# Patient Record
Sex: MALE | Race: WHITE | NOT HISPANIC OR LATINO | Employment: OTHER | URBAN - METROPOLITAN AREA
[De-identification: names, ages, dates, MRNs, and addresses within clinical notes are randomized per-mention and may not be internally consistent; named-entity substitution may affect disease eponyms.]

---

## 2018-01-04 ENCOUNTER — APPOINTMENT (OUTPATIENT)
Dept: GENERAL RADIOLOGY | Facility: HOSPITAL | Age: 68
End: 2018-01-04

## 2018-01-04 ENCOUNTER — HOSPITAL ENCOUNTER (OUTPATIENT)
Facility: HOSPITAL | Age: 68
Setting detail: OBSERVATION
LOS: 1 days | Discharge: HOME | End: 2018-01-05
Attending: EMERGENCY MEDICINE | Admitting: INTERNAL MEDICINE

## 2018-01-04 DIAGNOSIS — I48.91 ATRIAL FIBRILLATION WITH RVR (HCC): Primary | ICD-10-CM

## 2018-01-04 LAB
ALBUMIN SERPL-MCNC: 4 G/DL (ref 3.5–5.2)
ALBUMIN/GLOB SERPL: 1.3 G/DL
ALP SERPL-CCNC: 82 U/L (ref 39–117)
ALT SERPL W P-5'-P-CCNC: 6 U/L (ref 1–41)
ANION GAP SERPL CALCULATED.3IONS-SCNC: 16 MMOL/L
AST SERPL-CCNC: 10 U/L (ref 1–40)
BASOPHILS # BLD AUTO: 0.02 10*3/MM3 (ref 0–0.2)
BASOPHILS NFR BLD AUTO: 0.2 % (ref 0–1.5)
BILIRUB SERPL-MCNC: 1 MG/DL (ref 0.1–1.2)
BUN BLD-MCNC: 21 MG/DL (ref 8–23)
BUN/CREAT SERPL: 19.3 (ref 7–25)
CALCIUM SPEC-SCNC: 8.6 MG/DL (ref 8.6–10.5)
CHLORIDE SERPL-SCNC: 99 MMOL/L (ref 98–107)
CO2 SERPL-SCNC: 21 MMOL/L (ref 22–29)
CREAT BLD-MCNC: 1.09 MG/DL (ref 0.76–1.27)
DEPRECATED RDW RBC AUTO: 43.6 FL (ref 37–54)
EOSINOPHIL # BLD AUTO: 0.04 10*3/MM3 (ref 0–0.7)
EOSINOPHIL NFR BLD AUTO: 0.5 % (ref 0.3–6.2)
ERYTHROCYTE [DISTWIDTH] IN BLOOD BY AUTOMATED COUNT: 14.2 % (ref 11.5–14.5)
GFR SERPL CREATININE-BSD FRML MDRD: 67 ML/MIN/1.73
GLOBULIN UR ELPH-MCNC: 3 GM/DL
GLUCOSE BLD-MCNC: 244 MG/DL (ref 65–99)
GLUCOSE BLDC GLUCOMTR-MCNC: 214 MG/DL (ref 70–130)
HCT VFR BLD AUTO: 42.7 % (ref 40.4–52.2)
HGB BLD-MCNC: 14.4 G/DL (ref 13.7–17.6)
HOLD SPECIMEN: NORMAL
HOLD SPECIMEN: NORMAL
IMM GRANULOCYTES # BLD: 0 10*3/MM3 (ref 0–0.03)
IMM GRANULOCYTES NFR BLD: 0 % (ref 0–0.5)
LYMPHOCYTES # BLD AUTO: 0.55 10*3/MM3 (ref 0.9–4.8)
LYMPHOCYTES NFR BLD AUTO: 6.6 % (ref 19.6–45.3)
MCH RBC QN AUTO: 28.4 PG (ref 27–32.7)
MCHC RBC AUTO-ENTMCNC: 33.7 G/DL (ref 32.6–36.4)
MCV RBC AUTO: 84.2 FL (ref 79.8–96.2)
MONOCYTES # BLD AUTO: 0.27 10*3/MM3 (ref 0.2–1.2)
MONOCYTES NFR BLD AUTO: 3.2 % (ref 5–12)
NEUTROPHILS # BLD AUTO: 7.51 10*3/MM3 (ref 1.9–8.1)
NEUTROPHILS NFR BLD AUTO: 89.5 % (ref 42.7–76)
PLATELET # BLD AUTO: 211 10*3/MM3 (ref 140–500)
PMV BLD AUTO: 10.9 FL (ref 6–12)
POTASSIUM BLD-SCNC: 4 MMOL/L (ref 3.5–5.2)
PROT SERPL-MCNC: 7 G/DL (ref 6–8.5)
RBC # BLD AUTO: 5.07 10*6/MM3 (ref 4.6–6)
SODIUM BLD-SCNC: 136 MMOL/L (ref 136–145)
TROPONIN T SERPL-MCNC: <0.01 NG/ML (ref 0–0.03)
TROPONIN T SERPL-MCNC: <0.01 NG/ML (ref 0–0.03)
WBC NRBC COR # BLD: 8.39 10*3/MM3 (ref 4.5–10.7)
WHOLE BLOOD HOLD SPECIMEN: NORMAL
WHOLE BLOOD HOLD SPECIMEN: NORMAL

## 2018-01-04 PROCEDURE — 93005 ELECTROCARDIOGRAM TRACING: CPT

## 2018-01-04 PROCEDURE — 99284 EMERGENCY DEPT VISIT MOD MDM: CPT

## 2018-01-04 PROCEDURE — 25010000002 ENOXAPARIN PER 10 MG: Performed by: INTERNAL MEDICINE

## 2018-01-04 PROCEDURE — 85025 COMPLETE CBC W/AUTO DIFF WBC: CPT | Performed by: EMERGENCY MEDICINE

## 2018-01-04 PROCEDURE — 84484 ASSAY OF TROPONIN QUANT: CPT | Performed by: NURSE PRACTITIONER

## 2018-01-04 PROCEDURE — 80053 COMPREHEN METABOLIC PANEL: CPT | Performed by: EMERGENCY MEDICINE

## 2018-01-04 PROCEDURE — 96372 THER/PROPH/DIAG INJ SC/IM: CPT

## 2018-01-04 PROCEDURE — 99223 1ST HOSP IP/OBS HIGH 75: CPT | Performed by: INTERNAL MEDICINE

## 2018-01-04 PROCEDURE — 93005 ELECTROCARDIOGRAM TRACING: CPT | Performed by: NURSE PRACTITIONER

## 2018-01-04 PROCEDURE — 63710000001 INSULIN ASPART PER 5 UNITS: Performed by: INTERNAL MEDICINE

## 2018-01-04 PROCEDURE — 84484 ASSAY OF TROPONIN QUANT: CPT | Performed by: EMERGENCY MEDICINE

## 2018-01-04 PROCEDURE — 71046 X-RAY EXAM CHEST 2 VIEWS: CPT

## 2018-01-04 PROCEDURE — 82962 GLUCOSE BLOOD TEST: CPT

## 2018-01-04 PROCEDURE — 93010 ELECTROCARDIOGRAM REPORT: CPT | Performed by: INTERNAL MEDICINE

## 2018-01-04 PROCEDURE — 36415 COLL VENOUS BLD VENIPUNCTURE: CPT | Performed by: EMERGENCY MEDICINE

## 2018-01-04 RX ORDER — CITALOPRAM 10 MG/1
10 TABLET ORAL DAILY
Status: DISCONTINUED | OUTPATIENT
Start: 2018-01-05 | End: 2018-01-05 | Stop reason: HOSPADM

## 2018-01-04 RX ORDER — CITALOPRAM 20 MG/1
20 TABLET ORAL DAILY
COMMUNITY

## 2018-01-04 RX ORDER — LUBIPROSTONE 24 UG/1
24 CAPSULE ORAL
COMMUNITY
End: 2019-08-16 | Stop reason: ALTCHOICE

## 2018-01-04 RX ORDER — MECLIZINE HYDROCHLORIDE 25 MG/1
25 TABLET ORAL 3 TIMES DAILY PRN
Status: DISCONTINUED | OUTPATIENT
Start: 2018-01-04 | End: 2018-01-05 | Stop reason: HOSPADM

## 2018-01-04 RX ORDER — ACETAMINOPHEN 325 MG/1
650 TABLET ORAL EVERY 6 HOURS PRN
Status: DISCONTINUED | OUTPATIENT
Start: 2018-01-04 | End: 2018-01-05 | Stop reason: HOSPADM

## 2018-01-04 RX ORDER — DEXTROSE MONOHYDRATE 25 G/50ML
25 INJECTION, SOLUTION INTRAVENOUS
Status: DISCONTINUED | OUTPATIENT
Start: 2018-01-04 | End: 2018-01-05 | Stop reason: HOSPADM

## 2018-01-04 RX ORDER — ALPRAZOLAM 0.25 MG/1
0.25 TABLET ORAL 4 TIMES DAILY PRN
Status: DISCONTINUED | OUTPATIENT
Start: 2018-01-04 | End: 2018-01-05 | Stop reason: HOSPADM

## 2018-01-04 RX ORDER — GABAPENTIN 300 MG/1
300 CAPSULE ORAL 3 TIMES DAILY
Status: DISCONTINUED | OUTPATIENT
Start: 2018-01-04 | End: 2018-01-05 | Stop reason: HOSPADM

## 2018-01-04 RX ORDER — NICOTINE POLACRILEX 4 MG
15 LOZENGE BUCCAL
Status: DISCONTINUED | OUTPATIENT
Start: 2018-01-04 | End: 2018-01-05 | Stop reason: HOSPADM

## 2018-01-04 RX ORDER — GLIPIZIDE 10 MG/1
5 TABLET, FILM COATED, EXTENDED RELEASE ORAL DAILY
Status: ON HOLD | COMMUNITY
Start: 2012-10-22 | End: 2022-03-23

## 2018-01-04 RX ORDER — LISINOPRIL 20 MG/1
20 TABLET ORAL DAILY
Status: DISCONTINUED | OUTPATIENT
Start: 2018-01-05 | End: 2018-01-05 | Stop reason: HOSPADM

## 2018-01-04 RX ORDER — OXYBUTYNIN CHLORIDE 10 MG/1
10 TABLET, EXTENDED RELEASE ORAL DAILY
Status: DISCONTINUED | OUTPATIENT
Start: 2018-01-05 | End: 2018-01-05 | Stop reason: HOSPADM

## 2018-01-04 RX ORDER — LISINOPRIL 20 MG/1
20 TABLET ORAL DAILY
COMMUNITY
End: 2018-02-02 | Stop reason: ALTCHOICE

## 2018-01-04 RX ORDER — ASPIRIN 325 MG
325 TABLET ORAL ONCE
Status: DISCONTINUED | OUTPATIENT
Start: 2018-01-04 | End: 2018-01-04

## 2018-01-04 RX ORDER — ALLOPURINOL 100 MG/1
100 TABLET ORAL DAILY
COMMUNITY

## 2018-01-04 RX ORDER — TRAZODONE HYDROCHLORIDE 50 MG/1
25 TABLET ORAL NIGHTLY
COMMUNITY

## 2018-01-04 RX ORDER — GLIPIZIDE 10 MG/1
10 TABLET, FILM COATED, EXTENDED RELEASE ORAL
Status: DISCONTINUED | OUTPATIENT
Start: 2018-01-05 | End: 2018-01-05 | Stop reason: HOSPADM

## 2018-01-04 RX ORDER — ALLOPURINOL 100 MG/1
100 TABLET ORAL DAILY
Status: DISCONTINUED | OUTPATIENT
Start: 2018-01-05 | End: 2018-01-05 | Stop reason: HOSPADM

## 2018-01-04 RX ORDER — TRAZODONE HYDROCHLORIDE 50 MG/1
50 TABLET ORAL NIGHTLY
Status: DISCONTINUED | OUTPATIENT
Start: 2018-01-04 | End: 2018-01-05 | Stop reason: HOSPADM

## 2018-01-04 RX ORDER — SODIUM CHLORIDE 0.9 % (FLUSH) 0.9 %
10 SYRINGE (ML) INJECTION AS NEEDED
Status: DISCONTINUED | OUTPATIENT
Start: 2018-01-04 | End: 2018-01-05 | Stop reason: HOSPADM

## 2018-01-04 RX ORDER — LUBIPROSTONE 24 UG/1
24 CAPSULE ORAL 2 TIMES DAILY WITH MEALS
Status: DISCONTINUED | OUTPATIENT
Start: 2018-01-04 | End: 2018-01-05 | Stop reason: HOSPADM

## 2018-01-04 RX ORDER — SODIUM CHLORIDE 0.9 % (FLUSH) 0.9 %
1-10 SYRINGE (ML) INJECTION AS NEEDED
Status: DISCONTINUED | OUTPATIENT
Start: 2018-01-04 | End: 2018-01-05 | Stop reason: HOSPADM

## 2018-01-04 RX ORDER — ONDANSETRON 2 MG/ML
4 INJECTION INTRAMUSCULAR; INTRAVENOUS EVERY 6 HOURS PRN
Status: DISCONTINUED | OUTPATIENT
Start: 2018-01-04 | End: 2018-01-05 | Stop reason: HOSPADM

## 2018-01-04 RX ORDER — MECLIZINE HYDROCHLORIDE 25 MG/1
25 TABLET ORAL 3 TIMES DAILY PRN
COMMUNITY
End: 2018-12-14 | Stop reason: HOSPADM

## 2018-01-04 RX ORDER — OXYBUTYNIN CHLORIDE 10 MG/1
10 TABLET, EXTENDED RELEASE ORAL DAILY
COMMUNITY
End: 2020-09-11

## 2018-01-04 RX ORDER — GABAPENTIN 300 MG/1
300 CAPSULE ORAL DAILY
COMMUNITY
End: 2018-08-10 | Stop reason: ALTCHOICE

## 2018-01-04 RX ORDER — ATORVASTATIN CALCIUM 10 MG/1
10 TABLET, FILM COATED ORAL DAILY
Status: DISCONTINUED | OUTPATIENT
Start: 2018-01-05 | End: 2018-01-05 | Stop reason: HOSPADM

## 2018-01-04 RX ORDER — CARVEDILOL 3.12 MG/1
3.12 TABLET ORAL EVERY 12 HOURS SCHEDULED
Status: DISCONTINUED | OUTPATIENT
Start: 2018-01-04 | End: 2018-01-05 | Stop reason: HOSPADM

## 2018-01-04 RX ADMIN — GABAPENTIN 300 MG: 300 CAPSULE ORAL at 23:07

## 2018-01-04 RX ADMIN — INSULIN ASPART 4 UNITS: 100 INJECTION, SOLUTION INTRAVENOUS; SUBCUTANEOUS at 21:25

## 2018-01-04 RX ADMIN — TRAZODONE HYDROCHLORIDE 50 MG: 50 TABLET, FILM COATED ORAL at 23:08

## 2018-01-04 RX ADMIN — LUBIPROSTONE 24 MCG: 24 CAPSULE, GELATIN COATED ORAL at 23:08

## 2018-01-04 RX ADMIN — ENOXAPARIN SODIUM 100 MG: 100 INJECTION SUBCUTANEOUS at 23:12

## 2018-01-04 RX ADMIN — DILTIAZEM HYDROCHLORIDE 5 MG/HR: 100 INJECTION, POWDER, LYOPHILIZED, FOR SOLUTION INTRAVENOUS at 16:15

## 2018-01-04 RX ADMIN — ENOXAPARIN SODIUM 100 MG: 100 INJECTION SUBCUTANEOUS at 18:16

## 2018-01-04 RX ADMIN — CARVEDILOL 3.12 MG: 3.12 TABLET, FILM COATED ORAL at 21:25

## 2018-01-04 NOTE — ED PROVIDER NOTES
Pt is a 67 y.o. Male reporting to the ED with palpitations, onset a few hours ago today. Pt states that he was seen by his PCP for evaluation for disability. At this time the pt had an EKG performed which showed an abnormal, rapid heart rate. Pt also c/o SOA for 1.5 weeks. Pt denies CP or cough. The pt reports he is not currently on any blood thinners. On exam, lungs CTAB with faint crackles in bilateral bases, heart has irregularly irregular rhythm with HR in the 90s-120s on Cardizem, R chest wall there is a brain stimulator secondary to Parkinson's, abd is soft, non-tender, no BLE edema, and intact distal pulses. He is stable and no distress.     Pt had afib on the monitor and on my initial examination.     1725 - Consulted with Dr. Velasquez ([facility]) who agrees to admit the pt.     1735 - Rechecked pt. Pt is resting comfortably, and appears to have converted back into NSR.  Informed pt of the plan for admission for his new onset afib. He agrees with the plan for admission. All questions answered. He states he has no contraindications to anticoagulation.     EKG           EKG time: 1531  Rhythm/Rate: afib, 123  P waves and WV: afib  QRS, axis: narrow complex, normal axis   ST and T waves: nonspecific changes     Interpreted Contemporaneously by me, independently viewed  No prior records for comparison      I supervised care provided by the midlevel provider.    We have discussed this patient's history, physical exam, and treatment plan.   I have reviewed the note and personally saw and examined the patient and agree with the plan of care.    Documentation assistance provided by bob Lugo for Dr. Walsh.  Information recorded by the bob was done at my direction and has been verified and validated by me.         Ramo Lugo  01/04/18 1824       Tony Walsh MD  01/04/18 1910

## 2018-01-04 NOTE — ED PROVIDER NOTES
"  EMERGENCY DEPARTMENT ENCOUNTER    CHIEF COMPLAINT  Chief Complaint: rapid heart rate  History given by: patient, family  History limited by: N/A  Room Number: 41/41  PMD: Harvey Larson MD      HPI:  Pt is a 67 y.o. male who presents with rapid heart rate. Per family, while pt was at routine visit at PMD's office earlier today at the [facility], he underwent EKG that showed \"irregular rhythm and elevated HR\". Pt states that consequently, PMD referred him to ED for further evaluation. He reports that today, he has had intermittent, waxing and waning rapid palpitations, dyspnea, and heaviness to left chest. He also notes that he had nausea and 2 episodes of vomiting yesterday, both of which have resolved. He denies weakness, dizziness, sweating, fevers, chills, diarrhea, abd pain, cough, hx of atrial fibrillation, and hx of heart disease. He took 81mg ASA earlier today. Past Medical History of Parkinson's disease (has brain stimulator in place), diabetes, and HTN.     Duration: noticed today  Timing: unknown  Location: cardiovascular  Radiation: none  Quality: \"irregular rhythm and elevated HR\"  Intensity/Severity: moderate  Progression: unknown  Associated Symptoms: rapid palpitations, dyspnea, heaviness to left chest, nausea (occurred yesterday, resolved), vomiting x2 (occurred yesterday, resolved)  Aggravating Factors: none   Alleviating Factors: none  Previous Episodes: none  Treatment before arrival: none    PAST MEDICAL HISTORY  Active Ambulatory Problems     Diagnosis Date Noted   • ASCVD (arteriosclerotic cardiovascular disease) 01/27/2016   • Chronic atrial fibrillation 01/27/2016   • Anticoagulated on warfarin 01/27/2016   • Diabetic retinopathy associated with type 2 diabetes mellitus 01/27/2016   • Essential hypertension 01/27/2016   • HLD (hyperlipidemia) 01/27/2016   • Hypothyroidism 01/27/2016   • Peripheral neuropathy 01/27/2016   • Type 2 diabetes mellitus 01/27/2016   • Renal insufficiency " 01/27/2016     Resolved Ambulatory Problems     Diagnosis Date Noted   • No Resolved Ambulatory Problems     Past Medical History:   Diagnosis Date   • Hypertension    • Parkinson's disease        PAST SURGICAL HISTORY  Past Surgical History:   Procedure Laterality Date   • BRAIN STIMULATOR     • JOINT REPLACEMENT      knee, shoulder       FAMILY HISTORY  History reviewed. No pertinent family history.    SOCIAL HISTORY  Social History     Social History   • Marital status:      Spouse name: N/A   • Number of children: N/A   • Years of education: N/A     Occupational History   • Not on file.     Social History Main Topics   • Smoking status: Never Smoker   • Smokeless tobacco: Not on file   • Alcohol use No   • Drug use: No   • Sexual activity: Defer     Other Topics Concern   • Not on file     Social History Narrative   • No narrative on file         ALLERGIES  Bacitracin; Fish-derived products; Neosporin [neomycin-bacitracin zn-polymyx]; and Shrimp (diagnostic)    REVIEW OF SYSTEMS  Review of Systems   Constitutional: Negative for chills and fever.   HENT: Negative for sore throat.    Respiratory: Positive for shortness of breath. Negative for cough.    Cardiovascular: Positive for chest pain (heaviness to left chest) and palpitations (rapid palpitations).   Gastrointestinal: Positive for nausea (occurred yesterday, resolved) and vomiting (x2, occurred yesterday, resolved). Negative for abdominal pain and diarrhea.   Genitourinary: Negative for difficulty urinating and dysuria.   Musculoskeletal: Negative for back pain.   Skin: Negative for rash.   Neurological: Negative for dizziness and weakness.   Psychiatric/Behavioral: The patient is not nervous/anxious.        PHYSICAL EXAM  ED Triage Vitals   Temp Heart Rate Resp BP SpO2   01/04/18 1216 01/04/18 1138 01/04/18 1138 01/04/18 1138 01/04/18 1138   97.7 °F (36.5 °C) 77 20 111/77 95 % WNL       Physical Exam   Constitutional: He is oriented to person,  place, and time and well-developed, well-nourished, and in no distress. No distress.   HENT:   Head: Normocephalic and atraumatic.   Mouth/Throat: Mucous membranes are normal.   Eyes: EOM are normal. No scleral icterus.   Neck: Normal range of motion.   Cardiovascular: Normal heart sounds.  An irregularly irregular rhythm present. Tachycardia present.    HR is in 160s   Pulmonary/Chest: Effort normal and breath sounds normal. No respiratory distress.   Abdominal: Soft. There is no tenderness. There is no rebound and no guarding.   Musculoskeletal: Normal range of motion. He exhibits no edema (no pedal edema).   Neurological: He is alert and oriented to person, place, and time.   Skin: Skin is warm and dry.   Psychiatric: Mood and affect normal.   Nursing note and vitals reviewed.      LAB RESULTS  Recent Results (from the past 24 hour(s))   Comprehensive Metabolic Panel    Collection Time: 01/04/18 12:44 PM   Result Value Ref Range    Glucose 244 (H) 65 - 99 mg/dL    BUN 21 8 - 23 mg/dL    Creatinine 1.09 0.76 - 1.27 mg/dL    Sodium 136 136 - 145 mmol/L    Potassium 4.0 3.5 - 5.2 mmol/L    Chloride 99 98 - 107 mmol/L    CO2 21.0 (L) 22.0 - 29.0 mmol/L    Calcium 8.6 8.6 - 10.5 mg/dL    Total Protein 7.0 6.0 - 8.5 g/dL    Albumin 4.00 3.50 - 5.20 g/dL    ALT (SGPT) 6 1 - 41 U/L    AST (SGOT) 10 1 - 40 U/L    Alkaline Phosphatase 82 39 - 117 U/L    Total Bilirubin 1.0 0.1 - 1.2 mg/dL    eGFR Non African Amer 67 >60 mL/min/1.73    Globulin 3.0 gm/dL    A/G Ratio 1.3 g/dL    BUN/Creatinine Ratio 19.3 7.0 - 25.0    Anion Gap 16.0 mmol/L   Troponin    Collection Time: 01/04/18 12:44 PM   Result Value Ref Range    Troponin T <0.010 0.000 - 0.030 ng/mL   Light Blue Top    Collection Time: 01/04/18 12:44 PM   Result Value Ref Range    Extra Tube hold for add-on    Green Top (Gel)    Collection Time: 01/04/18 12:44 PM   Result Value Ref Range    Extra Tube Hold for add-ons.    Lavender Top    Collection Time: 01/04/18 12:44  PM   Result Value Ref Range    Extra Tube hold for add-on    Gold Top - SST    Collection Time: 01/04/18 12:44 PM   Result Value Ref Range    Extra Tube Hold for add-ons.    CBC Auto Differential    Collection Time: 01/04/18 12:44 PM   Result Value Ref Range    WBC 8.39 4.50 - 10.70 10*3/mm3    RBC 5.07 4.60 - 6.00 10*6/mm3    Hemoglobin 14.4 13.7 - 17.6 g/dL    Hematocrit 42.7 40.4 - 52.2 %    MCV 84.2 79.8 - 96.2 fL    MCH 28.4 27.0 - 32.7 pg    MCHC 33.7 32.6 - 36.4 g/dL    RDW 14.2 11.5 - 14.5 %    RDW-SD 43.6 37.0 - 54.0 fl    MPV 10.9 6.0 - 12.0 fL    Platelets 211 140 - 500 10*3/mm3    Neutrophil % 89.5 (H) 42.7 - 76.0 %    Lymphocyte % 6.6 (L) 19.6 - 45.3 %    Monocyte % 3.2 (L) 5.0 - 12.0 %    Eosinophil % 0.5 0.3 - 6.2 %    Basophil % 0.2 0.0 - 1.5 %    Immature Grans % 0.0 0.0 - 0.5 %    Neutrophils, Absolute 7.51 1.90 - 8.10 10*3/mm3    Lymphocytes, Absolute 0.55 (L) 0.90 - 4.80 10*3/mm3    Monocytes, Absolute 0.27 0.20 - 1.20 10*3/mm3    Eosinophils, Absolute 0.04 0.00 - 0.70 10*3/mm3    Basophils, Absolute 0.02 0.00 - 0.20 10*3/mm3    Immature Grans, Absolute 0.00 0.00 - 0.03 10*3/mm3   Troponin    Collection Time: 01/04/18  4:00 PM   Result Value Ref Range    Troponin T <0.010 0.000 - 0.030 ng/mL       I ordered the above labs and reviewed the results      RADIOLOGY            XR Chest 2 View (Final result) Result time: 01/04/18 13:17:23     Final result by Myron Rutledge MD (01/04/18 13:17:23)     Impression:     No focal pulmonary consolidation. Follow-up as clinical  indications persist.     This report was finalized on 1/4/2018 1:17 PM by Dr. Myron Rutledge MD.        Narrative:     XR CHEST 2 VW-     HISTORY: Male who is 67 years-old,  chest pain     TECHNIQUE: Frontal and lateral views of the chest     COMPARISON: 11/16/2015     FINDINGS: Heart size is normal. Pulmonary vasculature is unremarkable.  Aorta is tortuous. Right-sided generator device is seen with leads  extending to the  right aspect of the neck, both the image. No focal  pulmonary consolidation, pleural effusion, or pneumothorax. No acute  osseous process.                   I ordered the above noted radiological studies and reviewed the images on the PACS system.        EKG    EKG was interpreted by Dr. Walsh. See Dr Walsh's note for EKG interpretation.       PROGRESS AND CONSULTS  3:42 PM- Reviewed pt's history and workup with Dr. Walsh.  At bedside evaluation, they agree with the plan of care.  3:47 PM- EKG shows atrial fibrillation with RVR. Ordered cardizem drip and bolus for rate control.   3:50 PM- Informed pt and family that pt's EKG shows new onset atrial fibrillation with RVR for which pt will be administered medication for rate control. They verbalize understanding and agreement with plan.  5:07 PM- Rechecked pt. He is resting comfortably and is in no acute distress. HR is variable between 100s to 120s, atrial fibrillation.   5:09 PM- Sent call out to [facility] for admission.   5:33 PM- Discussed case with Dr Louis (cardiologist)  Reviewed history, exam, results and treatments.  Discussed concerns and plan of care. Dr Louis accepts pt to be admitted to telemetry.  5:35 PM- Rechecked pt. He continues to rest comfortably and remains in no acute distress. HR is in 70s. Rhythm has now converted to sinus rhythm on the heart monitor. BP is 113/63. O2 sat is 95% on room air. Discussed with pt and family about all pertinent results including CXR findings (no acute process), normal renal function, normal WBC count, and negative troponin. Informed pt and family that per heart monitor, pt has now converted to sinus rhythm. Discussed pt admission for further care, cardiology evaluation, and observation. Pt and family verbalize understanding and agreement with plan.   6:08 PM- Pt and family state that pt has no contraindication for anticoagulation. Ordered lovenox for anticoagulation.  6:48 PM- Pt's  "cardizem has been discontinued.       ADMISSION    Discussed treatment plan and reason for admission with pt/family and admitting physician.  Pt/family voiced understanding of the plan for admission for further testing/treatment as needed.      DIAGNOSIS  Final diagnoses:   New onset atrial fibrillation with RVR           COURSE & MEDICAL DECISION MAKING  Pertinent Labs and Imaging studies that were ordered and reviewed are noted above.  Results were reviewed/discussed with the patient and family and they were also made aware of online assess.   Pt and family also made aware that some labs, such as cultures, will not be resulted during ER visit and follow up with PMD is necessary.     MEDICATIONS GIVEN IN ER  Medications   sodium chloride 0.9 % flush 10 mL (not administered)   aspirin tablet 325 mg (325 mg Oral Not Given 1/4/18 1523)   diltiaZEM (CARDIZEM) IVPB 100 mg/100 mL (1 mg/mL) NS (add-vantage) (5 mg/hr Intravenous New Bag 1/4/18 1615)   diltiazem (CARDIZEM) bolus from bag 1 mg/mL 5 mg (5 mg Intravenous Bolus from Bag 1/4/18 1615)   enoxaparin (LOVENOX) syringe 100 mg (100 mg Subcutaneous Given 1/4/18 1816)       /76  Pulse 89  Temp 97.7 °F (36.5 °C) (Tympanic)   Resp 20  Ht 185.4 cm (73\")  Wt 103 kg (227 lb)  SpO2 94%  BMI 29.95 kg/m2      I personally reviewed the past medical history, past surgical history, social history, family history, current medications and allergies as they appear in this chart.  The scribe's note accurately reflects the work and decisions made by me.     Documentation assistance provided by bob Sparks for ELVIS Ashraf on 1/4/2018 at 6:28 PM. Information recorded by the bob was done at my direction and has been verified and validated by me.     Yaron Sparks  01/04/18 6538       ESME Plascencia  01/04/18 1856    "

## 2018-01-05 ENCOUNTER — APPOINTMENT (OUTPATIENT)
Dept: CARDIOLOGY | Facility: HOSPITAL | Age: 68
End: 2018-01-05
Attending: INTERNAL MEDICINE

## 2018-01-05 VITALS
HEART RATE: 56 BPM | RESPIRATION RATE: 18 BRPM | TEMPERATURE: 97.6 F | BODY MASS INDEX: 29.95 KG/M2 | HEIGHT: 73 IN | OXYGEN SATURATION: 95 % | WEIGHT: 226 LBS | DIASTOLIC BLOOD PRESSURE: 82 MMHG | SYSTOLIC BLOOD PRESSURE: 107 MMHG

## 2018-01-05 LAB
ALBUMIN SERPL-MCNC: 3.5 G/DL (ref 3.5–5.2)
ALBUMIN/GLOB SERPL: 1.3 G/DL
ALP SERPL-CCNC: 63 U/L (ref 39–117)
ALT SERPL W P-5'-P-CCNC: <5 U/L (ref 1–41)
ANION GAP SERPL CALCULATED.3IONS-SCNC: 14.1 MMOL/L
ANION GAP SERPL CALCULATED.3IONS-SCNC: 14.1 MMOL/L
AST SERPL-CCNC: 8 U/L (ref 1–40)
BH CV ECHO MEAS - ACS: 2.3 CM
BH CV ECHO MEAS - AO MEAN PG (FULL): 0 MMHG
BH CV ECHO MEAS - AO MEAN PG: 3 MMHG
BH CV ECHO MEAS - AO ROOT AREA (BSA CORRECTED): 1.7
BH CV ECHO MEAS - AO ROOT AREA: 9.6 CM^2
BH CV ECHO MEAS - AO ROOT DIAM: 3.5 CM
BH CV ECHO MEAS - AO V2 MEAN: 81.4 CM/SEC
BH CV ECHO MEAS - AO V2 VTI: 24.6 CM
BH CV ECHO MEAS - AVA(I,A): 4 CM^2
BH CV ECHO MEAS - AVA(I,D): 4 CM^2
BH CV ECHO MEAS - BSA(HAYCOCK): 2.2 M^2
BH CV ECHO MEAS - BSA: 2.1 M^2
BH CV ECHO MEAS - BZI_BMI: 37.6 KILOGRAMS/M^2
BH CV ECHO MEAS - BZI_METRIC_HEIGHT: 165.1 CM
BH CV ECHO MEAS - BZI_METRIC_WEIGHT: 102.5 KG
BH CV ECHO MEAS - CONTRAST EF (2CH): 63.3 ML/M^2
BH CV ECHO MEAS - CONTRAST EF 4CH: 66.4 ML/M^2
BH CV ECHO MEAS - EDV(CUBED): 85.2 ML
BH CV ECHO MEAS - EDV(MOD-SP2): 79 ML
BH CV ECHO MEAS - EDV(MOD-SP4): 128 ML
BH CV ECHO MEAS - EDV(TEICH): 87.7 ML
BH CV ECHO MEAS - EF(CUBED): 74.2 %
BH CV ECHO MEAS - EF(MOD-SP2): 63.3 %
BH CV ECHO MEAS - EF(MOD-SP4): 66.4 %
BH CV ECHO MEAS - EF(TEICH): 66.3 %
BH CV ECHO MEAS - ESV(CUBED): 22 ML
BH CV ECHO MEAS - ESV(MOD-SP2): 29 ML
BH CV ECHO MEAS - ESV(MOD-SP4): 43 ML
BH CV ECHO MEAS - ESV(TEICH): 29.6 ML
BH CV ECHO MEAS - FS: 36.4 %
BH CV ECHO MEAS - IVS/LVPW: 1
BH CV ECHO MEAS - IVSD: 1.2 CM
BH CV ECHO MEAS - LAT PEAK E' VEL: 8 CM/SEC
BH CV ECHO MEAS - LV DIASTOLIC VOL/BSA (35-75): 61.4 ML/M^2
BH CV ECHO MEAS - LV MASS(C)D: 191.3 GRAMS
BH CV ECHO MEAS - LV MASS(C)DI: 91.8 GRAMS/M^2
BH CV ECHO MEAS - LV MEAN PG: 3 MMHG
BH CV ECHO MEAS - LV SYSTOLIC VOL/BSA (12-30): 20.6 ML/M^2
BH CV ECHO MEAS - LV V1 MEAN: 80 CM/SEC
BH CV ECHO MEAS - LV V1 VTI: 26.1 CM
BH CV ECHO MEAS - LVIDD: 4.4 CM
BH CV ECHO MEAS - LVIDS: 2.8 CM
BH CV ECHO MEAS - LVLD AP2: 8.8 CM
BH CV ECHO MEAS - LVLD AP4: 9 CM
BH CV ECHO MEAS - LVLS AP2: 7.4 CM
BH CV ECHO MEAS - LVLS AP4: 7.6 CM
BH CV ECHO MEAS - LVOT AREA (M): 3.8 CM^2
BH CV ECHO MEAS - LVOT AREA: 3.8 CM^2
BH CV ECHO MEAS - LVOT DIAM: 2.2 CM
BH CV ECHO MEAS - LVPWD: 1.2 CM
BH CV ECHO MEAS - MED PEAK E' VEL: 5.9 CM/SEC
BH CV ECHO MEAS - MV A DUR: 0.23 SEC
BH CV ECHO MEAS - MV A MAX VEL: 44.9 CM/SEC
BH CV ECHO MEAS - MV DEC SLOPE: 275 CM/SEC^2
BH CV ECHO MEAS - MV DEC TIME: 0.23 SEC
BH CV ECHO MEAS - MV E MAX VEL: 83.4 CM/SEC
BH CV ECHO MEAS - MV E/A: 1.9
BH CV ECHO MEAS - MV MEAN PG: 1 MMHG
BH CV ECHO MEAS - MV P1/2T MAX VEL: 91.5 CM/SEC
BH CV ECHO MEAS - MV P1/2T: 97.5 MSEC
BH CV ECHO MEAS - MV V2 MEAN: 53.7 CM/SEC
BH CV ECHO MEAS - MV V2 VTI: 27.7 CM
BH CV ECHO MEAS - MVA P1/2T LCG: 2.4 CM^2
BH CV ECHO MEAS - MVA(P1/2T): 2.3 CM^2
BH CV ECHO MEAS - MVA(VTI): 3.6 CM^2
BH CV ECHO MEAS - PA ACC SLOPE: 692 CM/SEC^2
BH CV ECHO MEAS - PA ACC TIME: 0.12 SEC
BH CV ECHO MEAS - PA MAX PG: 2.8 MMHG
BH CV ECHO MEAS - PA PR(ACCEL): 25 MMHG
BH CV ECHO MEAS - PA V2 MAX: 84.2 CM/SEC
BH CV ECHO MEAS - PULM A REVS DUR: 0.15 SEC
BH CV ECHO MEAS - PULM A REVS VEL: 22.4 CM/SEC
BH CV ECHO MEAS - PULM DIAS VEL: 71 CM/SEC
BH CV ECHO MEAS - PULM S/D: 0.68
BH CV ECHO MEAS - PULM SYS VEL: 48.2 CM/SEC
BH CV ECHO MEAS - QP/QS: 0.59
BH CV ECHO MEAS - RAP SYSTOLE: 8 MMHG
BH CV ECHO MEAS - RV MEAN PG: 1 MMHG
BH CV ECHO MEAS - RV V1 MEAN: 44.3 CM/SEC
BH CV ECHO MEAS - RV V1 VTI: 15.3 CM
BH CV ECHO MEAS - RVOT AREA: 3.8 CM^2
BH CV ECHO MEAS - RVOT DIAM: 2.2 CM
BH CV ECHO MEAS - RVSP: 30 MMHG
BH CV ECHO MEAS - SI(AO): 113.6 ML/M^2
BH CV ECHO MEAS - SI(CUBED): 30.3 ML/M^2
BH CV ECHO MEAS - SI(LVOT): 47.6 ML/M^2
BH CV ECHO MEAS - SI(MOD-SP2): 24 ML/M^2
BH CV ECHO MEAS - SI(MOD-SP4): 40.8 ML/M^2
BH CV ECHO MEAS - SI(TEICH): 27.9 ML/M^2
BH CV ECHO MEAS - SV(AO): 236.7 ML
BH CV ECHO MEAS - SV(CUBED): 63.2 ML
BH CV ECHO MEAS - SV(LVOT): 99.2 ML
BH CV ECHO MEAS - SV(MOD-SP2): 50 ML
BH CV ECHO MEAS - SV(MOD-SP4): 85 ML
BH CV ECHO MEAS - SV(RVOT): 58.2 ML
BH CV ECHO MEAS - SV(TEICH): 58.1 ML
BH CV ECHO MEAS - TAPSE (>1.6): 2.3 CM2
BH CV ECHO MEAS - TR MAX VEL: 235 CM/SEC
BH CV XLRA - RV BASE: 3.8 CM
BH CV XLRA - RV LENGTH: 7.1 CM
BH CV XLRA - RV MID: 2.7 CM
BH CV XLRA - TDI S': 11.2 CM/SEC
BILIRUB SERPL-MCNC: 0.7 MG/DL (ref 0.1–1.2)
BUN BLD-MCNC: 21 MG/DL (ref 8–23)
BUN BLD-MCNC: 21 MG/DL (ref 8–23)
BUN/CREAT SERPL: 19.4 (ref 7–25)
BUN/CREAT SERPL: 20.8 (ref 7–25)
CALCIUM SPEC-SCNC: 8 MG/DL (ref 8.6–10.5)
CALCIUM SPEC-SCNC: 8 MG/DL (ref 8.6–10.5)
CHLORIDE SERPL-SCNC: 101 MMOL/L (ref 98–107)
CHLORIDE SERPL-SCNC: 102 MMOL/L (ref 98–107)
CO2 SERPL-SCNC: 22.9 MMOL/L (ref 22–29)
CO2 SERPL-SCNC: 22.9 MMOL/L (ref 22–29)
CREAT BLD-MCNC: 1.01 MG/DL (ref 0.76–1.27)
CREAT BLD-MCNC: 1.08 MG/DL (ref 0.76–1.27)
DEPRECATED RDW RBC AUTO: 45.1 FL (ref 37–54)
DEPRECATED RDW RBC AUTO: 45.4 FL (ref 37–54)
E/E' RATIO: 12.3
ERYTHROCYTE [DISTWIDTH] IN BLOOD BY AUTOMATED COUNT: 14.3 % (ref 11.5–14.5)
ERYTHROCYTE [DISTWIDTH] IN BLOOD BY AUTOMATED COUNT: 14.5 % (ref 11.5–14.5)
GFR SERPL CREATININE-BSD FRML MDRD: 68 ML/MIN/1.73
GFR SERPL CREATININE-BSD FRML MDRD: 73 ML/MIN/1.73
GLOBULIN UR ELPH-MCNC: 2.6 GM/DL
GLUCOSE BLD-MCNC: 139 MG/DL (ref 65–99)
GLUCOSE BLD-MCNC: 146 MG/DL (ref 65–99)
GLUCOSE BLDC GLUCOMTR-MCNC: 148 MG/DL (ref 70–130)
GLUCOSE BLDC GLUCOMTR-MCNC: 188 MG/DL (ref 70–130)
HCT VFR BLD AUTO: 36.6 % (ref 40.4–52.2)
HCT VFR BLD AUTO: 37.5 % (ref 40.4–52.2)
HGB BLD-MCNC: 12 G/DL (ref 13.7–17.6)
HGB BLD-MCNC: 12.1 G/DL (ref 13.7–17.6)
LEFT ATRIUM VOLUME INDEX: 48 ML/M2
MAGNESIUM SERPL-MCNC: 1.4 MG/DL (ref 1.6–2.4)
MAGNESIUM SERPL-MCNC: 1.4 MG/DL (ref 1.6–2.4)
MCH RBC QN AUTO: 27.9 PG (ref 27–32.7)
MCH RBC QN AUTO: 28 PG (ref 27–32.7)
MCHC RBC AUTO-ENTMCNC: 32.3 G/DL (ref 32.6–36.4)
MCHC RBC AUTO-ENTMCNC: 32.8 G/DL (ref 32.6–36.4)
MCV RBC AUTO: 85.5 FL (ref 79.8–96.2)
MCV RBC AUTO: 86.4 FL (ref 79.8–96.2)
PLATELET # BLD AUTO: 156 10*3/MM3 (ref 140–500)
PLATELET # BLD AUTO: 170 10*3/MM3 (ref 140–500)
PMV BLD AUTO: 10.4 FL (ref 6–12)
PMV BLD AUTO: 11 FL (ref 6–12)
POTASSIUM BLD-SCNC: 3.4 MMOL/L (ref 3.5–5.2)
POTASSIUM BLD-SCNC: 3.5 MMOL/L (ref 3.5–5.2)
PROT SERPL-MCNC: 6.1 G/DL (ref 6–8.5)
RBC # BLD AUTO: 4.28 10*6/MM3 (ref 4.6–6)
RBC # BLD AUTO: 4.34 10*6/MM3 (ref 4.6–6)
SODIUM BLD-SCNC: 138 MMOL/L (ref 136–145)
SODIUM BLD-SCNC: 139 MMOL/L (ref 136–145)
T4 FREE SERPL-MCNC: 1.09 NG/DL (ref 0.93–1.7)
TSH SERPL DL<=0.05 MIU/L-ACNC: 1.06 MIU/ML (ref 0.27–4.2)
WBC NRBC COR # BLD: 4.64 10*3/MM3 (ref 4.5–10.7)
WBC NRBC COR # BLD: 4.98 10*3/MM3 (ref 4.5–10.7)

## 2018-01-05 PROCEDURE — G0378 HOSPITAL OBSERVATION PER HR: HCPCS

## 2018-01-05 PROCEDURE — 93306 TTE W/DOPPLER COMPLETE: CPT

## 2018-01-05 PROCEDURE — 84443 ASSAY THYROID STIM HORMONE: CPT | Performed by: INTERNAL MEDICINE

## 2018-01-05 PROCEDURE — 25010000002 ENOXAPARIN PER 10 MG: Performed by: INTERNAL MEDICINE

## 2018-01-05 PROCEDURE — 93005 ELECTROCARDIOGRAM TRACING: CPT | Performed by: INTERNAL MEDICINE

## 2018-01-05 PROCEDURE — 80053 COMPREHEN METABOLIC PANEL: CPT | Performed by: INTERNAL MEDICINE

## 2018-01-05 PROCEDURE — 93306 TTE W/DOPPLER COMPLETE: CPT | Performed by: INTERNAL MEDICINE

## 2018-01-05 PROCEDURE — 63710000001 INSULIN ASPART PER 5 UNITS: Performed by: INTERNAL MEDICINE

## 2018-01-05 PROCEDURE — 85027 COMPLETE CBC AUTOMATED: CPT | Performed by: INTERNAL MEDICINE

## 2018-01-05 PROCEDURE — 83735 ASSAY OF MAGNESIUM: CPT | Performed by: INTERNAL MEDICINE

## 2018-01-05 PROCEDURE — 84439 ASSAY OF FREE THYROXINE: CPT | Performed by: INTERNAL MEDICINE

## 2018-01-05 PROCEDURE — 99232 SBSQ HOSP IP/OBS MODERATE 35: CPT | Performed by: INTERNAL MEDICINE

## 2018-01-05 PROCEDURE — 93010 ELECTROCARDIOGRAM REPORT: CPT | Performed by: INTERNAL MEDICINE

## 2018-01-05 PROCEDURE — 99217 PR OBSERVATION CARE DISCHARGE MANAGEMENT: CPT | Performed by: INTERNAL MEDICINE

## 2018-01-05 PROCEDURE — 96372 THER/PROPH/DIAG INJ SC/IM: CPT

## 2018-01-05 PROCEDURE — 82962 GLUCOSE BLOOD TEST: CPT

## 2018-01-05 RX ORDER — WARFARIN SODIUM 3 MG/1
3 TABLET ORAL
Status: DISCONTINUED | OUTPATIENT
Start: 2018-01-05 | End: 2018-01-05 | Stop reason: HOSPADM

## 2018-01-05 RX ORDER — CARVEDILOL 3.12 MG/1
3.12 TABLET ORAL EVERY 12 HOURS SCHEDULED
Qty: 30 TABLET | Refills: 11 | Status: SHIPPED | OUTPATIENT
Start: 2018-01-05 | End: 2018-01-22 | Stop reason: SDUPTHER

## 2018-01-05 RX ORDER — WARFARIN SODIUM 3 MG/1
3 TABLET ORAL
Qty: 30 TABLET | Refills: 6 | Status: SHIPPED | OUTPATIENT
Start: 2018-01-05 | End: 2018-01-22 | Stop reason: SDUPTHER

## 2018-01-05 RX ADMIN — CARVEDILOL 3.12 MG: 3.12 TABLET, FILM COATED ORAL at 09:01

## 2018-01-05 RX ADMIN — LUBIPROSTONE 24 MCG: 24 CAPSULE, GELATIN COATED ORAL at 09:01

## 2018-01-05 RX ADMIN — ENOXAPARIN SODIUM 100 MG: 100 INJECTION SUBCUTANEOUS at 12:32

## 2018-01-05 RX ADMIN — INSULIN ASPART 2 UNITS: 100 INJECTION, SOLUTION INTRAVENOUS; SUBCUTANEOUS at 12:33

## 2018-01-05 RX ADMIN — CITALOPRAM 10 MG: 10 TABLET, FILM COATED ORAL at 09:01

## 2018-01-05 RX ADMIN — ALLOPURINOL 100 MG: 100 TABLET ORAL at 09:01

## 2018-01-05 RX ADMIN — LISINOPRIL 20 MG: 20 TABLET ORAL at 09:01

## 2018-01-05 RX ADMIN — GLIPIZIDE 10 MG: 10 TABLET, FILM COATED, EXTENDED RELEASE ORAL at 09:01

## 2018-01-05 RX ADMIN — OXYBUTYNIN CHLORIDE 10 MG: 10 TABLET, FILM COATED, EXTENDED RELEASE ORAL at 09:01

## 2018-01-05 RX ADMIN — GABAPENTIN 300 MG: 300 CAPSULE ORAL at 09:00

## 2018-01-05 NOTE — PLAN OF CARE
Problem: Activity Intolerance (Adult)  Goal: Identify Related Risk Factors and Signs and Symptoms  Outcome: Ongoing (interventions implemented as appropriate)    Goal: Activity Tolerance  Outcome: Ongoing (interventions implemented as appropriate)    Goal: Effective Energy Conservation Techniques  Outcome: Ongoing (interventions implemented as appropriate)

## 2018-01-05 NOTE — PROGRESS NOTES
Hospital Follow Up    LOS:  LOS: 1 day   Patient Name: Edilberto Reeder  Age/Sex: 67 y.o. male  : 1950  MRN: [id number]    Day of Service: 18   Length of Stay: 1  Encounter Provider: Jimbo Fugn MD  Place of Service: [facility] CARDIOLOGY  Patient Care Team:  Harvey Larson MD as PCP - General  Harvey Larson MD as PCP - Family Medicine    Subjective:     Chief Complaint: Paroxysmal atrial fibrillation.    Interval History: Patient clinically is doing significantly better today.  He has remained in normal sinus rhythm overnight.    Objective:     Objective:  Temp:  [97.3 °F (36.3 °C)-97.7 °F (36.5 °C)] 97.7 °F (36.5 °C)  Heart Rate:  [] 55  Resp:  [18-20] 18  BP: (104-140)/(60-86) 104/75   No intake or output data in the 24 hours ending 18 0948  Body mass index is 29.82 kg/(m^2).  Last 3 weights    18  1216 18  1902 18  0932   Weight: 103 kg (227 lb) 103 kg (226 lb 4 oz) 103 kg (226 lb)     Weight change:       Physical Exam:   General : Alert, cooperative, in no acute distress.  Neuro: alert,cooperative and oriented  Lungs: CTAB. Normal respiratory effort and rate.  CV:: Regular rate and rhythm, normal S1 and S2, no murmurs, gallops or rubs.  ABD: Soft, nontender, non-distended. positive bowel sounds  Extr: No edema or cyanosis, moves all extremities    Lab Review:     Results from last 7 days  Lab Units 18  0626 18  0445 18  1244   SODIUM mmol/L 139 138 136   POTASSIUM mmol/L 3.4* 3.5 4.0   CHLORIDE mmol/L 102 101 99   CO2 mmol/L 22.9 22.9 21.0*   BUN mg/dL 21 21 21   CREATININE mg/dL 1.01 1.08 1.09   GLUCOSE mg/dL 146* 139* 244*   CALCIUM mg/dL 8.0* 8.0* 8.6   AST (SGOT) U/L 8  --  10   ALT (SGPT) U/L <5  --  6       Results from last 7 days  Lab Units 18  1600 18  1244   TROPONIN T ng/mL <0.010 <0.010       Results from last 7 days  Lab Units 18  0626 18  0445   WBC 10*3/mm3 4.64  4.98   HEMOGLOBIN g/dL 12.0* 12.1*   HEMATOCRIT % 36.6* 37.5*   PLATELETS 10*3/mm3 156 170           Results from last 7 days  Lab Units 01/05/18  0626 01/05/18  0445   MAGNESIUM mg/dL 1.4* 1.4*               Results from last 7 days  Lab Units 01/05/18  0445   TSH mIU/mL 1.060     I reviewed the patient's new clinical results.  I personally viewed and interpreted the patient's EKG  Current Medications:   Scheduled Meds:  allopurinol 100 mg Oral Daily   atorvastatin 10 mg Oral Daily   Carbidopa-Levodopa ER 36.25 mg Oral 4x Daily   carvedilol 3.125 mg Oral Q12H   citalopram 10 mg Oral Daily   enoxaparin 1 mg/kg Subcutaneous Q12H   gabapentin 300 mg Oral TID   glipiZIDE 10 mg Oral Daily With Breakfast   insulin aspart 0-9 Units Subcutaneous 4x Daily With Meals & Nightly   lisinopril 20 mg Oral Daily   lubiprostone 24 mcg Oral BID With Meals   oxybutynin XL 10 mg Oral Daily   traZODone 50 mg Oral Nightly     Continuous Infusions:  diltiaZEM 5 mg/hr Last Rate: 5 mg/hr (01/04/18 1615)       Allergies:  Allergies   Allergen Reactions   • Bacitracin Anaphylaxis   • Fish-Derived Products    • Neosporin [Neomycin-Bacitracin Zn-Polymyx]    • Shellfish Allergy    • Shrimp (Diagnostic)        Assessment:     Active Problems:    Atrial fibrillation with RVR        Plan:   1.  Paroxysmal atrial fibrillation.  He has stress test back in 2015.  Echocardiogram is currently pending.  I agree with Coreg.  If his ejection fraction is normal then I would discharge patient home on anticoagulation if these continue to [facility] I believe they cover Pradaxa the best although it is the least tolerated among the newer agents.  Possible discharge today.        Jimbo Fung MD  01/05/18  9:48 AM

## 2018-01-05 NOTE — DISCHARGE SUMMARY
Edilberto Reeder  [id number]    Date of Admit: 1/4/2018  Date of Discharge:  1/5/2018    Discharge Diagnosis:  Active Hospital Problems (** Indicates Principal Problem)    Diagnosis Date Noted   • Atrial fibrillation with RVR [I48.91] 01/04/2018      Resolved Hospital Problems    Diagnosis Date Noted Date Resolved   No resolved problems to display.           Hospital Course:   This is a 67 year old male with hx of Parkinsons, DM, & HTN. [facility] while [facility]. EKG found him to be in atrial flutter, and he converted to sinus after being started on a cardizem gtt. 3.125 mg of carvedilol was added to his regimen. He is now stable for discharge to home on coumadin and bridged with lovenox. He is to follow up with me in 4 weeks.     Procedures Performed    Echocardiogram on 1/5/18-  Interpretation Summary      · Left ventricular systolic function is normal. Calculated EF = 66.4%. Estimated EF was in agreement with the calculated EF. Normal left ventricular cavity size and wall thickness noted. All left ventricular wall segments contract normally.  · Left ventricular diastolic function is normal.  · No valvular stenosis or insufficiency noted.              Consults     Date and Time Order Name Status Description    1/4/2018 2618 Cardiology (on-call MD unless specified) Completed           Discharge Medications     Your medication list      START taking these medications       Instructions Last Dose Given Next Dose Due    carvedilol 3.125 MG tablet   Commonly known as:  COREG        Take 1 tablet by mouth Every 12 (Twelve) Hours.         enoxaparin 100 MG/ML solution syringe   Commonly known as:  LOVENOX        Inject 1 mL under the skin Every 12 (Twelve) Hours for 10 doses.         warfarin 3 MG tablet   Commonly known as:  COUMADIN        Take 1 tablet by mouth Daily. Please get INR checked on Monday 1/8/18.           CONTINUE taking these  medications       Instructions Last Dose Given Next Dose Due    allopurinol 100 MG tablet   Commonly known as:  ZYLOPRIM              CARBIDOPA-LEVODOPA EN              citalopram 10 MG tablet   Commonly known as:  CeleXA              gabapentin 300 MG capsule   Commonly known as:  NEURONTIN              glipiZIDE 10 MG 24 hr tablet   Commonly known as:  GLUCOTROL XL              lisinopril 20 MG tablet   Commonly known as:  PRINIVIL,ZESTRIL              lubiprostone 24 MCG capsule   Commonly known as:  AMITIZA              meclizine 25 MG tablet   Commonly known as:  ANTIVERT              metFORMIN 500 MG tablet   Commonly known as:  GLUCOPHAGE              oxybutynin XL 10 MG 24 hr tablet   Commonly known as:  DITROPAN-XL              SIMVASTATIN PO              traZODone 50 MG tablet   Commonly known as:  DESYREL                   Where to Get Your Medications      These medications were sent to [facility] - [address] [facility] - [phone]  - [phone] [address] [address]     Phone:  [phone]    • carvedilol 3.125 MG tablet   • enoxaparin 100 MG/ML solution syringe   • warfarin 3 MG tablet             Discharge Diet: healthy heart    Activity at Discharge: as tolerated    Discharge disposition: home    Condition on Discharge: stable    Follow-up Appointments  No future appointments.  Additional Instructions for the Follow-ups that You Need to Schedule     Discharge Follow-up with Specialty: Follow up with Dr. stover in 4 weeks. Get your INR checked this Monday on 1/8/18.    As directed    Specialty:  Follow up with Dr. stover in 4 weeks. Get your INR checked this Monday on 1/8/18.                     Test Results Pending at Discharge  INR Monday in my office       Jimbo Stover MD  01/05/18  12:22 PM

## 2018-01-05 NOTE — H&P
Date of Admission: 18    Encounter Provider: Evelio Louis MD    Group of Service: [facility]     Patient Name: Edilberto Reeder    :1950    Chief complaint:  Shortness of breath, rapid heart rate    History of Present Illness:       This is a very pleasant 67 year-old white male who is been followed by Dr. Fung in our group, last on 1/29/15.  He has a history of advanced Parkinson's disease, including placement of a brain stimulator.  He sees Dr. Augustin at the [facility] for his Parkinson's disease.  He also has hypertension and diabetes.  He has seen Dr. Fung in the past for preoperative clearance.  He evidently had minimal coronary artery disease on a cardiac catheterization around the year .  He has not had any progression of this disease that he is aware of.    The patient presented for a disability assessment at the [facility] today, and was noted to have a rapid irregular pulse.  He was sent to the emergency department here, and was noted to have intermittent paroxysmal atrial fibrillation, along with typical atrial flutter.  His EKG appears to show atrial flutter.  He was placed on a diltiazem drip, and converted to sinus rhythm after several hours.  He also was given Lovenox.  He was unaware that he was in atrial fibrillation, although he does state that he has been more short of breath with exertion in the last several days.  He has not had any chest discomfort.      Past Medical History:   Diagnosis Date   • Diabetes    • Hypertension    • Parkinson's disease     Advanced          Past Surgical History:   Procedure Laterality Date   • BRAIN STIMULATOR     • JOINT REPLACEMENT      Bilateral shoulder and knee replacements         Allergies   Allergen Reactions   • Bacitracin Anaphylaxis   • Fish-Derived Products    • Neosporin [Neomycin-Bacitracin Zn-Polymyx]    • Shellfish Allergy    • Shrimp (Diagnostic)          No current  "facility-administered medications on file prior to encounter.      No current outpatient prescriptions on file prior to encounter.         Social History     Social History   • Marital status:      Spouse name: N/A   • Number of children: N/A   • Years of education: N/A     Occupational History   • Not on file.     Social History Main Topics   • Smoking status: Never Smoker   • Smokeless tobacco: Never Used   • Alcohol use No   • Drug use: No   • Sexual activity: Defer     Other Topics Concern   • Not on file     Social History Narrative   • No narrative on file         History reviewed. No pertinent family history.      REVIEW OF SYSTEMS:   Pertinent positives were noted in the history of present illness above.  Otherwise, all other systems were reviewed, and were negative.    Objective:     Vitals:    01/04/18 1627 01/04/18 1632 01/04/18 1705 01/04/18 1902   BP:  126/76 116/76 140/73   BP Location:    Right arm   Patient Position:    Lying   Pulse: 81 89 89 71   Resp:    18   Temp:       TempSrc:       SpO2: 95% 94% 94% 95%   Weight:    103 kg (226 lb 4 oz)   Height:         Body mass index is 29.85 kg/(m^2).  Flowsheet Rows         First Filed Value    Admission Height  185.4 cm (73\") Documented at 01/04/2018 1216    Admission Weight  103 kg (227 lb) Documented at 01/04/2018 1216           General:    No acute distress, alert and oriented x4, pleasant                   Head:    Normocephalic, atraumatic.   Eyes:          Conjunctivae and sclerae normal, no icterus, PERRLA   Throat:   No oral lesions, no thrush, oral mucosa moist.    Neck:   Supple, trachea midline.   Lungs:     Clear to auscultation bilaterally     Heart:    Regular rhythm and normal rate.  No murmurs, gallops, or rubs noted.   Abdomen:     Soft, non-tender, non-distended, positive bowel sounds   Extremities:   No clubbing, cyanosis, or edema.     Pulses:   Pulses palpable and equal bilaterally.    Skin:   No bleeding or rash.   Neuro:   " Tremor noted    Psychiatric:   Normal mood and affect.     Lab Review:                  Results from last 7 days  Lab Units 01/04/18  1244   SODIUM mmol/L 136   POTASSIUM mmol/L 4.0   CHLORIDE mmol/L 99   CO2 mmol/L 21.0*   BUN mg/dL 21   CREATININE mg/dL 1.09   GLUCOSE mg/dL 244*   CALCIUM mg/dL 8.6       Results from last 7 days  Lab Units 01/04/18  1600 01/04/18  1244   TROPONIN T ng/mL <0.010 <0.010       Results from last 7 days  Lab Units 01/04/18  1244   WBC 10*3/mm3 8.39   HEMOGLOBIN g/dL 14.4   HEMATOCRIT % 42.7   PLATELETS 10*3/mm3 211                       EKG (reviewed by me personally):  Rapid atrial flutter      Assessment:   1.  Paroxysmal atrial fibrillation and typical atrial flutter  2.  Dyspnea on exertion  3.  Advanced Parkinson's disease, status post brain stimulator placement  4.  Hypertension  5.  Diabetes  6.  Minimal coronary artery disease by cath in 2000     Plan:       Again, the patient has converted to sinus rhythm at this point.  I am going to keep him on 5 mg of IV diltiazem overnight.  I will add carvedilol 3.125 mg twice a day to his regimen.  He is being anticoagulated with Lovenox currently.  I had a long discussion with the patient and his family regarding anticoagulation options.  He does occasionally fall because of his Parkinson's disease, although he has not suffered serious injury recently.  They are considering warfarin given its ease of reversibility.  However, I feel that he could try either Eliquis or Xarelto as an initial trial.  I will check thyroid function tests in the morning.  I will also check an echocardiogram tomorrow as well given that he has had more shortness of breath.  I suspect that this is from the rapid atrial fibrillation and atrial flutter in general.    Sharath Louis M.D.

## 2018-01-08 ENCOUNTER — TELEPHONE (OUTPATIENT)
Dept: CARDIOLOGY | Facility: HOSPITAL | Age: 68
End: 2018-01-08

## 2018-01-08 ENCOUNTER — TELEPHONE (OUTPATIENT)
Dept: CARDIOLOGY | Facility: CLINIC | Age: 68
End: 2018-01-08

## 2018-01-08 ENCOUNTER — HOSPITAL ENCOUNTER (OUTPATIENT)
Dept: CARDIOLOGY | Facility: HOSPITAL | Age: 68
Setting detail: RECURRING SERIES
Discharge: HOME | End: 2018-01-08

## 2018-01-08 PROCEDURE — 36416 COLLJ CAPILLARY BLOOD SPEC: CPT

## 2018-01-08 PROCEDURE — 85610 PROTHROMBIN TIME: CPT

## 2018-01-08 NOTE — TELEPHONE ENCOUNTER
Safia- Will you please send in the additional 5 Lovenox injections, same directions, to his pharmacy? (I cannot send via EPIC nor enter in to his chart.) Would you please also add a refill in case he is still subtherapeutic on Friday? So that he can pick those up without a new script. I will call the pt and let him know. Thank you, Beatriz

## 2018-01-08 NOTE — TELEPHONE ENCOUNTER
Patient called from [phone] and left a message asking if he should check his INR here or PCP's office.  I called number back but no answer.  Lili

## 2018-01-12 ENCOUNTER — HOSPITAL ENCOUNTER (OUTPATIENT)
Dept: CARDIOLOGY | Facility: HOSPITAL | Age: 68
Setting detail: RECURRING SERIES
Discharge: HOME | End: 2018-01-12

## 2018-01-12 PROCEDURE — 36416 COLLJ CAPILLARY BLOOD SPEC: CPT

## 2018-01-12 PROCEDURE — 85610 PROTHROMBIN TIME: CPT

## 2018-01-19 ENCOUNTER — HOSPITAL ENCOUNTER (OUTPATIENT)
Dept: CARDIOLOGY | Facility: HOSPITAL | Age: 68
Setting detail: RECURRING SERIES
Discharge: HOME | End: 2018-01-19

## 2018-01-19 PROCEDURE — 36416 COLLJ CAPILLARY BLOOD SPEC: CPT

## 2018-01-19 PROCEDURE — 85610 PROTHROMBIN TIME: CPT

## 2018-01-22 ENCOUNTER — HOSPITAL ENCOUNTER (OUTPATIENT)
Dept: CARDIOLOGY | Facility: HOSPITAL | Age: 68
Setting detail: RECURRING SERIES
Discharge: HOME | End: 2018-01-22

## 2018-01-22 PROCEDURE — 85610 PROTHROMBIN TIME: CPT

## 2018-01-22 PROCEDURE — 36416 COLLJ CAPILLARY BLOOD SPEC: CPT

## 2018-01-22 RX ORDER — CARVEDILOL 3.12 MG/1
3.12 TABLET ORAL EVERY 12 HOURS SCHEDULED
Qty: 180 TABLET | Refills: 0 | Status: SHIPPED | OUTPATIENT
Start: 2018-01-22 | End: 2019-08-16 | Stop reason: ALTCHOICE

## 2018-01-22 RX ORDER — WARFARIN SODIUM 3 MG/1
TABLET ORAL
Qty: 90 TABLET | Refills: 0 | Status: SHIPPED | OUTPATIENT
Start: 2018-01-22 | End: 2018-03-28 | Stop reason: SDUPTHER

## 2018-01-29 ENCOUNTER — HOSPITAL ENCOUNTER (OUTPATIENT)
Dept: CARDIOLOGY | Facility: HOSPITAL | Age: 68
Setting detail: RECURRING SERIES
Discharge: HOME | End: 2018-01-29

## 2018-01-29 ENCOUNTER — TELEPHONE (OUTPATIENT)
Dept: CARDIOLOGY | Facility: HOSPITAL | Age: 68
End: 2018-01-29

## 2018-01-29 PROCEDURE — 36416 COLLJ CAPILLARY BLOOD SPEC: CPT

## 2018-01-29 PROCEDURE — 85610 PROTHROMBIN TIME: CPT

## 2018-01-29 NOTE — TELEPHONE ENCOUNTER
I would use up lovenox  Ck in 3 days as you planned.  If inr not increased in 3 days then refill lovenox and resume taking

## 2018-02-01 ENCOUNTER — HOSPITAL ENCOUNTER (OUTPATIENT)
Dept: CARDIOLOGY | Facility: HOSPITAL | Age: 68
Setting detail: RECURRING SERIES
Discharge: HOME | End: 2018-02-01

## 2018-02-01 PROCEDURE — 36416 COLLJ CAPILLARY BLOOD SPEC: CPT

## 2018-02-01 PROCEDURE — 85610 PROTHROMBIN TIME: CPT

## 2018-02-02 ENCOUNTER — OFFICE VISIT (OUTPATIENT)
Dept: CARDIOLOGY | Facility: CLINIC | Age: 68
End: 2018-02-02

## 2018-02-02 VITALS
SYSTOLIC BLOOD PRESSURE: 115 MMHG | HEIGHT: 73 IN | DIASTOLIC BLOOD PRESSURE: 74 MMHG | HEART RATE: 55 BPM | BODY MASS INDEX: 29.69 KG/M2 | WEIGHT: 224 LBS | OXYGEN SATURATION: 98 %

## 2018-02-02 DIAGNOSIS — I48.0 PAROXYSMAL ATRIAL FIBRILLATION (HCC): Primary | ICD-10-CM

## 2018-02-02 DIAGNOSIS — I10 ESSENTIAL HYPERTENSION: ICD-10-CM

## 2018-02-02 DIAGNOSIS — I25.10 ASCVD (ARTERIOSCLEROTIC CARDIOVASCULAR DISEASE): ICD-10-CM

## 2018-02-02 PROCEDURE — 99214 OFFICE O/P EST MOD 30 MIN: CPT | Performed by: INTERNAL MEDICINE

## 2018-02-02 RX ORDER — SIMVASTATIN 40 MG
40 TABLET ORAL NIGHTLY
COMMUNITY
End: 2018-12-14 | Stop reason: HOSPADM

## 2018-02-02 NOTE — PROGRESS NOTES
Subjective:     Encounter Date:02/02/2018      Patient ID: Edilberto Reeder is a 67 y.o. male.    Chief Complaint:  Atrial Fibrillation   Presents for follow-up visit. Symptoms include dizziness, hypertension and shortness of breath. Symptoms are negative for chest pain and palpitations. The symptoms have been stable. Past medical history includes atrial fibrillation and CAD.   Hypertension   This is a chronic problem. The problem is controlled. Associated symptoms include malaise/fatigue and shortness of breath. Pertinent negatives include no anxiety, chest pain, palpitations or peripheral edema.   Coronary Artery Disease   Presents for follow-up visit. Symptoms include dizziness and shortness of breath. Pertinent negatives include no chest pain or palpitations. Risk factors include hypertension. The symptoms have been stable.     67-year-old gentleman who presents today for reevaluation after recent hospitalization.  Patient was found to have atrial fibrillation with a rapid rate.  He was rate controlled discharged on Lovenox and warfarin.  Warfarin was chosen due to the fact he is extremely high fall risk due to Parkinson's disease.  His Parkinson's is significant enough that he has a electronic implant to help control it.    Review of Systems   Constitution: Positive for malaise/fatigue.   Cardiovascular: Negative for chest pain and palpitations.   Respiratory: Positive for cough and shortness of breath.    Neurological: Positive for dizziness.   All other systems reviewed and are negative.      Procedures       Objective:     Physical Exam   Constitutional: He is oriented to person, place, and time. He appears well-developed.   HENT:   Head: Normocephalic.   Eyes: Conjunctivae are normal.   Neck: Normal range of motion.   Cardiovascular: Normal rate, regular rhythm and normal heart sounds.    Pulmonary/Chest: Breath sounds normal.   Abdominal: Soft. Bowel sounds are normal.   Musculoskeletal: Normal range  of motion. He exhibits no edema.   Neurological: He is alert and oriented to person, place, and time.   Skin: Skin is warm and dry.   Psychiatric: He has a normal mood and affect. His behavior is normal.   Vitals reviewed.      Lab Review:       Assessment:          Diagnosis Plan   1. Paroxysmal atrial fibrillation     2. ASCVD (arteriosclerotic cardiovascular disease)     3. Essential hypertension            Plan:       1.  Proximal atrial fibrillation.  Clinically patient is doing well.  Heart rate was 55 today blood pressure was very good.  This correlates to members and patient provided when he checked at home.  He remains on warfarin.  He did ask about continuing his Coreg and warfarin in the future.  I reviewed the risks and benefits of the current approach.  Although he has Parkinson's disease and is a high risk for fall he is also a significant risk to have an embolic event.  Unfortunately patient came to his clinic appointment today without his walker.  I stressed the importance to minimizing the risk of falls and clearly a walker would help that.  His son was present during the entire visit and I reviewed the risks and benefits of anticoagulation.  2.  Hypertension blood pressures good  3.  Coronary artery disease stable  4.  Follow-up 6 months    Atrial Fibrillation and Atrial Flutter  Assessment  • The patient has paroxysmal atrial fibrillation  • This is non-valvular in etiology  • The patient's CHADS2-VASc score is 3  • A [id number] score of 2 or more is considered a high risk for a thromboembolic event  • Warfarin prescribed    Plan  • Attempt to maintain sinus rhythm  • Continue warfarin for antithrombotic therapy, bleeding issues discussed  • Continue beta blocker for rhythm control      Coronary Artery Disease  Assessment  • The patient has no angina

## 2018-02-05 ENCOUNTER — HOSPITAL ENCOUNTER (OUTPATIENT)
Dept: CARDIOLOGY | Facility: HOSPITAL | Age: 68
Setting detail: RECURRING SERIES
Discharge: HOME | End: 2018-02-05

## 2018-02-05 PROCEDURE — 36416 COLLJ CAPILLARY BLOOD SPEC: CPT

## 2018-02-05 PROCEDURE — 85610 PROTHROMBIN TIME: CPT

## 2018-02-12 ENCOUNTER — HOSPITAL ENCOUNTER (OUTPATIENT)
Dept: CARDIOLOGY | Facility: HOSPITAL | Age: 68
Setting detail: RECURRING SERIES
Discharge: HOME | End: 2018-02-12

## 2018-02-12 PROCEDURE — 85610 PROTHROMBIN TIME: CPT

## 2018-02-12 PROCEDURE — 36416 COLLJ CAPILLARY BLOOD SPEC: CPT

## 2018-02-19 ENCOUNTER — HOSPITAL ENCOUNTER (OUTPATIENT)
Dept: CARDIOLOGY | Facility: HOSPITAL | Age: 68
Setting detail: RECURRING SERIES
Discharge: HOME | End: 2018-02-19

## 2018-02-19 PROCEDURE — 85610 PROTHROMBIN TIME: CPT

## 2018-02-19 PROCEDURE — 36416 COLLJ CAPILLARY BLOOD SPEC: CPT

## 2018-02-26 ENCOUNTER — HOSPITAL ENCOUNTER (OUTPATIENT)
Dept: CARDIOLOGY | Facility: HOSPITAL | Age: 68
Setting detail: RECURRING SERIES
Discharge: HOME | End: 2018-02-26

## 2018-02-26 PROCEDURE — 36416 COLLJ CAPILLARY BLOOD SPEC: CPT

## 2018-02-26 PROCEDURE — 85610 PROTHROMBIN TIME: CPT

## 2018-03-20 ENCOUNTER — HOSPITAL ENCOUNTER (OUTPATIENT)
Dept: CARDIOLOGY | Facility: HOSPITAL | Age: 68
Setting detail: RECURRING SERIES
Discharge: HOME | End: 2018-03-20

## 2018-03-20 PROCEDURE — 85610 PROTHROMBIN TIME: CPT

## 2018-03-20 PROCEDURE — 36416 COLLJ CAPILLARY BLOOD SPEC: CPT

## 2018-03-28 RX ORDER — WARFARIN SODIUM 3 MG/1
TABLET ORAL
Qty: 44 TABLET | Refills: 0 | Status: SHIPPED | OUTPATIENT
Start: 2018-03-28 | End: 2018-04-26 | Stop reason: SDUPTHER

## 2018-04-03 ENCOUNTER — HOSPITAL ENCOUNTER (OUTPATIENT)
Dept: CARDIOLOGY | Facility: HOSPITAL | Age: 68
Setting detail: RECURRING SERIES
Discharge: HOME | End: 2018-04-03

## 2018-04-03 PROCEDURE — 36416 COLLJ CAPILLARY BLOOD SPEC: CPT

## 2018-04-03 PROCEDURE — 85610 PROTHROMBIN TIME: CPT

## 2018-04-10 ENCOUNTER — HOSPITAL ENCOUNTER (OUTPATIENT)
Dept: CARDIOLOGY | Facility: HOSPITAL | Age: 68
Setting detail: RECURRING SERIES
Discharge: HOME | End: 2018-04-10

## 2018-04-10 PROCEDURE — 36416 COLLJ CAPILLARY BLOOD SPEC: CPT

## 2018-04-10 PROCEDURE — 85610 PROTHROMBIN TIME: CPT

## 2018-04-19 ENCOUNTER — OFFICE VISIT (OUTPATIENT)
Dept: CARDIOLOGY | Facility: CLINIC | Age: 68
End: 2018-04-19

## 2018-04-19 ENCOUNTER — HOSPITAL ENCOUNTER (OUTPATIENT)
Dept: CARDIOLOGY | Facility: HOSPITAL | Age: 68
Discharge: HOME | End: 2018-04-19
Admitting: NURSE PRACTITIONER

## 2018-04-19 ENCOUNTER — HOSPITAL ENCOUNTER (OUTPATIENT)
Dept: CARDIOLOGY | Facility: HOSPITAL | Age: 68
Setting detail: RECURRING SERIES
Discharge: HOME | End: 2018-04-19

## 2018-04-19 VITALS
HEART RATE: 78 BPM | HEIGHT: 72 IN | SYSTOLIC BLOOD PRESSURE: 120 MMHG | BODY MASS INDEX: 31.56 KG/M2 | WEIGHT: 233 LBS | DIASTOLIC BLOOD PRESSURE: 88 MMHG

## 2018-04-19 DIAGNOSIS — I48.0 PAROXYSMAL ATRIAL FIBRILLATION (HCC): Primary | ICD-10-CM

## 2018-04-19 DIAGNOSIS — R06.09 DYSPNEA ON EXERTION: ICD-10-CM

## 2018-04-19 DIAGNOSIS — I25.10 ASCVD (ARTERIOSCLEROTIC CARDIOVASCULAR DISEASE): ICD-10-CM

## 2018-04-19 DIAGNOSIS — R06.09 DYSPNEA ON EXERTION: Primary | ICD-10-CM

## 2018-04-19 PROBLEM — I48.91 ATRIAL FIBRILLATION WITH RVR (HCC): Status: RESOLVED | Noted: 2018-01-04 | Resolved: 2018-04-19

## 2018-04-19 LAB
ANION GAP SERPL CALCULATED.3IONS-SCNC: 13.3 MMOL/L
BASOPHILS # BLD AUTO: 0.05 10*3/MM3 (ref 0–0.2)
BASOPHILS NFR BLD AUTO: 0.7 % (ref 0–1.5)
BUN BLD-MCNC: 12 MG/DL (ref 8–23)
BUN/CREAT SERPL: 10.3 (ref 7–25)
CALCIUM SPEC-SCNC: 9 MG/DL (ref 8.6–10.5)
CHLORIDE SERPL-SCNC: 101 MMOL/L (ref 98–107)
CO2 SERPL-SCNC: 25.7 MMOL/L (ref 22–29)
CREAT BLD-MCNC: 1.16 MG/DL (ref 0.76–1.27)
DEPRECATED RDW RBC AUTO: 45.2 FL (ref 37–54)
EOSINOPHIL # BLD AUTO: 0.21 10*3/MM3 (ref 0–0.7)
EOSINOPHIL NFR BLD AUTO: 2.8 % (ref 0.3–6.2)
ERYTHROCYTE [DISTWIDTH] IN BLOOD BY AUTOMATED COUNT: 14.6 % (ref 11.5–14.5)
GFR SERPL CREATININE-BSD FRML MDRD: 62 ML/MIN/1.73
GLUCOSE BLD-MCNC: 117 MG/DL (ref 65–99)
HCT VFR BLD AUTO: 40.4 % (ref 40.4–52.2)
HGB BLD-MCNC: 12.9 G/DL (ref 13.7–17.6)
IMM GRANULOCYTES # BLD: 0 10*3/MM3 (ref 0–0.03)
IMM GRANULOCYTES NFR BLD: 0 % (ref 0–0.5)
LYMPHOCYTES # BLD AUTO: 1.22 10*3/MM3 (ref 0.9–4.8)
LYMPHOCYTES NFR BLD AUTO: 16.2 % (ref 19.6–45.3)
MCH RBC QN AUTO: 26.9 PG (ref 27–32.7)
MCHC RBC AUTO-ENTMCNC: 31.9 G/DL (ref 32.6–36.4)
MCV RBC AUTO: 84.2 FL (ref 79.8–96.2)
MONOCYTES # BLD AUTO: 0.55 10*3/MM3 (ref 0.2–1.2)
MONOCYTES NFR BLD AUTO: 7.3 % (ref 5–12)
NEUTROPHILS # BLD AUTO: 5.52 10*3/MM3 (ref 1.9–8.1)
NEUTROPHILS NFR BLD AUTO: 73 % (ref 42.7–76)
NT-PROBNP SERPL-MCNC: 337.7 PG/ML (ref 0–900)
PLATELET # BLD AUTO: 229 10*3/MM3 (ref 140–500)
PMV BLD AUTO: 10 FL (ref 6–12)
POTASSIUM BLD-SCNC: 4.3 MMOL/L (ref 3.5–5.2)
RBC # BLD AUTO: 4.8 10*6/MM3 (ref 4.6–6)
SODIUM BLD-SCNC: 140 MMOL/L (ref 136–145)
WBC NRBC COR # BLD: 7.55 10*3/MM3 (ref 4.5–10.7)

## 2018-04-19 PROCEDURE — 83880 ASSAY OF NATRIURETIC PEPTIDE: CPT | Performed by: NURSE PRACTITIONER

## 2018-04-19 PROCEDURE — 36415 COLL VENOUS BLD VENIPUNCTURE: CPT

## 2018-04-19 PROCEDURE — 93000 ELECTROCARDIOGRAM COMPLETE: CPT | Performed by: NURSE PRACTITIONER

## 2018-04-19 PROCEDURE — 85610 PROTHROMBIN TIME: CPT

## 2018-04-19 PROCEDURE — 36416 COLLJ CAPILLARY BLOOD SPEC: CPT

## 2018-04-19 PROCEDURE — 80048 BASIC METABOLIC PNL TOTAL CA: CPT | Performed by: NURSE PRACTITIONER

## 2018-04-19 PROCEDURE — 99214 OFFICE O/P EST MOD 30 MIN: CPT | Performed by: NURSE PRACTITIONER

## 2018-04-19 PROCEDURE — 85025 COMPLETE CBC W/AUTO DIFF WBC: CPT | Performed by: NURSE PRACTITIONER

## 2018-04-19 RX ORDER — LEVODOPA AND CARBIDOPA 145; 36.25 MG/1; MG/1
3 CAPSULE, EXTENDED RELEASE ORAL 4 TIMES DAILY
COMMUNITY
Start: 2018-03-28 | End: 2019-08-16 | Stop reason: ALTCHOICE

## 2018-04-19 RX ORDER — POTASSIUM CHLORIDE 750 MG/1
10 TABLET, FILM COATED, EXTENDED RELEASE ORAL DAILY
Qty: 90 TABLET | Refills: 3 | Status: SHIPPED | OUTPATIENT
Start: 2018-04-19 | End: 2020-09-11 | Stop reason: ALTCHOICE

## 2018-04-19 RX ORDER — FUROSEMIDE 20 MG/1
20 TABLET ORAL DAILY
Qty: 30 TABLET | Refills: 1 | Status: SHIPPED | OUTPATIENT
Start: 2018-04-19 | End: 2018-06-10 | Stop reason: SDUPTHER

## 2018-04-19 RX ORDER — MIRABEGRON 50 MG/1
1 TABLET, FILM COATED, EXTENDED RELEASE ORAL DAILY
COMMUNITY
Start: 2018-03-28 | End: 2019-08-16 | Stop reason: ALTCHOICE

## 2018-04-19 RX ORDER — TAMSULOSIN HYDROCHLORIDE 0.4 MG/1
1 CAPSULE ORAL DAILY
COMMUNITY
Start: 2018-03-28 | End: 2020-09-11 | Stop reason: ALTCHOICE

## 2018-04-19 NOTE — PROGRESS NOTES
Date of Office Visit: 2018  Encounter Provider: ESME Dozier  Place of Service: [facility]  Patient Name: Edilberto Reeder  :1950    Chief Complaint   Patient presents with   • Shortness of Breath   :     HPI: Edilberto Reeder is a 67 y.o. male comes in today for  complaints of shortness of breath.  He is a patient Dr. Fung.  I'm seeing him for the first time today and have reviewed his records.    He has a history of atrial fibrillation, coronary disease and hypertension.    He was in the hospital in 2018.  [facility].  He was found to be in atrial flutter.  Converted to sinus rhythm after being on a Cardizem drip.  He was started on carvedilol and discharged on warfarin.  Echo showed a CRT 66% with no significant valvular disease.    Patient's primary care as for the patient to be seen her to his next visit due to shortness of breath at rest.    He reports that since the diagnosis of atrial fibrillation, he had shortness of breath while bending over.  He is also noted some shortness of breath on exertion.  The only chest pain he describes as tightness with sneezing or coughing.  He was able to go on a long trip last week with his wife.  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  The shortness of breath he was having started before this trip.  He does complain of a cough.  He does have some palpitations but not as severe as when he came to the hospital.  He has Parkinson's.  He does report that he had a MI in the past but does not have stents.  He was told he did not have any significant blockages.    Past Medical History:   Diagnosis Date   • Atrial fibrillation with RVR    • Atrial flutter    • Diabetes    • HLD (hyperlipidemia) 2016   • Hypertension    • Parkinson's disease     Advanced        Past Surgical History:   Procedure Laterality Date   • BRAIN  "STIMULATOR     • JOINT REPLACEMENT      Bilateral shoulder and knee replacements           Review of Systems   Constitution: Positive for malaise/fatigue.   Cardiovascular: Negative for chest pain and palpitations.   Respiratory: Positive for cough and shortness of breath.    Neurological: Positive for dizziness.   All other systems reviewed and are negative.    All other systems reviewed and are negative    Allergies   Allergen Reactions   • Bacitracin Anaphylaxis   • Fish-Derived Products    • Neosporin [Neomycin-Bacitracin Zn-Polymyx]    • Shellfish Allergy    • Shrimp (Diagnostic)        All aspects of family and social history reviewed.           Objective:     Vitals:    04/19/18 0924   BP: 120/88   BP Location: Left arm   Pulse: 78   Weight: 106 kg (233 lb)   Height: 182.9 cm (72\")     Body mass index is 31.6 kg/m².    PHYSICAL EXAM:  Physical Exam   Constitutional: He is oriented to person, place, and time. He appears well-developed.   HENT:   Head: Normocephalic.   Eyes: Conjunctivae are normal.   Neck: Normal range of motion.   Cardiovascular: Normal rate, regular rhythm and normal heart sounds.    Pulmonary/Chest: He has rales (bilaterally).   Abdominal: Soft. Bowel sounds are normal.   Musculoskeletal: Normal range of motion. He exhibits no edema.   Neurological: He is alert and oriented to person, place, and time.   Skin: Skin is warm and dry.   Psychiatric: He has a normal mood and affect. His behavior is normal.   Vitals reviewed.        ECG 12 Lead  Date/Time: 4/19/2018 10:10 AM  Performed by: SANTY CANNON  Authorized by: SANTY CANNON   Comparison: compared with previous ECG from 1/5/2018  Similar to previous ECG  Rhythm: sinus rhythm  Rate: normal  BPM: 60  Conduction: conduction normal  ST Segments: ST segments normal  T Waves: T waves normal  QRS axis: normal  Other: no other findings  Clinical impression: normal ECG  Comments: parkinsons  Indication: afib                Assessment:    "    Diagnosis Plan   1. Paroxysmal atrial fibrillation  Holter Monitor - 72 Hour Up To 21 Days   2. ASCVD (arteriosclerotic cardiovascular disease)  Holter Monitor - 72 Hour Up To 21 Days   3. Dyspnea on exertion  Holter Monitor - 72 Hour Up To 21 Days        Orders Placed This Encounter   Procedures   • Holter Monitor - 72 Hour Up To 21 Days     14 day zio     Standing Status:   Future     Standing Expiration Date:   4/19/2019     Order Specific Question:   Reason for exam?     Answer:   AFib     Order Specific Question:   AFib specification?     Answer:   Paroxysmal       Current Outpatient Prescriptions   Medication Sig Dispense Refill   • allopurinol (ZYLOPRIM) 100 MG tablet Take 100 mg by mouth Daily.     • carvedilol (COREG) 3.125 MG tablet Take 1 tablet by mouth Every 12 (Twelve) Hours. 180 tablet 0   • citalopram (CeleXA) 10 MG tablet Take 10 mg by mouth Daily.     • gabapentin (NEURONTIN) 300 MG capsule Take 300 mg by mouth Daily.     • glipiZIDE (GLUCOTROL XL) 10 MG 24 hr tablet Take 10 mg by mouth.     • lubiprostone (AMITIZA) 24 MCG capsule Take 24 mcg by mouth Daily With Breakfast.     • meclizine (ANTIVERT) 25 MG tablet Take 25 mg by mouth 3 (Three) Times a Day As Needed for dizziness.     • metFORMIN (GLUCOPHAGE) 500 MG tablet Take 1,000 mg by mouth 2 (Two) Times a Day With Meals.     • oxybutynin XL (DITROPAN-XL) 10 MG 24 hr tablet Take 10 mg by mouth Daily.     • simvastatin (ZOCOR) 40 MG tablet Take 40 mg by mouth Every Night.     • traZODone (DESYREL) 50 MG tablet Take 50 mg by mouth Every Night.     • warfarin (COUMADIN) 3 MG tablet TAKE TWO TABLETS BY MOUTH DAILY ON SUNDAY AND SATURDAY AND TAKE THREE TABLETS BY MOUTH DAILY ON FRIDAY --- 1 TABLET ON ALL OTHER DAYS 44 tablet 0   • furosemide (LASIX) 20 MG tablet Take 1 tablet by mouth Daily. 30 tablet 1   • MYRBETRIQ 50 MG tablet sustained-release 24 hour 24 hr tablet Take 1 tablet by mouth Daily.     • potassium chloride (K-DUR) 10 MEQ CR tablet  Take 1 tablet by mouth Daily. 90 tablet 3   • RYTARY 36. MG capsule controlled-release Take 3 capsules by mouth 4 (Four) Times a Day.     • tamsulosin (FLOMAX) 0.4 MG capsule 24 hr capsule Take 1 capsule by mouth Daily.       No current facility-administered medications for this visit.             Plan:       1. Atrial Fibrillation and Atrial Flutter  Assessment  • The patient has paroxysmal atrial fibrillation  • This is non-valvular in etiology  • The patient's CHADS2-VASc score is 3  • A [id number] score of 2 or more is considered a high risk for a thromboembolic event  • Warfarin prescribed    Plan  • Attempt to maintain sinus rhythm  • Continue warfarin for antithrombotic therapy, bleeding issues discussed  • Continue beta blocker for rhythm control   Palpitations at times.  Looks to have slight diastolic heart failure.  I would be concerned that he be having more atrial fibrillation than we know about.  We'll place a Zio patch for monitoring.    2. Dyspnea-progressive shortness of breath since diagnosis of atrial fibrillation. Worse when bending over. Not as prominent with exertion.   We'll check some blood work today to make sure he is not anemic since starting warfarin.  Start low-dose furosemide 20 mg daily and 10 mEq potassium as the patient does have Rales in his lungs.  We'll check a proBNP and a BMP as well.     3.  Hypertension-stable    3.  Hyperlipidemia-follows with PCP    4.  Parkinson's    5.  Diabetes    6.Neuropathy-limiting factor to ambulation    Follow up in office after testing completed if abnormal    As always, it has been a pleasure to participate in this patient's care.      Sincerely,      ESME Dozier

## 2018-04-26 RX ORDER — WARFARIN SODIUM 3 MG/1
TABLET ORAL
Qty: 60 TABLET | Refills: 2 | Status: SHIPPED | OUTPATIENT
Start: 2018-04-26 | End: 2018-04-26 | Stop reason: SDUPTHER

## 2018-04-26 RX ORDER — WARFARIN SODIUM 3 MG/1
TABLET ORAL
Qty: 60 TABLET | Refills: 2 | Status: ON HOLD | OUTPATIENT
Start: 2018-04-26 | End: 2018-12-14 | Stop reason: SDUPTHER

## 2018-04-30 ENCOUNTER — HOSPITAL ENCOUNTER (OUTPATIENT)
Dept: CARDIOLOGY | Facility: HOSPITAL | Age: 68
Setting detail: RECURRING SERIES
Discharge: HOME | End: 2018-04-30

## 2018-04-30 PROCEDURE — 36416 COLLJ CAPILLARY BLOOD SPEC: CPT

## 2018-04-30 PROCEDURE — 85610 PROTHROMBIN TIME: CPT

## 2018-05-17 ENCOUNTER — TELEPHONE (OUTPATIENT)
Dept: CARDIOLOGY | Facility: CLINIC | Age: 68
End: 2018-05-17

## 2018-05-17 NOTE — TELEPHONE ENCOUNTER
Zio Patch reviewed. No specific atrial arrythmias. No changes to be made. Not likely consistent with his dizziness he describes. Attempted to call pt with results. No answer on home phone

## 2018-05-29 ENCOUNTER — HOSPITAL ENCOUNTER (OUTPATIENT)
Dept: CARDIOLOGY | Facility: HOSPITAL | Age: 68
Setting detail: RECURRING SERIES
Discharge: HOME | End: 2018-05-29

## 2018-05-29 PROCEDURE — 85610 PROTHROMBIN TIME: CPT

## 2018-05-29 PROCEDURE — 36416 COLLJ CAPILLARY BLOOD SPEC: CPT

## 2018-06-04 ENCOUNTER — HOSPITAL ENCOUNTER (OUTPATIENT)
Dept: CARDIOLOGY | Facility: HOSPITAL | Age: 68
Setting detail: RECURRING SERIES
Discharge: HOME | End: 2018-06-04

## 2018-06-04 PROCEDURE — 36416 COLLJ CAPILLARY BLOOD SPEC: CPT

## 2018-06-04 PROCEDURE — 85610 PROTHROMBIN TIME: CPT

## 2018-06-11 RX ORDER — FUROSEMIDE 20 MG/1
TABLET ORAL
Qty: 30 TABLET | Refills: 0 | Status: SHIPPED | OUTPATIENT
Start: 2018-06-11 | End: 2018-07-16 | Stop reason: SDUPTHER

## 2018-07-02 ENCOUNTER — HOSPITAL ENCOUNTER (OUTPATIENT)
Dept: CARDIOLOGY | Facility: HOSPITAL | Age: 68
Setting detail: RECURRING SERIES
Discharge: HOME | End: 2018-07-02

## 2018-07-02 PROCEDURE — 36416 COLLJ CAPILLARY BLOOD SPEC: CPT

## 2018-07-02 PROCEDURE — 85610 PROTHROMBIN TIME: CPT

## 2018-07-17 RX ORDER — FUROSEMIDE 20 MG/1
TABLET ORAL
Qty: 30 TABLET | Refills: 3 | Status: SHIPPED | OUTPATIENT
Start: 2018-07-17 | End: 2018-08-10 | Stop reason: ALTCHOICE

## 2018-08-03 ENCOUNTER — HOSPITAL ENCOUNTER (OUTPATIENT)
Dept: CARDIOLOGY | Facility: HOSPITAL | Age: 68
Setting detail: RECURRING SERIES
Discharge: HOME | End: 2018-08-03

## 2018-08-03 PROCEDURE — 85610 PROTHROMBIN TIME: CPT

## 2018-08-03 PROCEDURE — 36416 COLLJ CAPILLARY BLOOD SPEC: CPT

## 2018-08-10 ENCOUNTER — OFFICE VISIT (OUTPATIENT)
Dept: CARDIOLOGY | Facility: CLINIC | Age: 68
End: 2018-08-10

## 2018-08-10 VITALS
BODY MASS INDEX: 30.61 KG/M2 | HEART RATE: 57 BPM | WEIGHT: 226 LBS | DIASTOLIC BLOOD PRESSURE: 78 MMHG | SYSTOLIC BLOOD PRESSURE: 130 MMHG | HEIGHT: 72 IN

## 2018-08-10 DIAGNOSIS — I10 ESSENTIAL HYPERTENSION: Primary | Chronic | ICD-10-CM

## 2018-08-10 DIAGNOSIS — I48.0 PAROXYSMAL ATRIAL FIBRILLATION (HCC): Chronic | ICD-10-CM

## 2018-08-10 PROCEDURE — 93000 ELECTROCARDIOGRAM COMPLETE: CPT | Performed by: INTERNAL MEDICINE

## 2018-08-10 PROCEDURE — 99214 OFFICE O/P EST MOD 30 MIN: CPT | Performed by: INTERNAL MEDICINE

## 2018-08-10 NOTE — PROGRESS NOTES
Subjective:     Encounter Date:02/02/2018      Patient ID: Edilberto Reeder is a 68 y.o. male.    Chief Complaint:  Atrial Fibrillation   Presents for follow-up visit. Symptoms include dizziness, hypertension and shortness of breath. Symptoms are negative for chest pain and palpitations. The symptoms have been stable. Past medical history includes atrial fibrillation and CAD.   Hypertension   This is a chronic problem. The problem is controlled. Associated symptoms include malaise/fatigue and shortness of breath. Pertinent negatives include no anxiety, chest pain, palpitations or peripheral edema.   Coronary Artery Disease   Presents for follow-up visit. Symptoms include dizziness and shortness of breath. Pertinent negatives include no chest pain or palpitations. Risk factors include hypertension. The symptoms have been stable.     67-year-old gentleman who presents today for reevaluation after recent hospitalization.  Patient presents today doing better.  He does have a stimulator in for his Parkinson's without it he shakes extensively.  He does ambulate with a cane he has no limitations from a heart standpoint at the current time.    Review of Systems   Constitution: Positive for malaise/fatigue.   Cardiovascular: Negative for chest pain and palpitations.   Respiratory: Positive for cough and shortness of breath.    Neurological: Positive for dizziness.   All other systems reviewed and are negative.        ECG 12 Lead  Date/Time: 8/10/2018 3:39 PM  Performed by: STEVE KYLE  Authorized by: STEVE KYLE   Comparison: compared with previous ECG from 8/10/2018  Similar to previous ECG  Rhythm: sinus rhythm  Clinical impression: normal ECG               Objective:     Physical Exam   Constitutional: He is oriented to person, place, and time. He appears well-developed.   HENT:   Head: Normocephalic.   Eyes: Conjunctivae are normal.   Neck: Normal range of motion.   Cardiovascular: Normal rate, regular  rhythm and normal heart sounds.    Pulmonary/Chest: Breath sounds normal.   Abdominal: Soft. Bowel sounds are normal.   Musculoskeletal: Normal range of motion. He exhibits no edema.   Neurological: He is alert and oriented to person, place, and time.   Skin: Skin is warm and dry.   Psychiatric: He has a normal mood and affect. His behavior is normal.   Vitals reviewed.      Lab Review:       Assessment:          Diagnosis Plan   1. Essential hypertension     2. Paroxysmal atrial fibrillation (CMS/[facility])            Plan:       1.  Proximal atrial fibrillation.  Patient clinically is doing relatively well.  Some shortness of breath some edema some lightheadedness.  2.  Hypertension blood pressures good  3.  Coronary artery disease stable  4.  Follow-up 6 months    Atrial Fibrillation and Atrial Flutter  Assessment  • The patient has paroxysmal atrial fibrillation  • This is non-valvular in etiology  • The patient's CHADS2-VASc score is 3  • A [id number] score of 2 or more is considered a high risk for a thromboembolic event  • Warfarin prescribed    Plan  • Attempt to maintain sinus rhythm  • Continue warfarin for antithrombotic therapy, bleeding issues discussed  • Continue beta blocker for rhythm control      Coronary Artery Disease  Assessment  • The patient has no angina

## 2018-08-17 ENCOUNTER — ANTICOAGULATION VISIT (OUTPATIENT)
Dept: PHARMACY | Facility: HOSPITAL | Age: 68
End: 2018-08-17

## 2018-08-17 DIAGNOSIS — I48.0 PAROXYSMAL ATRIAL FIBRILLATION (HCC): ICD-10-CM

## 2018-08-17 PROBLEM — I48.91 ATRIAL FIBRILLATION: Status: ACTIVE | Noted: 2018-08-17

## 2018-08-17 LAB
INR PPP: 1.9 (ref 0.91–1.09)
PROTHROMBIN TIME: 23.3 SECONDS (ref 10–13.8)

## 2018-08-17 PROCEDURE — 36416 COLLJ CAPILLARY BLOOD SPEC: CPT

## 2018-08-17 PROCEDURE — 85610 PROTHROMBIN TIME: CPT

## 2018-08-17 PROCEDURE — G0463 HOSPITAL OUTPT CLINIC VISIT: HCPCS

## 2018-08-17 NOTE — PROGRESS NOTES
Anticoagulation Clinic Progress Note  Anticoagulation Summary  As of 2018    INR goal:   2.0-3.0   TTR:   --   Today's INR:   1.9!   Warfarin maintenance plan:   6 mg on Sun, Thu, Sat; 3 mg all other days   Weekly warfarin total:   30 mg   Plan last modified:   Tosin Rodríguez [facility] (2018)   Next INR check:   2018   Target end date:   Indefinite    Indications    Atrial fibrillation (CMS/HCC) [I48.91] [I48.91]             Anticoagulation Episode Summary     INR check location:       Preferred lab:       [facility] [place]    Comments:         Anticoagulation Care Providers     Provider Role Specialty Phone number    Jimbo Fung MD Referring Cardiology [phone]    Tosin Rodríguez [facility] Responsible Pharmacy             Drug interactions: None  Diet: Basically consistent but eating extra veggies from garden    Clinic Interview:  Patient Findings     Negatives:   Signs/symptoms of thrombosis, Signs/symptoms of bleeding,   Laboratory test error suspected, Change in health, Change in alcohol use,   Change in activity, Upcoming invasive procedure, Emergency department   visit, Upcoming dental procedure, Missed doses, Extra doses, Change in   medications, Change in diet/appetite, Hospital admission, Bruising, Other   complaints    Comments:   Eating some garden veggies, cucumbers, pickling cucumbers      Clinical Outcomes     Negatives:   Major bleeding event, Thromboembolic event,   Anticoagulation-related hospital admission, Anticoagulation-related ED   visit, Anticoagulation-related fatality    Comments:   Eating some garden veggies, cucumbers, pickling cucumbers        Education:  Edilberto Reeder is a new start in the [facility]. We discussed the followin) Warfarin's indication, mechanism, and dosing  2) Enforced the importance of taking warfarin as instructed and at the same time every day, preferably in the evening so that we can  make dose adjustments more easily following subsequent clinic visits  3) What he should do about a missed dose; pts can take missed doses within about 12 hours of their usual scheduled dose, but he was instructed on the importance of not doubling up on doses unless told to do so by the [facility]  4) Explained possible side effects of warfarin therapy, including increased risk of bleeding, s/sx of bleeding and s/sx of any additional clots/PE/CVA.   5) Discussed monitoring of warfarin, the INR, goal INR range, and the frequency of monitoring  6) Reviewed drug/food/tobacco/EtOH interactions and provided written information covering these topics in more detail, explaining that green, leafy vegetables interact most heavily with warfarin  7) Instructed the pt not to take or discontinue any medications without informing his physician/pharmacist and reminded him to inform us of any dietary changes, as well  8) Explained that he would be coming into the clinic more frequently in these first few weeks of therapy as we try to adjust his dose and achieve a therapeutic INR x 2 consecutive readings. Once that is achieved, patient will follow up in clinic every 4 weeks, on average.    He stated no problems with transportation or scheduling clinic appts in this manner. he expressed understanding of the information provided and has no additional questions at this time.    Edilberto Reeder was presented with a copy of the Patients Rights and Responsibilities. he expressed verbal consent and agreement to receive care in the [facility] Clinic under the current collaborative care agreement with [facility].       INR History:  Previous INR data from [facility] is recorded in [facility] and scanned into the patient's chart in [facility]. These results have been analyzed and reviewed.      Plan:  1. INR is Subtherapeutic today- see above in Anticoagulation Summary.   Will instruct Edilberto CAST  Varinder to Continue their warfarin regimen- see above in Anticoagulation Summary.  2. Follow up in 2 weeks  3. Patient declines warfarin refills.  4. Verbal and written information provided. Patient expresses understanding and has no further questions at this time.    Tosin Rodríguez, PharmD, BCACP

## 2018-08-24 ENCOUNTER — TELEPHONE (OUTPATIENT)
Dept: CARDIOLOGY | Facility: CLINIC | Age: 68
End: 2018-08-24

## 2018-08-27 PROBLEM — I48.91 ATRIAL FIBRILLATION (HCC): Status: RESOLVED | Noted: 2018-08-17 | Resolved: 2018-08-27

## 2018-08-27 NOTE — PROGRESS NOTES
Patient transferring to [facility] for [facility]. Resolved his current anticoagulation episode with us. Thank you.

## 2018-08-31 ENCOUNTER — ANTICOAGULATION VISIT (OUTPATIENT)
Dept: PHARMACY | Facility: HOSPITAL | Age: 68
End: 2018-08-31

## 2018-08-31 DIAGNOSIS — I48.0 PAROXYSMAL ATRIAL FIBRILLATION (HCC): ICD-10-CM

## 2018-12-08 ENCOUNTER — APPOINTMENT (OUTPATIENT)
Dept: GENERAL RADIOLOGY | Facility: HOSPITAL | Age: 68
End: 2018-12-08

## 2018-12-08 ENCOUNTER — HOSPITAL ENCOUNTER (INPATIENT)
Facility: HOSPITAL | Age: 68
LOS: 3 days | Discharge: NURSING, REHAB OR LONG-TERM CARE | End: 2018-12-14
Attending: EMERGENCY MEDICINE | Admitting: HOSPITALIST

## 2018-12-08 ENCOUNTER — APPOINTMENT (OUTPATIENT)
Dept: CT IMAGING | Facility: HOSPITAL | Age: 68
End: 2018-12-08

## 2018-12-08 DIAGNOSIS — I48.0 PAROXYSMAL ATRIAL FIBRILLATION (HCC): Chronic | ICD-10-CM

## 2018-12-08 DIAGNOSIS — G20 PARKINSON DISEASE (HCC): ICD-10-CM

## 2018-12-08 DIAGNOSIS — Z74.09 IMPAIRED FUNCTIONAL MOBILITY AND ACTIVITY TOLERANCE: ICD-10-CM

## 2018-12-08 DIAGNOSIS — R29.90 STROKE-LIKE SYMPTOMS: Primary | ICD-10-CM

## 2018-12-08 PROBLEM — E87.6 HYPOPOTASSEMIA: Status: ACTIVE | Noted: 2018-12-08

## 2018-12-08 LAB
ALBUMIN SERPL-MCNC: 3.5 G/DL (ref 3.5–5.2)
ALBUMIN/GLOB SERPL: 1.1 G/DL
ALP SERPL-CCNC: 65 U/L (ref 39–117)
ALT SERPL W P-5'-P-CCNC: <5 U/L (ref 1–41)
ANION GAP SERPL CALCULATED.3IONS-SCNC: 12.3 MMOL/L
APTT PPP: 37 SECONDS (ref 22.7–35.4)
AST SERPL-CCNC: 7 U/L (ref 1–40)
BASOPHILS # BLD AUTO: 0.02 10*3/MM3 (ref 0–0.2)
BASOPHILS NFR BLD AUTO: 0.3 % (ref 0–1.5)
BILIRUB SERPL-MCNC: 1.1 MG/DL (ref 0.1–1.2)
BUN BLD-MCNC: 9 MG/DL (ref 8–23)
BUN/CREAT SERPL: 8.4 (ref 7–25)
CALCIUM SPEC-SCNC: 7.8 MG/DL (ref 8.6–10.5)
CHLORIDE SERPL-SCNC: 93 MMOL/L (ref 98–107)
CO2 SERPL-SCNC: 33.7 MMOL/L (ref 22–29)
CREAT BLD-MCNC: 1.07 MG/DL (ref 0.76–1.27)
DEPRECATED RDW RBC AUTO: 46.5 FL (ref 37–54)
EOSINOPHIL # BLD AUTO: 0.14 10*3/MM3 (ref 0–0.7)
EOSINOPHIL NFR BLD AUTO: 1.8 % (ref 0.3–6.2)
ERYTHROCYTE [DISTWIDTH] IN BLOOD BY AUTOMATED COUNT: 14.9 % (ref 11.5–14.5)
GFR SERPL CREATININE-BSD FRML MDRD: 68 ML/MIN/1.73
GLOBULIN UR ELPH-MCNC: 3.2 GM/DL
GLUCOSE BLD-MCNC: 125 MG/DL (ref 65–99)
GLUCOSE BLDC GLUCOMTR-MCNC: 139 MG/DL (ref 70–130)
HBA1C MFR BLD: 7.34 % (ref 4.8–5.6)
HCT VFR BLD AUTO: 37.2 % (ref 40.4–52.2)
HGB BLD-MCNC: 11.8 G/DL (ref 13.7–17.6)
HOLD SPECIMEN: NORMAL
HOLD SPECIMEN: NORMAL
IMM GRANULOCYTES # BLD: 0.02 10*3/MM3 (ref 0–0.03)
IMM GRANULOCYTES NFR BLD: 0.3 % (ref 0–0.5)
INR PPP: 1.66 (ref 0.9–1.1)
LYMPHOCYTES # BLD AUTO: 0.93 10*3/MM3 (ref 0.9–4.8)
LYMPHOCYTES NFR BLD AUTO: 12 % (ref 19.6–45.3)
MCH RBC QN AUTO: 27 PG (ref 27–32.7)
MCHC RBC AUTO-ENTMCNC: 31.7 G/DL (ref 32.6–36.4)
MCV RBC AUTO: 85.1 FL (ref 79.8–96.2)
MONOCYTES # BLD AUTO: 0.78 10*3/MM3 (ref 0.2–1.2)
MONOCYTES NFR BLD AUTO: 10.1 % (ref 5–12)
NEUTROPHILS # BLD AUTO: 5.84 10*3/MM3 (ref 1.9–8.1)
NEUTROPHILS NFR BLD AUTO: 75.5 % (ref 42.7–76)
NT-PROBNP SERPL-MCNC: 731.2 PG/ML (ref 0–900)
PLATELET # BLD AUTO: 214 10*3/MM3 (ref 140–500)
PMV BLD AUTO: 10.4 FL (ref 6–12)
POTASSIUM BLD-SCNC: 3 MMOL/L (ref 3.5–5.2)
PROT SERPL-MCNC: 6.7 G/DL (ref 6–8.5)
PROTHROMBIN TIME: 19.4 SECONDS (ref 11.7–14.2)
RBC # BLD AUTO: 4.37 10*6/MM3 (ref 4.6–6)
SODIUM BLD-SCNC: 139 MMOL/L (ref 136–145)
TROPONIN T SERPL-MCNC: <0.01 NG/ML (ref 0–0.03)
WBC NRBC COR # BLD: 7.73 10*3/MM3 (ref 4.5–10.7)
WHOLE BLOOD HOLD SPECIMEN: NORMAL
WHOLE BLOOD HOLD SPECIMEN: NORMAL

## 2018-12-08 PROCEDURE — 84484 ASSAY OF TROPONIN QUANT: CPT | Performed by: EMERGENCY MEDICINE

## 2018-12-08 PROCEDURE — 83880 ASSAY OF NATRIURETIC PEPTIDE: CPT | Performed by: EMERGENCY MEDICINE

## 2018-12-08 PROCEDURE — 70450 CT HEAD/BRAIN W/O DYE: CPT

## 2018-12-08 PROCEDURE — 83036 HEMOGLOBIN GLYCOSYLATED A1C: CPT | Performed by: HOSPITALIST

## 2018-12-08 PROCEDURE — 99285 EMERGENCY DEPT VISIT HI MDM: CPT

## 2018-12-08 PROCEDURE — 85610 PROTHROMBIN TIME: CPT | Performed by: EMERGENCY MEDICINE

## 2018-12-08 PROCEDURE — 82962 GLUCOSE BLOOD TEST: CPT

## 2018-12-08 PROCEDURE — 85025 COMPLETE CBC W/AUTO DIFF WBC: CPT | Performed by: EMERGENCY MEDICINE

## 2018-12-08 PROCEDURE — 85730 THROMBOPLASTIN TIME PARTIAL: CPT | Performed by: EMERGENCY MEDICINE

## 2018-12-08 PROCEDURE — G0378 HOSPITAL OBSERVATION PER HR: HCPCS

## 2018-12-08 PROCEDURE — 93005 ELECTROCARDIOGRAM TRACING: CPT | Performed by: EMERGENCY MEDICINE

## 2018-12-08 PROCEDURE — 93010 ELECTROCARDIOGRAM REPORT: CPT | Performed by: INTERNAL MEDICINE

## 2018-12-08 PROCEDURE — 71045 X-RAY EXAM CHEST 1 VIEW: CPT

## 2018-12-08 PROCEDURE — 80053 COMPREHEN METABOLIC PANEL: CPT | Performed by: EMERGENCY MEDICINE

## 2018-12-08 RX ORDER — SODIUM CHLORIDE 0.9 % (FLUSH) 0.9 %
3-10 SYRINGE (ML) INJECTION AS NEEDED
Status: DISCONTINUED | OUTPATIENT
Start: 2018-12-08 | End: 2018-12-14 | Stop reason: HOSPADM

## 2018-12-08 RX ORDER — NICOTINE POLACRILEX 4 MG
15 LOZENGE BUCCAL
Status: DISCONTINUED | OUTPATIENT
Start: 2018-12-08 | End: 2018-12-14 | Stop reason: HOSPADM

## 2018-12-08 RX ORDER — FUROSEMIDE 20 MG/1
20 TABLET ORAL DAILY
COMMUNITY
End: 2019-08-16 | Stop reason: ALTCHOICE

## 2018-12-08 RX ORDER — POTASSIUM CHLORIDE 1.5 G/1.77G
40 POWDER, FOR SOLUTION ORAL AS NEEDED
Status: DISCONTINUED | OUTPATIENT
Start: 2018-12-08 | End: 2018-12-14 | Stop reason: HOSPADM

## 2018-12-08 RX ORDER — ASPIRIN 325 MG
325 TABLET ORAL DAILY
Status: DISCONTINUED | OUTPATIENT
Start: 2018-12-08 | End: 2018-12-11

## 2018-12-08 RX ORDER — ASPIRIN 300 MG/1
300 SUPPOSITORY RECTAL DAILY
Status: DISCONTINUED | OUTPATIENT
Start: 2018-12-08 | End: 2018-12-11

## 2018-12-08 RX ORDER — DEXTROSE MONOHYDRATE 25 G/50ML
25 INJECTION, SOLUTION INTRAVENOUS
Status: DISCONTINUED | OUTPATIENT
Start: 2018-12-08 | End: 2018-12-14 | Stop reason: HOSPADM

## 2018-12-08 RX ORDER — SODIUM CHLORIDE 0.9 % (FLUSH) 0.9 %
10 SYRINGE (ML) INJECTION AS NEEDED
Status: DISCONTINUED | OUTPATIENT
Start: 2018-12-08 | End: 2018-12-14 | Stop reason: HOSPADM

## 2018-12-08 RX ORDER — ACETAMINOPHEN 325 MG/1
650 TABLET ORAL EVERY 4 HOURS PRN
Status: DISCONTINUED | OUTPATIENT
Start: 2018-12-08 | End: 2018-12-14 | Stop reason: HOSPADM

## 2018-12-08 RX ORDER — SODIUM CHLORIDE 0.9 % (FLUSH) 0.9 %
3 SYRINGE (ML) INJECTION EVERY 12 HOURS SCHEDULED
Status: DISCONTINUED | OUTPATIENT
Start: 2018-12-08 | End: 2018-12-14 | Stop reason: HOSPADM

## 2018-12-08 RX ORDER — POTASSIUM CHLORIDE 7.45 MG/ML
10 INJECTION INTRAVENOUS
Status: DISCONTINUED | OUTPATIENT
Start: 2018-12-08 | End: 2018-12-14 | Stop reason: HOSPADM

## 2018-12-08 RX ORDER — POTASSIUM CHLORIDE 750 MG/1
40 CAPSULE, EXTENDED RELEASE ORAL AS NEEDED
Status: DISCONTINUED | OUTPATIENT
Start: 2018-12-08 | End: 2018-12-14 | Stop reason: HOSPADM

## 2018-12-08 RX ORDER — HYDROCODONE BITARTRATE AND ACETAMINOPHEN 7.5; 325 MG/1; MG/1
1 TABLET ORAL ONCE
Status: COMPLETED | OUTPATIENT
Start: 2018-12-08 | End: 2018-12-08

## 2018-12-08 RX ORDER — FLUDROCORTISONE ACETATE 0.1 MG/1
0.1 TABLET ORAL 2 TIMES DAILY
COMMUNITY
End: 2019-08-16 | Stop reason: ALTCHOICE

## 2018-12-08 RX ORDER — ATORVASTATIN CALCIUM 80 MG/1
80 TABLET, FILM COATED ORAL NIGHTLY
Status: DISCONTINUED | OUTPATIENT
Start: 2018-12-08 | End: 2018-12-14 | Stop reason: HOSPADM

## 2018-12-08 RX ORDER — PREGABALIN 100 MG/1
150 CAPSULE ORAL 2 TIMES DAILY
COMMUNITY
End: 2019-08-16 | Stop reason: ALTCHOICE

## 2018-12-08 RX ORDER — SODIUM CHLORIDE 9 MG/ML
75 INJECTION, SOLUTION INTRAVENOUS CONTINUOUS
Status: DISCONTINUED | OUTPATIENT
Start: 2018-12-08 | End: 2018-12-10

## 2018-12-08 RX ADMIN — ATORVASTATIN CALCIUM 80 MG: 80 TABLET, FILM COATED ORAL at 21:25

## 2018-12-08 RX ADMIN — SODIUM CHLORIDE, PRESERVATIVE FREE 3 ML: 5 INJECTION INTRAVENOUS at 21:25

## 2018-12-08 RX ADMIN — POTASSIUM CHLORIDE 40 MEQ: 750 CAPSULE, EXTENDED RELEASE ORAL at 17:57

## 2018-12-08 RX ADMIN — SODIUM CHLORIDE 75 ML/HR: 9 INJECTION, SOLUTION INTRAVENOUS at 17:58

## 2018-12-08 RX ADMIN — ACETAMINOPHEN 650 MG: 325 TABLET, FILM COATED ORAL at 21:25

## 2018-12-08 RX ADMIN — HYDROCODONE BITARTRATE AND ACETAMINOPHEN 1 TABLET: 7.5; 325 TABLET ORAL at 13:50

## 2018-12-08 RX ADMIN — ASPIRIN 325 MG: 325 TABLET ORAL at 21:25

## 2018-12-08 NOTE — ED TRIAGE NOTES
Pt reports 2 falls in the last week. Pt did not hit head. Pt on coumadin.    Pt has hx of parkinsons with bouts of weakness.     Today, pt c/o right sided tingling that started 2 nights ago. Symptoms progressively worse.     Pt also c/o left neck and head pain.

## 2018-12-08 NOTE — H&P
[facility]    Patient Identification:  Name: Edilberto Reeder  Age: 68 y.o.  Sex: male  :  1950  MRN: [id number]         Primary Care Physician: Harvey Larson MD    Chief Complaint   Patient presents with   • Tingling     Subjective   Patient is a 68 y.o. male with a history of Parkinson's came to the hospital because of right-sided weakness and numbness that started this morning. He also reports left-sided neck pain. He states he has a tingling feeling on his right side feeling like it is going to sleep. He ambulates with a walker/cane and wife states he has a shuffling walk and was not able walk today. He has a history of atrial fibrillation on warfarin. He reports slurred speech today. He has incontinence due to his medications. He has a stimulator for Parkinson's    Past Medical History:   Diagnosis Date   • Atrial fibrillation with RVR (CMS/HCC)    • Atrial flutter (CMS/HCC)    • Diabetes (CMS/HCC)    • HLD (hyperlipidemia) 2016   • Hypertension    • Parkinson's disease (CMS/HCC)     Advanced      Past Surgical History:   Procedure Laterality Date   • BRAIN STIMULATOR     • JOINT REPLACEMENT      Bilateral shoulder and knee replacements     Current medications reviewed.    History reviewed. No pertinent family history.  Social History     Tobacco Use   • Smoking status: Never Smoker   • Smokeless tobacco: Never Used   • Tobacco comment: caffeine use   Substance Use Topics   • Alcohol use: Yes   • Drug use: No     Review of Systems   Constitutional: Negative for chills and fever.   Eyes: Negative.    Respiratory: Negative.  Negative for chest tightness and shortness of breath.    Cardiovascular: Negative.  Negative for chest pain.   Gastrointestinal: Negative.  Negative for abdominal pain, diarrhea, nausea and vomiting.   Endocrine: Negative.    Genitourinary: Negative.    Musculoskeletal: Negative.    Skin: Negative.     Allergic/Immunologic: Negative.    Neurological: Positive for speech difficulty, weakness, numbness and headaches.   Hematological: Negative.    Psychiatric/Behavioral: Negative.       Objective      Vital Signs  Temp:  [99.2 °F (37.3 °C)] 99.2 °F (37.3 °C)  Heart Rate:  [57-67] 60  Resp:  [14-18] 14  BP: ()/(65-75) 121/67  Body mass index is 31.94 kg/m².    Physical Exam   Constitutional: He is oriented to person, place, and time. He appears well-developed and well-nourished.  Non-toxic appearance. He does not have a sickly appearance. He does not appear ill. No distress.   HENT:   Head: Normocephalic and atraumatic.   Eyes: Conjunctivae and EOM are normal.   Neck: Neck supple. No tracheal deviation present.   Cardiovascular: Normal rate, regular rhythm and intact distal pulses.   Pulses:       Radial pulses are 2+ on the right side, and 2+ on the left side.        Dorsalis pedis pulses are 2+ on the right side, and 2+ on the left side.   implanted right chest device   Pulmonary/Chest: Effort normal and breath sounds normal. No respiratory distress. He has no wheezes. He has no rales.   Abdominal: Soft. He exhibits no distension. There is no tenderness. There is no rebound and no guarding.   Musculoskeletal: He exhibits no edema.   Lymphadenopathy:     He has no cervical adenopathy.   Neurological: He is alert and oriented to person, place, and time. A sensory deficit (right) is present.   RUE 3-4/5  RLE 2-3/5  speech slightly slurred  brudzinski negative  left kernig negative  right difficult due to chronic knee pain   Skin: Skin is warm and dry.   Psychiatric: He has a normal mood and affect. His behavior is normal.     Results Review:  I reviewed the patient's new clinical results.  I reviewed the patient's new imaging results and agree with the interpretation.  I personally viewed and interpreted the patient's EKG/Telemetry data.    Lab Results   Component Value Date    ANIONGAP 12.3 12/08/2018      Estimated Creatinine Clearance: 81.2 mL/min (by C-G formula based on SCr of 1.07 mg/dL).     Results from last 7 days   Lab Units  12/08/18   1215   INR   1.66*     Assessment/Plan   Active Hospital Problems    Diagnosis Date Noted   • Stroke-like symptoms [R29.90] 12/08/2018   • Diabetes (CMS/HCC) [E11.9]    • Parkinson's disease (CMS/[facility]) [G20]    • Anticoagulated on warfarin [Z79.01] 01/27/2016   • Essential hypertension [I10] 01/27/2016   • HLD (hyperlipidemia) [E78.5] 01/27/2016   • Hypothyroidism [E03.9] 01/27/2016   • Paroxysmal atrial fibrillation (CMS/[facility]) [I48.0] 01/27/2016      Resolved Hospital Problems   No resolved problems to display.     68 y.o. male   · right sided weakness, speech difficulty  · CT negative. no MRI due to brain stimulator.  · ASA, statin  · echo, lipids  · neuro checks  · ask neurology to see  · PT/OT/SLP  · atrial fibrillation on anticoagulation, INR low  · PD: continue meds. has a stimulator  · DM2  · check a1c  · correctional insulin  · hypokalemia: replace  · wife is health care surrogate  · full code  · I discussed the patient's findings and my recommendations with patient and family and Dr. Bustillo.    Alexis Amador MD  12/08/18  3:32 PM

## 2018-12-08 NOTE — PLAN OF CARE
Problem: Fall Risk (Adult)  Goal: Identify Related Risk Factors and Signs and Symptoms  Outcome: Outcome(s) achieved Date Met: 12/08/18    Goal: Absence of Fall  Outcome: Ongoing (interventions implemented as appropriate)      Problem: Patient Care Overview  Goal: Plan of Care Review  Outcome: Ongoing (interventions implemented as appropriate)   12/08/18 1102   Coping/Psychosocial   Plan of Care Reviewed With patient   Plan of Care Review   Progress improving   OTHER   Outcome Summary Pt NIH 3, RLE/RUE has lessened sensation, Pt A&Ox4, potassium being replaced, echo ordered, son at , will monitor.        Problem: Stroke (Ischemic) (Adult)  Goal: Signs and Symptoms of Listed Potential Problems Will be Absent, Minimized or Managed (Stroke)  Outcome: Ongoing (interventions implemented as appropriate)

## 2018-12-08 NOTE — ED NOTES
Nursing report ED to floor  Edilberto A Varinder  68 y.o.  male    HPI (triage note):   Chief Complaint   Patient presents with   • Tingling       Admitting doctor:   Alexis Amador MD    Admitting diagnosis:   The primary encounter diagnosis was Stroke-like symptoms. A diagnosis of Parkinson disease (CMS/HCC) was also pertinent to this visit.    Code status:   Current Code Status     Date Active Code Status Order ID Comments User Context       Prior          Allergies:   Bacitracin; Fish-derived products; Neosporin [neomycin-bacitracin zn-polymyx]; Shellfish allergy; and Shrimp (diagnostic)    Weight:       12/08/18  1202   Weight: 107 kg (235 lb 8 oz)       Most recent vitals:   Vitals:    12/08/18 1417 12/08/18 1426 12/08/18 1427 12/08/18 1433   BP:    121/67   Pulse: 61 60  57   Resp:   14    Temp:       TempSrc:       SpO2: 96% 97%  95%   Weight:       Height:           Active LDAs/IV Access:   Lines, Drains & Airways    Active LDAs     Name:   Placement date:   Placement time:   Site:   Days:    Peripheral IV 12/08/18 1215 Left Antecubital   12/08/18    1215    Antecubital   less than 1                Labs (abnormal labs have a star):   Labs Reviewed   PROTIME-INR - Abnormal; Notable for the following components:       Result Value    Protime 19.4 (*)     INR 1.66 (*)     All other components within normal limits   COMPREHENSIVE METABOLIC PANEL - Abnormal; Notable for the following components:    Glucose 125 (*)     Potassium 3.0 (*)     Chloride 93 (*)     CO2 33.7 (*)     Calcium 7.8 (*)     All other components within normal limits   APTT - Abnormal; Notable for the following components:    PTT 37.0 (*)     All other components within normal limits   CBC WITH AUTO DIFFERENTIAL - Abnormal; Notable for the following components:    RBC 4.37 (*)     Hemoglobin 11.8 (*)     Hematocrit 37.2 (*)     MCHC 31.7 (*)     RDW 14.9 (*)     Lymphocyte % 12.0 (*)     All other components within normal limits   TROPONIN  (IN-HOUSE) - Normal    Narrative:     Troponin T Reference Ranges:  Less than 0.03 ng/mL:    Negative for AMI  0.03 to 0.09 ng/mL:      Indeterminant for AMI  Greater than 0.09 ng/mL: Positive for AMI   BNP (IN-HOUSE) - Normal    Narrative:     Among patients with dyspnea, NT-proBNP is highly sensitive for the detection of acute congestive heart failure. In addition NT-proBNP of <300 pg/ml effectively rules out acute congestive heart failure with 99% negative predictive value.   RAINBOW DRAW    Narrative:     The following orders were created for panel order [place] Draw.  Procedure                               Abnormality         Status                     ---------                               -----------         ------                     Light Blue Top[[id number]]                                   Final result               Green Top (Gel)[[id number]]                                  Final result               Lavender Top[[id number]]                                     Final result               Gold Top - SST[[id number]]                                   Final result                 Please view results for these tests on the individual orders.   LIGHT BLUE TOP   GREEN TOP   LAVENDER TOP   GOLD TOP - SST   CBC AND DIFFERENTIAL    Narrative:     The following orders were created for panel order CBC & Differential.  Procedure                               Abnormality         Status                     ---------                               -----------         ------                     CBC Auto Differential[[id number]]        Abnormal            Final result                 Please view results for these tests on the individual orders.       EKG:   ECG 12 Lead             Meds given in ED:   Medications   sodium chloride 0.9 % flush 10 mL (not administered)   HYDROcodone-acetaminophen (NORCO) 7.5-325 MG per tablet 1 tablet (1 tablet Oral Given 12/8/18 1350)       Imaging results:  Ct Head Without Contrast    Result  Date: 12/8/2018       No acute intracranial hemorrhage or hydrocephalus. Chronic changes. Follow-up/further evaluation can be obtained as indicated.  This report was finalized on 12/8/2018 1:03 PM by Dr. Myron Rutledge M.D.      Xr Chest 1 View    Result Date: 12/8/2018  No evidence for acute pulmonary process. Follow-up as clinical indications persist.  This report was finalized on 12/8/2018 12:51 PM by Dr. Myron Rutledge M.D.        Ambulatory status:   - up with assist x 2 only to bedside commode    Social issues:   Social History     Socioeconomic History   • Marital status:      Spouse name: Not on file   • Number of children: Not on file   • Years of education: Not on file   • Highest education level: Not on file   Social Needs   • Financial resource strain: Not on file   • Food insecurity - worry: Not on file   • Food insecurity - inability: Not on file   • Transportation needs - medical: Not on file   • Transportation needs - non-medical: Not on file   Occupational History   • Not on file   Tobacco Use   • Smoking status: Never Smoker   • Smokeless tobacco: Never Used   • Tobacco comment: caffeine use   Substance and Sexual Activity   • Alcohol use: Yes   • Drug use: No   • Sexual activity: Defer   Other Topics Concern   • Not on file   Social History Narrative   • Not on file     .     Digna Aparicio RN  12/08/18 4529

## 2018-12-08 NOTE — ED NOTES
Pt. reports pain in the back of his neck is about a 2/10 and his right knee is about a 5-6/10. Pt. is in NAD and A/Ox4.     Enrique Burger, RN  12/08/18 2861

## 2018-12-08 NOTE — ED PROVIDER NOTES
" EMERGENCY DEPARTMENT ENCOUNTER    CHIEF COMPLAINT  Chief Complaint: tingling  History given by: pt  History limited by: none  Room Number: 23/23  PMD: Harvey Larson MD      HPI:  Pt is a 68 y.o. male who presents complaining of right sided body tingling. Pt states that the calf and right arm has a \"tingling\" sensation. Pt states that it feels like it is going to sleep. Pt states that he has right foot pain as well. Pt states that palpating the areas and placing pressure on the right leg makes the pain worse. Pt states that he has had trouble walking. Pt has hx of Parkinson's. Pt has had several falls recently, but denies LOC or head trauma. Pt is on Coumadin for A-fib.    Duration:  Since this morning  Onset: gradual  Timing: constant  Location: right arm and right leg  Radiation: n/a  Quality: n/a  Intensity/Severity: moderate  Progression: worsened  Associated Symptoms: right arm numbness, right leg numbness, and right foot pain.  Aggravating Factors: palpating and standing on his leg.  Alleviating Factors: n/a  Previous Episodes: Pt has hx of Parkinson's.   Treatment before arrival: n/a    PAST MEDICAL HISTORY  Active Ambulatory Problems     Diagnosis Date Noted   • ASCVD (arteriosclerotic cardiovascular disease) 01/27/2016   • Paroxysmal atrial fibrillation (CMS/HCC) 01/27/2016   • Anticoagulated on warfarin 01/27/2016   • Diabetic retinopathy associated with type 2 diabetes mellitus (CMS/[facility]) 01/27/2016   • Essential hypertension 01/27/2016   • HLD (hyperlipidemia) 01/27/2016   • Hypothyroidism 01/27/2016   • Peripheral neuropathy 01/27/2016   • Renal insufficiency 01/27/2016   • Diabetes (CMS/[facility])    • Parkinson's disease (CMS/[facility])    • Dyspnea on exertion 04/19/2018     Resolved Ambulatory Problems     Diagnosis Date Noted   • Type 2 diabetes mellitus (CMS/HCC) 01/27/2016   • Atrial fibrillation with RVR (CMS/[facility]) 01/04/2018   • Atrial fibrillation (CMS/[facility]) [I48.91] 08/17/2018     Past Medical History: "   Diagnosis Date   • Atrial fibrillation with RVR (CMS/HCC)    • Atrial flutter (CMS/HCC)    • Diabetes (CMS/[facility])    • HLD (hyperlipidemia) 1/27/2016   • Hypertension    • Parkinson's disease (CMS/[facility])        PAST SURGICAL HISTORY  Past Surgical History:   Procedure Laterality Date   • BRAIN STIMULATOR     • JOINT REPLACEMENT      Bilateral shoulder and knee replacements       FAMILY HISTORY  History reviewed. No pertinent family history.    SOCIAL HISTORY  Social History     Socioeconomic History   • Marital status:      Spouse name: Not on file   • Number of children: Not on file   • Years of education: Not on file   • Highest education level: Not on file   Social Needs   • Financial resource strain: Not on file   • Food insecurity - worry: Not on file   • Food insecurity - inability: Not on file   • Transportation needs - medical: Not on file   • Transportation needs - non-medical: Not on file   Occupational History   • Not on file   Tobacco Use   • Smoking status: Never Smoker   • Smokeless tobacco: Never Used   • Tobacco comment: caffeine use   Substance and Sexual Activity   • Alcohol use: Yes   • Drug use: No   • Sexual activity: Defer   Other Topics Concern   • Not on file   Social History Narrative   • Not on file       ALLERGIES  Bacitracin; Fish-derived products; Neosporin [neomycin-bacitracin zn-polymyx]; Shellfish allergy; and Shrimp (diagnostic)    REVIEW OF SYSTEMS  Review of Systems   Constitutional: Negative for activity change, appetite change and fever.   HENT: Negative for congestion and sore throat.    Eyes: Negative.    Respiratory: Negative for cough and shortness of breath.    Cardiovascular: Negative for chest pain and leg swelling.   Gastrointestinal: Negative for abdominal pain, diarrhea and vomiting.   Endocrine: Negative.    Genitourinary: Negative for decreased urine volume and dysuria.   Musculoskeletal: Negative for neck pain.   Skin: Negative for rash and wound.    Allergic/Immunologic: Negative.    Neurological: Positive for numbness (right leg, right arm, and right foot). Negative for weakness and headaches.   Hematological: Negative.    Psychiatric/Behavioral: Negative.    All other systems reviewed and are negative.      PHYSICAL EXAM  ED Triage Vitals   Temp Heart Rate Resp BP SpO2   12/08/18 1202 12/08/18 1202 12/08/18 1202 12/08/18 1216 12/08/18 1202   99.2 °F (37.3 °C) 67 15 125/70 91 %      Temp src Heart Rate Source Patient Position BP Location FiO2 (%)   12/08/18 1202 12/08/18 1202 -- -- --   Tympanic Monitor          Physical Exam   Constitutional: He is oriented to person, place, and time. No distress.   HENT:   Head: Normocephalic and atraumatic.   Eyes: EOM are normal. Pupils are equal, round, and reactive to light.   Neck: Normal range of motion. Neck supple.   Cardiovascular: Normal rate, regular rhythm and normal heart sounds.   Pulmonary/Chest: Effort normal and breath sounds normal. No respiratory distress.   Deep breath stimulator in the right chest   Abdominal: Soft. There is no tenderness. There is no rebound and no guarding.   Musculoskeletal: Normal range of motion. He exhibits edema (mild).        Cervical back: He exhibits tenderness.        Right forearm: He exhibits tenderness.        Right lower leg: He exhibits tenderness.   No signs of a DVT. Left neck tenderness at the musculature.   Neurological: He is alert and oriented to person, place, and time. He has an abnormal Straight Leg Raise Test (right) and an abnormal Finger-Nose-Finger Test (right). GCS score is 15.   Speech is slow bu clear. Difficult with right arm rise difficulty with FTN, week leg raise and dorsiflexion  on the right. Diminished sensation to the right forearm and lower leg.   Skin: Skin is warm and dry.   Psychiatric: Mood and affect normal.   Nursing note and vitals reviewed.      LAB RESULTS  Lab Results (last 24 hours)     Procedure Component Value Units Date/Time     Protime-INR [[id number]]  (Abnormal) Collected:  12/08/18 1215    Specimen:  Blood Updated:  12/08/18 1245     Protime 19.4 Seconds      INR 1.66    CBC & Differential [[id number]] Collected:  12/08/18 1215    Specimen:  Blood Updated:  12/08/18 1242    Narrative:       The following orders were created for panel order CBC & Differential.  Procedure                               Abnormality         Status                     ---------                               -----------         ------                     CBC Auto Differential[[id number]]        Abnormal            Final result                 Please view results for these tests on the individual orders.    Comprehensive Metabolic Panel [[id number]]  (Abnormal) Collected:  12/08/18 1215    Specimen:  Blood Updated:  12/08/18 1306     Glucose 125 mg/dL      BUN 9 mg/dL      Creatinine 1.07 mg/dL      Sodium 139 mmol/L      Potassium 3.0 mmol/L      Chloride 93 mmol/L      CO2 33.7 mmol/L      Calcium 7.8 mg/dL      Total Protein 6.7 g/dL      Albumin 3.50 g/dL      ALT (SGPT) <5 U/L      AST (SGOT) 7 U/L      Alkaline Phosphatase 65 U/L      Total Bilirubin 1.1 mg/dL      eGFR Non African Amer 68 mL/min/1.73      Globulin 3.2 gm/dL      A/G Ratio 1.1 g/dL      BUN/Creatinine Ratio 8.4     Anion Gap 12.3 mmol/L     aPTT [[id number]]  (Abnormal) Collected:  12/08/18 1215    Specimen:  Blood Updated:  12/08/18 1245     PTT 37.0 seconds     Troponin [[id number]]  (Normal) Collected:  12/08/18 1215    Specimen:  Blood Updated:  12/08/18 1255     Troponin T <0.010 ng/mL     Narrative:       Troponin T Reference Ranges:  Less than 0.03 ng/mL:    Negative for AMI  0.03 to 0.09 ng/mL:      Indeterminant for AMI  Greater than 0.09 ng/mL: Positive for AMI    BNP [[id number]]  (Normal) Collected:  12/08/18 1215    Specimen:  Blood Updated:  12/08/18 1252     proBNP 731.2 pg/mL     Narrative:       Among patients with dyspnea, NT-proBNP is highly sensitive for the detection of  acute congestive heart failure. In addition NT-proBNP of <300 pg/ml effectively rules out acute congestive heart failure with 99% negative predictive value.    CBC Auto Differential [[id number]]  (Abnormal) Collected:  12/08/18 1215    Specimen:  Blood Updated:  12/08/18 1242     WBC 7.73 10*3/mm3      RBC 4.37 10*6/mm3      Hemoglobin 11.8 g/dL      Hematocrit 37.2 %      MCV 85.1 fL      MCH 27.0 pg      MCHC 31.7 g/dL      RDW 14.9 %      RDW-SD 46.5 fl      MPV 10.4 fL      Platelets 214 10*3/mm3      Neutrophil % 75.5 %      Lymphocyte % 12.0 %      Monocyte % 10.1 %      Eosinophil % 1.8 %      Basophil % 0.3 %      Immature Grans % 0.3 %      Neutrophils, Absolute 5.84 10*3/mm3      Lymphocytes, Absolute 0.93 10*3/mm3      Monocytes, Absolute 0.78 10*3/mm3      Eosinophils, Absolute 0.14 10*3/mm3      Basophils, Absolute 0.02 10*3/mm3      Immature Grans, Absolute 0.02 10*3/mm3           I ordered the above labs and reviewed the results    RADIOLOGY  CT Head Without Contrast   Final Result           No acute intracranial hemorrhage or hydrocephalus. Chronic changes.   Follow-up/further evaluation can be obtained as indicated.       This report was finalized on 12/8/2018 1:03 PM by Dr. Myron Rutledge M.D.          XR Chest 1 View   Final Result   No evidence for acute pulmonary process. Follow-up as   clinical indications persist.       This report was finalized on 12/8/2018 12:51 PM by Dr. Myron Rutledge M.D.               I ordered the above noted radiological studies. Interpreted by radiologist. Reviewed by me in PACS.       PROCEDURES  Procedures  EKG          EKG time: 1312  Rhythm/Rate: baseline artifact to to stimular  P waves and IA: First degrees av block  QRS, axis: nml  ST and T waves: Non-specific ST changes       Interpreted Contemporaneously by me, independently viewed  unchanged compared to prior 1/5/18           PROGRESS AND CONSULTS    not a TPA due to the sx occurring an 12 hours  ago.    1236- Ordered CMP, aPITT, Troponin, BNP, INR, and  EKG.     1240- CBC.    1348- Placed a call to [facility]. Ordered [place] for pain    1409- Discussed pt case with Dr. Amador ([facility]) who will admit the pt to a tele bed.      MEDICAL DECISION MAKING  Results were reviewed/discussed with the patient and they were also made aware of online access. Pt also made aware that some labs, such as cultures, will not be resulted during ER visit and follow up with PMD is necessary.     MDM  Number of Diagnoses or Management Options  Parkinson disease (CMS/HCC):   Stroke-like symptoms:      Amount and/or Complexity of Data Reviewed  Clinical lab tests: reviewed and ordered (Troponin- negative  INR- elevated  BNP- negative)  Tests in the radiology section of CPT®: reviewed and ordered (CT head-     No acute intracranial hemorrhage or hydrocephalus. Chronic changes.  Follow-up/further evaluation can be obtained as indicated.  CXR- No evidence for acute pulmonary process. Follow-up as  clinical indications persist.  )  Tests in the medicine section of CPT®: reviewed and ordered (See procedure note for EKG  )  Decide to obtain previous medical records or to obtain history from someone other than the patient: yes  Discuss the patient with other providers: yes (Dr. Amador ([facility]))  Independent visualization of images, tracings, or specimens: yes           DIAGNOSIS  Final diagnoses:   Stroke-like symptoms   Parkinson disease (CMS/HCC)       DISPOSITION  ADMISSION    Discussed treatment plan and reason for admission with pt/family and admitting physician.  Pt/family voiced understanding of the plan for admission for further testing/treatment as needed.         Latest Documented Vital Signs:  As of 2:21 PM  BP- 90/75 HR- 62 Temp- 99.2 °F (37.3 °C) (Tympanic) O2 sat- 98%    --  Documentation assistance provided by bob Munoz for Dr. Bustillo.  Information recorded by the bob was done at my direction and has been verified and  validated by me.        Adria Munoz  12/08/18 1428       Alejandro Bustillo MD  12/08/18 0089

## 2018-12-09 ENCOUNTER — APPOINTMENT (OUTPATIENT)
Dept: CT IMAGING | Facility: HOSPITAL | Age: 68
End: 2018-12-09

## 2018-12-09 ENCOUNTER — APPOINTMENT (OUTPATIENT)
Dept: CARDIOLOGY | Facility: HOSPITAL | Age: 68
End: 2018-12-09
Attending: HOSPITALIST

## 2018-12-09 LAB
AORTIC DIMENSIONLESS INDEX: 1 (DI)
BH CV ECHO MEAS - ACS: 1.9 CM
BH CV ECHO MEAS - AO MAX PG: 4.8 MMHG
BH CV ECHO MEAS - AO MEAN PG (FULL): 0 MMHG
BH CV ECHO MEAS - AO MEAN PG: 2 MMHG
BH CV ECHO MEAS - AO ROOT AREA (BSA CORRECTED): 1.4
BH CV ECHO MEAS - AO ROOT AREA: 8.6 CM^2
BH CV ECHO MEAS - AO ROOT DIAM: 3.3 CM
BH CV ECHO MEAS - AO V2 MAX: 102 CM/SEC
BH CV ECHO MEAS - AO V2 MEAN: 71.9 CM/SEC
BH CV ECHO MEAS - AO V2 VTI: 22 CM
BH CV ECHO MEAS - AVA(I,A): 3 CM^2
BH CV ECHO MEAS - AVA(I,D): 3 CM^2
BH CV ECHO MEAS - BSA(HAYCOCK): 2.4 M^2
BH CV ECHO MEAS - BSA: 2.3 M^2
BH CV ECHO MEAS - BZI_BMI: 30.7 KILOGRAMS/M^2
BH CV ECHO MEAS - BZI_METRIC_HEIGHT: 185.4 CM
BH CV ECHO MEAS - BZI_METRIC_WEIGHT: 105.7 KG
BH CV ECHO MEAS - EDV(CUBED): 148.9 ML
BH CV ECHO MEAS - EDV(MOD-SP2): 101 ML
BH CV ECHO MEAS - EDV(MOD-SP4): 112 ML
BH CV ECHO MEAS - EDV(TEICH): 135.3 ML
BH CV ECHO MEAS - EF(CUBED): 85.3 %
BH CV ECHO MEAS - EF(MOD-BP): 73 %
BH CV ECHO MEAS - EF(MOD-SP2): 70.3 %
BH CV ECHO MEAS - EF(MOD-SP4): 75 %
BH CV ECHO MEAS - EF(TEICH): 78.2 %
BH CV ECHO MEAS - ESV(CUBED): 22 ML
BH CV ECHO MEAS - ESV(MOD-SP2): 30 ML
BH CV ECHO MEAS - ESV(MOD-SP4): 28 ML
BH CV ECHO MEAS - ESV(TEICH): 29.6 ML
BH CV ECHO MEAS - FS: 47.2 %
BH CV ECHO MEAS - IVS/LVPW: 1
BH CV ECHO MEAS - IVSD: 1.1 CM
BH CV ECHO MEAS - LAT PEAK E' VEL: 8 CM/SEC
BH CV ECHO MEAS - LV DIASTOLIC VOL/BSA (35-75): 48.8 ML/M^2
BH CV ECHO MEAS - LV MASS(C)D: 227.7 GRAMS
BH CV ECHO MEAS - LV MASS(C)DI: 99.2 GRAMS/M^2
BH CV ECHO MEAS - LV MEAN PG: 2 MMHG
BH CV ECHO MEAS - LV SYSTOLIC VOL/BSA (12-30): 12.2 ML/M^2
BH CV ECHO MEAS - LV V1 MAX: 98 CM/SEC
BH CV ECHO MEAS - LV V1 MEAN: 65.2 CM/SEC
BH CV ECHO MEAS - LV V1 VTI: 20.9 CM
BH CV ECHO MEAS - LVIDD: 5.3 CM
BH CV ECHO MEAS - LVIDS: 2.8 CM
BH CV ECHO MEAS - LVLD AP2: 9.1 CM
BH CV ECHO MEAS - LVLD AP4: 8.9 CM
BH CV ECHO MEAS - LVLS AP2: 7.6 CM
BH CV ECHO MEAS - LVLS AP4: 7.2 CM
BH CV ECHO MEAS - LVOT AREA (M): 3.1 CM^2
BH CV ECHO MEAS - LVOT AREA: 3.1 CM^2
BH CV ECHO MEAS - LVOT DIAM: 2 CM
BH CV ECHO MEAS - LVPWD: 1.1 CM
BH CV ECHO MEAS - MED PEAK E' VEL: 5 CM/SEC
BH CV ECHO MEAS - MV A DUR: 0.15 SEC
BH CV ECHO MEAS - MV A MAX VEL: 56.8 CM/SEC
BH CV ECHO MEAS - MV DEC SLOPE: 511 CM/SEC^2
BH CV ECHO MEAS - MV DEC TIME: 0.18 SEC
BH CV ECHO MEAS - MV E MAX VEL: 104 CM/SEC
BH CV ECHO MEAS - MV E/A: 1.8
BH CV ECHO MEAS - MV MEAN PG: 2 MMHG
BH CV ECHO MEAS - MV P1/2T MAX VEL: 118 CM/SEC
BH CV ECHO MEAS - MV P1/2T: 67.6 MSEC
BH CV ECHO MEAS - MV V2 MEAN: 57.3 CM/SEC
BH CV ECHO MEAS - MV V2 VTI: 29.4 CM
BH CV ECHO MEAS - MVA P1/2T LCG: 1.9 CM^2
BH CV ECHO MEAS - MVA(P1/2T): 3.3 CM^2
BH CV ECHO MEAS - MVA(VTI): 2.2 CM^2
BH CV ECHO MEAS - PA ACC SLOPE: 19.7 CM/SEC^2
BH CV ECHO MEAS - PA ACC TIME: 0.1 SEC
BH CV ECHO MEAS - PA MAX PG (FULL): 1.2 MMHG
BH CV ECHO MEAS - PA MAX PG: 2.9 MMHG
BH CV ECHO MEAS - PA PR(ACCEL): 34.5 MMHG
BH CV ECHO MEAS - PA V2 MAX: 85.3 CM/SEC
BH CV ECHO MEAS - PULM A REVS DUR: 0.13 SEC
BH CV ECHO MEAS - PULM A REVS VEL: 23.9 CM/SEC
BH CV ECHO MEAS - PULM DIAS VEL: 79.8 CM/SEC
BH CV ECHO MEAS - PULM S/D: 0.67
BH CV ECHO MEAS - PULM SYS VEL: 53.2 CM/SEC
BH CV ECHO MEAS - PVA(V,A): 2.4 CM^2
BH CV ECHO MEAS - PVA(V,D): 2.4 CM^2
BH CV ECHO MEAS - QP/QS: 0.64
BH CV ECHO MEAS - RAP SYSTOLE: 6 MMHG
BH CV ECHO MEAS - RV MAX PG: 1.7 MMHG
BH CV ECHO MEAS - RV MEAN PG: 1 MMHG
BH CV ECHO MEAS - RV V1 MAX: 65.4 CM/SEC
BH CV ECHO MEAS - RV V1 MEAN: 43.7 CM/SEC
BH CV ECHO MEAS - RV V1 VTI: 13.4 CM
BH CV ECHO MEAS - RVOT AREA: 3.1 CM^2
BH CV ECHO MEAS - RVOT DIAM: 2 CM
BH CV ECHO MEAS - RVSP: 30 MMHG
BH CV ECHO MEAS - SI(AO): 81.9 ML/M^2
BH CV ECHO MEAS - SI(CUBED): 55.3 ML/M^2
BH CV ECHO MEAS - SI(LVOT): 28.6 ML/M^2
BH CV ECHO MEAS - SI(MOD-SP2): 30.9 ML/M^2
BH CV ECHO MEAS - SI(MOD-SP4): 36.6 ML/M^2
BH CV ECHO MEAS - SI(TEICH): 46.1 ML/M^2
BH CV ECHO MEAS - SV(AO): 188.2 ML
BH CV ECHO MEAS - SV(CUBED): 126.9 ML
BH CV ECHO MEAS - SV(LVOT): 65.7 ML
BH CV ECHO MEAS - SV(MOD-SP2): 71 ML
BH CV ECHO MEAS - SV(MOD-SP4): 84 ML
BH CV ECHO MEAS - SV(RVOT): 42.1 ML
BH CV ECHO MEAS - SV(TEICH): 105.8 ML
BH CV ECHO MEAS - TAPSE (>1.6): 2.1 CM2
BH CV ECHO MEAS - TR MAX PG: 24
BH CV ECHO MEAS - TR MAX VEL: 242 CM/SEC
BH CV ECHO MEASUREMENTS AVERAGE E/E' RATIO: 16
BH CV VAS BP RIGHT ARM: NORMAL MMHG
BH CV XLRA - RV BASE: 3.7 CM
BH CV XLRA - TDI S': 12 CM/SEC
CHOLEST SERPL-MCNC: 126 MG/DL (ref 0–200)
FOLATE SERPL-MCNC: 7.26 NG/ML (ref 4.78–24.2)
GLUCOSE BLDC GLUCOMTR-MCNC: 165 MG/DL (ref 70–130)
GLUCOSE BLDC GLUCOMTR-MCNC: 167 MG/DL (ref 70–130)
GLUCOSE BLDC GLUCOMTR-MCNC: 303 MG/DL (ref 70–130)
HDLC SERPL-MCNC: 30 MG/DL (ref 40–60)
LDLC SERPL CALC-MCNC: 78 MG/DL (ref 0–100)
LDLC/HDLC SERPL: 2.59 {RATIO}
LEFT ATRIUM VOLUME INDEX: 19 ML/M2
MAXIMAL PREDICTED HEART RATE: 150 BPM
POTASSIUM BLD-SCNC: 2.8 MMOL/L (ref 3.5–5.2)
STRESS TARGET HR: 128 BPM
TRIGL SERPL-MCNC: 92 MG/DL (ref 0–150)
TSH SERPL DL<=0.05 MIU/L-ACNC: 4.14 MIU/ML (ref 0.27–4.2)
URATE SERPL-MCNC: 6.1 MG/DL (ref 3.4–7)
VIT B12 BLD-MCNC: 327 PG/ML (ref 211–946)
VLDLC SERPL-MCNC: 18.4 MG/DL (ref 5–40)

## 2018-12-09 PROCEDURE — 93306 TTE W/DOPPLER COMPLETE: CPT

## 2018-12-09 PROCEDURE — 63710000001 INSULIN LISPRO (HUMAN) PER 5 UNITS: Performed by: HOSPITALIST

## 2018-12-09 PROCEDURE — 82746 ASSAY OF FOLIC ACID SERUM: CPT | Performed by: PSYCHIATRY & NEUROLOGY

## 2018-12-09 PROCEDURE — 80061 LIPID PANEL: CPT | Performed by: HOSPITALIST

## 2018-12-09 PROCEDURE — 70496 CT ANGIOGRAPHY HEAD: CPT

## 2018-12-09 PROCEDURE — G0378 HOSPITAL OBSERVATION PER HR: HCPCS

## 2018-12-09 PROCEDURE — 25010000002 IOPAMIDOL 61 % SOLUTION: Performed by: HOSPITALIST

## 2018-12-09 PROCEDURE — 72125 CT NECK SPINE W/O DYE: CPT

## 2018-12-09 PROCEDURE — 97110 THERAPEUTIC EXERCISES: CPT

## 2018-12-09 PROCEDURE — 82607 VITAMIN B-12: CPT | Performed by: PSYCHIATRY & NEUROLOGY

## 2018-12-09 PROCEDURE — 84443 ASSAY THYROID STIM HORMONE: CPT | Performed by: PSYCHIATRY & NEUROLOGY

## 2018-12-09 PROCEDURE — 84132 ASSAY OF SERUM POTASSIUM: CPT | Performed by: HOSPITALIST

## 2018-12-09 PROCEDURE — 99203 OFFICE O/P NEW LOW 30 MIN: CPT | Performed by: PSYCHIATRY & NEUROLOGY

## 2018-12-09 PROCEDURE — 70498 CT ANGIOGRAPHY NECK: CPT

## 2018-12-09 PROCEDURE — 93306 TTE W/DOPPLER COMPLETE: CPT | Performed by: INTERNAL MEDICINE

## 2018-12-09 PROCEDURE — G8978 MOBILITY CURRENT STATUS: HCPCS

## 2018-12-09 PROCEDURE — 86592 SYPHILIS TEST NON-TREP QUAL: CPT | Performed by: PSYCHIATRY & NEUROLOGY

## 2018-12-09 PROCEDURE — 82962 GLUCOSE BLOOD TEST: CPT

## 2018-12-09 PROCEDURE — G8979 MOBILITY GOAL STATUS: HCPCS

## 2018-12-09 PROCEDURE — 97163 PT EVAL HIGH COMPLEX 45 MIN: CPT

## 2018-12-09 PROCEDURE — 84550 ASSAY OF BLOOD/URIC ACID: CPT | Performed by: HOSPITALIST

## 2018-12-09 RX ORDER — PREGABALIN 75 MG/1
150 CAPSULE ORAL EVERY 12 HOURS SCHEDULED
Status: DISCONTINUED | OUTPATIENT
Start: 2018-12-09 | End: 2018-12-14 | Stop reason: HOSPADM

## 2018-12-09 RX ORDER — HYDROCODONE BITARTRATE AND ACETAMINOPHEN 7.5; 325 MG/1; MG/1
1 TABLET ORAL EVERY 6 HOURS PRN
Status: DISCONTINUED | OUTPATIENT
Start: 2018-12-09 | End: 2018-12-11

## 2018-12-09 RX ORDER — OXYBUTYNIN CHLORIDE 10 MG/1
10 TABLET, EXTENDED RELEASE ORAL DAILY
Status: DISCONTINUED | OUTPATIENT
Start: 2018-12-09 | End: 2018-12-14 | Stop reason: HOSPADM

## 2018-12-09 RX ORDER — CITALOPRAM 20 MG/1
20 TABLET ORAL DAILY
Status: DISCONTINUED | OUTPATIENT
Start: 2018-12-09 | End: 2018-12-14 | Stop reason: HOSPADM

## 2018-12-09 RX ORDER — CARVEDILOL 3.12 MG/1
3.12 TABLET ORAL EVERY 12 HOURS SCHEDULED
Status: DISCONTINUED | OUTPATIENT
Start: 2018-12-09 | End: 2018-12-14 | Stop reason: HOSPADM

## 2018-12-09 RX ORDER — FLUDROCORTISONE ACETATE 0.1 MG/1
100 TABLET ORAL EVERY 12 HOURS SCHEDULED
Status: DISCONTINUED | OUTPATIENT
Start: 2018-12-09 | End: 2018-12-14 | Stop reason: HOSPADM

## 2018-12-09 RX ORDER — FUROSEMIDE 20 MG/1
20 TABLET ORAL DAILY
Status: DISCONTINUED | OUTPATIENT
Start: 2018-12-09 | End: 2018-12-14 | Stop reason: HOSPADM

## 2018-12-09 RX ORDER — ALLOPURINOL 100 MG/1
100 TABLET ORAL DAILY
Status: DISCONTINUED | OUTPATIENT
Start: 2018-12-09 | End: 2018-12-14 | Stop reason: HOSPADM

## 2018-12-09 RX ORDER — TAMSULOSIN HYDROCHLORIDE 0.4 MG/1
0.4 CAPSULE ORAL DAILY
Status: DISCONTINUED | OUTPATIENT
Start: 2018-12-09 | End: 2018-12-14 | Stop reason: HOSPADM

## 2018-12-09 RX ORDER — WARFARIN SODIUM 6 MG/1
6 TABLET ORAL
Status: DISCONTINUED | OUTPATIENT
Start: 2018-12-09 | End: 2018-12-14 | Stop reason: HOSPADM

## 2018-12-09 RX ORDER — TRAZODONE HYDROCHLORIDE 50 MG/1
50 TABLET ORAL NIGHTLY
Status: DISCONTINUED | OUTPATIENT
Start: 2018-12-09 | End: 2018-12-14 | Stop reason: HOSPADM

## 2018-12-09 RX ADMIN — HYDROCODONE BITARTRATE AND ACETAMINOPHEN 1 TABLET: 7.5; 325 TABLET ORAL at 14:00

## 2018-12-09 RX ADMIN — PREGABALIN 150 MG: 75 CAPSULE ORAL at 20:47

## 2018-12-09 RX ADMIN — HYDROCODONE BITARTRATE AND ACETAMINOPHEN 1 TABLET: 7.5; 325 TABLET ORAL at 00:55

## 2018-12-09 RX ADMIN — SODIUM CHLORIDE 75 ML/HR: 9 INJECTION, SOLUTION INTRAVENOUS at 20:47

## 2018-12-09 RX ADMIN — HYDROCODONE BITARTRATE AND ACETAMINOPHEN 1 TABLET: 7.5; 325 TABLET ORAL at 20:55

## 2018-12-09 RX ADMIN — TRAZODONE HYDROCHLORIDE 50 MG: 50 TABLET ORAL at 20:48

## 2018-12-09 RX ADMIN — OXYBUTYNIN CHLORIDE 10 MG: 10 TABLET, FILM COATED, EXTENDED RELEASE ORAL at 18:25

## 2018-12-09 RX ADMIN — INSULIN LISPRO 5 UNITS: 100 INJECTION, SOLUTION INTRAVENOUS; SUBCUTANEOUS at 18:26

## 2018-12-09 RX ADMIN — POTASSIUM CHLORIDE 40 MEQ: 750 CAPSULE, EXTENDED RELEASE ORAL at 14:00

## 2018-12-09 RX ADMIN — HYDROCODONE BITARTRATE AND ACETAMINOPHEN 1 TABLET: 7.5; 325 TABLET ORAL at 06:56

## 2018-12-09 RX ADMIN — POTASSIUM CHLORIDE 40 MEQ: 750 CAPSULE, EXTENDED RELEASE ORAL at 18:26

## 2018-12-09 RX ADMIN — ACETAMINOPHEN 650 MG: 325 TABLET, FILM COATED ORAL at 08:45

## 2018-12-09 RX ADMIN — INSULIN LISPRO 2 UNITS: 100 INJECTION, SOLUTION INTRAVENOUS; SUBCUTANEOUS at 08:42

## 2018-12-09 RX ADMIN — FUROSEMIDE 20 MG: 20 TABLET ORAL at 18:25

## 2018-12-09 RX ADMIN — CITALOPRAM 20 MG: 20 TABLET, FILM COATED ORAL at 18:33

## 2018-12-09 RX ADMIN — ALLOPURINOL 100 MG: 100 TABLET ORAL at 18:25

## 2018-12-09 RX ADMIN — FLUDROCORTISONE ACETATE 100 MCG: 0.1 TABLET ORAL at 20:48

## 2018-12-09 RX ADMIN — WARFARIN SODIUM 6 MG: 6 TABLET ORAL at 18:25

## 2018-12-09 RX ADMIN — POTASSIUM CHLORIDE 40 MEQ: 750 CAPSULE, EXTENDED RELEASE ORAL at 03:04

## 2018-12-09 RX ADMIN — POTASSIUM CHLORIDE 40 MEQ: 750 CAPSULE, EXTENDED RELEASE ORAL at 06:56

## 2018-12-09 RX ADMIN — INSULIN LISPRO 2 UNITS: 100 INJECTION, SOLUTION INTRAVENOUS; SUBCUTANEOUS at 22:02

## 2018-12-09 RX ADMIN — TAMSULOSIN HYDROCHLORIDE 0.4 MG: 0.4 CAPSULE ORAL at 18:25

## 2018-12-09 RX ADMIN — CARVEDILOL 3.12 MG: 3.12 TABLET, FILM COATED ORAL at 20:48

## 2018-12-09 RX ADMIN — SODIUM CHLORIDE, PRESERVATIVE FREE 3 ML: 5 INJECTION INTRAVENOUS at 20:48

## 2018-12-09 RX ADMIN — ATORVASTATIN CALCIUM 80 MG: 80 TABLET, FILM COATED ORAL at 20:48

## 2018-12-09 RX ADMIN — IOPAMIDOL 95 ML: 612 INJECTION, SOLUTION INTRAVENOUS at 19:41

## 2018-12-09 RX ADMIN — POTASSIUM CHLORIDE 40 MEQ: 750 CAPSULE, EXTENDED RELEASE ORAL at 22:02

## 2018-12-09 RX ADMIN — SODIUM CHLORIDE 75 ML/HR: 9 INJECTION, SOLUTION INTRAVENOUS at 07:04

## 2018-12-09 RX ADMIN — ASPIRIN 325 MG: 325 TABLET ORAL at 08:41

## 2018-12-09 NOTE — PROGRESS NOTES
[facility]     LOS: 0 days     Name: Edilberto Reeder  Age: 68 y.o.  Sex: male  :  1950  MRN: [id number]         Primary Care Physician: Harvey Larson MD    Diet Regular; Cardiac    Subjective   no new complaints. right elbow swollen and tender. weakness is better. family thinks speech is back to baseline associated with PD    Objective   Temp:  [97.6 °F (36.4 °C)-98.7 °F (37.1 °C)] 98.7 °F (37.1 °C)  Heart Rate:  [60-71] 63  Resp:  [16-18] 18  BP: (118-131)/(62-72) 120/62  SpO2:  [96 %-99 %] 99 %  on   ;   Device (Oxygen Therapy): room air  Body mass index is 30.74 kg/m².    Physical Exam   Constitutional: He is oriented to person, place, and time. No distress.   Cardiovascular: Normal rate and regular rhythm.   Pulmonary/Chest: Effort normal and breath sounds normal. No respiratory distress.   Abdominal: Soft. There is no tenderness.   Musculoskeletal:   right elbow swollen, warm. no erythema.   Neurological: He is alert and oriented to person, place, and time.   right weakness improved   Skin: Skin is warm and dry.   Psychiatric: He has a normal mood and affect. His behavior is normal.     Reviewed medications and new clinical results    aspirin 325 mg Oral Daily   Or      aspirin 300 mg Rectal Daily   atorvastatin 80 mg Oral Nightly   Carbidopa-Levodopa ER 3 capsule Oral 4x Daily   insulin lispro 0-7 Units Subcutaneous 4x Daily With Meals & Nightly   sodium chloride 3 mL Intravenous Q12H     sodium chloride 75 mL/hr Last Rate: 75 mL/hr (18 1401)     Results from last 7 days   Lab Units  18   1215   WBC 10*3/mm3  7.73   HEMOGLOBIN g/dL  11.8*   PLATELETS 10*3/mm3  214     Results from last 7 days   Lab Units  18   0450  18   1215   SODIUM mmol/L   --   139   POTASSIUM mmol/L  2.8*  3.0*   CHLORIDE mmol/L   --   93*   CO2 mmol/L   --   33.7*   BUN mg/dL   --   9   CREATININE mg/dL   --   1.07   CALCIUM mg/dL   --   7.8*    GLUCOSE mg/dL   --   125*     Lab Results   Component Value Date    ANIONGAP 12.3 12/08/2018     Glucose   Date/Time Value Ref Range Status   12/09/2018 0528 167 (H) 70 - 130 mg/dL Final   12/08/2018 2055 139 (H) 70 - 130 mg/dL Final     Hemoglobin A1C   Date Value Ref Range Status   12/08/2018 7.34 (H) 4.80 - 5.60 % Final     Estimated Creatinine Clearance: 82 mL/min (by C-G formula based on SCr of 1.07 mg/dL).  Results from last 7 days   Lab Units  12/08/18   1215   INR   1.66*     Assessment/Plan   Active Hospital Problems    Diagnosis Date Noted   • Stroke-like symptoms [R29.90] 12/08/2018   • Hypopotassemia [E87.6] 12/08/2018   • Diabetes (CMS/[facility]) [E11.9]    • Parkinson's disease (CMS/[facility]) [G20]    • Anticoagulated on warfarin [Z79.01] 01/27/2016   • Essential hypertension [I10] 01/27/2016   • HLD (hyperlipidemia) [E78.5] 01/27/2016   • Hypothyroidism [E03.9] 01/27/2016   • Paroxysmal atrial fibrillation (CMS/[facility]) [I48.0] 01/27/2016      Resolved Hospital Problems   No resolved problems to display.     · TIA (right sided weakness, speech difficulty)  · appreciate neurology attention to patient  · ASA, statin, continue warfarin  · CTA head and neck  · echo pending  · possible right elbow gout  · has had gout  · check uric acid  · atrial fibrillation on anticoagulation, INR low  · PD: continue meds. has a stimulator  · DM2  ? a1c 7.34  ? control is OK  ? correctional insulin  · hypokalemia: replace  · disposition  · TBD  · I discussed the patient's findings and my recommendations with patient, family and nursing staff.    Alexis Amador MD   12/09/18  2:40 PM

## 2018-12-09 NOTE — THERAPY EVALUATION
Acute Care - Physical Therapy Initial Evaluation  [facility]     Patient Name: Edilberto Reeder  : 1950  MRN: [id number]  Today's Date: 2018   Onset of Illness/Injury or Date of Surgery: 18  Date of Referral to PT: 18  Referring Physician: Marjorie      Admit Date: 2018    Visit Dx:     ICD-10-CM ICD-9-CM   1. Stroke-like symptoms R29.90 781.99   2. Parkinson disease (CMS/HCC) G20 332.0   3. Impaired functional mobility and activity tolerance Z74.09 V49.89     Patient Active Problem List   Diagnosis   • ASCVD (arteriosclerotic cardiovascular disease)   • Paroxysmal atrial fibrillation (CMS/HCC)   • Anticoagulated on warfarin   • Diabetic retinopathy associated with type 2 diabetes mellitus (CMS/HCC)   • Essential hypertension   • HLD (hyperlipidemia)   • Hypothyroidism   • Peripheral neuropathy   • Renal insufficiency   • Diabetes (CMS/HCC)   • Parkinson's disease (CMS/HCC)   • Dyspnea on exertion   • Atrial fibrillation (CMS/HCC) [I48.91]   • Stroke-like symptoms   • Hypopotassemia     Past Medical History:   Diagnosis Date   • Atrial fibrillation with RVR (CMS/HCC)    • Atrial flutter (CMS/HCC)    • Diabetes (CMS/HCC)    • HLD (hyperlipidemia) 2016   • Hypertension    • Parkinson's disease (CMS/HCC)     Advanced      Past Surgical History:   Procedure Laterality Date   • BRAIN STIMULATOR     • JOINT REPLACEMENT      Bilateral shoulder and knee replacements        PT ASSESSMENT (last 12 hours)      Physical Therapy Evaluation     Row Name 18 0933          PT Evaluation Time/Intention    Subjective Information  complains of;weakness;pain;numbness  -PC     Document Type  evaluation  -PC     Mode of Treatment  physical therapy  -PC     Patient Effort  good  -PC     Symptoms Noted During/After Treatment  increased pain  -PC     Row Name 18 0933          General Information    Patient Profile Reviewed?  yes  -PC     Onset of Illness/Injury or Date of Surgery  18  -PC      Referring Physician  Marjorie  -     Patient Observations  cooperative;agree to therapy  -     Patient/Family Observations  pt is male, resting in bed, no acute distress, awake and oriented, c/o pain R elbow and knee, L side of neck   -     General Observations of Patient  wife present  -     Prior Level of Function  independent:;all household mobility;min assist:;ADL's  -PC     Equipment Currently Used at Home  cane, straight;walker, rolling  -     Pertinent History of Current Functional Problem  pt adm to r/o CVA due to R side weakness, numbness, CT negative,  neuro eval today, pt with R elbow pain, appears red and swollen, pt with hx of Parkinson's, has deep brain stimulator  -     Existing Precautions/Restrictions  fall  -     Barriers to Rehab  medically complex  -     Row Name 12/09/18 0933          Resource/Environmental Concerns    Current Living Arrangements  home/apartment/condo  -     Row Name 12/09/18 0933          Cognitive Assessment/Intervention- PT/OT    Orientation Status (Cognition)  oriented x 3  -PC     Follows Commands (Cognition)  follows one step commands;over 90% accuracy  -     Safety Deficit (Cognitive)  mild deficit;insight into deficits/self awareness  -     Row Name 12/09/18 0933          Bed Mobility Assessment/Treatment    Bed Mobility Assessment/Treatment  supine-sit;sit-supine  -     Supine-Sit [place] (Bed Mobility)  minimum assist (75% patient effort)  -     Sit-Supine [place] (Bed Mobility)  moderate assist (50% patient effort)  -     Bed Mobility, Safety Issues  decreased use of arms for pushing/pulling dec use of R UE  -PC     Row Name 12/09/18 0933          Transfer Assessment/Treatment    Transfer Assessment/Treatment  sit-stand transfer;stand-sit transfer  -     Comment (Transfers)  needed 2 attempts to come to stand, assist to lean forward  -     Sit-Stand [place] (Transfers)  moderate assist (50% patient effort)  -      Stand-Sit [place] (Transfers)  minimum assist (75% patient effort)  -     Row Name 12/09/18 0933          Sit-Stand Transfer    Assistive Device (Sit-Stand Transfers)  walker, front-wheeled  -PC     Row Name 12/09/18 0933          Stand-Sit Transfer    Assistive Device (Stand-Sit Transfers)  walker, front-wheeled  -PC     Row Name 12/09/18 0933          Gait/Stairs Assessment/Training    [place] Level (Gait)  minimum assist (75% patient effort)  -     Assistive Device (Gait)  walker, front-wheeled  -     Distance in Feet (Gait)  4 sidesteps to head of bed  -     Pattern (Gait)  step-to  -PC     Row Name 12/09/18 0933          General ROM    GENERAL ROM COMMENTS  R SHOULDER AAROM WFL, R Elbow limited due to pain on full flex and ext. R hand WFL, L UE WFL, B LE WFL  -     Row Name 12/09/18 0933          MMT (Manual Muscle Testing)    General MMT Comments  R shoulder 3-/5, r elbow 2+/5, hand  3-/5, pt with pain RUE which is affecting true MMT results,  L UE 5/5, RLE ankle DF/PF 5/5, knee ext 5/5, hip flex 3+/5, L LE 5/5  -     Row Name 12/09/18 0933          Motor Assessment/Intervention    Additional Documentation  Balance (Group)  -     Row Name 12/09/18 0933          Balance    Balance  static sitting balance;static standing balance  -     Row Name 12/09/18 0933          Static Sitting Balance    Level of [place] (Unsupported Sitting, Static Balance)  standby assist  -     Sitting Position (Unsupported Sitting, Static Balance)  sitting on edge of bed  -     Time Able to Maintain Position (Unsupported Sitting, Static Balance)  more than 5 minutes  -     Row Name 12/09/18 0933          Static Standing Balance    Level of [place] (Supported Standing, Static Balance)  minimal assist, 75% patient effort  -     Time Able to Maintain Position (Supported Standing, Static Balance)  2 to 3 minutes  -     Assistive Device Utilized (Supported Standing, Static Balance)  rolling  walker  -PC     Comment (Supported Standing, Static Balance)  workedon weight shifting in standing  -     Row Name 12/09/18 0933          Pain Assessment    Additional Documentation  Pain Scale: Numbers Pre/Post-Treatment (Group);Pain Scale: Word Pre/Post-Treatment (Group)  -     Row Name 12/09/18 0933          Pain Scale: Numbers Pre/Post-Treatment    Pain Location - Side  Right  -PC     Pain Location  elbow  -PC     Pre/Post Treatment Pain Comment  also c/o knee pain  -     Row Name 12/09/18 0933          Pain Scale: Word Pre/Post-Treatment    Pain: Word Scale, Pretreatment  6 - moderate-severe pain  -PC     Pre/Post Treatment Pain Comment  pain with any mvmt R elbow , RUE  -PC     Row Name 12/09/18 0933          Plan of Care Review    Plan of Care Reviewed With  patient  -[facility] Name 12/09/18 0933          Physical Therapy Clinical Impression    Date of Referral to PT  12/09/18  -     Criteria for Skilled Interventions Met (PT Clinical Impression)  yes;treatment indicated  -     Impairments Found (describe specific impairments)  gait, locomotion, and balance;muscle performance  -     Rehab Potential (PT Clinical Summary)  good, to achieve stated therapy goals  -     Row Name 12/09/18 0933          Physical Therapy Goals    Bed Mobility Goal Selection (PT)  bed mobility, PT goal 1  -PC     Transfer Goal Selection (PT)  transfer, PT goal 1  -PC     Gait Training Goal Selection (PT)  gait training, PT goal 1  -[facility] Name 12/09/18 0933          Bed Mobility Goal 1 (PT)    Activity/Assistive Device (Bed Mobility Goal 1, PT)  sit to supine/supine to sit  -PC     [place] Level/Cues Needed (Bed Mobility Goal 1, PT)  contact guard assist  -     Time Frame (Bed Mobility Goal 1, PT)  1 week  -     Row Name 12/09/18 0933          Transfer Goal 1 (PT)    Activity/Assistive Device (Transfer Goal 1, PT)  sit-to-stand/stand-to-sit  -     [place] Level/Cues Needed (Transfer Goal 1, PT)   contact guard assist  -PC     Time Frame (Transfer Goal 1, PT)  1 week  -PC     Row Name 12/09/18 0933          Gait Training Goal 1 (PT)    Activity/Assistive Device (Gait Training Goal 1, PT)  gait (walking locomotion);assistive device use  -PC     [place] Level (Gait Training Goal 1, PT)  contact guard assist  -PC     Distance (Gait Goal 1, PT)  50 ft  -PC     Time Frame (Gait Training Goal 1, PT)  1 week  -PC     Row Name 12/09/18 0933          Positioning and Restraints    Pre-Treatment Position  in bed  -PC     Post Treatment Position  bed  -PC     In Bed  supine;call light within reach;encouraged to call for assist;exit alarm on;with family/caregiver  -PC       User Key  (r) = Recorded By, (t) = Taken By, (c) = Cosigned By    Initials Name Provider Type    PC Estefania Paez, PT Physical Therapist        Physical Therapy Education     Title: PT OT SLP Therapies (Done)     Topic: Physical Therapy (Done)     Point: Mobility training (Done)     Learning Progress Summary           Patient Acceptance, E,D, DU by  at 12/9/2018  9:49 AM                   Point: Home exercise program (Done)     Learning Progress Summary           Patient Acceptance, E,D, DU by  at 12/9/2018  9:49 AM                   Point: Body mechanics (Done)     Learning Progress Summary           Patient Acceptance, E,D, DU by  at 12/9/2018  9:49 AM                   Point: Precautions (Done)     Learning Progress Summary           Patient Acceptance, E,D, DU by  at 12/9/2018  9:49 AM                               User Key     Initials Effective Dates Name Provider Type [facility] 04/03/18 -  Estefania Paez PT Physical Therapist PT              PT Recommendation and Plan  Anticipated Discharge Disposition (PT): (depends on medical course and progress with mobility)  Planned Therapy Interventions (PT Eval): balance training, bed mobility training, gait training, strengthening, transfer training  Therapy Frequency (PT Clinical  Impression): daily  Outcome Summary/Treatment Plan (PT)  Anticipated Discharge Disposition (PT): (depends on medical course and progress with mobility)  Plan of Care Reviewed With: patient  Outcome Summary: pt presents with impaired functional mobility , gisela RUE pain and weakness, difficulty walking, he will benefit from PT to address. Pt with Parkinson's at baseline, uses cane, but with significant limitation today, will progress as tolerated  Outcome Measures     Row Name 12/09/18 0900             How much help from another person do you currently need...    Turning from your back to your side while in flat bed without using bedrails?  3  -PC      Moving from lying on back to sitting on the side of a flat bed without bedrails?  3  -PC      Moving to and from a bed to a chair (including a wheelchair)?  2  -PC      Standing up from a chair using your arms (e.g., wheelchair, bedside chair)?  2  -PC      Climbing 3-5 steps with a railing?  1  -PC      To walk in hospital room?  2  -PC      AM-PAC 6 Clicks Score  13  -PC         Functional Assessment    Outcome Measure Options  AM-PAC 6 Clicks Basic Mobility (PT)  -PC        User Key  (r) = Recorded By, (t) = Taken By, (c) = Cosigned By    Initials Name Provider Type    PC Estefania Paez PT Physical Therapist         Time Calculation:   PT Charges     Row Name 12/09/18 0952             Time Calculation    Start Time  0905  -PC      Stop Time  0933  -PC      Time Calculation (min)  28 min  -PC      PT Received On  12/09/18  -PC      PT - Next Appointment  12/10/18  -PC      PT Goal Re-Cert Due Date  12/16/18  -PC         Time Calculation- PT    Total Timed Code Minutes- PT  17 minute(s)  -PC        User Key  (r) = Recorded By, (t) = Taken By, (c) = Cosigned By    Initials Name Provider Type    PC Estefania Paez, PT Physical Therapist        Therapy Suggested Charges     Code   Minutes Charges    None           Therapy Charges for Today     Code Description Service  Date Service Provider Modifiers Qty    88055150563 HC PT MOBILITY CURRENT 12/9/2018 Estefania Paez, PT GP, CK 1    53417808576 HC PT MOBILITY PROJECTED 12/9/2018 Estefania Paez, PT GP, CI 1    14110202616 HC PT EVAL HIGH COMPLEXITY 2 12/9/2018 Estefania Paez, PT GP 1    82879591063 HC PT THER PROC EA 15 MIN 12/9/2018 Estefania Paez, PT GP 1          PT G-Codes  PT Professional Judgement Used?: Yes  Outcome Measure Options: AM-PAC 6 Clicks Basic Mobility (PT)  AM-PAC 6 Clicks Score: 13  Functional Limitation: Mobility: Walking and moving around  Mobility: Walking and Moving Around Current Status (): At least 40 percent but less than 60 percent impaired, limited or restricted  Mobility: Walking and Moving Around Goal Status (): At least 1 percent but less than 20 percent impaired, limited or restricted      Estefania Paez, PT  12/9/2018

## 2018-12-09 NOTE — PLAN OF CARE
Problem: Fall Risk (Adult)  Goal: Absence of Fall  Outcome: Ongoing (interventions implemented as appropriate)      Problem: Patient Care Overview  Goal: Plan of Care Review  Outcome: Ongoing (interventions implemented as appropriate)   12/09/18 0534   Coping/Psychosocial   Plan of Care Reviewed With patient   Plan of Care Review   Progress no change   OTHER   Outcome Summary VSS. NIH 3. Patient still with right sided weakness and some numbness in his right leg. Potassium replaced. Recheck for the morning. Echo to be done. Neurology consulted. Will continue to monitor.        Problem: Stroke (Ischemic) (Adult)  Goal: Signs and Symptoms of Listed Potential Problems Will be Absent, Minimized or Managed (Stroke)  Outcome: Ongoing (interventions implemented as appropriate)      Problem: Skin Injury Risk (Adult)  Goal: Skin Health and Integrity  Outcome: Ongoing (interventions implemented as appropriate)

## 2018-12-09 NOTE — CONSULTS
Neurology Note    Patient:  Edilberto Reeder    YOB: 1950    REFERRING PHYSICIAN:  Alexis Amador MD    CHIEF COMPLAINT:    Right side numbness    HISTORY OF PRESENT ILLNESS:   The patient is a 68 y.o. male with h/o PD, s/p DBS managed by , admitted with right sided numbness and pain, question of slurred speech which resolved. He tells me that his right arm has been hurting and weak for 3 days, worse with movement, both feet were numb and achy yday. He has reported some neck pain. He has had several recent falls. He has chronic bladder incontinence. He has a shuffling gait at baseline, uses a cane. Has Afib, on warfarin, INR 1.66 on admission.    Past Medical History:  Past Medical History:   Diagnosis Date   • Atrial fibrillation with RVR (CMS/[facility])    • Atrial flutter (CMS/HCC)    • Diabetes (CMS/[facility])    • HLD (hyperlipidemia) 1/27/2016   • Hypertension    • Parkinson's disease (CMS/[facility])     Advanced        Past Surgical History:  Past Surgical History:   Procedure Laterality Date   • BRAIN STIMULATOR     • JOINT REPLACEMENT      Bilateral shoulder and knee replacements       Social History:   Social History     Socioeconomic History   • Marital status:      Spouse name: Not on file   • Number of children: Not on file   • Years of education: Not on file   • Highest education level: Not on file   Tobacco Use   • Smoking status: Never Smoker   • Smokeless tobacco: Never Used   • Tobacco comment: caffeine use   Substance and Sexual Activity   • Alcohol use: Yes   • Drug use: No   • Sexual activity: Defer        Family History:   History reviewed. No pertinent family history.    Medications Prior to Admission:    Prior to Admission medications    Medication Sig Start Date End Date Taking? Authorizing Provider   fludrocortisone 0.1 MG tablet Take 0.1 mg by mouth 2 (Two) Times a Day.   Yes Provider, MD Mendoza   furosemide (LASIX) 20 MG tablet Take 20 mg by mouth Daily.   Yes Provider  MD Mendoza   PATIENT SUPPLIED MEDICATION 36. mg 4 (Four) Times a Day.   Yes Mendoza Dennis MD   pregabalin (LYRICA) 100 MG capsule Take 150 mg by mouth 2 (Two) Times a Day.   Yes Mendoza Dennis MD   warfarin (COUMADIN) 3 MG tablet Take 2 tablets by mouth daily or as directed 4/26/18  Yes Jimbo Fung MD   allopurinol (ZYLOPRIM) 100 MG tablet Take 100 mg by mouth Daily.    Mendoza Dennis MD   carvedilol (COREG) 3.125 MG tablet Take 1 tablet by mouth Every 12 (Twelve) Hours. 1/22/18   Jimbo Fung MD   citalopram (CeleXA) 10 MG tablet Take 20 mg by mouth Daily.    Mendoza Dennis MD   glipiZIDE (GLUCOTROL XL) 10 MG 24 hr tablet Take 10 mg by mouth. 10/22/12   Mendoza Dennis MD   lubiprostone (AMITIZA) 24 MCG capsule Take 24 mcg by mouth Daily With Breakfast.    Mendoza Dennis MD   meclizine (ANTIVERT) 25 MG tablet Take 25 mg by mouth 3 (Three) Times a Day As Needed for dizziness.    Mendoza Dennis MD   metFORMIN (GLUCOPHAGE) 500 MG tablet Take 1,000 mg by mouth 2 (Two) Times a Day With Meals. 10/30/12   Mendoza Dennis MD   MYRBETRIQ 50 MG tablet sustained-release 24 hour 24 hr tablet Take 1 tablet by mouth Daily. 3/28/18   Mendoza Dennis MD   oxybutynin XL (DITROPAN-XL) 10 MG 24 hr tablet Take 10 mg by mouth Daily.    Mendoza Dennis MD   potassium chloride (K-DUR) 10 MEQ CR tablet Take 1 tablet by mouth Daily. 4/19/18   Lucy Martinez APRN   RYTARY 36. MG capsule controlled-release Take 3 capsules by mouth 4 (Four) Times a Day. 3/28/18   Mendoza Dennis MD   simvastatin (ZOCOR) 40 MG tablet Take 40 mg by mouth Every Night.    Mendoza Dennis MD   tamsulosin (FLOMAX) 0.4 MG capsule 24 hr capsule Take 1 capsule by mouth Daily. 3/28/18   Mendoza Dennis MD   traZODone (DESYREL) 50 MG tablet Take 50 mg by mouth Every Night.    Mendoza Dennis MD       Allergies:  Bacitracin; Fish-derived products;  Neosporin [neomycin-bacitracin zn-polymyx]; Shellfish allergy; and Shrimp (diagnostic)      Review of system  Review of Systems   Musculoskeletal: Positive for arthralgias, gait problem, myalgias and neck pain.   Neurological: Positive for weakness and numbness.       Vitals:    12/09/18 0740   BP: 120/62   Pulse: 63   Resp: 18   Temp: 98.7 °F (37.1 °C)   SpO2:        Physical exam  Physical Exam   Constitutional: He is oriented to person, place, and time. He appears well-developed and well-nourished.   HENT:   Head: Normocephalic and atraumatic.   Eyes: EOM are normal. Pupils are equal, round, and reactive to light.   Cardiovascular: Normal rate and regular rhythm.   Pulmonary/Chest: Effort normal.   Musculoskeletal:   ROM right shoulder and cspine decreased, winces in pain with right arm/shoulder movement.   Neurological: He is alert and oriented to person, place, and time. He has normal reflexes. No cranial nerve deficit or sensory deficit. Coordination normal.   Tone mildly increased, no tremor, no facial droop, moves right arm with difficulty against gravity with obvious discomfort, moves left arm and both legs well against gravity, hand  strength equal. Speech slow but clear, visual fields full.   Psychiatric: He has a normal mood and affect. His behavior is normal. Thought content normal.         Lab Results   Component Value Date    WBC 7.73 12/08/2018    HGB 11.8 (L) 12/08/2018    HCT 37.2 (L) 12/08/2018    MCV 85.1 12/08/2018     12/08/2018     Lab Results   Component Value Date    GLUCOSE 125 (H) 12/08/2018    BUN 9 12/08/2018    CREATININE 1.07 12/08/2018    EGFRIFNONA 68 12/08/2018    BCR 8.4 12/08/2018    CO2 33.7 (H) 12/08/2018    CALCIUM 7.8 (L) 12/08/2018    ALBUMIN 3.50 12/08/2018    AST 7 12/08/2018    ALT <5 12/08/2018     ECG 12 Lead   Order: [id number]   Status:  Final result   Visible to patient:  No (Not Released)   Next appt:  08/16/2019 at 02:20 PM in Cardiology (Jimbo CAMEJO  MD [place])      Narrative     RR Interval= 984 ms  NV Interval= 299 ms  QRSD Interval= 86 ms  QT Interval= 498 ms  QTc Interval= 502 ms  Heart Rate= 61 ms  P Axis= -52 deg  QRS Axis= 22 deg  T Wave Axis= -28 deg  I: 40 Axis= -26 deg  T: 40 Axis= deg  ST Axis= 196 deg  Sinus or ectopic atrial rhythm  Prolonged NV interval  Low voltage, extremity leads  Prolonged QT interval  Non-Specific STT wave changes  ns st t changes are new  Electronically Signed by:  [facility]) ([facility]) 08-Dec-2018 20:14:54  Date and Time of Study: 2018-12-08 13:18:22               Radiological Studies:    Ct Head Without Contrast    Result Date: 12/8/2018  CT HEAD WO CONTRAST-  INDICATIONS: Stroke  TECHNIQUE: Radiation dose reduction techniques were utilized, including automated exposure control and exposure modulation based on body size.    Noncontrast head CT  COMPARISON: 8/22/2013  FINDINGS:   Stable appearing deep brain stimulator leads, with associated attenuation artifact.  No acute intracranial hemorrhage, midline shift or mass effect. No acute territorial infarct is identified.  Mild periventricular hypodensities suggest chronic small vessel ischemic change in a patient this age.    Ventricles, cisterns, cerebral sulci are unremarkable for patient age.  Minimal mucosal thickening in the maxillary sinuses. The visualized paranasal sinuses, orbits, mastoid air cells are otherwise unremarkable.               No acute intracranial hemorrhage or hydrocephalus. Chronic changes. Follow-up/further evaluation can be obtained as indicated.  This report was finalized on 12/8/2018 1:03 PM by Dr. Myron Rutledge M.D.      Xr Chest 1 View    Result Date: 12/8/2018  XR CHEST 1 VW-  HISTORY: Male who is 68 years-old,  stroke  TECHNIQUE: Frontal view of the chest  COMPARISON: 1/4/2018  FINDINGS: Heart, mediastinum and pulmonary vasculature are unremarkable. No focal pulmonary consolidation, pleural effusion, or pneumothorax. Right upper  hemithorax is partly obscured by electronic device with lead extending to the neck. No acute osseous process.      No evidence for acute pulmonary process. Follow-up as clinical indications persist.  This report was finalized on 12/8/2018 12:51 PM by Dr. Myron Rutledge M.D.          During this visit the following were done:  Labs Reviewed [x]    Labs Ordered [x]    Radiology Reports Reviewed [x]    Radiology Ordered [x]    EKG, echo, and/or stress test reviewed [x]    EEG results reviewed  []    EEG reviewed and interpreted per myself   []    Discussed case with neurointerventionalist or neuroradiologist []    Referring Provider Records Reviewed []    ER Records Reviewed []    Hospital Records Reviewed []    History Obtained From Family []    Radiological images view and Interpreted per myself []    Case Discussed with referring provider []     Decision to obtain and request outside records  []        Assessment and Plan     1. TIA, likely embolic given AF, subtx INR.        - agree with aspirin, statin, continue warfarin.      - INR 2.0-3.0      - TTE.      - CTA head and neck.      - check b12, folate, TSH, RPR (for numbness).    2. Neck and shoulder pain, s/p falls.        - CT cspine and right shoulder.      - PT eval.      - prn Tylenol.    3. PD s/p DBS, stable, per [facility] neurology.                  Electronically signed by Freddy Cabrera MD on 12/9/2018 at 9:51 AM

## 2018-12-09 NOTE — PLAN OF CARE
Problem: Patient Care Overview  Goal: Plan of Care Review  Outcome: Ongoing (interventions implemented as appropriate)   12/09/18 1654   Coping/Psychosocial   Plan of Care Reviewed With patient   OTHER   Outcome Summary pt presents with impaired functional mobility , gisela RUE pain and weakness, difficulty walking, he will benefit from PT to address. Pt with Parkinson's at baseline, uses cane, but with significant limitation today, will progress as tolerated

## 2018-12-10 ENCOUNTER — APPOINTMENT (OUTPATIENT)
Dept: GENERAL RADIOLOGY | Facility: HOSPITAL | Age: 68
End: 2018-12-10

## 2018-12-10 LAB
ANION GAP SERPL CALCULATED.3IONS-SCNC: 11 MMOL/L
BUN BLD-MCNC: 14 MG/DL (ref 8–23)
BUN/CREAT SERPL: 14.4 (ref 7–25)
CALCIUM SPEC-SCNC: 7 MG/DL (ref 8.6–10.5)
CHLORIDE SERPL-SCNC: 101 MMOL/L (ref 98–107)
CO2 SERPL-SCNC: 27 MMOL/L (ref 22–29)
CREAT BLD-MCNC: 0.97 MG/DL (ref 0.76–1.27)
DEPRECATED RDW RBC AUTO: 47.8 FL (ref 37–54)
ERYTHROCYTE [DISTWIDTH] IN BLOOD BY AUTOMATED COUNT: 15.1 % (ref 11.5–14.5)
GFR SERPL CREATININE-BSD FRML MDRD: 77 ML/MIN/1.73
GLUCOSE BLD-MCNC: 131 MG/DL (ref 65–99)
GLUCOSE BLDC GLUCOMTR-MCNC: 129 MG/DL (ref 70–130)
GLUCOSE BLDC GLUCOMTR-MCNC: 174 MG/DL (ref 70–130)
GLUCOSE BLDC GLUCOMTR-MCNC: 209 MG/DL (ref 70–130)
GLUCOSE BLDC GLUCOMTR-MCNC: 230 MG/DL (ref 70–130)
HCT VFR BLD AUTO: 30.6 % (ref 40.4–52.2)
HGB BLD-MCNC: 9.5 G/DL (ref 13.7–17.6)
MCH RBC QN AUTO: 26.7 PG (ref 27–32.7)
MCHC RBC AUTO-ENTMCNC: 31 G/DL (ref 32.6–36.4)
MCV RBC AUTO: 86 FL (ref 79.8–96.2)
PLATELET # BLD AUTO: 185 10*3/MM3 (ref 140–500)
PMV BLD AUTO: 10.7 FL (ref 6–12)
POTASSIUM BLD-SCNC: 3.9 MMOL/L (ref 3.5–5.2)
POTASSIUM BLD-SCNC: 3.9 MMOL/L (ref 3.5–5.2)
RBC # BLD AUTO: 3.56 10*6/MM3 (ref 4.6–6)
SODIUM BLD-SCNC: 139 MMOL/L (ref 136–145)
WBC NRBC COR # BLD: 7.41 10*3/MM3 (ref 4.5–10.7)

## 2018-12-10 PROCEDURE — 85027 COMPLETE CBC AUTOMATED: CPT | Performed by: HOSPITALIST

## 2018-12-10 PROCEDURE — 97110 THERAPEUTIC EXERCISES: CPT

## 2018-12-10 PROCEDURE — 63710000001 INSULIN LISPRO (HUMAN) PER 5 UNITS: Performed by: HOSPITALIST

## 2018-12-10 PROCEDURE — G0378 HOSPITAL OBSERVATION PER HR: HCPCS

## 2018-12-10 PROCEDURE — G8989 SELF CARE D/C STATUS: HCPCS

## 2018-12-10 PROCEDURE — 80048 BASIC METABOLIC PNL TOTAL CA: CPT | Performed by: HOSPITALIST

## 2018-12-10 PROCEDURE — 73070 X-RAY EXAM OF ELBOW: CPT

## 2018-12-10 PROCEDURE — 99214 OFFICE O/P EST MOD 30 MIN: CPT | Performed by: NURSE PRACTITIONER

## 2018-12-10 PROCEDURE — 82962 GLUCOSE BLOOD TEST: CPT

## 2018-12-10 PROCEDURE — 84132 ASSAY OF SERUM POTASSIUM: CPT | Performed by: HOSPITALIST

## 2018-12-10 PROCEDURE — 97535 SELF CARE MNGMENT TRAINING: CPT

## 2018-12-10 PROCEDURE — 97165 OT EVAL LOW COMPLEX 30 MIN: CPT

## 2018-12-10 PROCEDURE — G8987 SELF CARE CURRENT STATUS: HCPCS

## 2018-12-10 PROCEDURE — 92610 EVALUATE SWALLOWING FUNCTION: CPT

## 2018-12-10 PROCEDURE — G8988 SELF CARE GOAL STATUS: HCPCS

## 2018-12-10 RX ORDER — CHOLECALCIFEROL (VITAMIN D3) 125 MCG
1000 CAPSULE ORAL DAILY
Status: DISCONTINUED | OUTPATIENT
Start: 2018-12-10 | End: 2018-12-14 | Stop reason: HOSPADM

## 2018-12-10 RX ADMIN — FUROSEMIDE 20 MG: 20 TABLET ORAL at 09:13

## 2018-12-10 RX ADMIN — SODIUM CHLORIDE 75 ML/HR: 9 INJECTION, SOLUTION INTRAVENOUS at 09:14

## 2018-12-10 RX ADMIN — ATORVASTATIN CALCIUM 80 MG: 80 TABLET, FILM COATED ORAL at 21:43

## 2018-12-10 RX ADMIN — Medication 1000 MCG: at 16:09

## 2018-12-10 RX ADMIN — FLUDROCORTISONE ACETATE 100 MCG: 0.1 TABLET ORAL at 21:43

## 2018-12-10 RX ADMIN — INSULIN LISPRO 3 UNITS: 100 INJECTION, SOLUTION INTRAVENOUS; SUBCUTANEOUS at 17:40

## 2018-12-10 RX ADMIN — TRAZODONE HYDROCHLORIDE 50 MG: 50 TABLET ORAL at 21:43

## 2018-12-10 RX ADMIN — HYDROCODONE BITARTRATE AND ACETAMINOPHEN 1 TABLET: 7.5; 325 TABLET ORAL at 23:28

## 2018-12-10 RX ADMIN — CARVEDILOL 3.12 MG: 3.12 TABLET, FILM COATED ORAL at 09:13

## 2018-12-10 RX ADMIN — WARFARIN SODIUM 6 MG: 6 TABLET ORAL at 17:40

## 2018-12-10 RX ADMIN — HYDROCODONE BITARTRATE AND ACETAMINOPHEN 1 TABLET: 7.5; 325 TABLET ORAL at 09:13

## 2018-12-10 RX ADMIN — PREGABALIN 150 MG: 75 CAPSULE ORAL at 09:13

## 2018-12-10 RX ADMIN — SODIUM CHLORIDE, PRESERVATIVE FREE 3 ML: 5 INJECTION INTRAVENOUS at 10:29

## 2018-12-10 RX ADMIN — ASPIRIN 325 MG: 325 TABLET ORAL at 09:13

## 2018-12-10 RX ADMIN — PREGABALIN 150 MG: 75 CAPSULE ORAL at 21:43

## 2018-12-10 RX ADMIN — CITALOPRAM 20 MG: 20 TABLET, FILM COATED ORAL at 09:13

## 2018-12-10 RX ADMIN — INSULIN LISPRO 3 UNITS: 100 INJECTION, SOLUTION INTRAVENOUS; SUBCUTANEOUS at 21:43

## 2018-12-10 RX ADMIN — TAMSULOSIN HYDROCHLORIDE 0.4 MG: 0.4 CAPSULE ORAL at 09:13

## 2018-12-10 RX ADMIN — OXYBUTYNIN CHLORIDE 10 MG: 10 TABLET, FILM COATED, EXTENDED RELEASE ORAL at 09:13

## 2018-12-10 RX ADMIN — FLUDROCORTISONE ACETATE 100 MCG: 0.1 TABLET ORAL at 09:13

## 2018-12-10 RX ADMIN — ALLOPURINOL 100 MG: 100 TABLET ORAL at 09:13

## 2018-12-10 RX ADMIN — INSULIN LISPRO 2 UNITS: 100 INJECTION, SOLUTION INTRAVENOUS; SUBCUTANEOUS at 11:49

## 2018-12-10 RX ADMIN — SODIUM CHLORIDE, PRESERVATIVE FREE 3 ML: 5 INJECTION INTRAVENOUS at 21:43

## 2018-12-10 RX ADMIN — CARVEDILOL 3.12 MG: 3.12 TABLET, FILM COATED ORAL at 21:43

## 2018-12-10 RX ADMIN — HYDROCODONE BITARTRATE AND ACETAMINOPHEN 1 TABLET: 7.5; 325 TABLET ORAL at 16:09

## 2018-12-10 NOTE — THERAPY EVALUATION
Acute Care - Speech Language Pathology   Swallow Initial Evaluation [facility]     Patient Name: Edilberto Reeder  : 1950  MRN: [id number]  Today's Date: 12/10/2018  Onset of Illness/Injury or Date of Surgery: 18     Referring Physician: Marjorie      Admit Date: 2018    Visit Dx:     ICD-10-CM ICD-9-CM   1. Stroke-like symptoms R29.90 781.99   2. Parkinson disease (CMS/HCC) G20 332.0   3. Impaired functional mobility and activity tolerance Z74.09 V49.89     Patient Active Problem List   Diagnosis   • ASCVD (arteriosclerotic cardiovascular disease)   • Paroxysmal atrial fibrillation (CMS/HCC)   • Anticoagulated on warfarin   • Diabetic retinopathy associated with type 2 diabetes mellitus (CMS/HCC)   • Essential hypertension   • HLD (hyperlipidemia)   • Hypothyroidism   • Peripheral neuropathy   • Renal insufficiency   • Diabetes (CMS/HCC)   • Parkinson's disease (CMS/HCC)   • Dyspnea on exertion   • Atrial fibrillation (CMS/HCC) [I48.91]   • Stroke-like symptoms   • Hypopotassemia     Past Medical History:   Diagnosis Date   • Atrial fibrillation with RVR (CMS/HCC)    • Atrial flutter (CMS/HCC)    • Diabetes (CMS/HCC)    • HLD (hyperlipidemia) 2016   • Hypertension    • Parkinson's disease (CMS/HCC)     Advanced      Past Surgical History:   Procedure Laterality Date   • BRAIN STIMULATOR     • JOINT REPLACEMENT      Bilateral shoulder and knee replacements        SWALLOW EVALUATION (last 72 hours)      SLP Adult Swallow Evaluation     Row Name 12/10/18 1400                   Rehab Evaluation    Document Type  evaluation  -CP        Subjective Information  no complaints  -CP        Patient Observations  alert;cooperative  -CP        Patient Effort  good  -CP        Symptoms Noted During/After Treatment  none  -CP           General Information    Patient Profile Reviewed  yes  -CP        Pertinent History Of Current Problem  Admitted with TIA, R weakness. Pt reports he is not at baseline,  however. He has a hx of Parkinson's with DBS placement. No hx of SLP services.   -CP        Current Method of Nutrition  regular textures;thin liquids  -CP        Precautions/Limitations, Vision  WFL  -CP        Precautions/Limitations, Hearing  WFL;for purposes of eval  -CP        Prior Level of Function-Communication  motor speech impairment  -CP        Prior Level of Function-Swallowing  no diet consistency restrictions  -CP        Plans/Goals Discussed with  patient;family  -CP        Barriers to Rehab  none identified  -CP        Patient's Goals for Discharge  return to regular diet  -CP           Oral Motor and Function    Dentition Assessment  natural, present and adequate  -CP        Secretion Management  WNL/WFL  -CP        Mucosal Quality  moist, healthy  -CP        Volitional Swallow  delayed  -CP           Oral Musculature and Cranial Nerve Assessment    Oral Motor General Assessment  lingual impairment;vocal impairment  -CP        Lingual Impairment, Detail. Cranial Nerves IX, XII (Glossopharyngeal and Hypoglossal)  reduced strength;bilaterally  -CP        Vocal Impairment, Detail. Cranial Nerve X (Vagus)  vocal quality abnormality (see comments)  -CP        Oral Motor, Comment  hypophonic  -CP           General Eating/Swallowing Observations    Respiratory Support Currently in Use  room air  -CP        Eating/Swallowing Skills  self-fed  -CP        Positioning During Eating  upright in bed  -CP        Utensils Used  spoon;cup  -CP        Consistencies Trialed  thin liquids;nectar/syrup-thick liquids;honey-thick liquids;pureed;regular textures  -CP        Pre SpO2 (%)  95  -CP        Post SpO2 (%)  95  -CP           Clinical Swallow Eval    Oral Prep Phase  WFL  -CP        Oral Transit  impaired  -CP        Oral Residue  WFL  -CP        Pharyngeal Phase  suspected pharyngeal impairment  -CP        Esophageal Phase  unremarkable  -CP        Clinical Swallow Evaluation Summary  Pt reports coughing and  "\"getting strangled\" with liquids for the past 2 months at home. Coughing was observed on 50% of trials with thin, and frequent throat clear noted with NTL. No overt s/s with HTL, pureed, or solids. However, pt reports some sensation of pharyngeal residue on R at times. Laryngeal elevation possibly reduced on palpation, and suspect back/base of tongue weakness. Feel pt needs VFSS. Recommend mech soft/HTL until completion.   -CP           Clinical Impression    SLP Swallowing Diagnosis  oral dysfunction;suspected pharyngeal dysfunction  -CP        Functional Impact  risk of aspiration/pneumonia  -CP        Rehab Potential/Prognosis, Swallowing  good, to achieve stated therapy goals  -CP        Swallow Criteria for Skilled Therapeutic Interventions Met  demonstrates skilled criteria  -CP           Recommendations    Therapy Frequency (Swallow)  PRN  -CP        Predicted Duration Therapy Intervention (Days)  until discharge  -CP        SLP Diet Recommendation  mechanical soft with no mixed consistencies;honey thick liquids  -CP        Recommended Diagnostics  VFSS (MBS)  -CP        Recommended Precautions and Strategies  upright posture during/after eating;small bites of food and sips of liquid;no straw  -CP        SLP Rec. for Method of Medication Administration  meds whole;meds crushed;with pudding or applesauce;as tolerated  -CP        Monitor for Signs of Aspiration  yes;notify SLP if any concerns  -CP        Anticipated Dischage Disposition  anticipate therapy at next level of care  -CP           Swallow Goals (SLP)    Oral Nutrition/Hydration Goal Selection (SLP)  oral nutrition/hydration, SLP goal 1  -CP           Oral Nutrition/Hydration Goal 1 (SLP)    Oral Nutrition/Hydration Goal 1, SLP  Tolerate mech soft/HTL diet  -CP        Time Frame (Oral Nutrition/Hydration Goal 1, SLP)  by discharge  -CP          User Key  (r) = Recorded By, (t) = Taken By, (c) = Cosigned By    Initials Name Effective Dates    CP " "Nidia Bolanos MS CCC-SLP 06/08/18 -           EDUCATION  The patient has been educated in the following areas:   Dysphagia (Swallowing Impairment) Oral Care/Hydration Modified Diet Instruction.    SLP Recommendation and Plan  SLP Swallowing Diagnosis: oral dysfunction, suspected pharyngeal dysfunction  SLP Diet Recommendation: mechanical soft with no mixed consistencies, honey thick liquids  Recommended Precautions and Strategies: upright posture during/after eating, small bites of food and sips of liquid, no straw     Monitor for Signs of Aspiration: yes, notify SLP if any concerns  Recommended Diagnostics: VFSS ([facility])  Swallow Criteria for Skilled Therapeutic Interventions Met: demonstrates skilled criteria  Anticipated Dischage Disposition: anticipate therapy at next level of care  Rehab Potential/Prognosis, Swallowing: good, to achieve stated therapy goals  Therapy Frequency (Swallow): PRN  Predicted Duration Therapy Intervention (Days): until discharge       Plan of Care Reviewed With: patient, son  Plan of Care Review  Plan of Care Reviewed With: patient, son  Outcome Summary: Swallow eval completed. Pt reports coughing and \"getting strangled\" with liquids for the past 2 months at home. Coughing was observed on 50% of trials with thin, and frequent throat clear noted with NTL. No overt s/s with HTL, pureed, or solids. However, pt reports some sensation of pharyngeal residue on R at times. Laryngeal elevation possibly reduced on palpation, and suspect back/base of tongue weakness. Feel pt needs VFSS. Recommend mech soft/HTL until completion.     SLP GOALS     Row Name 12/10/18 1400             Oral Nutrition/Hydration Goal 1 (SLP)    Oral Nutrition/Hydration Goal 1, SLP  Tolerate mech soft/HTL diet  -CP      Time Frame (Oral Nutrition/Hydration Goal 1, SLP)  by discharge  -CP        User Key  (r) = Recorded By, (t) = Taken By, (c) = Cosigned By    Initials Name Provider Type    CP Nidia Bolanos MS " CCC-SLP Speech and Language Pathologist           SLP Outcome Measures (last 72 hours)      SLP Outcome Measures     Row Name 12/10/18 1500             SLP Outcome Measures    Outcome Measure Used?  Adult NOMS  -CP         Adult FCM Scores    FCM Chosen  Swallowing  -CP      Swallowing FCM Score  3  -CP        User Key  (r) = Recorded By, (t) = Taken By, (c) = Cosigned By    Initials Name Effective Dates    Nidia Romero MS CCC-SLP 06/08/18 -            Time Calculation:   Time Calculation- SLP     Row Name 12/10/18 1505             Time Calculation- SLP    SLP Start Time  1300  -CP      SLP Stop Time  1400  -CP      SLP Time Calculation (min)  60 min  -CP      SLP Received On  12/10/18  -CP        User Key  (r) = Recorded By, (t) = Taken By, (c) = Cosigned By    Initials Name Provider Type    Nidia Romero MS CCC-SLP Speech and Language Pathologist          Therapy Charges for Today     Code Description Service Date Service Provider Modifiers Qty    [id number] HC ST EVAL ORAL PHARYNG SWALLOW 4 12/10/2018 Nidia Bolanos MS CCC-SLP GN 1               Nidia Bolanos MS CCC-NICOLETTE  12/10/2018

## 2018-12-10 NOTE — PLAN OF CARE
Problem: Patient Care Overview  Goal: Plan of Care Review  Outcome: Ongoing (interventions implemented as appropriate)   12/10/18 0934   Coping/Psychosocial   Plan of Care Reviewed With patient   Plan of Care Review   Progress improving   OTHER   Outcome Summary Pt increasing with bed mobility and transfer safety to RWX and improved gait distance with RWX

## 2018-12-10 NOTE — PROGRESS NOTES
[facility]     LOS: 0 days     Name: Edilberto Reeder  Age: 68 y.o.  Sex: male  :  1950  MRN: [id number]         Primary Care Physician: Harvey Larson MD    Diet Regular; Cardiac    Subjective   no new complaints. still right right elbow pain.    Objective   Temp:  [97.8 °F (36.6 °C)-98.7 °F (37.1 °C)] 98.7 °F (37.1 °C)  Heart Rate:  [63-71] 64  Resp:  [16] 16  BP: (110-134)/(63-72) 113/63  SpO2:  [94 %-96 %] 94 %  on   ;   Device (Oxygen Therapy): room air  Body mass index is 32.05 kg/m².    Physical Exam   Constitutional: He is oriented to person, place, and time. No distress.   Cardiovascular: Normal rate and regular rhythm.   Pulmonary/Chest: Effort normal and breath sounds normal. No respiratory distress.   Abdominal: Soft. There is no tenderness.   Musculoskeletal:   right elbow swollen, warm. no erythema.   Neurological: He is alert and oriented to person, place, and time.   right weakness improved more   Skin: Skin is warm and dry.   Psychiatric: He has a normal mood and affect. His behavior is normal.     Reviewed medications and new clinical results    allopurinol 100 mg Oral Daily   aspirin 325 mg Oral Daily   Or      aspirin 300 mg Rectal Daily   atorvastatin 80 mg Oral Nightly   Carbidopa-Levodopa ER 3 capsule Oral 4x Daily   carvedilol 3.125 mg Oral Q12H   citalopram 20 mg Oral Daily   fludrocortisone 100 mcg Oral Q12H   furosemide 20 mg Oral Daily   insulin lispro 0-7 Units Subcutaneous 4x Daily With Meals & Nightly   oxybutynin XL 10 mg Oral Daily   pregabalin 150 mg Oral Q12H   sodium chloride 3 mL Intravenous Q12H   tamsulosin 0.4 mg Oral Daily   traZODone 50 mg Oral Nightly   warfarin 6 mg Oral Daily       sodium chloride 75 mL/hr Last Rate: 75 mL/hr (12/10/18 1140)     Results from last 7 days   Lab Units  12/10/18   0214  18   1215   WBC 10*3/mm3  7.41  7.73   HEMOGLOBIN g/dL  9.5*  11.8*   PLATELETS 10*3/mm3  185  214      Results from last 7 days   Lab Units  12/10/18   0214  12/09/18   0450  12/08/18   1215   SODIUM mmol/L  139   --   139   POTASSIUM mmol/L  3.9  3.9  2.8*  3.0*   CHLORIDE mmol/L  101   --   93*   CO2 mmol/L  27.0   --   33.7*   BUN mg/dL  14   --   9   CREATININE mg/dL  0.97   --   1.07   CALCIUM mg/dL  7.0*   --   7.8*   GLUCOSE mg/dL  131*   --   125*     Lab Results   Component Value Date    ANIONGAP 11.0 12/10/2018     Glucose   Date/Time Value Ref Range Status   12/10/2018 1105 174 (H) 70 - 130 mg/dL Final   12/10/2018 0547 129 70 - 130 mg/dL Final   12/09/2018 2121 165 (H) 70 - 130 mg/dL Final   12/09/2018 1803 303 (H) 70 - 130 mg/dL Final   12/09/2018 0528 167 (H) 70 - 130 mg/dL Final   12/08/2018 2055 139 (H) 70 - 130 mg/dL Final     Hemoglobin A1C   Date Value Ref Range Status   12/08/2018 7.34 (H) 4.80 - 5.60 % Final     Estimated Creatinine Clearance: 92.1 mL/min (by C-G formula based on SCr of 0.97 mg/dL).  Results from last 7 days   Lab Units  12/08/18   1215   INR   1.66*     CTA noted  echo noted:  · Saline bubble study neg for PFO  · Calculated EF = 73%.  · There is no evidence of pericardial effusion.    Assessment/Plan   Active Hospital Problems    Diagnosis Date Noted   • Stroke-like symptoms [R29.90] 12/08/2018   • Hypopotassemia [E87.6] 12/08/2018   • Diabetes (CMS/HCC) [E11.9]    • Parkinson's disease (CMS/HCC) [G20]    • Anticoagulated on warfarin [Z79.01] 01/27/2016   • Essential hypertension [I10] 01/27/2016   • HLD (hyperlipidemia) [E78.5] 01/27/2016   • Hypothyroidism [E03.9] 01/27/2016   • Paroxysmal atrial fibrillation (CMS/HCC) [I48.0] 01/27/2016      Resolved Hospital Problems   No resolved problems to display.     · TIA (right sided weakness, speech difficulty)  · appreciate neurology attention to patient  · ASA, statin, continue warfarin  · CTA head and neck noted. echo noted  · possible right elbow gout  · has had gout in the past, UA 6.1.  · check XR and ask ortho  consult.  · atrial fibrillation on anticoagulation, INR low  · anemia  ? drop in Hgb dilutional  ? stop IVF  · PD: continue meds. has a stimulator  · DM2  ? a1c 7.34  ? control is OK  ? correctional insulin  · hypokalemia: replace  · disposition  · TBD  · I discussed the patient's findings and my recommendations with patient, family and nursing staff.    Alexis Amador MD   12/10/18  1:15 PM

## 2018-12-10 NOTE — THERAPY EVALUATION
Acute Care - Occupational Therapy Initial Evaluation  [facility]     Patient Name: Edilberto Reeder  : 1950  MRN: [id number]  Today's Date: 12/10/2018  Onset of Illness/Injury or Date of Surgery: 18     Referring Physician: Marjorie    Admit Date: 2018       ICD-10-CM ICD-9-CM   1. Stroke-like symptoms R29.90 781.99   2. Parkinson disease (CMS/HCC) G20 332.0   3. Impaired functional mobility and activity tolerance Z74.09 V49.89     Patient Active Problem List   Diagnosis   • ASCVD (arteriosclerotic cardiovascular disease)   • Paroxysmal atrial fibrillation (CMS/HCC)   • Anticoagulated on warfarin   • Diabetic retinopathy associated with type 2 diabetes mellitus (CMS/HCC)   • Essential hypertension   • HLD (hyperlipidemia)   • Hypothyroidism   • Peripheral neuropathy   • Renal insufficiency   • Diabetes (CMS/HCC)   • Parkinson's disease (CMS/HCC)   • Dyspnea on exertion   • Atrial fibrillation (CMS/HCC) [I48.91]   • Stroke-like symptoms   • Hypopotassemia     Past Medical History:   Diagnosis Date   • Atrial fibrillation with RVR (CMS/HCC)    • Atrial flutter (CMS/HCC)    • Diabetes (CMS/HCC)    • HLD (hyperlipidemia) 2016   • Hypertension    • Parkinson's disease (CMS/HCC)     Advanced      Past Surgical History:   Procedure Laterality Date   • BRAIN STIMULATOR     • JOINT REPLACEMENT      Bilateral shoulder and knee replacements          OT ASSESSMENT FLOWSHEET (last 72 hours)      Occupational Therapy Evaluation     Row Name 12/10/18 1300                   OT Evaluation Time/Intention    Subjective Information  complains of;weakness;fatigue;pain  -LE        Document Type  evaluation;therapy note (daily note)  -LE        Patient Effort  good  -LE        Symptoms Noted During/After Treatment  increased pain  -LE           General Information    Patient Profile Reviewed?  yes  -CORY        Referring Physician  Marjorie  -CORY        Patient Observations  cooperative;alert  -LE        General  Observations of Patient  fowlers, coloring in coloring book   -LE        Prior Level of Function  min assist:;independent:;gait;ADL's;transfer wife began to assist with lower body dressing past week.   -LE        Equipment Currently Used at Home  shower chair;wheelchair, motorized;walker, rolling;raised toilet  -LE        Pertinent History of Current Functional Problem  admit with R side numbness, pain, slurred speech.  MD working up for R elbow gout.    -LE        Existing Precautions/Restrictions  fall  -LE           Cognitive Assessment/Intervention- PT/OT    Orientation Status (Cognition)  oriented x 4  -LE        Follows Commands (Cognition)  follows one step commands;75-90% accuracy;delayed response/completion;increased processing time needed h/o Parkinsons  -LE        Personal Safety Interventions  fall prevention program maintained;gait belt;nonskid shoes/slippers when out of bed  -LE           Safety Issues, Functional Mobility    Impairments Affecting Function (Mobility)  balance;cognition;coordination;motor planning  -LE           Bed Mobility Assessment/Treatment    Bed Mobility Assessment/Treatment  scooting/bridging  -LE        Scooting/Bridging [place] (Bed Mobility)  minimum assist (75% patient effort)  -LE        Supine-Sit [place] (Bed Mobility)  minimum assist (75% patient effort)  -LE        Sit-Supine [place] (Bed Mobility)  minimum assist (75% patient effort)  -LE        Bed Mobility, Safety Issues  decreased use of arms for pushing/pulling;decreased use of legs for bridging/pushing;impaired trunk control for bed mobility  -LE        Assistive Device (Bed Mobility)  draw sheet;head of bed elevated;mechanical lift/aid  -LE           Functional Mobility    Functional Mobility- Ind. Level  minimum assist (75% patient effort)  -LE        Functional Mobility- Device  rolling walker  -LE        Functional Mobility-Distance (Feet)  20 bed to/from bathroom  -LE        Functional  "Mobility- Safety Issues  sequencing ability decreased;step length decreased  -LE        Functional Mobility- Comment  ed on using clapping, stepping to help with shuffle step and \"freezing\"  -LE           Transfer Assessment/Treatment    Transfer Assessment/Treatment  toilet transfer  -LE           Sit-Stand Transfer    Sit-Stand [place] (Transfers)  minimum assist (75% patient effort)  -LE        Assistive Device (Sit-Stand Transfers)  walker, front-wheeled  -LE           Stand-Sit Transfer    Stand-Sit [place] (Transfers)  minimum assist (75% patient effort)  -LE        Assistive Device (Stand-Sit Transfers)  walker, front-wheeled  -LE           Toilet Transfer    Type (Toilet Transfer)  stand pivot/stand step  -LE        [place] Level (Toilet Transfer)  moderate assist (50% patient effort) difficult from lower toilet. has higher toilet at home.   -LE        Assistive Device (Toilet Transfer)  grab bars/safety frame;walker, front-wheeled verbally ed use of 3in1 over toilet  -LE           ADL Assessment/Intervention    BADL Assessment/Intervention  bathing;upper body dressing;lower body dressing;grooming;feeding;toileting  -LE           Lower Body Dressing Assessment/Training    Comment (Lower Body Dressing)  fatigue with walk to BR, not energy and painful R UE to attempt LBD.    -LE           Self-Feeding Assessment/Training    Comment (Feeding)  L handed, set up to eat.   -LE           Toileting Assessment/Training    Comment (Toileting)  using brief,  did not need to void during toilet xfer.   -LE           General ROM    RT Upper Ext  Rt Shoulder Flexion;Rt Elbow Extension/Flexion;Rt Elbow Supination;Rt Elbow Pronation;Rt Wrist Flexion;Rt Wrist Extension  -LE        LT Upper Ext  -- baseline 2/3 shld, distal functional  -LE           Right Upper Ext    Rt Shoulder Flexion AROM  1/3 with increase pain at neck. onset of couple weeks  -LE        Rt Elbow Extension/Flexion AROM  1/4 pain, swelling at " "elbow. onset of few days  -LE        Rt Elbow Supination AROM  1/5   -LE        Rt Elbow Pronation AROM  2/3  -LE        Rt Wrist Flexion AROM  1/3  -LE        Rt Wrist Extension AROM  1/3  -LE        Rt Upper Extremity Comments   new pain with movement.  MD working up for gout  -LE           MMT (Manual Muscle Testing)    Rt Upper Ext  -- deferred due to pain  -LE           Static Sitting Balance    Level of [place] (Unsupported Sitting, Static Balance)  supervision  -LE        Sitting Position (Unsupported Sitting, Static Balance)  sitting on edge of bed  -LE        Time Able to Maintain Position (Unsupported Sitting, Static Balance)  more than 5 minutes  -LE           Static Standing Balance    Level of [place] (Supported Standing, Static Balance)  contact guard assist  -LE        Time Able to Maintain Position (Supported Standing, Static Balance)  1 to 2 minutes  -LE        Assistive Device Utilized (Supported Standing, Static Balance)  rolling walker  -LE           Sensory Assessment/Intervention    Sensory General Assessment  -- difficult to question/answer directly re: numb/tingle  -LE           Positioning and Restraints    Pre-Treatment Position  in bed  -LE        Post Treatment Position  bed  -LE        In Bed  notified nsg;fowlers;call light within reach;encouraged to call for assist;exit alarm on;heels elevated  -LE           Pain Scale: Numbers Pre/Post-Treatment    Pain Scale: Numbers, Pretreatment  10/10  -LE        Pain Scale: Numbers, Post-Treatment  10/10  -LE        Pain Location - Side  Right  -LE        Pain Location  -- elbow, neck.  milder pain R knee (\"better today\")  -LE           Edema Assessment & Management    Additional Documentation  -- R elbow, R hand, mild redness and tenderness to touch  -LE           Coping    Observed Emotional State  accepting;cooperative pleasant  -LE           Plan of Care Review    Plan of Care Reviewed With  patient  -LE           Clinical " Impression (OT)    OT Diagnosis  need for assist with personal care  -LE        Patient/Family Goals Statement (OT Eval)  return to prior level of function, reduce pain  -LE        Criteria for Skilled Therapeutic Interventions Met (OT Eval)  yes;treatment indicated  -LE        Rehab Potential (OT Eval)  good, to achieve stated therapy goals  -LE        Therapy Frequency (OT Eval)  5 times/wk  -LE        Care Plan Review (OT)  evaluation/treatment results reviewed;care plan/treatment goals reviewed  -LE        Anticipated Discharge Disposition (OT)  -- pending progress and support at d/c.   -LE        Patient/Family Concerns, Anticipated Discharge Disposition (OT)  -- pt states wife can assist with ADL and mobility  -LE           Vital Signs    O2 Delivery Pre Treatment  room air  -LE        O2 Delivery Intra Treatment  room air  -LE        O2 Delivery Post Treatment  room air  -LE        Pre Patient Position  Supine  -LE        Intra Patient Position  Standing  -LE        Post Patient Position  Supine  -LE        Activity Duration  -- mild SOA and Moderate fatigue with walk to BR  -LE           Planned OT Interventions    Planned Therapy Interventions (OT Eval)  activity tolerance training;adaptive equipment training;BADL retraining;functional balance retraining;transfer/mobility retraining;ROM/therapeutic exercise  -LE           OT Goals    Transfer Goal Selection (OT)  transfer, OT goal 1  -LE        Bathing Goal Selection (OT)  bathing, OT goal 1  -LE        Dressing Goal Selection (OT)  dressing, OT goal 1  -LE        Toileting Goal Selection (OT)  toileting, OT goal 1  -LE           Transfer Goal 1 (OT)    Activity/Assistive Device (Transfer Goal 1, OT)  sit-to-stand/stand-to-sit;bed-to-chair/chair-to-bed;toilet;commode, 3-in-1;walker, rolling  -LE        [place] Level/Cues Needed (Transfer Goal 1, OT)  set-up required;supervision required  -LE        Time Frame (Transfer Goal 1, OT)  1 week  -LE         Progress/Outcome (Transfer Goal 1, OT)  goal ongoing  -LE           Bathing Goal 1 (OT)    Activity/Assistive Device (Bathing Goal 1, OT)  upper body bathing;lower body bathing  -LE        [place] Level/Cues Needed (Bathing Goal 1, OT)  supervision required;set-up required;tactile cues required;contact guard assist  -LE        Time Frame (Bathing Goal 1, OT)  1 week  -LE        Progress/Outcomes (Bathing Goal 1, OT)  goal ongoing  -LE           Dressing Goal 1 (OT)    Activity/Assistive Device (Dressing Goal 1, OT)  upper body dressing;lower body dressing;reacher;sock-aid  -LE        [place]/Cues Needed (Dressing Goal 1, OT)  minimum assist (75% or more patient effort)  -LE        Time Frame (Dressing Goal 1, OT)  1 week  -LE        Progress/Outcome (Dressing Goal 1, OT)  goal ongoing  -LE           Toileting Goal 1 (OT)    Activity/Device (Toileting Goal 1, OT)  adjust/manage clothing;perform perineal hygiene;commode, 3-in-1;grab bar/safety frame  -LE        [place] Level/Cues Needed (Toileting Goal 1, OT)  minimum assist (75% or more patient effort)  -LE        Time Frame (Toileting Goal 1, OT)  1 week  -LE        Progress/Outcome (Toileting Goal 1, OT)  goal ongoing  -LE          User Key  (r) = Recorded By, (t) = Taken By, (c) = Cosigned By    Initials Name Effective Dates    CORY Mariluz Reilly, OTR 06/08/18 -          Occupational Therapy Education     Title: PT OT SLP Therapies (In Progress)     Topic: Occupational Therapy (In Progress)     Point: ADL training (Done)     Description: Instruct learner(s) on proper safety adaptation and remediation techniques during self care or transfers.   Instruct in proper use of assistive devices.    Learning Progress Summary           Patient Ericer, E,TB,D, DU,VU by CORY at 12/10/2018  1:21 PM    Comment:  Need for assist with ADL, body mechanics and reaching back/pushing up during xfers.  Ed on fall risk and need to prateek for assist.                   Point:  Precautions (Done)     Description: Instruct learner(s) on prescribed precautions during self-care and functional transfers.    Learning Progress Summary           Patient Ericer, E,TB,D, DU,VU by CORY at 12/10/2018  1:21 PM    Comment:  Need for assist with ADL, body mechanics and reaching back/pushing up during xfers.  Ed on fall risk and need to prateek for assist.                   Point: Body mechanics (Done)     Description: Instruct learner(s) on proper positioning and spine alignment during self-care, functional mobility activities and/or exercises.    Learning Progress Summary           Patient Ericer, E,TB,D, DU,VU by CORY at 12/10/2018  1:21 PM    Comment:  Need for assist with ADL, body mechanics and reaching back/pushing up during xfers.  Ed on fall risk and need to prateek for assist.                               User Key     Initials Effective Dates Name Provider Type Discipline    CORY 06/08/18 -  Mariluz Reilly OTR Occupational Therapist OT                  OT Recommendation and Plan  Outcome Summary/Treatment Plan (OT)  Anticipated Discharge Disposition (OT): (pending progress and support at d/c. )  Patient/Family Concerns, Anticipated Discharge Disposition (OT): (pt states wife can assist with ADL and mobility)  Planned Therapy Interventions (OT Eval): activity tolerance training, adaptive equipment training, BADL retraining, functional balance retraining, transfer/mobility retraining, ROM/therapeutic exercise  Therapy Frequency (OT Eval): 5 times/wk  Plan of Care Review  Plan of Care Reviewed With: patient  Plan of Care Reviewed With: patient  Outcome Summary: Pt presents with R LE, UE and neck pain.  Pt with impaired function R UE, balance, processing.  Ed on body mechanics, safety measures and need for assist now with ADL and bathroom mobility.  Pt agreeable and with insight into deficits.  Pt may benefit from skilled OT to increase safety and independence.     Outcome Measures     Row Name 12/10/18 1318  12/10/18 1317 12/10/18 1300       How much help from another is currently needed...    Putting on and taking off regular lower body clothing?  --  1  -LE  --    Bathing (including washing, rinsing, and drying)  --  2  -LE  --    Toileting (which includes using toilet bed pan or urinal)  --  2  -LE  --    Putting on and taking off regular upper body clothing  --  2  -LE  --    Taking care of personal grooming (such as brushing teeth)  --  2  -LE  --    Eating meals  --  3  -LE  --    Score  --  12  -LE  --       Modified Henderson Scale    Modified Henderson Scale  3 - Moderate disability.  Requiring some help, but able to walk without assistance.  -LE  --  --       Functional Assessment    Outcome Measure Options  --  Modified [place]  -LE  AM-PAC 6 Clicks Daily Activity (OT)  -LE    Row Name 12/10/18 0900 12/09/18 0900          How much help from another person do you currently need...    Turning from your back to your side while in flat bed without using bedrails?  3  -CW  3  -PC     Moving from lying on back to sitting on the side of a flat bed without bedrails?  3  -CW  3  -PC     Moving to and from a bed to a chair (including a wheelchair)?  3  -CW  2  -PC     Standing up from a chair using your arms (e.g., wheelchair, bedside chair)?  3  -CW  2  -PC     Climbing 3-5 steps with a railing?  1  -CW  1  -PC     To walk in hospital room?  3  -CW  2  -PC     AM-PAC 6 Clicks Score  16  -CW  13  -PC        Functional Assessment    Outcome Measure Options  AM-PAC 6 Clicks Basic Mobility (PT)  -CW  AM-PAC 6 Clicks Basic Mobility (PT)  -PC       User Key  (r) = Recorded By, (t) = Taken By, (c) = Cosigned By    Initials Name Provider Type    Mariluz Hernandez, OTR Occupational Therapist    PC Estefania Paez, PT Physical Therapist    CW Pedro Levi, PTA Physical Therapy Assistant          Time Calculation:   Time Calculation- OT     Row Name 12/10/18 4578             Time Calculation- OT    OT Start Time  1251  -LE      OT  Stop Time  1317  -LE      OT Time Calculation (min)  26 min  -LE      Total Timed Code Minutes- OT  16 minute(s)  -LE      OT Received On  12/10/18  -LE      OT Goal Re-Cert Due Date  12/17/18  -LE        User Key  (r) = Recorded By, (t) = Taken By, (c) = Cosigned By    Initials Name Provider Type    Mariluz Hernandez, OTR Occupational Therapist        Therapy Suggested Charges     Code   Minutes Charges    None           Therapy Charges for Today     Code Description Service Date Service Provider Modifiers Qty    57010272904 HC OT SELFCARE CURRENT 12/10/2018 Mariluz Reilly, OTR GO, CL 1    16261702400 HC OT SELFCARE PROJECTED 12/10/2018 Joselo Reillydon OTR GO, CL 1    77685557311 HC OT SELFCARE DISCHARGE 12/10/2018 Joselo Reillydon OTR GO, CL 1    53250322530 HC OT EVAL LOW COMPLEXITY 2 12/10/2018 TommieMariluz OTR GO 1    14480202513 HC OT SELF CARE/MGMT/TRAIN EA 15 MIN 12/10/2018 Joselo Reillydon OTR GO 1          OT G-codes  OT Functional Scales Options: AM-PAC 6 Clicks Daily Activity (OT)  Score: 12  Functional Limitation: Self care  Self Care Current Status (): At least 60 percent but less than 80 percent impaired, limited or restricted  Self Care Goal Status (): At least 60 percent but less than 80 percent impaired, limited or restricted  Self Care Discharge Status (): At least 60 percent but less than 80 percent impaired, limited or restricted    ANY Adams  12/10/2018

## 2018-12-10 NOTE — PLAN OF CARE
Problem: Patient Care Overview  Goal: Plan of Care Review  Outcome: Ongoing (interventions implemented as appropriate)   12/10/18 6411   Coping/Psychosocial   Plan of Care Reviewed With patient   OTHER   Outcome Summary Pt presents with R LE, UE and neck pain. Pt with impaired function R UE, balance, processing. Ed on body mechanics, safety measures and need for assist now with ADL and bathroom mobility. Pt agreeable and with insight into deficits. Pt may benefit from skilled OT to increase safety and independence.

## 2018-12-10 NOTE — PLAN OF CARE
Problem: Fall Risk (Adult)  Goal: Absence of Fall  Outcome: Ongoing (interventions implemented as appropriate)      Problem: Patient Care Overview  Goal: Plan of Care Review  Outcome: Ongoing (interventions implemented as appropriate)   12/10/18 0592   Coping/Psychosocial   Plan of Care Reviewed With patient   Plan of Care Review   Progress improving   OTHER   Outcome Summary Patient still with right shoulder/arm pain. Still experiencing some numbness in right arm and leg. CTA of the head/neck/spine completed overnight. Working with PT. Pain medication given as ordered for pain control. Potassium replaced and came up to 3.9. Will continue to monitor.        Problem: Skin Injury Risk (Adult)  Goal: Skin Health and Integrity  Outcome: Ongoing (interventions implemented as appropriate)

## 2018-12-10 NOTE — THERAPY TREATMENT NOTE
Acute Care - Physical Therapy Treatment Note  [facility]     Patient Name: Edilberto Reeder  : 1950  MRN: [id number]  Today's Date: 12/10/2018  Onset of Illness/Injury or Date of Surgery: 18  Date of Referral to PT: 18  Referring Physician: Marjorie    Admit Date: 2018    Visit Dx:    ICD-10-CM ICD-9-CM   1. Stroke-like symptoms R29.90 781.99   2. Parkinson disease (CMS/HCC) G20 332.0   3. Impaired functional mobility and activity tolerance Z74.09 V49.89     Patient Active Problem List   Diagnosis   • ASCVD (arteriosclerotic cardiovascular disease)   • Paroxysmal atrial fibrillation (CMS/HCC)   • Anticoagulated on warfarin   • Diabetic retinopathy associated with type 2 diabetes mellitus (CMS/HCC)   • Essential hypertension   • HLD (hyperlipidemia)   • Hypothyroidism   • Peripheral neuropathy   • Renal insufficiency   • Diabetes (CMS/HCC)   • Parkinson's disease (CMS/HCC)   • Dyspnea on exertion   • Atrial fibrillation (CMS/HCC) [I48.91]   • Stroke-like symptoms   • Hypopotassemia       Therapy Treatment    Rehabilitation Treatment Summary     Row Name 12/10/18 0800             Treatment Time/Intention    Discipline  physical therapy assistant  -CW      Document Type  therapy note (daily note)  -CW      Subjective Information  complains of;weakness;fatigue;pain  -CW      Mode of Treatment  physical therapy  -CW      Therapy Frequency (PT Clinical Impression)  daily  -CW      Patient Effort  good  -CW      Existing Precautions/Restrictions  fall  -CW      Recorded by [CW] Pedro Levi PTA 12/10/18 0903      Row Name 12/10/18 0800             Vital Signs    O2 Delivery Pre Treatment  room air  -CW      Recorded by [CW] Pedro Levi PTA 12/10/18 0903      Row Name 12/10/18 0800             Cognitive Assessment/Intervention- PT/OT    Orientation Status (Cognition)  oriented x 3  -CW      Follows Commands (Cognition)  follows one step commands;over 90% accuracy  -CW      Safety  Deficit (Cognitive)  mild deficit  -CW      Personal Safety Interventions  fall prevention program maintained;gait belt;muscle strengthening facilitated;nonskid shoes/slippers when out of bed  -CW      Recorded by [CW] Pedro Levi, PTA 12/10/18 0903      Row Name 12/10/18 0800             Bed Mobility Assessment/Treatment    Bed Mobility Assessment/Treatment  supine-sit;sit-supine  -CW      Supine-Sit [place] (Bed Mobility)  minimum assist (75% patient effort)  -CW      Sit-Supine [place] (Bed Mobility)  moderate assist (50% patient effort)  -CW      Bed Mobility, Safety Issues  decreased use of arms for pushing/pulling  -CW      Assistive Device (Bed Mobility)  bed rails  -CW      Recorded by [CW] Pedro Levi, PTA 12/10/18 0903      Row Name 12/10/18 0800             Transfer Assessment/Treatment    Transfer Assessment/Treatment  sit-stand transfer;stand-sit transfer  -CW      Recorded by [CW] Pedro Levi, PTA 12/10/18 0903      Row Name 12/10/18 0800             Sit-Stand Transfer    Sit-Stand [place] (Transfers)  minimum assist (75% patient effort);2 person assist  -CW      Assistive Device (Sit-Stand Transfers)  walker, front-wheeled  -CW      Recorded by [CW] Pedro Levi, PTA 12/10/18 0903      Row Name 12/10/18 0800             Stand-Sit Transfer    Stand-Sit [place] (Transfers)  minimum assist (75% patient effort);2 person assist  -CW      Assistive Device (Stand-Sit Transfers)  walker, front-wheeled  -CW      Recorded by [CW] Pedro Levi, PTA 12/10/18 0903      Row Name 12/10/18 0800             Gait/Stairs Assessment/Training    [place] Level (Gait)  minimum assist (75% patient effort);1 person to manage equipment  -CW      Assistive Device (Gait)  walker, front-wheeled  -CW      Distance in Feet (Gait)  40  -CW      Pattern (Gait)  step-through;step-to  -CW      Recorded by [CW] Pedro Levi, PTA 12/10/18 0903      Row Name 12/10/18  0800             Positioning and Restraints    Pre-Treatment Position  in bed  -CW      Post Treatment Position  bed  -CW      In Bed  notified nsg;supine;call light within reach;encouraged to call for assist;with family/caregiver  -CW      Recorded by [CW] Pedro Levi, PTA 12/10/18 0903      Row Name 12/10/18 0800             Pain Assessment    Additional Documentation  Pain Scale: Numbers Pre/Post-Treatment (Group)  -CW      Recorded by [CW] Pedro Levi, PTA 12/10/18 0903      Row Name 12/10/18 0800             Pain Scale: Numbers Pre/Post-Treatment    Pain Scale: Numbers, Pretreatment  8/10  -CW      Pain Scale: Numbers, Post-Treatment  8/10  -CW      Pain Location - Side  Right  -CW      Pain Location  elbow  -CW      Recorded by [CW] Pedro Levi, PTA 12/10/18 0903      Row Name 12/10/18 0800             Outcome Summary/Treatment Plan (PT)    Anticipated Discharge Disposition (PT)  skilled nursing facility;home with home health  -CW      Recorded by [CW] Pedro Levi, PTA 12/10/18 0903        User Key  (r) = Recorded By, (t) = Taken By, (c) = Cosigned By    Initials Name Effective Dates Discipline    CW Pedro Levi, PTA 03/07/18 -  PT                   Physical Therapy Education     Title: PT OT SLP Therapies (Done)     Topic: Physical Therapy (Done)     Point: Mobility training (Done)     Learning Progress Summary           Patient Acceptance, E,TB, VU,DU by CW at 12/10/2018  9:03 AM    Acceptance, E,D, DU by PC at 12/9/2018  9:49 AM                   Point: Home exercise program (Done)     Learning Progress Summary           Patient Acceptance, E,TB, VU,DU by CW at 12/10/2018  9:03 AM    Acceptance, E,D, DU by PC at 12/9/2018  9:49 AM                   Point: Body mechanics (Done)     Learning Progress Summary           Patient Acceptance, E,TB, VU,DU by CW at 12/10/2018  9:03 AM    Acceptance, E,D, DU by PC at 12/9/2018  9:49 AM                   Point: Precautions  (Done)     Learning Progress Summary           Patient Acceptance, E,TB, VU,DU by CW at 12/10/2018  9:03 AM    Acceptance, E,D, DU by PC at 12/9/2018  9:49 AM                               User Key     Initials Effective Dates Name Provider Type Discipline    PC 04/03/18 -  Estefania Paez, PT Physical Therapist PT    CW 03/07/18 -  Pedro Levi, HI Physical Therapy Assistant PT                PT Recommendation and Plan  Anticipated Discharge Disposition (PT): skilled nursing facility, home with home health  Therapy Frequency (PT Clinical Impression): daily  Outcome Summary/Treatment Plan (PT)  Anticipated Discharge Disposition (PT): skilled nursing facility, home with home health  Plan of Care Reviewed With: patient  Progress: improving  Outcome Summary: Pt increasing with bed mobility and transfer safety to RWX and improved gait distance with RWX  Outcome Measures     Row Name 12/10/18 0900 12/09/18 0900          How much help from another person do you currently need...    Turning from your back to your side while in flat bed without using bedrails?  3  -CW  3  -PC     Moving from lying on back to sitting on the side of a flat bed without bedrails?  3  -CW  3  -PC     Moving to and from a bed to a chair (including a wheelchair)?  3  -CW  2  -PC     Standing up from a chair using your arms (e.g., wheelchair, bedside chair)?  3  -CW  2  -PC     Climbing 3-5 steps with a railing?  1  -CW  1  -PC     To walk in hospital room?  3  -CW  2  -PC     AM-PAC 6 Clicks Score  16  -CW  13  -PC        Functional Assessment    Outcome Measure Options  AM-PAC 6 Clicks Basic Mobility (PT)  -CW  AM-PAC 6 Clicks Basic Mobility (PT)  -PC       User Key  (r) = Recorded By, (t) = Taken By, (c) = Cosigned By    Initials Name Provider Type    PC Estefania Paez, PT Physical Therapist    CW Pedro Levi, HI Physical Therapy Assistant         Time Calculation:   PT Charges     Row Name 12/10/18 0904             Time  Calculation    Start Time  0847  -CW      Stop Time  0904  -CW      Time Calculation (min)  17 min  -CW      PT Received On  12/10/18  -CW      PT - Next Appointment  12/11/18  -CW        User Key  (r) = Recorded By, (t) = Taken By, (c) = Cosigned By    Initials Name Provider Type    CW Pedro Levi PTA Physical Therapy Assistant        Therapy Suggested Charges     Code   Minutes Charges    None           Therapy Charges for Today     Code Description Service Date Service Provider Modifiers Qty    73816424715 HC PT THER PROC EA 15 MIN 12/10/2018 Pedro Levi PTA GP 1    47921855683 HC PT THER SUPP EA 15 MIN 12/10/2018 Pedro Levi PTA GP 1          PT G-Codes  PT Professional Judgement Used?: Yes  Outcome Measure Options: AM-PAC 6 Clicks Basic Mobility (PT)  AM-PAC 6 Clicks Score: 16  Functional Limitation: Mobility: Walking and moving around  Mobility: Walking and Moving Around Current Status (): At least 40 percent but less than 60 percent impaired, limited or restricted  Mobility: Walking and Moving Around Goal Status (): At least 1 percent but less than 20 percent impaired, limited or restricted    Pedro Levi PTA  12/10/2018

## 2018-12-10 NOTE — PLAN OF CARE
"Problem: Patient Care Overview  Goal: Plan of Care Review   12/10/18 7387   Coping/Psychosocial   Plan of Care Reviewed With patient;son   OTHER   Outcome Summary Swallow eval completed. Pt reports coughing and \"getting strangled\" with liquids for the past 2 months at home. Coughing was observed on 50% of trials with thin, and frequent throat clear noted with NTL. No overt s/s with HTL, pureed, or solids. However, pt reports some sensation of pharyngeal residue on R at times. Laryngeal elevation possibly reduced on palpation, and suspect back/base of tongue weakness. Feel pt needs VFSS. Recommend mech soft/HTL until completion.          "

## 2018-12-10 NOTE — PROGRESS NOTES
"DOS: 12/10/2018  NAME: Edilberto Reeder   : 1950  PCP: Harevy Larson MD  Chief Complaint   Patient presents with   • Tingling     Stroke    Subjective: No events overnight. c/o of right elbow pain/redness and warmth. Uric acid level normal.     Patient denies HA, double/blurred vision, dizziness, numbness or loss of sensation or any other new neurological complaints at this time.       Objective:  Vital signs: /63 (BP Location: Right arm, Patient Position: Lying)   Pulse 64   Temp 98.7 °F (37.1 °C) (Oral)   Resp 16   Ht 185.4 cm (73\")   Wt 110 kg (242 lb 15.2 oz)   SpO2 94%   BMI 32.05 kg/m²       HEENT: Normocephalic, atraumatic   COR: RRR  Resp: Even and unlabored  Extremities: Equal pulses, non distal embolization    Neurological:   MS: AO. Language normal. No neglect. Higher integrative function normal  CN: II-XII normal  Motor: 5/5, normal tone except right UE (elbow specifically)  pain and tenderness with ROM   Reflexes: Toes down going   Sensory: Intact on left; decreased on right   Coordination: Normal (finger to nose and heel to shin)     Laboratory results:  Lab Results   Component Value Date    GLUCOSE 131 (H) 12/10/2018    CALCIUM 7.0 (L) 12/10/2018     12/10/2018    K 3.9 12/10/2018    K 3.9 12/10/2018    CO2 27.0 12/10/2018     12/10/2018    BUN 14 12/10/2018    CREATININE 0.97 12/10/2018    EGFRIFNONA 77 12/10/2018    BCR 14.4 12/10/2018    ANIONGAP 11.0 12/10/2018     Lab Results   Component Value Date    WBC 7.41 12/10/2018    HGB 9.5 (L) 12/10/2018    HCT 30.6 (L) 12/10/2018    MCV 86.0 12/10/2018     12/10/2018     Lab Results   Component Value Date    CHOL 126 2018     Lab Results   Component Value Date    HDL 30 (L) 2018    HDL 41 2015    HDL 38 (L) 2014     Lab Results   Component Value Date    LDL 78 2018    LDL 21 2015    LDL 32 2014     Lab Results   Component Value Date    TRIG 92 2018    TRIG 84 " 03/19/2015    TRIG 71 02/19/2014     Lab Results   Component Value Date    TSH 4.140 12/09/2018     Lab Results   Component Value Date    NDOFJWJB07 327 12/09/2018     Lab Results   Component Value Date    INR 1.66 (H) 12/08/2018    INR 1.9 (H) 08/17/2018    INR 1.3 (H) 03/19/2015    PROTIME 19.4 (H) 12/08/2018    PROTIME 23.3 (H) 08/17/2018    PROTIME 14.1 (H) 03/19/2015     Lab Results   Component Value Date    FOLATE 7.26 12/09/2018       Review and interpretation of imaging:  NONCONTRAST CT SCAN CERVICAL SPINE     CLINICAL HISTORY: Neck and right arm pain; R29.90-Unspecified symptoms  and signs involving the nervous system; E58-Oltbecmja's disease;  Z74.09-Other reduced mobility.      COMPARISON: None.     TECHNIQUE: Radiation dose reduction techniques were utilized, including  automated exposure control and exposure modulation based on body size.  Axial noncontrast images of the cervical spine were obtained without  contrast. Sagittal reformatted images were supplemented.     FINDINGS:  No acute vertebral fracture identified on the axial series.  There is very mild S-shaped cervicothoracic scoliosis on the coronal  reformatted images.       The vertebrae are well aligned and well maintained in height and stature  on the sagittal reformatted images.  No significant compression  deformity or retropulsion.     Advanced multilevel degenerative and arthritic changes are present,  particularly from C3-T1. Marginal osteophytes produce multilevel canal  and foraminal stenosis bilaterally, particularly from the C3-C7 levels.     IMPRESSION:  Advanced multilevel degenerative changes. No acute osseous  finding.      CT ANGIOGRAM NECK, CT ANGIOGRAM HEAD     HISTORY: Stenosis, CVA,      COMPARISON: None..     TECHNIQUE:  Radiation dose reduction techniques were utilized, including  automated exposure control and exposure modulation based on body size.  Axial thin-section contrast enhanced CT angiogram images obtained  from  the aortic arch through the calvarial vertex utilizing angiographic  technique. Multi projection 3-D MIP reformatted images were supplemented  and reviewed.     CERVICAL CAROTID CT ANGIOGRAM:     FINDINGS:  Minimal plaque in the thoracic aortic arch. Artifact from an  implanted metallic stimulator limits visualization of the great vessel  origins. No significant stenosis is suspected, where adequately  visualized. Portions of the innominate and bilateral common carotid  arteries are not visualized however. There is some mild calcified plaque  in the proximal left internal carotid artery. There is no significant  stenosis of either bifurcation region. The bilateral vertebral arteries  are patent and codominant. There is some mild scattered plaque within  the cervical portion of the right vertebral artery but not resulting in  any significant narrowing.     NASCET criteria utilized in stenosis measurements.     IMPRESSION CERVICAL CAROTID CT ANGIOGRAM:   Findings as above, no  significant bifurcation region stenosis considering artifact.          CRANIAL CTA ANGIOGRAM:     FINDINGS:  There is some artifact from thalamic stimulator leads  bilaterally. Basilar artery is widely patent.  The basilar artery  terminates as the left posterior cerebral artery which appears  unremarkable. The right posterior cerebral is of fetal origin and  unremarkable. The bilateral internal carotid arteries are widely patent.  Minimal plaque is present along the cavernous segments. The right A1  segment is small, hypoplastic, but widely patent. The M1 branch and  trifurcation are unremarkable. The left ICA terminus is normal. A1 and  M1 segments are widely patent. There is a patent, ample anterior  communicating artery present on image 259. The left MCA trifurcation is  unremarkable. The peripheral vasculature is unremarkable..       IMPRESSION CRANIAL CT ANGIOGRAM:  No significant stenosis or aneurysm  identified.    Impression:This  is a 67 yo male w/ AF, HTN, HLD and Parkinson's Disease (w/ deep brain stimulator) who presented to [facility] w/ right-sided numbness/pain and questionable slurred speech. CT Head/Neck No significant stenosis and/or aneurysm noted. This event is likely TIA secondary to AF; subtherapeutic INR . We recommend Lovenox (1mg/kg) BID bridge to coumadin. INR today 1.66; goal (2-3) and B12 replacement. Therapies as written. CCP to assist with discharge planning. Call RRT for any acute neurological changes and/or concerns. We will continue to follow and advise.        Plan:  B12 Replacement (327)   ASA not indicated from a neurology perspective; ok to discontinue    Lipitor 80mg daily (LDL 78)   Neurochecks  BP control  Stroke Education  JUNE/SCDs  PT/OT/ST  Follow-up in stroke clinic in 3 months       Case reviewed with Dr. Smith and he agrees w/ above plan.       1:38 PM  ESME Avila

## 2018-12-10 NOTE — PLAN OF CARE
Problem: Fall Risk (Adult)  Goal: Absence of Fall  Outcome: Ongoing (interventions implemented as appropriate)      Problem: Patient Care Overview  Goal: Plan of Care Review  Outcome: Ongoing (interventions implemented as appropriate)   12/10/18 5841   Coping/Psychosocial   Plan of Care Reviewed With patient   Plan of Care Review   Progress improving   OTHER   Outcome Summary vss, continues to c/o pain in neck and right arm, prn meds given per order, diet changed to mech soft with htl, continue to monitor       Problem: Stroke (Ischemic) (Adult)  Goal: Signs and Symptoms of Listed Potential Problems Will be Absent, Minimized or Managed (Stroke)  Outcome: Ongoing (interventions implemented as appropriate)      Problem: Skin Injury Risk (Adult)  Goal: Identify Related Risk Factors and Signs and Symptoms  Outcome: Outcome(s) achieved Date Met: 12/10/18    Goal: Skin Health and Integrity  Outcome: Ongoing (interventions implemented as appropriate)

## 2018-12-11 ENCOUNTER — APPOINTMENT (OUTPATIENT)
Dept: GENERAL RADIOLOGY | Facility: HOSPITAL | Age: 68
End: 2018-12-11

## 2018-12-11 LAB
GLUCOSE BLDC GLUCOMTR-MCNC: 153 MG/DL (ref 70–130)
GLUCOSE BLDC GLUCOMTR-MCNC: 165 MG/DL (ref 70–130)
GLUCOSE BLDC GLUCOMTR-MCNC: 193 MG/DL (ref 70–130)
GLUCOSE BLDC GLUCOMTR-MCNC: 203 MG/DL (ref 70–130)
INR PPP: 1.77 (ref 0.9–1.1)
PROTHROMBIN TIME: 20.3 SECONDS (ref 11.7–14.2)
RPR SER QL: NORMAL

## 2018-12-11 PROCEDURE — 25010000002 ENOXAPARIN PER 10 MG: Performed by: HOSPITALIST

## 2018-12-11 PROCEDURE — 92611 MOTION FLUOROSCOPY/SWALLOW: CPT

## 2018-12-11 PROCEDURE — 63710000001 INSULIN LISPRO (HUMAN) PER 5 UNITS: Performed by: HOSPITALIST

## 2018-12-11 PROCEDURE — 74230 X-RAY XM SWLNG FUNCJ C+: CPT

## 2018-12-11 PROCEDURE — 82962 GLUCOSE BLOOD TEST: CPT

## 2018-12-11 PROCEDURE — G8998 SWALLOW D/C STATUS: HCPCS

## 2018-12-11 PROCEDURE — 99214 OFFICE O/P EST MOD 30 MIN: CPT | Performed by: NURSE PRACTITIONER

## 2018-12-11 PROCEDURE — 99222 1ST HOSP IP/OBS MODERATE 55: CPT | Performed by: ORTHOPAEDIC SURGERY

## 2018-12-11 PROCEDURE — 85610 PROTHROMBIN TIME: CPT | Performed by: HOSPITALIST

## 2018-12-11 PROCEDURE — G8996 SWALLOW CURRENT STATUS: HCPCS

## 2018-12-11 RX ORDER — METHYLPREDNISOLONE 4 MG/1
4 TABLET ORAL
Status: COMPLETED | OUTPATIENT
Start: 2018-12-12 | End: 2018-12-12

## 2018-12-11 RX ORDER — METHYLPREDNISOLONE 4 MG/1
8 TABLET ORAL
Status: COMPLETED | OUTPATIENT
Start: 2018-12-13 | End: 2018-12-14

## 2018-12-11 RX ORDER — HYDROCODONE BITARTRATE AND ACETAMINOPHEN 10; 325 MG/1; MG/1
1 TABLET ORAL EVERY 6 HOURS PRN
Status: DISCONTINUED | OUTPATIENT
Start: 2018-12-11 | End: 2018-12-14 | Stop reason: HOSPADM

## 2018-12-11 RX ORDER — METHYLPREDNISOLONE 4 MG/1
4 TABLET ORAL
Status: DISCONTINUED | OUTPATIENT
Start: 2018-12-17 | End: 2018-12-14 | Stop reason: HOSPADM

## 2018-12-11 RX ORDER — METHYLPREDNISOLONE 4 MG/1
4 TABLET ORAL
Status: COMPLETED | OUTPATIENT
Start: 2018-12-13 | End: 2018-12-13

## 2018-12-11 RX ORDER — METHYLPREDNISOLONE 4 MG/1
4 TABLET ORAL
Status: DISCONTINUED | OUTPATIENT
Start: 2018-12-15 | End: 2018-12-14 | Stop reason: HOSPADM

## 2018-12-11 RX ORDER — METHYLPREDNISOLONE 4 MG/1
8 TABLET ORAL
Status: COMPLETED | OUTPATIENT
Start: 2018-12-12 | End: 2018-12-12

## 2018-12-11 RX ORDER — METHYLPREDNISOLONE 4 MG/1
4 TABLET ORAL
Status: DISCONTINUED | OUTPATIENT
Start: 2018-12-16 | End: 2018-12-14 | Stop reason: HOSPADM

## 2018-12-11 RX ORDER — LIDOCAINE 50 MG/G
1 PATCH TOPICAL
Status: DISCONTINUED | OUTPATIENT
Start: 2018-12-11 | End: 2018-12-14 | Stop reason: HOSPADM

## 2018-12-11 RX ORDER — METHYLPREDNISOLONE 4 MG/1
4 TABLET ORAL
Status: DISCONTINUED | OUTPATIENT
Start: 2018-12-14 | End: 2018-12-14 | Stop reason: HOSPADM

## 2018-12-11 RX ADMIN — TRAZODONE HYDROCHLORIDE 50 MG: 50 TABLET ORAL at 21:33

## 2018-12-11 RX ADMIN — HYDROCODONE BITARTRATE AND ACETAMINOPHEN 1 TABLET: 7.5; 325 TABLET ORAL at 09:32

## 2018-12-11 RX ADMIN — CITALOPRAM 20 MG: 20 TABLET, FILM COATED ORAL at 09:26

## 2018-12-11 RX ADMIN — INSULIN LISPRO 2 UNITS: 100 INJECTION, SOLUTION INTRAVENOUS; SUBCUTANEOUS at 09:16

## 2018-12-11 RX ADMIN — SODIUM CHLORIDE, PRESERVATIVE FREE 3 ML: 5 INJECTION INTRAVENOUS at 09:35

## 2018-12-11 RX ADMIN — PREGABALIN 150 MG: 75 CAPSULE ORAL at 21:33

## 2018-12-11 RX ADMIN — FUROSEMIDE 20 MG: 20 TABLET ORAL at 09:26

## 2018-12-11 RX ADMIN — OXYBUTYNIN CHLORIDE 10 MG: 10 TABLET, FILM COATED, EXTENDED RELEASE ORAL at 09:26

## 2018-12-11 RX ADMIN — BARIUM SULFATE 50 ML: 400 SUSPENSION ORAL at 08:34

## 2018-12-11 RX ADMIN — Medication 1000 MCG: at 09:26

## 2018-12-11 RX ADMIN — PREGABALIN 150 MG: 75 CAPSULE ORAL at 09:26

## 2018-12-11 RX ADMIN — INSULIN LISPRO 2 UNITS: 100 INJECTION, SOLUTION INTRAVENOUS; SUBCUTANEOUS at 17:44

## 2018-12-11 RX ADMIN — FLUDROCORTISONE ACETATE 100 MCG: 0.1 TABLET ORAL at 09:26

## 2018-12-11 RX ADMIN — WARFARIN SODIUM 6 MG: 6 TABLET ORAL at 17:44

## 2018-12-11 RX ADMIN — FLUDROCORTISONE ACETATE 100 MCG: 0.1 TABLET ORAL at 21:33

## 2018-12-11 RX ADMIN — HYDROCODONE BITARTRATE AND ACETAMINOPHEN 1 TABLET: 10; 325 TABLET ORAL at 15:42

## 2018-12-11 RX ADMIN — CARVEDILOL 3.12 MG: 3.12 TABLET, FILM COATED ORAL at 21:33

## 2018-12-11 RX ADMIN — SODIUM CHLORIDE, PRESERVATIVE FREE 3 ML: 5 INJECTION INTRAVENOUS at 21:33

## 2018-12-11 RX ADMIN — TAMSULOSIN HYDROCHLORIDE 0.4 MG: 0.4 CAPSULE ORAL at 09:26

## 2018-12-11 RX ADMIN — ASPIRIN 325 MG: 325 TABLET ORAL at 09:16

## 2018-12-11 RX ADMIN — CARVEDILOL 3.12 MG: 3.12 TABLET, FILM COATED ORAL at 09:34

## 2018-12-11 RX ADMIN — BARIUM SULFATE 4 ML: 980 POWDER, FOR SUSPENSION ORAL at 08:34

## 2018-12-11 RX ADMIN — INSULIN LISPRO 2 UNITS: 100 INJECTION, SOLUTION INTRAVENOUS; SUBCUTANEOUS at 12:12

## 2018-12-11 RX ADMIN — ALLOPURINOL 100 MG: 100 TABLET ORAL at 09:26

## 2018-12-11 RX ADMIN — INSULIN LISPRO 3 UNITS: 100 INJECTION, SOLUTION INTRAVENOUS; SUBCUTANEOUS at 21:33

## 2018-12-11 RX ADMIN — ATORVASTATIN CALCIUM 80 MG: 80 TABLET, FILM COATED ORAL at 21:33

## 2018-12-11 RX ADMIN — ENOXAPARIN SODIUM 120 MG: 60 INJECTION SUBCUTANEOUS at 21:34

## 2018-12-11 RX ADMIN — LIDOCAINE 1 PATCH: 50 PATCH CUTANEOUS at 15:36

## 2018-12-11 RX ADMIN — BARIUM SULFATE 65 ML: 960 POWDER, FOR SUSPENSION ORAL at 08:34

## 2018-12-11 RX ADMIN — BARIUM SULFATE 50 ML: 400 SUSPENSION ORAL at 08:35

## 2018-12-11 NOTE — PROGRESS NOTES
"DOS: 2018  NAME: Edilberto Reeder   : 1950  PCP: Harvey Larson MD  Chief Complaint   Patient presents with   • Tingling     Stroke    Subjective: No events overnight. Right elbow pain persistent; xray revealed radial head fracture.     He denies HA, double/blurred vision, dizziness, numbness or loss of sensation or any other new neurological complaints at this time.     Objective:  Vital signs: /66 (BP Location: Left arm, Patient Position: Lying)   Pulse 61   Temp 97.4 °F (36.3 °C) (Oral)   Resp 16   Ht 185.4 cm (73\")   Wt 115 kg (253 lb 12 oz)   SpO2 93%   BMI 33.48 kg/m²       HEENT: Normocephalic, atraumatic   COR: RRR  Resp: Even and unlabored  Extremities: Equal pulses, non distal embolization    Neurological:   MS: AO. Language normal. No neglect. Higher integrative function normal  CN: II-XII normal; intermittent dysarthria   Motor: 5/5, normal tone except right UE (elbow specifically)  pain and tenderness with ROM   Reflexes: Toes down going   Sensory: Intact on left; decreased on right   Coordination: Normal (finger to nose and heel to shin)   Assessment unchanged from yesterday except Right elbow appear more painful/swollen       Laboratory results:  Lab Results   Component Value Date    GLUCOSE 131 (H) 12/10/2018    CALCIUM 7.0 (L) 12/10/2018     12/10/2018    K 3.9 12/10/2018    K 3.9 12/10/2018    CO2 27.0 12/10/2018     12/10/2018    BUN 14 12/10/2018    CREATININE 0.97 12/10/2018    EGFRIFNONA 77 12/10/2018    BCR 14.4 12/10/2018    ANIONGAP 11.0 12/10/2018     Lab Results   Component Value Date    WBC 7.41 12/10/2018    HGB 9.5 (L) 12/10/2018    HCT 30.6 (L) 12/10/2018    MCV 86.0 12/10/2018     12/10/2018     Lab Results   Component Value Date    CHOL 126 2018     Lab Results   Component Value Date    HDL 30 (L) 2018    HDL 41 2015    HDL 38 (L) 2014     Lab Results   Component Value Date    LDL 78 2018    LDL 21 " 03/19/2015    LDL 32 02/19/2014     Lab Results   Component Value Date    TRIG 92 12/09/2018    TRIG 84 03/19/2015    TRIG 71 02/19/2014     Lab Results   Component Value Date    TSH 4.140 12/09/2018     Lab Results   Component Value Date    TNYQIGCC58 327 12/09/2018     Lab Results   Component Value Date    INR 1.77 (H) 12/11/2018    INR 1.66 (H) 12/08/2018    INR 1.9 (H) 08/17/2018    PROTIME 20.3 (H) 12/11/2018    PROTIME 19.4 (H) 12/08/2018    PROTIME 23.3 (H) 08/17/2018     Lab Results   Component Value Date    FOLATE 7.26 12/09/2018       Review and interpretation of imaging:  NONCONTRAST CT SCAN CERVICAL SPINE     CLINICAL HISTORY: Neck and right arm pain; R29.90-Unspecified symptoms  and signs involving the nervous system; M85-Lvpyvxfpg's disease;  Z74.09-Other reduced mobility.      COMPARISON: None.     TECHNIQUE: Radiation dose reduction techniques were utilized, including  automated exposure control and exposure modulation based on body size.  Axial noncontrast images of the cervical spine were obtained without  contrast. Sagittal reformatted images were supplemented.     FINDINGS:  No acute vertebral fracture identified on the axial series.  There is very mild S-shaped cervicothoracic scoliosis on the coronal  reformatted images.       The vertebrae are well aligned and well maintained in height and stature  on the sagittal reformatted images.  No significant compression  deformity or retropulsion.     Advanced multilevel degenerative and arthritic changes are present,  particularly from C3-T1. Marginal osteophytes produce multilevel canal  and foraminal stenosis bilaterally, particularly from the C3-C7 levels.     IMPRESSION:  Advanced multilevel degenerative changes. No acute osseous  finding.      CT ANGIOGRAM NECK, CT ANGIOGRAM HEAD     HISTORY: Stenosis, CVA,      COMPARISON: None..     TECHNIQUE:  Radiation dose reduction techniques were utilized, including  automated exposure control and exposure  modulation based on body size.  Axial thin-section contrast enhanced CT angiogram images obtained from  the aortic arch through the calvarial vertex utilizing angiographic  technique. Multi projection 3-D MIP reformatted images were supplemented  and reviewed.     CERVICAL CAROTID CT ANGIOGRAM:     FINDINGS:  Minimal plaque in the thoracic aortic arch. Artifact from an  implanted metallic stimulator limits visualization of the great vessel  origins. No significant stenosis is suspected, where adequately  visualized. Portions of the innominate and bilateral common carotid  arteries are not visualized however. There is some mild calcified plaque  in the proximal left internal carotid artery. There is no significant  stenosis of either bifurcation region. The bilateral vertebral arteries  are patent and codominant. There is some mild scattered plaque within  the cervical portion of the right vertebral artery but not resulting in  any significant narrowing.     NASCET criteria utilized in stenosis measurements.     IMPRESSION CERVICAL CAROTID CT ANGIOGRAM:   Findings as above, no  significant bifurcation region stenosis considering artifact.          CRANIAL CTA ANGIOGRAM:     FINDINGS:  There is some artifact from thalamic stimulator leads  bilaterally. Basilar artery is widely patent.  The basilar artery  terminates as the left posterior cerebral artery which appears  unremarkable. The right posterior cerebral is of fetal origin and  unremarkable. The bilateral internal carotid arteries are widely patent.  Minimal plaque is present along the cavernous segments. The right A1  segment is small, hypoplastic, but widely patent. The M1 branch and  trifurcation are unremarkable. The left ICA terminus is normal. A1 and  M1 segments are widely patent. There is a patent, ample anterior  communicating artery present on image 259. The left MCA trifurcation is  unremarkable. The peripheral vasculature is unremarkable..        IMPRESSION CRANIAL CT ANGIOGRAM:  No significant stenosis or aneurysm  identified.    New reviewed today:   Labs   Right elbow films     Impression:This is a 67 yo male w/ AF, HTN, HLD and Parkinson's Disease (w/ deep brain stimulator) who presented to [facility] w/ right-sided numbness/pain and questionable slurred speech. CT Head/Neck No significant stenosis and/or aneurysm noted. This event is likely TIA secondary to AF; subtherapeutic INR .    Neurologically unchanged. Right elbow x-ray revealed radial head fracture; pending Ortho consult. No change in plan from a neurological perspective. We recommend Lovenox (1mg/kg) BID bridge to coumadin. INR today 1.77; goal (2-3) and B12 replacement. Therapies as written. CCP to assist with discharge planning. Call RRT for any acute neurological changes and/or concerns. No further neurology workup indicated. We will sign off and see again upon request.       Plan:  B12 Replacement (327)   ASA not indicated from a neurology perspective; ok to discontinue    Lipitor 80mg daily (LDL 78)   Neurochecks  BP control  Stroke Education  JUNE/SCDs  PT/OT/ST  Follow-up in stroke clinic in 3 months       Case reviewed with Dr. Smith 12/11 and he agrees w/ above plan.     10:48 AM  ESME Avila

## 2018-12-11 NOTE — PROGRESS NOTES
Pharmacy consult for Lovenox dosing     Edilberto Reeder is a 68 y.o. male 115 kg (253 lb 12 oz).    Indication:subtherapeutic INR   Physician:Dr Amador     Estimated Creatinine Clearance: 94.1 mL/min (by C-G formula based on SCr of 0.97 mg/dL).        Creatinine   Date Value Ref Range Status   12/10/2018 0.97 0.76 - 1.27 mg/dL Final            Platelets   Date Value Ref Range Status   12/10/2018 185 140 - 500 10*3/mm3 Final            Hematocrit   Date Value Ref Range Status   12/10/2018 30.6 (L) 40.4 - 52.2 % Final            Hemoglobin   Date Value Ref Range Status   12/10/2018 9.5 (L) 13.7 - 17.6 g/dL Final            PLAN:  Will start Lovenox 1 mg/Kg sc Q 12 hr.  Will monitor and adjust as needed while INR subtherapeutic.  INR today is 1.77     Sonal ReevesD, BCPS  12/11/18 2:27 PM

## 2018-12-11 NOTE — PLAN OF CARE
Problem: Patient Care Overview  Goal: Plan of Care Review  Outcome: Ongoing (interventions implemented as appropriate)   12/11/18 0510   Coping/Psychosocial   Plan of Care Reviewed With patient;spouse   Plan of Care Review   Progress no change   OTHER   Outcome Summary R elbow fx, reported to A by day shift. Awaiting orthopedic consult. Meds given as per MAR. Uneventful night.      Goal: Individualization and Mutuality  Outcome: Ongoing (interventions implemented as appropriate)

## 2018-12-11 NOTE — PLAN OF CARE
Problem: Fall Risk (Adult)  Goal: Absence of Fall  Outcome: Ongoing (interventions implemented as appropriate)   12/11/18 1531   Fall Risk (Adult)   Absence of Fall making progress toward outcome       Problem: Patient Care Overview  Goal: Plan of Care Review  Outcome: Ongoing (interventions implemented as appropriate)   12/11/18 0558 12/11/18 1419   Coping/Psychosocial   Plan of Care Reviewed With --  patient   Plan of Care Review   Progress no change --      Goal: Individualization and Mutuality  Outcome: Ongoing (interventions implemented as appropriate)   12/11/18 0558   Individualization   Patient Specific Preferences wife at bedside and door closed   Patient Specific Goals (Include Timeframe) personal safety during stay   Patient Specific Interventions neuro checks       Problem: Stroke (Ischemic) (Adult)  Goal: Signs and Symptoms of Listed Potential Problems Will be Absent, Minimized or Managed (Stroke)  Outcome: Ongoing (interventions implemented as appropriate)   12/09/18 0534   Goal/Outcome Evaluation   Problems Assessed (Stroke (Ischemic)) motor/sensory impairment;situational response   Problems Assessed (Stroke (Ischemic)) motor/sensory impairment;situational response       Problem: Skin Injury Risk (Adult)  Goal: Skin Health and Integrity  Outcome: Ongoing (interventions implemented as appropriate)   12/11/18 1531   Skin Injury Risk (Adult)   Skin Health and Integrity making progress toward outcome

## 2018-12-11 NOTE — PROGRESS NOTES
[facility]     LOS: 0 days     Name: Edilberto Reeder  Age: 68 y.o.  Sex: male  :  1950  MRN: [id number]         Primary Care Physician: Harvey Larson MD    Diet Regular; Honey Thick; Cardiac    Subjective   no new complaints. still right right elbow pain and neck pain.    Objective   Temp:  [97.4 °F (36.3 °C)-99.5 °F (37.5 °C)] 98.5 °F (36.9 °C)  Heart Rate:  [61-77] 63  Resp:  [16] 16  BP: (102-123)/(63-69) 102/65  SpO2:  [91 %-96 %] 92 %  on   ;   Device (Oxygen Therapy): room air  Body mass index is 33.48 kg/m².    Physical Exam   Constitutional: He is oriented to person, place, and time. No distress.   Cardiovascular: Normal rate and regular rhythm.   Pulmonary/Chest: Effort normal and breath sounds normal. No respiratory distress.   Abdominal: Soft. There is no tenderness.   Musculoskeletal:   right elbow swollen, warm. no erythema.   Neurological: He is alert and oriented to person, place, and time.   Skin: Skin is warm and dry.   Psychiatric: He has a normal mood and affect. His behavior is normal.     Reviewed medications and new clinical results    allopurinol 100 mg Oral Daily   aspirin 325 mg Oral Daily   Or      aspirin 300 mg Rectal Daily   atorvastatin 80 mg Oral Nightly   Carbidopa-Levodopa ER 3 capsule Oral 4x Daily   carvedilol 3.125 mg Oral Q12H   citalopram 20 mg Oral Daily   fludrocortisone 100 mcg Oral Q12H   furosemide 20 mg Oral Daily   insulin lispro 0-7 Units Subcutaneous 4x Daily With Meals & Nightly   oxybutynin XL 10 mg Oral Daily   pregabalin 150 mg Oral Q12H   sodium chloride 3 mL Intravenous Q12H   tamsulosin 0.4 mg Oral Daily   traZODone 50 mg Oral Nightly   vitamin B-12 1,000 mcg Oral Daily   warfarin 6 mg Oral Daily      Results from last 7 days   Lab Units  12/10/18   0214  18   1215   WBC 10*3/mm3  7.41  7.73   HEMOGLOBIN g/dL  9.5*  11.8*   PLATELETS 10*3/mm3  185  214     Results from last 7 days   Lab  Units  12/10/18   0214  12/09/18   0450  12/08/18   1215   SODIUM mmol/L  139   --   139   POTASSIUM mmol/L  3.9  3.9  2.8*  3.0*   CHLORIDE mmol/L  101   --   93*   CO2 mmol/L  27.0   --   33.7*   BUN mg/dL  14   --   9   CREATININE mg/dL  0.97   --   1.07   CALCIUM mg/dL  7.0*   --   7.8*   GLUCOSE mg/dL  131*   --   125*     Lab Results   Component Value Date    ANIONGAP 11.0 12/10/2018     Glucose   Date/Time Value Ref Range Status   12/11/2018 1100 165 (H) 70 - 130 mg/dL Final   12/11/2018 0546 193 (H) 70 - 130 mg/dL Final   12/10/2018 2059 230 (H) 70 - 130 mg/dL Final   12/10/2018 1648 209 (H) 70 - 130 mg/dL Final   12/10/2018 1105 174 (H) 70 - 130 mg/dL Final   12/10/2018 0547 129 70 - 130 mg/dL Final   12/09/2018 2121 165 (H) 70 - 130 mg/dL Final   12/09/2018 1803 303 (H) 70 - 130 mg/dL Final     Hemoglobin A1C   Date Value Ref Range Status   12/08/2018 7.34 (H) 4.80 - 5.60 % Final     Estimated Creatinine Clearance: 94.1 mL/min (by C-G formula based on SCr of 0.97 mg/dL).  Results from last 7 days   Lab Units  12/11/18   0620  12/08/18   1215   INR   1.77*  1.66*     Assessment/Plan   Active Hospital Problems    Diagnosis Date Noted   • Stroke-like symptoms [R29.90] 12/08/2018   • Hypopotassemia [E87.6] 12/08/2018   • Diabetes (CMS/HCC) [E11.9]    • Parkinson's disease (CMS/HCC) [G20]    • Anticoagulated on warfarin [Z79.01] 01/27/2016   • Essential hypertension [I10] 01/27/2016   • HLD (hyperlipidemia) [E78.5] 01/27/2016   • Hypothyroidism [E03.9] 01/27/2016   • Paroxysmal atrial fibrillation (CMS/HCC) [I48.0] 01/27/2016      Resolved Hospital Problems   No resolved problems to display.     · TIA (right sided weakness, speech difficulty)  · continue warfarin. stop ASA  · CTA head and neck noted. echo noted  · low normal B12: PO  · right elbow  · lateral radial head fracture on XR with joint effusion  · tiny metallic foreign body  · ortho to see  · patient requests increase pain medication  · right neck  pain  · CT c-spine degenerative changes  · atrial fibrillation on anticoagulation, INR low  ? lovenox until INR therapeutic  · anemia  ? drop in Hgb dilutional  ? stop IVF  ? monitor for further drop while on anticoagulation  · PD: continue meds. has a stimulator  · DM2  ? a1c 7.34  ? control is OK  ? correctional insulin  · hypokalemia: replace  · disposition  · TBD  · monitoring Hgb while on lovenox. hgb has dropped suspect is from dilution but need monitoring while on anticoagulation for stability.  · I discussed the patient's findings and my recommendations with patient, family and nursing staff.    Alexis Amador MD   12/11/18  2:00 PM

## 2018-12-11 NOTE — SIGNIFICANT NOTE
12/11/18 1507   Rehab Treatment   Discipline physical therapy assistant   Reason Treatment Not Performed other (see comments)  (Awaiting Ortho consult will follow up tomorrow)   Recommendation   PT - Next Appointment 12/12/18

## 2018-12-11 NOTE — CONSULTS
Orthopedic Consult      Patient: Edilberto Reeder    Date of Admission: 12/8/2018 12:03 PM    YOB: 1950    Medical Record Number: [id number]    Consulting Physician: Alexis Amador MD    Chief Complaints: Right elbow pain and swelling    History of Present Illness: 68 y.o. male seen at the request of Alexis Amador MD for his right elbow.  He tells me that he fell about a week and a half ago.  He bruised the knuckles in his right hand but didn't really think too much of the elbow.  He has noticed increased elbow pain over the past few days associated with swelling.  Describes his pain as moderate, constant and aching.  Pain is worse with movement.  He has noticed some increased warmth in the elbow.  He does have a history of gout but never in the elbow.  He is left-hand dominant.  He does have a history of Parkinson's    Allergies:   Allergies   Allergen Reactions   • Bacitracin Anaphylaxis   • Fish-Derived Products    • Neosporin [Neomycin-Bacitracin Zn-Polymyx]    • Shellfish Allergy    • Shrimp (Diagnostic)        Home Medications:    Current Facility-Administered Medications:   •  acetaminophen (TYLENOL) tablet 650 mg, 650 mg, Oral, Q4H PRN, Paul Cummings MD, 650 mg at 12/09/18 0845  •  allopurinol (ZYLOPRIM) tablet 100 mg, 100 mg, Oral, Daily, Alexis Amador MD, 100 mg at 12/11/18 0926  •  atorvastatin (LIPITOR) tablet 80 mg, 80 mg, Oral, Nightly, Alexis Amador MD, 80 mg at 12/10/18 2143  •  benzocaine-menthol (CHLORASEPTIC) lozenge 1 lozenge, 1 lozenge, Mouth/Throat, Q2H PRN, Alexis Amador MD  •  Carbidopa-Levodopa ER 36. MG capsule controlled-release 3 capsule, 3 capsule, Oral, 4x Daily, Willie Daniel MD, 3 capsule at 12/11/18 1743  •  carvedilol (COREG) tablet 3.125 mg, 3.125 mg, Oral, Q12H, Alexis Amador MD, 3.125 mg at 12/11/18 0934  •  citalopram (CeleXA) tablet 20 mg, 20 mg, Oral, Daily, Alexis Amador MD, 20 mg at 12/11/18 0926  •  dextrose (D50W)  25 g/ 50mL Intravenous Solution 25 g, 25 g, Intravenous, Q15 Min PRN, Alexis Amador MD  •  dextrose (GLUTOSE) oral gel 15 g, 15 g, Oral, Q15 Min PRN, Alexis Amador MD  •  enoxaparin (LOVENOX) syringe 120 mg, 1 mg/kg, Subcutaneous, Q12H, Alexis Amador MD  •  fludrocortisone tablet 100 mcg, 100 mcg, Oral, Q12H, Alexis Amador MD, 100 mcg at 12/11/18 0926  •  furosemide (LASIX) tablet 20 mg, 20 mg, Oral, Daily, Alexis Amador MD, 20 mg at 12/11/18 0926  •  glucagon (human recombinant) (GLUCAGEN DIAGNOSTIC) injection 1 mg, 1 mg, Subcutaneous, PRN, Alexis Amador MD  •  HYDROcodone-acetaminophen (NORCO)  MG per tablet 1 tablet, 1 tablet, Oral, Q6H PRN, Alexis Amador MD, 1 tablet at 12/11/18 1542  •  insulin lispro (humaLOG) injection 0-7 Units, 0-7 Units, Subcutaneous, 4x Daily With Meals & Nightly, Alexis Amador MD, 2 Units at 12/11/18 1744  •  lidocaine (LIDODERM) 5 % 1 patch, 1 patch, Transdermal, Q24H, Aelxis Amador MD, 1 patch at 12/11/18 1536  •  MethylPREDNISolone (MEDROL (FLORY)) tablet 4 mg, 4 mg, Oral, After Lunch, Adria Espinosa MD  •  MethylPREDNISolone (MEDROL (FLORY)) tablet 4 mg, 4 mg, Oral, After Dinner, Adria Espinosa MD  •  [START ON 12/12/2018] MethylPREDNISolone (MEDROL (FLORY)) tablet 4 mg, 4 mg, Oral, TID Around Food, Adria Espinosa MD  •  [START ON 12/13/2018] MethylPREDNISolone (MEDROL (FLORY)) tablet 4 mg, 4 mg, Oral, 4x Daily Taper, Adria Espinosa MD  •  [START ON 12/14/2018] MethylPREDNISolone (MEDROL (FLORY)) tablet 4 mg, 4 mg, Oral, TID Around Food, Adria Espinosa MD  •  [START ON 12/15/2018] MethylPREDNISolone (MEDROL (FLORY)) tablet 4 mg, 4 mg, Oral, Before Breakfast, Adria Espinosa MD  •  [START ON 12/15/2018] MethylPREDNISolone (MEDROL (FLORY)) tablet 4 mg, 4 mg, Oral, Tonight, Adria Espinosa MD  •  [START ON 12/16/2018] MethylPREDNISolone (MEDROL (FLORY)) tablet 4 mg, 4 mg, Oral, Before Breakfast, Adria Espinosa MD  •  [START ON 12/12/2018]  MethylPREDNISolone (MEDROL (FLORY)) tablet 8 mg, 8 mg, Oral, Before Breakfast, Adria Espinosa MD  •  MethylPREDNISolone (MEDROL (FLORY)) tablet 8 mg, 8 mg, Oral, Jesús Layton Scott B, MD  •  [START ON 12/12/2018] MethylPREDNISolone (MEDROL (FLORY)) tablet 8 mg, 8 mg, Oral, Jesús Layton Scott B, MD  •  oxybutynin XL (DITROPAN-XL) 24 hr tablet 10 mg, 10 mg, Oral, Daily, Alexis Amador MD, 10 mg at 12/11/18 0926  •  Pharmacy to Dose enoxaparin (LOVENOX), , Does not apply, Continuous PRN, Alexis Amador MD  •  potassium chloride (MICRO-K) CR capsule 40 mEq, 40 mEq, Oral, PRN, 40 mEq at 12/09/18 2202 **OR** potassium chloride (KLOR-CON) packet 40 mEq, 40 mEq, Oral, PRN **OR** potassium chloride 10 mEq in 100 mL IVPB, 10 mEq, Intravenous, Q1H PRN, Alexis Amador MD  •  pregabalin (LYRICA) capsule 150 mg, 150 mg, Oral, Q12H, Alexis Amador MD, 150 mg at 12/11/18 0926  •  [COMPLETED] Insert peripheral IV, , , Once **AND** sodium chloride 0.9 % flush 10 mL, 10 mL, Intravenous, PRN, Alejandro Bustillo MD  •  sodium chloride 0.9 % flush 3 mL, 3 mL, Intravenous, Q12H, Alexis Amador MD, 3 mL at 12/11/18 0935  •  sodium chloride 0.9 % flush 3-10 mL, 3-10 mL, Intravenous, PRN, Alexis Amador MD  •  tamsulosin (FLOMAX) 24 hr capsule 0.4 mg, 0.4 mg, Oral, Daily, Alexis Amador MD, 0.4 mg at 12/11/18 0926  •  traZODone (DESYREL) tablet 50 mg, 50 mg, Oral, Nightly, Alexis Amador MD, 50 mg at 12/10/18 2143  •  vitamin B-12 (CYANOCOBALAMIN) tablet 1,000 mcg, 1,000 mcg, Oral, Daily, Abby Giraldo APRN, 1,000 mcg at 12/11/18 0912  •  warfarin (COUMADIN) tablet 6 mg, 6 mg, Oral, Daily, Alexis Amador MD, 6 mg at 12/11/18 1958    Current Medications:  Scheduled Meds:  allopurinol 100 mg Oral Daily   atorvastatin 80 mg Oral Nightly   Carbidopa-Levodopa ER 3 capsule Oral 4x Daily   carvedilol 3.125 mg Oral Q12H   citalopram 20 mg Oral Daily   enoxaparin 1 mg/kg Subcutaneous Q12H   fludrocortisone 100 mcg  Oral Q12H   furosemide 20 mg Oral Daily   insulin lispro 0-7 Units Subcutaneous 4x Daily With Meals & Nightly   lidocaine 1 patch Transdermal Q24H   MethylPREDNISolone 4 mg Oral After Lunch   MethylPREDNISolone 4 mg Oral After Dinner   [START ON 12/12/2018] MethylPREDNISolone 4 mg Oral TID Around Food   [START ON 12/13/2018] MethylPREDNISolone 4 mg Oral 4x Daily Taper   [START ON 12/14/2018] MethylPREDNISolone 4 mg Oral TID Around Food   [START ON 12/15/2018] MethylPREDNISolone 4 mg Oral Before Breakfast   [START ON 12/15/2018] MethylPREDNISolone 4 mg Oral Tonight   [START ON 12/16/2018] MethylPREDNISolone 4 mg Oral Before Breakfast   [START ON 12/12/2018] MethylPREDNISolone 8 mg Oral Before Breakfast   MethylPREDNISolone 8 mg Oral Tonight   [START ON 12/12/2018] MethylPREDNISolone 8 mg Oral Tonight   oxybutynin XL 10 mg Oral Daily   pregabalin 150 mg Oral Q12H   sodium chloride 3 mL Intravenous Q12H   tamsulosin 0.4 mg Oral Daily   traZODone 50 mg Oral Nightly   vitamin B-12 1,000 mcg Oral Daily   warfarin 6 mg Oral Daily     Continuous Infusions:  Pharmacy to Dose enoxaparin (LOVENOX)      PRN Meds:.•  acetaminophen  •  benzocaine-menthol  •  dextrose  •  dextrose  •  glucagon (human recombinant)  •  HYDROcodone-acetaminophen  •  Pharmacy to Dose enoxaparin (LOVENOX)  •  potassium chloride **OR** potassium chloride **OR** potassium chloride  •  [COMPLETED] Insert peripheral IV **AND** sodium chloride  •  sodium chloride    Past Medical History:   Diagnosis Date   • Atrial fibrillation with RVR (CMS/HCC)    • Atrial flutter (CMS/HCC)    • Diabetes (CMS/HCC)    • HLD (hyperlipidemia) 1/27/2016   • Hypertension    • Parkinson's disease (CMS/HCC)     Advanced        Past Surgical History:   Procedure Laterality Date   • BRAIN STIMULATOR     • JOINT REPLACEMENT      Bilateral shoulder and knee replacements       Social History     Occupational History   • Not on file   Tobacco Use   • Smoking status: Never Smoker   •  Smokeless tobacco: Never Used   • Tobacco comment: caffeine use   Substance and Sexual Activity   • Alcohol use: Yes   • Drug use: No   • Sexual activity: Defer    Social History     Social History Narrative   • Not on file       History reviewed. No pertinent family history.    Review of Systems:     A 14 point review systems is reviewed with patient.  Pertinent positives are listed above.  All others are negative.    Physical Exam: 68 y.o. male    Vitals:    12/11/18 0300 12/11/18 0500 12/11/18 0726 12/11/18 1346   BP:   117/66 102/65   BP Location:   Left arm Left arm   Patient Position:   Lying Lying   Pulse: 77 68 61 63   Resp:   16 16   Temp:   97.4 °F (36.3 °C) 98.5 °F (36.9 °C)   TempSrc:   Oral Oral   SpO2: 92% 91% 93% 92%   Weight:  115 kg (253 lb 12 oz)     Height:         General:  Awake, alert. No acute distress.      Head/Neck:  Normocephalic, atraumatic.  Conjunctiva and sclera clear.  Hearing adequate for the exam.  Neck is supple with normal ROM.    Psych:  Affect and demeanor appropriate.    CV:  Regular rate and rhythm.  Hemodynamically stable.    Lungs:  Good chest expansion, breathing unlabored.    Abdomen:  Soft.  Non-tender, non-distended.    Extremities:  Right upper extremity:  Skin appears benign without obvious lacerations, ulcerations or lesions.  He does have diffuse edema about the elbow.  No appreciable effusion but he does have some increased warmth at the elbow No gross deformity or malalignment noted.  Compartments soft without evidence for DVT or compartment syndrome.  No atrophy.  No palpable masses or adenopathy.  Focal tenderness noted over the soft spot of the elbow and radiocapitellar articulation.  ROM is uncomfortable.  I can extend him to about 20° shy of full and flex him up to roughly 115°.  Supination of about 75° and pronation of about 80°.   No obvious instability although exam is limited due to discomfort.  Strength well-preserved distally with flexion and extension  of the wrist,  and pinch.  Sensation to light touch grossly intact distally.  Good skin turgor, brisk cap refill.  Palpable radial pulse.    All other extremities atraumatic without gross abnormality.     Diagnostic Tests:    Admission on 12/08/2018   No results displayed because visit has over 200 results.        Lab Results (last 24 hours)     Procedure Component Value Units Date/Time    POC Glucose Once [[id number]]  (Abnormal) Collected:  12/11/18 1610    Specimen:  Blood Updated:  12/11/18 1612     Glucose 153 mg/dL     POC Glucose Once [[id number]]  (Abnormal) Collected:  12/11/18 1100    Specimen:  Blood Updated:  12/11/18 1101     Glucose 165 mg/dL     RPR [[id number]]  (Normal) Collected:  12/09/18 1050    Specimen:  Blood Updated:  12/11/18 0943     RPR Non-Reactive    Protime-INR [[id number]]  (Abnormal) Collected:  12/11/18 0620    Specimen:  Blood Updated:  12/11/18 0652     Protime 20.3 Seconds      INR 1.77    POC Glucose Once [[id number]]  (Abnormal) Collected:  12/11/18 0546    Specimen:  Blood Updated:  12/11/18 0547     Glucose 193 mg/dL     POC Glucose Once [[id number]]  (Abnormal) Collected:  12/10/18 2059    Specimen:  Blood Updated:  12/10/18 2100     Glucose 230 mg/dL         Imaging: AP and lateral views of the right elbow are reviewed on the [email or web address] system along with the associated report.  There is a questionable nondisplaced radial head fracture.  There is an enthesophyte at the triceps insertion.      Assessment: Right elbow pseudogout, possible nondisplaced radial head fracture    Plan:  He may have a radial head fracture but I can't really tell on the x-rays.  If it is a fracture, it is nondisplaced and will heal with expectant management.  I suspect he has either gout or pseudogout in the elbow.  I recommend starting him on a Dosepak.  This was ordered for him.  I have also given him a sling for comfort.  I will need to see him back in 7-10 days to repeat his elbow x-rays.  I will  continue to follow him while he remains an inpatient.  Thank you for the consultation.  Please call with any questions or concerns.    Date: 12/11/2018    Adria Espinosa MD    CC: Alexis Amador MD

## 2018-12-11 NOTE — MBS/VFSS/FEES
Acute Care - Speech Language Pathology   Swallow Initial Evaluation [facility]     Patient Name: Edilberto Reeder  : 1950  MRN: [id number]  Today's Date: 2018  Onset of Illness/Injury or Date of Surgery: 18     Referring Physician: Marjorie      Admit Date: 2018    Visit Dx:     ICD-10-CM ICD-9-CM   1. Stroke-like symptoms R29.90 781.99   2. Parkinson disease (CMS/HCC) G20 332.0   3. Impaired functional mobility and activity tolerance Z74.09 V49.89     Patient Active Problem List   Diagnosis   • ASCVD (arteriosclerotic cardiovascular disease)   • Paroxysmal atrial fibrillation (CMS/HCC)   • Anticoagulated on warfarin   • Diabetic retinopathy associated with type 2 diabetes mellitus (CMS/HCC)   • Essential hypertension   • HLD (hyperlipidemia)   • Hypothyroidism   • Peripheral neuropathy   • Renal insufficiency   • Diabetes (CMS/HCC)   • Parkinson's disease (CMS/HCC)   • Dyspnea on exertion   • Atrial fibrillation (CMS/HCC) [I48.91]   • Stroke-like symptoms   • Hypopotassemia     Past Medical History:   Diagnosis Date   • Atrial fibrillation with RVR (CMS/HCC)    • Atrial flutter (CMS/HCC)    • Diabetes (CMS/HCC)    • HLD (hyperlipidemia) 2016   • Hypertension    • Parkinson's disease (CMS/HCC)     Advanced      Past Surgical History:   Procedure Laterality Date   • BRAIN STIMULATOR     • JOINT REPLACEMENT      Bilateral shoulder and knee replacements        SWALLOW EVALUATION (last 72 hours)      SLP Adult Swallow Evaluation     Row Name 18 0800 12/10/18 1400                Rehab Evaluation    Document Type  evaluation  -SH  evaluation  -CP       Subjective Information  no complaints  -SH  no complaints  -CP       Patient Observations  alert;cooperative  -SH  alert;cooperative  -CP       Patient Effort  good  -SH  good  -CP       Symptoms Noted During/After Treatment  --  none  -CP          General Information    Patient Profile Reviewed  yes  -SH  yes  -CP       Pertinent  History Of Current Problem  TIA. PD.   -  Admitted with TIA, R weakness. Pt reports he is not at baseline, however. He has a hx of Parkinson's with DBS placement. No hx of SLP services.   -CP       Current Method of Nutrition  honey-thick liquids;mechanical soft, no mixed consistencies  -  regular textures;thin liquids  -CP       Precautions/Limitations, Vision  WFL  -SH  WFL  -CP       Precautions/Limitations, Hearing  WFL;for purposes of eval  -SH  WFL;for purposes of eval  -CP       Prior Level of Function-Communication  motor speech impairment  -  motor speech impairment  -CP       Prior Level of Function-Swallowing  no diet consistency restrictions  -  no diet consistency restrictions  -CP       Plans/Goals Discussed with  patient;agreed upon  -  patient;family  -CP       Barriers to Rehab  previous functional deficit  -  none identified  -CP       Patient's Goals for Discharge  patient did not state  -  return to regular diet  -CP          Pain Assessment    Additional Documentation  Pain Scale: Numbers Pre/Post-Treatment (Group)  -  --          Pain Scale: Numbers Pre/Post-Treatment    Pain Scale: Numbers, Pretreatment  0/10 - no pain  -  --       Pain Scale: Numbers, Post-Treatment  0/10 - no pain  -  --          Oral Motor and Function    Dentition Assessment  --  natural, present and adequate  -CP       Secretion Management  --  WNL/WFL  -CP       Mucosal Quality  --  moist, healthy  -CP       Volitional Swallow  --  delayed  -CP          Oral Musculature and Cranial Nerve Assessment    Oral Motor General Assessment  --  lingual impairment;vocal impairment  -CP       Lingual Impairment, Detail. Cranial Nerves IX, XII (Glossopharyngeal and Hypoglossal)  --  reduced strength;bilaterally  -CP       Vocal Impairment, Detail. Cranial Nerve X (Vagus)  --  vocal quality abnormality (see comments)  -CP       Oral Motor, Comment  --  hypophonic  -CP          General Eating/Swallowing  "Observations    Respiratory Support Currently in Use  --  room air  -CP       Eating/Swallowing Skills  --  self-fed  -CP       Positioning During Eating  --  upright in bed  -CP       Utensils Used  --  spoon;cup  -CP       Consistencies Trialed  --  thin liquids;nectar/syrup-thick liquids;honey-thick liquids;pureed;regular textures  -CP       Pre SpO2 (%)  --  95  -CP       Post SpO2 (%)  --  95  -CP          Clinical Swallow Eval    Oral Prep Phase  --  WFL  -CP       Oral Transit  --  impaired  -CP       Oral Residue  --  WFL  -CP       Pharyngeal Phase  --  suspected pharyngeal impairment  -CP       Esophageal Phase  --  unremarkable  -CP       Clinical Swallow Evaluation Summary  --  Pt reports coughing and \"getting strangled\" with liquids for the past 2 months at home. Coughing was observed on 50% of trials with thin, and frequent throat clear noted with NTL. No overt s/s with HTL, pureed, or solids. However, pt reports some sensation of pharyngeal residue on R at times. Laryngeal elevation possibly reduced on palpation, and suspect back/base of tongue weakness. Feel pt needs VFSS. Recommend mech soft/HTL until completion.   -CP          MBS/VFSS Interpretation    VFSS Summary  Pt presents with moderate characterized by swallow delay, reduced hyolaryngeal elevation/excursion, lingual pumping noted, and base of tongue weakness.  Pt with a brain stimulator and wire was observed under fluoro. Pt reports change in voice and fatigue with voicing as the day progresses. Silent and deep penetration with initial trial of honey via spoon. No penetration with second trial. Spillage past valleculae with large bolus honey via cup with transient, shallow penetration. Trace pharyngeal residue post swallow. Spillage past valleculae with honey via straw without penetration with consecutive drinks. Spill to pyriforms before the swallow with nectar via straw with posterior penetration during the swallow. Silent aspiration " "during the swallow with nectar via cup. Pt with grimace during trial and reported, \"it didn't go down\". No penetration with nectar via cup when utilizing chin tuck correctly. Pt with difficulty tucking chin before premature spillage with thins via cup. This allowed for deep penetration at times. Further training with chin tuck is warranted. Increased transit with puree without penetration. Mild pharyngeal residue post swallow. Spillage to pyriforms before the swallow with mech soft and mixed. Transient deep penetration noted with mixed. Pt required liqiud wash honey to assist with bolus formation and propulsion with regular. Transient, shallow penetration noted only x1 with liquid wash honey.   -SH  --          SLP Communication to Radiology    Summary Statement  Pt presents with moderate characterized by swallow delay, reduced hyolaryngeal elevation/excursion, lingual pumping noted, and base of tongue weakness.  Pt with a brain stimulator and wire was observed under fluoro. Penetration x1 with honey via spoon. Silent aspiration nectar. Deep penetration thins with chin tuck. Transient penetration mixed. No penetration or significant residue post swallow with regular.   -SH  --          Clinical Impression    SLP Swallowing Diagnosis  moderate;oral dysfunction;pharyngeal dysfunction  -SH  oral dysfunction;suspected pharyngeal dysfunction  -CP       Functional Impact  risk of aspiration/pneumonia  -SH  risk of aspiration/pneumonia  -CP       Rehab Potential/Prognosis, Swallowing  good, to achieve stated therapy goals  -SH  good, to achieve stated therapy goals  -CP       Swallow Criteria for Skilled Therapeutic Interventions Met  demonstrates skilled criteria  -SH  demonstrates skilled criteria  -CP          Recommendations    Therapy Frequency (Swallow)  PRN  -SH  PRN  -CP       Predicted Duration Therapy Intervention (Days)  until discharge  -SH  until discharge  -CP       SLP Diet Recommendation  regular " textures;honey thick liquids;other (see comments) no mixed  -SH  mechanical soft with no mixed consistencies;honey thick liquids  -CP       Recommended Diagnostics  --  VFSS (MBS)  -CP       Recommended Precautions and Strategies  upright posture during/after eating;volitional throat clear;small bites of food and sips of liquid  -SH  upright posture during/after eating;small bites of food and sips of liquid;no straw  -CP       SLP Rec. for Method of Medication Administration  meds whole;meds crushed;with pudding or applesauce;as tolerated  -SH  meds whole;meds crushed;with pudding or applesauce;as tolerated  -CP       Monitor for Signs of Aspiration  yes;cough  -SH  yes;notify SLP if any concerns  -CP       Anticipated Dischage Disposition  anticipate therapy at next level of care;inpatient rehabilitation facility;skilled nursing facility;home with home health;home with  services  -SH  anticipate therapy at next level of care  -CP          Swallow Goals (SLP)    Oral Nutrition/Hydration Goal Selection (SLP)  oral nutrition/hydration, SLP goal 1  -SH  oral nutrition/hydration, SLP goal 1  -CP          Oral Nutrition/Hydration Goal 1 (SLP)    Oral Nutrition/Hydration Goal 1, SLP  Tolerate mech soft/HTL diet  -SH  Tolerate mech soft/HTL diet  -CP       Time Frame (Oral Nutrition/Hydration Goal 1, SLP)  by discharge  -SH  by discharge  -CP       Progress/Outcomes (Oral Nutrition/Hydration Goal 1, SLP)  good progress toward goal  -SH  --         User Key  (r) = Recorded By, (t) = Taken By, (c) = Cosigned By    Initials Name Effective Dates    CP Nidia Bolanos MS CCC-SLP 06/08/18 -     SH Laila Bach MS CCC-SLP 03/07/18 -           EDUCATION  The patient has been educated in the following areas:   Dysphagia (Swallowing Impairment).    SLP Recommendation and Plan  SLP Swallowing Diagnosis: moderate, oral dysfunction, pharyngeal dysfunction  SLP Diet Recommendation: regular textures, honey thick liquids, other  (see comments)(no mixed)  Recommended Precautions and Strategies: upright posture during/after eating, volitional throat clear, small bites of food and sips of liquid     Monitor for Signs of Aspiration: yes, cough     Swallow Criteria for Skilled Therapeutic Interventions Met: demonstrates skilled criteria  Anticipated Dischage Disposition: anticipate therapy at next level of care, inpatient rehabilitation facility, skilled nursing facility, home with home health, home with OP services  Rehab Potential/Prognosis, Swallowing: good, to achieve stated therapy goals  Therapy Frequency (Swallow): PRN  Predicted Duration Therapy Intervention (Days): until discharge       Plan of Care Reviewed With: patient  Plan of Care Review  Plan of Care Reviewed With: patient  Outcome Summary: VFSS completed. Pt with silent aspiration nectar. SLP recs honey and regular (no mixed). Pt is appropriate for rehab stay per speech therapy to address voice, speech, and swallowing deficits. If patient is to D/C home, further education and training with diet is warranted. Pt requires speech therapy at D/C.    SLP GOALS     Row Name 12/11/18 0800 12/10/18 1400          Oral Nutrition/Hydration Goal 1 (SLP)    Oral Nutrition/Hydration Goal 1, SLP  Tolerate mech soft/HTL diet  -SH  Tolerate mech soft/HTL diet  -CP     Time Frame (Oral Nutrition/Hydration Goal 1, SLP)  by discharge  -SH  by discharge  -CP     Progress/Outcomes (Oral Nutrition/Hydration Goal 1, SLP)  good progress toward goal  -SH  --       User Key  (r) = Recorded By, (t) = Taken By, (c) = Cosigned By    Initials Name Provider Type    CP Nidia Bolanos, MS CCC-SLP Speech and Language Pathologist     Laila Bach MS CCC-SLP Speech and Language Pathologist           SLP Outcome Measures (last 72 hours)      SLP Outcome Measures     Row Name 12/11/18 0900 12/10/18 1500          SLP Outcome Measures    Outcome Measure Used?  Adult NOMS  -SH  Adult NOMS  -CP        Adult FCM  Scores    FCM Chosen  Swallowing  -  Swallowing  -CP     Swallowing FCM Score  3  -  3  -CP       User Key  (r) = Recorded By, (t) = Taken By, (c) = Cosigned By    Initials Name Effective Dates    CP Nidia Bolanos MS CCC-SLP 06/08/18 -      Laila Bach MS CCC-SLP 03/07/18 -            Time Calculation:   Time Calculation- SLP     Row Name 12/11/18 0931             Time Calculation- SLP    SLP Start Time  0800  -      SLP Received On  12/11/18  -        User Key  (r) = Recorded By, (t) = Taken By, (c) = Cosigned By    Initials Name Provider Type     Laila Bach MS CCC-SLP Speech and Language Pathologist          Therapy Charges for Today     Code Description Service Date Service Provider Modifiers Qty    63596401816 HC ST MOTION FLUORO EVAL SWALLOW 5 12/11/2018 Laila Bach MS CCC-SLP GN 1    97117854640 HC ST SWALLOWING CURRENT STATUS 12/11/2018 Laila Bach MS CCC-SLP GN, CL 1    50116893412 HC ST SWALLOWING DISCHARGE 12/11/2018 Laila Bach MS CCC-SLP GN, CK 1          SLP G-Codes  Functional Limitations: Swallowing  Swallow Current Status (): At least 60 percent but less than 80 percent impaired, limited or restricted  Swallow Discharge Status (): At least 40 percent but less than 60 percent impaired, limited or restricted    Laila Bach MS CCC-SLP  12/11/2018

## 2018-12-11 NOTE — PLAN OF CARE
Problem: Patient Care Overview  Goal: Plan of Care Review   12/11/18 0928   Coping/Psychosocial   Plan of Care Reviewed With patient   OTHER   Outcome Summary VFSS completed. Pt with silent aspiration nectar. SLP recs honey and regular (no mixed). Pt is appropriate for rehab to address voice, speech, and swallowing deficits. If patient is to D/C home, further education and training with diet is warranted. Pt requires speech therapy at D/C.

## 2018-12-11 NOTE — SIGNIFICANT NOTE
12/11/18 1215   Rehab Treatment   Discipline occupational therapist   Reason Treatment Not Performed other (see comments)  (Per chart awaiting Ortho consult due to Fracture of (R) Radial Head )   Recommendation   OT - Next Appointment 12/12/18

## 2018-12-12 LAB
ANION GAP SERPL CALCULATED.3IONS-SCNC: 12.5 MMOL/L
BUN BLD-MCNC: 15 MG/DL (ref 8–23)
BUN/CREAT SERPL: 17 (ref 7–25)
CALCIUM SPEC-SCNC: 7.5 MG/DL (ref 8.6–10.5)
CHLORIDE SERPL-SCNC: 102 MMOL/L (ref 98–107)
CO2 SERPL-SCNC: 26.5 MMOL/L (ref 22–29)
CREAT BLD-MCNC: 0.88 MG/DL (ref 0.76–1.27)
GFR SERPL CREATININE-BSD FRML MDRD: 86 ML/MIN/1.73
GLUCOSE BLD-MCNC: 158 MG/DL (ref 65–99)
GLUCOSE BLDC GLUCOMTR-MCNC: 160 MG/DL (ref 70–130)
GLUCOSE BLDC GLUCOMTR-MCNC: 270 MG/DL (ref 70–130)
GLUCOSE BLDC GLUCOMTR-MCNC: 348 MG/DL (ref 70–130)
GLUCOSE BLDC GLUCOMTR-MCNC: 360 MG/DL (ref 70–130)
GLUCOSE BLDC GLUCOMTR-MCNC: 401 MG/DL (ref 70–130)
HCT VFR BLD AUTO: 28.9 % (ref 40.4–52.2)
HGB BLD-MCNC: 9.4 G/DL (ref 13.7–17.6)
INR PPP: 2.48 (ref 0.9–1.1)
POTASSIUM BLD-SCNC: 3.2 MMOL/L (ref 3.5–5.2)
POTASSIUM BLD-SCNC: 3.7 MMOL/L (ref 3.5–5.2)
PROTHROMBIN TIME: 26.4 SECONDS (ref 11.7–14.2)
SODIUM BLD-SCNC: 141 MMOL/L (ref 136–145)

## 2018-12-12 PROCEDURE — 63710000001 INSULIN LISPRO (HUMAN) PER 5 UNITS: Performed by: HOSPITALIST

## 2018-12-12 PROCEDURE — 97110 THERAPEUTIC EXERCISES: CPT

## 2018-12-12 PROCEDURE — 63710000001 METHYLPREDNISOLONE 4 MG TABLET THERAPY PACK 21 EACH DISP PACK: Performed by: ORTHOPAEDIC SURGERY

## 2018-12-12 PROCEDURE — 92526 ORAL FUNCTION THERAPY: CPT

## 2018-12-12 PROCEDURE — 85610 PROTHROMBIN TIME: CPT | Performed by: HOSPITALIST

## 2018-12-12 PROCEDURE — 80048 BASIC METABOLIC PNL TOTAL CA: CPT | Performed by: HOSPITALIST

## 2018-12-12 PROCEDURE — 25010000002 ENOXAPARIN PER 10 MG: Performed by: HOSPITALIST

## 2018-12-12 PROCEDURE — 85014 HEMATOCRIT: CPT | Performed by: HOSPITALIST

## 2018-12-12 PROCEDURE — 82962 GLUCOSE BLOOD TEST: CPT

## 2018-12-12 PROCEDURE — 85018 HEMOGLOBIN: CPT | Performed by: HOSPITALIST

## 2018-12-12 PROCEDURE — 84132 ASSAY OF SERUM POTASSIUM: CPT | Performed by: HOSPITALIST

## 2018-12-12 RX ORDER — FAMOTIDINE 20 MG/1
20 TABLET, FILM COATED ORAL DAILY
Status: DISCONTINUED | OUTPATIENT
Start: 2018-12-12 | End: 2018-12-14 | Stop reason: HOSPADM

## 2018-12-12 RX ADMIN — CARVEDILOL 3.12 MG: 3.12 TABLET, FILM COATED ORAL at 09:58

## 2018-12-12 RX ADMIN — METHYLPREDNISOLONE 4 MG: 4 TABLET ORAL at 18:20

## 2018-12-12 RX ADMIN — ATORVASTATIN CALCIUM 80 MG: 80 TABLET, FILM COATED ORAL at 21:35

## 2018-12-12 RX ADMIN — POTASSIUM CHLORIDE 40 MEQ: 750 CAPSULE, EXTENDED RELEASE ORAL at 10:12

## 2018-12-12 RX ADMIN — WARFARIN SODIUM 6 MG: 6 TABLET ORAL at 18:20

## 2018-12-12 RX ADMIN — SODIUM CHLORIDE, PRESERVATIVE FREE 3 ML: 5 INJECTION INTRAVENOUS at 21:39

## 2018-12-12 RX ADMIN — TAMSULOSIN HYDROCHLORIDE 0.4 MG: 0.4 CAPSULE ORAL at 09:58

## 2018-12-12 RX ADMIN — PREGABALIN 150 MG: 75 CAPSULE ORAL at 09:58

## 2018-12-12 RX ADMIN — ENOXAPARIN SODIUM 80 MG: 80 INJECTION SUBCUTANEOUS at 21:32

## 2018-12-12 RX ADMIN — INSULIN LISPRO 4 UNITS: 100 INJECTION, SOLUTION INTRAVENOUS; SUBCUTANEOUS at 13:26

## 2018-12-12 RX ADMIN — LIDOCAINE 1 PATCH: 50 PATCH CUTANEOUS at 21:33

## 2018-12-12 RX ADMIN — POTASSIUM CHLORIDE 40 MEQ: 750 CAPSULE, EXTENDED RELEASE ORAL at 14:19

## 2018-12-12 RX ADMIN — TRAZODONE HYDROCHLORIDE 50 MG: 50 TABLET ORAL at 21:35

## 2018-12-12 RX ADMIN — METHYLPREDNISOLONE 4 MG: 4 TABLET ORAL at 14:17

## 2018-12-12 RX ADMIN — INSULIN LISPRO 6 UNITS: 100 INJECTION, SOLUTION INTRAVENOUS; SUBCUTANEOUS at 21:32

## 2018-12-12 RX ADMIN — OXYBUTYNIN CHLORIDE 10 MG: 10 TABLET, FILM COATED, EXTENDED RELEASE ORAL at 09:58

## 2018-12-12 RX ADMIN — FAMOTIDINE 20 MG: 20 TABLET, FILM COATED ORAL at 21:34

## 2018-12-12 RX ADMIN — ALLOPURINOL 100 MG: 100 TABLET ORAL at 09:58

## 2018-12-12 RX ADMIN — SODIUM CHLORIDE, PRESERVATIVE FREE 3 ML: 5 INJECTION INTRAVENOUS at 10:08

## 2018-12-12 RX ADMIN — PREGABALIN 150 MG: 75 CAPSULE ORAL at 21:34

## 2018-12-12 RX ADMIN — Medication 1000 MCG: at 09:58

## 2018-12-12 RX ADMIN — ENOXAPARIN SODIUM 120 MG: 60 INJECTION SUBCUTANEOUS at 09:58

## 2018-12-12 RX ADMIN — HYDROCODONE BITARTRATE AND ACETAMINOPHEN 1 TABLET: 10; 325 TABLET ORAL at 21:37

## 2018-12-12 RX ADMIN — CARVEDILOL 3.12 MG: 3.12 TABLET, FILM COATED ORAL at 21:35

## 2018-12-12 RX ADMIN — ENOXAPARIN SODIUM 30 MG: 30 INJECTION SUBCUTANEOUS at 21:38

## 2018-12-12 RX ADMIN — FUROSEMIDE 20 MG: 20 TABLET ORAL at 09:58

## 2018-12-12 RX ADMIN — FLUDROCORTISONE ACETATE 100 MCG: 0.1 TABLET ORAL at 09:58

## 2018-12-12 RX ADMIN — METHYLPREDNISOLONE 8 MG: 4 TABLET ORAL at 21:35

## 2018-12-12 RX ADMIN — INSULIN LISPRO 5 UNITS: 100 INJECTION, SOLUTION INTRAVENOUS; SUBCUTANEOUS at 18:20

## 2018-12-12 RX ADMIN — FLUDROCORTISONE ACETATE 100 MCG: 0.1 TABLET ORAL at 21:35

## 2018-12-12 RX ADMIN — HYDROCODONE BITARTRATE AND ACETAMINOPHEN 1 TABLET: 10; 325 TABLET ORAL at 09:57

## 2018-12-12 RX ADMIN — CITALOPRAM 20 MG: 20 TABLET, FILM COATED ORAL at 09:58

## 2018-12-12 RX ADMIN — METHYLPREDNISOLONE 8 MG: 4 TABLET ORAL at 06:06

## 2018-12-12 NOTE — PLAN OF CARE
Problem: Patient Care Overview  Goal: Plan of Care Review   12/12/18 1203   Coping/Psychosocial   Plan of Care Reviewed With patient   OTHER   Outcome Summary ST following for f/u VFSS; education; diet toleration and swallow tx. Moderately dysarthric. Reviewed VFSS results with pt. Spoke with RN who stated pt had difficulty/choking with po meds. However, pt took all at once with HTL. Recommended for pt to take pills 1-2 at a time; pt verbalized understanding; afebrile; breath sounds diminished not worsening. Reviewed swallow strategy: throat clear post each bite/swallow. Provided handouts re: swallow function; diet modifications; mixed consistency; and swallow therapies. Pt with questions re: swallow and how long to continue diet. SLP recommends swallow tx at next level of care. ST to cont to follow for diet; swallow tx; education as needed.       12/12/18 1203   Coping/Psychosocial   Plan of Care Reviewed With patient   OTHER   Outcome Summary ST following for f/u VFSS; education; diet toleration and swallow tx. Moderately dysarthric. Reviewed VFSS results with pt. Spoke with RN who stated pt had difficulty/choking with po meds. However, pt took all at once with HTL. Recommended for pt to take pills 1-2 at a time; pt verbalized understanding; afebrile; breath sounds diminished not worsening. Reviewed swallow strategy: throat clear post each bite/swallow. Provided handouts re: swallow function; diet modifications; mixed consistency; and swallow therapies. Pt with questions re: swallow and how long to continue diet. SLP recommends swallow tx at next level of care. ST to cont to follow for diet; swallow tx; education as needed. Pt on regular diet; no mixed consistency; honey thick liquids.

## 2018-12-12 NOTE — PROGRESS NOTES
[facility]     LOS: 1 day     Name: Edilberto Reeder  Age: 68 y.o.  Sex: male  :  1950  MRN: [id number]         Primary Care Physician: Harvey Larson MD    Diet Regular; Honey Thick; Cardiac    Subjective   no new complaints. he states right elbow pain is improved..    Objective   Temp:  [97.3 °F (36.3 °C)-99.2 °F (37.3 °C)] 99.2 °F (37.3 °C)  Heart Rate:  [64-83] 66  Resp:  [18] 18  BP: (112-134)/(67-77) 134/67  SpO2:  [90 %-94 %] 93 %  on   ;   Device (Oxygen Therapy): room air  Body mass index is 32.17 kg/m².    Physical Exam   Constitutional: He is oriented to person, place, and time. No distress.   Cardiovascular: Normal rate and regular rhythm.   Pulmonary/Chest: Effort normal and breath sounds normal. No respiratory distress.   Abdominal: Soft. There is no tenderness.   Musculoskeletal: He exhibits edema (wife states chronic).   right elbow swelling and warmth improved some   Neurological: He is alert and oriented to person, place, and time.   Skin: Skin is warm and dry.   Psychiatric: He has a normal mood and affect. His behavior is normal.     Reviewed medications and new clinical results    allopurinol 100 mg Oral Daily   atorvastatin 80 mg Oral Nightly   Carbidopa-Levodopa ER 3 capsule Oral 4x Daily   carvedilol 3.125 mg Oral Q12H   citalopram 20 mg Oral Daily   enoxaparin 30 mg Subcutaneous Q12H   And      enoxaparin 80 mg Subcutaneous Q12H   fludrocortisone 100 mcg Oral Q12H   furosemide 20 mg Oral Daily   insulin lispro 0-7 Units Subcutaneous 4x Daily With Meals & Nightly   lidocaine 1 patch Transdermal Q24H   [START ON 2018] MethylPREDNISolone 4 mg Oral TID Around Food   [START ON 2018] MethylPREDNISolone 4 mg Oral 4x Daily Taper   [START ON 12/15/2018] MethylPREDNISolone 4 mg Oral TID Around Food   [START ON 2018] MethylPREDNISolone 4 mg Oral Before Breakfast   [START ON 2018] MethylPREDNISolone 4 mg Oral  Tonight   [START ON 12/17/2018] MethylPREDNISolone 4 mg Oral Before Breakfast   MethylPREDNISolone 8 mg Oral Tonight   [START ON 12/13/2018] MethylPREDNISolone 8 mg Oral Tonight   oxybutynin XL 10 mg Oral Daily   pregabalin 150 mg Oral Q12H   sodium chloride 3 mL Intravenous Q12H   tamsulosin 0.4 mg Oral Daily   traZODone 50 mg Oral Nightly   vitamin B-12 1,000 mcg Oral Daily   warfarin 6 mg Oral Daily       Pharmacy to Dose enoxaparin (LOVENOX)      Results from last 7 days   Lab Units  12/12/18   0520  12/10/18   0214  12/08/18   1215   WBC 10*3/mm3   --   7.41  7.73   HEMOGLOBIN g/dL  9.4*  9.5*  11.8*   PLATELETS 10*3/mm3   --   185  214     Results from last 7 days   Lab Units  12/12/18   0520  12/10/18   0214  12/09/18   0450  12/08/18   1215   SODIUM mmol/L  141  139   --   139   POTASSIUM mmol/L  3.2*  3.9  3.9  2.8*  3.0*   CHLORIDE mmol/L  102  101   --   93*   CO2 mmol/L  26.5  27.0   --   33.7*   BUN mg/dL  15  14   --   9   CREATININE mg/dL  0.88  0.97   --   1.07   CALCIUM mg/dL  7.5*  7.0*   --   7.8*   GLUCOSE mg/dL  158*  131*   --   125*     Lab Results   Component Value Date    ANIONGAP 12.5 12/12/2018     Glucose   Date/Time Value Ref Range Status   12/12/2018 1644 348 (H) 70 - 130 mg/dL Final   12/12/2018 1114 270 (H) 70 - 130 mg/dL Final   12/12/2018 0547 160 (H) 70 - 130 mg/dL Final   12/11/2018 2054 203 (H) 70 - 130 mg/dL Final   12/11/2018 1610 153 (H) 70 - 130 mg/dL Final   12/11/2018 1100 165 (H) 70 - 130 mg/dL Final   12/11/2018 0546 193 (H) 70 - 130 mg/dL Final   12/10/2018 2059 230 (H) 70 - 130 mg/dL Final     No results found for: HGBA1C  Estimated Creatinine Clearance: 102 mL/min (by C-G formula based on SCr of 0.88 mg/dL).  Results from last 7 days   Lab Units  12/11/18   0620  12/08/18   1215   INR   1.77*  1.66*     Assessment/Plan   Active Hospital Problems    Diagnosis Date Noted   • Stroke-like symptoms [R29.90] 12/08/2018   • Hypopotassemia [E87.6] 12/08/2018   • Diabetes  (CMS/HCC) [E11.9]    • Parkinson's disease (CMS/HCC) [G20]    • Anticoagulated on warfarin [Z79.01] 01/27/2016   • Essential hypertension [I10] 01/27/2016   • HLD (hyperlipidemia) [E78.5] 01/27/2016   • Hypothyroidism [E03.9] 01/27/2016   • Paroxysmal atrial fibrillation (CMS/HCC) [I48.0] 01/27/2016      Resolved Hospital Problems   No resolved problems to display.     · TIA (right sided weakness, speech difficulty)  · continue warfarin. stop ASA  · CTA head and neck noted. echo noted  · low normal B12: PO  · right elbow  · lateral radial head fracture on XR with joint effusion  · appreciate orthopedic surgery attention to patient: sling and follow up 7-10 days  · medrol dose pack possible gout  · right neck pain  · CT c-spine degenerative changes  · atrial fibrillation on anticoagulation, INR low  ? lovenox until INR therapeutic  · anemia  ? drop in Hgb dilutional  ? stable  ? monitor for further drop while on anticoagulation and steroids. add pepcid  · PD: continue meds. has a stimulator  · DM2  ? a1c 7.34  ? control is OK  ? correctional insulin  · hypokalemia: replace  · disposition  · TBD. skilled soon  · monitoring Hgb while on lovenox. hgb has dropped suspect is from dilution but need monitoring while on anticoagulation for stability.  · I discussed the patient's findings and my recommendations with patient, family and nursing staff.    Alexis Amador MD   12/12/18  6:48 PM

## 2018-12-12 NOTE — PROGRESS NOTES
"Pharmacy to Dose Enoxaparin    Patient: Edilberto Reeder (N535/1,  LOS: 1 day )  Relevant clinical data and objective history reviewed:  68 y.o. male 185.4 cm (73\") 111 kg (243 lb 13.3 oz)  Body mass index is 32.17 kg/m².  he has a past medical history of Atrial fibrillation with RVR (CMS/[facility]), Atrial flutter (CMS/HCC), Diabetes (CMS/HCC), HLD (hyperlipidemia) (1/27/2016), Hypertension, and Parkinson's disease (CMS/[facility]).    Documented weights    12/08/18 1202 12/08/18 1640 12/09/18 0520 12/09/18 1209   Weight: 107 kg (235 lb 8 oz) 104 kg (229 lb) 106 kg (233 lb 14.5 oz) 106 kg (233 lb)    12/10/18 0500 12/11/18 0500 12/12/18 0500   Weight: 110 kg (242 lb 15.2 oz) 115 kg (253 lb 12 oz) 111 kg (243 lb 13.3 oz)     Consult for Dr Amador  Indication: afib - bridge with warfarin  Current dose: 120 mg subQ q12h    Other Anticoagulation: coumadin    Estimated Creatinine Clearance: 102 mL/min (by C-G formula based on SCr of 0.88 mg/dL).  Body mass index is 32.17 kg/m².    Creatinine   Date Value Ref Range Status   12/12/2018 0.88 0.76 - 1.27 mg/dL Final   12/10/2018 0.97 0.76 - 1.27 mg/dL Final     Platelets   Date Value Ref Range Status   12/10/2018 185 140 - 500 10*3/mm3 Final     Lab Results   Component Value Date    INR 1.77 (H) 12/11/2018       PLAN:  - wt was updated. now 111 kg  - Will adjust to Lovenox 110 mg SubQ every 12 hr.      - recommend daily PT/INR while bridging to warfarin (Goal 2-3)    Jona Puri, PharmD  12/12/18 10:04 AM      "

## 2018-12-12 NOTE — PLAN OF CARE
Problem: Fall Risk (Adult)  Goal: Absence of Fall  Outcome: Ongoing (interventions implemented as appropriate)      Problem: Patient Care Overview  Goal: Plan of Care Review  Outcome: Ongoing (interventions implemented as appropriate)   12/12/18 0599   Coping/Psychosocial   Plan of Care Reviewed With patient;spouse   Plan of Care Review   Progress no change   OTHER   Outcome Summary Awoke in middle of night with c/o's sore throat and feeling of a lump in throat; not improved with water. RUE in sling, remains swollen and painful with LROM. Pt remains weak with ataxic gait. Pt not safe for unassisted ambulation. Oxygen sat down to 88% while sleeping.      Goal: Individualization and Mutuality  Outcome: Ongoing (interventions implemented as appropriate)      Problem: Stroke (Ischemic) (Adult)  Goal: Signs and Symptoms of Listed Potential Problems Will be Absent, Minimized or Managed (Stroke)  Outcome: Ongoing (interventions implemented as appropriate)      Problem: Skin Injury Risk (Adult)  Goal: Skin Health and Integrity  Outcome: Ongoing (interventions implemented as appropriate)

## 2018-12-12 NOTE — PROGRESS NOTES
Discharge Planning Assessment  [facility]     Patient Name: Edilberto Reeder  MRN: [id number]  Today's Date: 12/12/2018    Admit Date: 12/8/2018    Discharge Needs Assessment     Row Name 12/12/18 0852       Living Environment    Lives With  spouse;child(abilio), adult    Current Living Arrangements  home/apartment/condo    Primary Care Provided by  spouse/significant other    Provides Primary Care For  no one, unable/limited ability to care for self    Family Caregiver if Needed  spouse    Quality of Family Relationships  helpful;involved;supportive       Resource/Environmental Concerns    Resource/Environmental Concerns  none    Transportation Concerns  car, none       Transition Planning    Patient/Family Anticipates Transition to  home with help/services;inpatient rehabilitation facility    Patient/Family Anticipated Services at Transition  rehabilitation services;skilled nursing    Transportation Anticipated  health plan transportation;family or friend will provide       Discharge Needs Assessment    Readmission Within the Last 30 Days  no previous admission in last 30 days    Concerns to be Addressed  discharge planning;decision making    Equipment Currently Used at Home  cane, quad;walker, rolling;shower chair;power chair,(recliner lift)    Anticipated Changes Related to Illness  inability to care for self    Discharge Facility/Level of Care Needs  rehabilitation facility    Offered/Gave Vendor List  yes    Current Discharge Risk  chronically ill;dependent with mobility/activities of daily living        Discharge Plan     Row Name 12/12/18 0843       Plan    Plan  SNF vs HH- await family choices for SNF    Patient/Family in Agreement with Plan  yes    Plan Comments  Late Entry: 12/11/2018- Spoke with patient and wife Jamaica ([phone]) at bedside, introduced self and explained CCP role. Verified facesheet and confirmed local phaarmacy is [facility] in [facility]. Pt denies problems with medication costs,  [facility] Pt lives in a s/s home with 1/2 step to enter. Pt states prior to admission he was independent with quad cane or walker. However wife assists pts in daily care. Pt denies any HH or SNF hx. CCP explained the services and provided SNF/HH list. Wife states they are thinking about rehab due to his weakness.  CCP explained will follow up tomorrow for choices for referrals. Partial packet in ccp office. olayinka rn/ccp        Destination      No service coordination in this encounter.      Durable Medical Equipment      No service coordination in this encounter.      Dialysis/Infusion      No service coordination in this encounter.      Home Medical Care      No service coordination in this encounter.      Community Resources      No service coordination in this encounter.          Demographic Summary     Row Name 12/12/18 0915       General Information    Referral Source  admission list    Reason for Consult  discharge planning    Preferred Language  English     Used During This Interaction  no       Contact Information    Permission Granted to Share Info With  family/designee;facility         Functional Status     Row Name 12/12/18 0851       Functional Status    Usual Activity Tolerance  moderate    Current Activity Tolerance  fair       Functional Status, IADL    Medications  assistive person    Meal Preparation  assistive person    Housekeeping  assistive person    Laundry  assistive person    Shopping  assistive person       Mental Status    General Appearance WDL  WDL       Mental Status Summary    Recent Changes in Mental Status/Cognitive Functioning  no changes       Employment/    Employment Status  , previous service    Current or Previous Occupation          Psychosocial    No documentation.       Abuse/Neglect    No documentation.       Legal     Row Name 12/12/18 0852       Financial/Legal    Finance Comments  Advance directive at home,  encouraged family to bring in.        Substance Abuse    No documentation.       Patient Forms     Row Name 12/12/18 0854       Patient Forms    Provider Choice List  Delivered    Delivered to  Support person    Method of delivery  In person            Sarah Charlton RN

## 2018-12-12 NOTE — THERAPY TREATMENT NOTE
Acute Care - Physical Therapy Treatment Note  [facility]     Patient Name: Edilberto Reeder  : 1950  MRN: [id number]  Today's Date: 2018  Onset of Illness/Injury or Date of Surgery: 18  Date of Referral to PT: 18  Referring Physician: Marjorie    Admit Date: 2018    Visit Dx:    ICD-10-CM ICD-9-CM   1. Stroke-like symptoms R29.90 781.99   2. Parkinson disease (CMS/HCC) G20 332.0   3. Impaired functional mobility and activity tolerance Z74.09 V49.89     Patient Active Problem List   Diagnosis   • ASCVD (arteriosclerotic cardiovascular disease)   • Paroxysmal atrial fibrillation (CMS/HCC)   • Anticoagulated on warfarin   • Diabetic retinopathy associated with type 2 diabetes mellitus (CMS/HCC)   • Essential hypertension   • HLD (hyperlipidemia)   • Hypothyroidism   • Peripheral neuropathy   • Renal insufficiency   • Diabetes (CMS/HCC)   • Parkinson's disease (CMS/HCC)   • Dyspnea on exertion   • Atrial fibrillation (CMS/HCC) [I48.91]   • Stroke-like symptoms   • Hypopotassemia       Therapy Treatment    Rehabilitation Treatment Summary     Row Name 18 1630 18 1100          Treatment Time/Intention    Discipline  physical therapy assistant  -  speech language pathologist  -SA     Document Type  therapy note (daily note)  -  therapy note (daily note)  -     Subjective Information  no complaints  -  no complaints  -     Mode of Treatment  physical therapy  -  speech-language pathology  -     Patient/Family Observations  Pt supine in bed  -  Moderately dysarthric; sleeping; roused easily  -     Care Plan Review  patient/other agree to care plan  -  --     Therapy Frequency (PT Clinical Impression)  daily  -  --     Therapy Frequency (Swallow)  --  PRN  -SA     Patient Effort  good  -  good  -SA     Existing Precautions/Restrictions  fall  -  other (see comments) swallow  -SA     Treatment Considerations/Comments  right UE in sling  -  --     Recorded  by [] Camelia Rose, [facility] 12/12/18 1718 [SA] Kim Shields MS CCC-SLP 12/12/18 1155     Row Name 12/12/18 1630             Cognitive Assessment/Intervention- PT/OT    Orientation Status (Cognition)  oriented x 3  -EH      Follows Commands (Cognition)  follows one step commands;over 90% accuracy  -      Safety Deficit (Cognitive)  mild deficit;insight into deficits/self awareness  -      Personal Safety Interventions  fall prevention program maintained;gait belt;nonskid shoes/slippers when out of bed  -EH      Recorded by [] Camelia Rose, [facility] 12/12/18 1718      Row Name 12/12/18 1630             Bed Mobility Assessment/Treatment    Supine-Sit [place] (Bed Mobility)  minimum assist (75% patient effort)  -      Sit-Supine [place] (Bed Mobility)  minimum assist (75% patient effort)  -      Assistive Device (Bed Mobility)  bed rails  -EH      Recorded by [] Camelia Rose, [facility] 12/12/18 1718      Row Name 12/12/18 1630             Sit-Stand Transfer    Sit-Stand [place] (Transfers)  moderate assist (50% patient effort)  -      Assistive Device (Sit-Stand Transfers)  -- No AD  -EH      Recorded by [] Camelia Rose, [facility] 12/12/18 1718      Row Name 12/12/18 1630             Stand-Sit Transfer    Stand-Sit [place] (Transfers)  moderate assist (50% patient effort)  -      Assistive Device (Stand-Sit Transfers)  -- No AD  -EH      Recorded by [] Camelia Rose, [facility] 12/12/18 1718      Row Name 12/12/18 1630             Gait/Stairs Assessment/Training    [place] Level (Gait)  minimum assist (75% patient effort);2 person assist  -      Assistive Device (Gait)  -- HHA  -EH      Distance in Feet (Gait)  150  -      Pattern (Gait)  step-to  -EH      Deviations/Abnormal Patterns (Gait)  ataxic;gait speed decreased;stride length decreased  -      Bilateral Gait Deviations  forward flexed posture;heel strike decreased  -EH      Recorded by [] Camelia Rose, PTA 12/12/18 1718      Row  Name 12/12/18 1630             Positioning and Restraints    Pre-Treatment Position  in bed  -      Post Treatment Position  bed  -      In Bed  supine;call light within reach;encouraged to call for assist;exit alarm on  -      Recorded by [] MarquespratimaCamelia PTA 12/12/18 4358        User Key  (r) = Recorded By, (t) = Taken By, (c) = Cosigned By    Initials Name Effective Dates Discipline     Camelia Rose, PTA 08/19/18 -  PT    SA Kim Shields MS CCC-SLP 04/03/18 -  SLP               Rehab Goal Summary     Row Name 12/12/18 1200 12/12/18 1100          Swallow Goals (SLP)    Oral Nutrition/Hydration Goal Selection (SLP)  oral nutrition/hydration, SLP goal 1  -SA  oral nutrition/hydration, SLP goal 1  -     Additional Documentation  pharyngeal strengthening exercise goal selection (SLP);swallow compensatory strategies goal selection (SLP)  -SA  --        Oral Nutrition/Hydration Goal 1 (SLP)    Oral Nutrition/Hydration Goal 1, SLP  --  Tolerate regular diet; no mixed consistency; HTL diet on 5/5 trials by discharge  -SA     Time Frame (Oral Nutrition/Hydration Goal 1, SLP)  --  by discharge  -SA     Barriers (Oral Nutrition/Hydration Goal 1, SLP)  --  ST following for f/u VFSS; education; diet toleration and swallow tx. Moderately dysarthric. Reviewed VFSS results with pt. Spoke with RN who stated pt had difficulty/choking with po meds. However, pt took all at once with HTL. Recommended for pt to take pills 1-2 at a time; pt verbalized understanding; afebrile; breath sounds diminished not worsening. Reviewed swallow strategy: throat clear post each bite/swallow. Provided handouts re: swallow function; diet modifications; mixed consistency; and swallow therapies. Pt with questions re: swallow and how long to continue diet. SLP recommends swallow tx at next level of care. ST to cont to follow for diet; swallow tx; education as needed.   -SA     Progress/Outcomes (Oral Nutrition/Hydration Goal 1, SLP)  --   goal revised this date  -       User Key  (r) = Recorded By, (t) = Taken By, (c) = Cosigned By    Initials Name Provider Type Discipline    Kim Rutherford MS CCC-SLP Speech and Language Pathologist SLP          Physical Therapy Education     Title: PT OT SLP Therapies (In Progress)     Topic: Physical Therapy (Done)     Point: Mobility training (Done)     Learning Progress Summary           Patient Acceptance, E, VU,DU by  at 12/12/2018  5:16 PM    Acceptance, E,TB, VU,DU by  at 12/10/2018  9:03 AM    Acceptance, E,D, DU by  at 12/9/2018  9:49 AM                   Point: Home exercise program (Done)     Learning Progress Summary           Patient Acceptance, E, VU,DU by  at 12/12/2018  5:16 PM    Acceptance, E,TB, VU,DU by  at 12/10/2018  9:03 AM    Acceptance, E,D, DU by  at 12/9/2018  9:49 AM                   Point: Body mechanics (Done)     Learning Progress Summary           Patient Acceptance, E, VU,DU by  at 12/12/2018  5:16 PM    Acceptance, E,TB, VU,DU by  at 12/10/2018  9:03 AM    Acceptance, E,D, DU by  at 12/9/2018  9:49 AM                   Point: Precautions (Done)     Learning Progress Summary           Patient Acceptance, E, VU,DU by  at 12/12/2018  5:16 PM    Acceptance, E,TB, VU,DU by  at 12/10/2018  9:03 AM    Acceptance, E,D, DU by  at 12/9/2018  9:49 AM                               User Key     Initials Effective Dates Name Provider Type Discipline    PC 04/03/18 -  Estefania Paez, PT Physical Therapist PT     03/07/18 -  Pedro Levi, PTA Physical Therapy Assistant PT     08/19/18 -  Camelia Rose PTA Physical Therapy Assistant PT                PT Recommendation and Plan  Therapy Frequency (PT Clinical Impression): daily     Outcome Measures     Row Name 12/12/18 1700 12/10/18 1318 12/10/18 1317       How much help from another person do you currently need...    Turning from your back to your side while in flat bed without using bedrails?  3  -  --   --    Moving from lying on back to sitting on the side of a flat bed without bedrails?  3  -EH  --  --    Moving to and from a bed to a chair (including a wheelchair)?  3  -EH  --  --    Standing up from a chair using your arms (e.g., wheelchair, bedside chair)?  3  -EH  --  --    Climbing 3-5 steps with a railing?  1  -EH  --  --    To walk in hospital room?  3  -EH  --  --    AM-PAC 6 Clicks Score  16  -EH  --  --       How much help from another is currently needed...    Putting on and taking off regular lower body clothing?  --  --  1  -LE    Bathing (including washing, rinsing, and drying)  --  --  2  -LE    Toileting (which includes using toilet bed pan or urinal)  --  --  2  -LE    Putting on and taking off regular upper body clothing  --  --  2  -LE    Taking care of personal grooming (such as brushing teeth)  --  --  2  -LE    Eating meals  --  --  3  -LE    Score  --  --  12  -LE       Modified [place] Scale    Modified [place] Scale  --  3 - Moderate disability.  Requiring some help, but able to walk without assistance.  -LE  --       Functional Assessment    Outcome Measure Options  --  --  Modified [place]  -LE    Row Name 12/10/18 1300 12/10/18 0900          How much help from another person do you currently need...    Turning from your back to your side while in flat bed without using bedrails?  --  3  -CW     Moving from lying on back to sitting on the side of a flat bed without bedrails?  --  3  -CW     Moving to and from a bed to a chair (including a wheelchair)?  --  3  -CW     Standing up from a chair using your arms (e.g., wheelchair, bedside chair)?  --  3  -CW     Climbing 3-5 steps with a railing?  --  1  -CW     To walk in hospital room?  --  3  -CW     AM-PAC 6 Clicks Score  --  16  -CW        Functional Assessment    Outcome Measure Options  AM-PAC 6 Clicks Daily Activity (OT)  -LE  AM-PAC 6 Clicks Basic Mobility (PT)  -CW       User Key  (r) = Recorded By, (t) = Taken By, (c) = Cosigned By     Initials Name Provider Type    Mariluz Hernandez, OTR Occupational Therapist    CW Pedro Levi, PTA Physical Therapy Assistant     Camelia Rose PTA Physical Therapy Assistant         Time Calculation:   PT Charges     Row Name 12/12/18 1715             Time Calculation    Start Time  1630  -      Stop Time  1643  -      Time Calculation (min)  13 min  -      PT Received On  12/12/18  -      PT - Next Appointment  12/13/18  -         Time Calculation- PT    Total Timed Code Minutes- PT  13 minute(s)  -        User Key  (r) = Recorded By, (t) = Taken By, (c) = Cosigned By    Initials Name Provider Type     Camelia Rose PTA Physical Therapy Assistant        Therapy Suggested Charges     Code   Minutes Charges    None           Therapy Charges for Today     Code Description Service Date Service Provider Modifiers Qty    76850265947 HC PT THER PROC EA 15 MIN 12/12/2018 Camelia Rose PTA GP 1    42374982960 HC PT THER SUPP EA 15 MIN 12/12/2018 Camelia Rose PTA GP 1          PT G-Codes  PT Professional Judgement Used?: Yes  Outcome Measure Options: Modified [place]  AM-PAC 6 Clicks Score: 16  Score: 12  Modified [place] Scale: 3 - Moderate disability.  Requiring some help, but able to walk without assistance.  Functional Limitation: Mobility: Walking and moving around  Mobility: Walking and Moving Around Current Status (): At least 40 percent but less than 60 percent impaired, limited or restricted  Mobility: Walking and Moving Around Goal Status (): At least 1 percent but less than 20 percent impaired, limited or restricted    Camelia oRse PTA  12/12/2018

## 2018-12-12 NOTE — THERAPY TREATMENT NOTE
Acute Care - Speech Language Pathology   Swallow Treatment Note [facility]     Patient Name: Edilberto Reeder  : 1950  MRN: [id number]  Today's Date: 2018  Onset of Illness/Injury or Date of Surgery: 18     Referring Physician: Marjorie      Admit Date: 2018    Visit Dx:      ICD-10-CM ICD-9-CM   1. Stroke-like symptoms R29.90 781.99   2. Parkinson disease (CMS/HCC) G20 332.0   3. Impaired functional mobility and activity tolerance Z74.09 V49.89     Patient Active Problem List   Diagnosis   • ASCVD (arteriosclerotic cardiovascular disease)   • Paroxysmal atrial fibrillation (CMS/HCC)   • Anticoagulated on warfarin   • Diabetic retinopathy associated with type 2 diabetes mellitus (CMS/HCC)   • Essential hypertension   • HLD (hyperlipidemia)   • Hypothyroidism   • Peripheral neuropathy   • Renal insufficiency   • Diabetes (CMS/HCC)   • Parkinson's disease (CMS/HCC)   • Dyspnea on exertion   • Atrial fibrillation (CMS/HCC) [I48.91]   • Stroke-like symptoms   • Hypopotassemia       Therapy Treatment  Rehabilitation Treatment Summary     Row Name 18 1100             Treatment Time/Intention    Discipline  speech language pathologist  -SA      Document Type  therapy note (daily note)  -SA      Subjective Information  no complaints  -SA      Mode of Treatment  speech-language pathology  -SA      Patient/Family Observations  Moderately dysarthric; sleeping; roused easily  -SA      Therapy Frequency (Swallow)  PRN  -SA      Patient Effort  good  -SA      Existing Precautions/Restrictions  other (see comments) swallow  -SA      Recorded by [SA] Kim Shields MS CCC-SLP 18 1159        User Key  (r) = Recorded By, (t) = Taken By, (c) = Cosigned By    Initials Name Effective Dates Discipline    SA Kim Shields MS CCC-SLP 18 -  SLP          Outcome Summary         SLP GOALS     Row Name 18 1100 18 0800 12/10/18 1400       Oral Nutrition/Hydration Goal 1 (SLP)    Oral  Nutrition/Hydration Goal 1, SLP  Tolerate regular diet; no mixed consistency; HTL diet on 5/5 trials by discharge  -SA  Tolerate mech soft/HTL diet  -SH  Tolerate mech soft/HTL diet  -CP    Time Frame (Oral Nutrition/Hydration Goal 1, SLP)  by discharge  -SA  by discharge  -SH  by discharge  -CP    Barriers (Oral Nutrition/Hydration Goal 1, SLP)  ST following for f/u VFSS; education; diet toleration and swallow tx. Moderately dysarthric. Reviewed VFSS results with pt. Spoke with RN who stated pt had difficulty/choking with po meds. However, pt took all at once with HTL. Recommended for pt to take pills 1-2 at a time; pt verbalized understanding; afebrile; breath sounds diminished not worsening. Reviewed swallow strategy: throat clear post each bite/swallow. Provided handouts re: swallow function; diet modifications; mixed consistency; and swallow therapies. Pt with questions re: swallow and how long to continue diet. SLP recommends swallow tx at next level of care. ST to cont to follow for diet; swallow tx; education as needed.   -  --  --    Progress/Outcomes (Oral Nutrition/Hydration Goal 1, SLP)  goal revised this date  -  good progress toward goal  -  --      User Key  (r) = Recorded By, (t) = Taken By, (c) = Cosigned By    Initials Name Provider Type    Nidia Romero, MS CCC-SLP Speech and Language Pathologist     Laila Bach MS CCC-SLP Speech and Language Pathologist    Kim Rutherford, MS CCC-SLP Speech and Language Pathologist          EDUCATION  The patient has been educated in the following areas:   Dysphagia (Swallowing Impairment) Modified Diet Instruction.    SLP Recommendation and Plan            Speech therapy PRN                 Therapy Frequency (Swallow): PRN          Plan of Care Reviewed With: patient  Plan of Care Review  Plan of Care Reviewed With: patient  Outcome Summary: ST following for f/u VFSS; education; diet toleration and swallow tx. Moderately dysarthric. Reviewed  VFSS results with pt. Spoke with RN who stated pt had difficulty/choking with po meds. However, pt took all at once with HTL. Recommended for pt to take pills 1-2 at a time; pt verbalized understanding; afebrile; breath sounds diminished not worsening. Reviewed swallow strategy: throat clear post each bite/swallow. Provided handouts re: swallow function; diet modifications; mixed consistency; and swallow therapies. Pt with questions re: swallow and how long to continue diet. SLP recommends swallow tx at next level of care. ST to cont to follow for diet; swallow tx; education as needed. Pt on regular diet; no mixed consistency; honey thick liquids.        SLP Outcome Measures (last 72 hours)      SLP Outcome Measures     Row Name 12/11/18 0900 12/10/18 1500          SLP Outcome Measures    Outcome Measure Used?  Adult NOMS  -  Adult NOMS  -CP        Adult FCM Scores    FCM Chosen  Swallowing  -SH  Swallowing  -CP     Swallowing FCM Score  3  -  3  -CP       User Key  (r) = Recorded By, (t) = Taken By, (c) = Cosigned By    Initials Name Effective Dates    CP Nidia Bolanos MS CCC-SLP 06/08/18 -      Laila Bach MS CCC-SLP 03/07/18 -              Time Calculation:   Time Calculation- SLP     Row Name 12/12/18 1204             Time Calculation- [facility]    SLP Start Time  1100  -      SLP Stop Time  1145  -      SLP Time Calculation (min)  45 min  -      SLP Received On  12/12/18  -        User Key  (r) = Recorded By, (t) = Taken By, (c) = Cosigned By    Initials Name Provider Type     Kim Shields MS CCC-SLP Speech and Language Pathologist          Therapy Charges for Today     Code Description Service Date Service Provider Modifiers Qty    [id number]  ST TREATMENT SWALLOW 3 12/12/2018 Kim Shields MS CCC-SLP GN 1          SLP G-Codes  Functional Limitations: Swallowing  Swallow Current Status (): At least 60 percent but less than 80 percent impaired, limited or restricted  Swallow Discharge  Status (): At least 40 percent but less than 60 percent impaired, limited or restricted      Kim Shields MS CCC-SLP  12/12/2018

## 2018-12-12 NOTE — PLAN OF CARE
Problem: Patient Care Overview  Goal: Plan of Care Review  Outcome: Ongoing (interventions implemented as appropriate)   12/12/18 8256   Coping/Psychosocial   Plan of Care Reviewed With patient   Plan of Care Review   Progress improving   OTHER   Outcome Summary Pt tolerated treatment with no complaints. Pt ambulated 125 feet with HHA, minAX2. Pt a little unsteady during ambulation but no overt LOB. Pt only required Riaz for bed mobility and modA for standing.

## 2018-12-12 NOTE — PROGRESS NOTES
Continued Stay Note  [facility]     Patient Name: Edilberto Reeder  MRN: [id number]  Today's Date: 12/12/2018    Admit Date: 12/8/2018    Discharge Plan     Row Name 12/12/18 1235       Plan    Plan  Referral to [facility] and [facility]    Patient/Family in Agreement with Plan  yes    Plan Comments  imm notice given. CCP spoke with wife Jamaica via outbound call, she requests SNF referrals to [facility] and [facility]. Called Michelle for referral. Partial Packet in ccp office. olayinka KUMAR/CCP        Discharge Codes    No documentation.             Sarah Charlton, RN

## 2018-12-12 NOTE — DISCHARGE PLACEMENT REQUEST
"Edilberto Yarbrough (68 y.o. Male)     Date of Birth Social Security Number Address Home Phone MRN    1950  [address] [facility] [address] [phone] [id number]    Christianity Marital Status          None        Admission Date Admission Type Admitting Provider Attending Provider Department, Room/Bed    12/8/18 Emergency Alexis Amador MD Nguyen, Minh Loc, MD [address], [address]    Discharge Date Discharge Disposition Discharge Destination                       Attending Provider:  Alexis Amador MD    Allergies:  Bacitracin, Fish-derived Products, Neosporin [Neomycin-bacitracin Zn-polymyx], Shellfish Allergy, Shrimp (Diagnostic)    Isolation:  None   Infection:  None   Code Status:  CPR    Ht:  185.4 cm (73\")   Wt:  111 kg (243 lb 13.3 oz)    Admission Cmt:  None   Principal Problem:  None                Active Insurance as of 12/8/2018     Primary Coverage     Payor Plan Insurance Group Employer/Plan Group    MEDICARE MEDICARE A & B      Payor Plan Address Payor Plan Phone Number Payor Plan Fax Number Effective Dates    [address] [phone]  9/1/2003 - None Entered    [facility] [id number]       Subscriber Name Subscriber Birth Date Member ID       EDILBERTO YARBROUGH 1950 [id number]           Secondary Coverage     Payor Plan Insurance Group Employer/Plan Group    [facility] SUPP      Payor Plan Address Payor Plan Phone Number Payor Plan Fax Number Effective Dates    [address]   1/1/2007 - None Entered    [facility] [id number]       Subscriber Name Subscriber Birth Date Member ID       EDILBERTO YARBROUGH 1950 [id number]                 Emergency Contacts      (Rel.) Home Phone Work Phone Mobile Phone    Jamaica Yarbrough (Spouse) [phone] -- [phone]    David Yarbrough (Son) -- -- [phone]              "

## 2018-12-12 NOTE — PLAN OF CARE
Problem: Fall Risk (Adult)  Goal: Absence of Fall  Outcome: Ongoing (interventions implemented as appropriate)   12/12/18 0544   Fall Risk (Adult)   Absence of Fall making progress toward outcome       Problem: Patient Care Overview  Goal: Plan of Care Review  Outcome: Ongoing (interventions implemented as appropriate)   12/12/18 0544 12/12/18 0957   Coping/Psychosocial   Plan of Care Reviewed With --  patient   Plan of Care Review   Progress no change --      Goal: Individualization and Mutuality  Outcome: Ongoing (interventions implemented as appropriate)   12/11/18 0558   Individualization   Patient Specific Goals (Include Timeframe) personal safety during stay   Patient Specific Interventions neuro checks       Problem: Stroke (Ischemic) (Adult)  Goal: Signs and Symptoms of Listed Potential Problems Will be Absent, Minimized or Managed (Stroke)  Outcome: Ongoing (interventions implemented as appropriate)   12/09/18 0534 12/12/18 0544   Goal/Outcome Evaluation   Problems Assessed (Stroke (Ischemic)) --  all   Problems Assessed (Stroke (Ischemic)) motor/sensory impairment;situational response --        Problem: Skin Injury Risk (Adult)  Goal: Skin Health and Integrity  Outcome: Ongoing (interventions implemented as appropriate)   12/12/18 0544   Skin Injury Risk (Adult)   Skin Health and Integrity making progress toward outcome

## 2018-12-13 LAB
GLUCOSE BLDC GLUCOMTR-MCNC: 249 MG/DL (ref 70–130)
GLUCOSE BLDC GLUCOMTR-MCNC: 317 MG/DL (ref 70–130)
GLUCOSE BLDC GLUCOMTR-MCNC: 329 MG/DL (ref 70–130)
GLUCOSE BLDC GLUCOMTR-MCNC: 388 MG/DL (ref 70–130)
HCT VFR BLD AUTO: 30.5 % (ref 40.4–52.2)
HGB BLD-MCNC: 9.8 G/DL (ref 13.7–17.6)
INR PPP: 2.79 (ref 0.9–1.1)
PROTHROMBIN TIME: 29 SECONDS (ref 11.7–14.2)

## 2018-12-13 PROCEDURE — 25010000002 ENOXAPARIN PER 10 MG: Performed by: HOSPITALIST

## 2018-12-13 PROCEDURE — 63710000001 METHYLPREDNISOLONE 4 MG TABLET THERAPY PACK 21 EACH DISP PACK: Performed by: ORTHOPAEDIC SURGERY

## 2018-12-13 PROCEDURE — 85610 PROTHROMBIN TIME: CPT | Performed by: HOSPITALIST

## 2018-12-13 PROCEDURE — 97110 THERAPEUTIC EXERCISES: CPT

## 2018-12-13 PROCEDURE — 85018 HEMOGLOBIN: CPT | Performed by: HOSPITALIST

## 2018-12-13 PROCEDURE — 82962 GLUCOSE BLOOD TEST: CPT

## 2018-12-13 PROCEDURE — 63710000001 INSULIN LISPRO (HUMAN) PER 5 UNITS: Performed by: HOSPITALIST

## 2018-12-13 PROCEDURE — 85014 HEMATOCRIT: CPT | Performed by: HOSPITALIST

## 2018-12-13 RX ADMIN — INSULIN LISPRO 12 UNITS: 100 INJECTION, SOLUTION INTRAVENOUS; SUBCUTANEOUS at 12:50

## 2018-12-13 RX ADMIN — TAMSULOSIN HYDROCHLORIDE 0.4 MG: 0.4 CAPSULE ORAL at 09:02

## 2018-12-13 RX ADMIN — ENOXAPARIN SODIUM 30 MG: 30 INJECTION SUBCUTANEOUS at 09:04

## 2018-12-13 RX ADMIN — TRAZODONE HYDROCHLORIDE 50 MG: 50 TABLET ORAL at 21:57

## 2018-12-13 RX ADMIN — LIDOCAINE 1 PATCH: 50 PATCH CUTANEOUS at 21:57

## 2018-12-13 RX ADMIN — METHYLPREDNISOLONE 4 MG: 4 TABLET ORAL at 06:42

## 2018-12-13 RX ADMIN — INSULIN LISPRO 5 UNITS: 100 INJECTION, SOLUTION INTRAVENOUS; SUBCUTANEOUS at 22:25

## 2018-12-13 RX ADMIN — INSULIN LISPRO 10 UNITS: 100 INJECTION, SOLUTION INTRAVENOUS; SUBCUTANEOUS at 17:44

## 2018-12-13 RX ADMIN — METHYLPREDNISOLONE 4 MG: 4 TABLET ORAL at 17:42

## 2018-12-13 RX ADMIN — SODIUM CHLORIDE, PRESERVATIVE FREE 3 ML: 5 INJECTION INTRAVENOUS at 22:01

## 2018-12-13 RX ADMIN — Medication 1000 MCG: at 09:02

## 2018-12-13 RX ADMIN — METHYLPREDNISOLONE 4 MG: 4 TABLET ORAL at 12:51

## 2018-12-13 RX ADMIN — ENOXAPARIN SODIUM 80 MG: 80 INJECTION SUBCUTANEOUS at 09:04

## 2018-12-13 RX ADMIN — CARVEDILOL 3.12 MG: 3.12 TABLET, FILM COATED ORAL at 09:02

## 2018-12-13 RX ADMIN — FLUDROCORTISONE ACETATE 100 MCG: 0.1 TABLET ORAL at 21:57

## 2018-12-13 RX ADMIN — HYDROCODONE BITARTRATE AND ACETAMINOPHEN 1 TABLET: 10; 325 TABLET ORAL at 09:11

## 2018-12-13 RX ADMIN — WARFARIN SODIUM 6 MG: 6 TABLET ORAL at 17:43

## 2018-12-13 RX ADMIN — CITALOPRAM 20 MG: 20 TABLET, FILM COATED ORAL at 09:03

## 2018-12-13 RX ADMIN — INSULIN LISPRO 5 UNITS: 100 INJECTION, SOLUTION INTRAVENOUS; SUBCUTANEOUS at 09:03

## 2018-12-13 RX ADMIN — FLUDROCORTISONE ACETATE 100 MCG: 0.1 TABLET ORAL at 09:02

## 2018-12-13 RX ADMIN — FAMOTIDINE 20 MG: 20 TABLET, FILM COATED ORAL at 09:02

## 2018-12-13 RX ADMIN — OXYBUTYNIN CHLORIDE 10 MG: 10 TABLET, FILM COATED, EXTENDED RELEASE ORAL at 09:03

## 2018-12-13 RX ADMIN — ALLOPURINOL 100 MG: 100 TABLET ORAL at 09:03

## 2018-12-13 RX ADMIN — CARVEDILOL 3.12 MG: 3.12 TABLET, FILM COATED ORAL at 21:57

## 2018-12-13 RX ADMIN — PREGABALIN 150 MG: 75 CAPSULE ORAL at 09:03

## 2018-12-13 RX ADMIN — SODIUM CHLORIDE, PRESERVATIVE FREE 3 ML: 5 INJECTION INTRAVENOUS at 09:05

## 2018-12-13 RX ADMIN — PREGABALIN 150 MG: 75 CAPSULE ORAL at 21:57

## 2018-12-13 RX ADMIN — ATORVASTATIN CALCIUM 80 MG: 80 TABLET, FILM COATED ORAL at 21:57

## 2018-12-13 RX ADMIN — FUROSEMIDE 20 MG: 20 TABLET ORAL at 09:03

## 2018-12-13 NOTE — PLAN OF CARE
Problem: Fall Risk (Adult)  Goal: Absence of Fall  Outcome: Ongoing (interventions implemented as appropriate)      Problem: Patient Care Overview  Goal: Plan of Care Review  Outcome: Ongoing (interventions implemented as appropriate)   12/13/18 7147   Coping/Psychosocial   Plan of Care Reviewed With patient;daughter   Plan of Care Review   Progress improving   OTHER   Outcome Summary Patient reported very little pain of right elbow. Seen by orthopedics nd sling discontinued, no pushing, pulling or lifting greater than 2 lbs, continue to work on elbow extension. Seen by [facility] bed available tomorow. Will continue to monitor.       Problem: Stroke (Ischemic) (Adult)  Goal: Signs and Symptoms of Listed Potential Problems Will be Absent, Minimized or Managed (Stroke)  Outcome: Ongoing (interventions implemented as appropriate)      Problem: Skin Injury Risk (Adult)  Goal: Skin Health and Integrity  Outcome: Ongoing (interventions implemented as appropriate)

## 2018-12-13 NOTE — PROGRESS NOTES
[facility]     LOS: 2 days     Name: Edilberto Reeder  Age: 68 y.o.  Sex: male  :  1950  MRN: [id number]         Primary Care Physician: Harvey Larson MD    Diet Regular; Honey Thick; Cardiac    Subjective   no new complaints. he states right elbow pain is improved, neck pain improved. he states ROM is improved.    Objective   Temp:  [97.3 °F (36.3 °C)-98.4 °F (36.9 °C)] 97.3 °F (36.3 °C)  Heart Rate:  [52-65] 57  Resp:  [18] 18  BP: (126-134)/(67-80) 134/80  SpO2:  [89 %-94 %] 93 %  on   ;   Device (Oxygen Therapy): room air  Body mass index is 32.17 kg/m².    Physical Exam   Constitutional: He is oriented to person, place, and time. No distress.   Cardiovascular: Normal rate and regular rhythm.   Pulmonary/Chest: Effort normal and breath sounds normal. No respiratory distress.   Abdominal: Soft. There is no tenderness.   Musculoskeletal: He exhibits edema.   right elbow swelling and warmth improved   Neurological: He is alert and oriented to person, place, and time.   Skin: Skin is warm and dry.   Psychiatric: He has a normal mood and affect. His behavior is normal.     Reviewed medications and new clinical results    allopurinol 100 mg Oral Daily   atorvastatin 80 mg Oral Nightly   Carbidopa-Levodopa ER 3 capsule Oral 4x Daily   carvedilol 3.125 mg Oral Q12H   citalopram 20 mg Oral Daily   enoxaparin 30 mg Subcutaneous Q12H   And      enoxaparin 80 mg Subcutaneous Q12H   famotidine 20 mg Oral Daily   fludrocortisone 100 mcg Oral Q12H   furosemide 20 mg Oral Daily   insulin lispro 0-7 Units Subcutaneous 4x Daily With Meals & Nightly   lidocaine 1 patch Transdermal Q24H   MethylPREDNISolone 4 mg Oral TID Around Food   [START ON 2018] MethylPREDNISolone 4 mg Oral 4x Daily Taper   [START ON 12/15/2018] MethylPREDNISolone 4 mg Oral TID Around Food   [START ON 2018] MethylPREDNISolone 4 mg Oral Before Breakfast   [START ON 2018]  MethylPREDNISolone 4 mg Oral Tonight   [START ON 12/17/2018] MethylPREDNISolone 4 mg Oral Before Breakfast   MethylPREDNISolone 8 mg Oral Tonight   oxybutynin XL 10 mg Oral Daily   pregabalin 150 mg Oral Q12H   sodium chloride 3 mL Intravenous Q12H   tamsulosin 0.4 mg Oral Daily   traZODone 50 mg Oral Nightly   vitamin B-12 1,000 mcg Oral Daily   warfarin 6 mg Oral Daily       Pharmacy to Dose enoxaparin (LOVENOX)      Results from last 7 days   Lab Units  12/13/18   0322  12/12/18   0520  12/10/18   0214  12/08/18   1215   WBC 10*3/mm3   --    --   7.41  7.73   HEMOGLOBIN g/dL  9.8*  9.4*  9.5*  11.8*   PLATELETS 10*3/mm3   --    --   185  214     Results from last 7 days   Lab Units  12/12/18   1843  12/12/18   0520  12/10/18   0214  12/09/18   0450  12/08/18   1215   SODIUM mmol/L   --   141  139   --   139   POTASSIUM mmol/L  3.7  3.2*  3.9  3.9  2.8*  3.0*   CHLORIDE mmol/L   --   102  101   --   93*   CO2 mmol/L   --   26.5  27.0   --   33.7*   BUN mg/dL   --   15  14   --   9   CREATININE mg/dL   --   0.88  0.97   --   1.07   CALCIUM mg/dL   --   7.5*  7.0*   --   7.8*   GLUCOSE mg/dL   --   158*  131*   --   125*     Lab Results   Component Value Date    ANIONGAP 12.5 12/12/2018     Glucose   Date/Time Value Ref Range Status   12/13/2018 1050 388 (H) 70 - 130 mg/dL Final   12/13/2018 0551 329 (H) 70 - 130 mg/dL Final   12/12/2018 2107 401 (H) 70 - 130 mg/dL Final   12/12/2018 2106 360 (H) 70 - 130 mg/dL Final   12/12/2018 1644 348 (H) 70 - 130 mg/dL Final   12/12/2018 1114 270 (H) 70 - 130 mg/dL Final   12/12/2018 0547 160 (H) 70 - 130 mg/dL Final   12/11/2018 2054 203 (H) 70 - 130 mg/dL Final     Estimated Creatinine Clearance: 102 mL/min (by C-G formula based on SCr of 0.88 mg/dL).  Results from last 7 days   Lab Units  12/13/18   0322  12/12/18   1935  12/11/18   0620   INR   2.79*  2.48*  1.77*     Assessment/Plan   Active Hospital Problems    Diagnosis Date Noted   • Stroke-like symptoms [R29.90]  12/08/2018   • Hypopotassemia [E87.6] 12/08/2018   • Diabetes (CMS/HCC) [E11.9]    • Parkinson's disease (CMS/HCC) [G20]    • Anticoagulated on warfarin [Z79.01] 01/27/2016   • Essential hypertension [I10] 01/27/2016   • HLD (hyperlipidemia) [E78.5] 01/27/2016   • Hypothyroidism [E03.9] 01/27/2016   • Paroxysmal atrial fibrillation (CMS/HCC) [I48.0] 01/27/2016      Resolved Hospital Problems   No resolved problems to display.     · TIA (right sided weakness, speech difficulty)  · continue warfarin.   · CTA head and neck noted. echo noted  · low normal B12: PO  · right elbow  · lateral radial head fracture on XR with joint effusion  · appreciate orthopedic surgery attention to patient: sling and follow up 7-10 days  · medrol dose pack possible gout  · right neck pain  · CT c-spine degenerative changes  · atrial fibrillation on anticoagulation, INR low  ? INR therapeutic stop lovenox  · anemia  ? drop in Hgb dilutional. has been stable  ? monitor for any more drop while on anticoagulation and steroids. added pepcid  · PD: continue meds. has a stimulator  · DM2  ? a1c 7.34  ? control is worse with steroids will increase correctional insulin  · hypokalemia: replaced  · disposition  · TBD. skilled soon  · monitoring Hgb has been stable so far likely rehab tomorrow if remains stable  · I discussed the patient's findings and my recommendations with patient and nursing staff.    Alexis Amador MD   12/13/18  12:17 PM

## 2018-12-13 NOTE — THERAPY TREATMENT NOTE
Acute Care - Physical Therapy Treatment Note  [facility]     Patient Name: Edilberto Reeder  : 1950  MRN: [id number]  Today's Date: 2018  Onset of Illness/Injury or Date of Surgery: 18  Date of Referral to PT: 18  Referring Physician: Marjorie    Admit Date: 2018    Visit Dx:    ICD-10-CM ICD-9-CM   1. Stroke-like symptoms R29.90 781.99   2. Parkinson disease (CMS/HCC) G20 332.0   3. Impaired functional mobility and activity tolerance Z74.09 V49.89     Patient Active Problem List   Diagnosis   • ASCVD (arteriosclerotic cardiovascular disease)   • Paroxysmal atrial fibrillation (CMS/HCC)   • Anticoagulated on warfarin   • Diabetic retinopathy associated with type 2 diabetes mellitus (CMS/HCC)   • Essential hypertension   • HLD (hyperlipidemia)   • Hypothyroidism   • Peripheral neuropathy   • Renal insufficiency   • Diabetes (CMS/HCC)   • Parkinson's disease (CMS/HCC)   • Dyspnea on exertion   • Atrial fibrillation (CMS/HCC) [I48.91]   • Stroke-like symptoms   • Hypopotassemia       Therapy Treatment    Rehabilitation Treatment Summary     Row Name 18             Treatment Time/Intention    Discipline  physical therapy assistant  -CW      Document Type  therapy note (daily note)  -CW      Subjective Information  complains of;pain  -CW      Mode of Treatment  physical therapy  -CW      Therapy Frequency (PT Clinical Impression)  daily  -CW      Patient Effort  good  -CW      Existing Precautions/Restrictions  fall  -CW      Recorded by [CW] Pedro Levi PTA 18      Row Name 18             Vital Signs    O2 Delivery Pre Treatment  room air  -CW      Recorded by [CW] Pedro Levi PTA 18      Row Name 18             Cognitive Assessment/Intervention- PT/OT    Orientation Status (Cognition)  oriented x 3  -CW      Follows Commands (Cognition)  follows one step commands;over 90% accuracy  -CW      Safety Deficit  (Cognitive)  mild deficit  -CW      Personal Safety Interventions  fall prevention program maintained;gait belt;muscle strengthening facilitated;nonskid shoes/slippers when out of bed  -CW      Recorded by [CW] Pedro Levi, PTA 12/13/18 0909      Row Name 12/13/18 0900             Bed Mobility Assessment/Treatment    Bed Mobility Assessment/Treatment  supine-sit  -CW      Supine-Sit [place] (Bed Mobility)  minimum assist (75% patient effort)  -CW      Assistive Device (Bed Mobility)  bed rails  -CW      Comment (Bed Mobility)  up to chair  -CW      Recorded by [CW] Pedro Levi, PTA 12/13/18 0909      Row Name 12/13/18 0900             Transfer Assessment/Treatment    Transfer Assessment/Treatment  sit-stand transfer;stand-sit transfer  -CW      Recorded by [CW] Pedro Levi, PTA 12/13/18 0909      Row Name 12/13/18 0900             Sit-Stand Transfer    Sit-Stand [place] (Transfers)  moderate assist (50% patient effort)  -CW      Assistive Device (Sit-Stand Transfers)  -- No AD  -CW      Recorded by [CW] Pedro Levi, PTA 12/13/18 0909      Row Name 12/13/18 0900             Stand-Sit Transfer    Stand-Sit [place] (Transfers)  moderate assist (50% patient effort)  -CW      Assistive Device (Stand-Sit Transfers)  -- No AD  -CW      Recorded by [CW] Pedro Levi, PTA 12/13/18 0909      Row Name 12/13/18 0900             Gait/Stairs Assessment/Training    [place] Level (Gait)  minimum assist (75% patient effort);2 person assist  -CW      Assistive Device (Gait)  -- HHA  -CW      Distance in Feet (Gait)  100  -CW      Pattern (Gait)  step-to  -CW      Deviations/Abnormal Patterns (Gait)  ataxic;gait speed decreased;stride length decreased  -CW      Bilateral Gait Deviations  forward flexed posture;heel strike decreased  -CW      Recorded by [CW] Pedro Levi, PTA 12/13/18 0909      Row Name 12/13/18 0900             Positioning and Restraints     Pre-Treatment Position  in bed  -CW      Post Treatment Position  chair  -CW      In Chair  notified nsg;sitting;call light within reach;encouraged to call for assist;exit alarm on;with nsg  -CW      Recorded by [CW] Pedro Levi, PTA 12/13/18 0909      Row Name 12/13/18 0900             Outcome Summary/Treatment Plan (PT)    Anticipated Discharge Disposition (PT)  skilled nursing facility;home with home health  -CW      Recorded by [CW] Pedro Levi, PTA 12/13/18 0909        User Key  (r) = Recorded By, (t) = Taken By, (c) = Cosigned By    Initials Name Effective Dates Discipline    CW Pedro Levi, PTA 03/07/18 -  PT                   Physical Therapy Education     Title: PT OT SLP Therapies (In Progress)     Topic: Physical Therapy (Done)     Point: Mobility training (Done)     Learning Progress Summary           Patient Acceptance, E,TB, VU,DU by  at 12/13/2018  9:10 AM    Acceptance, E, VU,DU by  at 12/12/2018  5:16 PM    Acceptance, E,TB, VU,DU by  at 12/10/2018  9:03 AM    Acceptance, E,D, DU by PC at 12/9/2018  9:49 AM                   Point: Home exercise program (Done)     Learning Progress Summary           Patient Acceptance, E,TB, VU,DU by CW at 12/13/2018  9:10 AM    Acceptance, E, VU,DU by  at 12/12/2018  5:16 PM    Acceptance, E,TB, VU,DU by  at 12/10/2018  9:03 AM    Acceptance, E,D, DU by PC at 12/9/2018  9:49 AM                   Point: Body mechanics (Done)     Learning Progress Summary           Patient Acceptance, E,TB, VU,DU by CW at 12/13/2018  9:10 AM    Acceptance, E, VU,DU by  at 12/12/2018  5:16 PM    Acceptance, E,TB, VU,DU by  at 12/10/2018  9:03 AM    Acceptance, E,D, DU by PC at 12/9/2018  9:49 AM                   Point: Precautions (Done)     Learning Progress Summary           Patient Acceptance, E,TB, VU,DU by CW at 12/13/2018  9:10 AM    Acceptance, E, VU,DU by  at 12/12/2018  5:16 PM    Acceptance, E,TB, MUNDO,KATELYNN by CW at 12/10/2018  9:03 AM     JOSE MARIA Crook D, DU by  at 12/9/2018  9:49 AM                               User Key     Initials Effective Dates Name Provider Type Discipline    PC 04/03/18 -  Estefania Paez, PT Physical Therapist PT     03/07/18 -  Pedro Levi, PTA Physical Therapy Assistant PT     08/19/18 -  Camelia Rose PTA Physical Therapy Assistant PT                PT Recommendation and Plan  Anticipated Discharge Disposition (PT): skilled nursing facility, home with home health  Therapy Frequency (PT Clinical Impression): daily  Outcome Summary/Treatment Plan (PT)  Anticipated Discharge Disposition (PT): skilled nursing facility, home with home health  Plan of Care Reviewed With: patient  Progress: improving  Outcome Summary: Pt improving with activity tolerance amb limited due to safety and fatigue  Outcome Measures     Row Name 12/13/18 0900 12/12/18 1700 12/10/18 1318       How much help from another person do you currently need...    Turning from your back to your side while in flat bed without using bedrails?  3  -CW  3  -EH  --    Moving from lying on back to sitting on the side of a flat bed without bedrails?  3  -CW  3  -EH  --    Moving to and from a bed to a chair (including a wheelchair)?  3  -CW  3  -EH  --    Standing up from a chair using your arms (e.g., wheelchair, bedside chair)?  3  -CW  3  -EH  --    Climbing 3-5 steps with a railing?  1  -CW  1  -EH  --    To walk in hospital room?  3  -CW  3  -EH  --    AM-PAC 6 Clicks Score  16  -CW  16  -EH  --       Modified [place] Scale    Modified [place] Scale  --  --  3 - Moderate disability.  Requiring some help, but able to walk without assistance.  -LE       Functional Assessment    Outcome Measure Options  AM-PAC 6 Clicks Basic Mobility (PT)  -CW  --  --    Row Name 12/10/18 1317 12/10/18 1300          How much help from another is currently needed...    Putting on and taking off regular lower body clothing?  1  -LE  --     Bathing (including washing,  rinsing, and drying)  2  -LE  --     Toileting (which includes using toilet bed pan or urinal)  2  -LE  --     Putting on and taking off regular upper body clothing  2  -LE  --     Taking care of personal grooming (such as brushing teeth)  2  -LE  --     Eating meals  3  -LE  --     Score  12  -LE  --        Functional Assessment    Outcome Measure Options  Modified Agustina  -LE  AM-PAC 6 Clicks Daily Activity (OT)  -LE       User Key  (r) = Recorded By, (t) = Taken By, (c) = Cosigned By    Initials Name Provider Type    Mariluz Hernandez, OTR Occupational Therapist    CW Pedro Levi PTA Physical Therapy Assistant     Camelia Rose PTA Physical Therapy Assistant         Time Calculation:   PT Charges     Row Name 12/13/18 0914             Time Calculation    Start Time  0857  -CW      Stop Time  0914  -CW      Time Calculation (min)  17 min  -CW      PT Received On  12/13/18  -CW      PT - Next Appointment  12/14/18  -CW        User Key  (r) = Recorded By, (t) = Taken By, (c) = Cosigned By    Initials Name Provider Type    Pedro Mata PTA Physical Therapy Assistant        Therapy Suggested Charges     Code   Minutes Charges    None           Therapy Charges for Today     Code Description Service Date Service Provider Modifiers Qty    68621398797 HC PT THER PROC EA 15 MIN 12/13/2018 Pedro Levi PTA GP 1    89034047075 HC PT THER SUPP EA 15 MIN 12/13/2018 Pedro Levi PTA GP 1          PT G-Codes  PT Professional Judgement Used?: Yes  Outcome Measure Options: AM-PAC 6 Clicks Basic Mobility (PT)  AM-PAC 6 Clicks Score: 16  Score: 12  Modified [place] Scale: 3 - Moderate disability.  Requiring some help, but able to walk without assistance.  Functional Limitation: Mobility: Walking and moving around  Mobility: Walking and Moving Around Current Status (): At least 40 percent but less than 60 percent impaired, limited or restricted  Mobility: Walking and Moving Around Goal Status  (): At least 1 percent but less than 20 percent impaired, limited or restricted    Pedro Levi, PTA  12/13/2018

## 2018-12-13 NOTE — NURSING NOTE
Received referral through stroke order set. Work up TIA and based on therapy evals no determined need for intensive acute rehab program. Will sign off. Thanks, Quiana RN rehab admission nurse [phone]

## 2018-12-13 NOTE — PLAN OF CARE
Problem: Fall Risk (Adult)  Goal: Absence of Fall  Outcome: Ongoing (interventions implemented as appropriate)      Problem: Patient Care Overview  Goal: Plan of Care Review  Outcome: Ongoing (interventions implemented as appropriate)   12/13/18 0622   Coping/Psychosocial   Plan of Care Reviewed With patient;spouse   Plan of Care Review   Progress no change   OTHER   Outcome Summary Pt's pulse ox noted to drop to 79% while sleeping, rebounds quickly. Noted with vigorous snore and sore throat. Could pt benefit from overnight sleep study? RUE swelling and pain somewhat improved.      Goal: Individualization and Mutuality  Outcome: Ongoing (interventions implemented as appropriate)      Problem: Stroke (Ischemic) (Adult)  Goal: Signs and Symptoms of Listed Potential Problems Will be Absent, Minimized or Managed (Stroke)  Outcome: Ongoing (interventions implemented as appropriate)      Problem: Skin Injury Risk (Adult)  Goal: Skin Health and Integrity  Outcome: Ongoing (interventions implemented as appropriate)

## 2018-12-13 NOTE — PROGRESS NOTES
Continued Stay Note  [facility]     Patient Name: Edilberto Reeder  MRN: [id number]  Today's Date: 12/13/2018    Admit Date: 12/8/2018    Discharge Plan     Row Name 12/13/18 1312       Plan    Plan  [facility] accepted and bed available tomorrow.    Plan Comments  Spoke with Michelle/[facility], pt has been accepted and bed available tomorrow if ready. Michelle to come speak with pt and wife. Partial Packet in ccp office. [email or web address] lorenzo/ccp        Discharge Codes    No documentation.       Expected Discharge Date and Time     Expected Discharge Date Expected Discharge Time    Dec 14, 2018             Sarah Charlton, RN

## 2018-12-13 NOTE — PLAN OF CARE
Problem: Patient Care Overview  Goal: Plan of Care Review  Outcome: Ongoing (interventions implemented as appropriate)   12/13/18 0910   Coping/Psychosocial   Plan of Care Reviewed With patient   Plan of Care Review   Progress improving   OTHER   Outcome Summary Pt improving with activity tolerance amb limited due to safety and fatigue

## 2018-12-13 NOTE — PROGRESS NOTES
I have seen and evaluated the patient again today.  He reports that his elbow pain is tremendously improved.  No complaints except the discomfort of using a sling in bed.    The elbow remains somewhat edematous but there is no erythema or increased warmth.  Mild RC tenderness.  Near full motion.  He lacks 10 degrees of extension but has full flexion.    Assessment:  Resolving right elbow pseudogout with non-displaced radial head fracture    Plan:  He can discontinue the sling at this time.  No pushing, pulling or lifting greater than 2 lbs.  I need to see him in my office in 3-4 weeks for repeat xrays.  I encouraged him to continue working on his elbow extension.    Adria Espinosa MD

## 2018-12-14 VITALS
RESPIRATION RATE: 18 BRPM | HEIGHT: 73 IN | SYSTOLIC BLOOD PRESSURE: 141 MMHG | BODY MASS INDEX: 32.14 KG/M2 | DIASTOLIC BLOOD PRESSURE: 78 MMHG | OXYGEN SATURATION: 93 % | TEMPERATURE: 98.9 F | HEART RATE: 54 BPM | WEIGHT: 242.51 LBS

## 2018-12-14 LAB
GLUCOSE BLDC GLUCOMTR-MCNC: 259 MG/DL (ref 70–130)
GLUCOSE BLDC GLUCOMTR-MCNC: 265 MG/DL (ref 70–130)
GLUCOSE BLDC GLUCOMTR-MCNC: 331 MG/DL (ref 70–130)
INR PPP: 3.35 (ref 0.9–1.1)
PROTHROMBIN TIME: 33.4 SECONDS (ref 11.7–14.2)

## 2018-12-14 PROCEDURE — 63710000001 INSULIN LISPRO (HUMAN) PER 5 UNITS: Performed by: HOSPITALIST

## 2018-12-14 PROCEDURE — 97110 THERAPEUTIC EXERCISES: CPT

## 2018-12-14 PROCEDURE — 82962 GLUCOSE BLOOD TEST: CPT

## 2018-12-14 PROCEDURE — 63710000001 METHYLPREDNISOLONE 4 MG TABLET THERAPY PACK 21 EACH DISP PACK: Performed by: ORTHOPAEDIC SURGERY

## 2018-12-14 PROCEDURE — 85610 PROTHROMBIN TIME: CPT | Performed by: HOSPITALIST

## 2018-12-14 RX ORDER — METHYLPREDNISOLONE 4 MG/1
TABLET ORAL
Start: 2018-12-15 | End: 2018-12-14 | Stop reason: HOSPADM

## 2018-12-14 RX ORDER — METHYLPREDNISOLONE 4 MG/1
TABLET ORAL
Start: 2018-12-14 | End: 2018-12-14

## 2018-12-14 RX ORDER — METHYLPREDNISOLONE 4 MG/1
TABLET ORAL
Start: 2018-12-16 | End: 2018-12-14 | Stop reason: HOSPADM

## 2018-12-14 RX ORDER — ATORVASTATIN CALCIUM 80 MG/1
80 TABLET, FILM COATED ORAL NIGHTLY
Start: 2018-12-14 | End: 2019-08-16 | Stop reason: ALTCHOICE

## 2018-12-14 RX ORDER — FAMOTIDINE 20 MG/1
20 TABLET, FILM COATED ORAL DAILY
Start: 2018-12-15 | End: 2019-08-16 | Stop reason: ALTCHOICE

## 2018-12-14 RX ORDER — METHYLPREDNISOLONE 4 MG/1
TABLET ORAL
Start: 2018-12-17 | End: 2018-12-14 | Stop reason: HOSPADM

## 2018-12-14 RX ORDER — WARFARIN SODIUM 3 MG/1
TABLET ORAL
Qty: 60 TABLET | Refills: 2
Start: 2018-12-14 | End: 2020-09-11 | Stop reason: ALTCHOICE

## 2018-12-14 RX ORDER — METHYLPREDNISOLONE 4 MG/1
TABLET ORAL
Start: 2018-12-14 | End: 2019-08-16 | Stop reason: ALTCHOICE

## 2018-12-14 RX ADMIN — FLUDROCORTISONE ACETATE 100 MCG: 0.1 TABLET ORAL at 10:24

## 2018-12-14 RX ADMIN — SODIUM CHLORIDE, PRESERVATIVE FREE 3 ML: 5 INJECTION INTRAVENOUS at 10:29

## 2018-12-14 RX ADMIN — FAMOTIDINE 20 MG: 20 TABLET, FILM COATED ORAL at 10:24

## 2018-12-14 RX ADMIN — PREGABALIN 150 MG: 75 CAPSULE ORAL at 10:24

## 2018-12-14 RX ADMIN — METHYLPREDNISOLONE 8 MG: 4 TABLET ORAL at 02:04

## 2018-12-14 RX ADMIN — FUROSEMIDE 20 MG: 20 TABLET ORAL at 10:25

## 2018-12-14 RX ADMIN — TAMSULOSIN HYDROCHLORIDE 0.4 MG: 0.4 CAPSULE ORAL at 10:25

## 2018-12-14 RX ADMIN — Medication 1000 MCG: at 10:24

## 2018-12-14 RX ADMIN — WARFARIN SODIUM 6 MG: 6 TABLET ORAL at 17:06

## 2018-12-14 RX ADMIN — INSULIN LISPRO 8 UNITS: 100 INJECTION, SOLUTION INTRAVENOUS; SUBCUTANEOUS at 17:06

## 2018-12-14 RX ADMIN — INSULIN LISPRO 8 UNITS: 100 INJECTION, SOLUTION INTRAVENOUS; SUBCUTANEOUS at 10:26

## 2018-12-14 RX ADMIN — OXYBUTYNIN CHLORIDE 10 MG: 10 TABLET, FILM COATED, EXTENDED RELEASE ORAL at 10:24

## 2018-12-14 RX ADMIN — CITALOPRAM 20 MG: 20 TABLET, FILM COATED ORAL at 10:24

## 2018-12-14 RX ADMIN — METHYLPREDNISOLONE 4 MG: 4 TABLET ORAL at 12:16

## 2018-12-14 RX ADMIN — ALLOPURINOL 100 MG: 100 TABLET ORAL at 10:24

## 2018-12-14 RX ADMIN — CARVEDILOL 3.12 MG: 3.12 TABLET, FILM COATED ORAL at 10:25

## 2018-12-14 RX ADMIN — METHYLPREDNISOLONE 4 MG: 4 TABLET ORAL at 10:29

## 2018-12-14 RX ADMIN — INSULIN LISPRO 10 UNITS: 100 INJECTION, SOLUTION INTRAVENOUS; SUBCUTANEOUS at 12:17

## 2018-12-14 NOTE — THERAPY TREATMENT NOTE
Acute Care - Physical Therapy Treatment Note  [facility]     Patient Name: Edilberto Reeder  : 1950  MRN: [id number]  Today's Date: 2018  Onset of Illness/Injury or Date of Surgery: 18  Date of Referral to PT: 18  Referring Physician: Marjorie    Admit Date: 2018    Visit Dx:    ICD-10-CM ICD-9-CM   1. Stroke-like symptoms R29.90 781.99   2. Parkinson disease (CMS/HCC) G20 332.0   3. Impaired functional mobility and activity tolerance Z74.09 V49.89   4. Paroxysmal atrial fibrillation (CMS/HCC) I48.0 427.31     Patient Active Problem List   Diagnosis   • ASCVD (arteriosclerotic cardiovascular disease)   • Paroxysmal atrial fibrillation (CMS/HCC)   • Anticoagulated on warfarin   • Diabetic retinopathy associated with type 2 diabetes mellitus (CMS/HCC)   • Essential hypertension   • HLD (hyperlipidemia)   • Hypothyroidism   • Peripheral neuropathy   • Renal insufficiency   • Diabetes (CMS/HCC)   • Parkinson's disease (CMS/HCC)   • Dyspnea on exertion   • Atrial fibrillation (CMS/HCC) [I48.91]   • Stroke-like symptoms   • Hypopotassemia       Therapy Treatment    Rehabilitation Treatment Summary     Row Name 18 1104             Treatment Time/Intention    Discipline  physical therapy assistant  -      Document Type  therapy note (daily note)  -      Subjective Information  complains of;weakness  -      Mode of Treatment  physical therapy  -      Patient/Family Observations  Pt supine in bed  -      Care Plan Review  patient/other agree to care plan  -      Therapy Frequency (PT Clinical Impression)  daily  -      Patient Effort  good  -      Existing Precautions/Restrictions  fall  -      Recorded by [] Camelia Rose PTA 18 1253      Row Name 18 1107             Cognitive Assessment/Intervention- PT/OT    Orientation Status (Cognition)  oriented x 3  -EH      Follows Commands (Cognition)  follows one step commands;over 90% accuracy  -      Personal  Safety Interventions  fall prevention program maintained;gait belt;nonskid shoes/slippers when out of bed  -EH      Recorded by [] Camelia Rose, [facility] 12/14/18 1253      Row Name 12/14/18 1104             Bed Mobility Assessment/Treatment    Supine-Sit [place] (Bed Mobility)  contact guard  -EH      Sit-Supine [place] (Bed Mobility)  contact guard  -EH      Assistive Device (Bed Mobility)  bed rails  -EH      Recorded by [] Camelia Rose, [facility] 12/14/18 1253      Row Name 12/14/18 1104             Sit-Stand Transfer    Sit-Stand [place] (Transfers)  moderate assist (50% patient effort)  -      Assistive Device (Sit-Stand Transfers)  -- No AD  -EH      Recorded by [] Camelia Rose, [facility] 12/14/18 1253      Row Name 12/14/18 1104             Stand-Sit Transfer    Stand-Sit [place] (Transfers)  moderate assist (50% patient effort)  -      Assistive Device (Stand-Sit Transfers)  -- No AD  -EH      Recorded by [] Camelia Rose, [facility] 12/14/18 1253      Row Name 12/14/18 1104             Gait/Stairs Assessment/Training    [place] Level (Gait)  minimum assist (75% patient effort);2 person assist;verbal cues  -      Assistive Device (Gait)  -- No AD  -EH      Distance in Feet (Gait)  150  -EH      Pattern (Gait)  step-to  -EH      Deviations/Abnormal Patterns (Gait)  ataxic;gait speed decreased;stride length decreased  -      Bilateral Gait Deviations  forward flexed posture;heel strike decreased  -EH      Recorded by [] Camelia Rose, [facility] 12/14/18 1253      Row Name 12/14/18 1104             Positioning and Restraints    Pre-Treatment Position  in bed  -EH      Post Treatment Position  bed  -EH      In Bed  supine;call light within reach;encouraged to call for assist;exit alarm on  -EH      Recorded by [] Camelia Rose, [facility] 12/14/18 1253        User Key  (r) = Recorded By, (t) = Taken By, (c) = Cosigned By    Initials Name Effective Dates Discipline     Camelia Rose, [place] 08/19/18 -   PT                   Physical Therapy Education     Title: PT OT SLP Therapies (Done)     Topic: Physical Therapy (Done)     Point: Mobility training (Done)     Learning Progress Summary           Patient Acceptance, E, VU by  at 12/14/2018 12:48 PM    Acceptance, E, VU by ML at 12/14/2018  5:59 AM    Acceptance, E,TB, VU,DU by CW at 12/13/2018  9:10 AM    Acceptance, E, VU,DU by  at 12/12/2018  5:16 PM    Acceptance, E,TB, VU,DU by CW at 12/10/2018  9:03 AM    Acceptance, E,D, DU by PC at 12/9/2018  9:49 AM                   Point: Home exercise program (Done)     Learning Progress Summary           Patient Acceptance, E, VU by  at 12/14/2018 12:48 PM    Acceptance, E, VU by ML at 12/14/2018  5:59 AM    Acceptance, E,TB, VU,DU by CW at 12/13/2018  9:10 AM    Acceptance, E, VU,DU by EH at 12/12/2018  5:16 PM    Acceptance, E,TB, VU,DU by CW at 12/10/2018  9:03 AM    Acceptance, E,D, DU by PC at 12/9/2018  9:49 AM                   Point: Body mechanics (Done)     Learning Progress Summary           Patient Acceptance, E, VU by  at 12/14/2018 12:48 PM    Acceptance, E, VU by ML at 12/14/2018  5:59 AM    Acceptance, E,TB, VU,DU by CW at 12/13/2018  9:10 AM    Acceptance, E, VU,DU by  at 12/12/2018  5:16 PM    Acceptance, E,TB, VU,DU by CW at 12/10/2018  9:03 AM    Acceptance, E,D, DU by PC at 12/9/2018  9:49 AM                   Point: Precautions (Done)     Learning Progress Summary           Patient Acceptance, E, VU by  at 12/14/2018 12:48 PM    Acceptance, E, VU by ML at 12/14/2018  5:59 AM    Acceptance, E,TB, VU,DU by CW at 12/13/2018  9:10 AM    Acceptance, E, VU,DU by EH at 12/12/2018  5:16 PM    Acceptance, E,TB, VU,DU by CW at 12/10/2018  9:03 AM    Acceptance, E,D, DU by PC at 12/9/2018  9:49 AM                               User Key     Initials Effective Dates Name Provider Type Discipline    PC 04/03/18 -  Estefania Paez, PT Physical Therapist PT    ML 06/16/16 -  Florence Stallings, RN Registered  Nurse Nurse     03/07/18 -  Pedro Levi PTA Physical Therapy Assistant PT     08/19/18 -  Camelia Rose PTA Physical Therapy Assistant PT                PT Recommendation and Plan  Therapy Frequency (PT Clinical Impression): daily  Plan of Care Reviewed With: patient  Progress: improving  Outcome Summary: Pt tolerated treatment with minimal c/o weakness. Pt ambulated 150 feet, HHA, MinAX2. Pt required vcs to increase step length and to extend knees as pt will ambulate with very flexed knees.   Outcome Measures     Row Name 12/14/18 1200 12/13/18 0900 12/12/18 1700       How much help from another person do you currently need...    Turning from your back to your side while in flat bed without using bedrails?  3  -EH  3  -CW  3  -EH    Moving from lying on back to sitting on the side of a flat bed without bedrails?  3  -EH  3  -CW  3  -EH    Moving to and from a bed to a chair (including a wheelchair)?  3  -EH  3  -CW  3  -EH    Standing up from a chair using your arms (e.g., wheelchair, bedside chair)?  3  -EH  3  -CW  3  -EH    Climbing 3-5 steps with a railing?  1  -EH  1  -CW  1  -EH    To walk in hospital room?  3  -EH  3  -CW  3  -EH    AM-PAC 6 Clicks Score  16  -EH  16  -CW  16  -EH       Functional Assessment    Outcome Measure Options  --  AM-PAC 6 Clicks Basic Mobility (PT)  -CW  --      User Key  (r) = Recorded By, (t) = Taken By, (c) = Cosigned By    Initials Name Provider Type    CW Pedro Levi PTA Physical Therapy Assistant     Camelia Rose PTA Physical Therapy Assistant         Time Calculation:   PT Charges     Row Name 12/14/18 1248             Time Calculation    Start Time  1104  -      Stop Time  1116  -      Time Calculation (min)  12 min  -      PT Received On  12/14/18  -      PT - Next Appointment  12/15/18  -         Time Calculation- PT    Total Timed Code Minutes- PT  12 minute(s)  -        User Key  (r) = Recorded By, (t) = Taken By, (c) = Cosigned By     Initials Name Provider Type     Camelia Rose PTA Physical Therapy Assistant        Therapy Suggested Charges     Code   Minutes Charges    None           Therapy Charges for Today     Code Description Service Date Service Provider Modifiers Qty    40426179457 HC PT THER PROC EA 15 MIN 12/14/2018 Camelia Rose PTA GP 1    12619936888 HC PT THER SUPP EA 15 MIN 12/14/2018 Camelia Rose PTA GP 1          PT G-Codes  PT Professional Judgement Used?: Yes  Outcome Measure Options: AM-PAC 6 Clicks Basic Mobility (PT)  AM-PAC 6 Clicks Score: 16  Score: 12  Modified [place] Scale: 3 - Moderate disability.  Requiring some help, but able to walk without assistance.  Functional Limitation: Mobility: Walking and moving around  Mobility: Walking and Moving Around Current Status (): At least 40 percent but less than 60 percent impaired, limited or restricted  Mobility: Walking and Moving Around Goal Status (): At least 1 percent but less than 20 percent impaired, limited or restricted    Camelia Rose PTA  12/14/2018

## 2018-12-14 NOTE — PLAN OF CARE
Problem: Fall Risk (Adult)  Goal: Absence of Fall  Outcome: Ongoing (interventions implemented as appropriate)      Problem: Patient Care Overview  Goal: Plan of Care Review   12/14/18 1620   Coping/Psychosocial   Plan of Care Reviewed With patient;spouse   Plan of Care Review   Progress improving   OTHER   Outcome Summary Patient without report of discomfort. Discharged to [facility] [place] and family to transport. VSS. Will continue to [facility].       Problem: Skin Injury Risk (Adult)  Goal: Skin Health and Integrity  Outcome: Ongoing (interventions implemented as appropriate)

## 2018-12-14 NOTE — PLAN OF CARE
Problem: Patient Care Overview  Goal: Plan of Care Review  Outcome: Ongoing (interventions implemented as appropriate)   12/14/18 4470   Coping/Psychosocial   Plan of Care Reviewed With patient   Plan of Care Review   Progress improving   OTHER   Outcome Summary Pt tolerated treatment with minimal c/o weakness. Pt ambulated 150 feet, HHA, MinAX2. Pt required vcs to increase step length and to extend knees as pt will ambulate with very flexed knees.

## 2018-12-14 NOTE — DISCHARGE INSTRUCTIONS
Stroke Risk Factors:    Diabetes  Hyperlipidemia  Paroxysmal atrial fibrillation  Parkinson's disease    Stroke prevention:    Go to follow up doctor appointments  Take medications as prescribed by your doctor.

## 2018-12-14 NOTE — DISCHARGE SUMMARY
[facility]    Date of Discharge:  12/14/2018    PCP: Harvey Larson MD    Discharge Diagnosis:   Active Hospital Problems    Diagnosis Date Noted   • Stroke-like symptoms [R29.90] 12/08/2018   • Hypopotassemia [E87.6] 12/08/2018   • Diabetes (CMS/HCC) [E11.9]    • Parkinson's disease (CMS/HCC) [G20]    • Anticoagulated on warfarin [Z79.01] 01/27/2016   • Essential hypertension [I10] 01/27/2016   • HLD (hyperlipidemia) [E78.5] 01/27/2016   • Hypothyroidism [E03.9] 01/27/2016   • Paroxysmal atrial fibrillation (CMS/[facility]) [I48.0] 01/27/2016      Resolved Hospital Problems   No resolved problems to display.      Consults     Date and Time Order Name Status Description    12/10/2018 1320 Inpatient Orthopedic Surgery Consult      12/8/2018 1721 Inpatient Neurology Consult Stroke Completed     12/8/2018 1348 [facility] (on-call MD unless specified) Completed         Hospital Course  Please see history and physical for details. Patient is a 68 y.o. male with a history of Parkinson's disease admitted because of right-sided weakness and numbness. He has a history of atrial fibrillation on warfarin. INR was subtherapeutic on admission. Neurology felt he likely had an embolic TIA due to his atrial fibrillation and subtherapeutic INR.    Patient had right elbow tenderness possibly gout. X-rays showed a possible fracture. Orthopedic surgery saw the patient and felt that he may have had a radial head fracture but could not appreciated on the x-rays. If it was a fracture does not displace and would heal with expectant management. They felt rather he had gout or pseudogout and was started on a Dosepak with improvement in his pain and swelling and range of motion. They would like to see him in the office in 7-10 days to repeat elbow x-rays. He also had some neck discomfort and CT of the cervical spine showed advanced multilevel degenerative changes. This also improved significantly  with steroids.    Patient was started on Lovenox until his INR was therapeutic. He did have a drop in his hemoglobin which was dilutional. It was monitored while he was anticoagulated and has been stable. INR today is 3.35. Hemoglobin as of yesterday was 9.8.    B12 was low normal and he was started on replacement.    QTc was 408.    He is going to a skilled nursing facility due to deconditioning and weakness.    He had a video swallow study done speech recommended honey and regular. He will need speech therapy at rehabilitation    I discussed the patient's findings and my recommendations with patient and nursing staff.    Condition on Discharge: Improved.     Temp:  [97.6 °F (36.4 °C)-98.9 °F (37.2 °C)] 98.9 °F (37.2 °C)  Heart Rate:  [54-67] 54  Resp:  [18] 18  BP: (132-141)/(78-88) 141/78  Body mass index is 31.99 kg/m².    Physical Exam   Constitutional: He is oriented to person, place, and time. No distress.   Cardiovascular: Normal rate and regular rhythm.   Pulmonary/Chest: Effort normal and breath sounds normal. No respiratory distress.   Abdominal: Soft. There is no tenderness. There is no rebound and no guarding.   Musculoskeletal: He exhibits edema (trace (chronic)).   right elbow swelling and warmth nearly resolved   Neurological: He is alert and oriented to person, place, and time.   Skin: Skin is warm and dry.   Psychiatric: He has a normal mood and affect. His behavior is normal.        Discharge Medications      New Medications      Instructions Start Date   atorvastatin 80 MG tablet  Commonly known as:  LIPITOR   80 mg, Oral, Nightly      benzocaine-menthol 6-10 MG lozenge  Commonly known as:  CHLORASEPTIC   1 lozenge, Oral, Every 2 Hours PRN      cyanocobalamin 1000 MCG tablet  Commonly known as:  VITAMIN B-12   1,000 mcg, Oral, Daily      famotidine 20 MG tablet  Commonly known as:  PEPCID   20 mg, Oral, Daily      insulin lispro 100 UNIT/ML injection  Commonly known as:  humaLOG   0-14 Units,  Subcutaneous, 4 Times Daily With Meals & Nightly      MethylPREDNISolone 4 MG tablet  Commonly known as:  MEDROL (FLORY)   Take medrol dose pack from hospital and finish as directed         Continue These Medications      Instructions Start Date   allopurinol 100 MG tablet  Commonly known as:  ZYLOPRIM   100 mg, Oral, Daily      carvedilol 3.125 MG tablet  Commonly known as:  COREG   3.125 mg, Oral, Every 12 Hours Scheduled      citalopram 10 MG tablet  Commonly known as:  CeleXA   20 mg, Oral, Daily      fludrocortisone 0.1 MG tablet   0.1 mg, Oral, 2 Times Daily      furosemide 20 MG tablet  Commonly known as:  LASIX   20 mg, Oral, Daily      glipiZIDE 10 MG 24 hr tablet  Commonly known as:  GLUCOTROL XL   10 mg, Oral      lubiprostone 24 MCG capsule  Commonly known as:  AMITIZA   24 mcg, Oral, Daily With Breakfast      metFORMIN 500 MG tablet  Commonly known as:  GLUCOPHAGE   1,000 mg, Oral, 2 Times Daily With Meals      MYRBETRIQ 50 MG tablet sustained-release 24 hour 24 hr tablet  Generic drug:  Mirabegron ER   1 tablet, Oral, Daily      oxybutynin XL 10 MG 24 hr tablet  Commonly known as:  DITROPAN-XL   10 mg, Oral, Daily      potassium chloride 10 MEQ CR tablet  Commonly known as:  K-DUR   10 mEq, Oral, Daily      pregabalin 100 MG capsule  Commonly known as:  LYRICA   150 mg, Oral, 2 Times Daily      RYTARY 36. MG capsule controlled-release  Generic drug:  Carbidopa-Levodopa ER   3 capsules, Oral, 4 Times Daily      tamsulosin 0.4 MG capsule 24 hr capsule  Commonly known as:  FLOMAX   1 capsule, Oral, Daily      traZODone 50 MG tablet  Commonly known as:  DESYREL   50 mg, Oral, Nightly      warfarin 3 MG tablet  Commonly known as:  COUMADIN   Take 2 tablets by mouth daily or as directed         Stop These Medications    meclizine 25 MG tablet  Commonly known as:  ANTIVERT     PATIENT SUPPLIED MEDICATION     simvastatin 40 MG tablet  Commonly known as:  ZOCOR           Diet Instructions     Diet: Regular,  Cardiac; Honey Thick      Discharge Diet:   Regular  Cardiac       Fluid Consistency:  Honey Thick          Additional Instructions for the Follow-ups that You Need to Schedule     Protime-INR    Dec 15, 2018 (Approximate)         Contact information for follow-up providers     Harvey Larson MD Follow up.    Specialty:  Family Medicine  Contact information:  [address] [facility] [address]  [phone]             Adria Espinosa MD Follow up in 1 week(s).    Specialty:  Orthopedic Surgery  Why:  Please call the office to set up a one week follow up appointment.    Contact information:  [address]  [place] [id number]  [phone]                   Contact information for after-discharge care     Destination     [facility] Follow up.    Service:  Skilled Nursing  Contact information:  [address] [facility] [address] [phone]                            Alexis Amador MD  12/14/18  3:57 PM    Discharge time spent greater than 30 minutes.

## 2019-01-02 ENCOUNTER — TELEPHONE (OUTPATIENT)
Dept: NEUROLOGY | Facility: CLINIC | Age: 69
End: 2019-01-02

## 2019-01-02 NOTE — TELEPHONE ENCOUNTER
Two Week Stroke Phone Call  Spoke with the patient's nurse    · Admission Date: 12/8/2018    · Discharge Date: 12/14/2018    · Discharge Destination: [facility]    · Meds reviewed with patient/caregiver?    [x]Yes [] No   Facility is managing   o Cholesterol Reducing: Lipitor  - Facility  pcp had changed to simvastatin 40mg   o Anit-Coagulate: coumadin     · Is the patient taking all medication as directed?   [x]  Yes  []  No    · Discussed personal risk factors   [x]  Yes []  No    Facility is managing  o High blood pressure   - Has been monitoring BP [x]  Yes     []  No  - Bp goal <130/80   o High cholesterol   - Review desired LDL goal <70  o Atrial fibrillation     • Discussed signs and symptoms of stroke and when patient to call 911?   [x]  Yes []  No  o Sudden weakness or numbness of the face, arm, or leg especially on one side of the body  o Sudden confusion, trouble speaking or understanding  o Sudden trouble seeing in one or both eyes   o Sudden trouble walking, dizziness, loss of balance or coordination  o Sudden severe headaches with no known cause      Notified Patient's nurse that if any of these symptoms occur to call 911    · Does the patient have any new signs or symptoms of a stroke?   []  Yes     [x]  No    · Does the patietnt have an appointment with PCP?  [x]  Yes     []  No   Being by facility pcp  · Does the patient have 3 month Stroke Clinic appointment?  Office will call to make     · Is the patient currently in therapy, outpatient, or home health?  [x]  Yes     []  No    Needs a referral?      []  Yes     [x]  No      Patient Satisfaction   Patient or Patient family is not available to discuss.

## 2019-08-16 ENCOUNTER — OFFICE VISIT (OUTPATIENT)
Dept: CARDIOLOGY | Facility: CLINIC | Age: 69
End: 2019-08-16

## 2019-08-16 VITALS
SYSTOLIC BLOOD PRESSURE: 142 MMHG | OXYGEN SATURATION: 98 % | WEIGHT: 209 LBS | HEIGHT: 73 IN | BODY MASS INDEX: 27.7 KG/M2 | HEART RATE: 66 BPM | DIASTOLIC BLOOD PRESSURE: 82 MMHG

## 2019-08-16 DIAGNOSIS — I48.0 PAROXYSMAL ATRIAL FIBRILLATION (HCC): Primary | Chronic | ICD-10-CM

## 2019-08-16 DIAGNOSIS — I25.10 ASCVD (ARTERIOSCLEROTIC CARDIOVASCULAR DISEASE): ICD-10-CM

## 2019-08-16 DIAGNOSIS — I10 ESSENTIAL HYPERTENSION: Chronic | ICD-10-CM

## 2019-08-16 PROCEDURE — 99214 OFFICE O/P EST MOD 30 MIN: CPT | Performed by: INTERNAL MEDICINE

## 2019-08-16 RX ORDER — CARVEDILOL 3.12 MG/1
3.12 TABLET ORAL 2 TIMES DAILY
Qty: 60 TABLET | Refills: 11 | Status: SHIPPED | OUTPATIENT
Start: 2019-08-16 | End: 2020-09-01

## 2019-08-16 RX ORDER — SIMVASTATIN 80 MG
40 TABLET ORAL NIGHTLY
Status: ON HOLD | COMMUNITY
End: 2022-11-01

## 2019-08-19 NOTE — PROGRESS NOTES
Subjective:     Encounter Date:08/16/19      Patient ID: Edilberto Reeder is a 69 y.o. male.    Chief Complaint:  Atrial Fibrillation   Presents for follow-up visit. Symptoms include dizziness, hypertension and shortness of breath. Symptoms are negative for chest pain and palpitations. The symptoms have been stable. Past medical history includes atrial fibrillation and CAD.   Hypertension   This is a chronic problem. The problem is controlled. Associated symptoms include blurred vision, malaise/fatigue and shortness of breath. Pertinent negatives include no anxiety, chest pain, palpitations or peripheral edema.   Coronary Artery Disease   Presents for follow-up visit. Symptoms include dizziness and shortness of breath. Pertinent negatives include no chest pain or palpitations. Risk factors include hypertension. The symptoms have been stable.     69-year-old gentleman presents today for reevaluation.  Clinically patient continues to do relatively well.  He is lost 32 pounds since December 2018.  He denies chest pain shortness of breath palpitations.  He does occasionally get some swelling in his leg occasional dizziness.      Review of Systems   Constitution: Positive for malaise/fatigue.   Eyes: Positive for blurred vision.   Cardiovascular: Negative for chest pain and palpitations.   Respiratory: Positive for shortness of breath.    Neurological: Positive for dizziness.   All other systems reviewed and are negative.      Procedures         Objective:     Physical Exam   Constitutional: He is oriented to person, place, and time. He appears well-developed.   HENT:   Head: Normocephalic.   Eyes: Conjunctivae are normal.   Neck: Normal range of motion.   Cardiovascular: Normal rate, regular rhythm and normal heart sounds.   Pulmonary/Chest: Breath sounds normal.   Abdominal: Soft. Bowel sounds are normal.   Musculoskeletal: Normal range of motion. He exhibits no edema.   Neurological: He is alert and oriented to  person, place, and time.   Skin: Skin is warm and dry.   Psychiatric: He has a normal mood and affect. His behavior is normal.   Vitals reviewed.      Lab Review:       Assessment:          Diagnosis Plan   1. Paroxysmal atrial fibrillation (CMS/HCC)     2. Essential hypertension     3. ASCVD (arteriosclerotic cardiovascular disease)            Plan:       1.  Proximal atrial fibrillation.  Patient clinically is doing relatively well.    2.  Hypertension blood pressures good  3.  Coronary artery disease stable  4.  History of Parkinson's disease.  This limits his mobility quite a bit.  5.  Follow-up 12 months    Atrial Fibrillation and Atrial Flutter  Assessment  • The patient has paroxysmal atrial fibrillation  • This is non-valvular in etiology  • The patient's CHADS2-VASc score is 3  • A [id number] score of 2 or more is considered a high risk for a thromboembolic event  • Warfarin prescribed    Plan  • Attempt to maintain sinus rhythm  • Continue warfarin for antithrombotic therapy, bleeding issues discussed  • Continue beta blocker for rhythm control      Coronary Artery Disease  Assessment  • The patient has no angina

## 2019-09-10 ENCOUNTER — OFFICE VISIT (OUTPATIENT)
Dept: CARDIOLOGY | Facility: CLINIC | Age: 69
End: 2019-09-10

## 2019-09-10 VITALS
HEIGHT: 73 IN | BODY MASS INDEX: 27.83 KG/M2 | SYSTOLIC BLOOD PRESSURE: 118 MMHG | HEART RATE: 67 BPM | OXYGEN SATURATION: 98 % | WEIGHT: 210 LBS | DIASTOLIC BLOOD PRESSURE: 62 MMHG

## 2019-09-10 DIAGNOSIS — I10 ESSENTIAL HYPERTENSION: ICD-10-CM

## 2019-09-10 DIAGNOSIS — I48.0 PAROXYSMAL ATRIAL FIBRILLATION (HCC): Primary | ICD-10-CM

## 2019-09-10 PROCEDURE — 99214 OFFICE O/P EST MOD 30 MIN: CPT | Performed by: NURSE PRACTITIONER

## 2019-09-10 NOTE — PROGRESS NOTES
"      [facility] CARDIOLOGY  [address]  [facility] [address]  Phone: [phone]      Patient Name: Ediblerto Reeder  :1950  Age: 69 y.o.  Primary Cardiologist: Jimbo Fung MD  Encounter Provider:  ESME Kim      Chief Complaint:   Chief Complaint   Patient presents with   • Follow-up     1 month         HPI  Edilberto Reeder is a 69 y.o. male who presents today for 1 month reevaluation.     Pt has a  history significant for atrial fibrillation, hypertension, hyperlipidemia, diabetes, Parkinson's disease.    Patient reports that he is done well over the past month.  States that he still has some chronic shortness of breath and fatigue which is stable for him.  He denies any chest pain, shortness of breath, lightheadedness, swelling.  Blood pressure and heart rate have been controlled.  He is on Coumadin for atrial fibrillation and denies any bleeding complaints.      The following portions of the patient's history were reviewed and updated as appropriate: allergies, current medications, past family history, past medical history, past social history, past surgical history and problem list.      Review of Systems   Constitution: Positive for malaise/fatigue.   Cardiovascular: Positive for dyspnea on exertion. Negative for chest pain and leg swelling.   Respiratory: Positive for shortness of breath.    Endocrine: Positive for cold intolerance.   Neurological: Positive for excessive daytime sleepiness, dizziness and light-headedness.   All other systems reviewed and are negative.      OBJECTIVE:     Vitals:    09/10/19 1355   BP: 118/62   BP Location: Right arm   Patient Position: Sitting   Pulse: 67   SpO2: 98%   Weight: 95.3 kg (210 lb)   Height: 185.4 cm (73\")     Body mass index is 27.71 kg/m².    Physical Exam   Constitutional: He is oriented to person, place, and time. Vital signs are normal. He appears well-developed and well-nourished. "   Eyes: Conjunctivae are normal.   Neck: Carotid bruit is not present.   Cardiovascular: Normal rate, regular rhythm and normal heart sounds.   No murmur heard.  Pulmonary/Chest: Effort normal and breath sounds normal.   Abdominal: Normal appearance.   Musculoskeletal: Normal range of motion.   No pedal edema   Neurological: He is alert and oriented to person, place, and time. GCS eye subscore is 4. GCS verbal subscore is 5. GCS motor subscore is 6.   Skin: Skin is warm and dry.   Psychiatric: He has a normal mood and affect. His speech is normal and behavior is normal. Judgment and thought content normal. Cognition and memory are normal.       Procedures    Cardiac Procedures:  1. Echocardiogram 1/5/2018.  EF 66%.  Normal LV cavity size and wall thickness noted.  All LV wall segments contract normally.  LV diastolic function is normal.  No valvular stenosis or insufficiency noted.  2. ZIO monitor 5/4/2018.  16 episodes of irregular atrial tachycardia longest lasting 24 seconds.  3. Echocardiogram 12/9/2018.  Saline bubble study negative for PFO.  EF 73%.  No evidence of pericardial effusion.        ASSESSMENT:      Diagnosis Plan   1. Paroxysmal atrial fibrillation (CMS/HCC)     2. Essential hypertension           PLAN OF CARE:     1. Paroxysmal atrial fibrillation.  Patient anticoagulated with Coumadin and not having any bleeding complaints.  He is rate controlled with carvedilol and has stable blood pressure and heart rate.  Reports chronic but stable shortness of breath and fatigue.  Otherwise asymptomatic.  2. Hypertension.  Blood pressure controlled.  Continue current regimen.  3. Follow-up with Dr. Fung in 1 year.  Sooner for any problems or complications.    Atrial Fibrillation and Atrial Flutter  Assessment  • The patient has paroxysmal atrial fibrillation  • The patient's CHADS2-VASc score is 5  • A [id number] score of 2 or more is considered a high risk for a thromboembolic event  • Warfarin  prescribed    Plan  • Attempt to maintain sinus rhythm  • Continue warfarin for antithrombotic therapy, bleeding issues discussed  • Continue beta blocker for rhythm control  • Continue beta blocker for rate control         Discharge Medications           Accurate as of 9/10/19  2:16 PM. If you have any questions, ask your nurse or doctor.               Changes to Medications      Instructions Start Date   potassium chloride 10 MEQ CR tablet  Commonly known as:  K-DUR  What changed:  how much to take   10 mEq, Oral, Daily         Continue These Medications      Instructions Start Date   allopurinol 100 MG tablet  Commonly known as:  ZYLOPRIM   100 mg, Oral, Daily      carvedilol 3.125 MG tablet  Commonly known as:  COREG   3.125 mg, Oral, 2 Times Daily      citalopram 10 MG tablet  Commonly known as:  CeleXA   20 mg, Oral, Daily      glipiZIDE 10 MG 24 hr tablet  Commonly known as:  GLUCOTROL XL   10 mg, Oral      metFORMIN 500 MG tablet  Commonly known as:  GLUCOPHAGE   1,000 mg, Oral, 2 Times Daily With Meals      oxybutynin XL 10 MG 24 hr tablet  Commonly known as:  DITROPAN-XL   10 mg, Oral, Daily      simvastatin 80 MG tablet  Commonly known as:  ZOCOR   80 mg, Oral, Nightly      tamsulosin 0.4 MG capsule 24 hr capsule  Commonly known as:  FLOMAX   1 capsule, Oral, Daily      traZODone 50 MG tablet  Commonly known as:  DESYREL   50 mg, Oral, Nightly      warfarin 3 MG tablet  Commonly known as:  COUMADIN   Take 2 tablets by mouth daily or as directed             Thank you for allowing me to participate in the care of your patient,         EMSE Kim  [facility]  09/10/19  2:16 PM    **Jovani Disclaimer:**  Much of this encounter note is an electronic transcription/translation of spoken language to printed text. The electronic translation of spoken language may permit erroneous, or at times, nonsensical words or phrases to be inadvertently transcribed. Although I have reviewed the  note for such errors, some may still exist.

## 2019-10-25 ENCOUNTER — TELEPHONE (OUTPATIENT)
Dept: CARDIOLOGY | Facility: CLINIC | Age: 69
End: 2019-10-25

## 2019-10-25 NOTE — TELEPHONE ENCOUNTER
Patient called to [facility] would [facility] from Warfar[facility] to Eliquis.  Patient would like to discuss this with you first. Please advise. / LASHAE     Patient's phone number is [phone]

## 2020-09-01 RX ORDER — CARVEDILOL 3.12 MG/1
TABLET ORAL
Qty: 60 TABLET | Refills: 0 | Status: SHIPPED | OUTPATIENT
Start: 2020-09-01 | End: 2020-10-07

## 2020-09-11 ENCOUNTER — OFFICE VISIT (OUTPATIENT)
Dept: CARDIOLOGY | Facility: CLINIC | Age: 70
End: 2020-09-11

## 2020-09-11 VITALS
SYSTOLIC BLOOD PRESSURE: 120 MMHG | HEIGHT: 73 IN | BODY MASS INDEX: 29.03 KG/M2 | DIASTOLIC BLOOD PRESSURE: 76 MMHG | WEIGHT: 219 LBS | HEART RATE: 57 BPM

## 2020-09-11 DIAGNOSIS — I25.10 ASCVD (ARTERIOSCLEROTIC CARDIOVASCULAR DISEASE): ICD-10-CM

## 2020-09-11 DIAGNOSIS — I48.0 PAROXYSMAL ATRIAL FIBRILLATION (HCC): Primary | Chronic | ICD-10-CM

## 2020-09-11 DIAGNOSIS — I10 ESSENTIAL HYPERTENSION: Chronic | ICD-10-CM

## 2020-09-11 PROCEDURE — 99214 OFFICE O/P EST MOD 30 MIN: CPT | Performed by: INTERNAL MEDICINE

## 2020-09-11 PROCEDURE — 93000 ELECTROCARDIOGRAM COMPLETE: CPT | Performed by: INTERNAL MEDICINE

## 2020-09-11 RX ORDER — LEVODOPA AND CARBIDOPA 145; 36.25 MG/1; MG/1
CAPSULE, EXTENDED RELEASE ORAL
Status: ON HOLD | COMMUNITY
Start: 2020-08-20 | End: 2022-03-23 | Stop reason: SDUPTHER

## 2020-09-11 RX ORDER — GABAPENTIN 800 MG/1
400 TABLET ORAL 2 TIMES DAILY
COMMUNITY
End: 2022-03-25 | Stop reason: HOSPADM

## 2020-09-13 NOTE — PROGRESS NOTES
Subjective:     Encounter Date:09/11/2020      Patient ID: Edilberto Reeder is a 70 y.o. male.    Chief Complaint:  Atrial Fibrillation  Presents for follow-up visit. Symptoms include dizziness, hypertension and shortness of breath. Symptoms are negative for chest pain and palpitations. The symptoms have been stable. Past medical history includes atrial fibrillation and CAD.   Hypertension  This is a chronic problem. The problem is controlled. Associated symptoms include blurred vision, malaise/fatigue and shortness of breath. Pertinent negatives include no anxiety, chest pain, palpitations or peripheral edema.   Coronary Artery Disease  Presents for follow-up visit. Symptoms include dizziness and shortness of breath. Pertinent negatives include no chest pain or palpitations. Risk factors include hypertension. The symptoms have been stable.     70-year-old gentleman who presents today for reevaluation.  Patient is having occasional chest tightness nothing is consistent.  Unfortunately do his Parkinson's he is quite limited in what he can do.  He is also supposed to get a stimulator battery changed here in the future.  Patient is going to have his hammertoes fixed on October 1.      Review of Systems   Constitution: Positive for malaise/fatigue.   Eyes: Positive for blurred vision.   Cardiovascular: Negative for chest pain and palpitations.   Respiratory: Positive for shortness of breath.    Neurological: Positive for dizziness.   All other systems reviewed and are negative.        ECG 12 Lead    Date/Time: 9/11/2020 2:18 PM  Performed by: Jimbo Fung MD  Authorized by: Jimbo Fung MD   Comparison: compared with previous ECG   Similar to previous ECG  Rhythm: sinus rhythm  Conduction: 1st degree AV block    Clinical impression: non-specific ECG               Objective:     Physical Exam   Constitutional: He is oriented to person, place, and time. He appears well-developed.   HENT:   Head:  Normocephalic.   Eyes: Conjunctivae are normal.   Neck: Normal range of motion.   Cardiovascular: Normal rate, regular rhythm and normal heart sounds.   Pulmonary/Chest: Breath sounds normal.   Abdominal: Soft. Bowel sounds are normal.   Musculoskeletal: Normal range of motion.         General: No edema.   Neurological: He is alert and oriented to person, place, and time.   Skin: Skin is warm and dry.   Psychiatric: He has a normal mood and affect. His behavior is normal.   Vitals reviewed.      Lab Review:       Assessment:          Diagnosis Plan   1. Paroxysmal atrial fibrillation (CMS/HCC)     2. Essential hypertension     3. ASCVD (arteriosclerotic cardiovascular disease)            Plan:       1.  Proximal atrial fibrillation.  Patient clinically is doing relatively well.  Now on Eliquis.  I told him he could stop it 3 to 5 days prior to surgery.  Patient is also having injections in his neck that I would hold it up to 5 days prior.  I explained that there is always a risk of a neurologic event although extremely low with the fact that his atrial fibrillation is paroxysmal and he was in a normal sinus rhythm today makes her risk slightly lower.  2.  Hypertension blood pressures good  3.  Coronary artery disease stable  4.  History of Parkinson's disease.  This limits his mobility quite a bit.  5.  Follow-up 12 months    Atrial Fibrillation and Atrial Flutter  Assessment  • The patient has paroxysmal atrial fibrillation  • This is non-valvular in etiology  • The patient's CHADS2-VASc score is 3  • A [id number] score of 2 or more is considered a high risk for a thromboembolic event  • Warfarin prescribed    Plan  • Attempt to maintain sinus rhythm  • Continue warfarin for antithrombotic therapy, bleeding issues discussed  • Continue beta blocker for rhythm control      Coronary Artery Disease  Assessment  • The patient has no angina             Vitals signs reviewed.   Constitutional:       Appearance:  Well-developed.   Eyes:      Conjunctiva/sclera: Conjunctivae normal.   HENT:      Head: Normocephalic.   Neck:      Musculoskeletal: Normal range of motion.   Pulmonary:      Breath sounds: Normal breath sounds.   Cardiovascular:      Normal rate. Regular rhythm. Normal heart sounds.   Edema:     Peripheral edema absent.   Abdominal:      General: Bowel sounds are normal.      Palpations: Abdomen is soft.   Musculoskeletal: Normal range of motion.   Skin:     General: Skin is warm and dry.   Neurological:      Mental Status: Alert and oriented to person, place, and time.   Psychiatric:         Mood and Affect: Mood and affect normal.         Behavior: Behavior normal.

## 2020-10-07 RX ORDER — CARVEDILOL 3.12 MG/1
TABLET ORAL
Qty: 180 TABLET | Refills: 3 | Status: SHIPPED | OUTPATIENT
Start: 2020-10-07 | End: 2021-10-08

## 2020-10-21 ENCOUNTER — APPOINTMENT (OUTPATIENT)
Dept: GENERAL RADIOLOGY | Facility: HOSPITAL | Age: 70
End: 2020-10-21

## 2020-10-21 ENCOUNTER — APPOINTMENT (OUTPATIENT)
Dept: CT IMAGING | Facility: HOSPITAL | Age: 70
End: 2020-10-21

## 2020-10-21 ENCOUNTER — HOSPITAL ENCOUNTER (EMERGENCY)
Facility: HOSPITAL | Age: 70
Discharge: HOME | End: 2020-10-21
Attending: EMERGENCY MEDICINE | Admitting: EMERGENCY MEDICINE

## 2020-10-21 VITALS
SYSTOLIC BLOOD PRESSURE: 136 MMHG | HEART RATE: 66 BPM | TEMPERATURE: 97.9 F | RESPIRATION RATE: 18 BRPM | OXYGEN SATURATION: 96 % | DIASTOLIC BLOOD PRESSURE: 77 MMHG

## 2020-10-21 DIAGNOSIS — S86.912A KNEE STRAIN, LEFT, INITIAL ENCOUNTER: ICD-10-CM

## 2020-10-21 DIAGNOSIS — M54.50 ACUTE MIDLINE LOW BACK PAIN WITHOUT SCIATICA: ICD-10-CM

## 2020-10-21 DIAGNOSIS — G20 PARKINSON DISEASE (HCC): ICD-10-CM

## 2020-10-21 DIAGNOSIS — Z79.01 CURRENT USE OF LONG TERM ANTICOAGULATION: ICD-10-CM

## 2020-10-21 DIAGNOSIS — S16.1XXA ACUTE STRAIN OF NECK MUSCLE, INITIAL ENCOUNTER: ICD-10-CM
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
## 2020-10-21 PROCEDURE — 73502 X-RAY EXAM HIP UNI 2-3 VIEWS: CPT

## 2020-10-21 PROCEDURE — 96374 THER/PROPH/DIAG INJ IV PUSH: CPT

## 2020-10-21 PROCEDURE — 25010000002 ONDANSETRON PER 1 MG: Performed by: NURSE PRACTITIONER

## 2020-10-21 PROCEDURE — 96375 TX/PRO/DX INJ NEW DRUG ADDON: CPT

## 2020-10-21 PROCEDURE — 99283 EMERGENCY DEPT VISIT LOW MDM: CPT

## 2020-10-21 PROCEDURE — 70450 CT HEAD/BRAIN W/O DYE: CPT

## 2020-10-21 PROCEDURE — 72110 X-RAY EXAM L-2 SPINE 4/>VWS: CPT

## 2020-10-21 PROCEDURE — 99284 EMERGENCY DEPT VISIT MOD MDM: CPT

## 2020-10-21 PROCEDURE — 73552 X-RAY EXAM OF FEMUR 2/>: CPT

## 2020-10-21 PROCEDURE — 72072 X-RAY EXAM THORAC SPINE 3VWS: CPT

## 2020-10-21 PROCEDURE — 72125 CT NECK SPINE W/O DYE: CPT

## 2020-10-21 PROCEDURE — 25010000002 MORPHINE PER 10 MG: Performed by: NURSE PRACTITIONER

## 2020-10-21 PROCEDURE — 73562 X-RAY EXAM OF KNEE 3: CPT

## 2020-10-21 RX ORDER — MORPHINE SULFATE 2 MG/ML
2 INJECTION, SOLUTION INTRAMUSCULAR; INTRAVENOUS ONCE
Status: COMPLETED | OUTPATIENT
Start: 2020-10-21 | End: 2020-10-21

## 2020-10-21 RX ORDER — ONDANSETRON 2 MG/ML
4 INJECTION INTRAMUSCULAR; INTRAVENOUS ONCE
Status: COMPLETED | OUTPATIENT
Start: 2020-10-21 | End: 2020-10-21

## 2020-10-21 RX ADMIN — MORPHINE SULFATE 2 MG: 2 INJECTION, SOLUTION INTRAMUSCULAR; INTRAVENOUS at 17:49

## 2020-10-21 RX ADMIN — ONDANSETRON 4 MG: 2 INJECTION INTRAMUSCULAR; INTRAVENOUS at 17:49

## 2020-10-21 NOTE — ED TRIAGE NOTES
Pt arrives via EMS following a fall outside. Pt states that he feels like he twisted his left knee. Pt has a large amount of swelling to that knee. It is splinted via EMS. Pt masked at arrival and triage staff wore all appropriate PPE.

## 2020-10-21 NOTE — ED PROVIDER NOTES
EMERGENCY DEPARTMENT ENCOUNTER    Room Number:  06/06  Date of encounter:  10/21/2020  PCP: Harvey Larson MD  Historian: patient   Full history not obtainable due to: none     HPI:  Chief Complaint: Fall, left knee pain     Context: Edilberto Reeder is a 70 y.o. male who presents to the ED c/o fall onset just prior to arrival.  The patient reports he was trying to line up some lawnmowers that he was going to work on when his feet got caught underneath him and caused him to fall.  He denies any LOC.  He landed on the left knee.  He reports constant and moderate to severe pain.  Nonradiating.  Worse with movement.  Associated back and neck pain.  Unsure if he hit his head.  He is anticoagulant on Eliquis for history of atrial fibrillation.    He does have a history of Parkinson's with deep brain stimulator placement.    PAST MEDICAL HISTORY    Active Ambulatory Problems     Diagnosis Date Noted   • ASCVD (arteriosclerotic cardiovascular disease) 01/27/2016   • Paroxysmal atrial fibrillation (CMS/HCC) 01/27/2016   • Anticoagulated on warfarin 01/27/2016   • Diabetic retinopathy associated with type 2 diabetes mellitus (CMS/HCC) 01/27/2016   • Essential hypertension 01/27/2016   • HLD (hyperlipidemia) 01/27/2016   • Hypothyroidism 01/27/2016   • Peripheral neuropathy 01/27/2016   • Renal insufficiency 01/27/2016   • Diabetes (CMS/HCC)    • Parkinson's disease (CMS/HCC)    • Dyspnea on exertion 04/19/2018   • Atrial fibrillation (CMS/HCC) [I48.91] 08/17/2018   • Stroke-like symptoms 12/08/2018   • Hypopotassemia 12/08/2018     Resolved Ambulatory Problems     Diagnosis Date Noted   • Type 2 diabetes mellitus (CMS/HCC) 01/27/2016   • Atrial fibrillation with RVR (CMS/HCC) 01/04/2018     Past Medical History:   Diagnosis Date   • Atrial flutter (CMS/HCC)    • Hypertension          PAST SURGICAL HISTORY  Past Surgical History:   Procedure Laterality Date   • BRAIN STIMULATOR     • JOINT REPLACEMENT      Bilateral  shoulder and knee replacements         FAMILY HISTORY  No family history on file.      SOCIAL HISTORY  Social History     Socioeconomic History   • Marital status:      Spouse name: Not on file   • Number of children: Not on file   • Years of education: Not on file   • Highest education level: Not on file   Tobacco Use   • Smoking status: Never Smoker   • Smokeless tobacco: Never Used   • Tobacco comment: caffeine use   Substance and Sexual Activity   • Alcohol use: Yes   • Drug use: No   • Sexual activity: Defer         ALLERGIES  Bacitracin, Neosporin [neomycin-bacitracin zn-polymyx], Fish-derived products, Shellfish allergy, and Shrimp (diagnostic)        REVIEW OF SYSTEMS  Review of Systems   All systems reviewed and marked as negative except as listed in HPI       PHYSICAL EXAM    I have reviewed the triage vital signs and nursing notes.    ED Triage Vitals [10/21/20 1703]   Temp Heart Rate Resp BP SpO2   -- 109 18 111/64 90 %      Temp src Heart Rate Source Patient Position BP Location FiO2 (%)   -- Monitor Lying -- --       GENERAL: Alert well developed, well nourished in no distress  HENT: NCAT, neck supple, trachea midline  EYES: no scleral icterus, PERRL, normal conjunctivae  CV: regular rhythm, regular rate, no murmur  RESPIRATORY: unlabored effort, CTAB  ABDOMEN: soft, nontender, nondistended, bowel sounds present  MUSCULOSKELETAL: no gross deformity.  Tenderness diffusely of the cervical thoracic and lumbar spine.  There is limited range of motion of the left knee secondary to pain.  There is linear scarring consistent with prior total knee replacement.  No open wounds.  NEURO: alert,  sensory and motor function of extremities grossly intact, speech clear, mental status normal/baseline  SKIN: warm, dry, no rash  PSYCH:  Appropriate mood and affect    Vital signs and nursing notes reviewed.    RADIOLOGY  Xr Spine Thoracic 3 View    Result Date: 10/21/2020  XR SPINE LUMBAR COMPLETE 4+VW-, XR SPINE  THORACIC 3 VW-, XR HIP W OR WO PELVIS 2-3 VIEW LEFT-, XR FEMUR 2 VW LEFT-, XR KNEE 3 VW LEFT-  INDICATIONS: Trauma  TECHNIQUE: 6 views of the lumbar spine, 4 views of the thoracic spine, frontal view of the pelvis, 2 views of the left hip, 2 views of the left femur, 3 views of the left knee  COMPARISON: None available  FINDINGS:  Thoracic and lumbar spine: Mild anterolisthesis of L5 on S1. Alignment is otherwise in range of normal. Vertebral body heights appear preserved. No acute fracture is identified. Multilevel endplate spurring, disc space narrowing, facet arthropathy. Arterial calcification is present.  Pelvis, left hip, left femur, left knee: No acute fracture is identified. The pelvic ring appears intact. The hip joint spaces appear preserved. Left knee arthroplasty hardware appears intact. Small degenerative spurring at the left femoral head. Arterial calcification is present. Mild left knee effusion.  Follow-up/further evaluation can be obtained as indications persist.       As described.    This report was finalized on 10/21/2020 7:36 PM by Dr. Myron Rutledge M.D.      Xr Femur 2 View Left    Result Date: 10/21/2020  XR SPINE LUMBAR COMPLETE 4+VW-, XR SPINE THORACIC 3 VW-, XR HIP W OR WO PELVIS 2-3 VIEW LEFT-, XR FEMUR 2 VW LEFT-, XR KNEE 3 VW LEFT-  INDICATIONS: Trauma  TECHNIQUE: 6 views of the lumbar spine, 4 views of the thoracic spine, frontal view of the pelvis, 2 views of the left hip, 2 views of the left femur, 3 views of the left knee  COMPARISON: None available  FINDINGS:  Thoracic and lumbar spine: Mild anterolisthesis of L5 on S1. Alignment is otherwise in range of normal. Vertebral body heights appear preserved. No acute fracture is identified. Multilevel endplate spurring, disc space narrowing, facet arthropathy. Arterial calcification is present.  Pelvis, left hip, left femur, left knee: No acute fracture is identified. The pelvic ring appears intact. The hip joint spaces appear  preserved. Left knee arthroplasty hardware appears intact. Small degenerative spurring at the left femoral head. Arterial calcification is present. Mild left knee effusion.  Follow-up/further evaluation can be obtained as indications persist.       As described.    This report was finalized on 10/21/2020 7:36 PM by Dr. Myron Rutledge M.D.      Xr Knee 3 View Left    Result Date: 10/21/2020  XR SPINE LUMBAR COMPLETE 4+VW-, XR SPINE THORACIC 3 VW-, XR HIP W OR WO PELVIS 2-3 VIEW LEFT-, XR FEMUR 2 VW LEFT-, XR KNEE 3 VW LEFT-  INDICATIONS: Trauma  TECHNIQUE: 6 views of the lumbar spine, 4 views of the thoracic spine, frontal view of the pelvis, 2 views of the left hip, 2 views of the left femur, 3 views of the left knee  COMPARISON: None available  FINDINGS:  Thoracic and lumbar spine: Mild anterolisthesis of L5 on S1. Alignment is otherwise in range of normal. Vertebral body heights appear preserved. No acute fracture is identified. Multilevel endplate spurring, disc space narrowing, facet arthropathy. Arterial calcification is present.  Pelvis, left hip, left femur, left knee: No acute fracture is identified. The pelvic ring appears intact. The hip joint spaces appear preserved. Left knee arthroplasty hardware appears intact. Small degenerative spurring at the left femoral head. Arterial calcification is present. Mild left knee effusion.  Follow-up/further evaluation can be obtained as indications persist.       As described.    This report was finalized on 10/21/2020 7:36 PM by Dr. Myron Rutledge M.D.      Ct Head Without Contrast    Result Date: 10/21/2020  CT HEAD WO CONTRAST-, CT CERVICAL SPINE WO CONTRAST-  INDICATIONS: Trauma  TECHNIQUE: Radiation dose reduction techniques were utilized, including automated exposure control and exposure modulation based on body size. Noncontrast head CT, cervical spine CT  COMPARISON: . Assessment/2018  FINDINGS:  Head CT:  Stable appearing bilateral deep brain  stimulator leads with associated attenuation artifact.  No acute intracranial hemorrhage, midline shift or mass effect. No acute territorial infarct is identified.  Mild periventricular hypodensities suggest chronic small vessel ischemic change in a patient this age.  Arterial calcifications are seen at the base of the brain.  Ventricles, cisterns, cerebral sulci are unremarkable for patient age.  Mild mucosal thickening in the x-ray sinuses. Small partial opacification of left mastoid air cells. The visualized paranasal sinuses, orbits, mastoid air cells are otherwise unremarkable.    Cervical spine CT:  Mild anterolisthesis of C2 on C3, mild retrolisthesis of C4 on C5.  No acute fracture is identified.  Bilateral neuroforaminal narrowing related to facet and uncovertebral joint hypertrophy is noted, more prominent on the right C4/5, C5/6, C6/7, and on the left at C3/4, C4/5, C5/6, C6/7. Disc osteophyte complex appears to result in mild to moderate central stenosis. C3-C7; intracanalicular contents are however suboptimally evaluated without intrathecal contrast material..         No acute intracranial hemorrhage or hydrocephalus; chronic and postsurgical changes the brain. No acute cervical fracture identified; degenerative changes in cervical spine. Follow-up/further evaluation can be obtained as indicated.  This report was finalized on 10/21/2020 7:21 PM by Dr. Myron Rutledge M.D.      Ct Cervical Spine Without Contrast    Result Date: 10/21/2020  CT HEAD WO CONTRAST-, CT CERVICAL SPINE WO CONTRAST-  INDICATIONS: Trauma  TECHNIQUE: Radiation dose reduction techniques were utilized, including automated exposure control and exposure modulation based on body size. Noncontrast head CT, cervical spine CT  COMPARISON: . Assessment/2018  FINDINGS:  Head CT:  Stable appearing bilateral deep brain stimulator leads with associated attenuation artifact.  No acute intracranial hemorrhage, midline shift or mass effect. No  acute territorial infarct is identified.  Mild periventricular hypodensities suggest chronic small vessel ischemic change in a patient this age.  Arterial calcifications are seen at the base of the brain.  Ventricles, cisterns, cerebral sulci are unremarkable for patient age.  Mild mucosal thickening in the x-ray sinuses. Small partial opacification of left mastoid air cells. The visualized paranasal sinuses, orbits, mastoid air cells are otherwise unremarkable.    Cervical spine CT:  Mild anterolisthesis of C2 on C3, mild retrolisthesis of C4 on C5.  No acute fracture is identified.  Bilateral neuroforaminal narrowing related to facet and uncovertebral joint hypertrophy is noted, more prominent on the right C4/5, C5/6, C6/7, and on the left at C3/4, C4/5, C5/6, C6/7. Disc osteophyte complex appears to result in mild to moderate central stenosis. C3-C7; intracanalicular contents are however suboptimally evaluated without intrathecal contrast material..         No acute intracranial hemorrhage or hydrocephalus; chronic and postsurgical changes the brain. No acute cervical fracture identified; degenerative changes in cervical spine. Follow-up/further evaluation can be obtained as indicated.  This report was finalized on 10/21/2020 7:21 PM by Dr. Myron Rutledge M.D.      Xr Spine Lumbar Complete 4+vw    Result Date: 10/21/2020  XR SPINE LUMBAR COMPLETE 4+VW-, XR SPINE THORACIC 3 VW-, XR HIP W OR WO PELVIS 2-3 VIEW LEFT-, XR FEMUR 2 VW LEFT-, XR KNEE 3 VW LEFT-  INDICATIONS: Trauma  TECHNIQUE: 6 views of the lumbar spine, 4 views of the thoracic spine, frontal view of the pelvis, 2 views of the left hip, 2 views of the left femur, 3 views of the left knee  COMPARISON: None available  FINDINGS:  Thoracic and lumbar spine: Mild anterolisthesis of L5 on S1. Alignment is otherwise in range of normal. Vertebral body heights appear preserved. No acute fracture is identified. Multilevel endplate spurring, disc space  narrowing, facet arthropathy. Arterial calcification is present.  Pelvis, left hip, left femur, left knee: No acute fracture is identified. The pelvic ring appears intact. The hip joint spaces appear preserved. Left knee arthroplasty hardware appears intact. Small degenerative spurring at the left femoral head. Arterial calcification is present. Mild left knee effusion.  Follow-up/further evaluation can be obtained as indications persist.       As described.    This report was finalized on 10/21/2020 7:36 PM by Dr. Myron Rutledge M.D.      Xr Hip With Or Without Pelvis 2 - 3 View Left    Result Date: 10/21/2020  XR SPINE LUMBAR COMPLETE 4+VW-, XR SPINE THORACIC 3 VW-, XR HIP W OR WO PELVIS 2-3 VIEW LEFT-, XR FEMUR 2 VW LEFT-, XR KNEE 3 VW LEFT-  INDICATIONS: Trauma  TECHNIQUE: 6 views of the lumbar spine, 4 views of the thoracic spine, frontal view of the pelvis, 2 views of the left hip, 2 views of the left femur, 3 views of the left knee  COMPARISON: None available  FINDINGS:  Thoracic and lumbar spine: Mild anterolisthesis of L5 on S1. Alignment is otherwise in range of normal. Vertebral body heights appear preserved. No acute fracture is identified. Multilevel endplate spurring, disc space narrowing, facet arthropathy. Arterial calcification is present.  Pelvis, left hip, left femur, left knee: No acute fracture is identified. The pelvic ring appears intact. The hip joint spaces appear preserved. Left knee arthroplasty hardware appears intact. Small degenerative spurring at the left femoral head. Arterial calcification is present. Mild left knee effusion.  Follow-up/further evaluation can be obtained as indications persist.       As described.    This report was finalized on 10/21/2020 7:36 PM by Dr. Myron Rutledge M.D.        I ordered the above noted radiological studies. Independently reviewed by me and discussed with radiologist.  See dictation above for official radiology  interpretation.      PROCEDURES    Procedures        MEDICATIONS GIVEN IN ER    Medications   morphine injection 2 mg (2 mg Intravenous Given 10/21/20 1749)   ondansetron (ZOFRAN) injection 4 mg (4 mg Intravenous Given 10/21/20 1749)         PROGRESS, DATA ANALYSIS, CONSULTS, AND MEDICAL DECISION MAKING    All labs have been independently reviewed by me.  All radiology studies have been reviewed by me.   EKG's independently reviewed by me.  Discussion below represents my analysis of pertinent findings related to patient's condition, differential diagnosis, treatment plan and final disposition.    DIFFERENTIAL DIAGNOSIS INCLUDE BUT NOT LIMITED TO: Closed head injury, concussion, skull fracture, cervical strain, fracture, dislocation, hematoma    ED Course as of Oct 21 2244   Wed Oct 21, 2020   1740 Patient placed in cervical collar given neck pain and history of trauma.    [JS]   1920 I viewed x-ray of the left knee on PACS system.  My findings are no fracture.    [JS]   1920 I viewed x-ray of the left femur on PACS system.  My findings are no fracture.    [JS]   1937 CT imaging of cervical spine negative.  C-collar removed.    [JS]   2000 The patient ambulated as per baseline with ED tech.  There is no pain with weightbearing of the left lower extremity.  Discussed plan for discharge and follow-up with his family doctor and orthopedic doctor with his wife is at the bedside.  She and patient are agreeable with the plan.      [JS]      ED Course User Index  [JS] Jennifer Mercado APRN       AS OF 22:44 EDT VITALS:        BP - 136/77  HR - 66  TEMP - 97.9 °F (36.6 °C) (Tympanic)  02 SATS - 96%          DIAGNOSIS  Final diagnoses:   Fall from standing, initial encounter   Parkinson disease (CMS/HCC)   Acute strain of neck muscle, initial encounter   Knee strain, left, initial encounter   Acute midline low back pain without sciatica   Current use of long term anticoagulation         DISPOSITION  Discharge    Pt  masked in first look. I wore a surgical mask and protective eye wear throughout my encounters with the pt. I performed hand hygiene on entry into the pt room and upon exit.     Dictated utilizing Dragon dictation:  Much of this encounter note is an electronic transcription/translation of spoken language to printed text. The electronic translation of spoken language may permit erroneous, or at times, nonsensical words or phrases to be inadvertently transcribed; Although I have reviewed the note for such errors, some may still exist.       Jennifer Mercado, APRN  10/21/20 [id number]

## 2020-10-22 NOTE — DISCHARGE INSTRUCTIONS
Home to rest  Ice to sore areas  Follow up with your family doctor or orthopedic if you have ongoing pain  Turn to ER for new or worsening concerns such as dizziness, headaches, vision changes, vomiting or any new problems.  Continue care with your primary care physician and have your blood pressure regularly checked and managed. Normal blood pressure is 120/80.

## 2021-05-01 ENCOUNTER — HOSPITAL ENCOUNTER (EMERGENCY)
Facility: HOSPITAL | Age: 71
Discharge: HOME | End: 2021-05-01
Attending: EMERGENCY MEDICINE | Admitting: EMERGENCY MEDICINE

## 2021-05-01 ENCOUNTER — APPOINTMENT (OUTPATIENT)
Dept: GENERAL RADIOLOGY | Facility: HOSPITAL | Age: 71
End: 2021-05-01

## 2021-05-01 ENCOUNTER — APPOINTMENT (OUTPATIENT)
Dept: CT IMAGING | Facility: HOSPITAL | Age: 71
End: 2021-05-01

## 2021-05-01 VITALS
BODY MASS INDEX: 33.86 KG/M2 | WEIGHT: 250 LBS | HEART RATE: 65 BPM | TEMPERATURE: 98.2 F | OXYGEN SATURATION: 97 % | DIASTOLIC BLOOD PRESSURE: 78 MMHG | RESPIRATION RATE: 16 BRPM | SYSTOLIC BLOOD PRESSURE: 127 MMHG | HEIGHT: 72 IN

## 2021-05-01 DIAGNOSIS — S22.42XA CLOSED FRACTURE OF MULTIPLE RIBS OF LEFT SIDE, INITIAL ENCOUNTER: Primary | ICD-10-CM
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
## 2021-05-01 DIAGNOSIS — S80.212A ABRASION OF LEFT KNEE, INITIAL ENCOUNTER: ICD-10-CM

## 2021-05-01 LAB
BACTERIA UR QL AUTO: NORMAL /HPF
BILIRUB UR QL STRIP: NEGATIVE
CLARITY UR: CLEAR
COLOR UR: ABNORMAL
GLUCOSE UR STRIP-MCNC: NEGATIVE MG/DL
HGB UR QL STRIP.AUTO: NEGATIVE
HYALINE CASTS UR QL AUTO: NORMAL /LPF
KETONES UR QL STRIP: ABNORMAL
LEUKOCYTE ESTERASE UR QL STRIP.AUTO: NEGATIVE
NITRITE UR QL STRIP: NEGATIVE
PH UR STRIP.AUTO: <=5 [PH] (ref 5–8)
PROT UR QL STRIP: ABNORMAL
RBC # UR: NORMAL /HPF
REF LAB TEST METHOD: NORMAL
SP GR UR STRIP: >=1.03 (ref 1–1.03)
SQUAMOUS #/AREA URNS HPF: NORMAL /HPF
UROBILINOGEN UR QL STRIP: ABNORMAL
WBC UR QL AUTO: NORMAL /HPF

## 2021-05-01 PROCEDURE — 76376 3D RENDER W/INTRP POSTPROCES: CPT

## 2021-05-01 PROCEDURE — 73560 X-RAY EXAM OF KNEE 1 OR 2: CPT

## 2021-05-01 PROCEDURE — 99283 EMERGENCY DEPT VISIT LOW MDM: CPT

## 2021-05-01 PROCEDURE — 71250 CT THORAX DX C-: CPT

## 2021-05-01 PROCEDURE — 81001 URINALYSIS AUTO W/SCOPE: CPT | Performed by: EMERGENCY MEDICINE

## 2021-05-01 RX ORDER — HYDROCODONE BITARTRATE AND ACETAMINOPHEN 7.5; 325 MG/1; MG/1
1 TABLET ORAL ONCE
Status: COMPLETED | OUTPATIENT
Start: 2021-05-01 | End: 2021-05-01

## 2021-05-01 RX ORDER — HYDROCODONE BITARTRATE AND ACETAMINOPHEN 5; 325 MG/1; MG/1
1 TABLET ORAL EVERY 6 HOURS PRN
Qty: 12 TABLET | Refills: 0 | Status: ON HOLD | OUTPATIENT
Start: 2021-05-01 | End: 2022-03-23

## 2021-05-01 RX ADMIN — HYDROCODONE BITARTRATE AND ACETAMINOPHEN 1 TABLET: 7.5; 325 TABLET ORAL at 20:28

## 2021-05-01 RX ADMIN — HYDROCODONE BITARTRATE AND ACETAMINOPHEN 1 TABLET: 7.5; 325 TABLET ORAL at 16:14

## 2021-05-01 NOTE — ED PROVIDER NOTES
" EMERGENCY DEPARTMENT ENCOUNTER    Room Number:  13/13  Date of encounter:  5/1/2021  PCP: Harvey Larson MD  Historian: Patient     I used full protective equipment while examining this patient.  This includes face mask, gloves and protective eyewear.  I washed my hands before entering the room and immediately upon leaving the room.  Patient was wearing a surgical mask.      HPI:  Chief Complaint: Left rib pain  A complete HPI/ROS/PMH/PSH/SH/FH are unobtainable due to: None    Context: Edilberto Reeder is a 70 y.o. male who presents to the ED c/o left rib pain after falling off his riding lawnmower.  Patient was trying to get off his lawnmower when he \"tripped over my own feet\" and fell and landed on the gravel.  This occurred around 1 PM today.  He complains of constant left posterior rib and left knee injury.  He has been able to ambulate.  Rib pain is worse with movement and taking deep breaths.  Pain is moderate in intensity.  Patient takes Eliquis but denies hitting his head, loss consciousness, headache, neck pain, back pain, numbness/tingling/weakness of extremities, shortness of breath, or abdominal pain.      PAST MEDICAL HISTORY  Active Ambulatory Problems     Diagnosis Date Noted   • ASCVD (arteriosclerotic cardiovascular disease) 01/27/2016   • Paroxysmal atrial fibrillation (CMS/HCC) 01/27/2016   • Anticoagulated on warfarin 01/27/2016   • Diabetic retinopathy associated with type 2 diabetes mellitus (CMS/HCC) 01/27/2016   • Essential hypertension 01/27/2016   • HLD (hyperlipidemia) 01/27/2016   • Hypothyroidism 01/27/2016   • Peripheral neuropathy 01/27/2016   • Renal insufficiency 01/27/2016   • Diabetes (CMS/HCC)    • Parkinson's disease (CMS/HCC)    • Dyspnea on exertion 04/19/2018   • Atrial fibrillation (CMS/HCC) [I48.91] 08/17/2018   • Stroke-like symptoms 12/08/2018   • Hypopotassemia 12/08/2018     Resolved Ambulatory Problems     Diagnosis Date Noted   • Type 2 diabetes mellitus (CMS/HCC) " 01/27/2016   • Atrial fibrillation with RVR (CMS/HCC) 01/04/2018     Past Medical History:   Diagnosis Date   • Atrial flutter (CMS/HCC)    • Hypertension          PAST SURGICAL HISTORY  Past Surgical History:   Procedure Laterality Date   • BRAIN STIMULATOR     • JOINT REPLACEMENT      Bilateral shoulder and knee replacements         FAMILY HISTORY  History reviewed. No pertinent family history.      SOCIAL HISTORY  Social History     Socioeconomic History   • Marital status:      Spouse name: Not on file   • Number of children: Not on file   • Years of education: Not on file   • Highest education level: Not on file   Tobacco Use   • Smoking status: Never Smoker   • Smokeless tobacco: Never Used   • Tobacco comment: caffeine use   Substance and Sexual Activity   • Alcohol use: Yes   • Drug use: No   • Sexual activity: Defer         ALLERGIES  Bacitracin, Neosporin [neomycin-bacitracin zn-polymyx], Fish-derived products, Shellfish allergy, and Shrimp (diagnostic)       REVIEW OF SYSTEMS  Review of Systems      All systems have been reviewed and are negative except as as discussed in the HPI    PHYSICAL EXAM    I have reviewed the triage vital signs and nursing notes.    ED Triage Vitals   Temp Heart Rate Resp BP SpO2   05/01/21 1515 05/01/21 1458 05/01/21 1458 05/01/21 1511 05/01/21 1458   98.2 °F (36.8 °C) 64 16 135/92 96 %      Temp src Heart Rate Source Patient Position BP Location FiO2 (%)   05/01/21 1515 05/01/21 1458 05/01/21 1511 05/01/21 1511 --   Tympanic Monitor Sitting Right arm        Physical Exam  GENERAL: Awake, alert  HENT: NCAT, nares patent, moist mucous membranes  NECK: supple, no cervical spine tenderness  EYES: Extraocular muscles intact  CV: regular rhythm, regular rate  RESPIRATORY: normal effort, clear to auscultation bilaterally  ABDOMEN: soft, nontender, mild left CVA tenderness  MUSCULOSKELETAL: There is tenderness over the left posterior ribs.  No crepitus.  There is a small  contusion on the left posterior flank.  There is an abrasion on the left lateral knee.  There is mild tenderness of the left lateral knee.  Extremities are  without obvious deformity.  There is normal range of motion in all extremities.  Pelvis is stable.  No tenderness of the thoracic or lumbar spine.  NEURO: Strength, sensation, and coordination are grossly intact.  Speech and mentation are unremarkable.  No facial droop.  SKIN: warm, dry, no rash  PSYCH: Normal mood and affect      LAB RESULTS  Recent Results (from the past 24 hour(s))   Urinalysis With Microscopic If Indicated (No Culture) - Urine, Clean Catch    Collection Time: 05/01/21  4:47 PM    Specimen: Urine, Clean Catch   Result Value Ref Range    Color, UA Dark Yellow (A) Yellow, Straw    Appearance, UA Clear Clear    pH, UA <=5.0 5.0 - 8.0    Specific Gravity, UA >=1.030 1.005 - 1.030    Glucose, UA Negative Negative    Ketones, UA 15 mg/dL (1+) (A) Negative    Bilirubin, UA Negative Negative    Blood, UA Negative Negative    Protein, UA 30 mg/dL (1+) (A) Negative    Leuk Esterase, UA Negative Negative    Nitrite, UA Negative Negative    Urobilinogen, UA 1.0 E.U./dL 0.2 - 1.0 E.U./dL   Urinalysis, Microscopic Only - Urine, Clean Catch    Collection Time: 05/01/21  4:47 PM    Specimen: Urine, Clean Catch   Result Value Ref Range    RBC, UA 0-2 None Seen, 0-2 /HPF    WBC, UA 0-2 None Seen, 0-2 /HPF    Bacteria, UA None Seen None Seen /HPF    Squamous Epithelial Cells, UA 0-2 None Seen, 0-2 /HPF    Hyaline Casts, UA 3-6 None Seen /LPF    Methodology Automated Microscopy        Ordered the above labs and independently reviewed the results.      RADIOLOGY  XR Knee 1 or 2 View Left    Result Date: 5/1/2021  TWO-VIEW LEFT KNEE  HISTORY: Fell. Pain.  FINDINGS: The patient has had previous total knee replacement and there is some joint space narrowing between the components. There is no evidence of acute fracture or joint effusion.  This report was finalized  on 5/1/2021 4:23 PM by Dr. Filemon Montero M.D.      CT Chest Without Contrast Diagnostic    Result Date: 5/1/2021  Patient: GABBIE YARBROUGH  Time Out: 19:46 Exam(s): CT CHEST Without Contrast EXAM:   CT Chest Without Intravenous Contrast CLINICAL HISTORY:    Fell off lawnmower, left posterior chest wall pain and bruising  Reason for exam: Fell off lawnmower, left posterior chest wall pain and bruising. TECHNIQUE:   Axial computed tomography images of the chest without intravenous contrast.  CTDI is 16.40 mGy and DLP is 795 mGy-cm.  This CT exam was performed according to the principle of ALARA (As Low As Reasonably Achievable) by using one or more of the following dose reduction techniques: automated exposure control, adjustment of the mA and or kV according to patient size, and or use of iterative reconstruction technique. COMPARISON:   CT chest on 11 16 2015. FINDINGS:   Lungs:  Small calcified granulomas at the left lung base.  Mild dependent atelectasis bilaterally.   Pleural space:  No significant pleural effusion or pneumothorax.   Heart:  Unremarkable.  No cardiomegaly.  No significant pericardial effusion.   Mediastinum:  Calcified left hilar lymph nodes.   Bones joints:  Nondisplaced acute fractures of the left posterior 12th rib and posterior lateral 11th rib.  Likely chronic fracture deformities of the left posterolateral fifth and eighth ribs.  Bilateral shoulder arthroplasties.  Degenerative changes of the spine.  No dislocation.   Soft tissues: Mild bruising in the left flank soft tissues.   Vasculature:  Atherosclerotic changes of the aorta.  No aortic aneurysm.   Lymph nodes:  See above.   Pancreas:  Mild atrophy of the pancreas.   Kidneys and ureters:  Large left renal cyst.  Nonspecific bilateral perinephric fat stranding.   Tubes, lines and devices:  Right-sided pacemaker.  Coronary artery calcifications. IMPRESSION:     1.  Nondisplaced acute fractures of the left posterior 12th rib and  posterior lateral 11th rib. 2.  Likely chronic fracture deformities of the left posterolateral fifth and eighth ribs. 3.  No significant pleural effusion or pneumothorax. 4.  Mild bruising in the left flank soft tissues. 5.  No acute pulmonary parenchymal abnormality identified.    Electronically signed by Ana Lilia Neumann M.D. on 05-01-21 at 1946      I ordered the above noted radiological studies. Reviewed by me and discussed with radiologist.  See dictation for official radiology interpretation.      PROCEDURES  Procedures      MEDICATIONS GIVEN IN ER    Medications   HYDROcodone-acetaminophen (NORCO) 7.5-325 MG per tablet 1 tablet (1 tablet Oral Given 5/1/21 1614)   HYDROcodone-acetaminophen (NORCO) 7.5-325 MG per tablet 1 tablet (1 tablet Oral Given 5/1/21 2028)         PROGRESS, DATA ANALYSIS, CONSULTS, AND MEDICAL DECISION MAKING    All labs have been independently reviewed by me.  All radiology studies have been reviewed by me and discussed with radiologist dictating the report.   EKG's independently viewed and interpreted by me.  I have reviewed the nurse's notes, vital signs, past medical history, and medication list.  Discussion below represents my analysis of pertinent findings related to patient's condition, differential diagnosis, treatment plan and final disposition.      ED Course as of May 01 2154   Sat May 01, 2021   1644 Left knee x-rays interpreted by the radiologist and independently viewed by me.  X-rays negative acute.    [WH]   1925 Patient CT is still not been read.  I called the radiology film room to inquire about this.  They are going to look into it.    [WH]   1950 CTs not been read.    [WH]   2005 Test results discussed with the patient.  He states his pain is improved.  He will be discharged with an incentive spirometer and a prescription for [place].  Return precautions were discussed.    [WH]      ED Course User Index  [WH] Alejandro Decker MD       AS OF 21:54 EDT VITALS:    BP -  127/78  HR - 65  TEMP - 98.2 °F (36.8 °C) (Tympanic)  O2 SATS - 97%      DIAGNOSIS  Final diagnoses:   Closed fracture of multiple ribs of left side, initial encounter   Abrasion of left knee, initial encounter   Fall, initial encounter         DISPOSITION  Discharge    DISCHARGE    Patient discharged in stable condition.    Reviewed implications of results, diagnosis, meds, responsibility to follow up, warning signs and symptoms of possible worsening, potential complications and reasons to return to ER, including worsening pain, shortness of breath, fever, or other concern..    Patient/Family voiced understanding of above instructions.    Discussed plan for discharge, as there is no emergent indication for admission. Patient referred to primary care provider for BP management due to today's BP. Pt/family is agreeable and understands need for follow up and repeat testing.  Pt is aware that discharge does not mean that nothing is wrong but it indicates no emergency is present that requires admission and they must continue care with follow-up as given below or physician of their choice.     FOLLOW-UP  Harvey Larson MD  [address] [facility] [address] [phone]    Schedule an appointment as soon as possible for a visit            Medication List      New Prescriptions    HYDROcodone-acetaminophen 5-325 MG per tablet  Commonly known as: NORCO  Take 1 tablet by mouth Every 6 (Six) Hours As Needed for Moderate Pain .           Where to Get Your Medications      These medications [facility] - [place], [place] - [address] [place] [address] - [phone]  - [phone] [address] [address]    Phone: [phone]   · HYDROcodone-acetaminophen 5-325 MG per tablet           Dictated utilizing Dragon dictation:  Much of this encounter note is an electronic transcription/translation of spoken language to printed text. The electronic translation of spoken language  may permit erroneous, or at times, nonsensical words or phrases to be inadvertently transcribed; Although I have reviewed the note for such errors, some may still exist.     Alejandro Decker MD  05/01/21 0947

## 2021-05-01 NOTE — ED TRIAGE NOTES
Pt reports left rib pain following a fall off of his lawnmower about 2hrs ago. Pt reports his foot was caught causing him to fall. Pt reports pain 10/10.    Pt was wearing a mask during assessment.  This RN wore appropriate PPE

## 2021-05-02 NOTE — DISCHARGE INSTRUCTIONS
Using incentives from [employer] once an hour while awake.  Take medication as prescribed.  Return to emergency department for increased pain, trouble breathing, fever, or other concern.  Follow-up with your primary care doctor if symptoms are not improving in the next 3 to 4 days.

## 2021-09-17 ENCOUNTER — APPOINTMENT (OUTPATIENT)
Dept: GENERAL RADIOLOGY | Facility: HOSPITAL | Age: 71
End: 2021-09-17

## 2021-09-17 ENCOUNTER — APPOINTMENT (OUTPATIENT)
Dept: CARDIOLOGY | Facility: HOSPITAL | Age: 71
End: 2021-09-17

## 2021-09-17 ENCOUNTER — HOSPITAL ENCOUNTER (OUTPATIENT)
Facility: HOSPITAL | Age: 71
Setting detail: OBSERVATION
Discharge: HOME | End: 2021-09-18
Attending: EMERGENCY MEDICINE | Admitting: EMERGENCY MEDICINE

## 2021-09-17 DIAGNOSIS — Z86.69 HISTORY OF PARKINSON'S DISEASE: ICD-10-CM

## 2021-09-17 DIAGNOSIS — M25.571 RIGHT ANKLE PAIN, UNSPECIFIED CHRONICITY: ICD-10-CM

## 2021-09-17 DIAGNOSIS — M79.604 RIGHT LEG PAIN: Primary | ICD-10-CM

## 2021-09-17 PROBLEM — M25.579 ANKLE PAIN: Status: ACTIVE | Noted: 2021-09-17

## 2021-09-17 LAB
ALBUMIN SERPL-MCNC: 4.5 G/DL (ref 3.5–5.2)
ALBUMIN/GLOB SERPL: 1.7 G/DL
ALP SERPL-CCNC: 85 U/L (ref 39–117)
ALT SERPL W P-5'-P-CCNC: <5 U/L (ref 1–41)
ANION GAP SERPL CALCULATED.3IONS-SCNC: 11.2 MMOL/L (ref 5–15)
AST SERPL-CCNC: 8 U/L (ref 1–40)
BASOPHILS # BLD AUTO: 0.06 10*3/MM3 (ref 0–0.2)
BASOPHILS NFR BLD AUTO: 0.7 % (ref 0–1.5)
BH CV LOW VAS RIGHT GREATER SAPH BK VESSEL: 1
BH CV LOW VAS RIGHT SOLEAL VESSEL: 1
BH CV LOWER VASCULAR LEFT COMMON FEMORAL AUGMENT: NORMAL
BH CV LOWER VASCULAR LEFT COMMON FEMORAL COMPETENT: NORMAL
BH CV LOWER VASCULAR LEFT COMMON FEMORAL COMPRESS: NORMAL
BH CV LOWER VASCULAR LEFT COMMON FEMORAL PHASIC: NORMAL
BH CV LOWER VASCULAR LEFT COMMON FEMORAL SPONT: NORMAL
BH CV LOWER VASCULAR RIGHT COMMON FEMORAL AUGMENT: NORMAL
BH CV LOWER VASCULAR RIGHT COMMON FEMORAL COMPETENT: NORMAL
BH CV LOWER VASCULAR RIGHT COMMON FEMORAL COMPRESS: NORMAL
BH CV LOWER VASCULAR RIGHT COMMON FEMORAL PHASIC: NORMAL
BH CV LOWER VASCULAR RIGHT COMMON FEMORAL SPONT: NORMAL
BH CV LOWER VASCULAR RIGHT DISTAL FEMORAL COMPRESS: NORMAL
BH CV LOWER VASCULAR RIGHT GASTRONEMIUS COMPRESS: NORMAL
BH CV LOWER VASCULAR RIGHT GREATER SAPH AK COMPRESS: NORMAL
BH CV LOWER VASCULAR RIGHT GREATER SAPH BK COMPRESS: NORMAL
BH CV LOWER VASCULAR RIGHT GREATER SAPH BK THROMBUS: NORMAL
BH CV LOWER VASCULAR RIGHT LESSER SAPH COMPRESS: NORMAL
BH CV LOWER VASCULAR RIGHT MID FEMORAL AUGMENT: NORMAL
BH CV LOWER VASCULAR RIGHT MID FEMORAL COMPETENT: NORMAL
BH CV LOWER VASCULAR RIGHT MID FEMORAL COMPRESS: NORMAL
BH CV LOWER VASCULAR RIGHT MID FEMORAL PHASIC: NORMAL
BH CV LOWER VASCULAR RIGHT MID FEMORAL SPONT: NORMAL
BH CV LOWER VASCULAR RIGHT PERONEAL COMPRESS: NORMAL
BH CV LOWER VASCULAR RIGHT POPLITEAL AUGMENT: NORMAL
BH CV LOWER VASCULAR RIGHT POPLITEAL COMPETENT: NORMAL
BH CV LOWER VASCULAR RIGHT POPLITEAL COMPRESS: NORMAL
BH CV LOWER VASCULAR RIGHT POPLITEAL PHASIC: NORMAL
BH CV LOWER VASCULAR RIGHT POPLITEAL SPONT: NORMAL
BH CV LOWER VASCULAR RIGHT POSTERIOR TIBIAL COMPRESS: NORMAL
BH CV LOWER VASCULAR RIGHT PROFUNDA FEMORAL COMPRESS: NORMAL
BH CV LOWER VASCULAR RIGHT PROXIMAL FEMORAL COMPRESS: NORMAL
BH CV LOWER VASCULAR RIGHT SAPHENOFEMORAL JUNCTION COMPRESS: NORMAL
BH CV LOWER VASCULAR RIGHT SOLEAL COMPRESS: NORMAL
BH CV LOWER VASCULAR RIGHT SOLEAL THROMBUS: NORMAL
BH CV LOWER VASCULAR RIGHT VARICOSITY BK COMPRESS: NORMAL
BILIRUB SERPL-MCNC: 1.1 MG/DL (ref 0–1.2)
BUN SERPL-MCNC: 12 MG/DL (ref 8–23)
BUN/CREAT SERPL: 13.3 (ref 7–25)
CALCIUM SPEC-SCNC: 8.7 MG/DL (ref 8.6–10.5)
CHLORIDE SERPL-SCNC: 100 MMOL/L (ref 98–107)
CO2 SERPL-SCNC: 25.8 MMOL/L (ref 22–29)
CREAT SERPL-MCNC: 0.9 MG/DL (ref 0.76–1.27)
CRP SERPL-MCNC: 1.5 MG/DL (ref 0–0.5)
DEPRECATED RDW RBC AUTO: 40.9 FL (ref 37–54)
EOSINOPHIL # BLD AUTO: 0.3 10*3/MM3 (ref 0–0.4)
EOSINOPHIL NFR BLD AUTO: 3.5 % (ref 0.3–6.2)
ERYTHROCYTE [DISTWIDTH] IN BLOOD BY AUTOMATED COUNT: 13.2 % (ref 12.3–15.4)
ERYTHROCYTE [SEDIMENTATION RATE] IN BLOOD: 11 MM/HR (ref 0–20)
GFR SERPL CREATININE-BSD FRML MDRD: 83 ML/MIN/1.73
GLOBULIN UR ELPH-MCNC: 2.6 GM/DL
GLUCOSE SERPL-MCNC: 91 MG/DL (ref 65–99)
HCT VFR BLD AUTO: 37.6 % (ref 37.5–51)
HGB BLD-MCNC: 12.3 G/DL (ref 13–17.7)
IMM GRANULOCYTES # BLD AUTO: 0.02 10*3/MM3 (ref 0–0.05)
IMM GRANULOCYTES NFR BLD AUTO: 0.2 % (ref 0–0.5)
LYMPHOCYTES # BLD AUTO: 0.96 10*3/MM3 (ref 0.7–3.1)
LYMPHOCYTES NFR BLD AUTO: 11.1 % (ref 19.6–45.3)
MAXIMAL PREDICTED HEART RATE: 147 BPM
MCH RBC QN AUTO: 27.9 PG (ref 26.6–33)
MCHC RBC AUTO-ENTMCNC: 32.7 G/DL (ref 31.5–35.7)
MCV RBC AUTO: 85.3 FL (ref 79–97)
MONOCYTES # BLD AUTO: 0.65 10*3/MM3 (ref 0.1–0.9)
MONOCYTES NFR BLD AUTO: 7.5 % (ref 5–12)
NEUTROPHILS NFR BLD AUTO: 6.65 10*3/MM3 (ref 1.7–7)
NEUTROPHILS NFR BLD AUTO: 77 % (ref 42.7–76)
NRBC BLD AUTO-RTO: 0 /100 WBC (ref 0–0.2)
PLATELET # BLD AUTO: 195 10*3/MM3 (ref 140–450)
PMV BLD AUTO: 10.3 FL (ref 6–12)
POTASSIUM SERPL-SCNC: 3.8 MMOL/L (ref 3.5–5.2)
PROT SERPL-MCNC: 7.1 G/DL (ref 6–8.5)
RBC # BLD AUTO: 4.41 10*6/MM3 (ref 4.14–5.8)
SARS-COV-2 RNA RESP QL NAA+PROBE: NOT DETECTED
SODIUM SERPL-SCNC: 137 MMOL/L (ref 136–145)
STRESS TARGET HR: 125 BPM
URATE SERPL-MCNC: 5.4 MG/DL (ref 3.4–7)
WBC # BLD AUTO: 8.64 10*3/MM3 (ref 3.4–10.8)

## 2021-09-17 PROCEDURE — 96374 THER/PROPH/DIAG INJ IV PUSH: CPT

## 2021-09-17 PROCEDURE — 73630 X-RAY EXAM OF FOOT: CPT

## 2021-09-17 PROCEDURE — 86140 C-REACTIVE PROTEIN: CPT | Performed by: NURSE PRACTITIONER

## 2021-09-17 PROCEDURE — 85025 COMPLETE CBC W/AUTO DIFF WBC: CPT | Performed by: EMERGENCY MEDICINE

## 2021-09-17 PROCEDURE — 84550 ASSAY OF BLOOD/URIC ACID: CPT | Performed by: EMERGENCY MEDICINE

## 2021-09-17 PROCEDURE — G0378 HOSPITAL OBSERVATION PER HR: HCPCS

## 2021-09-17 PROCEDURE — 85652 RBC SED RATE AUTOMATED: CPT | Performed by: NURSE PRACTITIONER

## 2021-09-17 PROCEDURE — 25010000002 CEFAZOLIN 1-4 GM/50ML-% SOLUTION: Performed by: EMERGENCY MEDICINE

## 2021-09-17 PROCEDURE — C9803 HOPD COVID-19 SPEC COLLECT: HCPCS

## 2021-09-17 PROCEDURE — 25010000002 MORPHINE PER 10 MG: Performed by: NURSE PRACTITIONER

## 2021-09-17 PROCEDURE — 99284 EMERGENCY DEPT VISIT MOD MDM: CPT

## 2021-09-17 PROCEDURE — 73590 X-RAY EXAM OF LOWER LEG: CPT

## 2021-09-17 PROCEDURE — 96375 TX/PRO/DX INJ NEW DRUG ADDON: CPT

## 2021-09-17 PROCEDURE — 73610 X-RAY EXAM OF ANKLE: CPT

## 2021-09-17 PROCEDURE — U0003 INFECTIOUS AGENT DETECTION BY NUCLEIC ACID (DNA OR RNA); SEVERE ACUTE RESPIRATORY SYNDROME CORONAVIRUS 2 (SARS-COV-2) (CORONAVIRUS DISEASE [COVID-19]), AMPLIFIED PROBE TECHNIQUE, MAKING USE OF HIGH THROUGHPUT TECHNOLOGIES AS DESCRIBED BY CMS-2020-01-R: HCPCS | Performed by: EMERGENCY MEDICINE

## 2021-09-17 PROCEDURE — 93971 EXTREMITY STUDY: CPT

## 2021-09-17 PROCEDURE — 25010000002 ONDANSETRON PER 1 MG: Performed by: NURSE PRACTITIONER

## 2021-09-17 PROCEDURE — 80053 COMPREHEN METABOLIC PANEL: CPT | Performed by: EMERGENCY MEDICINE

## 2021-09-17 RX ORDER — ONDANSETRON 2 MG/ML
4 INJECTION INTRAMUSCULAR; INTRAVENOUS ONCE
Status: COMPLETED | OUTPATIENT
Start: 2021-09-17 | End: 2021-09-17

## 2021-09-17 RX ORDER — SODIUM CHLORIDE 0.9 % (FLUSH) 0.9 %
10 SYRINGE (ML) INJECTION AS NEEDED
Status: DISCONTINUED | OUTPATIENT
Start: 2021-09-17 | End: 2021-09-18 | Stop reason: HOSPADM

## 2021-09-17 RX ORDER — CEFAZOLIN SODIUM 1 G/50ML
1 INJECTION, SOLUTION INTRAVENOUS ONCE
Status: COMPLETED | OUTPATIENT
Start: 2021-09-17 | End: 2021-09-17

## 2021-09-17 RX ORDER — MORPHINE SULFATE 2 MG/ML
4 INJECTION, SOLUTION INTRAMUSCULAR; INTRAVENOUS ONCE
Status: COMPLETED | OUTPATIENT
Start: 2021-09-17 | End: 2021-09-17

## 2021-09-17 RX ADMIN — MORPHINE SULFATE 4 MG: 2 INJECTION, SOLUTION INTRAMUSCULAR; INTRAVENOUS at 17:21

## 2021-09-17 RX ADMIN — CEFAZOLIN SODIUM 1 G: 1 INJECTION, SOLUTION INTRAVENOUS at 23:57

## 2021-09-17 RX ADMIN — ONDANSETRON 4 MG: 2 INJECTION INTRAMUSCULAR; INTRAVENOUS at 17:21

## 2021-09-17 NOTE — ED PROVIDER NOTES
EMERGENCY DEPARTMENT ENCOUNTER    Room Number:  A02/02  Date of encounter:  9/17/2021  PCP: Harvey Larson MD  Historian: Patient      PPE    Patient was placed in face mask in first look. Patient was wearing facemask when I entered the room and throughout our encounter. I wore full protective equipment throughout this patient encounter including a CAPR face mask, and gloves. Hand hygiene was performed before donning protective equipment and after removal when leaving the room.        HPI:  Chief Complaint: Right foot pain  A complete HPI/ROS/PMH/PSH/SH/FH are unobtainable due to: Nothing    Context: Edilberto Reeder is a 71 y.o. male who arrives to the ED via private vehicle.  Patient presents with c/o mild, constant, right foot pain for the past several days.  Patient and family states that this is an ongoing issue however it is been more painful over the past 2 to 3 days.  Patient denies fever, chills, chest pain, shortness of breath, recent injury to his foot.  Patient states that he does have a history of gout which he takes allopurinol for.  Patient states that nothing makes the symptoms better and weightbearing worsens symptoms.          PAST MEDICAL HISTORY  Active Ambulatory Problems     Diagnosis Date Noted   • ASCVD (arteriosclerotic cardiovascular disease) 01/27/2016   • Paroxysmal atrial fibrillation (CMS/HCC) 01/27/2016   • Anticoagulated on warfarin 01/27/2016   • Diabetic retinopathy associated with type 2 diabetes mellitus (CMS/[facility]) 01/27/2016   • Essential hypertension 01/27/2016   • HLD (hyperlipidemia) 01/27/2016   • Hypothyroidism 01/27/2016   • Peripheral neuropathy 01/27/2016   • Renal insufficiency 01/27/2016   • Diabetes (CMS/[facility])    • Parkinson's disease (CMS/[facility])    • Dyspnea on exertion 04/19/2018   • Atrial fibrillation (CMS/[facility]) [I48.91] 08/17/2018   • Stroke-like symptoms 12/08/2018   • Hypopotassemia 12/08/2018     Resolved Ambulatory Problems     Diagnosis Date Noted   • Type 2  diabetes mellitus (CMS/[facility]) 01/27/2016   • Atrial fibrillation with RVR (CMS/[facility]) 01/04/2018     Past Medical History:   Diagnosis Date   • Atrial flutter (CMS/[facility])    • Hypertension          PAST SURGICAL HISTORY  Past Surgical History:   Procedure Laterality Date   • BRAIN STIMULATOR     • JOINT REPLACEMENT      Bilateral shoulder and knee replacements         FAMILY HISTORY  No family history on file.      SOCIAL HISTORY  Social History     Socioeconomic History   • Marital status:      Spouse name: Not on file   • Number of children: Not on file   • Years of education: Not on file   • Highest education level: Not on file   Tobacco Use   • Smoking status: Never Smoker   • Smokeless tobacco: Never Used   • Tobacco comment: caffeine use   Substance and Sexual Activity   • Alcohol use: Yes   • Drug use: No   • Sexual activity: Defer         ALLERGIES  Bacitracin, Neosporin [neomycin-bacitracin zn-polymyx], Fish-derived products, Shellfish allergy, and Shrimp (diagnostic)        REVIEW OF SYSTEMS  Review of Systems     All systems reviewed and negative except for those discussed in HPI.        PHYSICAL EXAM    ED Triage Vitals [09/17/21 1536]   Temp Heart Rate Resp BP SpO2   98 °F (36.7 °C) 63 16 126/78 99 %       Physical Exam  GENERAL: chronically ill appearing, non-toxic appearing, not distressed  HENT: normocephalic, atraumatic  EYES: no scleral icterus, PERRL  CV: regular rhythm, regular rate, no murmur  RESPIRATORY: normal effort, CTAB  ABDOMEN: soft   MUSCULOSKELETAL: no deformity  Patient has erythema and swelling to right foot and lower leg, with tenderness to palpation to the dorsum of right foot. Decreased flexion and dorsiflexion. 2+ DP and PT pulses. Tenderness to right calf.   NEURO: alert, moves all extremities, follows commands, mental status normal/baseline  SKIN: warm, dry, no rash   Psych: Appropriate mood and affect  Nursing notes and vital signs reviewed      LAB RESULTS  Recent Results  (from the past 24 hour(s))   CBC Auto Differential    Collection Time: 09/17/21  4:40 PM    Specimen: Blood   Result Value Ref Range    WBC 8.64 3.40 - 10.80 10*3/mm3    RBC 4.41 4.14 - 5.80 10*6/mm3    Hemoglobin 12.3 (L) 13.0 - 17.7 g/dL    Hematocrit 37.6 37.5 - 51.0 %    MCV 85.3 79.0 - 97.0 fL    MCH 27.9 26.6 - 33.0 pg    MCHC 32.7 31.5 - 35.7 g/dL    RDW 13.2 12.3 - 15.4 %    RDW-SD 40.9 37.0 - 54.0 fl    MPV 10.3 6.0 - 12.0 fL    Platelets 195 140 - 450 10*3/mm3    Neutrophil % 77.0 (H) 42.7 - 76.0 %    Lymphocyte % 11.1 (L) 19.6 - 45.3 %    Monocyte % 7.5 5.0 - 12.0 %    Eosinophil % 3.5 0.3 - 6.2 %    Basophil % 0.7 0.0 - 1.5 %    Immature Grans % 0.2 0.0 - 0.5 %    Neutrophils, Absolute 6.65 1.70 - 7.00 10*3/mm3    Lymphocytes, Absolute 0.96 0.70 - 3.10 10*3/mm3    Monocytes, Absolute 0.65 0.10 - 0.90 10*3/mm3    Eosinophils, Absolute 0.30 0.00 - 0.40 10*3/mm3    Basophils, Absolute 0.06 0.00 - 0.20 10*3/mm3    Immature Grans, Absolute 0.02 0.00 - 0.05 10*3/mm3    nRBC 0.0 0.0 - 0.2 /100 WBC       Ordered the above labs and independently reviewed the results.      RADIOLOGY  XR Foot 3+ View Right    Result Date: 9/17/2021  EXAMINATION: 3 VIEWS OF THE RIGHT FOOT  HISTORY: 71-year-old male with a complaint of right foot pain.  FINDINGS: PA, oblique and lateral radiographs of the right foot were obtained and demonstrate a mild hallux valgus deformity. A fracture is not appreciated and no other malalignment is noted. There is a plantar osteophyte arising from the calcaneus. Mild osteophytic change involving the midfoot joint spaces is noted. Mild soft tissue swelling overlying the dorsum of the midfoot is noted. Atheromatous calcification within the vasculature is appreciated.      1. There is a mild hallux valgus deformity. 2. A plantar surface calcaneal osteophyte is noted. This may be seen in the setting of plantar fasciitis. 3. There is mild osteophytic change of the midfoot. 4. No acute abnormality of  the right foot is appreciated.        I ordered the above noted radiological studies and viewed the images on the PACS system.       MEDICAL RECORD REVIEW  Medical records reviewed in [facility], patient has been here multiple times for injuries related to falls.      PROCEDURES    Procedures        DIFFERENTIAL DIAGNOSIS  Differential Diagnosis for Lower Extremity Injury/trauma include but are not limited to the following:    Hip fracture/dislocation/contusion/sprain  Pelvic Fracture  Knee contusion/sprain/fracture/dislocation/internal derangement  Fracture/sprain of the femur, tibia, fibula, ankle, foot or digits  Claudication, Peripheral Vascular Disease, Gout, Osteoarthritis, Bursitis, Septic Joint, DVT          PROGRESS, DATA ANALYSIS, CONSULTS, AND MEDICAL DECISION MAKING        ED Course as of Sep 18 0603   Fri Sep 17, 2021   1653 Discussed pertinent information from history and physical exam with patient.  Discussed differential diagnosis and plan for ED evaluation/work-up and treatment including labs, x-rays of the right foot, ankle and tib-fib to evaluate for fracture, venous Doppler of the right lower extremity to rule out DVT,.  All questions answered.  Patient is agreeable with this plan.        [MS]   1654 Reviewed pt's history and workup with Dr. Salmon.  After a bedside evaluation, they agree with the plan of care.          [MS]   1842 C-Reactive Protein(!): 1.50 [MS]   1910 Discussed with [place] vascular tech, patient is negative for DVT in the right lower extremity.    [MS]   1939 Care transferred to Dr Salmon pending x ray results, orthopedic consult and final disposition.  See his note for final disposition.      [MS]   1950 To orthopedics, Dr. Hammond he was in agreement to watch the patient in the obs unit.    [KS]      ED Course User Index  [KS] Kannan Salmon MD  [MS] Amarilys Neal, APRN      Diagnosis Plan   1. Right leg pain     2. Right ankle pain, unspecified chronicity     3. History of  Parkinson's disease             MEDICATIONS GIVEN IN ED    Medications   sodium chloride 0.9 % flush 10 mL (has no administration in time range)   morphine injection 4 mg (has no administration in time range)   ondansetron (ZOFRAN) injection 4 mg (has no administration in time range)           COURSE & MEDICAL DECISION MAKING  Any/All labs and Any/All Imaging studies that were ordered were reviewed and are noted above.  Results were reviewed/discussed with the patient and they were also made aware of online access.    Pt also made aware that some labs, such as cultures, will not be resulted during ER visit and follow up with PMD is necessary.        Amarilys Neal, APRN  09/18/21 0604

## 2021-09-17 NOTE — ED NOTES
Patient was wearing a face mask throughout our encounter.  I wore a Face Shield and N95 throughout the encounter.  Hand hygiene was performed before and after patient encounter.       Swelling to right foot that has been ongoing chronically but worsened over the past few days.  Pt has a history of gout,      Nicolás, Harvey, RN  09/17/21 6172

## 2021-09-17 NOTE — ED PROVIDER NOTES
Subjective   71-year-old male with past medical history of A. fib, diabetes, hyperlipidemia, Parkinson's, hypertension here for chief complaint of right leg pain.  History was obtained from the patient.  The patient states that this is been going on for about a month.  He states that it got worse yesterday.  He states that he has pain in his calf, ankle, and midfoot.  He denies any trauma.  He denies any falls.  Patient states that the pain is sharp, worse with movement, improved with rest, moderate, does not radiate.  Patient states that it feels like his previous gout pain.  Patient denies any fevers or chills.  Patient denies any previous history of PE or DVT.  Patient denies any chest pain, shortness of breath, abdominal pain, nausea, vomiting, headaches, vision changes, or any other symptoms.          Review of Systems   All other systems reviewed and are negative.      Past Medical History:   Diagnosis Date   • Atrial fibrillation with RVR (CMS/HCC)    • Atrial flutter (CMS/HCC)    • Diabetes (CMS/HCC)    • HLD (hyperlipidemia) 1/27/2016   • Hypertension    • Parkinson's disease (CMS/HCC)     Advanced        Allergies   Allergen Reactions   • Bacitracin Anaphylaxis   • Neosporin [Neomycin-Bacitracin Zn-Polymyx] Other (See Comments)     BP drops and heart stops!   • Fish-Derived Products Hives   • Shellfish Allergy Hives   • Shrimp (Diagnostic) Hives       Past Surgical History:   Procedure Laterality Date   • BRAIN STIMULATOR     • JOINT REPLACEMENT      Bilateral shoulder and knee replacements       No family history on file.    Social History     Socioeconomic History   • Marital status:      Spouse name: Not on file   • Number of children: Not on file   • Years of education: Not on file   • Highest education level: Not on file   Tobacco Use   • Smoking status: Never Smoker   • Smokeless tobacco: Never Used   • Tobacco comment: caffeine use   Substance and Sexual Activity   • Alcohol use: Yes   • Drug  use: No   • Sexual activity: Defer           Objective   Physical Exam  Constitutional:       General: He is not in acute distress.     Appearance: He is not ill-appearing, toxic-appearing or diaphoretic.   HENT:      Head: Normocephalic and atraumatic.      Right Ear: External ear normal.      Left Ear: External ear normal.      Nose: Nose normal. No congestion or rhinorrhea.      Mouth/Throat:      Mouth: Mucous membranes are moist.      Pharynx: Oropharynx is clear. No oropharyngeal exudate or posterior oropharyngeal erythema.   Eyes:      General: No scleral icterus.        Right eye: No discharge.         Left eye: No discharge.      Extraocular Movements: Extraocular movements intact.      Conjunctiva/sclera: Conjunctivae normal.      Pupils: Pupils are equal, round, and reactive to light.   Cardiovascular:      Rate and Rhythm: Normal rate and regular rhythm.      Pulses: Normal pulses.      Heart sounds: Normal heart sounds. No murmur heard.   No friction rub. No gallop.    Pulmonary:      Effort: Pulmonary effort is normal. No respiratory distress.      Breath sounds: Normal breath sounds. No stridor. No wheezing, rhonchi or rales.   Chest:      Chest wall: No tenderness.   Abdominal:      General: Abdomen is flat. Bowel sounds are normal. There is no distension.      Palpations: Abdomen is soft.      Tenderness: There is no abdominal tenderness. There is no right CVA tenderness, left CVA tenderness, guarding or rebound.   Musculoskeletal:         General: Tenderness (right foot, ankle. mid-foot, and calf) present. No swelling, deformity or signs of injury. Normal range of motion.      Cervical back: Normal range of motion and neck supple. No rigidity or tenderness.      Right lower leg: No edema.      Left lower leg: No edema.      Comments: RLE: 2+ pedal pulses, soft compartments, tenderness to palpation at the midfoot, ankle, tib-fib, no tenderness to palpation at the knee, femur, no obvious injury  noted.  Patient denies any injuries to any other extremity.  Patient has pain on plantar flexion dorsiflexion   Skin:     General: Skin is warm and dry.      Coloration: Skin is not jaundiced or pale.   Neurological:      General: No focal deficit present.      Mental Status: He is alert and oriented to person, place, and time.      Motor: No weakness.   Psychiatric:         Mood and Affect: Mood normal.         Behavior: Behavior normal.         Procedures           ED Course  ED Course as of Sep 17 2122   Fri Sep 17, 2021   1653 Discussed pertinent information from history and physical exam with patient.  Discussed differential diagnosis and plan for ED evaluation/work-up and treatment including labs, x-rays of the right foot, ankle and tib-fib to evaluate for fracture, venous Doppler of the right lower extremity to rule out DVT,.  All questions answered.  Patient is agreeable with this plan.        [MS]   1654 Reviewed pt's history and workup with Dr. Salmon.  After a bedside evaluation, they agree with the plan of care.          [MS]   1842 C-Reactive Protein(!): 1.50 [MS]   1910 Discussed with [place] vascular tech, patient is negative for DVT in the right lower extremity.    [MS]   1939 Care transferred to Dr Salmon pending x ray results, orthopedic consult and final disposition.  See his note for final disposition.      [MS]   1950 To orthopedics, Dr. Hammond he was in agreement to watch the patient in the obs unit.    [KS]      ED Course User Index  [KS] Kannan Salmon MD  [MS] Amarilys Neal, APRN                                           MDM  Number of Diagnoses or Management Options     Amount and/or Complexity of Data Reviewed  Clinical lab tests: ordered and reviewed  Tests in the radiology section of CPT®: ordered and reviewed    Risk of Complications, Morbidity, and/or Mortality  General comments: When I first saw the patient, the patient appeared nontoxic.  He was complaining of right ankle and  lower tib-fib pain.  He was also complaining of right midfoot pain.  We did get x-rays that were negative for an acute fracture.  There was some swelling noted of the right leg on the x-ray.  Patient did have limited range of motion at the ankle.  I did get ESR and CRP that were almost normal.  I did consult orthopedic surgery.  Orthopedic recommended that the patient be kept overnight and they will see him in the morning.  I did do a bedside ultrasound there was concern for possible cellulitis at the right ankle.  Patient did have 2+ pulses.  Patient's compartments are soft.  I did order 1 dose of Ancef in the ED.  At this point, the decision was made to admit the patient to the ED observation unit.  I spoke to Dr. Larson and handed the patient over to him.  He is aware of the pending orthopedic consult and will followed up.  I defer all further management of this patient to the ED observation unit and the orthopedic surgeon.  Patient's pain was better when I reevaluated him.        Final diagnoses:   Right leg pain   Right ankle pain, unspecified chronicity   History of Parkinson's disease       ED Disposition  ED Disposition     ED Disposition Condition Comment    Decision to Admit            No follow-up provider specified.       Medication List      No changes were made to your prescriptions during this visit.          Kannan Salmon MD  09/17/21 7098

## 2021-09-17 NOTE — PROGRESS NOTES
Vascular lab preliminary    Right lower extremity venous duplex exam is complete    Right leg is negative for acute DVT/SVT      Spoke with Amarilys Neal APRN    Final report to follow

## 2021-09-18 ENCOUNTER — DOCUMENTATION (OUTPATIENT)
Dept: TELEMETRY | Facility: HOSPITAL | Age: 71
End: 2021-09-18

## 2021-09-18 VITALS
SYSTOLIC BLOOD PRESSURE: 109 MMHG | TEMPERATURE: 97.9 F | BODY MASS INDEX: 35.79 KG/M2 | RESPIRATION RATE: 18 BRPM | HEART RATE: 64 BPM | DIASTOLIC BLOOD PRESSURE: 70 MMHG | OXYGEN SATURATION: 96 % | HEIGHT: 70 IN | WEIGHT: 250 LBS

## 2021-09-18 PROBLEM — M10.071 ACUTE IDIOPATHIC GOUT OF RIGHT ANKLE: Status: ACTIVE | Noted: 2021-09-18

## 2021-09-18 LAB
DEPRECATED RDW RBC AUTO: 39.6 FL (ref 37–54)
ERYTHROCYTE [DISTWIDTH] IN BLOOD BY AUTOMATED COUNT: 13.1 % (ref 12.3–15.4)
HCT VFR BLD AUTO: 31.5 % (ref 37.5–51)
HGB BLD-MCNC: 10.4 G/DL (ref 13–17.7)
MCH RBC QN AUTO: 27.4 PG (ref 26.6–33)
MCHC RBC AUTO-ENTMCNC: 33 G/DL (ref 31.5–35.7)
MCV RBC AUTO: 83.1 FL (ref 79–97)
PLATELET # BLD AUTO: 152 10*3/MM3 (ref 140–450)
PMV BLD AUTO: 10.9 FL (ref 6–12)
RBC # BLD AUTO: 3.79 10*6/MM3 (ref 4.14–5.8)
WBC # BLD AUTO: 7.07 10*3/MM3 (ref 3.4–10.8)

## 2021-09-18 PROCEDURE — G0378 HOSPITAL OBSERVATION PER HR: HCPCS

## 2021-09-18 PROCEDURE — 85027 COMPLETE CBC AUTOMATED: CPT | Performed by: EMERGENCY MEDICINE

## 2021-09-18 RX ORDER — METHYLPREDNISOLONE 4 MG/1
TABLET ORAL
Qty: 21 TABLET | Refills: 0 | Status: SHIPPED | OUTPATIENT
Start: 2021-09-18 | End: 2022-02-11 | Stop reason: ALTCHOICE

## 2021-09-18 RX ORDER — CARVEDILOL 3.12 MG/1
3.12 TABLET ORAL 2 TIMES DAILY
Status: DISCONTINUED | OUTPATIENT
Start: 2021-09-18 | End: 2021-09-18 | Stop reason: HOSPADM

## 2021-09-18 RX ORDER — MECLIZINE HYDROCHLORIDE 25 MG/1
25 TABLET ORAL 3 TIMES DAILY PRN
COMMUNITY
End: 2022-03-25 | Stop reason: HOSPADM

## 2021-09-18 RX ORDER — ASPIRIN 81 MG/1
81 TABLET, CHEWABLE ORAL EVERY OTHER DAY
COMMUNITY
End: 2023-02-14

## 2021-09-18 RX ORDER — SODIUM CHLORIDE 0.9 % (FLUSH) 0.9 %
10 SYRINGE (ML) INJECTION AS NEEDED
Status: DISCONTINUED | OUTPATIENT
Start: 2021-09-18 | End: 2021-09-18 | Stop reason: HOSPADM

## 2021-09-18 RX ORDER — TRAZODONE HYDROCHLORIDE 50 MG/1
50 TABLET ORAL NIGHTLY
Status: DISCONTINUED | OUTPATIENT
Start: 2021-09-18 | End: 2021-09-18 | Stop reason: HOSPADM

## 2021-09-18 RX ORDER — GLIPIZIDE 5 MG/1
5 TABLET ORAL
Status: DISCONTINUED | OUTPATIENT
Start: 2021-09-18 | End: 2021-09-18 | Stop reason: SDUPTHER

## 2021-09-18 RX ORDER — ASPIRIN 81 MG/1
81 TABLET, CHEWABLE ORAL DAILY
Status: DISCONTINUED | OUTPATIENT
Start: 2021-09-18 | End: 2021-09-18 | Stop reason: HOSPADM

## 2021-09-18 RX ORDER — NITROGLYCERIN 0.4 MG/1
0.4 TABLET SUBLINGUAL
Status: DISCONTINUED | OUTPATIENT
Start: 2021-09-18 | End: 2021-09-18 | Stop reason: HOSPADM

## 2021-09-18 RX ORDER — MECLIZINE HYDROCHLORIDE 25 MG/1
25 TABLET ORAL 3 TIMES DAILY PRN
Status: DISCONTINUED | OUTPATIENT
Start: 2021-09-18 | End: 2021-09-18 | Stop reason: HOSPADM

## 2021-09-18 RX ORDER — GLIPIZIDE 10 MG/1
10 TABLET ORAL
COMMUNITY
End: 2022-02-11 | Stop reason: ALTCHOICE

## 2021-09-18 RX ORDER — CHOLECALCIFEROL (VITAMIN D3) 125 MCG
500 CAPSULE ORAL DAILY
Status: DISCONTINUED | OUTPATIENT
Start: 2021-09-18 | End: 2021-09-18 | Stop reason: HOSPADM

## 2021-09-18 RX ORDER — ACETAMINOPHEN 325 MG/1
650 TABLET ORAL EVERY 4 HOURS PRN
Status: DISCONTINUED | OUTPATIENT
Start: 2021-09-18 | End: 2021-09-18 | Stop reason: HOSPADM

## 2021-09-18 RX ORDER — CITALOPRAM 20 MG/1
20 TABLET ORAL DAILY
Status: DISCONTINUED | OUTPATIENT
Start: 2021-09-18 | End: 2021-09-18 | Stop reason: HOSPADM

## 2021-09-18 RX ORDER — HYDROCODONE BITARTRATE AND ACETAMINOPHEN 5; 325 MG/1; MG/1
1 TABLET ORAL EVERY 6 HOURS PRN
Status: DISCONTINUED | OUTPATIENT
Start: 2021-09-18 | End: 2021-09-18 | Stop reason: HOSPADM

## 2021-09-18 RX ORDER — SODIUM CHLORIDE 0.9 % (FLUSH) 0.9 %
10 SYRINGE (ML) INJECTION EVERY 12 HOURS SCHEDULED
Status: DISCONTINUED | OUTPATIENT
Start: 2021-09-18 | End: 2021-09-18 | Stop reason: HOSPADM

## 2021-09-18 RX ORDER — GLIPIZIDE 10 MG/1
10 TABLET ORAL
Status: DISCONTINUED | OUTPATIENT
Start: 2021-09-18 | End: 2021-09-18 | Stop reason: HOSPADM

## 2021-09-18 RX ORDER — ALLOPURINOL 100 MG/1
100 TABLET ORAL DAILY
Status: DISCONTINUED | OUTPATIENT
Start: 2021-09-18 | End: 2021-09-18 | Stop reason: HOSPADM

## 2021-09-18 RX ORDER — CHOLECALCIFEROL (VITAMIN D3) 125 MCG
500 CAPSULE ORAL DAILY
COMMUNITY
End: 2022-02-11 | Stop reason: ALTCHOICE

## 2021-09-18 RX ORDER — SULFAMETHOXAZOLE AND TRIMETHOPRIM 800; 160 MG/1; MG/1
1 TABLET ORAL EVERY 12 HOURS SCHEDULED
Status: DISCONTINUED | OUTPATIENT
Start: 2021-09-18 | End: 2021-09-18 | Stop reason: HOSPADM

## 2021-09-18 RX ADMIN — ASPIRIN 81 MG: 81 TABLET, CHEWABLE ORAL at 08:45

## 2021-09-18 RX ADMIN — Medication 500 MCG: at 08:46

## 2021-09-18 RX ADMIN — Medication 10 ML: at 08:46

## 2021-09-18 RX ADMIN — CARBIDOPA AND LEVODOPA 1 TABLET: 25; 100 TABLET ORAL at 06:36

## 2021-09-18 RX ADMIN — SULFAMETHOXAZOLE AND TRIMETHOPRIM 1 TABLET: 800; 160 TABLET ORAL at 08:45

## 2021-09-18 RX ADMIN — APIXABAN 5 MG: 5 TABLET, FILM COATED ORAL at 08:45

## 2021-09-18 RX ADMIN — CARVEDILOL 3.12 MG: 3.12 TABLET, FILM COATED ORAL at 08:45

## 2021-09-18 RX ADMIN — GLIPIZIDE 10 MG: 10 TABLET ORAL at 08:45

## 2021-09-18 RX ADMIN — ALLOPURINOL 100 MG: 100 TABLET ORAL at 08:46

## 2021-09-18 RX ADMIN — METFORMIN HYDROCHLORIDE 1000 MG: 500 TABLET, FILM COATED ORAL at 09:34

## 2021-09-18 RX ADMIN — HYDROCODONE BITARTRATE AND ACETAMINOPHEN 1 TABLET: 5; 325 TABLET ORAL at 06:03

## 2021-09-18 RX ADMIN — CITALOPRAM 20 MG: 20 TABLET, FILM COATED ORAL at 08:46

## 2021-09-18 NOTE — ED NOTES
This RN called wife to update pt status. Wife was told that we are waiting on x-rays to be resulted but that the physician was planning on admission to consult orthopedics. Wife states that she is running home to get patient's parkinson's medication and bring him clothes.      Angeline Ambrosio, RN  09/17/21 2600

## 2021-09-18 NOTE — H&P
[facility]   HISTORY AND PHYSICAL    Patient Name: Edilberto Reeder  : 1950  MRN: [id number]  Primary Care Physician:  Harvey Larson MD  Date of admission: 2021    Subjective   Subjective     Chief Complaint: Right leg pain    HPI:    Edilberto Reeder is a 71 y.o. male right lower extremity pain and redness.  No fever, no significant swelling, but is having difficulty ambulating.  Patient has Parkinson's and wife is a caretaker.    Review of Systems   All systems were reviewed and negative except for: Leg pain and skin redness    Personal History     Past Medical History:   Diagnosis Date   • Atrial fibrillation with RVR (CMS/HCC)    • Atrial flutter (CMS/HCC)    • Diabetes (CMS/HCC)    • HLD (hyperlipidemia) 2016   • Hypertension    • Parkinson's disease (CMS/HCC)     Advanced        Past Surgical History:   Procedure Laterality Date   • BRAIN STIMULATOR     • JOINT REPLACEMENT      Bilateral shoulder and knee replacements       Family History: family history is not on file. Otherwise pertinent FHx was reviewed and not pertinent to current issue.    Social History:  reports that he has never smoked. He has never used smokeless tobacco. He reports current alcohol use. He reports that he does not use drugs.    Home Medications:  Carbidopa-Levodopa ER, HYDROcodone-acetaminophen, Polyvinyl Alcohol-Povidone PF, allopurinol, apixaban, aspirin, carvedilol, citalopram, gabapentin, glipizide, meclizine, metFORMIN, simvastatin, traZODone, and vitamin B-12    Allergies:  Allergies   Allergen Reactions   • Bacitracin Anaphylaxis   • Neosporin [Neomycin-Bacitracin Zn-Polymyx] Other (See Comments)     BP drops and heart stops!   • Fish-Derived Products Hives   • Shellfish Allergy Hives   • Shrimp (Diagnostic) Hives       Objective   Objective     Vitals:   Temp:  [97.6 °F (36.4 °C)-98 °F (36.7 °C)] 97.6 °F (36.4 °C)  Heart Rate:  [62-68] 63  Resp:  [16-18] 18  BP: (107-132)/(65-78) 132/73  Physical  Exam   GENERAL: Awake and alert, very pleasant  HENT: nares patent  EYES: no scleral icterus  CV: regular rhythm, regular rate  RESPIRATORY: normal effort  ABDOMEN: soft  MUSCULOSKELETAL: no deformity.  Some erythema on the anterior aspect of the right lower extremity just above the ankle.  It is warm but not indurated and no significant swelling.  Pulses symmetric and intact, no fluctuance  NEURO: Strength, sensation, and coordination are grossly intact.  Speech and mentation are unremarkable  SKIN: warm, dry     Result Review    Result Review:  I have personally reviewed the results from the time of this admission to 9/18/2021 05:24 EDT and agree with these findings:  [x]  Laboratory  []  Microbiology  []  Radiology  []  EKG/Telemetry   [x]  Cardiology/Vascular   []  Pathology  []  Old records  []  Other:  Most notable findings include: Unremarkable work-up, negative DVT  Assessment/Plan   Assessment / Plan     Brief Patient Summary:  Edilberto Reeder is a 71 y.o. male who presents with what we presume is cellulitis of the right lower extremity.  Venous Doppler is negative, Ortho consult is pending, and his wife is the caretaker at the bedside    Active Hospital Problems:  Active Hospital Problems    Diagnosis    • Ankle pain      Plan:       DVT prophylaxis:  Medical and mechanical DVT prophylaxis orders are present.    CODE STATUS:    Level Of Support Discussed With: Patient  Code Status: CPR  Medical Interventions (Level of Support Prior to Arrest): Full    Admission Status:  I believe this patient meets observation status.    Electronically signed by Shakeel Larson MD, 09/18/21, 5:24 AM EDT.

## 2021-09-18 NOTE — PROGRESS NOTES
[facility]     Progress Note    Patient Name: Edilberto Reeder  : 1950  MRN: [id number]  Primary Care Physician:  Harvey Larson MD  Date of admission: 2021    Subjective   Subjective     Chief Complaint: Right leg pain    HPI:  Patient Reports right lower extremity pain with redness at this time he has no fever and no significant swelling that has remained unchanged since last night patient is having difficulty ambulating at this time and he is reporting some increased pain that is localized to that area.  The patient has Parkinson's his wife is his caretaker.  Typically the patient ambulates with a walker or cane at home.  On exam the patient does report that he does have a history of gout or gouty arthritis.  Typically takes allopurinol for the treatment of this.    Review of Systems  all systems were reviewed and are negative except for right lower leg pain and erythema      Objective   Objective     Vitals:   Temp:  [97.6 °F (36.4 °C)-98 °F (36.7 °C)] 97.9 °F (36.6 °C)  Heart Rate:  [62-68] 64  Resp:  [16-18] 18  BP: (107-132)/(65-78) 109/70  Physical Exam   Physical Exam             GENERAL: Awake and alert, very pleasant NAD  HENT: nares patent, left external ear normal right external ear normal  EYES: no scleral icterus no nystagmus   CV: regular rhythm, regular rate  RESPIRATORY: normal effort normal rate  ABDOMEN: soft nontender no rigidity  MUSCULOSKELETAL: no deformity.   Localized erythema on the anterior aspect of the right lower extremity just above the ankle.    No significant edema localized warmth Pulses symmetric and intact, no fluctuance  NEURO: Strength, sensation, and coordination are grossly intact.  Speech and mentation are unremarkable  SKIN: warm, dry             Result Review    Result Review:  I have personally reviewed the results from the time of this admission to 2021 10:01 EDT and agree with these findings:  [x]  Laboratory  []  Microbiology  [x]   Radiology  []  EKG/Telemetry   []  Cardiology/Vascular   []  Pathology  []  Old records  []  Other:  Most notable findings include: Unremarkable work-up negative DVT    Assessment/Plan   Assessment / Plan     Brief Patient Summary:  Edilberto Reeder is a 71 y.o. male who who presents with suspected cellulitis of the right lower extremity.  DVT work-up is negative Ortho consult is pending his wife is at the bedside.  Patient reports some localized increased pain in the right lower extremity but has been receiving p.o. Norco.  History of gout.    Active Hospital Problems:  Active Hospital Problems    Diagnosis    • Ankle pain      Plan:   Orthopedic: consult, per recommendation of orthopedic Jennifer DIAZ will plan discharge the patient on a Medrol Dosepak for the treatment of acute gout.    Will order Medrol Dosepak to be sent to the patient's primary pharmacy for the treatment of acute gout.    DVT prophylaxis:  Medical and mechanical DVT prophylaxis orders are present.    CODE STATUS:   Level Of Support Discussed With: Patient  Code Status: CPR  Medical Interventions (Level of Support Prior to Arrest): Full    Disposition:  I expect patient to be discharged today 9/18/2021.    Update: Patient discharged safely from the ED Obs unit, this note will serve as the discharge summary.    Electronically signed by ESME Batista, 09/18/21, 10:01 AM EDT.

## 2021-09-18 NOTE — CONSULTS
Orthopaedic Consultation      Patient: Edilberto Reeder    Date of Admission: 9/17/2021  4:01 PM    YOB: 1950    Medical Record Number: [id number]    Attending Physician:  Wade Collins MD    Consulting Physician:  Jennifer Silvestre PA-C        Chief Complaints: Right ankle and foot pain and swelling.    History of Present Illness: The patient is a 71-year-old male with a past medical history significant for gout, atrial fibrillation, diabetes, hyperlipidemia, hypertension, Parkinson's who presented to [facility] emergency department for right ankle foot pain and swelling.  Patient states the foot pain and swelling occurred 1 week ago after returning from vacation.  Patient denies injury or trauma to the right lower extremity.  Patient states he has a history of gout and feels that this may be a gout flare.  Patient denies fever or chills.  Patient had a venous Doppler ultrasound negative for DVT.  Patient has chronic thrombophlebitis.  Orthopedics was consulted for further management.  Allergies:   Allergies   Allergen Reactions   • Bacitracin Anaphylaxis   • Neosporin [Neomycin-Bacitracin Zn-Polymyx] Other (See Comments)     BP drops and heart stops!   • Fish-Derived Products Hives   • Shellfish Allergy Hives   • Shrimp (Diagnostic) Hives       Medications:   Home Medications:  Medications Prior to Admission   Medication Sig Dispense Refill Last Dose   • allopurinol (ZYLOPRIM) 100 MG tablet Take 100 mg by mouth Daily.   9/17/2021 at Unknown time   • apixaban (ELIQUIS) 5 MG tablet tablet Take 5 mg by mouth 2 (Two) Times a Day.   9/17/2021 at Unknown time   • Carbidopa-Levodopa ER (Rytary) 23.75-95 MG capsule controlled-release Take  by mouth.   9/17/2021 at Unknown time   • citalopram (CeleXA) 10 MG tablet Take 20 mg by mouth Daily.   9/17/2021 at Unknown time   • gabapentin (NEURONTIN) 800 MG tablet Take 800 mg by mouth Daily.   9/17/2021 at Unknown time   • glipizide  (GLUCOTROL) 10 MG tablet Take 10 mg by mouth 2 (Two) Times a Day Before Meals.      • meclizine (ANTIVERT) 25 MG tablet Take 25 mg by mouth 3 (Three) Times a Day As Needed for Dizziness.      • metFORMIN (GLUCOPHAGE) 500 MG tablet Take 1,000 mg by mouth 2 (Two) Times a Day With Meals.   9/17/2021 at Unknown time   • simvastatin (ZOCOR) 80 MG tablet Take 80 mg by mouth Every Night.   9/17/2021 at Unknown time   • vitamin B-12 (CYANOCOBALAMIN) 500 MCG tablet Take 500 mcg by mouth Daily.      • aspirin 81 MG chewable tablet Chew 81 mg Daily.      • carvedilol (COREG) 3.125 MG tablet TAKE ONE TABLET BY MOUTH TWICE A  tablet 3    • glipiZIDE (GLUCOTROL XL) 10 MG 24 hr tablet Take 10 mg by mouth.      • HYDROcodone-acetaminophen (NORCO) 5-325 MG per tablet Take 1 tablet by mouth Every 6 (Six) Hours As Needed for Moderate Pain . 12 tablet 0    • Polyvinyl Alcohol-Povidone PF (HYPOTEARS) 1.4-0.6 % ophthalmic solution 1-2 drops As Needed for Wound Care.      • RYTARY 36. MG capsule controlled-release       • traZODone (DESYREL) 50 MG tablet Take 50 mg by mouth Every Night.          Current Medications:  Scheduled Meds:allopurinol, 100 mg, Oral, Daily  apixaban, 5 mg, Oral, Q12H  aspirin, 81 mg, Oral, Daily  carbidopa-levodopa, 1 tablet, Oral, Q8H  carvedilol, 3.125 mg, Oral, BID  citalopram, 20 mg, Oral, Daily  glipizide, 10 mg, Oral, BID AC  metFORMIN, 1,000 mg, Oral, BID With Meals  sodium chloride, 10 mL, Intravenous, Q12H  sulfamethoxazole-trimethoprim, 1 tablet, Oral, Q12H  traZODone, 50 mg, Oral, Nightly  vitamin B-12, 500 mcg, Oral, Daily      Continuous Infusions:   PRN Meds:.•  acetaminophen  •  HYDROcodone-acetaminophen  •  meclizine  •  nitroglycerin  •  [COMPLETED] Insert peripheral IV **AND** sodium chloride  •  sodium chloride    Past Medical History:   Diagnosis Date   • Atrial fibrillation with RVR (CMS/HCC)    • Atrial flutter (CMS/HCC)    • Diabetes (CMS/HCC)    • HLD (hyperlipidemia) 1/27/2016  "  • Hypertension    • Parkinson's disease (CMS/HCC)     Advanced      Past Surgical History:   Procedure Laterality Date   • BRAIN STIMULATOR     • JOINT REPLACEMENT      Bilateral shoulder and knee replacements     Social History     Occupational History   • Not on file   Tobacco Use   • Smoking status: Never Smoker   • Smokeless tobacco: Never Used   • Tobacco comment: caffeine use   Substance and Sexual Activity   • Alcohol use: Yes   • Drug use: No   • Sexual activity: Defer      Social History     Social History Narrative   • Not on file     History reviewed. No pertinent family history.      Review of Systems:   No other pertinent positives or negatives other than what is mentioned in the HPI and below.  Constitutional: Negative for fatigue, fever, or weight loss  HEENT: No active headache.  Pulmonary: Patient denies SOA.  Cardiovascular: Patient denies any chest pain.  Gastrointestinal:  Patient denies active vomiting or diarrhea.  Musculoskeletal: Positive for right ankle and foot swelling and pain.  Neurological: Patient denies active dizziness or loss of consciousness.  Skin: Patient denies any active bleeding.    Vital signs in last 24 hours:  Temp:  [97.6 °F (36.4 °C)-98 °F (36.7 °C)] 97.9 °F (36.6 °C)  Heart Rate:  [62-68] 64  Resp:  [16-18] 18  BP: (107-132)/(65-78) 109/70  Vitals:    09/17/21 2008 09/17/21 2300 09/18/21 0457 09/18/21 0759   BP: 121/70 107/65 132/73 109/70   BP Location:  Left arm Left arm Left arm   Patient Position:  Sitting Lying Lying   Pulse: 68 62 63 64   Resp: 16 16 18 18   Temp:   97.6 °F (36.4 °C) 97.9 °F (36.6 °C)   TempSrc:   Oral Oral   SpO2: 98%  98% 96%   Weight: 113 kg (250 lb)      Height: 177.8 cm (70\")             Physical Exam: 71 y.o. male         General Appearance:  Alert, cooperative, in no acute distress    HEENT:    Atraumatic, Pupils are equal   Neck:   Cervical spine midline, no appreciable JVD   Lungs:     Breathing non-labored and chest rise symmetric    " Heart:   Abdomen:     Rectal:       Extremities:   Pulses  Neurovascular:   Skin:   Musculoskeletal:      Pulse regular    Soft, Non-tender or distended    Deferred        No clubbing, cyanosis, or edema    Intact    Cranial nerves 2 - 12 grossly intact, sensation intact    No skin lesions  Examination of the right lower extremity was performed today.  No appreciable skin lesions.  Diffuse swelling dorsum of the foot and anterior ankle.  Tenderness over the tibiotalar joint.  Nontender great toe.  Nontender digits 2-5.  Intact EHL, FHL, TA, GS.  Dorsiflexion to neutral.  50 degrees plantar flexion.  5 degrees inversion.  5 degrees eversion.  Calf supple and nontender.  Negative Homans' sign.  Foot is warm and well-perfused.  2+ dorsal pedal pulse.  2+ posterior tibialis pulse.  Neurovascularly and sensation intact.     Diagnostic Tests:    Results from last 7 days   Lab Units 09/18/21  0721   WBC 10*3/mm3 7.07   HEMOGLOBIN g/dL 10.4*   HEMATOCRIT % 31.5*   PLATELETS 10*3/mm3 152     Results from last 7 days   Lab Units 09/17/21  1640   SODIUM mmol/L 137   POTASSIUM mmol/L 3.8   CHLORIDE mmol/L 100   CO2 mmol/L 25.8   BUN mg/dL 12   CREATININE mg/dL 0.90   GLUCOSE mg/dL 91   CALCIUM mg/dL 8.7             Assessment:  Patient Active Problem List   Diagnosis   • ASCVD (arteriosclerotic cardiovascular disease)   • Paroxysmal atrial fibrillation (CMS/HCC)   • Anticoagulated on warfarin   • Diabetic retinopathy associated with type 2 diabetes mellitus (CMS/HCC)   • Essential hypertension   • HLD (hyperlipidemia)   • Hypothyroidism   • Peripheral neuropathy   • Renal insufficiency   • Diabetes (CMS/HCC)   • Parkinson's disease (CMS/HCC)   • Dyspnea on exertion   • Atrial fibrillation (CMS/HCC) [I48.91]   • Stroke-like symptoms   • Hypopotassemia   • Ankle pain         Plan: Right ankle swelling and pain due to gout.  The diagnosis was reviewed in detail with the patient and his wife.  Treatment options were discussed and  detailed.  Patient states he does take allopurinol.  For the acute gout flare our recommendation is a Medrol Dosepak.  Patient may weight-bear as tolerated to the right lower extremity.  Patient may apply ice for 20 minutes at a time.  I recommended elevation of the right lower extremity. Patient is on chronic anticoagulation for atrial fibrillation.  Patient may be discharged when medically stable.  Patient advised to follow-up with PCP.  All findings discussed with my supervising physician Dr. Douglas Mendez and he is in agreement with the plan.  Please call our office with any questions or concerns at [phone].  Thank you for the consultation and for allowing us to be a part of the patient's care.  We will sign off from an orthopedic standpoint.    Date: 9/18/2021  Jennifer Silvestre PA-C

## 2021-09-18 NOTE — DISCHARGE INSTRUCTIONS
Start taking the Medrol Dosepak for the treatment of gout as recommended by orthopedics.  Return to the ER with any shortness of breath chest pain or significant fever.

## 2021-09-18 NOTE — CASE MANAGEMENT/SOCIAL WORK
Discharge Planning Assessment  [facility]     Patient Name: Edilberto Reeder  MRN: [id number]  Today's Date: 9/17/2021    Admit Date: 9/17/2021    Discharge Needs Assessment     Row Name 09/17/21 1543       Living Environment    Lives With  spouse    Name(s) of Who Lives With Patient  J[place] Reeder [phone]    Current Living Arrangements  home/apartment/condo    Primary Care Provided by  self    Provides Primary Care For  no one, unable/limited ability to care for self    Family Caregiver if Needed  spouse    Family Caregiver Names  J[place] Reeder    Quality of Family Relationships  helpful;involved;supportive    Able to Return to Prior Arrangements  yes       Resource/Environmental Concerns    Resource/Environmental Concerns  none    Transportation Concerns  car, none       Transition Planning    Patient/Family Anticipates Transition to  home;home with family    Patient/Family Anticipated Services at Transition  none    Transportation Anticipated  family or friend will provide       Discharge Needs Assessment    Readmission Within the Last 30 Days  no previous admission in last 30 days    Equipment Currently Used at Home  cane, straight;walker, rolling;other (see comments) Scooter    Concerns to be Addressed  no discharge needs identified;denies needs/concerns at this time    Anticipated Changes Related to Illness  none    Equipment Needed After Discharge  none    Provided Post Acute Provider List?  Refused    Provided Post Acute Provider Quality & Resource List?  Refused        Discharge Plan     Row Name 09/17/21 5429       Plan    Plan  Face sheet verified, role of CCP explained. Pt stated that his wife, [place], assist with all his ADLs and that she provides transportation for him. Stated that he has no need for further DME. Denies any difficulty paying for medications    Provided Post Acute Provider List?  Refused    Provided Post Acute Provider Quality & Resource List?  Refused    Plan Comments  Pt  intends to go home upon discharge. States that his wife provides all his care needs and that he does not need any further help        Continued Care and Services - Admitted Since 9/17/2021    Coordination has not been started for this encounter.         Demographic Summary    No documentation.       Functional Status    No documentation.       Psychosocial    No documentation.       Abuse/Neglect    No documentation.       Legal    No documentation.       Substance Abuse    No documentation.       Patient Forms    No documentation.           Kim Sawyer RN

## 2021-09-18 NOTE — PROGRESS NOTES
06:12 EDT  Patient was admitted for right ankle pain and suspected cellulitis.  Patient has Parkinson's and mobility issues at baseline, but this has rendered him nearly immobile due to the pain.    In the ED the patient was given IV antibiotics, had a negative venous duplex, and is awaiting orthopedics consult to ensure that they agree this is only cellulitis and nothing intra-articular.    Patient is resting comfortably through the night, he is received his antibiotics and Tylenol for pain.  His wife is his primary caregiver and has been at bedside.

## 2021-09-18 NOTE — ED NOTES
RN called wife to update. RN told wife that pt is admitted to the observation unit and that orthopedic surgery is planning on seeing him in the morning and antibiotics have been ordered. Wife would like to talk to admitting physician because she is concerned about him getting admitted and worried about insurance coverage.     Dr. Salmon spoke to wife and explained admission reasoning and she finally agrees to admission. Report given to OBS unit and they say they will come and get the patient      Angeline Ambrosio RN  09/17/21 1528
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
## 2021-10-08 RX ORDER — CARVEDILOL 3.12 MG/1
TABLET ORAL
Qty: 60 TABLET | Refills: 2 | Status: SHIPPED | OUTPATIENT
Start: 2021-10-08 | End: 2022-01-10

## 2022-01-10 RX ORDER — CARVEDILOL 3.12 MG/1
TABLET ORAL
Qty: 180 TABLET | Refills: 3 | Status: SHIPPED | OUTPATIENT
Start: 2022-01-10 | End: 2022-03-21 | Stop reason: SDUPTHER

## 2022-02-11 ENCOUNTER — OFFICE VISIT (OUTPATIENT)
Dept: CARDIOLOGY | Facility: CLINIC | Age: 72
End: 2022-02-11

## 2022-02-11 VITALS
HEART RATE: 106 BPM | BODY MASS INDEX: 35.87 KG/M2 | HEIGHT: 70 IN | SYSTOLIC BLOOD PRESSURE: 122 MMHG | DIASTOLIC BLOOD PRESSURE: 70 MMHG

## 2022-02-11 DIAGNOSIS — I48.11 LONGSTANDING PERSISTENT ATRIAL FIBRILLATION: ICD-10-CM

## 2022-02-11 DIAGNOSIS — I10 ESSENTIAL HYPERTENSION: Primary | Chronic | ICD-10-CM

## 2022-02-11 PROCEDURE — 93000 ELECTROCARDIOGRAM COMPLETE: CPT | Performed by: INTERNAL MEDICINE

## 2022-02-11 PROCEDURE — 99214 OFFICE O/P EST MOD 30 MIN: CPT | Performed by: INTERNAL MEDICINE

## 2022-02-11 RX ORDER — ZINC GLUCONATE 50 MG
1 TABLET ORAL DAILY
Status: ON HOLD | COMMUNITY
End: 2022-03-23

## 2022-02-11 RX ORDER — DONEPEZIL HYDROCHLORIDE 5 MG/1
10 TABLET, FILM COATED ORAL NIGHTLY
COMMUNITY
Start: 2021-12-07 | End: 2022-04-30

## 2022-02-11 RX ORDER — MAGNESIUM OXIDE 420 MG/1
1 TABLET ORAL 2 TIMES DAILY
COMMUNITY
End: 2022-03-25 | Stop reason: HOSPADM

## 2022-02-11 NOTE — PROGRESS NOTES
"      CARDIOLOGY    Jimbo Fung MD    ENCOUNTER DATE:  02/11/2022    Edilberto Reeder / 71 y.o. / male        CHIEF COMPLAINT / REASON FOR OFFICE VISIT     Chronic atrial fibrillation  Hypertension  Parkinson's disease  HISTORY OF PRESENT ILLNESS       HPI  Edilberto Reeder is a 71 y.o. male who presents today for reassessment.  He really did not have any specific complaint.  He does say he gets short of breath when he walks but he does not really do much activities.  He does have an open wound and currently getting physical therapy/wound care at home.  He denies chest pains palpitations.      The following portions of the patient's history were reviewed and updated as appropriate: allergies, current medications, past family history, past medical history, past social history, past surgical history and problem list.      VITAL SIGNS     Visit Vitals  /70 (BP Location: Left arm)   Pulse 106   Ht 177.8 cm (70\")   BMI 35.87 kg/m²         Wt Readings from Last 3 Encounters:   09/17/21 113 kg (250 lb)   05/01/21 113 kg (250 lb)   09/11/20 99.3 kg (219 lb)     Body mass index is 35.87 kg/m².      REVIEW OF SYSTEMS   Review of Systems   All other systems reviewed and are negative.          PHYSICAL EXAMINATION     Vitals reviewed.   Constitutional:       Appearance: Healthy appearance.   Pulmonary:      Effort: Pulmonary effort is normal.   Cardiovascular:      Normal rate. Irregularly irregular rhythm. Normal S1. Normal S2.      Murmurs: There is no murmur.      No gallop. No click. No rub.   Pulses:     Intact distal pulses.   Edema:     Peripheral edema absent.   Neurological:      Mental Status: Alert and oriented to person, place and time.           REVIEWED DATA       ECG 12 Lead    Date/Time: 2/11/2022 2:19 PM  Performed by: Jimbo Fung MD  Authorized by: Jimbo Fung MD   Comparison: compared with previous ECG from 9/11/2020  Similar to previous ECG  Rhythm: atrial fibrillation  Other " findings: non-specific ST-T wave changes    Clinical impression: abnormal EKG            Cardiac Procedures:  1.           ASSESSMENT & PLAN      Diagnosis Plan   1. Essential hypertension     2. Longstanding persistent atrial fibrillation (HCC)           SUMMARY/DISCUSSION    1.  Chronic atrial fibrillation.  Patient says from time to time he is feeling his heart racing.  When I inquired more he said at least once a week although his caregiver who is with him could not confirm that.  His blood pressures been running a little bit high so between the 2 things I told him to increase his carvedilol to 6.25 twice daily.  His blood pressure improves his palpitations decreased I would leave him on a higher dose and see him back in 6 months.  2. Hypertension blood pressures good  3.  Lower extremity wound getting physical therapy.    MEDICATIONS         Discharge Medications          Accurate as of February 11, 2022  2:08 PM. If you have any questions, ask your nurse or doctor.            Continue These Medications      Instructions Start Date   allopurinol 100 MG tablet  Commonly known as: ZYLOPRIM   100 mg, Oral, Daily      apixaban 5 MG tablet tablet  Commonly known as: ELIQUIS   5 mg, Oral, 2 Times Daily      aspirin 81 MG chewable tablet   81 mg, Oral, Every Other Day      Calcium Carbonate 1500 (600 Ca) MG tablet   Oral      carvedilol 3.125 MG tablet  Commonly known as: COREG   TAKE ONE TABLET BY MOUTH TWICE A DAY      citalopram 20 MG tablet  Commonly known as: CeleXA   20 mg, Oral, Daily      donepezil 5 MG tablet  Commonly known as: ARICEPT   Oral      gabapentin 800 MG tablet  Commonly known as: NEURONTIN   800 mg, Oral, Daily      glipizide 10 MG 24 hr tablet  Commonly known as: GLUCOTROL XL   5 mg, Oral, 2 Times Daily      HYDROcodone-acetaminophen 5-325 MG per tablet  Commonly known as: NORCO   1 tablet, Oral, Every 6 Hours PRN      Magnesium Oxide 420 MG tablet   1 tablet, Oral, 2 Times Daily      meclizine  25 MG tablet  Commonly known as: ANTIVERT   25 mg, Oral, 3 Times Daily PRN      metFORMIN 500 MG tablet  Commonly known as: GLUCOPHAGE   1,000 mg, Oral, 2 Times Daily With Meals      Polyvinyl Alcohol-Povidone PF 1.4-0.6 % ophthalmic solution  Commonly known as: HYPOTEARS   1-2 drops, As Needed      Rytary 36. MG capsule controlled-release  Generic drug: Carbidopa-Levodopa ER   Oral      Rytary 36. MG capsule controlled-release  Generic drug: Carbidopa-Levodopa ER   No dose, route, or frequency recorded.      simvastatin 80 MG tablet  Commonly known as: ZOCOR   80 mg, Oral, Nightly      traZODone 50 MG tablet  Commonly known as: DESYREL   50 mg, Oral, Nightly      Zinc 50 MG tablet   1 tablet, Oral, Daily                 **Dragon Disclaimer:   Much of this encounter note is an electronic transcription/translation of spoken language to printed text. The electronic translation of spoken language may permit erroneous, or at times, nonsensical words or phrases to be inadvertently transcribed. Although I have reviewed the note for such errors, some may still exist.

## 2022-03-21 RX ORDER — CARVEDILOL 6.25 MG/1
6.25 TABLET ORAL 2 TIMES DAILY
Qty: 180 TABLET | Refills: 3 | Status: SHIPPED | OUTPATIENT
Start: 2022-03-21 | End: 2022-04-08

## 2022-03-23 ENCOUNTER — APPOINTMENT (OUTPATIENT)
Dept: CARDIOLOGY | Facility: HOSPITAL | Age: 72
End: 2022-03-23

## 2022-03-23 ENCOUNTER — HOSPITAL ENCOUNTER (OUTPATIENT)
Facility: HOSPITAL | Age: 72
Setting detail: OBSERVATION
Discharge: HOME | End: 2022-03-25
Attending: EMERGENCY MEDICINE | Admitting: INTERNAL MEDICINE

## 2022-03-23 ENCOUNTER — APPOINTMENT (OUTPATIENT)
Dept: GENERAL RADIOLOGY | Facility: HOSPITAL | Age: 72
End: 2022-03-23

## 2022-03-23 DIAGNOSIS — G20 PARKINSON'S DISEASE: ICD-10-CM

## 2022-03-23 DIAGNOSIS — Z86.79 HISTORY OF CORONARY ATHEROSCLEROSIS: ICD-10-CM

## 2022-03-23 DIAGNOSIS — E11.9 NON-INSULIN DEPENDENT TYPE 2 DIABETES MELLITUS: ICD-10-CM

## 2022-03-23 DIAGNOSIS — R07.9 CHEST PAIN, UNSPECIFIED TYPE: Primary | ICD-10-CM

## 2022-03-23 PROBLEM — R53.1 GENERALIZED WEAKNESS: Status: ACTIVE | Noted: 2022-03-23

## 2022-03-23 PROBLEM — R40.4 EPISODES OF STARING: Status: ACTIVE | Noted: 2022-03-23

## 2022-03-23 PROBLEM — H53.2 DIPLOPIA: Status: ACTIVE | Noted: 2022-03-23

## 2022-03-23 PROBLEM — R51.9 HEADACHE: Status: ACTIVE | Noted: 2022-03-23

## 2022-03-23 LAB
ALBUMIN SERPL-MCNC: 4.1 G/DL (ref 3.5–5.2)
ALBUMIN/GLOB SERPL: 2 G/DL
ALP SERPL-CCNC: 65 U/L (ref 39–117)
ALT SERPL W P-5'-P-CCNC: <5 U/L (ref 1–41)
ANION GAP SERPL CALCULATED.3IONS-SCNC: 12.1 MMOL/L (ref 5–15)
ANION GAP SERPL CALCULATED.3IONS-SCNC: 12.7 MMOL/L (ref 5–15)
AST SERPL-CCNC: 10 U/L (ref 1–40)
BACTERIA UR QL AUTO: NORMAL /HPF
BASOPHILS # BLD AUTO: 0.05 10*3/MM3 (ref 0–0.2)
BASOPHILS NFR BLD AUTO: 0.8 % (ref 0–1.5)
BH CV XLRA MEAS LEFT DIST CCA EDV: -21.7 CM/SEC
BH CV XLRA MEAS LEFT DIST CCA PSV: -69.8 CM/SEC
BH CV XLRA MEAS LEFT DIST ICA EDV: -21.1 CM/SEC
BH CV XLRA MEAS LEFT DIST ICA PSV: -56.8 CM/SEC
BH CV XLRA MEAS LEFT ICA/CCA RATIO: 1.02
BH CV XLRA MEAS LEFT MID ICA EDV: -23.6 CM/SEC
BH CV XLRA MEAS LEFT MID ICA PSV: -64 CM/SEC
BH CV XLRA MEAS LEFT PROX CCA EDV: -19.3 CM/SEC
BH CV XLRA MEAS LEFT PROX CCA PSV: -79.2 CM/SEC
BH CV XLRA MEAS LEFT PROX ECA EDV: -11.8 CM/SEC
BH CV XLRA MEAS LEFT PROX ECA PSV: -51.5 CM/SEC
BH CV XLRA MEAS LEFT PROX ICA EDV: -29.9 CM/SEC
BH CV XLRA MEAS LEFT PROX ICA PSV: -71.5 CM/SEC
BH CV XLRA MEAS LEFT PROX SCLA PSV: 99.8 CM/SEC
BH CV XLRA MEAS LEFT VERTEBRAL A EDV: -13.7 CM/SEC
BH CV XLRA MEAS LEFT VERTEBRAL A PSV: -53.4 CM/SEC
BH CV XLRA MEAS RIGHT DIST CCA EDV: 16.5 CM/SEC
BH CV XLRA MEAS RIGHT DIST CCA PSV: 65.2 CM/SEC
BH CV XLRA MEAS RIGHT DIST ICA EDV: -21.7 CM/SEC
BH CV XLRA MEAS RIGHT DIST ICA PSV: -66.3 CM/SEC
BH CV XLRA MEAS RIGHT ICA/CCA RATIO: 1.42
BH CV XLRA MEAS RIGHT MID ICA EDV: -28.7 CM/SEC
BH CV XLRA MEAS RIGHT MID ICA PSV: -72.1 CM/SEC
BH CV XLRA MEAS RIGHT PROX CCA EDV: 16.1 CM/SEC
BH CV XLRA MEAS RIGHT PROX CCA PSV: 62.9 CM/SEC
BH CV XLRA MEAS RIGHT PROX ECA EDV: -15.8 CM/SEC
BH CV XLRA MEAS RIGHT PROX ECA PSV: -85.6 CM/SEC
BH CV XLRA MEAS RIGHT PROX ICA EDV: -21.7 CM/SEC
BH CV XLRA MEAS RIGHT PROX ICA PSV: -92.6 CM/SEC
BH CV XLRA MEAS RIGHT PROX SCLA PSV: 77.8 CM/SEC
BH CV XLRA MEAS RIGHT VERTEBRAL A EDV: 8.6 CM/SEC
BH CV XLRA MEAS RIGHT VERTEBRAL A PSV: 42.4 CM/SEC
BILIRUB SERPL-MCNC: 0.6 MG/DL (ref 0–1.2)
BILIRUB UR QL STRIP: NEGATIVE
BUN SERPL-MCNC: 25 MG/DL (ref 8–23)
BUN SERPL-MCNC: 26 MG/DL (ref 8–23)
BUN/CREAT SERPL: 32.9 (ref 7–25)
BUN/CREAT SERPL: 37.7 (ref 7–25)
CALCIUM SPEC-SCNC: 8.8 MG/DL (ref 8.6–10.5)
CALCIUM SPEC-SCNC: 8.9 MG/DL (ref 8.6–10.5)
CHLORIDE SERPL-SCNC: 100 MMOL/L (ref 98–107)
CHLORIDE SERPL-SCNC: 101 MMOL/L (ref 98–107)
CLARITY UR: CLEAR
CO2 SERPL-SCNC: 23.3 MMOL/L (ref 22–29)
CO2 SERPL-SCNC: 23.9 MMOL/L (ref 22–29)
COLOR UR: YELLOW
CREAT SERPL-MCNC: 0.69 MG/DL (ref 0.76–1.27)
CREAT SERPL-MCNC: 0.76 MG/DL (ref 0.76–1.27)
DEPRECATED RDW RBC AUTO: 47.3 FL (ref 37–54)
DEPRECATED RDW RBC AUTO: 49.5 FL (ref 37–54)
EGFRCR SERPLBLD CKD-EPI 2021: 94.9 ML/MIN/1.73
EGFRCR SERPLBLD CKD-EPI 2021: 97.7 ML/MIN/1.73
EOSINOPHIL # BLD AUTO: 0.16 10*3/MM3 (ref 0–0.4)
EOSINOPHIL NFR BLD AUTO: 2.6 % (ref 0.3–6.2)
ERYTHROCYTE [DISTWIDTH] IN BLOOD BY AUTOMATED COUNT: 15.8 % (ref 12.3–15.4)
ERYTHROCYTE [DISTWIDTH] IN BLOOD BY AUTOMATED COUNT: 15.9 % (ref 12.3–15.4)
GLOBULIN UR ELPH-MCNC: 2.1 GM/DL
GLUCOSE BLDC GLUCOMTR-MCNC: 100 MG/DL (ref 70–130)
GLUCOSE SERPL-MCNC: 116 MG/DL (ref 65–99)
GLUCOSE SERPL-MCNC: 123 MG/DL (ref 65–99)
GLUCOSE UR STRIP-MCNC: NEGATIVE MG/DL
HBA1C MFR BLD: 7.5 % (ref 4.8–5.6)
HCT VFR BLD AUTO: 33 % (ref 37.5–51)
HCT VFR BLD AUTO: 34.6 % (ref 37.5–51)
HGB BLD-MCNC: 11 G/DL (ref 13–17.7)
HGB BLD-MCNC: 11.1 G/DL (ref 13–17.7)
HGB UR QL STRIP.AUTO: NEGATIVE
HYALINE CASTS UR QL AUTO: NORMAL /LPF
IMM GRANULOCYTES # BLD AUTO: 0.02 10*3/MM3 (ref 0–0.05)
IMM GRANULOCYTES NFR BLD AUTO: 0.3 % (ref 0–0.5)
KETONES UR QL STRIP: ABNORMAL
LEFT ARM BP: NORMAL MMHG
LEUKOCYTE ESTERASE UR QL STRIP.AUTO: ABNORMAL
LYMPHOCYTES # BLD AUTO: 1.23 10*3/MM3 (ref 0.7–3.1)
LYMPHOCYTES NFR BLD AUTO: 20.3 % (ref 19.6–45.3)
MAXIMAL PREDICTED HEART RATE: 147 BPM
MCH RBC QN AUTO: 27.3 PG (ref 26.6–33)
MCH RBC QN AUTO: 27.3 PG (ref 26.6–33)
MCHC RBC AUTO-ENTMCNC: 32.1 G/DL (ref 31.5–35.7)
MCHC RBC AUTO-ENTMCNC: 33.3 G/DL (ref 31.5–35.7)
MCV RBC AUTO: 81.9 FL (ref 79–97)
MCV RBC AUTO: 85 FL (ref 79–97)
MONOCYTES # BLD AUTO: 0.51 10*3/MM3 (ref 0.1–0.9)
MONOCYTES NFR BLD AUTO: 8.4 % (ref 5–12)
NEUTROPHILS NFR BLD AUTO: 4.08 10*3/MM3 (ref 1.7–7)
NEUTROPHILS NFR BLD AUTO: 67.6 % (ref 42.7–76)
NITRITE UR QL STRIP: NEGATIVE
NRBC BLD AUTO-RTO: 0 /100 WBC (ref 0–0.2)
PH UR STRIP.AUTO: 6.5 [PH] (ref 5–8)
PLATELET # BLD AUTO: 149 10*3/MM3 (ref 140–450)
PLATELET # BLD AUTO: 170 10*3/MM3 (ref 140–450)
PMV BLD AUTO: 10.6 FL (ref 6–12)
PMV BLD AUTO: 11.1 FL (ref 6–12)
POTASSIUM SERPL-SCNC: 4 MMOL/L (ref 3.5–5.2)
POTASSIUM SERPL-SCNC: 4.1 MMOL/L (ref 3.5–5.2)
PROCALCITONIN SERPL-MCNC: 0.03 NG/ML (ref 0–0.25)
PROT SERPL-MCNC: 6.2 G/DL (ref 6–8.5)
PROT UR QL STRIP: NEGATIVE
QT INTERVAL: 543 MS
RBC # BLD AUTO: 4.03 10*6/MM3 (ref 4.14–5.8)
RBC # BLD AUTO: 4.07 10*6/MM3 (ref 4.14–5.8)
RBC # UR STRIP: NORMAL /HPF
REF LAB TEST METHOD: NORMAL
RIGHT ARM BP: NORMAL MMHG
SARS-COV-2 RNA RESP QL NAA+PROBE: NOT DETECTED
SODIUM SERPL-SCNC: 136 MMOL/L (ref 136–145)
SODIUM SERPL-SCNC: 137 MMOL/L (ref 136–145)
SP GR UR STRIP: 1.02 (ref 1–1.03)
SQUAMOUS #/AREA URNS HPF: NORMAL /HPF
STRESS TARGET HR: 125 BPM
T4 FREE SERPL-MCNC: 1.21 NG/DL (ref 0.93–1.7)
TROPONIN T SERPL-MCNC: 0.01 NG/ML (ref 0–0.03)
TROPONIN T SERPL-MCNC: <0.01 NG/ML (ref 0–0.03)
TSH SERPL DL<=0.05 MIU/L-ACNC: 4.95 UIU/ML (ref 0.27–4.2)
UROBILINOGEN UR QL STRIP: ABNORMAL
WBC # UR STRIP: NORMAL /HPF
WBC NRBC COR # BLD: 5.64 10*3/MM3 (ref 3.4–10.8)
WBC NRBC COR # BLD: 6.05 10*3/MM3 (ref 3.4–10.8)

## 2022-03-23 PROCEDURE — G0378 HOSPITAL OBSERVATION PER HR: HCPCS

## 2022-03-23 PROCEDURE — 71045 X-RAY EXAM CHEST 1 VIEW: CPT

## 2022-03-23 PROCEDURE — 96374 THER/PROPH/DIAG INJ IV PUSH: CPT

## 2022-03-23 PROCEDURE — 84443 ASSAY THYROID STIM HORMONE: CPT | Performed by: NURSE PRACTITIONER

## 2022-03-23 PROCEDURE — 93005 ELECTROCARDIOGRAM TRACING: CPT

## 2022-03-23 PROCEDURE — 81001 URINALYSIS AUTO W/SCOPE: CPT | Performed by: INTERNAL MEDICINE

## 2022-03-23 PROCEDURE — 85025 COMPLETE CBC W/AUTO DIFF WBC: CPT | Performed by: PHYSICIAN ASSISTANT

## 2022-03-23 PROCEDURE — 84439 ASSAY OF FREE THYROXINE: CPT | Performed by: NURSE PRACTITIONER

## 2022-03-23 PROCEDURE — C9803 HOPD COVID-19 SPEC COLLECT: HCPCS

## 2022-03-23 PROCEDURE — 99285 EMERGENCY DEPT VISIT HI MDM: CPT

## 2022-03-23 PROCEDURE — 87040 BLOOD CULTURE FOR BACTERIA: CPT | Performed by: INTERNAL MEDICINE

## 2022-03-23 PROCEDURE — 84145 PROCALCITONIN (PCT): CPT | Performed by: INTERNAL MEDICINE

## 2022-03-23 PROCEDURE — 25010000002 ONDANSETRON PER 1 MG: Performed by: PHYSICIAN ASSISTANT

## 2022-03-23 PROCEDURE — 80053 COMPREHEN METABOLIC PANEL: CPT | Performed by: PHYSICIAN ASSISTANT

## 2022-03-23 PROCEDURE — 99204 OFFICE O/P NEW MOD 45 MIN: CPT | Performed by: PSYCHIATRY & NEUROLOGY

## 2022-03-23 PROCEDURE — 93880 EXTRACRANIAL BILAT STUDY: CPT

## 2022-03-23 PROCEDURE — 97602 WOUND(S) CARE NON-SELECTIVE: CPT

## 2022-03-23 PROCEDURE — 93010 ELECTROCARDIOGRAM REPORT: CPT | Performed by: INTERNAL MEDICINE

## 2022-03-23 PROCEDURE — 84484 ASSAY OF TROPONIN QUANT: CPT | Performed by: PHYSICIAN ASSISTANT

## 2022-03-23 PROCEDURE — 99214 OFFICE O/P EST MOD 30 MIN: CPT | Performed by: INTERNAL MEDICINE

## 2022-03-23 PROCEDURE — U0003 INFECTIOUS AGENT DETECTION BY NUCLEIC ACID (DNA OR RNA); SEVERE ACUTE RESPIRATORY SYNDROME CORONAVIRUS 2 (SARS-COV-2) (CORONAVIRUS DISEASE [COVID-19]), AMPLIFIED PROBE TECHNIQUE, MAKING USE OF HIGH THROUGHPUT TECHNOLOGIES AS DESCRIBED BY CMS-2020-01-R: HCPCS | Performed by: PHYSICIAN ASSISTANT

## 2022-03-23 PROCEDURE — 83036 HEMOGLOBIN GLYCOSYLATED A1C: CPT | Performed by: NURSE PRACTITIONER

## 2022-03-23 PROCEDURE — 93005 ELECTROCARDIOGRAM TRACING: CPT | Performed by: EMERGENCY MEDICINE

## 2022-03-23 PROCEDURE — 25010000002 MORPHINE PER 10 MG: Performed by: EMERGENCY MEDICINE

## 2022-03-23 PROCEDURE — 85027 COMPLETE CBC AUTOMATED: CPT | Performed by: NURSE PRACTITIONER

## 2022-03-23 PROCEDURE — 96375 TX/PRO/DX INJ NEW DRUG ADDON: CPT

## 2022-03-23 PROCEDURE — 36415 COLL VENOUS BLD VENIPUNCTURE: CPT | Performed by: NURSE PRACTITIONER

## 2022-03-23 PROCEDURE — 82962 GLUCOSE BLOOD TEST: CPT

## 2022-03-23 RX ORDER — ONDANSETRON 4 MG/1
4 TABLET, FILM COATED ORAL EVERY 6 HOURS PRN
Status: DISCONTINUED | OUTPATIENT
Start: 2022-03-23 | End: 2022-03-25 | Stop reason: HOSPADM

## 2022-03-23 RX ORDER — LEVODOPA AND CARBIDOPA 95; 23.75 MG/1; MG/1
CAPSULE, EXTENDED RELEASE ORAL AS NEEDED
Status: ON HOLD | COMMUNITY
End: 2022-03-23 | Stop reason: DRUGHIGH

## 2022-03-23 RX ORDER — ACETAMINOPHEN 650 MG/1
650 SUPPOSITORY RECTAL EVERY 4 HOURS PRN
Status: DISCONTINUED | OUTPATIENT
Start: 2022-03-23 | End: 2022-03-25 | Stop reason: HOSPADM

## 2022-03-23 RX ORDER — SODIUM CHLORIDE 0.9 % (FLUSH) 0.9 %
10 SYRINGE (ML) INJECTION EVERY 12 HOURS SCHEDULED
Status: DISCONTINUED | OUTPATIENT
Start: 2022-03-23 | End: 2022-03-25 | Stop reason: HOSPADM

## 2022-03-23 RX ORDER — MORPHINE SULFATE 2 MG/ML
4 INJECTION, SOLUTION INTRAMUSCULAR; INTRAVENOUS ONCE
Status: COMPLETED | OUTPATIENT
Start: 2022-03-23 | End: 2022-03-23

## 2022-03-23 RX ORDER — ONDANSETRON 2 MG/ML
4 INJECTION INTRAMUSCULAR; INTRAVENOUS ONCE
Status: COMPLETED | OUTPATIENT
Start: 2022-03-23 | End: 2022-03-23

## 2022-03-23 RX ORDER — DOCUSATE SODIUM 100 MG/1
100 CAPSULE, LIQUID FILLED ORAL 2 TIMES DAILY
Status: DISCONTINUED | OUTPATIENT
Start: 2022-03-23 | End: 2022-03-25 | Stop reason: HOSPADM

## 2022-03-23 RX ORDER — LEVOTHYROXINE SODIUM 0.03 MG/1
25 TABLET ORAL DAILY
COMMUNITY

## 2022-03-23 RX ORDER — DEXTROSE MONOHYDRATE 25 G/50ML
25 INJECTION, SOLUTION INTRAVENOUS
Status: DISCONTINUED | OUTPATIENT
Start: 2022-03-23 | End: 2022-03-25 | Stop reason: HOSPADM

## 2022-03-23 RX ORDER — ACETAMINOPHEN 160 MG/5ML
650 SOLUTION ORAL EVERY 4 HOURS PRN
Status: DISCONTINUED | OUTPATIENT
Start: 2022-03-23 | End: 2022-03-25 | Stop reason: HOSPADM

## 2022-03-23 RX ORDER — CARVEDILOL 6.25 MG/1
6.25 TABLET ORAL 2 TIMES DAILY
Status: DISCONTINUED | OUTPATIENT
Start: 2022-03-23 | End: 2022-03-25 | Stop reason: HOSPADM

## 2022-03-23 RX ORDER — INSULIN LISPRO 100 [IU]/ML
0-7 INJECTION, SOLUTION INTRAVENOUS; SUBCUTANEOUS
Status: DISCONTINUED | OUTPATIENT
Start: 2022-03-23 | End: 2022-03-25 | Stop reason: HOSPADM

## 2022-03-23 RX ORDER — ALLOPURINOL 100 MG/1
100 TABLET ORAL DAILY
Status: DISCONTINUED | OUTPATIENT
Start: 2022-03-23 | End: 2022-03-25 | Stop reason: HOSPADM

## 2022-03-23 RX ORDER — SODIUM CHLORIDE 0.9 % (FLUSH) 0.9 %
10 SYRINGE (ML) INJECTION AS NEEDED
Status: DISCONTINUED | OUTPATIENT
Start: 2022-03-23 | End: 2022-03-25 | Stop reason: HOSPADM

## 2022-03-23 RX ORDER — DONEPEZIL HYDROCHLORIDE 10 MG/1
10 TABLET, FILM COATED ORAL NIGHTLY
Status: DISCONTINUED | OUTPATIENT
Start: 2022-03-23 | End: 2022-03-25 | Stop reason: HOSPADM

## 2022-03-23 RX ORDER — CHOLECALCIFEROL (VITAMIN D3) 125 MCG
500 CAPSULE ORAL DAILY
Status: DISCONTINUED | OUTPATIENT
Start: 2022-03-23 | End: 2022-03-25 | Stop reason: HOSPADM

## 2022-03-23 RX ORDER — CALCIUM CARBONATE 200(500)MG
2 TABLET,CHEWABLE ORAL 2 TIMES DAILY PRN
Status: DISCONTINUED | OUTPATIENT
Start: 2022-03-23 | End: 2022-03-25 | Stop reason: HOSPADM

## 2022-03-23 RX ORDER — ZINC SULFATE 50(220)MG
220 CAPSULE ORAL DAILY
Status: DISCONTINUED | OUTPATIENT
Start: 2022-03-23 | End: 2022-03-25 | Stop reason: HOSPADM

## 2022-03-23 RX ORDER — ACETAMINOPHEN 325 MG/1
650 TABLET ORAL EVERY 4 HOURS PRN
Status: DISCONTINUED | OUTPATIENT
Start: 2022-03-23 | End: 2022-03-25 | Stop reason: HOSPADM

## 2022-03-23 RX ORDER — INDOMETHACIN 50 MG/1
50 CAPSULE ORAL 3 TIMES DAILY
COMMUNITY
End: 2022-03-25 | Stop reason: HOSPADM

## 2022-03-23 RX ORDER — LEVOTHYROXINE SODIUM 0.03 MG/1
12.5 TABLET ORAL DAILY
Status: DISCONTINUED | OUTPATIENT
Start: 2022-03-23 | End: 2022-03-25 | Stop reason: HOSPADM

## 2022-03-23 RX ORDER — ONDANSETRON 2 MG/ML
4 INJECTION INTRAMUSCULAR; INTRAVENOUS EVERY 6 HOURS PRN
Status: DISCONTINUED | OUTPATIENT
Start: 2022-03-23 | End: 2022-03-25 | Stop reason: HOSPADM

## 2022-03-23 RX ORDER — NITROGLYCERIN 0.4 MG/1
0.4 TABLET SUBLINGUAL
Status: DISCONTINUED | OUTPATIENT
Start: 2022-03-23 | End: 2022-03-25 | Stop reason: HOSPADM

## 2022-03-23 RX ORDER — ATORVASTATIN CALCIUM 20 MG/1
40 TABLET, FILM COATED ORAL DAILY
Status: DISCONTINUED | OUTPATIENT
Start: 2022-03-23 | End: 2022-03-25 | Stop reason: HOSPADM

## 2022-03-23 RX ORDER — CITALOPRAM 20 MG/1
20 TABLET ORAL DAILY
Status: DISCONTINUED | OUTPATIENT
Start: 2022-03-23 | End: 2022-03-25 | Stop reason: HOSPADM

## 2022-03-23 RX ORDER — ASPIRIN 81 MG/1
81 TABLET, CHEWABLE ORAL EVERY OTHER DAY
Status: DISCONTINUED | OUTPATIENT
Start: 2022-03-23 | End: 2022-03-25 | Stop reason: HOSPADM

## 2022-03-23 RX ORDER — LANOLIN ALCOHOL/MO/W.PET/CERES
500 CREAM (GRAM) TOPICAL DAILY
COMMUNITY

## 2022-03-23 RX ORDER — ALOGLIPTIN 12.5 MG/1
TABLET, FILM COATED ORAL DAILY
COMMUNITY

## 2022-03-23 RX ORDER — NICOTINE POLACRILEX 4 MG
15 LOZENGE BUCCAL
Status: DISCONTINUED | OUTPATIENT
Start: 2022-03-23 | End: 2022-03-25 | Stop reason: HOSPADM

## 2022-03-23 RX ADMIN — CITALOPRAM 20 MG: 20 TABLET, FILM COATED ORAL at 15:39

## 2022-03-23 RX ADMIN — MAGNESIUM OXIDE 400 MG (241.3 MG MAGNESIUM) TABLET 400 MG: TABLET at 15:39

## 2022-03-23 RX ADMIN — Medication 10 ML: at 20:15

## 2022-03-23 RX ADMIN — LINAGLIPTIN 5 MG: 5 TABLET, FILM COATED ORAL at 15:39

## 2022-03-23 RX ADMIN — CARVEDILOL 6.25 MG: 6.25 TABLET, FILM COATED ORAL at 20:14

## 2022-03-23 RX ADMIN — DOCUSATE SODIUM 100 MG: 100 CAPSULE, LIQUID FILLED ORAL at 20:14

## 2022-03-23 RX ADMIN — ONDANSETRON 4 MG: 2 INJECTION INTRAMUSCULAR; INTRAVENOUS at 04:22

## 2022-03-23 RX ADMIN — CARVEDILOL 6.25 MG: 6.25 TABLET, FILM COATED ORAL at 15:39

## 2022-03-23 RX ADMIN — Medication 500 MCG: at 15:39

## 2022-03-23 RX ADMIN — ALLOPURINOL 100 MG: 100 TABLET ORAL at 15:39

## 2022-03-23 RX ADMIN — NITROGLYCERIN 0.4 MG: 0.4 TABLET SUBLINGUAL at 04:30

## 2022-03-23 RX ADMIN — LEVOTHYROXINE SODIUM 12.5 MCG: 0.03 TABLET ORAL at 15:39

## 2022-03-23 RX ADMIN — LEVODOPA AND CARBIDOPA 4 CAPSULE: 145; 36.25 CAPSULE, EXTENDED RELEASE ORAL at 20:14

## 2022-03-23 RX ADMIN — MORPHINE SULFATE 4 MG: 2 INJECTION, SOLUTION INTRAMUSCULAR; INTRAVENOUS at 04:25

## 2022-03-23 RX ADMIN — DONEPEZIL HYDROCHLORIDE 10 MG: 10 TABLET, FILM COATED ORAL at 20:14

## 2022-03-23 RX ADMIN — ASPIRIN 81 MG: 81 TABLET, CHEWABLE ORAL at 15:39

## 2022-03-23 RX ADMIN — ATORVASTATIN CALCIUM 40 MG: 20 TABLET, FILM COATED ORAL at 15:39

## 2022-03-23 RX ADMIN — DOCUSATE SODIUM 100 MG: 100 CAPSULE, LIQUID FILLED ORAL at 15:41

## 2022-03-23 NOTE — ED TRIAGE NOTES
Patient from home reporting intermittent chest pressure rated at a 5. Patient also reports bilateral neck and arm pain. Patient has a history of parkinson's and Afib, anticoagulated on eliquis.

## 2022-03-23 NOTE — CONSULTS
Patient Name: Edilberto Reeder  Age/Sex: 71 y.o. male  : 1950  MRN: [id number]    Date of Admission: 3/23/2022  Date of Encounter Visit: 22  Encounter Provider: Cole Pimentel MD  Place of Service: [facility] CARDIOLOGY      Referring Provider: Narayan Lockwood MD  Patient Care Team:  Harvey Larson MD as PCP - General  Harvey Larson MD as PCP - Family Medicine    Subjective:     Admitted/Consulted for: chest pain    Chief Complaint: chest pain     History of Present Illness:  71 y.o. male patient with a history of hypertension, diabetes, Parkinson's and persistent atrial fibrillation, anticoagulated with Eliquis. He had an echocardiogram completed last in  noting normal LVSF with EF 73%.    He presented to [facility] ED 3/23/22 with reports of substernal chest pressure that radiated to his bilateral arms and neck, and associated hypertension at home. CXR completed upon arrival to the ED noted right-sided basilar atelectasis. Troponin x2 negative. HGB 11.1/11.0.     His blood pressure has been well controlled since arrival in the 100-110's systolic.  He currently states his chest discomfort is very mild in 1-2 out of 10 in intensity.  It does not seem to be worsened by deep inspiration or positional alteration.  He is not active with his Parkinson's disease.  He has had issues per his wife's report with somnolence since he has been here and extreme weakness of his bilateral upper extremities.      Previous testing:     Echocardiogram 18  · Saline bubble study neg for PFO  · Calculated EF = 73%.  · There is no evidence of pericardial effusion.    Holter 18  · An abnormal monitor study.  · There were 16 episodes of an irregular atrial tachycardia with the fastest at 164 bpm lasting only for a total of 8 beats. The longest episode was 24.5 seconds at a rate of 107 bpm.    Past Medical History:  Past Medical History:   Diagnosis Date    • Atrial fibrillation with RVR (HCC)    • Atrial flutter (HCC)    • Diabetes (HCC)    • HLD (hyperlipidemia) 1/27/2016   • Hypertension    • Parkinson's disease (HCC)     Advanced        Past Surgical History:   Procedure Laterality Date   • BRAIN STIMULATOR     • JOINT REPLACEMENT      Bilateral shoulder and knee replacements       Home Medications:   Medications Prior to Admission   Medication Sig Dispense Refill Last Dose   • allopurinol (ZYLOPRIM) 100 MG tablet Take 100 mg by mouth Daily.      • Alogliptin Benzoate 12.5 MG tablet Take  by mouth Daily. Half tablet daily      • apixaban (ELIQUIS) 5 MG tablet tablet Take 5 mg by mouth 2 (Two) Times a Day.      • aspirin 81 MG chewable tablet Chew 81 mg Every Other Day.      • Calcium Carbonate 1500 (600 Ca) MG tablet Take 650 mg by mouth Daily.      • Carbidopa-Levodopa ER (Rytary) 23.75-95 MG capsule controlled-release Take  by mouth As Needed.      • Carbidopa-Levodopa ER 36. MG capsule controlled-release Take  by mouth 4 (Four) Times a Day. 4 tablets 4 times daily      • carvedilol (COREG) 6.25 MG tablet Take 1 tablet by mouth 2 (Two) Times a Day. (Patient taking differently: Take 6.25 mg by mouth 2 (Two) Times a Day. New dose, patient has not picked up new Rx) 180 tablet 3    • citalopram (CeleXA) 20 MG tablet Take 20 mg by mouth Daily.      • docusate sodium (COLACE) 50 MG capsule Take  by mouth Every Night.      • donepezil (ARICEPT) 5 MG tablet Take 10 mg by mouth Every Night.      • gabapentin (NEURONTIN) 800 MG tablet Take 400 mg by mouth 2 (Two) Times a Day.      • glipiZIDE (GLUCOTROL XL) 10 MG 24 hr tablet Take 5 mg by mouth Daily.      • indomethacin (INDOCIN) 50 MG capsule Take 50 mg by mouth 3 (Three) Times a Day.      • levothyroxine (SYNTHROID, LEVOTHROID) 25 MCG tablet Take 12.5 mcg by mouth Daily.      • magnesium oxide (MAG-OX) 400 tablet tablet Take 420 mg by mouth Daily.      • Magnesium Oxide 420 MG tablet Take 1 tablet by mouth 2  (Two) Times a Day.      • meclizine (ANTIVERT) 25 MG tablet Take 25 mg by mouth 3 (Three) Times a Day As Needed for Dizziness.      • metFORMIN (GLUCOPHAGE) 500 MG tablet Take 500 mg by mouth 3 (Three) Times a Day.      • simvastatin (ZOCOR) 80 MG tablet Take 40 mg by mouth Every Night.      • traZODone (DESYREL) 50 MG tablet Take 25 mg by mouth Every Night.      • vitamin B-12 (CYANOCOBALAMIN) 1000 MCG tablet Take 500 mcg by mouth Daily.      • HYDROcodone-acetaminophen (NORCO) 5-325 MG per tablet Take 1 tablet by mouth Every 6 (Six) Hours As Needed for Moderate Pain . 12 tablet 0    • Polyvinyl Alcohol-Povidone PF (HYPOTEARS) 1.4-0.6 % ophthalmic solution 1-2 drops As Needed for Wound Care.      • RYTARY 36. MG capsule controlled-release       • Zinc 50 MG tablet Take 1 tablet by mouth Daily.          Allergies:  Allergies   Allergen Reactions   • Bacitracin Anaphylaxis   • Neosporin [Neomycin-Bacitracin Zn-Polymyx] Other (See Comments)     BP drops and heart stops!   • Fish-Derived Products Hives   • Shellfish Allergy Hives   • Shrimp (Diagnostic) Hives       Past Social History:  Social History     Socioeconomic History   • Marital status:    Tobacco Use   • Smoking status: Never Smoker   • Smokeless tobacco: Never Used   • Tobacco comment: caffeine use   Substance and Sexual Activity   • Alcohol use: Yes   • Drug use: No   • Sexual activity: Defer        Past Family History:  No family history on file.      Review of Systems:  All systems reviewed. Pertinent positives identified in HPI. All other systems are negative.       Objective:     Objective:  Temp:  [97.4 °F (36.3 °C)-97.8 °F (36.6 °C)] 97.4 °F (36.3 °C)  Heart Rate:  [] 61  Resp:  [16-18] 16  BP: ()/(57-83) 112/67    Intake/Output Summary (Last 24 hours) at 3/23/2022 1049  Last data filed at 3/23/2022 0826  Gross per 24 hour   Intake 0 ml   Output --   Net 0 ml     Body mass index is 28.34 kg/m².      03/23/22  0608   Weight:  "89.6 kg (197 lb 8.5 oz)           Physical Exam:   Temp:  [97.4 °F (36.3 °C)-97.8 °F (36.6 °C)] 97.4 °F (36.3 °C)  Heart Rate:  [] 61  Resp:  [16-18] 16  BP: ()/(57-83) 112/67    Intake/Output Summary (Last 24 hours) at 3/23/2022 1049  Last data filed at 3/23/2022 0826  Gross per 24 hour   Intake 0 ml   Output --   Net 0 ml     Flowsheet Rows    Flowsheet Row First Filed Value   Admission Height 177.8 cm (70\") Documented at 03/23/2022 0550   Admission Weight 89.6 kg (197 lb 8.5 oz) Documented at 03/23/2022 0608          General Appearance:   in no acute distress.  Somnolent but arousable.   Head:    Normocephalic, without obvious abnormality, atraumatic       Neck/Lymph   No adenopathy, supple, no thyromegaly, no carotid bruit, no    JVD   Lungs:     Clear to auscultation bilaterally, no wheezes, rales, or     rhonchi    Cardiac:    Normal rate, regular rhythm, no murmur, no rub, no gallop   Chest Wall:    No abnormalities observed   GI:     Normal bowel sounds, soft, nontender, nondistended,            no rebound tenderness   Extremities:   No cyanosis, clubbing, or edema   Circulatory/Peripheral Vascular :   Pulses palpable and equal bilaterally   Integumentary:   No bleeding or rash. Normal temperature                 Lab Review:     Results from last 7 days   Lab Units 03/23/22  0719 03/23/22  0358   SODIUM mmol/L 137 136   POTASSIUM mmol/L 4.1 4.0   CHLORIDE mmol/L 101 100   CO2 mmol/L 23.9 23.3   BUN mg/dL 26* 25*   CREATININE mg/dL 0.69* 0.76   GLUCOSE mg/dL 116* 123*   CALCIUM mg/dL 8.8 8.9       Results from last 7 days   Lab Units 03/23/22  0719 03/23/22  0358   TROPONIN T ng/mL 0.010 <0.010     Results from last 7 days   Lab Units 03/23/22  0719   WBC 10*3/mm3 5.64   HEMOGLOBIN g/dL 11.0*   HEMATOCRIT % 33.0*   PLATELETS 10*3/mm3 170                                   Imaging:    Imaging Results (Most Recent)     Procedure Component Value Units Date/Time    XR Chest 1 View [[id number]] " Collected: 03/23/22 0357     Updated: 03/23/22 0402    Narrative:      SINGLE VIEW OF THE CHEST     HISTORY: Chest pain     COMPARISON: 05/01/2021.      FINDINGS:  Heart size is within normal limits for technique. Right-sided brain  stimulator is noted. Patient status post bilateral shoulder  arthroplasties. There is right basilar atelectasis. Additional  consolidation is noted at the left lung base, but the appearance is  stable when compared to prior exam. This may represent some additional  scarring.       Impression:      Right basilar atelectasis.     This report was finalized on 3/23/2022 3:59 AM by Dr. Alyssa Dos Santos M.D.             Results for orders placed during the hospital encounter of 12/08/18    Adult Transthoracic Echo Complete W/ Cont if Necessary Per Protocol (With Agitated Saline)    Interpretation Summary  · Saline bubble study neg for PFO  · Calculated EF = 73%.  · There is no evidence of pericardial effusion.      EKG 3/23/22        BASELINE 2/11/22      I personally viewed and interpreted the patient's EKG/Telemetry data.    Assessment:   Assessment/Plan   1.  Chest pain: Mild  2.  Parkinson's disease  3.  Essential hypertension: Well-controlled  4.  Persistent atrial fibrillation: Ventricular rate well controlled  5.  Coronary artery calcification: Documented on CTPE protocol    -Chest pain is very mild.  He does not appear to be critically ill at this point in time from a cardiac standpoint.  -Cardiac biomarkers are negative and EKG does not show significant changes from baseline.  I do not think he needs an invasive evaluation.  -Blood pressure and heart rate are well controlled.  No changes in current medical regimen.  -Stop Eliquis at this point in case we end up needing to pivot and do an invasive evaluation after stress testing.  -I agree with neurology evaluation first.  I think he has a lot of issues going on that need to be evaluated prior to stress testing.      -Consider  myocardial perfusion stress testing with Lexiscan tomorrow or the next day depending on neurology work-up.  He would have to have a high risk defect to consider moving forward with coronary angiography.    Thank you for allowing me to participate in the care of Edilberto SEGUN Osbornly. Feel free to contact me directly with any further questions or concerns.    Cole Pimentel MD  [facility]  03/23/22  10:49 EDT;

## 2022-03-23 NOTE — NURSING NOTE
03/23/22 1001   Wound 03/23/22 0613 Right lower leg   Placement Date/Time: 03/23/22 0613   Present on Hospital Admission: Yes  Side: Right  Orientation: lower  Location: leg   Base clean;moist;pink   Periwound intact;dry   Periwound Temperature warm   Periwound Skin Turgor soft   Edges open   Wound Length (cm) 2 cm   Wound Width (cm) 1 cm   Wound Depth (cm) 0.1 cm   Wound Surface Area (cm^2) 2 cm^2   Wound Volume (cm^3) 0.2 cm^3   Drainage Characteristics/Odor serosanguineous   Drainage Amount scant   Care, Wound cleansed with;sterile normal saline   Dressing Care dressing changed;silver impregnated;hydrofiber;silicone;border dressing     CWON note: pt seen for evaluation of rt anterior leg wound POA. Pt is followed by HH 2x week for dressing change. Pedal pulses are present, no edema noted. Will continue same wound care while in-patient, as family states the wound is healing well with current treatment. Dressing changed at this time and orders placed in Epic. Staff to do dressing change 2x week.

## 2022-03-23 NOTE — ED PROVIDER NOTES
MD ATTESTATION NOTE    The LUC and I have discussed this patient's history, physical exam, and treatment plan.    I provided a substantive portion of the care of this patient. I personally performed the physical exam, in its entirety. The attached note describes my personal findings.      Edilberto Reeder is a 71 y.o. male who presents to the ED c/o chest pain since 10 PM yesterday.  Describes a dull pain that radiates to bilateral upper extremities.  No nausea vomiting or diaphoresis.  Has history of paroxysmal A. fib and is on Eliquis.  Denies any prior history of CAD.      On exam:  GENERAL: not distressed  HENT: nares patent  EYES: no scleral icterus  CV: regular rhythm, regular rate  RESPIRATORY: normal effort, clear to station laterally  ABDOMEN: soft nontender nondistended  MUSCULOSKELETAL: no deformity  NEURO: alert, moves all extremities, follows commands  SKIN: warm, dry    Labs  Recent Results (from the past 24 hour(s))   ECG 12 Lead    Collection Time: 03/23/22  3:11 AM   Result Value Ref Range    QT Interval 543 ms   Comprehensive Metabolic Panel    Collection Time: 03/23/22  3:58 AM    Specimen: Blood   Result Value Ref Range    Glucose 123 (H) 65 - 99 mg/dL    BUN 25 (H) 8 - 23 mg/dL    Creatinine 0.76 0.76 - 1.27 mg/dL    Sodium 136 136 - 145 mmol/L    Potassium 4.0 3.5 - 5.2 mmol/L    Chloride 100 98 - 107 mmol/L    CO2 23.3 22.0 - 29.0 mmol/L    Calcium 8.9 8.6 - 10.5 mg/dL    Total Protein 6.2 6.0 - 8.5 g/dL    Albumin 4.10 3.50 - 5.20 g/dL    ALT (SGPT) <5 1 - 41 U/L    AST (SGOT) 10 1 - 40 U/L    Alkaline Phosphatase 65 39 - 117 U/L    Total Bilirubin 0.6 0.0 - 1.2 mg/dL    Globulin 2.1 gm/dL    A/G Ratio 2.0 g/dL    BUN/Creatinine Ratio 32.9 (H) 7.0 - 25.0    Anion Gap 12.7 5.0 - 15.0 mmol/L    eGFR 94.9 >60.0 mL/min/1.73   Troponin    Collection Time: 03/23/22  3:58 AM    Specimen: Blood   Result Value Ref Range    Troponin T <0.010 0.000 - 0.030 ng/mL   CBC Auto Differential    Collection  Time: 03/23/22  3:58 AM    Specimen: Blood   Result Value Ref Range    WBC 6.05 3.40 - 10.80 10*3/mm3    RBC 4.07 (L) 4.14 - 5.80 10*6/mm3    Hemoglobin 11.1 (L) 13.0 - 17.7 g/dL    Hematocrit 34.6 (L) 37.5 - 51.0 %    MCV 85.0 79.0 - 97.0 fL    MCH 27.3 26.6 - 33.0 pg    MCHC 32.1 31.5 - 35.7 g/dL    RDW 15.9 (H) 12.3 - 15.4 %    RDW-SD 49.5 37.0 - 54.0 fl    MPV 10.6 6.0 - 12.0 fL    Platelets 149 140 - 450 10*3/mm3    Neutrophil % 67.6 42.7 - 76.0 %    Lymphocyte % 20.3 19.6 - 45.3 %    Monocyte % 8.4 5.0 - 12.0 %    Eosinophil % 2.6 0.3 - 6.2 %    Basophil % 0.8 0.0 - 1.5 %    Immature Grans % 0.3 0.0 - 0.5 %    Neutrophils, Absolute 4.08 1.70 - 7.00 10*3/mm3    Lymphocytes, Absolute 1.23 0.70 - 3.10 10*3/mm3    Monocytes, Absolute 0.51 0.10 - 0.90 10*3/mm3    Eosinophils, Absolute 0.16 0.00 - 0.40 10*3/mm3    Basophils, Absolute 0.05 0.00 - 0.20 10*3/mm3    Immature Grans, Absolute 0.02 0.00 - 0.05 10*3/mm3    nRBC 0.0 0.0 - 0.2 /100 WBC   COVID-19,[facility] MEL IN-HOUSE CEPHEID/YUNG NP SWAB IN TRANSPORT MEDIA 8-12 HR TAT - Swab, Nasopharynx    Collection Time: 03/23/22  3:59 AM    Specimen: Nasopharynx; Swab   Result Value Ref Range    COVID19 Not Detected Not Detected - Ref. Range       Radiology  XR Chest 1 View    Result Date: 3/23/2022  SINGLE VIEW OF THE CHEST  HISTORY: Chest pain  COMPARISON: 05/01/2021.  FINDINGS: Heart size is within normal limits for technique. Right-sided brain stimulator is noted. Patient status post bilateral shoulder arthroplasties. There is right basilar atelectasis. Additional consolidation is noted at the left lung base, but the appearance is stable when compared to prior exam. This may represent some additional scarring.      Right basilar atelectasis.  This report was finalized on 3/23/2022 3:59 AM by Dr. Alyssa Dos Santos M.D.        Medical Decision Making:  ED Course as of 03/23/22 0616   Wed Mar 23, 2022   0341 Sublingual nitro x3 and morphine ordered for the patient's chest  pain and headache. [RC]   0342 EKG          EKG time: 0311  Rhythm/Rate: 65/a fib  P waves and DE:   QRS, axis: Nl axis   ST and T waves: Nonspecific T wave changes in the inferior lateral leads    Interpreted Contemporaneously by me, independently viewed  -No acute change noted from 12/8/2018 [RC]   0343 Troponin T: <0.010 [RC]   0345 HEART SCORE    History Highly suspicious (2)  ECG Nonspecific repol disturbance (1)  Age > or = 65 (2)  Risk factors > or = to 3 RF for atherosclerotic dx (2)  Troponin < or = Normal limit (0)    This patient's HEART score is 7    HEART Score Key:  Scores 0-3: 0.9-1.7% risk of adverse cardiac event. In the HEART Score study, these patients were discharged (0.99% in the retrospective study, 1.7% in the prospective study)  Scores 4-6: 12-16.6% risk of adverse cardiac event. In the HEART Score study, these patients were admitted to the hospital. (11.6% retrospective, 16.6% prospective)  Scores ?7: 50-65% risk of adverse cardiac event. In the HEART Score study, these patients were candidates for early invasive measures. (65.2% retrospective, 50.1% prospective)   oc [RC]      ED Course User Index  [RC] Cali Giron III, PA           PPE: Both the patient and I wore a surgical mask throughout the entire patient encounter. I wore protective goggles.     Diagnosis  Final diagnoses:   Chest pain, unspecified type   History of coronary atherosclerosis   Parkinson's disease (HCC)   Non-insulin dependent type 2 diabetes mellitus (HCC)        Bryant Dillon MD  03/23/22 0616

## 2022-03-23 NOTE — CONSULTS
Patient Identification:  NAME:  Edilberto Reeder  Age:  71 y.o.   Sex:  male   :  1950   MRN:  [id number]       Chief complaint: He does not have one, reason for consult his eyes rolling up, and he has Parkinson's disease    History of present illness: Patient is a 71-year-old right-handed white male with a history of atrial fibrillation diabetes hyper tension advanced Parkinson's disease for which he is on a large dose of long-acting Sinemet and he also has a deep brain stimulator.  Came to the hospital with chest pain radiating into both arms he does have a history of A. fib.  He is on Eliquis.      I am being called to see him because earlier today he had when he came in his eyes were rolling up in the back of his head he was awake and alert during that there was no paresthesias no paralysis he thought maybe he saw a little double but the nurses describe what they have seen which is eyelid fluttering in his eyes tend to go up and stay for a few seconds or longer in quality duration.  Modifying factors none it is noted he takes long-acting Sinemet in the hospital did not have it and so he is not getting any Sinemet right now and does not have any of the eye symptoms currently.  His daughter is going to bring in the large dose long-acting Sinemet that he takes and he will start having that.  Interestingly the wife states he will get confused at night in the hospital if he does not get that Sinemet.  Location he has more resting tremor in the left upper extremity than on the right  Specifically he denies having any stroke symptoms no paresthesias no paralysis.  T the head by my independent eyeball review shows chronic changes and postsurgical changes only.      Past medical history:  Past Medical History:   Diagnosis Date   • Atrial fibrillation with RVR (HCC)    • Atrial flutter (HCC)    • Diabetes (HCC)    • HLD (hyperlipidemia) 2016   • Hypertension    • Parkinson's disease (HCC)     Advanced         Past surgical history:  Past Surgical History:   Procedure Laterality Date   • BRAIN STIMULATOR     • JOINT REPLACEMENT      Bilateral shoulder and knee replacements       Allergies:  Bacitracin, Neosporin [neomycin-bacitracin zn-polymyx], Fish-derived products, Shellfish allergy, and Shrimp (diagnostic)    Home medications:  Medications Prior to Admission   Medication Sig Dispense Refill Last Dose   • allopurinol (ZYLOPRIM) 100 MG tablet Take 100 mg by mouth Daily.   3/22/2022 at Unknown time   • Alogliptin Benzoate 12.5 MG tablet Take  by mouth Daily. Half tablet daily   3/22/2022 at Unknown time   • apixaban (ELIQUIS) 5 MG tablet tablet Take 5 mg by mouth 2 (Two) Times a Day.      • aspirin 81 MG chewable tablet Chew 81 mg Every Other Day.   Past Week at Unknown time   • Calcium Carbonate 1500 (600 Ca) MG tablet Take 650 mg by mouth Daily.   3/22/2022 at Unknown time   • Carbidopa-Levodopa ER (Rytary) 23.75-95 MG capsule controlled-release Take  by mouth As Needed.   Past Month at Unknown time   • Carbidopa-Levodopa ER 36. MG capsule controlled-release Take  by mouth 4 (Four) Times a Day. 4 tablets 4 times daily   3/22/2022 at Unknown time   • carvedilol (COREG) 6.25 MG tablet Take 1 tablet by mouth 2 (Two) Times a Day. (Patient taking differently: Take 6.25 mg by mouth 3 (Three) Times a Day. New dose, patient has not picked up new Rx) 180 tablet 3 3/22/2022 at Unknown time   • citalopram (CeleXA) 20 MG tablet Take 20 mg by mouth Daily.   3/22/2022 at Unknown time   • docusate sodium (COLACE) 50 MG capsule Take  by mouth Every Night.   3/22/2022 at Unknown time   • donepezil (ARICEPT) 5 MG tablet Take 10 mg by mouth Every Night.   3/22/2022 at Unknown time   • gabapentin (NEURONTIN) 800 MG tablet Take 400 mg by mouth 2 (Two) Times a Day.   3/22/2022 at Unknown time   • indomethacin (INDOCIN) 50 MG capsule Take 50 mg by mouth 3 (Three) Times a Day.   3/22/2022 at Unknown time   • levothyroxine  (SYNTHROID, LEVOTHROID) 25 MCG tablet Take 12.5 mcg by mouth Daily.   3/22/2022 at Unknown time   • magnesium oxide (MAG-OX) 400 tablet tablet Take 420 mg by mouth Daily.   3/22/2022 at Unknown time   • Magnesium Oxide 420 MG tablet Take 1 tablet by mouth 2 (Two) Times a Day.   3/22/2022 at Unknown time   • meclizine (ANTIVERT) 25 MG tablet Take 25 mg by mouth 3 (Three) Times a Day As Needed for Dizziness.   Past Week at Unknown time   • metFORMIN (GLUCOPHAGE) 500 MG tablet Take 500 mg by mouth 3 (Three) Times a Day.   3/22/2022 at Unknown time   • simvastatin (ZOCOR) 80 MG tablet Take 40 mg by mouth Every Night.   3/22/2022 at Unknown time   • traZODone (DESYREL) 50 MG tablet Take 25 mg by mouth Every Night.   3/22/2022 at Unknown time   • vitamin B-12 (CYANOCOBALAMIN) 1000 MCG tablet Take 500 mcg by mouth Daily.   3/22/2022 at Unknown time   • Polyvinyl Alcohol-Povidone PF (HYPOTEARS) 1.4-0.6 % ophthalmic solution 1-2 drops As Needed for Wound Care.           Hospital medications:  allopurinol, 100 mg, Oral, Daily  apixaban, 5 mg, Oral, Q12H  aspirin, 81 mg, Oral, Every Other Day  atorvastatin, 40 mg, Oral, Daily  carvedilol, 6.25 mg, Oral, BID  citalopram, 20 mg, Oral, Daily  docusate sodium, 100 mg, Oral, BID  donepezil, 10 mg, Oral, Nightly  levothyroxine, 12.5 mcg, Oral, Daily  linagliptin, 5 mg, Oral, Daily  magnesium oxide, 400 mg, Oral, Daily  sodium chloride, 10 mL, Intravenous, Q12H  vitamin B-12, 500 mcg, Oral, Daily  zinc sulfate, 220 mg, Oral, Daily      Pharmacy Consult - Pharmacy to dose,       •  acetaminophen **OR** acetaminophen **OR** acetaminophen  •  calcium carbonate  •  nitroglycerin  •  nitroglycerin  •  ondansetron **OR** ondansetron  •  Pharmacy Consult - Pharmacy to dose  •  [COMPLETED] Insert peripheral IV **AND** sodium chloride  •  sodium chloride    Family history:  No family history on file.    Social history:  Social History     Tobacco Use   • Smoking status: Never Smoker   •  Smokeless tobacco: Never Used   • Tobacco comment: caffeine use   Substance Use Topics   • Alcohol use: Yes   • Drug use: No       Review of systems:    He cannot tell me much the wife tells me that he never did this with his eyes before she talks about his Parkinson's disease but otherwise cannot give me much in the way of review of systems at this time.  I did review the chart fully for this    Objective:  Vitals Ranges:   Temp:  [97.4 °F (36.3 °C)-97.8 °F (36.6 °C)] 97.4 °F (36.3 °C)  Heart Rate:  [] 61  Resp:  [16-18] 16  BP: ()/(57-83) 112/67      Physical Exam: Patient is awake alert and oriented x3 he has a masked face.  Fund of knowledge poor attention span concentration fair recent remote memory fair language function soft but normal no dysarthria no aphasia he is in no distress pupils 3 constricting to 2 unable to visualize disc retinas extraocular movements are full horizontally and vertically without nystagmus nasolabial folds palate tongue symmetrical decreased hearing normal facial sensation head turning shoulder shrug occasional resting tremor in the left upper extremity but no rigidity in either upper extremity there is bradykinesia moves his feet well overall strength is at least 5- out of 5 x4 without atrophy or fasciculations reflexes trace throughout symmetrical toes downgoing bilaterally sensation normal light touch face both arms both legs coordination slow in the upper extremity Station gait was impossible I thought he would fall heart regular without murmur neck supple without bruits extremities no clubbing cyanosis or significant edema visual acuity normal at 3 feet    Results review:   I reviewed the patient's new clinical results.    Data review:  Lab Results (last 24 hours)     Procedure Component Value Units Date/Time    Urinalysis With Culture If Indicated - Urine, Clean Catch [[id number]]  (Abnormal) Collected: 03/23/22 1113    Specimen: Urine, Clean Catch Updated: 03/23/22  "1151     Color, UA Yellow     Appearance, UA Clear     pH, UA 6.5     Specific Gravity, UA 1.024     Glucose, UA Negative     Ketones, UA Trace     Bilirubin, UA Negative     Blood, UA Negative     Protein, UA Negative     Leuk Esterase, UA Trace     Nitrite, UA Negative     Urobilinogen, UA 0.2 E.U./dL    Urinalysis, Microscopic Only - Urine, Clean Catch [[id number]] Collected: 03/23/22 1113    Specimen: Urine, Clean Catch Updated: 03/23/22 1151     RBC, UA 0-2 /HPF      WBC, UA 0-2 /HPF      Bacteria, UA None Seen /HPF      Squamous Epithelial Cells, UA 0-2 /HPF      Hyaline Casts, UA None Seen /LPF      Methodology Automated Microscopy    Blood Culture - Blood, Hand, Right [[id number]] Collected: 03/23/22 1052    Specimen: Blood from Hand, Right Updated: 03/23/22 1117    Blood Culture - Blood, Arm, Left [[id number]] Collected: 03/23/22 1052    Specimen: Blood from Arm, Left Updated: 03/23/22 1117    Procalcitonin [[id number]]  (Normal) Collected: 03/23/22 0719    Specimen: Blood Updated: 03/23/22 1015     Procalcitonin 0.03 ng/mL     Narrative:      As a Marker for Sepsis (Non-Neonates):    1. <0.5 ng/mL represents a low risk of severe sepsis and/or septic shock.  2. >2 ng/mL represents a high risk of severe sepsis and/or septic shock.    As a Marker for Lower Respiratory Tract Infections that require antibiotic therapy:    PCT on Admission    Antibiotic Therapy       6-12 Hrs later    >0.5                Strongly Recommended  >0.25 - <0.5        Recommended   0.1 - 0.25          Discouraged              Remeasure/reassess PCT  <0.1                Strongly Discouraged     Remeasure/reassess PCT    As 28 day mortality risk marker: \"Change in Procalcitonin Result\" (>80% or <=80%) if Day 0 (or Day 1) and Day 4 values are available. Refer to http://www.[facility]-pct-calculator.com    Change in PCT <=80%  A decrease of PCT levels below or equal to 80% defines a positive change in PCT test result representing a higher risk " for 28-day all-cause mortality of patients diagnosed with severe sepsis for septic shock.    Change in PCT >80%  A decrease of PCT levels of more than 80% defines a negative change in PCT result representing a lower risk for 28-day all-cause mortality of patients diagnosed with severe sepsis or septic shock.       Basic Metabolic Panel [[id number]]  (Abnormal) Collected: 03/23/22 0719    Specimen: Blood Updated: 03/23/22 0808     Glucose 116 mg/dL      BUN 26 mg/dL      Creatinine 0.69 mg/dL      Sodium 137 mmol/L      Potassium 4.1 mmol/L      Chloride 101 mmol/L      CO2 23.9 mmol/L      Calcium 8.8 mg/dL      BUN/Creatinine Ratio 37.7     Anion Gap 12.1 mmol/L      eGFR 97.7 mL/min/1.73      Comment: National Kidney Foundation and American Society of Nephrology (ASN) Task Force recommended calculation based on the Chronic Kidney Disease Epidemiology Collaboration (CKD-EPI) equation refit without adjustment for race.       Narrative:      GFR Normal >60  Chronic Kidney Disease <60  Kidney Failure <15      Troponin [[id number]]  (Normal) Collected: 03/23/22 0719    Specimen: Blood Updated: 03/23/22 0808     Troponin T 0.010 ng/mL     Narrative:      Troponin T Reference Range:  <= 0.03 ng/mL-   Negative for AMI  >0.03 ng/mL-     Abnormal for myocardial necrosis.  Clinicians would have to utilize clinical acumen, EKG, Troponin and serial changes to determine if it is an Acute Myocardial Infarction or myocardial injury due to an underlying chronic condition.       Results may be falsely decreased if patient taking Biotin.      CBC (No Diff) [[id number]]  (Abnormal) Collected: 03/23/22 0719    Specimen: Blood Updated: 03/23/22 0738     WBC 5.64 10*3/mm3      RBC 4.03 10*6/mm3      Hemoglobin 11.0 g/dL      Hematocrit 33.0 %      MCV 81.9 fL      MCH 27.3 pg      MCHC 33.3 g/dL      RDW 15.8 %      RDW-SD 47.3 fl      MPV 11.1 fL      Platelets 170 10*3/mm3     COVID PRE-OP / PRE-PROCEDURE SCREENING ORDER (NO ISOLATION) -  Swab, Nasopharynx [[id number]]  (Normal) Collected: 03/23/22 0359    Specimen: Swab from Nasopharynx Updated: 03/23/22 0451    Narrative:      The following orders were created for panel order COVID PRE-OP / PRE-PROCEDURE SCREENING ORDER (NO ISOLATION) - Swab, Nasopharynx.  Procedure                               Abnormality         Status                     ---------                               -----------         ------                     COVID-19,[facility] IN-HOUSE...[[id number]]  Normal              Final result                 Please view results for these tests on the individual orders.    COVID-19,[facility] IN-HOUSE CEPHEID/YUNG NP SWAB IN TRANSPORT MEDIA 8-12 HR TAT - Swab, Nasopharynx [[id number]]  (Normal) Collected: 03/23/22 0359    Specimen: Swab from Nasopharynx Updated: 03/23/22 0451     COVID19 Not Detected    Narrative:      Fact sheet for providers: https://www.fda.gov/media/179984/download     Fact sheet for patients: https://www.fda.gov/media/616034/download    Comprehensive Metabolic Panel [[id number]]  (Abnormal) Collected: 03/23/22 0358    Specimen: Blood Updated: 03/23/22 0449     Glucose 123 mg/dL      BUN 25 mg/dL      Creatinine 0.76 mg/dL      Sodium 136 mmol/L      Potassium 4.0 mmol/L      Chloride 100 mmol/L      CO2 23.3 mmol/L      Calcium 8.9 mg/dL      Total Protein 6.2 g/dL      Albumin 4.10 g/dL      ALT (SGPT) <5 U/L      AST (SGOT) 10 U/L      Alkaline Phosphatase 65 U/L      Total Bilirubin 0.6 mg/dL      Globulin 2.1 gm/dL      A/G Ratio 2.0 g/dL      BUN/Creatinine Ratio 32.9     Anion Gap 12.7 mmol/L      eGFR 94.9 mL/min/1.73      Comment: National Kidney Foundation and American Society of Nephrology (ASN) Task Force recommended calculation based on the Chronic Kidney Disease Epidemiology Collaboration (CKD-EPI) equation refit without adjustment for race.       Narrative:      GFR Normal >60  Chronic Kidney Disease <60  Kidney Failure <15      Troponin [[id number]]  (Normal)  Collected: 03/23/22 0358    Specimen: Blood Updated: 03/23/22 0428     Troponin T <0.010 ng/mL     Narrative:      Troponin T Reference Range:  <= 0.03 ng/mL-   Negative for AMI  >0.03 ng/mL-     Abnormal for myocardial necrosis.  Clinicians would have to utilize clinical acumen, EKG, Troponin and serial changes to determine if it is an Acute Myocardial Infarction or myocardial injury due to an underlying chronic condition.       Results may be falsely decreased if patient taking Biotin.      CBC & Differential [[id number]]  (Abnormal) Collected: 03/23/22 0358    Specimen: Blood Updated: 03/23/22 0407    Narrative:      The following orders were created for panel order CBC & Differential.  Procedure                               Abnormality         Status                     ---------                               -----------         ------                     CBC Auto Differential[[id number]]        Abnormal            Final result                 Please view results for these tests on the individual orders.    CBC Auto Differential [[id number]]  (Abnormal) Collected: 03/23/22 0358    Specimen: Blood Updated: 03/23/22 0407     WBC 6.05 10*3/mm3      RBC 4.07 10*6/mm3      Hemoglobin 11.1 g/dL      Hematocrit 34.6 %      MCV 85.0 fL      MCH 27.3 pg      MCHC 32.1 g/dL      RDW 15.9 %      RDW-SD 49.5 fl      MPV 10.6 fL      Platelets 149 10*3/mm3      Neutrophil % 67.6 %      Lymphocyte % 20.3 %      Monocyte % 8.4 %      Eosinophil % 2.6 %      Basophil % 0.8 %      Immature Grans % 0.3 %      Neutrophils, Absolute 4.08 10*3/mm3      Lymphocytes, Absolute 1.23 10*3/mm3      Monocytes, Absolute 0.51 10*3/mm3      Eosinophils, Absolute 0.16 10*3/mm3      Basophils, Absolute 0.05 10*3/mm3      Immature Grans, Absolute 0.02 10*3/mm3      nRBC 0.0 /100 WBC            Imaging:  Imaging Results (Last 24 Hours)     Procedure Component Value Units Date/Time    XR Chest 1 View [[id number]] Collected: 03/23/22 0357     Updated:  03/23/22 0402    Narrative:      SINGLE VIEW OF THE CHEST     HISTORY: Chest pain     COMPARISON: 05/01/2021.      FINDINGS:  Heart size is within normal limits for technique. Right-sided brain  stimulator is noted. Patient status post bilateral shoulder  arthroplasties. There is right basilar atelectasis. Additional  consolidation is noted at the left lung base, but the appearance is  stable when compared to prior exam. This may represent some additional  scarring.       Impression:      Right basilar atelectasis.     This report was finalized on 3/23/2022 3:59 AM by Dr. Alyssa Dos Santos M.D.            PPE worn at all times washed before washed afterwards disposed of everything properly is now within 6 feet of them for more than few minutes during my exam no aerosols used at any point    Assessment and Plan:     Patient has Parkinson's disease with a deep brain stimulator he looks amazingly awake alert and good at this time with only an occasional resting tremor in the left upper extremity.  He is on a large dose of long-acting carbidopa levodopa that we do not have in the hospital but his daughter is going to bring that in and he will be able to get that.  Specifically I was called in today because this patient's eyes intermittently rolled up in his head with a fluttering type movement of the eyelids, but he is able to communicate during that and there is no symptomatology suggest a stroke syndrome.  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  Note the CT of his head is normal by my independent eyeball review when he cannot get an MRI scan.  I will check carotid Dopplers to determine there is any stenosis in the carotid arteries but also to determine if there is good antegrade flow in the vertebrobasilar system as well.  I cannot call this a vertebrobasilar TIA or stroke based upon the history that I have gotten about this patient, and his current exam.    Jaspreet Dickson,  MD  03/23/22  12:26 EDT

## 2022-03-23 NOTE — CASE MANAGEMENT/SOCIAL WORK
Discharge Planning Assessment  [facility]     Patient Name: Edilberto Reeder  MRN: [id number]  Today's Date: 3/23/2022    Admit Date: 3/23/2022     Discharge Needs Assessment     Row Name 03/23/22 1617       Living Environment    People in Home spouse    Name(s) of People in Home Jamaica Reeder, wife, [phone]    Current Living Arrangements home    Primary Care Provided by self;spouse/significant other    Provides Primary Care For no one, unable/limited ability to care for self    Family Caregiver if Needed spouse    Family Caregiver Names Jamaica Reeder, wife, [phone]    Quality of Family Relationships supportive;involved;helpful    Able to Return to Prior Arrangements yes       Resource/Environmental Concerns    Resource/Environmental Concerns none       Transition Planning    Patient/Family Anticipates Transition to home with family    Patient/Family Anticipated Services at Transition none    Transportation Anticipated family or friend will provide       Discharge Needs Assessment    Readmission Within the Last 30 Days no previous admission in last 30 days    Current Outpatient/Agency/Support Group other (see comments)  [facility] States will continue these services at D/C.    Equipment Currently Used at Home wheelchair, motorized;wheelchair;walker, rolling;cane, straight    Concerns to be Addressed discharge planning    Anticipated Changes Related to Illness none    Equipment Needed After Discharge none    Discharge Facility/Level of Care Needs other (see comments)  [facility].    Provided Post Acute Provider List? Refused    Provided Post Acute Provider Quality & Resource List? Refused    Current Discharge Risk chronically ill;physical impairment               Discharge Plan     Row Name 03/23/22 1636       Plan    Plan Home with spouse. [facility]. Family to transport.    Patient/Family in Agreement  with Plan yes    Plan Comments Met with pt. and [place] Reeder, wife, [phone] at bedside. Pt gave permission to speak with family present. Explained roll of . Face sheet and pharmacy verified. Pt lives with his wife. DME equipment includes a motorized wheelchair, walker, cane, and transport wheelchair.  Pt ambulates with a walker at baseline. Pt requires some assist with ADLs. Pt has been to [facility]. Pt [facility] and [facility]. States will continue these services at D/C. Pt’s PCP is MD at [facility]. States he also goes to Dr. Harvey Larson for PCP. Uses [facility] on [facility] in [facility]. At discharge, family will transport. Currently denies any need for HH or SNF.  PT/OT eval pending. Explained that CCP would follow to assess for discharge needs.  Walter Elizondo RN-BC              Continued Care and Services - Admitted Since 3/23/2022    Coordination has not been started for this encounter.       Expected Discharge Date and Time     Expected Discharge Date Expected Discharge Time    Mar 24, 2022          Demographic Summary     Row Name 03/23/22 1615       General Information    Admission Type observation    Arrived From emergency department    Required Notices Provided Observation Status Notice    Reason for Consult discharge planning;decision-making;care coordination/care conference    Preferred Language English       Contact Information    Permission Granted to Share Info With family/designee  Jamaica Reeder, wife, [phone]               Functional Status     Row Name 03/23/22 1616       Functional Status    Usual Activity Tolerance moderate    Current Activity Tolerance fair       Functional Status, IADL    Medications assistive equipment and person    Meal Preparation assistive equipment and person    Housekeeping assistive equipment and person    Laundry assistive equipment and person    Shopping assistive equipment and  person       Mental Status    General Appearance WDL WDL       Mental Status Summary    Recent Changes in Mental Status/Cognitive Functioning no changes                      Walter Elizondo RN

## 2022-03-23 NOTE — CASE MANAGEMENT/SOCIAL WORK
Continued Stay Note  [facility]     Patient Name: Edilberto Reeder  MRN: [id number]  Today's Date: 3/23/2022    Admit Date: 3/23/2022     Discharge Plan     Row Name 03/23/22 1630       Plan    Plan Home with spouse. [facility]. Family to transport.    Patient/Family in Agreement with Plan yes    Plan Comments Met with pt. and [place] Reeder, wife, [phone] at bedside. Pt gave permission to speak with family present. Explained roll of . Face sheet and pharmacy verified. Pt lives with his wife. DME equipment includes a motorized wheelchair, walker, cane, and transport wheelchair.  Pt ambulates with a walker at baseline. Pt requires some assist with ADLs. Pt has been to [facility]. Pt [facility] and [facility]. [facility] will continue these services at D/C. Pt’s PCP is MD at [facility]. States he also goes to Dr. Harvey Larson for PCP. Uses [facility] on [facility] in [facility]. At discharge, family will transport. Currently denies any need for HH or SNF.  PT/OT eval pending. Explained that CCP would follow to assess for discharge needs.  Walter Elizondo RN-BC               Discharge Codes    No documentation.               Expected Discharge Date and Time     Expected Discharge Date Expected Discharge Time    Mar 24, 2022             Walter Elizondo RN

## 2022-03-23 NOTE — PLAN OF CARE
Problem: Adult Inpatient Plan of Care  Goal: Plan of Care Review  Outcome: Ongoing, Progressing  Flowsheets (Taken 3/23/2022 8112)  Progress: improving  Plan of Care Reviewed With: patient  Outcome Evaluation: Pt vitals stable. Chest pain from this am resolved. Dysphagia screen failed. Speech to see in the am. Doppler of carotid. Echo ordered. Eliquis held for possible procedure. Home med to be brought up by family this evening. Will continue to monitor

## 2022-03-23 NOTE — H&P
"    Patient Name:  Edilberto Reeder  YOB: 1950  MRN:  [id number]  Admit Date:  3/23/2022  Patient Care Team:  Harvey Larson MD as PCP - General  Harvey Larson MD as PCP - Family Medicine      Subjective   History Present Illness     Chief Complaint   Patient presents with   • Chest Pain     History of Present Illness   Mr. Reeder is a 71 y.o. non-smoker with a history of ASCVD, Parkinson's disease, PAF, DM2, HTN and hypothyroidism that presents to [facility] complaining of chest pain. The patient and his wife are at bedside. They both assist with the history and report a few day history of intermittent headache. The patient states his headaches had been \"all over\" and sharp in nature. He denies any dizziness, lightheadedness or one sided weakness, but has had some diplopia per his reports. He does have Parkinson's and is followed by Dr. Augustin with [facility]. He has had a brain stimulator for this reason without any issues for many years. He denies any chronic issues with headache. He reports generalized weakness and did have a fall a couple weeks ago without injury. He has baseline shuffling gait with his walker but denies any issues with dysphagia.    In addition to this intermittent headache, the patient reports a new onset of chest pain that began yesterday. He apparently had a sudden onset of bilateral shoulder pain that radiated into the center of his chest. The pain was sharp and severe and associated with elevated HR and BP per his wife. He denies any recent dyspnea, cough, fever or chills and states he was not nauseous or diaphoretic. His wife gave him a Coreg that did help for about an hour but symptoms returned. She states he then began \"starring off\" and looking \"glazed\" so she brought him into the hospital for further care.   In the ED, he was hemodynamically stable and afebrile. WBC were 6.05, hemoglobin 11.1, creatinine 0.76 and " troponin negative x 2. Procal was normal and UA unremarkable. CXR showed right basilar atelectasis but no other findings. He was given morphine and Zofran and has been admitted for further work up.    Review of Systems   Constitutional: Negative for activity change, appetite change and fatigue.   HENT: Negative for congestion and ear pain.    Eyes: Negative for pain and discharge.   Respiratory: Negative for cough and shortness of breath.    Cardiovascular: Positive for chest pain. Negative for leg swelling.   Gastrointestinal: Positive for constipation. Negative for abdominal distention, abdominal pain, diarrhea, nausea and vomiting.   Genitourinary: Negative for difficulty urinating and dysuria.   Musculoskeletal: Positive for gait problem. Negative for arthralgias.   Skin: Negative for color change and pallor.   Neurological: Positive for weakness and headaches.   Psychiatric/Behavioral: Negative for confusion. The patient is not nervous/anxious.       Personal History     Past Medical History:   Diagnosis Date   • Atrial fibrillation with RVR (HCC)    • Atrial flutter (HCC)    • Diabetes (HCC)    • HLD (hyperlipidemia) 1/27/2016   • Hypertension    • Parkinson's disease (HCC)     Advanced      Past Surgical History:   Procedure Laterality Date   • BRAIN STIMULATOR     • JOINT REPLACEMENT      Bilateral shoulder and knee replacements     No family history on file.  Social History     Tobacco Use   • Smoking status: Never Smoker   • Smokeless tobacco: Never Used   • Tobacco comment: caffeine use   Substance Use Topics   • Alcohol use: Yes   • Drug use: No     Medications Prior to Admission   Medication Sig Dispense Refill Last Dose   • allopurinol (ZYLOPRIM) 100 MG tablet Take 100 mg by mouth Daily.   3/22/2022 at Unknown time   • Alogliptin Benzoate 12.5 MG tablet Take  by mouth Daily. Half tablet daily   3/22/2022 at Unknown time   • apixaban (ELIQUIS) 5 MG tablet tablet Take 5 mg by mouth 2 (Two) Times a Day.       • aspirin 81 MG chewable tablet Chew 81 mg Every Other Day.   Past Week at Unknown time   • Calcium Carbonate 1500 (600 Ca) MG tablet Take 650 mg by mouth Daily.   3/22/2022 at Unknown time   • Carbidopa-Levodopa ER 36. MG capsule controlled-release Take  by mouth 4 (Four) Times a Day. 4 tablets 4 times daily   3/22/2022 at Unknown time   • carvedilol (COREG) 6.25 MG tablet Take 1 tablet by mouth 2 (Two) Times a Day. (Patient taking differently: Take 6.25 mg by mouth 3 (Three) Times a Day. New dose, patient has not picked up new Rx) 180 tablet 3 3/22/2022 at Unknown time   • citalopram (CeleXA) 20 MG tablet Take 20 mg by mouth Daily.   3/22/2022 at Unknown time   • docusate sodium (COLACE) 50 MG capsule Take  by mouth Every Night.   3/22/2022 at Unknown time   • donepezil (ARICEPT) 5 MG tablet Take 10 mg by mouth Every Night.   3/22/2022 at Unknown time   • gabapentin (NEURONTIN) 800 MG tablet Take 400 mg by mouth 2 (Two) Times a Day.   3/22/2022 at Unknown time   • indomethacin (INDOCIN) 50 MG capsule Take 50 mg by mouth 3 (Three) Times a Day.   3/22/2022 at Unknown time   • levothyroxine (SYNTHROID, LEVOTHROID) 25 MCG tablet Take 12.5 mcg by mouth Daily.   3/22/2022 at Unknown time   • magnesium oxide (MAG-OX) 400 tablet tablet Take 420 mg by mouth Daily.   3/22/2022 at Unknown time   • Magnesium Oxide 420 MG tablet Take 1 tablet by mouth 2 (Two) Times a Day.   3/22/2022 at Unknown time   • meclizine (ANTIVERT) 25 MG tablet Take 25 mg by mouth 3 (Three) Times a Day As Needed for Dizziness.   Past Week at Unknown time   • metFORMIN (GLUCOPHAGE) 500 MG tablet Take 500 mg by mouth 3 (Three) Times a Day.   3/22/2022 at Unknown time   • simvastatin (ZOCOR) 80 MG tablet Take 40 mg by mouth Every Night.   3/22/2022 at Unknown time   • traZODone (DESYREL) 50 MG tablet Take 25 mg by mouth Every Night.   3/22/2022 at Unknown time   • vitamin B-12 (CYANOCOBALAMIN) 1000 MCG tablet Take 500 mcg by mouth Daily.    3/22/2022 at Unknown time   • Polyvinyl Alcohol-Povidone PF (HYPOTEARS) 1.4-0.6 % ophthalmic solution 1-2 drops As Needed for Wound Care.        Allergies:    Allergies   Allergen Reactions   • Bacitracin Anaphylaxis   • Neosporin [Neomycin-Bacitracin Zn-Polymyx] Other (See Comments)     BP drops and heart stops!   • Fish-Derived Products Hives   • Shellfish Allergy Hives   • Shrimp (Diagnostic) Hives       Objective    Objective     Vital Signs  Temp:  [97.4 °F (36.3 °C)-97.8 °F (36.6 °C)] 97.4 °F (36.3 °C)  Heart Rate:  [] 61  Resp:  [16-18] 16  BP: ()/(57-83) 112/67  SpO2:  [92 %-95 %] 93 %  on   ;   Device (Oxygen Therapy): room air  Body mass index is 28.34 kg/m².    Physical Exam  Vitals and nursing note reviewed.   Constitutional:       General: He is not in acute distress.     Appearance: He is ill-appearing.   HENT:      Head: Normocephalic and atraumatic.      Right Ear: External ear normal.      Left Ear: External ear normal.      Nose: Nose normal.   Eyes:      General:         Right eye: No discharge.         Left eye: No discharge.      Conjunctiva/sclera: Conjunctivae normal.   Cardiovascular:      Rate and Rhythm: Normal rate. Rhythm irregular.      Pulses: Normal pulses.      Heart sounds: Normal heart sounds.   Pulmonary:      Effort: Pulmonary effort is normal. No respiratory distress.      Comments: Diminished, fairly clear. On RA  Abdominal:      General: Bowel sounds are normal. There is no distension.      Palpations: Abdomen is soft.      Tenderness: There is no abdominal tenderness.   Musculoskeletal:         General: No swelling or tenderness. Normal range of motion.      Cervical back: Normal range of motion and neck supple.   Skin:     General: Skin is warm and dry.      Findings: No bruising.   Neurological:      General: No focal deficit present.      Mental Status: He is alert and oriented to person, place, and time.      Sensory: No sensory deficit.      Motor: Weakness  present.      Coordination: Coordination normal.   Psychiatric:         Mood and Affect: Mood normal.         Behavior: Behavior normal.        Results Review:  I reviewed the patient's new clinical results.  I reviewed the patient's new imaging results and agree with the interpretation.  I reviewed the patient's other test results and agree with the interpretation  I personally viewed and interpreted the patient's EKG/Telemetry data    Lab Results (last 24 hours)     Procedure Component Value Units Date/Time    CBC & Differential [[id number]]  (Abnormal) Collected: 03/23/22 0358    Specimen: Blood Updated: 03/23/22 0407    Narrative:      The following orders were created for panel order CBC & Differential.  Procedure                               Abnormality         Status                     ---------                               -----------         ------                     CBC Auto Differential[[id number]]        Abnormal            Final result                 Please view results for these tests on the individual orders.    Comprehensive Metabolic Panel [[id number]]  (Abnormal) Collected: 03/23/22 0358    Specimen: Blood Updated: 03/23/22 0449     Glucose 123 mg/dL      BUN 25 mg/dL      Creatinine 0.76 mg/dL      Sodium 136 mmol/L      Potassium 4.0 mmol/L      Chloride 100 mmol/L      CO2 23.3 mmol/L      Calcium 8.9 mg/dL      Total Protein 6.2 g/dL      Albumin 4.10 g/dL      ALT (SGPT) <5 U/L      AST (SGOT) 10 U/L      Alkaline Phosphatase 65 U/L      Total Bilirubin 0.6 mg/dL      Globulin 2.1 gm/dL      A/G Ratio 2.0 g/dL      BUN/Creatinine Ratio 32.9     Anion Gap 12.7 mmol/L      eGFR 94.9 mL/min/1.73      Comment: National Kidney Foundation and American Society of Nephrology (ASN) Task Force recommended calculation based on the Chronic Kidney Disease Epidemiology Collaboration (CKD-EPI) equation refit without adjustment for race.       Narrative:      GFR Normal >60  Chronic Kidney Disease  <60  Kidney Failure <15      Troponin [[id number]]  (Normal) Collected: 03/23/22 0358    Specimen: Blood Updated: 03/23/22 0428     Troponin T <0.010 ng/mL     Narrative:      Troponin T Reference Range:  <= 0.03 ng/mL-   Negative for AMI  >0.03 ng/mL-     Abnormal for myocardial necrosis.  Clinicians would have to utilize clinical acumen, EKG, Troponin and serial changes to determine if it is an Acute Myocardial Infarction or myocardial injury due to an underlying chronic condition.       Results may be falsely decreased if patient taking Biotin.      CBC Auto Differential [[id number]]  (Abnormal) Collected: 03/23/22 0358    Specimen: Blood Updated: 03/23/22 0407     WBC 6.05 10*3/mm3      RBC 4.07 10*6/mm3      Hemoglobin 11.1 g/dL      Hematocrit 34.6 %      MCV 85.0 fL      MCH 27.3 pg      MCHC 32.1 g/dL      RDW 15.9 %      RDW-SD 49.5 fl      MPV 10.6 fL      Platelets 149 10*3/mm3      Neutrophil % 67.6 %      Lymphocyte % 20.3 %      Monocyte % 8.4 %      Eosinophil % 2.6 %      Basophil % 0.8 %      Immature Grans % 0.3 %      Neutrophils, Absolute 4.08 10*3/mm3      Lymphocytes, Absolute 1.23 10*3/mm3      Monocytes, Absolute 0.51 10*3/mm3      Eosinophils, Absolute 0.16 10*3/mm3      Basophils, Absolute 0.05 10*3/mm3      Immature Grans, Absolute 0.02 10*3/mm3      nRBC 0.0 /100 WBC     COVID PRE-OP / PRE-PROCEDURE SCREENING ORDER (NO ISOLATION) - Swab, Nasopharynx [[id number]]  (Normal) Collected: 03/23/22 0359    Specimen: Swab from Nasopharynx Updated: 03/23/22 0451    Narrative:      The following orders were created for panel order COVID PRE-OP / PRE-PROCEDURE SCREENING ORDER (NO ISOLATION) - Swab, Nasopharynx.  Procedure                               Abnormality         Status                     ---------                               -----------         ------                     COVID-19, MEL IN-HOUSE...[[id number]]  Normal              Final result                 Please view results for these  tests on the individual orders.    COVID-19,[facility] MEL IN-HOUSE CEPHEID/YUNG NP SWAB IN TRANSPORT MEDIA 8-12 HR TAT - Swab, Nasopharynx [[id number]]  (Normal) Collected: 03/23/22 0359    Specimen: Swab from Nasopharynx Updated: 03/23/22 0451     COVID19 Not Detected    Narrative:      Fact sheet for providers: https://www.fda.gov/media/560051/download     Fact sheet for patients: https://www.fda.gov/media/991558/download    Troponin [[id number]]  (Normal) Collected: 03/23/22 0719    Specimen: Blood Updated: 03/23/22 0808     Troponin T 0.010 ng/mL     Narrative:      Troponin T Reference Range:  <= 0.03 ng/mL-   Negative for AMI  >0.03 ng/mL-     Abnormal for myocardial necrosis.  Clinicians would have to utilize clinical acumen, EKG, Troponin and serial changes to determine if it is an Acute Myocardial Infarction or myocardial injury due to an underlying chronic condition.       Results may be falsely decreased if patient taking Biotin.      Basic Metabolic Panel [[id number]]  (Abnormal) Collected: 03/23/22 0719    Specimen: Blood Updated: 03/23/22 0808     Glucose 116 mg/dL      BUN 26 mg/dL      Creatinine 0.69 mg/dL      Sodium 137 mmol/L      Potassium 4.1 mmol/L      Chloride 101 mmol/L      CO2 23.9 mmol/L      Calcium 8.8 mg/dL      BUN/Creatinine Ratio 37.7     Anion Gap 12.1 mmol/L      eGFR 97.7 mL/min/1.73      Comment: National Kidney Foundation and American Society of Nephrology (ASN) Task Force recommended calculation based on the Chronic Kidney Disease Epidemiology Collaboration (CKD-EPI) equation refit without adjustment for race.       Narrative:      GFR Normal >60  Chronic Kidney Disease <60  Kidney Failure <15      CBC (No Diff) [[id number]]  (Abnormal) Collected: 03/23/22 0719    Specimen: Blood Updated: 03/23/22 0738     WBC 5.64 10*3/mm3      RBC 4.03 10*6/mm3      Hemoglobin 11.0 g/dL      Hematocrit 33.0 %      MCV 81.9 fL      MCH 27.3 pg      MCHC 33.3 g/dL      RDW 15.8 %      RDW-SD 47.3 fl      " MPV 11.1 fL      Platelets 170 10*3/mm3     Procalcitonin [[id number]]  (Normal) Collected: 03/23/22 0719    Specimen: Blood Updated: 03/23/22 1015     Procalcitonin 0.03 ng/mL     Narrative:      As a Marker for Sepsis (Non-Neonates):    1. <0.5 ng/mL represents a low risk of severe sepsis and/or septic shock.  2. >2 ng/mL represents a high risk of severe sepsis and/or septic shock.    As a Marker for Lower Respiratory Tract Infections that require antibiotic therapy:    PCT on Admission    Antibiotic Therapy       6-12 Hrs later    >0.5                Strongly Recommended  >0.25 - <0.5        Recommended   0.1 - 0.25          Discouraged              Remeasure/reassess PCT  <0.1                Strongly Discouraged     Remeasure/reassess PCT    As 28 day mortality risk marker: \"Change in Procalcitonin Result\" (>80% or <=80%) if Day 0 (or Day 1) and Day 4 values are available. Refer to http://www.[facility]-pct-calculator.com    Change in PCT <=80%  A decrease of PCT levels below or equal to 80% defines a positive change in PCT test result representing a higher risk for 28-day all-cause mortality of patients diagnosed with severe sepsis for septic shock.    Change in PCT >80%  A decrease of PCT levels of more than 80% defines a negative change in PCT result representing a lower risk for 28-day all-cause mortality of patients diagnosed with severe sepsis or septic shock.       Blood Culture - Blood, Hand, Right [[id number]] Collected: 03/23/22 1052    Specimen: Blood from Hand, Right Updated: 03/23/22 1117    Blood Culture - Blood, Arm, Left [[id number]] Collected: 03/23/22 1052    Specimen: Blood from Arm, Left Updated: 03/23/22 1117    Urinalysis With Culture If Indicated - Urine, Clean Catch [[id number]]  (Abnormal) Collected: 03/23/22 1113    Specimen: Urine, Clean Catch Updated: 03/23/22 1151     Color, UA Yellow     Appearance, UA Clear     pH, UA 6.5     Specific Gravity, UA 1.024     Glucose, UA Negative     " Ketones, UA Trace     Bilirubin, UA Negative     Blood, UA Negative     Protein, UA Negative     Leuk Esterase, UA Trace     Nitrite, UA Negative     Urobilinogen, UA 0.2 E.U./dL    Urinalysis, Microscopic Only - Urine, Clean Catch [[id number]] Collected: 03/23/22 1113    Specimen: Urine, Clean Catch Updated: 03/23/22 1151     RBC, UA 0-2 /HPF      WBC, UA 0-2 /HPF      Bacteria, UA None Seen /HPF      Squamous Epithelial Cells, UA 0-2 /HPF      Hyaline Casts, UA None Seen /LPF      Methodology Automated Microscopy          Imaging Results (Last 24 Hours)     Procedure Component Value Units Date/Time    XR Chest 1 View [[id number]] Collected: 03/23/22 0357     Updated: 03/23/22 0402    Narrative:      SINGLE VIEW OF THE CHEST     HISTORY: Chest pain     COMPARISON: 05/01/2021.      FINDINGS:  Heart size is within normal limits for technique. Right-sided brain  stimulator is noted. Patient status post bilateral shoulder  arthroplasties. There is right basilar atelectasis. Additional  consolidation is noted at the left lung base, but the appearance is  stable when compared to prior exam. This may represent some additional  scarring.       Impression:      Right basilar atelectasis.     This report was finalized on 3/23/2022 3:59 AM by Dr. Alyssa Dos Santos M.D.             Results for orders placed during the hospital encounter of 12/08/18    Adult Transthoracic Echo Complete W/ Cont if Necessary Per Protocol (With Agitated Saline)    Interpretation Summary  · Saline bubble study neg for PFO  · Calculated EF = 73%.  · There is no evidence of pericardial effusion.      ECG 12 Lead   Final Result   HEART RATE= 65  bpm   RR Interval= 926  ms   ID Interval=   ms   P Horizontal Axis=   deg   P Front Axis=   deg   QRSD Interval= 92  ms   QT Interval= 543  ms   QRS Axis= 33  deg   T Wave Axis= -20  deg   - ABNORMAL ECG -   Atrial fibrillation   Prolonged QT interval   When compared with ECG of 08-Dec-2018 13:18:22,    Significant change in rhythm   Electronically Signed By: Manolo Cancino ([facility]) 23-Mar-2022 09:22:08   Date and Time of Study: 2022-03-23 03:11:37           Assessment/Plan     Active Hospital Problems    Diagnosis  POA   • **Chest pain [R07.9]  Yes   • Episodes of staring [R40.4]  Yes   • Generalized weakness [R53.1]  Yes   • Diplopia [H53.2]  Yes   • Headache [R51.9]  Yes   • Parkinson's disease (HCC) [G20]  Yes     Advanced      • DM2 (diabetes mellitus, type 2) (HCC) [E11.9]  Yes   • ASCVD (arteriosclerotic cardiovascular disease) [I25.10]  Yes   • Paroxysmal atrial fibrillation (HCC) [I48.0]  Yes   • Essential hypertension [I10]  Yes   • Hypothyroidism [E03.9]  Yes     Mr. Reeder is a 71 year old male who presented to the hospital with multiple complaints including intermittent HA, chest pain, generalized weakness and some starring off spells and diplopia. He does have a brain stimulator with underlying Parkinson's as well as CAD.    · Chest pain: Cardiology has been consulted. Chest pain severity improved. Some typical and atypical components. Troponin negative x 2 and EKG with atrial fibrillation/prolonged QT. No echo since 2018. Will repeat and defer any further cardiac testing to cardiology expertise.   · Generalized weakness/HA/diplopia/starring spell: Neurology has been consulted. Will defer head imaging to their expertise. No overt deficits on exam to suggest stroke.   · Parkinson's disease: Takes extended release Sinemet- family to bring in. Monitor neuro checks. Bedside swallow failed. ST to see. Ask PT/OT to see.   · ASCVD/PAF: As above. Rate controlled. Resume home regimen with ASA/Eliquis/Coreg/statin.   · DM2: Oral agents resumed. Monitor ACHS and use SSI as needed. Last A1c 8.2% in 2020. Will repeat.  · Hypothyroidism: Just started replacement this week- Unsure what TSH was- will repeat.     · I discussed the patients findings and my recommendations with patient, family, nursing staff and   Ethel.    VTE Prophylaxis - Eliquis (home med).  Code Status - Full code. Confirmed with patient and wife.       ESME Arredondo  [facility]  03/23/22  12:37 EDT

## 2022-03-23 NOTE — PROGRESS NOTES
RX to dose Sinemet per Dr. Davenport  Patient currently takes Rytary 36.25/145 mg capsules 4 caps 4x daily. After speaking with Dr. Dickson he said to just wait until daughter brings in his home medication and have patient take that. Doesn't want to try any conversion over to hospital formulary product. (Daughter is to bring in the Rytary this afternoon) When it arrives I will label per home med policy for hospital use and give to RN to administer.     Thanks,   Darin Lam [facility]

## 2022-03-23 NOTE — PLAN OF CARE
Goal Outcome Evaluation:  Plan of Care Reviewed With: other (see comments) (RN)   Outcome Evaluation: Patient KIARRA (vascular). Failed RN swallow screen. Hx of dysphagia with last VFSS at this facility 12/2018. At that time patient demonstrated silent aspiration of nectar thick liquids. A regular diet with honey thick liquids was recommended. Per RN, patient does not consume a modified diet at home. SLP to follow for clinical swallow evaluation to assist in developing a plan of care. Could consider vital meds crushed in puree. MD to advise.

## 2022-03-23 NOTE — ED PROVIDER NOTES
EMERGENCY DEPARTMENT ENCOUNTER    Room Number:  07/07  Date of encounter:  3/23/2022  PCP: Harvey Larson MD  Historian: Patient      HPI:  Chief Complaint: Chest pain  A complete HPI/ROS/PMH/PSH/SH/FH are unobtainable due to: Nothing    Context: Edilberto Reeder is a 71 y.o. male who presents to the ED c/o chest pain that began night approximately 10 PM.  Patient's blood pressure was noted to be elevated.  He described a substernal chest pressure that radiated to bilateral arms and neck.  The pain has continued since onset but is slightly better and said to be a 5 out of 10 on the pain scale at present.  He denied nausea, vomiting, diaphoresis.  He is followed by Dr. Fung for local cardiology and has paroxysmal A. fib.  He is treated with Eliquis and carvedilol.  Patient denies past medical history of heart attack but does endorse arteriosclerotic cardiovascular disease.    CRFs for heart score purposes include hypertension, hyperlipidemia, diabetes mellitus, BMI, family history    Patient has past medical history of Parkinson's disease and currently has a brain stimulator.        PAST MEDICAL HISTORY  Active Ambulatory Problems     Diagnosis Date Noted   • ASCVD (arteriosclerotic cardiovascular disease) 01/27/2016   • Paroxysmal atrial fibrillation (HCC) 01/27/2016   • Anticoagulated on warfarin 01/27/2016   • Diabetic retinopathy associated with type 2 diabetes mellitus (HCC) 01/27/2016   • Essential hypertension 01/27/2016   • HLD (hyperlipidemia) 01/27/2016   • Hypothyroidism 01/27/2016   • Peripheral neuropathy 01/27/2016   • Renal insufficiency 01/27/2016   • Diabetes (HCC)    • Parkinson's disease (HCC)    • Dyspnea on exertion 04/19/2018   • Atrial fibrillation (CMS/HCC) [I48.91] 08/17/2018   • Stroke-like symptoms 12/08/2018   • Hypopotassemia 12/08/2018   • Ankle pain 09/17/2021   • Acute idiopathic gout of right ankle 09/18/2021     Resolved Ambulatory Problems     Diagnosis Date Noted   • Type 2  diabetes mellitus (HCC) 01/27/2016   • Atrial fibrillation with RVR (HCC) 01/04/2018     Past Medical History:   Diagnosis Date   • Atrial flutter (HCC)    • Hypertension          PAST SURGICAL HISTORY  Past Surgical History:   Procedure Laterality Date   • BRAIN STIMULATOR     • JOINT REPLACEMENT      Bilateral shoulder and knee replacements         FAMILY HISTORY  No family history on file.      SOCIAL HISTORY  Social History     Socioeconomic History   • Marital status:    Tobacco Use   • Smoking status: Never Smoker   • Smokeless tobacco: Never Used   • Tobacco comment: caffeine use   Substance and Sexual Activity   • Alcohol use: Yes   • Drug use: No   • Sexual activity: Defer         ALLERGIES  Bacitracin, Neosporin [neomycin-bacitracin zn-polymyx], Fish-derived products, Shellfish allergy, and Shrimp (diagnostic)        REVIEW OF SYSTEMS  Review of Systems   Constitutional: Negative for chills, diaphoresis and fever.   HENT: Negative.    Eyes: Negative.    Respiratory: Negative for shortness of breath.    Cardiovascular: Positive for chest pain. Negative for palpitations.   Gastrointestinal: Negative for nausea and vomiting.   Genitourinary: Negative.    Musculoskeletal: Negative.    Skin: Negative.    Neurological: Negative.    Psychiatric/Behavioral: Negative.         All systems reviewed and negative except for those discussed in HPI.       PHYSICAL EXAM    I have reviewed the triage vital signs and nursing notes.    ED Triage Vitals [03/23/22 0317]   Temp Heart Rate Resp BP SpO2   -- 102 -- 113/74 93 %      Temp src Heart Rate Source Patient Position BP Location FiO2 (%)   -- -- -- -- --       Physical Exam  GENERAL: Chronically ill in appearance, nontoxic, no acute distress  HENT: nares patent  EYES: no scleral icterus  CV: Irregularly irregular with no obvious murmur   RESPIRATORY: normal effort, lungs CTA B  ABDOMEN: soft, nontender  MUSCULOSKELETAL: no deformity, chest pain not  reproducible  NEURO: alert and oriented x1, moves all extremities, follows commands  SKIN: warm, dry        LAB RESULTS  Recent Results (from the past 24 hour(s))   ECG 12 Lead    Collection Time: 03/23/22  3:11 AM   Result Value Ref Range    QT Interval 543 ms   Comprehensive Metabolic Panel    Collection Time: 03/23/22  3:58 AM    Specimen: Blood   Result Value Ref Range    Glucose 123 (H) 65 - 99 mg/dL    BUN 25 (H) 8 - 23 mg/dL    Creatinine 0.76 0.76 - 1.27 mg/dL    Sodium 136 136 - 145 mmol/L    Potassium 4.0 3.5 - 5.2 mmol/L    Chloride 100 98 - 107 mmol/L    CO2 23.3 22.0 - 29.0 mmol/L    Calcium 8.9 8.6 - 10.5 mg/dL    Total Protein 6.2 6.0 - 8.5 g/dL    Albumin 4.10 3.50 - 5.20 g/dL    ALT (SGPT) <5 1 - 41 U/L    AST (SGOT) 10 1 - 40 U/L    Alkaline Phosphatase 65 39 - 117 U/L    Total Bilirubin 0.6 0.0 - 1.2 mg/dL    Globulin 2.1 gm/dL    A/G Ratio 2.0 g/dL    BUN/Creatinine Ratio 32.9 (H) 7.0 - 25.0    Anion Gap 12.7 5.0 - 15.0 mmol/L    eGFR 94.9 >60.0 mL/min/1.73   Troponin    Collection Time: 03/23/22  3:58 AM    Specimen: Blood   Result Value Ref Range    Troponin T <0.010 0.000 - 0.030 ng/mL   CBC Auto Differential    Collection Time: 03/23/22  3:58 AM    Specimen: Blood   Result Value Ref Range    WBC 6.05 3.40 - 10.80 10*3/mm3    RBC 4.07 (L) 4.14 - 5.80 10*6/mm3    Hemoglobin 11.1 (L) 13.0 - 17.7 g/dL    Hematocrit 34.6 (L) 37.5 - 51.0 %    MCV 85.0 79.0 - 97.0 fL    MCH 27.3 26.6 - 33.0 pg    MCHC 32.1 31.5 - 35.7 g/dL    RDW 15.9 (H) 12.3 - 15.4 %    RDW-SD 49.5 37.0 - 54.0 fl    MPV 10.6 6.0 - 12.0 fL    Platelets 149 140 - 450 10*3/mm3    Neutrophil % 67.6 42.7 - 76.0 %    Lymphocyte % 20.3 19.6 - 45.3 %    Monocyte % 8.4 5.0 - 12.0 %    Eosinophil % 2.6 0.3 - 6.2 %    Basophil % 0.8 0.0 - 1.5 %    Immature Grans % 0.3 0.0 - 0.5 %    Neutrophils, Absolute 4.08 1.70 - 7.00 10*3/mm3    Lymphocytes, Absolute 1.23 0.70 - 3.10 10*3/mm3    Monocytes, Absolute 0.51 0.10 - 0.90 10*3/mm3     Eosinophils, Absolute 0.16 0.00 - 0.40 10*3/mm3    Basophils, Absolute 0.05 0.00 - 0.20 10*3/mm3    Immature Grans, Absolute 0.02 0.00 - 0.05 10*3/mm3    nRBC 0.0 0.0 - 0.2 /100 WBC       Ordered the above labs and independently reviewed the results.        RADIOLOGY  XR Chest 1 View    Result Date: 3/23/2022  SINGLE VIEW OF THE CHEST  HISTORY: Chest pain  COMPARISON: 05/01/2021.  FINDINGS: Heart size is within normal limits for technique. Right-sided brain stimulator is noted. Patient status post bilateral shoulder arthroplasties. There is right basilar atelectasis. Additional consolidation is noted at the left lung base, but the appearance is stable when compared to prior exam. This may represent some additional scarring.      Right basilar atelectasis.  This report was finalized on 3/23/2022 3:59 AM by Dr. Alyssa Dos Santos M.D.        I ordered the above noted radiological studies. Reviewed by me and discussed with radiologist.  See dictation for official radiology interpretation.      PROCEDURES    Procedures      MEDICATIONS GIVEN IN ER    Medications   sodium chloride 0.9 % flush 10 mL (has no administration in time range)   nitroglycerin (NITROSTAT) SL tablet 0.4 mg (0.4 mg Sublingual Given 3/23/22 0430)   sodium chloride 0.9 % flush 10 mL (has no administration in time range)   sodium chloride 0.9 % flush 10 mL (has no administration in time range)   nitroglycerin (NITROSTAT) SL tablet 0.4 mg (has no administration in time range)   acetaminophen (TYLENOL) tablet 650 mg (has no administration in time range)     Or   acetaminophen (TYLENOL) 160 MG/5ML solution 650 mg (has no administration in time range)     Or   acetaminophen (TYLENOL) suppository 650 mg (has no administration in time range)   ondansetron (ZOFRAN) tablet 4 mg (has no administration in time range)     Or   ondansetron (ZOFRAN) injection 4 mg (has no administration in time range)   calcium carbonate (TUMS) chewable tablet 500 mg (200 mg  elemental) (has no administration in time range)   nitroglycerin (NITROSTAT) ointment 1 inch (has no administration in time range)   ondansetron (ZOFRAN) injection 4 mg (4 mg Intravenous Given 3/23/22 0422)   morphine injection 4 mg (4 mg Intravenous Given 3/23/22 0425)         PROGRESS, DATA ANALYSIS, CONSULTS, AND MEDICAL DECISION MAKING    All labs have been independently reviewed by me.  All radiology studies have been reviewed by me and discussed with radiologist dictating the report.   EKG's independently viewed and interpreted by me.  Discussion below represents my analysis of pertinent findings related to patient's condition, differential diagnosis, treatment plan and final disposition.    DDx includes but is not limited to: ACS, PNA, PTX, PE, GI mediated chest pain, chest wall pain, anxiety, aortic dissection.  To further evaluate the patient will obtain CBC, CMP, troponin x2, EKG, portable chest x-ray.    ED Course as of 03/23/22 0449   Wed Mar 23, 2022   0341 Sublingual nitro x3 and morphine ordered for the patient's chest pain and headache. [RC]   0342 EKG          EKG time: 0311  Rhythm/Rate: 65/a fib  P waves and AK:   QRS, axis: Nl axis   ST and T waves: Nonspecific T wave changes in the inferior lateral leads    Interpreted Contemporaneously by me, independently viewed  -No acute change noted from 12/8/2018 [RC]   0343 Troponin T: <0.010 [RC]   0345 HEART SCORE    History Highly suspicious (2)  ECG Nonspecific repol disturbance (1)  Age > or = 65 (2)  Risk factors > or = to 3 RF for atherosclerotic dx (2)  Troponin < or = Normal limit (0)    This patient's HEART score is 7    HEART Score Key:  Scores 0-3: 0.9-1.7% risk of adverse cardiac event. In the HEART Score study, these patients were discharged (0.99% in the retrospective study, 1.7% in the prospective study)  Scores 4-6: 12-16.6% risk of adverse cardiac event. In the HEART Score study, these patients were admitted to the hospital. (11.6%  retrospective, 16.6% prospective)  Scores ?7: 50-65% risk of adverse cardiac event. In the HEART Score study, these patients were candidates for early invasive measures. (65.2% retrospective, 50.1% prospective)   oc [RC]      ED Course User Index  [RC] Cali Giron III, PA           PPE: The patient wore a surgical mask throughout the entire patient encounter. I wore an N95.    AS OF 04:49 EDT VITALS:    BP - 100/68  HR - 71  TEMP - 97.4 °F (36.3 °C) (Oral)  O2 SATS - 92%        DIAGNOSIS  Final diagnoses:   Chest pain, unspecified type   History of coronary atherosclerosis   Parkinson's disease (HCC)   Non-insulin dependent type 2 diabetes mellitus (HCC)         DISPOSITION  ADMISSION    Discussed treatment plan and reason for admission with pt/family and admitting physician.  Pt/family voiced understanding of the plan for admission for further testing/treatment as needed.              Cali Giron III, PA  03/23/22 0449

## 2022-03-24 ENCOUNTER — APPOINTMENT (OUTPATIENT)
Dept: CARDIOLOGY | Facility: HOSPITAL | Age: 72
End: 2022-03-24

## 2022-03-24 LAB
ALBUMIN SERPL-MCNC: 3.6 G/DL (ref 3.5–5.2)
ALBUMIN/GLOB SERPL: 1.6 G/DL
ALP SERPL-CCNC: 65 U/L (ref 39–117)
ALT SERPL W P-5'-P-CCNC: <5 U/L (ref 1–41)
ANION GAP SERPL CALCULATED.3IONS-SCNC: 10.6 MMOL/L (ref 5–15)
AST SERPL-CCNC: 6 U/L (ref 1–40)
BH CV ECHO MEAS - AO MAX PG: 3.2 MMHG
BH CV ECHO MEAS - AO MEAN PG: 1.82 MMHG
BH CV ECHO MEAS - AO V2 MAX: 89.3 CM/SEC
BH CV ECHO MEAS - AO V2 VTI: 19.9 CM
BH CV ECHO MEAS - AVA(I,D): 3.7 CM2
BH CV ECHO MEAS - EDV(CUBED): 52.2 ML
BH CV ECHO MEAS - EDV(MOD-SP2): 70 ML
BH CV ECHO MEAS - EDV(MOD-SP4): 78 ML
BH CV ECHO MEAS - EF(MOD-BP): 59 %
BH CV ECHO MEAS - EF(MOD-SP2): 55.7 %
BH CV ECHO MEAS - EF(MOD-SP4): 61.5 %
BH CV ECHO MEAS - ESV(CUBED): 30 ML
BH CV ECHO MEAS - ESV(MOD-SP2): 31 ML
BH CV ECHO MEAS - ESV(MOD-SP4): 30 ML
BH CV ECHO MEAS - FS: 16.9 %
BH CV ECHO MEAS - IVS/LVPW: 0.98 CM
BH CV ECHO MEAS - IVSD: 1.48 CM
BH CV ECHO MEAS - LV DIASTOLIC VOL/BSA (35-75): 37.8 CM2
BH CV ECHO MEAS - LV MASS(C)D: 210.8 GRAMS
BH CV ECHO MEAS - LV MAX PG: 3.4 MMHG
BH CV ECHO MEAS - LV MEAN PG: 1.89 MMHG
BH CV ECHO MEAS - LV SYSTOLIC VOL/BSA (12-30): 14.5 CM2
BH CV ECHO MEAS - LV V1 MAX: 92.6 CM/SEC
BH CV ECHO MEAS - LV V1 VTI: 20.1 CM
BH CV ECHO MEAS - LVIDD: 3.7 CM
BH CV ECHO MEAS - LVIDS: 3.1 CM
BH CV ECHO MEAS - LVOT AREA: 3.6 CM2
BH CV ECHO MEAS - LVOT DIAM: 2.15 CM
BH CV ECHO MEAS - LVPWD: 1.51 CM
BH CV ECHO MEAS - MV DEC SLOPE: 550.6 CM/SEC2
BH CV ECHO MEAS - MV MAX PG: 4.7 MMHG
BH CV ECHO MEAS - MV MEAN PG: 1.91 MMHG
BH CV ECHO MEAS - MV V2 VTI: 20.3 CM
BH CV ECHO MEAS - MVA(VTI): 3.6 CM2
BH CV ECHO MEAS - PA ACC TIME: 0.11 SEC
BH CV ECHO MEAS - PA PR(ACCEL): 27.9 MMHG
BH CV ECHO MEAS - PA V2 MAX: 97 CM/SEC
BH CV ECHO MEAS - RAP SYSTOLE: 8 MMHG
BH CV ECHO MEAS - RV MAX PG: 1.38 MMHG
BH CV ECHO MEAS - RV V1 MAX: 58.7 CM/SEC
BH CV ECHO MEAS - RV V1 VTI: 13.3 CM
BH CV ECHO MEAS - RVOT DIAM: 2.05 CM
BH CV ECHO MEAS - RVSP: 38 MMHG
BH CV ECHO MEAS - SI(MOD-SP2): 18.9 ML/M2
BH CV ECHO MEAS - SI(MOD-SP4): 23.3 ML/M2
BH CV ECHO MEAS - SV(LVOT): 72.6 ML
BH CV ECHO MEAS - SV(MOD-SP2): 39 ML
BH CV ECHO MEAS - SV(MOD-SP4): 48 ML
BH CV ECHO MEAS - SV(RVOT): 43.5 ML
BH CV ECHO MEAS - TR MAX PG: 30.7 MMHG
BH CV ECHO MEAS - TR MAX VEL: 276.9 CM/SEC
BH CV XLRA - RV BASE: 3.5 CM
BH CV XLRA - RV LENGTH: 7 CM
BH CV XLRA - RV MID: 2.37 CM
BH CV XLRA - TDI S': 13.8 CM/SEC
BILIRUB SERPL-MCNC: 0.7 MG/DL (ref 0–1.2)
BUN SERPL-MCNC: 16 MG/DL (ref 8–23)
BUN/CREAT SERPL: 20.3 (ref 7–25)
CALCIUM SPEC-SCNC: 8.8 MG/DL (ref 8.6–10.5)
CHLORIDE SERPL-SCNC: 103 MMOL/L (ref 98–107)
CO2 SERPL-SCNC: 25.4 MMOL/L (ref 22–29)
CREAT SERPL-MCNC: 0.79 MG/DL (ref 0.76–1.27)
DEPRECATED RDW RBC AUTO: 46.4 FL (ref 37–54)
EGFRCR SERPLBLD CKD-EPI 2021: 93.8 ML/MIN/1.73
ERYTHROCYTE [DISTWIDTH] IN BLOOD BY AUTOMATED COUNT: 15.6 % (ref 12.3–15.4)
GLOBULIN UR ELPH-MCNC: 2.3 GM/DL
GLUCOSE BLDC GLUCOMTR-MCNC: 123 MG/DL (ref 70–130)
GLUCOSE BLDC GLUCOMTR-MCNC: 152 MG/DL (ref 70–130)
GLUCOSE BLDC GLUCOMTR-MCNC: 155 MG/DL (ref 70–130)
GLUCOSE BLDC GLUCOMTR-MCNC: 283 MG/DL (ref 70–130)
GLUCOSE SERPL-MCNC: 115 MG/DL (ref 65–99)
HCT VFR BLD AUTO: 34.2 % (ref 37.5–51)
HGB BLD-MCNC: 11.2 G/DL (ref 13–17.7)
LEFT ATRIUM VOLUME INDEX: 34.9 ML/M2
MAXIMAL PREDICTED HEART RATE: 147 BPM
MCH RBC QN AUTO: 27.2 PG (ref 26.6–33)
MCHC RBC AUTO-ENTMCNC: 32.7 G/DL (ref 31.5–35.7)
MCV RBC AUTO: 83 FL (ref 79–97)
PLATELET # BLD AUTO: 159 10*3/MM3 (ref 140–450)
PMV BLD AUTO: 11.1 FL (ref 6–12)
POTASSIUM SERPL-SCNC: 4.1 MMOL/L (ref 3.5–5.2)
PROT SERPL-MCNC: 5.9 G/DL (ref 6–8.5)
RBC # BLD AUTO: 4.12 10*6/MM3 (ref 4.14–5.8)
SODIUM SERPL-SCNC: 139 MMOL/L (ref 136–145)
STRESS TARGET HR: 125 BPM
WBC NRBC COR # BLD: 5.8 10*3/MM3 (ref 3.4–10.8)

## 2022-03-24 PROCEDURE — 82962 GLUCOSE BLOOD TEST: CPT

## 2022-03-24 PROCEDURE — 97110 THERAPEUTIC EXERCISES: CPT | Performed by: OCCUPATIONAL THERAPIST

## 2022-03-24 PROCEDURE — 92610 EVALUATE SWALLOWING FUNCTION: CPT

## 2022-03-24 PROCEDURE — 93306 TTE W/DOPPLER COMPLETE: CPT

## 2022-03-24 PROCEDURE — 63710000001 INSULIN LISPRO (HUMAN) PER 5 UNITS: Performed by: NURSE PRACTITIONER

## 2022-03-24 PROCEDURE — G0378 HOSPITAL OBSERVATION PER HR: HCPCS

## 2022-03-24 PROCEDURE — 85027 COMPLETE CBC AUTOMATED: CPT | Performed by: NURSE PRACTITIONER

## 2022-03-24 PROCEDURE — 97110 THERAPEUTIC EXERCISES: CPT

## 2022-03-24 PROCEDURE — 99213 OFFICE O/P EST LOW 20 MIN: CPT | Performed by: PSYCHIATRY & NEUROLOGY

## 2022-03-24 PROCEDURE — 93306 TTE W/DOPPLER COMPLETE: CPT | Performed by: INTERNAL MEDICINE

## 2022-03-24 PROCEDURE — 97166 OT EVAL MOD COMPLEX 45 MIN: CPT | Performed by: OCCUPATIONAL THERAPIST

## 2022-03-24 PROCEDURE — 80053 COMPREHEN METABOLIC PANEL: CPT | Performed by: NURSE PRACTITIONER

## 2022-03-24 PROCEDURE — 97535 SELF CARE MNGMENT TRAINING: CPT | Performed by: OCCUPATIONAL THERAPIST

## 2022-03-24 PROCEDURE — 97162 PT EVAL MOD COMPLEX 30 MIN: CPT

## 2022-03-24 PROCEDURE — 99214 OFFICE O/P EST MOD 30 MIN: CPT | Performed by: INTERNAL MEDICINE

## 2022-03-24 RX ADMIN — Medication 500 MCG: at 09:13

## 2022-03-24 RX ADMIN — CARVEDILOL 6.25 MG: 6.25 TABLET, FILM COATED ORAL at 20:33

## 2022-03-24 RX ADMIN — LEVODOPA AND CARBIDOPA 4 CAPSULE: 145; 36.25 CAPSULE, EXTENDED RELEASE ORAL at 09:14

## 2022-03-24 RX ADMIN — MAGNESIUM OXIDE 400 MG (241.3 MG MAGNESIUM) TABLET 400 MG: TABLET at 09:13

## 2022-03-24 RX ADMIN — LINAGLIPTIN 5 MG: 5 TABLET, FILM COATED ORAL at 09:13

## 2022-03-24 RX ADMIN — Medication 10 ML: at 20:37

## 2022-03-24 RX ADMIN — CITALOPRAM 20 MG: 20 TABLET, FILM COATED ORAL at 09:13

## 2022-03-24 RX ADMIN — LEVODOPA AND CARBIDOPA 4 CAPSULE: 145; 36.25 CAPSULE, EXTENDED RELEASE ORAL at 17:44

## 2022-03-24 RX ADMIN — DONEPEZIL HYDROCHLORIDE 10 MG: 10 TABLET, FILM COATED ORAL at 20:33

## 2022-03-24 RX ADMIN — CARVEDILOL 6.25 MG: 6.25 TABLET, FILM COATED ORAL at 09:13

## 2022-03-24 RX ADMIN — LEVODOPA AND CARBIDOPA 4 CAPSULE: 145; 36.25 CAPSULE, EXTENDED RELEASE ORAL at 11:58

## 2022-03-24 RX ADMIN — DOCUSATE SODIUM 100 MG: 100 CAPSULE, LIQUID FILLED ORAL at 09:14

## 2022-03-24 RX ADMIN — LEVOTHYROXINE SODIUM 12.5 MCG: 0.03 TABLET ORAL at 09:13

## 2022-03-24 RX ADMIN — ALLOPURINOL 100 MG: 100 TABLET ORAL at 09:13

## 2022-03-24 RX ADMIN — INSULIN LISPRO 4 UNITS: 100 INJECTION, SOLUTION INTRAVENOUS; SUBCUTANEOUS at 17:44

## 2022-03-24 RX ADMIN — Medication 10 ML: at 09:14

## 2022-03-24 RX ADMIN — DOCUSATE SODIUM 100 MG: 100 CAPSULE, LIQUID FILLED ORAL at 20:33

## 2022-03-24 RX ADMIN — ATORVASTATIN CALCIUM 40 MG: 20 TABLET, FILM COATED ORAL at 09:14

## 2022-03-24 RX ADMIN — Medication 220 MG: at 09:13

## 2022-03-24 RX ADMIN — LEVODOPA AND CARBIDOPA 4 CAPSULE: 145; 36.25 CAPSULE, EXTENDED RELEASE ORAL at 20:37

## 2022-03-24 NOTE — PLAN OF CARE
Goal Outcome Evaluation:  Plan of Care Reviewed With: patient, spouse        Progress: improving  Outcome Evaluation: Pt seen for PT arleen this am. He is doing well and agreeable to PT. He was admitted to [facility] yesterday w chest pain.  He has hx of Parkinsons, A fib, HTN,and DM2. At baseline, he lives at home w his wife and uses walker for ambulation. SHe does assist w ADLs and he has WC to use if needed. Pt does reports hx of falls. Today, pt w minimal complaints. He was able to sit EOB w min A. HE then stood and ambulated approx 5 ft from bed to chair w Rwx and CGA/min A. He declined attempt to ambulate further at this time. He plans home at [place], w assist of wife. He will continue to benefit from skilled PT to maximize safety, strength, and independence with mobility.

## 2022-03-24 NOTE — PLAN OF CARE
Goal Outcome Evaluation:           Progress: improving  Outcome Evaluation: VSS, no c/o pain, up to chair today, speech saw and increased to reg/thins, down for echo now, NPO at midnight for stress test first thing in morning, will continue to monitor

## 2022-03-24 NOTE — PROGRESS NOTES
Patient Identification:  NAME:  Edilberto Reeder  Age:  71 y.o.   Sex:  male   :  1950   MRN:  [id number]       Chief complaint: Parkinson's disease, oculogyric crisis (intermittent)    History of present illness: No more of the eye rolling he is doing well has a mild resting tremor in the left hand carotid Doppler showed normal bilateral carotid Doppler low and antegrade vertebral artery flow bilaterally      Past medical history:  Past Medical History:   Diagnosis Date   • Atrial fibrillation with RVR (HCC)    • Atrial flutter (HCC)    • Diabetes (HCC)    • HLD (hyperlipidemia) 2016   • Hypertension    • Parkinson's disease (HCC)     Advanced        Allergies:  Bacitracin, Neosporin [neomycin-bacitracin zn-polymyx], Fish-derived products, Shellfish allergy, and Shrimp (diagnostic)    Home medications:  Medications Prior to Admission   Medication Sig Dispense Refill Last Dose   • allopurinol (ZYLOPRIM) 100 MG tablet Take 100 mg by mouth Daily.   3/22/2022 at Unknown time   • Alogliptin Benzoate 12.5 MG tablet Take  by mouth Daily. Half tablet daily   3/22/2022 at Unknown time   • apixaban (ELIQUIS) 5 MG tablet tablet Take 5 mg by mouth 2 (Two) Times a Day.      • aspirin 81 MG chewable tablet Chew 81 mg Every Other Day.   Past Week at Unknown time   • Calcium Carbonate 1500 (600 Ca) MG tablet Take 650 mg by mouth Daily.   3/22/2022 at Unknown time   • Carbidopa-Levodopa ER 36. MG capsule controlled-release Take  by mouth 4 (Four) Times a Day. 4 tablets 4 times daily   3/22/2022 at Unknown time   • carvedilol (COREG) 6.25 MG tablet Take 1 tablet by mouth 2 (Two) Times a Day. (Patient taking differently: Take 6.25 mg by mouth 3 (Three) Times a Day. New dose, patient has not picked up new Rx) 180 tablet 3 3/22/2022 at Unknown time   • citalopram (CeleXA) 20 MG tablet Take 20 mg by mouth Daily.   3/22/2022 at Unknown time   • docusate sodium (COLACE) 50 MG capsule Take  by mouth Every Night.    3/22/2022 at Unknown time   • donepezil (ARICEPT) 5 MG tablet Take 10 mg by mouth Every Night.   3/22/2022 at Unknown time   • gabapentin (NEURONTIN) 800 MG tablet Take 400 mg by mouth 2 (Two) Times a Day.   3/22/2022 at Unknown time   • indomethacin (INDOCIN) 50 MG capsule Take 50 mg by mouth 3 (Three) Times a Day.   3/22/2022 at Unknown time   • levothyroxine (SYNTHROID, LEVOTHROID) 25 MCG tablet Take 12.5 mcg by mouth Daily.   3/22/2022 at Unknown time   • magnesium oxide (MAG-OX) 400 tablet tablet Take 420 mg by mouth Daily.   3/22/2022 at Unknown time   • Magnesium Oxide 420 MG tablet Take 1 tablet by mouth 2 (Two) Times a Day.   3/22/2022 at Unknown time   • meclizine (ANTIVERT) 25 MG tablet Take 25 mg by mouth 3 (Three) Times a Day As Needed for Dizziness.   Past Week at Unknown time   • metFORMIN (GLUCOPHAGE) 500 MG tablet Take 500 mg by mouth 3 (Three) Times a Day.   3/22/2022 at Unknown time   • simvastatin (ZOCOR) 80 MG tablet Take 40 mg by mouth Every Night.   3/22/2022 at Unknown time   • traZODone (DESYREL) 50 MG tablet Take 25 mg by mouth Every Night.   3/22/2022 at Unknown time   • vitamin B-12 (CYANOCOBALAMIN) 1000 MCG tablet Take 500 mcg by mouth Daily.   3/22/2022 at Unknown time   • Polyvinyl Alcohol-Povidone PF (HYPOTEARS) 1.4-0.6 % ophthalmic solution 1-2 drops As Needed for Wound Care.           Hospital medications:  allopurinol, 100 mg, Oral, Daily  aspirin, 81 mg, Oral, Every Other Day  atorvastatin, 40 mg, Oral, Daily  Carbidopa-Levodopa ER, 4 capsule, Oral, 4x Daily  carvedilol, 6.25 mg, Oral, BID  citalopram, 20 mg, Oral, Daily  docusate sodium, 100 mg, Oral, BID  donepezil, 10 mg, Oral, Nightly  insulin lispro, 0-7 Units, Subcutaneous, TID AC  levothyroxine, 12.5 mcg, Oral, Daily  linagliptin, 5 mg, Oral, Daily  magnesium oxide, 400 mg, Oral, Daily  sodium chloride, 10 mL, Intravenous, Q12H  vitamin B-12, 500 mcg, Oral, Daily  zinc sulfate, 220 mg, Oral, Daily         •  acetaminophen  **OR** acetaminophen **OR** acetaminophen  •  calcium carbonate  •  dextrose  •  dextrose  •  glucagon (human recombinant)  •  nitroglycerin  •  nitroglycerin  •  ondansetron **OR** ondansetron  •  [COMPLETED] Insert peripheral IV **AND** sodium chloride  •  sodium chloride      Objective:  Vitals Ranges:   Temp:  [97.5 °F (36.4 °C)-97.8 °F (36.6 °C)] 97.5 °F (36.4 °C)  Heart Rate:  [66-93] 93  Resp:  [16-20] 20  BP: (105-142)/(66-88) 125/83      Physical Exam:  He is awake alert very pleasant normal cranial nerves II through VII resting tremor in the left upper extremity but easily lifts both arms reflexes trace throughout symmetrical right toe downgoing left toe upgoing    Results review:   I reviewed the patient's new clinical results.    Data review:  Lab Results (last 24 hours)     Procedure Component Value Units Date/Time    Blood Culture - Blood, Hand, Right [[id number]]  (Normal) Collected: 03/23/22 1052    Specimen: Blood from Hand, Right Updated: 03/24/22 1132     Blood Culture No growth at 24 hours    Blood Culture - Blood, Arm, Left [[id number]]  (Normal) Collected: 03/23/22 1052    Specimen: Blood from Arm, Left Updated: 03/24/22 1132     Blood Culture No growth at 24 hours    POC Glucose Once [[id number]]  (Abnormal) Collected: 03/24/22 1122    Specimen: Blood Updated: 03/24/22 1124     Glucose 152 mg/dL      Comment: Meter: [id number] : [id number] Rogelio MILLAN       Comprehensive Metabolic Panel [[id number]]  (Abnormal) Collected: 03/24/22 0529    Specimen: Blood Updated: 03/24/22 0634     Glucose 115 mg/dL      BUN 16 mg/dL      Creatinine 0.79 mg/dL      Sodium 139 mmol/L      Potassium 4.1 mmol/L      Chloride 103 mmol/L      CO2 25.4 mmol/L      Calcium 8.8 mg/dL      Total Protein 5.9 g/dL      Albumin 3.60 g/dL      ALT (SGPT) <5 U/L      AST (SGOT) 6 U/L      Alkaline Phosphatase 65 U/L      Total Bilirubin 0.7 mg/dL      Globulin 2.3 gm/dL      A/G Ratio 1.6 g/dL      BUN/Creatinine Ratio  20.3     Anion Gap 10.6 mmol/L      eGFR 93.8 mL/min/1.73      Comment: National Kidney Foundation and American Society of Nephrology (ASN) Task Force recommended calculation based on the Chronic Kidney Disease Epidemiology Collaboration (CKD-EPI) equation refit without adjustment for race.       Narrative:      GFR Normal >60  Chronic Kidney Disease <60  Kidney Failure <15      CBC (No Diff) [[id number]]  (Abnormal) Collected: 03/24/22 0529    Specimen: Blood Updated: 03/24/22 0614     WBC 5.80 10*3/mm3      RBC 4.12 10*6/mm3      Hemoglobin 11.2 g/dL      Hematocrit 34.2 %      MCV 83.0 fL      MCH 27.2 pg      MCHC 32.7 g/dL      RDW 15.6 %      RDW-SD 46.4 fl      MPV 11.1 fL      Platelets 159 10*3/mm3     POC Glucose Once [[id number]]  (Normal) Collected: 03/24/22 0537    Specimen: Blood Updated: 03/24/22 0539     Glucose 123 mg/dL      Comment: Meter: [id number] : [id number] Diallo Kim NA       POC Glucose Once [[id number]]  (Normal) Collected: 03/23/22 1658    Specimen: Blood Updated: 03/23/22 1659     Glucose 100 mg/dL      Comment: Meter: [id number] : [id number] Hung Monserrat NA              Imaging:  Imaging Results (Last 24 Hours)     ** No results found for the last 24 hours. **         PPE worn at all times washed before washed afterwards disposed of everything properly was now within 6 feet of them for more than few minutes during my exam no aerosols used at any point  Assessment and Plan:     He looks great today he has primary Parkinson's disease with an implant/stimulator that is working well and he is on a large dose of long-acting Sinemet that I am not going to change.  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  Thanks, and neurology will sign off, please reconsult.  As  needed      Jaspreet Dickson MD  03/24/22  14:44 EDT

## 2022-03-24 NOTE — THERAPY EVALUATION
Patient Name: Edilberto Reeder  : 1950    MRN: [id number]                              Today's Date: 3/24/2022       Admit Date: 3/23/2022    Visit Dx:     ICD-10-CM ICD-9-CM   1. Chest pain, unspecified type  R07.9 786.50   2. History of coronary atherosclerosis  Z86.79 V12.59   3. Parkinson's disease (HCC)  G20 332.0   4. Non-insulin dependent type 2 diabetes mellitus (HCC)  E11.9 250.00     Patient Active Problem List   Diagnosis   • ASCVD (arteriosclerotic cardiovascular disease)   • Paroxysmal atrial fibrillation (HCC)   • Anticoagulated on warfarin   • Diabetic retinopathy associated with type 2 diabetes mellitus (HCC)   • Essential hypertension   • HLD (hyperlipidemia)   • Hypothyroidism   • Peripheral neuropathy   • Renal insufficiency   • DM2 (diabetes mellitus, type 2) (HCC)   • Parkinson's disease (HCC)   • Dyspnea on exertion   • Atrial fibrillation (CMS/HCC) [I48.91]   • Stroke-like symptoms   • Hypopotassemia   • Ankle pain   • Acute idiopathic gout of right ankle   • Chest pain   • Episodes of staring   • Generalized weakness   • Diplopia   • Headache     Past Medical History:   Diagnosis Date   • Atrial fibrillation with RVR (HCC)    • Atrial flutter (HCC)    • Diabetes (HCC)    • HLD (hyperlipidemia) 2016   • Hypertension    • Parkinson's disease (HCC)     Advanced      Past Surgical History:   Procedure Laterality Date   • BRAIN STIMULATOR     • JOINT REPLACEMENT      Bilateral shoulder and knee replacements      General Information     Row Name 22 1143          Physical Therapy Time and Intention    Document Type evaluation  -EJ     Mode of Treatment physical therapy  -EJ     Row Name 22 1143          General Information    Patient Profile Reviewed yes  -EJ     Prior Level of Function min assist:;ADL's;all household mobility  ambulates w walker at home; has electric WC, transport WC, cane  -EJ     Existing Precautions/Restrictions fall  -EJ     Barriers to Rehab previous  functional deficit  -EJ     Row Name 03/24/22 1143          Living Environment    People in Home spouse  -EJ     Row Name 03/24/22 1143          Home Main Entrance    Stair Railings, Main Entrance none  -EJ     Row Name 03/24/22 1143          Stairs Within Home, Primary    Number of Stairs, Within Home, Primary none  -EJ     Row Name 03/24/22 1143          Cognition    Orientation Status (Cognition) oriented x 3  -EJ     Row Name 03/24/22 1143          Safety Issues, Functional Mobility    Impairments Affecting Function (Mobility) strength;balance;endurance/activity tolerance  -EJ           User Key  (r) = Recorded By, (t) = Taken By, (c) = Cosigned By    Initials Name Provider Type    EJ Angeline Multani, PT Physical Therapist               Mobility     Row Name 03/24/22 1144          Bed Mobility    Bed Mobility supine-sit  -EJ     Supine-Sit [place] (Bed Mobility) verbal cues;minimum assist (75% patient effort)  -EJ     Assistive Device (Bed Mobility) head of bed elevated;bed rails  -     Row Name 03/24/22 1144          Sit-Stand Transfer    Sit-Stand [place] (Transfers) verbal cues;minimum assist (75% patient effort)  -EJ     Assistive Device (Sit-Stand Transfers) walker, front-wheeled  -EJ     Row Name 03/24/22 1144          Gait/Stairs (Locomotion)    [place] Level (Gait) verbal cues;contact guard;minimum assist (75% patient effort)  -EJ     Assistive Device (Gait) walker, front-wheeled  -EJ     Distance in Feet (Gait) 5  -EJ     Deviations/Abnormal Patterns (Gait) kelvin decreased;stride length decreased  -EJ     Comment, (Gait/Stairs) ambulated from bed to chair, declined attempt to ambulate further at this time  -EJ           User Key  (r) = Recorded By, (t) = Taken By, (c) = Cosigned By    Initials Name Provider Type    EJ Angeline Multani, PT Physical Therapist               Obj/Interventions     Row Name 03/24/22 1145          Range of Motion Comprehensive    General Range of Motion  no range of motion deficits identified  -[facility] Name 03/24/22 1145          Strength Comprehensive (MMT)    Comment, General Manual Muscle Testing (MMT) Assessment generalized weakness  -[facility] Name 03/24/22 1145          Balance    Balance Assessment sitting static balance;standing static balance;standing dynamic balance  -EJ     Static Sitting Balance independent  -EJ     Static Standing Balance standby assist;contact guard  -EJ     Dynamic Standing Balance contact guard  -EJ     Position/Device Used, Standing Balance walker, rolling  -EJ           User Key  (r) = Recorded By, (t) = Taken By, (c) = Cosigned By    Initials Name Provider Type     Angeline Multani, PT Physical Therapist               Goals/Plan     [facility] Name 03/24/22 1148          Bed Mobility Goal 1 (PT)    Activity/Assistive Device (Bed Mobility Goal 1, PT) bed mobility activities, all  -EJ     [place] Level/Cues Needed (Bed Mobility Goal 1, PT) contact guard required  -EJ     Time Frame (Bed Mobility Goal 1, PT) 1 week  -EJ     Row Name 03/24/22 1148          Transfer Goal 1 (PT)    Activity/Assistive Device (Transfer Goal 1, PT) transfers, all;walker, rolling  -EJ     [place] Level/Cues Needed (Transfer Goal 1, PT) standby assist  -EJ     Time Frame (Transfer Goal 1, PT) 1 week  -EJ     Row Name 03/24/22 1148          Gait Training Goal 1 (PT)    Activity/Assistive Device (Gait Training Goal 1, PT) gait (walking locomotion);walker, rolling  -EJ     [place] Level (Gait Training Goal 1, PT) contact guard required  -EJ     Distance (Gait Training Goal 1, PT) 100  -EJ     Time Frame (Gait Training Goal 1, PT) 1 week  -EJ           User Key  (r) = Recorded By, (t) = Taken By, (c) = Cosigned By    Initials Name Provider Type     Angeline Multani, PT Physical Therapist               Clinical Impression     [facility] Name 03/24/22 1145          Pain    Pretreatment Pain Rating 0/10 - no pain  -EJ     Row Name 03/24/22 1145           Plan of Care Review    Plan of Care Reviewed With patient;spouse  -EJ     Progress improving  -EJ     Outcome Evaluation Pt seen for PT eval this am. He is doing well and agreeable to PT. He was admitted to [facility] yesterday w chest pain.  He has hx of Parkinsons, A fib, HTN,and DM2. At baseline, he lives at home w his wife and uses walker for ambulation. SHe does assist w ADLs and he has WC to use if needed. Pt does reports hx of falls. Today, pt w minimal complaints. He was able to sit EOB w min A. HE then stood and ambulated approx 5 ft from bed to chair w Rwx and CGA/min A. He declined attempt to ambulate further at this time. He plans home at [place], w assist of wife. He will continue to benefit from skilled PT to maximize safety, strength, and independence with mobility.  -EJ     Row Name 03/24/22 1145          Therapy Assessment/Plan (PT)    Patient/Family Therapy Goals Statement (PT) go home  -EJ     Rehab Potential (PT) good, to achieve stated therapy goals  -EJ     Criteria for Skilled Interventions Met (PT) yes  -EJ     Row Name 03/24/22 1145          Positioning and Restraints    Pre-Treatment Position in bed  -EJ     Post Treatment Position chair  -EJ     In Chair notified nsg;reclined;call light within reach;encouraged to call for assist;exit alarm on;with family/caregiver  -EJ           User Key  (r) = Recorded By, (t) = Taken By, (c) = Cosigned By    Initials Name Provider Type    EJ Angeline Multani, PT Physical Therapist               Outcome Measures     Row Name 03/24/22 1131          How much help from another person do you currently need...    Turning from your back to your side while in flat bed without using bedrails? 3  -EJ     Moving from lying on back to sitting on the side of a flat bed without bedrails? 3  -EJ     Moving to and from a bed to a chair (including a wheelchair)? 3  -EJ     Standing up from a chair using your arms (e.g., wheelchair, bedside chair)? 3  -EJ     Climbing 3-5 steps  with a railing? 2  -EJ     To walk in hospital room? 3  -EJ     AM-PAC 6 Clicks Score (PT) 17  -EJ     Row Name 03/24/22 1148          Functional Assessment    Outcome Measure Options AM-PAC 6 Clicks Basic Mobility (PT)  -           User Key  (r) = Recorded By, (t) = Taken By, (c) = Cosigned By    Initials Name Provider Type    EJ Angeline Multani, PT Physical Therapist                             Physical Therapy Education                 Title: PT OT SLP Therapies (In Progress)     Topic: Physical Therapy (In Progress)     Point: Mobility training (Done)     Learning Progress Summary           Patient Acceptance, E,TB,D, VU,NR by  at 3/24/2022 1150                   Point: Home exercise program (Not Started)     Learner Progress:  Not documented in this visit.          Point: Body mechanics (Not Started)     Learner Progress:  Not documented in this visit.          Point: Precautions (Not Started)     Learner Progress:  Not documented in this visit.                      User Key     Initials Effective Dates Name Provider Type Discipline     06/16/21 -  Angeline Multani, PT Physical Therapist PT              PT Recommendation and Plan  Planned Therapy Interventions (PT): balance training, bed mobility training, gait training, home exercise program, patient/family education, strengthening, ROM (range of motion), transfer training  Plan of Care Reviewed With: patient, spouse  Progress: improving  Outcome Evaluation: Pt seen for PT eval this am. He is doing well and agreeable to PT. He was admitted to [facility] yesterday w chest pain.  He has hx of Parkinsons, A fib, HTN,and DM2. At baseline, he lives at home w his wife and uses walker for ambulation. SHe does assist w ADLs and he has WC to use if needed. Pt does reports hx of falls. Today, pt w minimal complaints. He was able to sit EOB w min A. HE then stood and ambulated approx 5 ft from bed to chair w Rwx and CGA/min A. He declined attempt to ambulate further  at this time. He plans home at [place], w assist of wife. He will continue to benefit from skilled PT to maximize safety, strength, and independence with mobility.     Time Calculation:    PT Charges     Row Name 03/24/22 1150             Time Calculation    Start Time 1100  -EJ      Stop Time 1120  -EJ      Time Calculation (min) 20 min  -EJ      PT Received On 03/24/22  -EJ      PT - Next Appointment 03/25/22  -EJ      PT Goal Re-Cert Due Date 03/31/22  -EJ              Time Calculation- PT    Total Timed Code Minutes- PT 12 minute(s)  -EJ            User Key  (r) = Recorded By, (t) = Taken By, (c) = Cosigned By    Initials Name Provider Type    EJ Angeline Multani, PT Physical Therapist              Therapy Charges for Today     Code Description Service Date Service Provider Modifiers Qty    73993029271 HC PT EVAL MOD COMPLEXITY 3 3/24/2022 Angeline Multani, PT GP 1    94822673975 HC PT THER PROC EA 15 MIN 3/24/2022 Angeline Multani, PT GP 1          PT G-Codes  Outcome Measure Options: AM-PAC 6 Clicks Basic Mobility (PT)  AM-PAC 6 Clicks Score (PT): 17    Angeline Multani PT  3/24/2022

## 2022-03-24 NOTE — PROGRESS NOTES
"Patient Care Team:  Harvey Larson MD as PCP - General  Harvey Larson MD as PCP - Family Medicine    Chief Complaint: Follow-up chest pain.    Interval History:   Patient stood and ambulated 5 feet.  Continued with physical therapy.  No significant chest discomfort.    Objective   Vital Signs  Temp:  [97.5 °F (36.4 °C)-97.8 °F (36.6 °C)] 97.5 °F (36.4 °C)  Heart Rate:  [66-93] 66  Resp:  [16-18] 18  BP: (105-142)/(66-88) 115/78    Intake/Output Summary (Last 24 hours) at 3/24/2022 1158  Last data filed at 3/23/2022 2219  Gross per 24 hour   Intake 0 ml   Output --   Net 0 ml     Flowsheet Rows    Flowsheet Row First Filed Value   Admission Height 177.8 cm (70\") Documented at 03/23/2022 0550   Admission Weight 89.6 kg (197 lb 8.5 oz) Documented at 03/23/2022 0608          Temp:  [97.5 °F (36.4 °C)-97.8 °F (36.6 °C)] 97.5 °F (36.4 °C)  Heart Rate:  [66-93] 66  Resp:  [16-18] 18  BP: (105-142)/(66-88) 115/78    Intake/Output Summary (Last 24 hours) at 3/24/2022 1158  Last data filed at 3/23/2022 2219  Gross per 24 hour   Intake 0 ml   Output --   Net 0 ml     Flowsheet Rows    Flowsheet Row First Filed Value   Admission Height 177.8 cm (70\") Documented at 03/23/2022 0550   Admission Weight 89.6 kg (197 lb 8.5 oz) Documented at 03/23/2022 0608          General Appearance:    Alert, cooperative, in no acute distress   Head:    Normocephalic, without obvious abnormality, atraumatic       Neck/Lymph   No adenopathy, supple, no thyromegaly, no carotid bruit, no    JVD   Lungs:     Clear to auscultation bilaterally, no wheezes, rales, or     rhonchi    Cardiac:    Normal rate, regular rhythm, no murmur, no rub, no gallop   Chest Wall:    No abnormalities observed   GI:     Normal bowel sounds, soft, nontender, nondistended,            no rebound tenderness   Extremities:   No cyanosis, clubbing, or edema   Circulatory/Peripheral Vascular :   Pulses palpable and equal bilaterally   Integumentary:   No bleeding or rash. " Normal temperature                allopurinol, 100 mg, Oral, Daily  aspirin, 81 mg, Oral, Every Other Day  atorvastatin, 40 mg, Oral, Daily  Carbidopa-Levodopa ER, 4 capsule, Oral, 4x Daily  carvedilol, 6.25 mg, Oral, BID  citalopram, 20 mg, Oral, Daily  docusate sodium, 100 mg, Oral, BID  donepezil, 10 mg, Oral, Nightly  insulin lispro, 0-7 Units, Subcutaneous, TID AC  levothyroxine, 12.5 mcg, Oral, Daily  linagliptin, 5 mg, Oral, Daily  magnesium oxide, 400 mg, Oral, Daily  sodium chloride, 10 mL, Intravenous, Q12H  vitamin B-12, 500 mcg, Oral, Daily  zinc sulfate, 220 mg, Oral, Daily             Results Review:    Results from last 7 days   Lab Units 03/24/22  0529   SODIUM mmol/L 139   POTASSIUM mmol/L 4.1   CHLORIDE mmol/L 103   CO2 mmol/L 25.4   BUN mg/dL 16   CREATININE mg/dL 0.79   GLUCOSE mg/dL 115*   CALCIUM mg/dL 8.8     Results from last 7 days   Lab Units 03/23/22  0719 03/23/22  0358   TROPONIN T ng/mL 0.010 <0.010     Results from last 7 days   Lab Units 03/24/22  0529   WBC 10*3/mm3 5.80   HEMOGLOBIN g/dL 11.2*   HEMATOCRIT % 34.2*   PLATELETS 10*3/mm3 159                     @LABRCNT(bnp)@  I reviewed the patient's new clinical results.  I personally viewed and interpreted the patient's EKG/Telemetry data            Assessment/Plan   1.  Chest pain: Mild  2.  Parkinson's disease  3.  Essential hypertension: Well-controlled  4.  Persistent atrial fibrillation: Ventricular rate well controlled  5.  Coronary artery calcification: Documented on CTPE protocol     -Cardiac biomarkers are negative and EKG does not show significant changes from baseline.  I do not think he needs an invasive evaluation.  -Blood pressure and heart rate are well controlled.  No changes in current medical regimen.  -Stop Eliquis at this point in case we end up needing to pivot and do an invasive evaluation after stress testing.  -lexiscan ordered. If no high risk defects we will recommend medical management.

## 2022-03-24 NOTE — THERAPY EVALUATION
Acute Care - Speech Language Pathology   Swallow Initial Evaluation [facility]     Patient Name: Edilberto Reeder  : 1950  MRN: [id number]  Today's Date: 3/24/2022               Admit Date: 3/23/2022    Visit Dx:     ICD-10-CM ICD-9-CM   1. Chest pain, unspecified type  R07.9 786.50   2. History of coronary atherosclerosis  Z86.79 V12.59   3. Parkinson's disease (HCC)  G20 332.0   4. Non-insulin dependent type 2 diabetes mellitus (HCC)  E11.9 250.00     Patient Active Problem List   Diagnosis   • ASCVD (arteriosclerotic cardiovascular disease)   • Paroxysmal atrial fibrillation (HCC)   • Anticoagulated on warfarin   • Diabetic retinopathy associated with type 2 diabetes mellitus (HCC)   • Essential hypertension   • HLD (hyperlipidemia)   • Hypothyroidism   • Peripheral neuropathy   • Renal insufficiency   • DM2 (diabetes mellitus, type 2) (HCC)   • Parkinson's disease (HCC)   • Dyspnea on exertion   • Atrial fibrillation (CMS/HCC) [I48.91]   • Stroke-like symptoms   • Hypopotassemia   • Ankle pain   • Acute idiopathic gout of right ankle   • Chest pain   • Episodes of staring   • Generalized weakness   • Diplopia   • Headache     Past Medical History:   Diagnosis Date   • Atrial fibrillation with RVR (HCC)    • Atrial flutter (HCC)    • Diabetes (HCC)    • HLD (hyperlipidemia) 2016   • Hypertension    • Parkinson's disease (HCC)     Advanced      Past Surgical History:   Procedure Laterality Date   • BRAIN STIMULATOR     • JOINT REPLACEMENT      Bilateral shoulder and knee replacements       SLP Recommendation and Plan  SLP Swallowing Diagnosis: swallow WFL (22 1330)  SLP Diet Recommendation: regular textures, thin liquids (22 1330)  Recommended Precautions and Strategies: upright posture during/after eating, small bites of food and sips of liquid, general aspiration precautions (22 1330)  SLP Rec. for Method of Medication Administration: meds whole, meds crushed, with thin liquids,  with pudding or applesauce, as tolerated (03/24/22 1330)     Monitor for Signs of Aspiration: yes, notify SLP if any concerns (03/24/22 1330)     Swallow Criteria for Skilled Therapeutic Interventions Met: baseline status (03/24/22 1330)  Anticipated Discharge Disposition (SLP): home (03/24/22 1330)  Rehab Potential/Prognosis, Swallowing: good, to achieve stated therapy goals (03/24/22 1330)  Therapy Frequency (Swallow): evaluation only (03/24/22 1330)  Predicted Duration Therapy Intervention (Days): until discharge (03/24/22 1330)                                  Plan of Care Reviewed With: patient  Outcome Evaluation: Clinical swallow eval completed. Recommend regular and thins, meds whole/crushed with puree/thins. Recommend general aspiration precautions. Patient/wife in agreement for instrumental and diet modification if pulmonary status declines or PNA develops. SLP to s/o, please re-consult as warranted.      SWALLOW EVALUATION (last 72 hours)     SLP Adult Swallow Evaluation     Row Name 03/24/22 1330                   Rehab Evaluation    Document Type evaluation  -OC        Subjective Information no complaints  -OC        Patient Observations alert;cooperative;agree to therapy  -OC        Patient Effort good  -OC        Symptoms Noted During/After Treatment none  -OC                  General Information    Patient Profile Reviewed yes  -OC        Pertinent History Of Current Problem Patient admitted with chest pain, generalized weakness, head ache, diplopia. Hx parkinsons.  -OC        Current Method of Nutrition mechanical soft, no mixed consistencies;honey-thick liquids  -OC        Precautions/Limitations, Vision WFL;for purposes of eval  -OC        Precautions/Limitations, Hearing WFL;for purposes of eval  -OC        Prior Level of Function-Communication WFL  -OC        Prior Level of Function-Swallowing no diet consistency restrictions;mechanical soft with no mixed consistencies;honey thick liquids;other  (see comments)  2018 eval recs for HTL/MS no mixed  -OC        Plans/Goals Discussed with patient;spouse/S.O.  -OC        Barriers to Rehab none identified  -OC        Patient's Goals for Discharge return to regular diet  -OC        Family Goals for Discharge patient able to return to regular diet  -OC                  Pain Scale: Numbers Pre/Post-Treatment    Pretreatment Pain Rating 0/10 - no pain  -OC        Posttreatment Pain Rating 0/10 - no pain  -OC                  Oral Motor Structure and Function    Dentition Assessment natural, present and adequate  -OC        Secretion Management WNL/WFL  -OC        Mucosal Quality moist, healthy  -OC        Volitional Swallow WFL  -OC        Volitional Cough WFL  -OC                  Oral Musculature and Cranial Nerve Assessment    Oral Motor General Assessment WFL  -OC                  Clinical Swallow Eval    Clinical Swallow Evaluation Summary Patient and wife report regular/thin diet at home. Patient presents with functional swallow at this time. No overt s/s aspiration with thin, puree, mechanical soft, and dry solid. Timely swallow and no oral residue. Clear vocal quality s/p all trials. SLP discussed previous recs for honey thick and mech soft no mixed. Patient and wife in agreement to return to regular/thins and further eval/instrumental/diet modifications if patient with negative pulmonary changes.  -OC                  SLP Evaluation Clinical Impression    SLP Swallowing Diagnosis swallow WFL  -OC        Functional Impact no impact on function  -OC        Rehab Potential/Prognosis, Swallowing good, to achieve stated therapy goals  -OC        Swallow Criteria for Skilled Therapeutic Interventions Met baseline status  -OC                  Recommendations    Therapy Frequency (Swallow) evaluation only  -OC        Predicted Duration Therapy Intervention (Days) until discharge  -OC        SLP Diet Recommendation regular textures;thin liquids  -OC        Recommended  Precautions and Strategies upright posture during/after eating;small bites of food and sips of liquid;general aspiration precautions  -OC        Oral Care Recommendations Oral Care BID/PRN  -OC        SLP Rec. for Method of Medication Administration meds whole;meds crushed;with thin liquids;with pudding or applesauce;as tolerated  -OC        Monitor for Signs of Aspiration yes;notify SLP if any concerns  -OC        Anticipated Discharge Disposition (SLP) home  -OC              User Key  (r) = Recorded By, (t) = Taken By, (c) = Cosigned By    Initials Name Effective Dates    OC Isis Millan MA, CCC-SLP 06/16/21 -                 EDUCATION  The patient has been educated in the following areas:   Dysphagia (Swallowing Impairment).         SLP Outcome Measures (last 72 hours)     SLP Outcome Measures     Row Name 03/24/22 1400             SLP Outcome Measures    Outcome Measure Used? Adult NOMS  -OC              Adult FCM Scores    FCM Chosen Swallowing  -OC      Swallowing FCM Score 6  -OC            User Key  (r) = Recorded By, (t) = Taken By, (c) = Cosigned By    Initials Name Effective Dates    Isis Jo MA, CCC-SLP 06/16/21 -                  Time Calculation:    Time Calculation- SLP     Row Name 03/24/22 1531             Time Calculation- SLP    SLP Start Time 1400  -OC      SLP Received On 03/24/22  -OC              Untimed Charges    SLP Eval/Re-eval  ST Eval Oral Pharyng Swallow - [id number]  -OC      [id number] Eval Oral Pharyng Swallow Minutes 60  -OC              Total Minutes    Untimed Charges Total Minutes 60  -OC       Total Minutes 60  -OC            User Key  (r) = Recorded By, (t) = Taken By, (c) = Cosigned By    Initials Name Provider Type    OC Isis Millan MA,ALEX-SLP Speech and Language Pathologist                Therapy Charges for Today     Code Description Service Date Service Provider Modifiers Qty    13774211218  ST EVAL ORAL PHARYNG SWALLOW 4 3/24/2022 Isis Millan MA,ALEX-SLP GN 1                Isis Millan MA,CCC-SLP  3/24/2022

## 2022-03-24 NOTE — PLAN OF CARE
Goal Outcome Evaluation:  Plan of Care Reviewed With: patient           Outcome Evaluation: Clinical swallow eval completed. Recommend regular and thins, meds whole/crushed with puree/thins. Recommend general aspiration precautions. Patient/wife in agreement for instrumental and diet modification if pulmonary status declines or PNA develops. SLP to s/o, please re-consult as warranted.      Patient was not wearing a face mask during this therapy encounter. Therapist used appropriate personal protective equipment including mask, eye protection and gloves.  Mask used was standard procedure mask. Appropriate PPE was worn during the entire therapy session. Hand hygiene was completed before and after therapy session. Patient is not in enhanced droplet precautions.              Problem: Bronchoconstriction:  Goal: Improve in air movement and diminished wheezing  Outcome: PROGRESSING AS EXPECTED  Pt on home regimen of DUO TID

## 2022-03-24 NOTE — PLAN OF CARE
Goal Outcome Evaluation:  Plan of Care Reviewed With: patient, spouse        Progress: improving  Outcome Evaluation: pt evaluated for OT this date, he was admitted w. chest pain which has resolved, he has h/o parkinson's, Afib, HTN, and DM2. He lives w his wife at home who A him as needed w ADLs and tsf. per wife some days he made need more help or less help depending on the day. Pt completed sup-sit w min A, and sat EOB w no A, was dep w socks and SBA w grooming tasks. Pt completed sit to stand w min A and walker, walked in room to door, into [place] to turn around then back to the bed as he did not want  to get up to the chair. pt completed AROM 5x sh and 10x B elbow. pt fatigued quickly w sh ex due to h/o total shoulders. Pt cont to benefit from OT to incr ADL, strength, balance, and tsf. Plans to dc home w his wife.  OT wore all PPE, washed hands before/after.

## 2022-03-24 NOTE — THERAPY EVALUATION
Patient Name: Edilberto Reeder  : 1950    MRN: [id number]                              Today's Date: 3/24/2022       Admit Date: 3/23/2022    Visit Dx:     ICD-10-CM ICD-9-CM   1. Chest pain, unspecified type  R07.9 786.50   2. History of coronary atherosclerosis  Z86.79 V12.59   3. Parkinson's disease (HCC)  G20 332.0   4. Non-insulin dependent type 2 diabetes mellitus (HCC)  E11.9 250.00     Patient Active Problem List   Diagnosis   • ASCVD (arteriosclerotic cardiovascular disease)   • Paroxysmal atrial fibrillation (HCC)   • Anticoagulated on warfarin   • Diabetic retinopathy associated with type 2 diabetes mellitus (HCC)   • Essential hypertension   • HLD (hyperlipidemia)   • Hypothyroidism   • Peripheral neuropathy   • Renal insufficiency   • DM2 (diabetes mellitus, type 2) (HCC)   • Parkinson's disease (HCC)   • Dyspnea on exertion   • Atrial fibrillation (CMS/HCC) [I48.91]   • Stroke-like symptoms   • Hypopotassemia   • Ankle pain   • Acute idiopathic gout of right ankle   • Chest pain   • Episodes of staring   • Generalized weakness   • Diplopia   • Headache     Past Medical History:   Diagnosis Date   • Atrial fibrillation with RVR (HCC)    • Atrial flutter (HCC)    • Diabetes (HCC)    • HLD (hyperlipidemia) 2016   • Hypertension    • Parkinson's disease (HCC)     Advanced      Past Surgical History:   Procedure Laterality Date   • BRAIN STIMULATOR     • JOINT REPLACEMENT      Bilateral shoulder and knee replacements      General Information     Row Name 22 1224          OT Time and Intention    Document Type evaluation;therapy note (daily note)  -     Mode of Treatment individual therapy;occupational therapy  -     Row Name 22 1224          General Information    Patient Profile Reviewed yes  -     Prior Level of Function min assist:;ADL's;transfer  but dependent upon the day, some days better than others.  -     Existing Precautions/Restrictions fall  -     Barriers to  Rehab previous functional deficit  -     Row Name 03/24/22 1224          Living Environment    People in Home spouse  -[facility] Name 03/24/22 1224          Cognition    Orientation Status (Cognition) oriented x 3  -[facility] Name 03/24/22 1224          Safety Issues, Functional Mobility    Impairments Affecting Function (Mobility) balance;endurance/activity tolerance;strength  -           User Key  (r) = Recorded By, (t) = Taken By, (c) = Cosigned By    Initials Name Provider Type     Marina Lewis, OTR Occupational Therapist                 Mobility/ADL's     Row Name 03/24/22 1226          Bed Mobility    Bed Mobility sit-supine;supine-sit  -     Supine-Sit [place] (Bed Mobility) set up;verbal cues;minimum assist (75% patient effort);moderate assist (50% patient effort)  -     Sit-Supine [place] (Bed Mobility) set up;moderate assist (50% patient effort)  -     Assistive Device (Bed Mobility) bed rails;head of bed elevated  -     Comment, (Bed Mobility) A w LE to get back into bed, A to get to EOB to scoot out  -[facility] Name 03/24/22 1226          Transfers    Transfers sit-stand transfer;stand-sit transfer  -     Sit-Stand [place] (Transfers) set up;verbal cues;minimum assist (75% patient effort)  -     Stand-Sit [place] (Transfers) set up;verbal cues;minimum assist (75% patient effort);contact guard  -[facility] Name 03/24/22 1226          Sit-Stand Transfer    Assistive Device (Sit-Stand Transfers) walker, front-wheeled  -[facility] Name 03/24/22 1226          Functional Mobility    Functional Mobility- Ind. Level minimum assist (75% patient effort)  -     Functional Mobility- Device rolling walker  -     Functional Mobility- Comment in room, to door into payton way then back to bed  -[facility] Name 03/24/22 1226          Activities of Daily Living    BADL Assessment/Intervention grooming;lower body dressing  -[facility] Name 03/24/22 1226           Stand-Sit Transfer    Assistive Device (Stand-Sit Transfers) walker, front-wheeled  -     Row Name 03/24/22 1226          Grooming Assessment/Training    [place] Level (Grooming) grooming skills;oral care regimen;wash face, hands;set up;standby assist  -     Position (Grooming) sitting up in bed  -[facility] Name 03/24/22 1226          Lower Body Dressing Assessment/Training    [place] Level (Lower Body Dressing) lower body dressing skills;don;socks;dependent (less than 25% patient effort)  -     Position (Lower Body Dressing) sitting up in bed  -           User Key  (r) = Recorded By, (t) = Taken By, (c) = Cosigned By    Initials Name Provider Type     Marina Lewis, OTR Occupational Therapist               Obj/Interventions     [facility] Name 03/24/22 1228          Sensory Assessment (Somatosensory)    Sensory Assessment (Somatosensory) sensation intact  -[facility] Name 03/24/22 1228          Vision Assessment/Intervention    Visual Impairment/Limitations WFL  -[facility] Name 03/24/22 1228          Range of Motion Comprehensive    General Range of Motion upper extremity range of motion deficits identified  -     Comment, General Range of Motion B sh imp slightly from previous sh surgeries, per wife total shouler and total reverse shoulders on B. B elbow, wrist, forearm 8/8 B sh 7/8  -[facility] Name 03/24/22 1228          Strength Comprehensive (MMT)    General Manual Muscle Testing (MMT) Assessment upper extremity strength deficits identified  -     Comment, General Manual Muscle Testing (MMT) Assessment B UE 3+/5 B sh 3-/5  -[facility] Name 03/24/22 1228          Shoulder (Therapeutic Exercise)    Shoulder (Therapeutic Exercise) AROM (active range of motion)  -     Shoulder AROM (Therapeutic Exercise) bilateral;flexion;extension;5 repetitions  -     Row Name 03/24/22 1228          Elbow/Forearm (Therapeutic Exercise)    Elbow/Forearm (Therapeutic Exercise) AROM (active range of  motion)  -     Elbow/Forearm AROM (Therapeutic Exercise) bilateral;flexion;extension;10 repetitions  -     Row Name 03/24/22 1228          Motor Skills    Motor Skills coordination;functional endurance  -     Coordination WFL  -     Functional Endurance fair +  -KP     Therapeutic Exercise elbow/forearm;shoulder  -     Row Name 03/24/22 1228          Balance    Balance Assessment sitting static balance;standing static balance  -KP     Static Sitting Balance set-up;independent  -     Position, Sitting Balance sitting edge of bed  -KP     Static Standing Balance set-up;contact guard;standby assist  -KP     Dynamic Standing Balance set-up;contact guard;minimal assist  -KP     Position/Device Used, Standing Balance supported;walker, front-wheeled  -     Balance Interventions sitting;standing;sit to stand;supported;static;dynamic;occupation based/functional task  -           User Key  (r) = Recorded By, (t) = Taken By, (c) = Cosigned By    Initials Name Provider Type    KP Marina Lewis, OTR Occupational Therapist               Goals/Plan     Row Name 03/24/22 1235          Bathing Goal 1 (OT)    Activity/Device (Bathing Goal 1, OT) bathing skills, all  -KP     [place] Level/Cues Needed (Bathing Goal 1, OT) verbal cues required;minimum assist (75% or more patient effort)  -KP     Time Frame (Bathing Goal 1, OT) short term goal (STG);2 weeks  -KP     Progress/Outcomes (Bathing Goal 1, OT) goal ongoing  -     Row Name 03/24/22 1235          Grooming Goal 1 (OT)    Activity/Device (Grooming Goal 1, OT) grooming skills, all  -KP     [place] (Grooming Goal 1, OT) modified independence  -KP     Time Frame (Grooming Goal 1, OT) short term goal (STG);2 weeks  -KP     Progress/Outcome (Grooming Goal 1, OT) goal ongoing  -     Row Name 03/24/22 1235          Strength Goal 1 (OT)    Strength Goal 1 (OT) incr B UE to 4-/5  -KP     Time Frame (Strength Goal 1, OT) short term goal (STG);2 weeks   -     Progress/Outcome (Strength Goal 1, OT) goal ongoing  -     Row Name 03/24/22 1235          Therapy Assessment/Plan (OT)    Planned Therapy Interventions (OT) activity tolerance training;functional balance retraining;occupation/activity based interventions;BADL retraining;ROM/therapeutic exercise;strengthening exercise;transfer/mobility retraining;patient/caregiver education/training  -           User Key  (r) = Recorded By, (t) = Taken By, (c) = Cosigned By    Initials Name Provider Type    Marina Russell, OTR Occupational Therapist               Clinical Impression     Row Name 03/24/22 1232          Pain Assessment    Pretreatment Pain Rating 0/10 - no pain  -     Posttreatment Pain Rating 0/10 - no pain  -     Row Name 03/24/22 1232          Plan of Care Review    Plan of Care Reviewed With patient;spouse  -     Progress improving  -     Outcome Evaluation pt evaluated for OT this date, he was admitted w. chest pain which has resolved, he has h/o parkinson's, Afib, HTN, and DM2. He lives w his wife at home who A him as needed w ADLs and tsf. per wife some days he made need more help or less help depending on the day. Pt completed sup-sit w min A, and sat EOB w no A, was dep w socks and SBA w grooming tasks. Pt completed sit to stand w min A and walker, walked in room to door, into [place] to turn around then back to the bed as he did not want  to get up to the chair. pt completed AROM 5x sh and 10x B elbow. pt fatigued quickly w sh ex due to h/o total shoulders. Pt cont to benefit from OT to incr ADL, strength, balance, and tsf. Plans to dc home w his wife.  -     Row Name 03/24/22 1232          Therapy Assessment/Plan (OT)    Rehab Potential (OT) good, to achieve stated therapy goals  -     Criteria for Skilled Therapeutic Interventions Met (OT) yes;meets criteria;skilled treatment is necessary  -     Therapy Frequency (OT) 3 times/wk  -     Row Name 03/24/22 1238           Therapy Plan Review/Discharge Plan (OT)    Anticipated Discharge Disposition (OT) home with 24/7 care;home with assist  -KP     Row Name 03/24/22 1232          Positioning and Restraints    Pre-Treatment Position in bed  -KP     Post Treatment Position bed  -KP     In Bed call light within reach;encouraged to call for assist;exit alarm on;with family/caregiver;yoon  -           User Key  (r) = Recorded By, (t) = Taken By, (c) = Cosigned By    Initials Name Provider Type    Marina Russell OTR Occupational Therapist               Outcome Measures     Row Name 03/24/22 1236          How much help from another is currently needed...    Putting on and taking off regular lower body clothing? 1  -KP     Bathing (including washing, rinsing, and drying) 2  -KP     Toileting (which includes using toilet bed pan or urinal) 2  -KP     Putting on and taking off regular upper body clothing 3  -KP     Taking care of personal grooming (such as brushing teeth) 3  -KP     Eating meals 3  -KP     AM-PAC 6 Clicks Score (OT) 14  -KP     Row Name 03/24/22 1148          How much help from another person do you currently need...    Turning from your back to your side while in flat bed without using bedrails? 3  -EJ     Moving from lying on back to sitting on the side of a flat bed without bedrails? 3  -EJ     Moving to and from a bed to a chair (including a wheelchair)? 3  -EJ     Standing up from a chair using your arms (e.g., wheelchair, bedside chair)? 3  -EJ     Climbing 3-5 steps with a railing? 2  -EJ     To walk in hospital room? 3  -EJ     AM-PAC 6 Clicks Score (PT) 17  -EJ     Row Name 03/24/22 1236 03/24/22 1148       Functional Assessment    Outcome Measure Options AM-PAC 6 Clicks Daily Activity (OT)  -KP AM-PAC 6 Clicks Basic Mobility (PT)  -EJ          User Key  (r) = Recorded By, (t) = Taken By, (c) = Cosigned By    Initials Name Provider Type    Marina Russell OTR Occupational Therapist    EJ  Angeline Multani, PT Physical Therapist                Occupational Therapy Education                 Title: PT OT SLP Therapies (In Progress)     Topic: Occupational Therapy (Done)     Point: ADL training (Done)     Description:   Instruct learner(s) on proper safety adaptation and remediation techniques during self care or transfers.   Instruct in proper use of assistive devices.              Learning Progress Summary           Patient Acceptance, E,TB,D, DU,VU by  at 3/24/2022 1237    Comment: ed pt on role of OT. benefit of therapy, POC w. OT. ed on safety w. ADL and tsf. pt demo w min A tsf and SBA grooming, dep LBD   Family Acceptance, E,TB,D, DU,VU by  at 3/24/2022 1237    Comment: ed pt on role of OT. benefit of therapy, POC w. OT. ed on safety w. ADL and tsf. pt demo w min A tsf and SBA grooming, dep LBD                   Point: Precautions (Done)     Description:   Instruct learner(s) on prescribed precautions during self-care and functional transfers.              Learning Progress Summary           Patient Acceptance, E,TB,D, DU,VU by  at 3/24/2022 1237    Comment: ed pt on role of OT. benefit of therapy, POC w. OT. ed on safety w. ADL and tsf. pt demo w min A tsf and SBA grooming, dep LBD   Family Acceptance, E,TB,D, DU,VU by  at 3/24/2022 1237    Comment: ed pt on role of OT. benefit of therapy, POC w. OT. ed on safety w. ADL and tsf. pt demo w min A tsf and SBA grooming, dep LBD                   Point: Body mechanics (Done)     Description:   Instruct learner(s) on proper positioning and spine alignment during self-care, functional mobility activities and/or exercises.              Learning Progress Summary           Patient Acceptance, E,TB,D, DU,VU by  at 3/24/2022 1237    Comment: ed pt on role of OT. benefit of therapy, POC w. OT. ed on safety w. ADL and tsf. pt demo w min A tsf and SBA grooming, dep LBD   Family Acceptance, E,TB,D, DU,VU by  at 3/24/2022 1237    Comment: ed pt on  role of OT. benefit of therapy, POC w. OT. ed on safety w. ADL and tsf. pt demo w min A tsf and SBA grooming, dep LBD                               User Key     Initials Effective Dates Name Provider Type Discipline     06/16/21 -  Marina Lewis, OTR Occupational Therapist OT              OT Recommendation and Plan  Planned Therapy Interventions (OT): activity tolerance training, functional balance retraining, occupation/activity based interventions, BADL retraining, ROM/therapeutic exercise, strengthening exercise, transfer/mobility retraining, patient/caregiver education/training  Therapy Frequency (OT): 3 times/wk  Plan of Care Review  Plan of Care Reviewed With: patient, spouse  Progress: improving  Outcome Evaluation: pt evaluated for OT this date, he was admitted w. chest pain which has resolved, he has h/o parkinson's, Afib, HTN, and DM2. He lives w his wife at home who A him as needed w ADLs and tsf. per wife some days he made need more help or less help depending on the day. Pt completed sup-sit w min A, and sat EOB w no A, was dep w socks and SBA w grooming tasks. Pt completed sit to stand w min A and walker, walked in room to door, into [place] to turn around then back to the bed as he did not want  to get up to the chair. pt completed AROM 5x sh and 10x B elbow. pt fatigued quickly w sh ex due to h/o total shoulders. Pt cont to benefit from OT to incr ADL, strength, balance, and tsf. Plans to dc home w his wife.     Time Calculation:    Time Calculation- OT     Row Name 03/24/22 1238             Time Calculation- OT    OT Start Time 0819  -      OT Stop Time 0849  -      OT Time Calculation (min) 30 min  -      Total Timed Code Minutes- OT 23 minute(s)  -      OT Received On 03/24/22  -      OT - Next Appointment 03/25/22  -      OT Goal Re-Cert Due Date 04/07/22  -              Timed Charges    58166 - OT Therapeutic Exercise Minutes 15  -      64294 - OT Self Care/Mgmt Minutes 8   -KP              Untimed Charges    OT Eval/Re-eval Minutes 7  -KP              Total Minutes    Timed Charges Total Minutes 23  -KP      Untimed Charges Total Minutes 7  -KP       Total Minutes 30  -KP            User Key  (r) = Recorded By, (t) = Taken By, (c) = Cosigned By    Initials Name Provider Type    Marina Russell OTR Occupational Therapist              Therapy Charges for Today     Code Description Service Date Service Provider Modifiers Qty    89499168510 HC OT THER PROC EA 15 MIN 3/24/2022 Marina Lewis OTR GO 1    88511383698 HC OT SELF CARE/MGMT/TRAIN EA 15 MIN 3/24/2022 Marina Lewis OTR GO 1    15134489445 HC OT EVAL MOD COMPLEXITY 2 3/24/2022 Marina Lewis OTR GO 1               ANY Thomason  3/24/2022

## 2022-03-24 NOTE — PLAN OF CARE
Goal Outcome Evaluation:  Plan of Care Reviewed With: patient, significant other        Progress: improving  Outcome Evaluation: VSS. No c/o chest pain or other pain. Home meds continued. Speech to evaluate today for failed swallow study yesterday. Slept well tonight.  Wife at bedside. Will continue to monitor.

## 2022-03-24 NOTE — PROGRESS NOTES
Name: Edilberto Reeder ADMIT: 3/23/2022   : 1950  PCP: Harvey Larson MD    MRN: [id number] LOS: 0 days   AGE/SEX: 71 y.o. male  ROOM: E4/     Subjective   Subjective   Sitting up in chair. Worked with PT. Reports neck pain, but no chest pain or headache. Carotid dopplers normal. No nausea or vomiting. No dyspnea. Feels better today.     Objective   Objective   Vital Signs  Temp:  [97.5 °F (36.4 °C)-97.8 °F (36.6 °C)] 97.5 °F (36.4 °C)  Heart Rate:  [66-93] 66  Resp:  [16-18] 18  BP: (105-142)/(66-88) 115/78  SpO2:  [91 %-94 %] 91 %  on   ;   Device (Oxygen Therapy): room air  Body mass index is 28.34 kg/m².     Physical Exam  Vitals and nursing note reviewed.   Constitutional:       General: He is not in acute distress.     Appearance: He is ill-appearing.   Cardiovascular:      Rate and Rhythm: Normal rate. Rhythm irregular.      Pulses: Normal pulses.      Heart sounds: Normal heart sounds.   Pulmonary:      Effort: Pulmonary effort is normal. No respiratory distress.      Comments: Diminished, fairly clear. On RA  Abdominal:      General: Bowel sounds are normal. There is no distension.      Palpations: Abdomen is soft.      Tenderness: There is no abdominal tenderness.   Musculoskeletal:         General: No swelling or tenderness. Normal range of motion.      Cervical back: Normal range of motion and neck supple.   Skin:     General: Skin is warm and dry.      Findings: No bruising.   Neurological:      General: No focal deficit present.      Mental Status: He is alert and oriented to person, place, and time.      Sensory: No sensory deficit.      Motor: Weakness present.      Coordination: Coordination normal.   Psychiatric:         Mood and Affect: Mood normal.         Behavior: Behavior normal.     Results Review:       I reviewed the patient's new clinical results.  Results from last 7 days   Lab Units 22  0529 22  0719 22  0358   WBC 10*3/mm3 5.80 5.64 6.05   HEMOGLOBIN  g/dL 11.2* 11.0* 11.1*   PLATELETS 10*3/mm3 159 170 149     Results from last 7 days   Lab Units 03/24/22  0529 03/23/22  0719 03/23/22  0358   SODIUM mmol/L 139 137 136   POTASSIUM mmol/L 4.1 4.1 4.0   CHLORIDE mmol/L 103 101 100   CO2 mmol/L 25.4 23.9 23.3   BUN mg/dL 16 26* 25*   CREATININE mg/dL 0.79 0.69* 0.76   GLUCOSE mg/dL 115* 116* 123*   Estimated Creatinine Clearance: 93.8 mL/min (by C-G formula based on SCr of 0.79 mg/dL).  Results from last 7 days   Lab Units 03/24/22  0529 03/23/22  0358   ALBUMIN g/dL 3.60 4.10   BILIRUBIN mg/dL 0.7 0.6   ALK PHOS U/L 65 65   AST (SGOT) U/L 6 10   ALT (SGPT) U/L <5 <5     Results from last 7 days   Lab Units 03/24/22  0529 03/23/22  0719 03/23/22  0358   CALCIUM mg/dL 8.8 8.8 8.9   ALBUMIN g/dL 3.60  --  4.10     Results from last 7 days   Lab Units 03/23/22  0719   PROCALCITONIN ng/mL 0.03     Hemoglobin A1C   Date/Time Value Ref Range Status   03/23/2022 0719 7.50 (H) 4.80 - 5.60 % Final     Glucose   Date/Time Value Ref Range Status   03/24/2022 1122 152 (H) 70 - 130 mg/dL Final     Comment:     Meter: [id number] : [id number] Rogelio Hickey NA   03/24/2022 0537 123 70 - 130 mg/dL Final     Comment:     Meter: [id number] : [id number] Yoel Alvarez NA   03/23/2022 1658 100 70 - 130 mg/dL Final     Comment:     Meter: [id number] : [id number] Abhilash MILLAN       allopurinol, 100 mg, Oral, Daily  aspirin, 81 mg, Oral, Every Other Day  atorvastatin, 40 mg, Oral, Daily  Carbidopa-Levodopa ER, 4 capsule, Oral, 4x Daily  carvedilol, 6.25 mg, Oral, BID  citalopram, 20 mg, Oral, Daily  docusate sodium, 100 mg, Oral, BID  donepezil, 10 mg, Oral, Nightly  insulin lispro, 0-7 Units, Subcutaneous, TID AC  levothyroxine, 12.5 mcg, Oral, Daily  linagliptin, 5 mg, Oral, Daily  magnesium oxide, 400 mg, Oral, Daily  sodium chloride, 10 mL, Intravenous, Q12H  vitamin B-12, 500 mcg, Oral, Daily  zinc sulfate, 220 mg, Oral, Daily       Diet Dysphagia; IV - Mechanical Soft No  Mixed Consistencies; Honey Thick; Consistent Carbohydrate  NPO Diet       Assessment/Plan     Active Hospital Problems    Diagnosis  POA   • **Chest pain [R07.9]  Yes   • Episodes of staring [R40.4]  Yes   • Generalized weakness [R53.1]  Yes   • Diplopia [H53.2]  Yes   • Headache [R51.9]  Yes   • Parkinson's disease (HCC) [G20]  Yes   • DM2 (diabetes mellitus, type 2) (HCC) [E11.9]  Yes   • ASCVD (arteriosclerotic cardiovascular disease) [I25.10]  Yes   • Paroxysmal atrial fibrillation (HCC) [I48.0]  Yes   • Essential hypertension [I10]  Yes   • Hypothyroidism [E03.9]  Yes      Resolved Hospital Problems   No resolved problems to display.     Mr. Reeder is a 71 year old male who presented to the hospital with multiple complaints including intermittent HA, chest pain, generalized weakness and some starring off spells and diplopia. He does have a brain stimulator with underlying Parkinson's as well as CAD.     · Chest pain: Cardiology has been consulted and plans for stress testing.  Some typical and atypical components. Troponin negative x 2 and EKG with atrial fibrillation/prolonged QT. No echo since 2018 and repeat ordered. Eliquis temporarily on hold per Cards in case invasive testing needed.  · Generalized weakness/HA/diplopia/starring spell: Neurology has been consulted. They ordered carotid doppler which is negative. Will defer need for head/neck imaging to their expertise.   · Parkinson's disease: Takes extended release Sinemet. Monitor neuro checks. Bedside swallow failed and ST to formally see. He is on modified diet with HTL until they further assess. Working with PT.  · ASCVD/PAF: As above. Rate controlled. Continue home regimen with ASA/Eliquis (holding temporarily)/Coreg/statin.   · DM2: Oral agents resumed. Monitor ACHS and use SSI as needed. A1c 7.5%.  · Hypothyroidism: Just started replacement this week. TSH 4.95 here. Follow up outpatient for monitoring.     · I discussed the patients findings and  my recommendations with patient and wife.     VTE Prophylaxis - Eliquis (home med)- holding   Code Status - Full code. Confirmed with patient and wife.  Disposition - Anticipate discharge TBD.      ESME Arredondo  [facility]  03/24/22  12:31 EDT

## 2022-03-25 ENCOUNTER — APPOINTMENT (OUTPATIENT)
Dept: NUCLEAR MEDICINE | Facility: HOSPITAL | Age: 72
End: 2022-03-25

## 2022-03-25 VITALS
WEIGHT: 196.21 LBS | TEMPERATURE: 97.6 F | HEIGHT: 70 IN | RESPIRATION RATE: 20 BRPM | HEART RATE: 88 BPM | OXYGEN SATURATION: 95 % | SYSTOLIC BLOOD PRESSURE: 114 MMHG | DIASTOLIC BLOOD PRESSURE: 71 MMHG | BODY MASS INDEX: 28.09 KG/M2

## 2022-03-25 PROBLEM — G93.41 METABOLIC ENCEPHALOPATHY: Status: ACTIVE | Noted: 2022-03-25

## 2022-03-25 PROBLEM — R40.4 EPISODES OF STARING: Status: RESOLVED | Noted: 2022-03-23 | Resolved: 2022-03-25

## 2022-03-25 PROBLEM — R51.9 HEADACHE: Status: RESOLVED | Noted: 2022-03-23 | Resolved: 2022-03-25

## 2022-03-25 PROBLEM — G93.41 METABOLIC ENCEPHALOPATHY: Status: RESOLVED | Noted: 2022-03-25 | Resolved: 2022-03-25

## 2022-03-25 PROBLEM — H53.2 DIPLOPIA: Status: RESOLVED | Noted: 2022-03-23 | Resolved: 2022-03-25

## 2022-03-25 PROBLEM — R07.9 CHEST PAIN: Status: RESOLVED | Noted: 2022-03-23 | Resolved: 2022-03-25

## 2022-03-25 LAB
ANION GAP SERPL CALCULATED.3IONS-SCNC: 13.6 MMOL/L (ref 5–15)
BH CV REST NUCLEAR ISOTOPE DOSE: 11 MCI
BH CV STRESS COMMENTS STAGE 1: NORMAL
BH CV STRESS DOSE REGADENOSON STAGE 1: 0.4
BH CV STRESS DURATION MIN STAGE 1: 0
BH CV STRESS DURATION SEC STAGE 1: 10
BH CV STRESS NUCLEAR ISOTOPE DOSE: 30.6 MCI
BH CV STRESS PROTOCOL 1: NORMAL
BH CV STRESS RECOVERY BP: NORMAL MMHG
BH CV STRESS RECOVERY HR: 106 BPM
BH CV STRESS STAGE 1: 1
BUN SERPL-MCNC: 11 MG/DL (ref 8–23)
BUN/CREAT SERPL: 16.7 (ref 7–25)
CALCIUM SPEC-SCNC: 9 MG/DL (ref 8.6–10.5)
CHLORIDE SERPL-SCNC: 102 MMOL/L (ref 98–107)
CO2 SERPL-SCNC: 25.4 MMOL/L (ref 22–29)
CREAT SERPL-MCNC: 0.66 MG/DL (ref 0.76–1.27)
DEPRECATED RDW RBC AUTO: 44.9 FL (ref 37–54)
EGFRCR SERPLBLD CKD-EPI 2021: 99 ML/MIN/1.73
ERYTHROCYTE [DISTWIDTH] IN BLOOD BY AUTOMATED COUNT: 15.7 % (ref 12.3–15.4)
GLUCOSE BLDC GLUCOMTR-MCNC: 138 MG/DL (ref 70–130)
GLUCOSE BLDC GLUCOMTR-MCNC: 157 MG/DL (ref 70–130)
GLUCOSE BLDC GLUCOMTR-MCNC: 236 MG/DL (ref 70–130)
GLUCOSE SERPL-MCNC: 138 MG/DL (ref 65–99)
HCT VFR BLD AUTO: 35.4 % (ref 37.5–51)
HGB BLD-MCNC: 11.8 G/DL (ref 13–17.7)
LV EF NUC BP: 46 %
MAXIMAL PREDICTED HEART RATE: 147 BPM
MCH RBC QN AUTO: 26.8 PG (ref 26.6–33)
MCHC RBC AUTO-ENTMCNC: 33.3 G/DL (ref 31.5–35.7)
MCV RBC AUTO: 80.5 FL (ref 79–97)
PERCENT MAX PREDICTED HR: 84.35 %
PLATELET # BLD AUTO: 166 10*3/MM3 (ref 140–450)
PMV BLD AUTO: 10.6 FL (ref 6–12)
POTASSIUM SERPL-SCNC: 4 MMOL/L (ref 3.5–5.2)
RBC # BLD AUTO: 4.4 10*6/MM3 (ref 4.14–5.8)
SODIUM SERPL-SCNC: 141 MMOL/L (ref 136–145)
STRESS BASELINE BP: NORMAL MMHG
STRESS BASELINE HR: 89 BPM
STRESS PERCENT HR: 99 %
STRESS POST PEAK BP: NORMAL MMHG
STRESS POST PEAK HR: 124 BPM
STRESS TARGET HR: 125 BPM
WBC NRBC COR # BLD: 6.04 10*3/MM3 (ref 3.4–10.8)

## 2022-03-25 PROCEDURE — 0 TECHNETIUM SESTAMIBI: Performed by: INTERNAL MEDICINE

## 2022-03-25 PROCEDURE — G0378 HOSPITAL OBSERVATION PER HR: HCPCS

## 2022-03-25 PROCEDURE — 78452 HT MUSCLE IMAGE SPECT MULT: CPT

## 2022-03-25 PROCEDURE — 78452 HT MUSCLE IMAGE SPECT MULT: CPT | Performed by: INTERNAL MEDICINE

## 2022-03-25 PROCEDURE — A9500 TC99M SESTAMIBI: HCPCS | Performed by: INTERNAL MEDICINE

## 2022-03-25 PROCEDURE — 93016 CV STRESS TEST SUPVJ ONLY: CPT | Performed by: INTERNAL MEDICINE

## 2022-03-25 PROCEDURE — 82962 GLUCOSE BLOOD TEST: CPT

## 2022-03-25 PROCEDURE — 85027 COMPLETE CBC AUTOMATED: CPT | Performed by: NURSE PRACTITIONER

## 2022-03-25 PROCEDURE — 93018 CV STRESS TEST I&R ONLY: CPT | Performed by: INTERNAL MEDICINE

## 2022-03-25 PROCEDURE — 25010000002 REGADENOSON 0.4 MG/5ML SOLUTION: Performed by: INTERNAL MEDICINE

## 2022-03-25 PROCEDURE — 99214 OFFICE O/P EST MOD 30 MIN: CPT | Performed by: NURSE PRACTITIONER

## 2022-03-25 PROCEDURE — 93017 CV STRESS TEST TRACING ONLY: CPT

## 2022-03-25 PROCEDURE — 80048 BASIC METABOLIC PNL TOTAL CA: CPT | Performed by: NURSE PRACTITIONER

## 2022-03-25 RX ORDER — ACETAMINOPHEN 325 MG/1
650 TABLET ORAL EVERY 6 HOURS PRN
Start: 2022-03-25

## 2022-03-25 RX ORDER — ISOSORBIDE MONONITRATE 30 MG/1
30 TABLET, EXTENDED RELEASE ORAL
Status: DISCONTINUED | OUTPATIENT
Start: 2022-03-25 | End: 2022-03-25 | Stop reason: HOSPADM

## 2022-03-25 RX ORDER — ISOSORBIDE MONONITRATE 30 MG/1
30 TABLET, EXTENDED RELEASE ORAL
Qty: 30 TABLET | Refills: 0 | Status: SHIPPED | OUTPATIENT
Start: 2022-03-25 | End: 2022-04-08 | Stop reason: SDUPTHER

## 2022-03-25 RX ADMIN — TECHNETIUM TC 99M SESTAMIBI 1 DOSE: 1 INJECTION INTRAVENOUS at 11:01

## 2022-03-25 RX ADMIN — ASPIRIN 81 MG: 81 TABLET, CHEWABLE ORAL at 16:40

## 2022-03-25 RX ADMIN — CARVEDILOL 6.25 MG: 6.25 TABLET, FILM COATED ORAL at 11:17

## 2022-03-25 RX ADMIN — Medication 500 MCG: at 16:40

## 2022-03-25 RX ADMIN — CITALOPRAM 20 MG: 20 TABLET, FILM COATED ORAL at 16:40

## 2022-03-25 RX ADMIN — LEVOTHYROXINE SODIUM 12.5 MCG: 0.03 TABLET ORAL at 11:17

## 2022-03-25 RX ADMIN — ISOSORBIDE MONONITRATE 30 MG: 30 TABLET ORAL at 17:42

## 2022-03-25 RX ADMIN — MAGNESIUM OXIDE 400 MG (241.3 MG MAGNESIUM) TABLET 400 MG: TABLET at 16:40

## 2022-03-25 RX ADMIN — ALLOPURINOL 100 MG: 100 TABLET ORAL at 11:16

## 2022-03-25 RX ADMIN — Medication 220 MG: at 16:40

## 2022-03-25 RX ADMIN — LEVODOPA AND CARBIDOPA 4 CAPSULE: 145; 36.25 CAPSULE, EXTENDED RELEASE ORAL at 17:42

## 2022-03-25 RX ADMIN — LINAGLIPTIN 5 MG: 5 TABLET, FILM COATED ORAL at 11:17

## 2022-03-25 RX ADMIN — REGADENOSON 0.4 MG: 0.08 INJECTION, SOLUTION INTRAVENOUS at 11:01

## 2022-03-25 RX ADMIN — TECHNETIUM TC 99M SESTAMIBI 1 DOSE: 1 INJECTION INTRAVENOUS at 06:30

## 2022-03-25 RX ADMIN — ACETAMINOPHEN 650 MG: 325 TABLET ORAL at 16:40

## 2022-03-25 RX ADMIN — LEVODOPA AND CARBIDOPA 4 CAPSULE: 145; 36.25 CAPSULE, EXTENDED RELEASE ORAL at 11:17

## 2022-03-25 NOTE — PLAN OF CARE
Problem: Adult Inpatient Plan of Care  Goal: Plan of Care Review  Outcome: Ongoing, Progressing  Flowsheets (Taken 3/25/2022 9479)  Progress: improving  Plan of Care Reviewed With: patient  Outcome Evaluation: VSS. Denies chest pain, SOA, N/V. Pt tolerating diet. NPO since MN for stress test in AM. Pt stable and needs met at this time.

## 2022-03-25 NOTE — CASE MANAGEMENT/SOCIAL WORK
Case Management Discharge Note      Final Note: Home with spouse. [facility] ttransport.    Provided Post Acute Provider List?: Refused  Provided Post Acute Provider Quality & Resource List?: Refused    Selected Continued Care - Admitted Since 3/23/2022     Destination    No services have been selected for the patient.              Durable Medical Equipment    No services have been selected for the patient.              Dialysis/Infusion    No services have been selected for the patient.              Home Medical Care    No services have been selected for the patient.              Therapy    No services have been selected for the patient.              Community Resources    No services have been selected for the patient.              Community & DME    No services have been selected for the patient.                  Transportation Services  Private: Car    Final Discharge Disposition Code: 01 - home or self-care

## 2022-03-25 NOTE — DISCHARGE SUMMARY
[facility] Medicine Services  DISCHARGE SUMMARY    Patient Name: Edilberto Reeder  : 1950  MRN: [id number]    Date of Admission: 3/23/2022  2:49 AM  Date of Discharge: 3/25/2022  Primary Care Physician: Harvey Larson MD    Consults     Date and Time Order Name Status Description    3/23/2022  9:20 AM Inpatient Neurology Consult General Completed     3/23/2022  4:33 AM Inpatient Cardiology Consult Completed     3/23/2022  4:07 AM [facility] (on-call MD unless specified) Details            Hospital Course       Active Hospital Problems    Diagnosis  POA   • Generalized weakness [R53.1]  Yes   • Parkinson's disease (HCC) [G20]  Yes   • DM2 (diabetes mellitus, type 2) (HCC) [E11.9]  Yes   • ASCVD (arteriosclerotic cardiovascular disease) [I25.10]  Yes   • Paroxysmal atrial fibrillation (HCC) [I48.0]  Yes   • Essential hypertension [I10]  Yes   • Hypothyroidism [E03.9]  Yes   • HLD (hyperlipidemia) [E78.5]  Yes   • Diabetic retinopathy associated with type 2 diabetes mellitus (HCC) [E11.319]  Yes   • Peripheral neuropathy [G62.9]  Yes      Resolved Hospital Problems    Diagnosis Date Resolved POA   • **Chest pain [R07.9] 2022 Yes   • Metabolic encephalopathy [G93.41] 2022 Yes   • Episodes of staring [R40.4] 2022 Yes   • Diplopia [H53.2] 2022 Yes   • Headache [R51.9] 2022 Yes          Hospital Course:  Edilberto Reeder is a 71 y.o. male who presented to the hospital with multiple complaints including intermittent HA, chest pain, generalized weakness and some starring off spells and diplopia. He does have a brain stimulator with underlying Parkinson's as well as CAD.      Chest pain: Cardiology evaluated and recommended stress test.  It was read out as a low risk test.  Cardiology  did add Imdur to his home medications.  Patient notably does have atrial fibrillation.  His Eliquis was temporarily held for cardiology until stress test was read.  He can continue Eliquis at  discharge.    Confusion and staring spells:  Possible metabolic encephalopathy..  Patient was evaluated by neurology and felt to be negative for stroke.  Neurology was concerned that patient had eye-movement dystonia and explained otherwise at that point dystonia in few hours may continue to occur.  Ultimately patient is doing well on his home dose of Sinemet and will continue this at discharge and follow-up with his outpatient neurologist for continued management.  Recommend he avoid sedating medications that can interfere including gabapentin that were discontinued as per below.  If he restarts any of these would recommend he restart at a lower dose.    Slightly elevated TSH: Free T4 normal.  Recommend outpatient follow-up with repeat studies before starting Synthroid.    Patient has been cleared for discharge by all consultant teams.    At the time of discharge patient was told to take all medications as prescribed, keep all follow-up appointments, and call their doctor or return to the hospital with any worsening or concerning symptoms.    Please note that this note was made using Dragon voice recognition software        Day of Discharge     HPI:   Feels better.  No confusion.  No other medical issues.  Wants to go home today.    ROS:  No current fevers or chills  No current shortness of breath or cough  No current nausea, vomiting, or diarrhea  No current chest pain or palpitations    Vital Signs:   Temp:  [97.5 °F (36.4 °C)-97.9 °F (36.6 °C)] 97.6 °F (36.4 °C)  Heart Rate:  [55-95] 81  Resp:  [20] 20  BP: (102-139)/(62-92) 115/79     Physical Exam:  Constitutional:Awake, alert, chronically ill appearing    HENT: NCAT, mucous membranes moist, neck supple   Respiratory: Clear to auscultation bilaterally, respiratory effort normal, nonlabored breathing    Cardiovascular: RRR, normal radial pulses   Gastrointestinal: Positive bowel sounds, soft, nontender, nondistended   Musculoskeletal: Elderly frail and  chronically debilitated appearance, muscle wasting is noted, minimal lower extremity edema, BMI is 28    Psychiatric: Calm affect, cooperative    Neurologic: Mentation much improved.  It is less slow.  He is currently mostly oriented, cogwheel rigidity improved, no unilateral weakness, speech is clear without slurring, no word finding issues    Skin: No rashes or jaundice      Pertinent  and/or Most Recent Results     Results from last 7 days   Lab Units 03/25/22  0551 03/24/22  0529 03/23/22  0719 03/23/22  0358   WBC 10*3/mm3 6.04 5.80 5.64 6.05   HEMOGLOBIN g/dL 11.8* 11.2* 11.0* 11.1*   HEMATOCRIT % 35.4* 34.2* 33.0* 34.6*   PLATELETS 10*3/mm3 166 159 170 149   SODIUM mmol/L 141 139 137 136   POTASSIUM mmol/L 4.0 4.1 4.1 4.0   CHLORIDE mmol/L 102 103 101 100   CO2 mmol/L 25.4 25.4 23.9 23.3   BUN mg/dL 11 16 26* 25*   CREATININE mg/dL 0.66* 0.79 0.69* 0.76   GLUCOSE mg/dL 138* 115* 116* 123*   CALCIUM mg/dL 9.0 8.8 8.8 8.9     Results from last 7 days   Lab Units 03/24/22  0529 03/23/22  0358   BILIRUBIN mg/dL 0.7 0.6   ALK PHOS U/L 65 65   ALT (SGPT) U/L <5 <5   AST (SGOT) U/L 6 10           Invalid input(s): TG, LDLCALC, LDLREALC  Results from last 7 days   Lab Units 03/23/22  0719 03/23/22  0358   TSH uIU/mL 4.950*  --    HEMOGLOBIN A1C % 7.50*  --    TROPONIN T ng/mL 0.010 <0.010   PROCALCITONIN ng/mL 0.03  --        Brief Urine Lab Results  (Last result in the past 365 days)      Color   Clarity   Blood   Leuk Est   Nitrite   Protein   CREAT   Urine HCG        03/23/22 1113 Yellow   Clear   Negative   Trace   Negative   Negative                 Microbiology Results Abnormal     Procedure Component Value - Date/Time    Blood Culture - Blood, Hand, Right [[id number]]  (Normal) Collected: 03/23/22 1052    Lab Status: Preliminary result Specimen: Blood from Hand, Right Updated: 03/25/22 1130     Blood Culture No growth at 2 days    Blood Culture - Blood, Arm, Left [[id number]]  (Normal) Collected: 03/23/22 1052     Lab Status: Preliminary result Specimen: Blood from Arm, Left Updated: 03/25/22 1130     Blood Culture No growth at 2 days    COVID PRE-OP / PRE-PROCEDURE SCREENING ORDER (NO ISOLATION) - Swab, Nasopharynx [[id number]]  (Normal) Collected: 03/23/22 0359    Lab Status: Final result Specimen: Swab from Nasopharynx Updated: 03/23/22 0451    Narrative:      The following orders were created for panel order COVID PRE-OP / PRE-PROCEDURE SCREENING ORDER (NO ISOLATION) - Swab, Nasopharynx.  Procedure                               Abnormality         Status                     ---------                               -----------         ------                     COVID-19,[facility] IN-HOUSE...[[id number]]  Normal              Final result                 Please view results for these tests on the individual orders.    COVID-19,[facility] IN-HOUSE CEPHEID/YUNG NP SWAB IN TRANSPORT MEDIA 8-12 HR TAT - Swab, Nasopharynx [[id number]]  (Normal) Collected: 03/23/22 0359    Lab Status: Final result Specimen: Swab from Nasopharynx Updated: 03/23/22 0451     COVID19 Not Detected    Narrative:      Fact sheet for providers: https://www.fda.gov/media/697170/download     Fact sheet for patients: https://www.fda.gov/media/578406/download          Imaging Results (All)     Procedure Component Value Units Date/Time    XR Chest 1 View [[id number]] Collected: 03/23/22 0357     Updated: 03/23/22 0402    Narrative:      SINGLE VIEW OF THE CHEST     HISTORY: Chest pain     COMPARISON: 05/01/2021.      FINDINGS:  Heart size is within normal limits for technique. Right-sided brain  stimulator is noted. Patient status post bilateral shoulder  arthroplasties. There is right basilar atelectasis. Additional  consolidation is noted at the left lung base, but the appearance is  stable when compared to prior exam. This may represent some additional  scarring.       Impression:      Right basilar atelectasis.     This report was finalized on 3/23/2022 3:59 AM by  Dr. Alyssa Dos Santos M.D.             Results for orders placed during the hospital encounter of 03/23/22    Duplex Carotid Ultrasound CAR    Interpretation Summary  · Proximal right internal carotid artery is normal.  · Proximal left internal carotid artery is normal.      Results for orders placed during the hospital encounter of 03/23/22    Duplex Carotid Ultrasound CAR    Interpretation Summary  · Proximal right internal carotid artery is normal.  · Proximal left internal carotid artery is normal.      Results for orders placed during the hospital encounter of 03/23/22    Adult Transthoracic Echo Complete W/ Cont if Necessary Per Protocol    Interpretation Summary  · Left ventricular ejection fraction appears to be 61 - 65%. Left ventricular systolic function is normal.  · Left ventricular wall thickness is consistent with moderate concentric hypertrophy.  · Normal right ventricular cavity size and systolic function noted.  · The left atrial cavity is moderately dilated.  · Mild tricuspid valve regurgitation is present.  · Calculated right ventricular systolic pressure from tricuspid regurgitation is 38 mmHg.  · There is no evidence of pericardial effusion         Discharge Details        Discharge Medications      New Medications      Instructions Start Date   acetaminophen 325 MG tablet  Commonly known as: TYLENOL   650 mg, Oral, Every 6 Hours PRN      isosorbide mononitrate 30 MG 24 hr tablet  Commonly known as: IMDUR   30 mg, Oral, Every 24 Hours Scheduled         Changes to Medications      Instructions Start Date   carvedilol 6.25 MG tablet  Commonly known as: COREG  What changed:   when to take this  additional instructions   6.25 mg, Oral, 2 Times Daily         Continue These Medications      Instructions Start Date   allopurinol 100 MG tablet  Commonly known as: ZYLOPRIM   100 mg, Oral, Daily      Alogliptin Benzoate 12.5 MG tablet   Oral, Daily, Half tablet daily      apixaban 5 MG tablet  tablet  Commonly known as: ELIQUIS   5 mg, Oral, 2 Times Daily      aspirin 81 MG chewable tablet   81 mg, Oral, Every Other Day      Calcium Carbonate 1500 (600 Ca) MG tablet   650 mg, Oral, Daily      Carbidopa-Levodopa ER 36. MG capsule controlled-release   Oral, 4 Times Daily, 4 tablets 4 times daily      citalopram 20 MG tablet  Commonly known as: CeleXA   20 mg, Oral, Daily      docusate sodium 50 MG capsule  Commonly known as: COLACE   Oral, Nightly      donepezil 5 MG tablet  Commonly known as: ARICEPT   10 mg, Oral, Nightly      levothyroxine 25 MCG tablet  Commonly known as: SYNTHROID, LEVOTHROID   12.5 mcg, Oral, Daily      magnesium oxide 400 tablet tablet  Commonly known as: MAG-OX   420 mg, Oral, Daily      metFORMIN 500 MG tablet  Commonly known as: GLUCOPHAGE   500 mg, Oral, 3 Times Daily      Polyvinyl Alcohol-Povidone PF 1.4-0.6 % ophthalmic solution  Commonly known as: HYPOTEARS   1-2 drops, As Needed      simvastatin 80 MG tablet  Commonly known as: ZOCOR   40 mg, Oral, Nightly      traZODone 50 MG tablet  Commonly known as: DESYREL   25 mg, Oral, Nightly      vitamin B-12 1000 MCG tablet  Commonly known as: CYANOCOBALAMIN   500 mcg, Oral, Daily         Stop These Medications    gabapentin 800 MG tablet  Commonly known as: NEURONTIN     indomethacin 50 MG capsule  Commonly known as: INDOCIN     Magnesium Oxide 420 MG tablet     meclizine 25 MG tablet  Commonly known as: ANTIVERT            Allergies   Allergen Reactions   • Bacitracin Anaphylaxis   • Neosporin [Neomycin-Bacitracin Zn-Polymyx] Other (See Comments)     BP drops and heart stops!   • Fish-Derived Products Hives   • Shellfish Allergy Hives   • Shrimp (Diagnostic) Hives         Discharge Disposition:  Home or Self Care    Diet:  Hospital:  Diet Order   Procedures   • Diet Regular; Cardiac       Activity:  Activity Instructions     Up WIth Assist                 CODE STATUS:    Code Status and Medical Interventions:   Ordered at:  03/23/22 0433     Code Status (Patient has no pulse and is not breathing):    CPR (Attempt to Resuscitate)     Medical Interventions (Patient has pulse or is breathing):    Full Support       Future Appointments   Date Time Provider Department Center   4/12/2022  9:20 AM Lissa Zepeda APRN [facility]   5/9/2022 10:20 AM Jimbo Fung MD [facility]   2/17/2023  2:00 PM Jimbo Fung MD [facility]       Additional Instructions for the Follow-ups that You Need to Schedule     Discharge Follow-up with PCP   As directed       Currently Documented PCP:    Harvey Larson MD    PCP Phone Number:    [phone]     Follow Up Details: 1 week         Discharge Follow-up with Specified Provider: Recommend to follow-up with your neurologist for continued management of Parkinson's   As directed      To: Recommend to follow-up with your neurologist for continued management of Parkinson's                     Joaquin Davenport MD  03/25/22      Time Spent on Discharge:  I spent 36 minutes on this discharge activity which included: face-to-face encounter with the patient, reviewing the data in the system, coordination of the care with the nursing staff as well as consultants, documentation, and entering orders.

## 2022-03-25 NOTE — PROGRESS NOTES
Hospital Follow Up    LOS:  LOS: 0 days   Patient Name: Edilberto Reeder  Age/Sex: 71 y.o. male  : 1950  MRN: [id number]    Day of Service: 22   Length of Stay: 0  Encounter Provider: ESME Enamorado  Place of Service: [facility] CARDIOLOGY  Patient Care Team:  Harvey Larson MD as PCP - General  Harvey Larson MD as PCP - Family Medicine    Subjective:     Chief Complaint: chest pain    Interval History: No chest pain today. Had stress test thing morning    Objective:     Objective:  Temp:  [97.5 °F (36.4 °C)-97.9 °F (36.6 °C)] 97.9 °F (36.6 °C)  Heart Rate:  [55-95] 88  Resp:  [20] 20  BP: (102-139)/(62-92) 139/92     Intake/Output Summary (Last 24 hours) at 3/25/2022 1129  Last data filed at 3/24/2022 2355  Gross per 24 hour   Intake 240 ml   Output --   Net 240 ml     Body mass index is 28.41 kg/m².      22  0608 22  1721   Weight: 89.6 kg (197 lb 8.5 oz) 89 kg (196 lb 3.4 oz)     Weight change:     Physical Exam:   General Appearance:    Awake alert and oriented in no acute distress.   Color:  Skin:  Neuro:  HEENT:    Lungs:     Pink  Warm and dry  No focal, motor or sensory deficits  Neck supple, pupils equal, round and reactive. No JVD, No Bruit  Clear to auscultation,respirations regular, even and                  unlabored    Heart:  Irregular/Irregular rate and rhythm, S1 and S2, no murmur, no gallop, no rub. No edema, DP/PT pulses are 2+   Chest Wall:    No abnormalities observed   Abdomen:     Normal bowel sounds, no masses, no organomegaly, soft        non-tender, non-distended, no guarding, no ascites noted   Extremities:   Moves all extremities well, no edema, no cyanosis, no redness       Lab Review:   Results from last 7 days   Lab Units 22  0551 22  0529 22  0719 22  0358   SODIUM mmol/L 141 139   < > 136   POTASSIUM mmol/L 4.0 4.1   < > 4.0   CHLORIDE mmol/L 102 103   < > 100   CO2 mmol/L 25.4 25.4   <  > 23.3   BUN mg/dL 11 16   < > 25*   CREATININE mg/dL 0.66* 0.79   < > 0.76   GLUCOSE mg/dL 138* 115*   < > 123*   CALCIUM mg/dL 9.0 8.8   < > 8.9   AST (SGOT) U/L  --  6  --  10   ALT (SGPT) U/L  --  <5  --  <5    < > = values in this interval not displayed.     Results from last 7 days   Lab Units 03/23/22  0719 03/23/22  0358   TROPONIN T ng/mL 0.010 <0.010     Results from last 7 days   Lab Units 03/25/22  0551 03/24/22  0529   WBC 10*3/mm3 6.04 5.80   HEMOGLOBIN g/dL 11.8* 11.2*   HEMATOCRIT % 35.4* 34.2*   PLATELETS 10*3/mm3 166 159                   Invalid input(s): LDLCALC      Results from last 7 days   Lab Units 03/23/22  0719   TSH uIU/mL 4.950*     I reviewed the patient's new clinical results.  I personally viewed and interpreted the patient's EKG  Current Medications:   Scheduled Meds:allopurinol, 100 mg, Oral, Daily  aspirin, 81 mg, Oral, Every Other Day  atorvastatin, 40 mg, Oral, Daily  Carbidopa-Levodopa ER, 4 capsule, Oral, 4x Daily  carvedilol, 6.25 mg, Oral, BID  citalopram, 20 mg, Oral, Daily  docusate sodium, 100 mg, Oral, BID  donepezil, 10 mg, Oral, Nightly  insulin lispro, 0-7 Units, Subcutaneous, TID AC  levothyroxine, 12.5 mcg, Oral, Daily  linagliptin, 5 mg, Oral, Daily  magnesium oxide, 400 mg, Oral, Daily  sodium chloride, 10 mL, Intravenous, Q12H  vitamin B-12, 500 mcg, Oral, Daily  zinc sulfate, 220 mg, Oral, Daily      Continuous Infusions:     Allergies:  Allergies   Allergen Reactions   • Bacitracin Anaphylaxis   • Neosporin [Neomycin-Bacitracin Zn-Polymyx] Other (See Comments)     BP drops and heart stops!   • Fish-Derived Products Hives   • Shellfish Allergy Hives   • Shrimp (Diagnostic) Hives       Assessment:       Chest pain    ASCVD (arteriosclerotic cardiovascular disease)    Paroxysmal atrial fibrillation (HCC)    Essential hypertension    Hypothyroidism    DM2 (diabetes mellitus, type 2) (HCC)    Parkinson's disease (HCC)    Episodes of staring    Generalized weakness     Diplopia    Headache    1. Chest pain  2. Parkinson's disease  3. Essential HTN  4. Persistent Afib with controlled rate  5.Coronary Artery Calcification on Ct chest.    Plan:      Had stress test today.  Awaiting results.  Further recommendations pending.  He does appear little weak today he says he is tired from the stress test.  Labs noted vitals are stable    ESME Enamorado  03/25/22  11:29 EDT  Electronically signed by ESME Enamorado, 03/25/22, 11:29 AM EDT.

## 2022-03-25 NOTE — CASE MANAGEMENT/SOCIAL WORK
Continued Stay Note  [facility]     Patient Name: Edilberto Reeder  MRN: [id number]  Today's Date: 3/25/2022    Admit Date: 3/23/2022     Discharge Plan     Row Name 03/25/22 1417       Plan    Plan Home with spouse. [facility]. Family to transport.    Patient/Family in Agreement with Plan yes    Plan Comments Stress test today showed very small amount of basal inferolateral ischemia and EF 46%. neuro has signed off. Plan for D/C today or tomorrow. Walter Elizondo RN-BC               Discharge Codes    No documentation.               Expected Discharge Date and Time     Expected Discharge Date Expected Discharge Time    Mar 24, 2022             Walter Elizondo RN

## 2022-03-28 LAB
BACTERIA SPEC AEROBE CULT: NORMAL
BACTERIA SPEC AEROBE CULT: NORMAL

## 2022-04-08 ENCOUNTER — OFFICE VISIT (OUTPATIENT)
Dept: CARDIOLOGY | Facility: CLINIC | Age: 72
End: 2022-04-08

## 2022-04-08 VITALS
SYSTOLIC BLOOD PRESSURE: 112 MMHG | DIASTOLIC BLOOD PRESSURE: 60 MMHG | HEART RATE: 62 BPM | BODY MASS INDEX: 27.77 KG/M2 | WEIGHT: 194 LBS | HEIGHT: 70 IN

## 2022-04-08 DIAGNOSIS — I25.708 CORONARY ARTERY DISEASE INVOLVING CORONARY BYPASS GRAFT OF NATIVE HEART WITH OTHER FORMS OF ANGINA PECTORIS: ICD-10-CM

## 2022-04-08 DIAGNOSIS — I10 ESSENTIAL HYPERTENSION: ICD-10-CM

## 2022-04-08 DIAGNOSIS — I48.11 LONGSTANDING PERSISTENT ATRIAL FIBRILLATION: Primary | ICD-10-CM

## 2022-04-08 PROCEDURE — 99214 OFFICE O/P EST MOD 30 MIN: CPT | Performed by: NURSE PRACTITIONER

## 2022-04-08 PROCEDURE — 93000 ELECTROCARDIOGRAM COMPLETE: CPT | Performed by: NURSE PRACTITIONER

## 2022-04-08 RX ORDER — ISOSORBIDE MONONITRATE 30 MG/1
30 TABLET, EXTENDED RELEASE ORAL
Qty: 30 TABLET | Refills: 5 | Status: SHIPPED | OUTPATIENT
Start: 2022-04-08 | End: 2022-10-17

## 2022-04-08 RX ORDER — CARVEDILOL 6.25 MG/1
6.25 TABLET ORAL 2 TIMES DAILY
Qty: 180 TABLET | Refills: 3 | Status: SHIPPED | OUTPATIENT
Start: 2022-04-08 | End: 2022-05-31

## 2022-04-08 NOTE — PROGRESS NOTES
Date of Office Visit: 22  Encounter Provider: ESME Turner  Place of Service: [facility] CARDIOLOGY  Patient Name: Edilberto Reeder  :1950    Chief Complaint   Patient presents with   • Coronary Artery Disease   • Follow-up   :     HPI: Edilberto Reeder is a 71 y.o. male  with coronary artery disease, diabetes mellitus with peripheral neuropathy, hyperlipidemia, chronic atrial fibrillation, hypertension, memory loss, Parkinson's disease, and history of falls      He is followed by Dr. Fung.  I will visit with him in follow-up and have reviewed his medical record.    He had a normal Myoview perfusion study in .  He had echocardiogram 2018 that showed normal left ventricular systolic function and no significant valvular disease.  He was hospitalized in 2022 with chest pain.  Echocardiogram showed normal left ventricular systolic function, moderate concentric hypertrophy, normal lRVSP and no significant valvular disease.  Perfusion stress test showed a very small amount of basal inferolateral ischemia.  He also complaining of intermittent headache and spells of diplopia.  He was evaluated by neurology who did not feel that he had an acute stroke.  He presents today accompanied by his wife and has increased his activity some.  He still has a little chest discomfort and shortness of breath now only on occasion.  He does report improvement in the way he feels since starting isosorbide.  No capitation's, near-syncope or syncope.    Allergies   Allergen Reactions   • Bacitracin Anaphylaxis   • Neosporin [Neomycin-Bacitracin Zn-Polymyx] Other (See Comments)     BP drops and heart stops!   • Fish-Derived Products Hives   • Shellfish Allergy Hives   • Shrimp (Diagnostic) Hives           Family and social history reviewed.     ROS  All other systems were reviewed and are negative          Objective:     Vitals:    22 0824   BP: 112/60   BP Location:  "Right arm   Patient Position: Sitting   Pulse: 62   Weight: 88 kg (194 lb)   Height: 177.8 cm (70\")     Body mass index is 27.84 kg/m².    PHYSICAL EXAM:  Cardiovascular:      Irregularly irregular rhythm.           ECG 12 Lead    Date/Time: 4/8/2022 8:44 AM  Performed by: Jeannette Garcia APRN  Authorized by: Jeannette Garcia APRN   Comparison: compared with previous ECG   Similar to previous ECG  Rhythm: atrial fibrillation  Rate: normal    Clinical impression: abnormal EKG              Current Outpatient Medications   Medication Sig Dispense Refill   • acetaminophen (TYLENOL) 325 MG tablet Take 2 tablets by mouth Every 6 (Six) Hours As Needed for Mild Pain .     • allopurinol (ZYLOPRIM) 100 MG tablet Take 100 mg by mouth Daily.     • Alogliptin Benzoate 12.5 MG tablet Take  by mouth Daily. Half tablet daily     • apixaban (ELIQUIS) 5 MG tablet tablet Take 5 mg by mouth 2 (Two) Times a Day.     • aspirin 81 MG chewable tablet Chew 81 mg Every Other Day.     • Calcium Carbonate 1500 (600 Ca) MG tablet Take 650 mg by mouth Daily.     • Carbidopa-Levodopa ER 36. MG capsule controlled-release Take  by mouth 4 (Four) Times a Day. 4 tablets 4 times daily     • carvedilol (COREG) 6.25 MG tablet Take 1 tablet by mouth 2 (Two) Times a Day. 180 tablet 3   • citalopram (CeleXA) 20 MG tablet Take 20 mg by mouth Daily.     • docusate sodium (COLACE) 50 MG capsule Take  by mouth Every Night.     • isosorbide mononitrate (IMDUR) 30 MG 24 hr tablet Take 1 tablet by mouth Daily. 30 tablet 5   • levothyroxine (SYNTHROID, LEVOTHROID) 25 MCG tablet Take 12.5 mcg by mouth Daily.     • metFORMIN (GLUCOPHAGE) 500 MG tablet Take 500 mg by mouth 3 (Three) Times a Day.     • Polyvinyl Alcohol-Povidone PF (HYPOTEARS) 1.4-0.6 % ophthalmic solution 1-2 drops As Needed for Wound Care.     • simvastatin (ZOCOR) 80 MG tablet Take 40 mg by mouth Every Night.     • traZODone (DESYREL) 50 MG tablet Take 25 mg by mouth Every Night.     • vitamin " B-12 (CYANOCOBALAMIN) 1000 MCG tablet Take 500 mcg by mouth Daily.     • donepezil (ARICEPT) 5 MG tablet Take 10 mg by mouth Every Night.       No current facility-administered medications for this visit.     Assessment:       Diagnosis Plan   1. Longstanding persistent atrial fibrillation (HCC)  ECG 12 Lead   2. Essential hypertension     3. Coronary artery disease involving coronary bypass graft of native heart with other forms of angina pectoris (HCC)  ECG 12 Lead        Orders Placed This Encounter   Procedures   • ECG 12 Lead     This order was created via procedure documentation     Order Specific Question:   Release to patient     Answer:   Immediate         Plan:         1.  71-year-old gentleman with presumed coronary artery disease treated with aspirin, simvastatin, and isosorbide.  He notes symptom improvement and he is increasing his physical activity.  We will continue current regimen for the next 4 to 6 weeks until he follows up but he is to call with any worsening symptoms  2.  Hypertension blood pressure appears well controlled  3.  Hyperlipidemia on simvastatin  4.  Diabetes mellitus hemoglobin A1c 7.5  5.  Parkinson's disease with brain stimulator  6. Meomory loss-            It has been a pleasure to participate in this patient's care.      Thank you,  ESME Turner      **I used Dragon to dictate this note:**

## 2022-04-30 ENCOUNTER — HOSPITAL ENCOUNTER (OUTPATIENT)
Facility: HOSPITAL | Age: 72
Setting detail: OBSERVATION
LOS: 2 days | Discharge: HOME | End: 2022-05-08
Attending: EMERGENCY MEDICINE | Admitting: INTERNAL MEDICINE

## 2022-04-30 ENCOUNTER — APPOINTMENT (OUTPATIENT)
Dept: CT IMAGING | Facility: HOSPITAL | Age: 72
End: 2022-04-30

## 2022-04-30 DIAGNOSIS — S09.90XA MINOR HEAD INJURY, INITIAL ENCOUNTER: Primary | ICD-10-CM

## 2022-04-30 DIAGNOSIS — Z12.11 SCREENING FOR COLON CANCER: ICD-10-CM

## 2022-04-30 DIAGNOSIS — D64.9 ANEMIA, UNSPECIFIED TYPE: ICD-10-CM

## 2022-04-30 DIAGNOSIS — S01.81XA LACERATION OF FOREHEAD, INITIAL ENCOUNTER: ICD-10-CM

## 2022-04-30 DIAGNOSIS — D62 ACUTE BLOOD LOSS ANEMIA: ICD-10-CM

## 2022-04-30 LAB
ALBUMIN SERPL-MCNC: 3.6 G/DL (ref 3.5–5.2)
ALBUMIN/GLOB SERPL: 2 G/DL
ALP SERPL-CCNC: 61 U/L (ref 39–117)
ALT SERPL W P-5'-P-CCNC: <5 U/L (ref 1–41)
ANION GAP SERPL CALCULATED.3IONS-SCNC: 15 MMOL/L (ref 5–15)
AST SERPL-CCNC: 9 U/L (ref 1–40)
BASOPHILS # BLD AUTO: 0.07 10*3/MM3 (ref 0–0.2)
BASOPHILS NFR BLD AUTO: 0.7 % (ref 0–1.5)
BILIRUB SERPL-MCNC: 0.4 MG/DL (ref 0–1.2)
BUN SERPL-MCNC: 26 MG/DL (ref 8–23)
BUN/CREAT SERPL: 37.7 (ref 7–25)
CALCIUM SPEC-SCNC: 8.3 MG/DL (ref 8.6–10.5)
CHLORIDE SERPL-SCNC: 102 MMOL/L (ref 98–107)
CO2 SERPL-SCNC: 20 MMOL/L (ref 22–29)
CREAT SERPL-MCNC: 0.69 MG/DL (ref 0.76–1.27)
DEPRECATED RDW RBC AUTO: 45.9 FL (ref 37–54)
EGFRCR SERPLBLD CKD-EPI 2021: 97.7 ML/MIN/1.73
EOSINOPHIL # BLD AUTO: 0.18 10*3/MM3 (ref 0–0.4)
EOSINOPHIL NFR BLD AUTO: 1.9 % (ref 0.3–6.2)
ERYTHROCYTE [DISTWIDTH] IN BLOOD BY AUTOMATED COUNT: 14.6 % (ref 12.3–15.4)
GLOBULIN UR ELPH-MCNC: 1.8 GM/DL
GLUCOSE SERPL-MCNC: 263 MG/DL (ref 65–99)
HCT VFR BLD AUTO: 29.6 % (ref 37.5–51)
HGB BLD-MCNC: 9.5 G/DL (ref 13–17.7)
HOLD SPECIMEN: NORMAL
IMM GRANULOCYTES # BLD AUTO: 0.05 10*3/MM3 (ref 0–0.05)
IMM GRANULOCYTES NFR BLD AUTO: 0.5 % (ref 0–0.5)
LYMPHOCYTES # BLD AUTO: 1.01 10*3/MM3 (ref 0.7–3.1)
LYMPHOCYTES NFR BLD AUTO: 10.5 % (ref 19.6–45.3)
MCH RBC QN AUTO: 27.5 PG (ref 26.6–33)
MCHC RBC AUTO-ENTMCNC: 32.1 G/DL (ref 31.5–35.7)
MCV RBC AUTO: 85.8 FL (ref 79–97)
MONOCYTES # BLD AUTO: 0.7 10*3/MM3 (ref 0.1–0.9)
MONOCYTES NFR BLD AUTO: 7.3 % (ref 5–12)
NEUTROPHILS NFR BLD AUTO: 7.6 10*3/MM3 (ref 1.7–7)
NEUTROPHILS NFR BLD AUTO: 79.1 % (ref 42.7–76)
NRBC BLD AUTO-RTO: 0 /100 WBC (ref 0–0.2)
PLATELET # BLD AUTO: 181 10*3/MM3 (ref 140–450)
PMV BLD AUTO: 10.7 FL (ref 6–12)
POTASSIUM SERPL-SCNC: 3.9 MMOL/L (ref 3.5–5.2)
PROT SERPL-MCNC: 5.4 G/DL (ref 6–8.5)
RBC # BLD AUTO: 3.45 10*6/MM3 (ref 4.14–5.8)
SODIUM SERPL-SCNC: 137 MMOL/L (ref 136–145)
WBC NRBC COR # BLD: 9.61 10*3/MM3 (ref 3.4–10.8)
WHOLE BLOOD HOLD SPECIMEN: NORMAL
WHOLE BLOOD HOLD SPECIMEN: NORMAL

## 2022-04-30 PROCEDURE — 93005 ELECTROCARDIOGRAM TRACING: CPT | Performed by: NURSE PRACTITIONER

## 2022-04-30 PROCEDURE — G0378 HOSPITAL OBSERVATION PER HR: HCPCS

## 2022-04-30 PROCEDURE — 85025 COMPLETE CBC W/AUTO DIFF WBC: CPT | Performed by: NURSE PRACTITIONER

## 2022-04-30 PROCEDURE — U0004 COV-19 TEST NON-CDC HGH THRU: HCPCS | Performed by: NURSE PRACTITIONER

## 2022-04-30 PROCEDURE — 36415 COLL VENOUS BLD VENIPUNCTURE: CPT

## 2022-04-30 PROCEDURE — 25010000002 MORPHINE PER 10 MG: Performed by: EMERGENCY MEDICINE

## 2022-04-30 PROCEDURE — 80053 COMPREHEN METABOLIC PANEL: CPT | Performed by: NURSE PRACTITIONER

## 2022-04-30 PROCEDURE — 93010 ELECTROCARDIOGRAM REPORT: CPT | Performed by: INTERNAL MEDICINE

## 2022-04-30 PROCEDURE — 96375 TX/PRO/DX INJ NEW DRUG ADDON: CPT

## 2022-04-30 PROCEDURE — 72125 CT NECK SPINE W/O DYE: CPT

## 2022-04-30 PROCEDURE — 99284 EMERGENCY DEPT VISIT MOD MDM: CPT

## 2022-04-30 PROCEDURE — 25010000002 ONDANSETRON PER 1 MG: Performed by: EMERGENCY MEDICINE

## 2022-04-30 PROCEDURE — 70450 CT HEAD/BRAIN W/O DYE: CPT

## 2022-04-30 RX ORDER — CITALOPRAM 20 MG/1
20 TABLET ORAL DAILY
Status: DISCONTINUED | OUTPATIENT
Start: 2022-05-01 | End: 2022-05-08 | Stop reason: HOSPADM

## 2022-04-30 RX ORDER — ASPIRIN 81 MG/1
81 TABLET, CHEWABLE ORAL EVERY OTHER DAY
Status: DISCONTINUED | OUTPATIENT
Start: 2022-04-30 | End: 2022-05-01

## 2022-04-30 RX ORDER — ALUMINA, MAGNESIA, AND SIMETHICONE 2400; 2400; 240 MG/30ML; MG/30ML; MG/30ML
15 SUSPENSION ORAL EVERY 6 HOURS PRN
Status: DISCONTINUED | OUTPATIENT
Start: 2022-04-30 | End: 2022-05-08 | Stop reason: HOSPADM

## 2022-04-30 RX ORDER — ONDANSETRON 2 MG/ML
4 INJECTION INTRAMUSCULAR; INTRAVENOUS ONCE
Status: COMPLETED | OUTPATIENT
Start: 2022-04-30 | End: 2022-04-30

## 2022-04-30 RX ORDER — ONDANSETRON 4 MG/1
4 TABLET, FILM COATED ORAL EVERY 6 HOURS PRN
Status: DISCONTINUED | OUTPATIENT
Start: 2022-04-30 | End: 2022-05-08 | Stop reason: HOSPADM

## 2022-04-30 RX ORDER — CARBIDOPA AND LEVODOPA 25; 100 MG/1; MG/1
1 TABLET, EXTENDED RELEASE ORAL 4 TIMES DAILY
Status: DISCONTINUED | OUTPATIENT
Start: 2022-04-30 | End: 2022-05-08 | Stop reason: HOSPADM

## 2022-04-30 RX ORDER — MORPHINE SULFATE 2 MG/ML
2 INJECTION, SOLUTION INTRAMUSCULAR; INTRAVENOUS
Status: DISCONTINUED | OUTPATIENT
Start: 2022-04-30 | End: 2022-05-03

## 2022-04-30 RX ORDER — LIDOCAINE HYDROCHLORIDE AND EPINEPHRINE 10; 10 MG/ML; UG/ML
10 INJECTION, SOLUTION INFILTRATION; PERINEURAL ONCE
Status: COMPLETED | OUTPATIENT
Start: 2022-04-30 | End: 2022-04-30

## 2022-04-30 RX ORDER — SODIUM CHLORIDE 0.9 % (FLUSH) 0.9 %
10 SYRINGE (ML) INJECTION AS NEEDED
Status: DISCONTINUED | OUTPATIENT
Start: 2022-04-30 | End: 2022-05-02

## 2022-04-30 RX ORDER — NICOTINE POLACRILEX 4 MG
15 LOZENGE BUCCAL
Status: DISCONTINUED | OUTPATIENT
Start: 2022-04-30 | End: 2022-05-01

## 2022-04-30 RX ORDER — DEXTROSE MONOHYDRATE 25 G/50ML
25 INJECTION, SOLUTION INTRAVENOUS
Status: DISCONTINUED | OUTPATIENT
Start: 2022-04-30 | End: 2022-05-01

## 2022-04-30 RX ORDER — NITROGLYCERIN 0.4 MG/1
0.4 TABLET SUBLINGUAL
Status: DISCONTINUED | OUTPATIENT
Start: 2022-04-30 | End: 2022-05-08 | Stop reason: HOSPADM

## 2022-04-30 RX ORDER — DONEPEZIL HYDROCHLORIDE 10 MG/1
10 TABLET, FILM COATED ORAL NIGHTLY
Status: DISCONTINUED | OUTPATIENT
Start: 2022-04-30 | End: 2022-05-08 | Stop reason: HOSPADM

## 2022-04-30 RX ORDER — LEVOTHYROXINE SODIUM 0.03 MG/1
12.5 TABLET ORAL
Status: DISCONTINUED | OUTPATIENT
Start: 2022-05-01 | End: 2022-05-08 | Stop reason: HOSPADM

## 2022-04-30 RX ORDER — INSULIN LISPRO 100 [IU]/ML
0-9 INJECTION, SOLUTION INTRAVENOUS; SUBCUTANEOUS
Status: DISCONTINUED | OUTPATIENT
Start: 2022-05-01 | End: 2022-05-01

## 2022-04-30 RX ORDER — ACETAMINOPHEN 325 MG/1
650 TABLET ORAL EVERY 4 HOURS PRN
Status: DISCONTINUED | OUTPATIENT
Start: 2022-04-30 | End: 2022-05-08 | Stop reason: HOSPADM

## 2022-04-30 RX ORDER — TRAZODONE HYDROCHLORIDE 50 MG/1
25 TABLET ORAL NIGHTLY
Status: DISCONTINUED | OUTPATIENT
Start: 2022-04-30 | End: 2022-05-08 | Stop reason: HOSPADM

## 2022-04-30 RX ORDER — HYDROCODONE BITARTRATE AND ACETAMINOPHEN 5; 325 MG/1; MG/1
1 TABLET ORAL EVERY 6 HOURS PRN
Status: DISCONTINUED | OUTPATIENT
Start: 2022-04-30 | End: 2022-05-08 | Stop reason: HOSPADM

## 2022-04-30 RX ORDER — ISOSORBIDE MONONITRATE 30 MG/1
30 TABLET, EXTENDED RELEASE ORAL
Status: DISCONTINUED | OUTPATIENT
Start: 2022-05-01 | End: 2022-05-08 | Stop reason: HOSPADM

## 2022-04-30 RX ORDER — ATORVASTATIN CALCIUM 20 MG/1
40 TABLET, FILM COATED ORAL DAILY
Status: DISCONTINUED | OUTPATIENT
Start: 2022-05-01 | End: 2022-05-08 | Stop reason: HOSPADM

## 2022-04-30 RX ORDER — CARVEDILOL 6.25 MG/1
6.25 TABLET ORAL 2 TIMES DAILY
Status: DISCONTINUED | OUTPATIENT
Start: 2022-04-30 | End: 2022-05-08 | Stop reason: HOSPADM

## 2022-04-30 RX ORDER — ONDANSETRON 2 MG/ML
4 INJECTION INTRAMUSCULAR; INTRAVENOUS EVERY 6 HOURS PRN
Status: DISCONTINUED | OUTPATIENT
Start: 2022-04-30 | End: 2022-05-08 | Stop reason: HOSPADM

## 2022-04-30 RX ORDER — ALLOPURINOL 100 MG/1
100 TABLET ORAL DAILY
Status: DISCONTINUED | OUTPATIENT
Start: 2022-05-01 | End: 2022-05-08 | Stop reason: HOSPADM

## 2022-04-30 RX ORDER — HYDROCODONE BITARTRATE AND ACETAMINOPHEN 7.5; 325 MG/1; MG/1
1 TABLET ORAL ONCE
Status: COMPLETED | OUTPATIENT
Start: 2022-04-30 | End: 2022-04-30

## 2022-04-30 RX ORDER — MORPHINE SULFATE 2 MG/ML
4 INJECTION, SOLUTION INTRAMUSCULAR; INTRAVENOUS ONCE
Status: COMPLETED | OUTPATIENT
Start: 2022-04-30 | End: 2022-04-30

## 2022-04-30 RX ORDER — SODIUM CHLORIDE 0.9 % (FLUSH) 0.9 %
10 SYRINGE (ML) INJECTION AS NEEDED
Status: DISCONTINUED | OUTPATIENT
Start: 2022-04-30 | End: 2022-05-08 | Stop reason: HOSPADM

## 2022-04-30 RX ADMIN — LIDOCAINE HYDROCHLORIDE,EPINEPHRINE BITARTRATE 10 ML: 10; .01 INJECTION, SOLUTION INFILTRATION; PERINEURAL at 20:55

## 2022-04-30 RX ADMIN — ONDANSETRON 4 MG: 2 INJECTION INTRAMUSCULAR; INTRAVENOUS at 21:05

## 2022-04-30 RX ADMIN — HYDROCODONE BITARTRATE AND ACETAMINOPHEN 1 TABLET: 7.5; 325 TABLET ORAL at 16:43

## 2022-04-30 RX ADMIN — Medication 3 ML: at 15:54

## 2022-04-30 RX ADMIN — SODIUM CHLORIDE 1000 ML: 9 INJECTION, SOLUTION INTRAVENOUS at 20:27

## 2022-04-30 RX ADMIN — MORPHINE SULFATE 4 MG: 2 INJECTION, SOLUTION INTRAMUSCULAR; INTRAVENOUS at 21:02

## 2022-05-01 ENCOUNTER — APPOINTMENT (OUTPATIENT)
Dept: CT IMAGING | Facility: HOSPITAL | Age: 72
End: 2022-05-01

## 2022-05-01 ENCOUNTER — APPOINTMENT (OUTPATIENT)
Dept: GENERAL RADIOLOGY | Facility: HOSPITAL | Age: 72
End: 2022-05-01

## 2022-05-01 PROBLEM — Z79.01 CHRONIC ANTICOAGULATION: Chronic | Status: ACTIVE | Noted: 2022-05-01

## 2022-05-01 PROBLEM — D62 ACUTE BLOOD LOSS ANEMIA: Status: ACTIVE | Noted: 2022-05-01

## 2022-05-01 LAB
ANION GAP SERPL CALCULATED.3IONS-SCNC: 13 MMOL/L (ref 5–15)
BUN SERPL-MCNC: 15 MG/DL (ref 8–23)
BUN/CREAT SERPL: 23.8 (ref 7–25)
CALCIUM SPEC-SCNC: 8.1 MG/DL (ref 8.6–10.5)
CHLORIDE SERPL-SCNC: 102 MMOL/L (ref 98–107)
CO2 SERPL-SCNC: 23 MMOL/L (ref 22–29)
CREAT SERPL-MCNC: 0.63 MG/DL (ref 0.76–1.27)
DEPRECATED RDW RBC AUTO: 44.6 FL (ref 37–54)
EGFRCR SERPLBLD CKD-EPI 2021: 100.4 ML/MIN/1.73
ERYTHROCYTE [DISTWIDTH] IN BLOOD BY AUTOMATED COUNT: 14.9 % (ref 12.3–15.4)
FERRITIN SERPL-MCNC: 36.4 NG/ML (ref 30–400)
GLUCOSE BLDC GLUCOMTR-MCNC: 256 MG/DL (ref 70–130)
GLUCOSE BLDC GLUCOMTR-MCNC: 274 MG/DL (ref 70–130)
GLUCOSE BLDC GLUCOMTR-MCNC: 280 MG/DL (ref 70–130)
GLUCOSE BLDC GLUCOMTR-MCNC: 340 MG/DL (ref 70–130)
GLUCOSE SERPL-MCNC: 208 MG/DL (ref 65–99)
HBA1C MFR BLD: 8.1 % (ref 4.8–5.6)
HCT VFR BLD AUTO: 23.6 % (ref 37.5–51)
HCT VFR BLD AUTO: 23.6 % (ref 37.5–51)
HCT VFR BLD AUTO: 25 % (ref 37.5–51)
HGB BLD-MCNC: 7.9 G/DL (ref 13–17.7)
HGB BLD-MCNC: 7.9 G/DL (ref 13–17.7)
HGB BLD-MCNC: 8 G/DL (ref 13–17.7)
IRON 24H UR-MRATE: 29 MCG/DL (ref 59–158)
IRON SATN MFR SERPL: 11 % (ref 20–50)
LDH SERPL-CCNC: 125 U/L (ref 135–225)
MCH RBC QN AUTO: 27.5 PG (ref 26.6–33)
MCHC RBC AUTO-ENTMCNC: 33.5 G/DL (ref 31.5–35.7)
MCV RBC AUTO: 82.2 FL (ref 79–97)
PLATELET # BLD AUTO: 145 10*3/MM3 (ref 140–450)
PMV BLD AUTO: 11 FL (ref 6–12)
POTASSIUM SERPL-SCNC: 3.8 MMOL/L (ref 3.5–5.2)
QT INTERVAL: 357 MS
RBC # BLD AUTO: 2.87 10*6/MM3 (ref 4.14–5.8)
RETICS # AUTO: 0.06 10*6/MM3 (ref 0.02–0.13)
RETICS/RBC NFR AUTO: 2.04 % (ref 0.7–1.9)
SARS-COV-2 ORF1AB RESP QL NAA+PROBE: NOT DETECTED
SODIUM SERPL-SCNC: 138 MMOL/L (ref 136–145)
TIBC SERPL-MCNC: 268 MCG/DL (ref 298–536)
TRANSFERRIN SERPL-MCNC: 180 MG/DL (ref 200–360)
TSH SERPL DL<=0.05 MIU/L-ACNC: 2.52 UIU/ML (ref 0.27–4.2)
WBC NRBC COR # BLD: 6.94 10*3/MM3 (ref 3.4–10.8)

## 2022-05-01 PROCEDURE — G0378 HOSPITAL OBSERVATION PER HR: HCPCS

## 2022-05-01 PROCEDURE — 63710000001 INSULIN LISPRO (HUMAN) PER 5 UNITS: Performed by: INTERNAL MEDICINE

## 2022-05-01 PROCEDURE — 83036 HEMOGLOBIN GLYCOSYLATED A1C: CPT | Performed by: NURSE PRACTITIONER

## 2022-05-01 PROCEDURE — 70150 X-RAY EXAM OF FACIAL BONES: CPT

## 2022-05-01 PROCEDURE — 83615 LACTATE (LD) (LDH) ENZYME: CPT | Performed by: INTERNAL MEDICINE

## 2022-05-01 PROCEDURE — 85027 COMPLETE CBC AUTOMATED: CPT | Performed by: NURSE PRACTITIONER

## 2022-05-01 PROCEDURE — 85018 HEMOGLOBIN: CPT | Performed by: INTERNAL MEDICINE

## 2022-05-01 PROCEDURE — 84466 ASSAY OF TRANSFERRIN: CPT | Performed by: NURSE PRACTITIONER

## 2022-05-01 PROCEDURE — 80048 BASIC METABOLIC PNL TOTAL CA: CPT | Performed by: NURSE PRACTITIONER

## 2022-05-01 PROCEDURE — 85014 HEMATOCRIT: CPT | Performed by: INTERNAL MEDICINE

## 2022-05-01 PROCEDURE — 82962 GLUCOSE BLOOD TEST: CPT

## 2022-05-01 PROCEDURE — 82728 ASSAY OF FERRITIN: CPT | Performed by: NURSE PRACTITIONER

## 2022-05-01 PROCEDURE — 63710000001 INSULIN LISPRO (HUMAN) PER 5 UNITS: Performed by: HOSPITALIST

## 2022-05-01 PROCEDURE — 85045 AUTOMATED RETICULOCYTE COUNT: CPT | Performed by: NURSE PRACTITIONER

## 2022-05-01 PROCEDURE — 84443 ASSAY THYROID STIM HORMONE: CPT | Performed by: INTERNAL MEDICINE

## 2022-05-01 PROCEDURE — 83540 ASSAY OF IRON: CPT | Performed by: NURSE PRACTITIONER

## 2022-05-01 PROCEDURE — 70450 CT HEAD/BRAIN W/O DYE: CPT

## 2022-05-01 RX ORDER — INSULIN LISPRO 100 [IU]/ML
0-7 INJECTION, SOLUTION INTRAVENOUS; SUBCUTANEOUS
Status: DISCONTINUED | OUTPATIENT
Start: 2022-05-01 | End: 2022-05-01

## 2022-05-01 RX ORDER — INSULIN LISPRO 100 [IU]/ML
0-9 INJECTION, SOLUTION INTRAVENOUS; SUBCUTANEOUS
Status: DISCONTINUED | OUTPATIENT
Start: 2022-05-01 | End: 2022-05-08 | Stop reason: HOSPADM

## 2022-05-01 RX ORDER — NICOTINE POLACRILEX 4 MG
15 LOZENGE BUCCAL
Status: DISCONTINUED | OUTPATIENT
Start: 2022-05-01 | End: 2022-05-08 | Stop reason: HOSPADM

## 2022-05-01 RX ORDER — DEXTROSE MONOHYDRATE 25 G/50ML
25 INJECTION, SOLUTION INTRAVENOUS
Status: DISCONTINUED | OUTPATIENT
Start: 2022-05-01 | End: 2022-05-08 | Stop reason: HOSPADM

## 2022-05-01 RX ADMIN — TRAZODONE HYDROCHLORIDE 25 MG: 50 TABLET ORAL at 00:28

## 2022-05-01 RX ADMIN — ACETAMINOPHEN 650 MG: 325 TABLET ORAL at 09:21

## 2022-05-01 RX ADMIN — CARVEDILOL 6.25 MG: 6.25 TABLET, FILM COATED ORAL at 21:16

## 2022-05-01 RX ADMIN — CARBIDOPA AND LEVODOPA 1 TABLET: 25; 100 TABLET, EXTENDED RELEASE ORAL at 17:26

## 2022-05-01 RX ADMIN — ISOSORBIDE MONONITRATE 30 MG: 30 TABLET ORAL at 09:21

## 2022-05-01 RX ADMIN — INSULIN LISPRO 7 UNITS: 100 INJECTION, SOLUTION INTRAVENOUS; SUBCUTANEOUS at 12:28

## 2022-05-01 RX ADMIN — CARBIDOPA AND LEVODOPA 1 TABLET: 25; 100 TABLET, EXTENDED RELEASE ORAL at 21:16

## 2022-05-01 RX ADMIN — INSULIN LISPRO 4 UNITS: 100 INJECTION, SOLUTION INTRAVENOUS; SUBCUTANEOUS at 09:21

## 2022-05-01 RX ADMIN — DONEPEZIL HYDROCHLORIDE 10 MG: 10 TABLET, FILM COATED ORAL at 21:16

## 2022-05-01 RX ADMIN — DONEPEZIL HYDROCHLORIDE 10 MG: 10 TABLET, FILM COATED ORAL at 00:28

## 2022-05-01 RX ADMIN — INSULIN LISPRO 6 UNITS: 100 INJECTION, SOLUTION INTRAVENOUS; SUBCUTANEOUS at 17:26

## 2022-05-01 RX ADMIN — ALLOPURINOL 100 MG: 100 TABLET ORAL at 09:21

## 2022-05-01 RX ADMIN — CARVEDILOL 6.25 MG: 6.25 TABLET, FILM COATED ORAL at 00:28

## 2022-05-01 RX ADMIN — ATORVASTATIN CALCIUM 40 MG: 20 TABLET, FILM COATED ORAL at 09:21

## 2022-05-01 RX ADMIN — LEVOTHYROXINE SODIUM 12.5 MCG: 0.03 TABLET ORAL at 06:12

## 2022-05-01 RX ADMIN — HYDROCODONE BITARTRATE AND ACETAMINOPHEN 1 TABLET: 5; 325 TABLET ORAL at 21:39

## 2022-05-01 RX ADMIN — TRAZODONE HYDROCHLORIDE 25 MG: 50 TABLET ORAL at 21:16

## 2022-05-01 RX ADMIN — CITALOPRAM 20 MG: 20 TABLET, FILM COATED ORAL at 09:21

## 2022-05-01 RX ADMIN — CARVEDILOL 6.25 MG: 6.25 TABLET, FILM COATED ORAL at 09:21

## 2022-05-01 RX ADMIN — CARBIDOPA AND LEVODOPA 1 TABLET: 25; 100 TABLET, EXTENDED RELEASE ORAL at 00:28

## 2022-05-01 RX ADMIN — CARBIDOPA AND LEVODOPA 1 TABLET: 25; 100 TABLET, EXTENDED RELEASE ORAL at 09:21

## 2022-05-01 RX ADMIN — HYDROCODONE BITARTRATE AND ACETAMINOPHEN 1 TABLET: 5; 325 TABLET ORAL at 06:12

## 2022-05-01 RX ADMIN — DOCUSATE SODIUM 50 MG: 50 CAPSULE, LIQUID FILLED ORAL at 00:29

## 2022-05-01 RX ADMIN — DOCUSATE SODIUM 50 MG: 50 CAPSULE, LIQUID FILLED ORAL at 21:16

## 2022-05-01 RX ADMIN — HYDROCODONE BITARTRATE AND ACETAMINOPHEN 1 TABLET: 5; 325 TABLET ORAL at 00:28

## 2022-05-01 RX ADMIN — CARBIDOPA AND LEVODOPA 1 TABLET: 25; 100 TABLET, EXTENDED RELEASE ORAL at 12:28

## 2022-05-01 NOTE — PLAN OF CARE
Goal Outcome Evaluation:  Plan of Care Reviewed With: patient        Progress: improving   VSS.  A-Ox4.  Pt had some pain in his head today.  See MAR.  Pt was able to get up today to [facility] and have BM.  Will CTM.

## 2022-05-01 NOTE — ED NOTES
"Pt up to bedside commode assist x1. Pt's linens changed. Pt received new gown.  Pt is steady on feet for brief periods, only with assist x1. Pt reports an \"awful headache.\" This Rn notified ESME Pruitt.  "

## 2022-05-01 NOTE — PROGRESS NOTES
"    Name: Edilberto Reeder ADMIT: 2022   : 1950  PCP: Harvey Larson MD    MRN: [id number] LOS: 0 days   AGE/SEX: 71 y.o. male  ROOM: [facility]     Subjective   Subjective   Feeling better today. C/o pain around forehead \"right between my eyes\" and behind his right ear. No vis changes. No N/V. Tolerating diet. Voiding well. No CP or palp or SOA. No bleeding.    Review of Systems   as above    Objective   Objective   Vital Signs  Temp:  [97.5 °F (36.4 °C)-98.8 °F (37.1 °C)] 97.6 °F (36.4 °C)  Heart Rate:  [114-118] 114  Resp:  [18] 18  BP: (100-137)/(68-93) 118/74  SpO2:  [96 %-99 %] 97 %  on   ;   Device (Oxygen Therapy): room air  Body mass index is 25.88 kg/m².  Physical Exam  Vitals and nursing note reviewed. Exam conducted with a chaperone present (Wife).   Constitutional:       General: He is not in acute distress.     Appearance: He is ill-appearing (chronically). He is not toxic-appearing or diaphoretic.   HENT:      Head: Normocephalic.      Comments: Large lac across forehead, sutured, no active bleeding  Raccoon eyes  Mild TTP over right mastoid  No subQ air  EOMI  No otorrhea     Right Ear: External ear normal.      Left Ear: External ear normal.      Nose: Nose normal.      Mouth/Throat:      Mouth: Mucous membranes are moist.      Pharynx: Oropharynx is clear.   Eyes:      General: No scleral icterus.        Right eye: No discharge.         Left eye: No discharge.      Extraocular Movements: Extraocular movements intact.      Conjunctiva/sclera: Conjunctivae normal.   Cardiovascular:      Rate and Rhythm: Normal rate and regular rhythm.      Pulses: Normal pulses.      Heart sounds: Normal heart sounds.      Comments: Deep brain stimulator right upper chest wall  Pulmonary:      Effort: Pulmonary effort is normal. No respiratory distress.      Breath sounds: Normal breath sounds. No wheezing or rales.   Abdominal:      General: Bowel sounds are normal. There is no distension.      " Palpations: Abdomen is soft.      Tenderness: There is no abdominal tenderness.   Musculoskeletal:         General: No swelling or deformity. Normal range of motion.      Cervical back: Neck supple. No rigidity.   Lymphadenopathy:      Cervical: No cervical adenopathy.   Skin:     General: Skin is warm and dry.      Capillary Refill: Capillary refill takes less than 2 seconds.      Coloration: Skin is not jaundiced.      Findings: No rash.   Neurological:      General: No focal deficit present.      Mental Status: He is alert and oriented to person, place, and time. Mental status is at baseline.      Cranial Nerves: No cranial nerve deficit.      Coordination: Coordination normal.      Comments: No tremor   Psychiatric:         Mood and Affect: Mood normal.         Behavior: Behavior normal.         Thought Content: Thought content normal.         Results Review     I reviewed the patient's new clinical results.  Results from last 7 days   Lab Units 04/30/22 1949   WBC 10*3/mm3 9.61   HEMOGLOBIN g/dL 9.5*   PLATELETS 10*3/mm3 181     Results from last 7 days   Lab Units 04/30/22 1949   SODIUM mmol/L 137   POTASSIUM mmol/L 3.9   CHLORIDE mmol/L 102   CO2 mmol/L 20.0*   BUN mg/dL 26*   CREATININE mg/dL 0.69*   GLUCOSE mg/dL 263*   EGFR mL/min/1.73 97.7     Results from last 7 days   Lab Units 04/30/22 1949   ALBUMIN g/dL 3.60   BILIRUBIN mg/dL 0.4   ALK PHOS U/L 61   AST (SGOT) U/L 9   ALT (SGPT) U/L <5     Results from last 7 days   Lab Units 04/30/22 1949   CALCIUM mg/dL 8.3*   ALBUMIN g/dL 3.60       Glucose   Date/Time Value Ref Range Status   05/01/2022 1029 340 (H) 70 - 130 mg/dL Final     Comment:     Meter: [id number] : [id number] Giovani Olivia MONTY   05/01/2022 0554 280 (H) 70 - 130 mg/dL Final     Comment:     Meter: [id number] : [id number] Evonne MILLAN       CT Head Without Contrast    Result Date: 4/30/2022  Postoperative changes as noted. No acute intracranial abnormalities are  identified.  This report was finalized on 4/30/2022 4:31 PM by Dr. Robert Paul M.D.      CT Cervical Spine Without Contrast    Result Date: 4/30/2022  Extensive multilevel degenerative disc changes as described. There is no evidence of acute fracture or subluxation.  This report was finalized on 4/30/2022 4:36 PM by Dr. Robert Paul M.D.      Scheduled Medications  allopurinol, 100 mg, Oral, Daily  atorvastatin, 40 mg, Oral, Daily  carbidopa-levodopa ER, 1 tablet, Oral, 4x Daily  carvedilol, 6.25 mg, Oral, BID  citalopram, 20 mg, Oral, Daily  docusate sodium, 50 mg, Oral, Nightly  donepezil, 10 mg, Oral, Nightly  insulin lispro, 0-7 Units, Subcutaneous, TID AC  isosorbide mononitrate, 30 mg, Oral, Q24H  levothyroxine, 12.5 mcg, Oral, Q AM  traZODone, 25 mg, Oral, Nightly    Infusions   Diet  Diet Regular; Consistent Carbohydrate       Assessment/Plan     Active Hospital Problems    Diagnosis  POA   • **Head injury due to trauma [S09.90XA]  Yes   • Acute blood loss anemia [D62]  Yes   • Chronic anticoagulation [Z79.01]  Not Applicable   • Parkinson's disease (HCC) [G20]  Yes   • Type 2 diabetes mellitus with hyperglycemia, without long-term current use of insulin (HCC) [E11.65]  Yes   • Paroxysmal atrial fibrillation (HCC) [I48.0]  Yes   • HLD (hyperlipidemia) [E78.5]  Yes   • Essential hypertension [I10]  Yes   • ASCVD (arteriosclerotic cardiovascular disease) [I25.10]  Yes      Resolved Hospital Problems   No resolved problems to display.       71 y.o. non-smoker with a history of type 2 diabetes, Parkinson's disease, paroxysmal atrial fibrillation on chronic AC (Eliquis), HLD, HTN, and cardiovascular disease who presents with head laceration secondary to mechanical fall. Significant blood loss as a result.    Mechanical fall with large scalp lac: head CT reassuring, facial bone XR fine, s/p 15 sutures in ER, good hemostasis this AM, neurochecks stable, repeat CT head to make sure no late bleed--especially  given c/o pain over right mastoid, continue to hold ASA and Eliquis, PT eval    Acute blood loss anemia: due to scalp lac, await Hgb today, ASA and Eliquis on hold, checking iron panel and B12/folate    PAF, chronic AC (Eliquis): HRs slightly elevated, NSR on monitor, continue Coreg    CAD: ASA on hold, continue Coreg/Imdur/Lipitor, no CP or SOA    HTN: BPs fine on home meds, no hypotension    DM2: OHGs on hold, A1c pending, covering with SSI--will increase dose of SSI as sugars are quite high here    HypoT4: checking TSH, continue L-T4    Parkinson's: with deep brain stimulator, no tremor, continue Sinemet, PT eval      · SCDs for DVT prophylaxis.  · Full code.  · Discussed with patient, wife, and RN.  · Anticipate discharge home with family, possibly tomorrow if Hgb stable and no bleed on repeat CT head and PT eval okay. Can restart ASA and AC tomorrow at [place].      Jaspreet Pollock MD  [facility]  05/01/22  11:50 EDT

## 2022-05-01 NOTE — H&P
Patient Name:  Edilberto Reeder  YOB: 1950  MRN:  [id number]  Admit Date:  4/30/2022  Patient Care Team:  Harvey Larson MD as PCP - General  Harvey Larson MD as PCP - Family Medicine      Subjective   History Present Illness     Chief Complaint   Patient presents with   • Head Laceration     History of Present Illness   Mr. Reeder is a 71 y.o. non-smoker with a history of type 2 diabetes paroxysmal atrial fibrillation on Eliquis, Parkinson's disease, HLD, HTN, and cardiovascular disease that presents to [facility] complaining of head laceration/injury secondary to mechanical fall.  Patient reports that he was working on his deck when he tried to pull up some wood bending forward causing him to lose balance and falling face forward.  Wife at bedside states that he lost a significant amount of blood.  He sustained a laceration of his forehead in which she received 15 sutures in the emergency department.  He denies any loss of consciousness, dizziness, lightheadedness, or visual changes.  He is on Eliquis for atrial fibrillation.  CT of head shows no acute intracranial processes or hemorrhage.  Labs show hemoglobin 9.5, hematocrit 29.6.  He has been admitted to our service for further evaluation and treatment.    Review of Systems   Constitutional: Negative for chills and fever.   HENT: Negative for congestion and rhinorrhea.    Eyes: Negative for photophobia and visual disturbance.   Respiratory: Negative for cough and shortness of breath.    Cardiovascular: Negative for chest pain and palpitations.   Gastrointestinal: Negative for constipation, diarrhea, nausea and vomiting.   Endocrine: Negative for cold intolerance and heat intolerance.   Genitourinary: Negative for difficulty urinating and dysuria.   Musculoskeletal: Negative for gait problem and joint swelling.   Skin: Negative for rash and wound.   Neurological: Negative for dizziness, light-headedness and headaches.    Psychiatric/Behavioral: Negative for sleep disturbance and suicidal ideas.        Personal History     Past Medical History:   Diagnosis Date   • Atrial fibrillation with RVR (HCC)    • Atrial flutter (HCC)    • Diabetes (HCC)    • HLD (hyperlipidemia) 1/27/2016   • Hypertension    • Parkinson's disease (HCC)     Advanced      Past Surgical History:   Procedure Laterality Date   • BRAIN STIMULATOR     • JOINT REPLACEMENT      Bilateral shoulder and knee replacements     No family history on file.  Social History     Tobacco Use   • Smoking status: Never Smoker   • Smokeless tobacco: Never Used   • Tobacco comment: caffeine use   Substance Use Topics   • Alcohol use: Yes   • Drug use: No     No current facility-administered medications on file prior to encounter.     Current Outpatient Medications on File Prior to Encounter   Medication Sig Dispense Refill   • acetaminophen (TYLENOL) 325 MG tablet Take 2 tablets by mouth Every 6 (Six) Hours As Needed for Mild Pain .     • allopurinol (ZYLOPRIM) 100 MG tablet Take 100 mg by mouth Daily.     • Alogliptin Benzoate 12.5 MG tablet Take  by mouth Daily. Half tablet daily     • apixaban (ELIQUIS) 5 MG tablet tablet Take 5 mg by mouth 2 (Two) Times a Day.     • aspirin 81 MG chewable tablet Chew 81 mg Every Other Day.     • Calcium Carbonate 1500 (600 Ca) MG tablet Take 650 mg by mouth Daily.     • Carbidopa-Levodopa ER 36. MG capsule controlled-release Take  by mouth 4 (Four) Times a Day. 4 tablets 4 times daily     • carvedilol (COREG) 6.25 MG tablet Take 1 tablet by mouth 2 (Two) Times a Day. 180 tablet 3   • citalopram (CeleXA) 20 MG tablet Take 20 mg by mouth Daily.     • docusate sodium (COLACE) 50 MG capsule Take  by mouth Every Night.     • donepezil (ARICEPT) 5 MG tablet Take 10 mg by mouth Every Night.     • isosorbide mononitrate (IMDUR) 30 MG 24 hr tablet Take 1 tablet by mouth Daily. 30 tablet 5   • levothyroxine (SYNTHROID, LEVOTHROID) 25 MCG tablet  Take 12.5 mcg by mouth Daily.     • metFORMIN (GLUCOPHAGE) 500 MG tablet Take 500 mg by mouth 3 (Three) Times a Day.     • simvastatin (ZOCOR) 80 MG tablet Take 40 mg by mouth Every Night.     • traZODone (DESYREL) 50 MG tablet Take 25 mg by mouth Every Night.     • vitamin B-12 (CYANOCOBALAMIN) 1000 MCG tablet Take 500 mcg by mouth Daily.     • Polyvinyl Alcohol-Povidone PF (HYPOTEARS) 1.4-0.6 % ophthalmic solution 1-2 drops As Needed for Wound Care.       Allergies   Allergen Reactions   • Bacitracin Anaphylaxis   • Neosporin [Neomycin-Bacitracin Zn-Polymyx] Other (See Comments)     BP drops and heart stops!   • Fish-Derived Products Hives   • Shellfish Allergy Hives   • Shrimp (Diagnostic) Hives       Objective    Objective     Vital Signs  Temp:  [97.5 °F (36.4 °C)-98.8 °F (37.1 °C)] 97.5 °F (36.4 °C)  Heart Rate:  [114-118] 116  Resp:  [18] 18  BP: (100-137)/(68-93) 137/84  SpO2:  [96 %-99 %] 99 %  on   ;   Device (Oxygen Therapy): room air  Body mass index is 27.24 kg/m².    Physical Exam  Vitals and nursing note reviewed.   Constitutional:       General: He is not in acute distress.     Appearance: He is well-developed. He is not toxic-appearing.      Comments: Chronically ill-appearing   HENT:      Head: Normocephalic.      Comments: Patient's forehead is wrapped with Kerlix.  Hair covered with dried blood.  Significant edema to bilateral with as well as bilateral ecchymosis.  Eyes:      General: No scleral icterus.        Right eye: No discharge.         Left eye: No discharge.      Conjunctiva/sclera: Conjunctivae normal.   Neck:      Vascular: No JVD.   Cardiovascular:      Rate and Rhythm: Normal rate and regular rhythm.      Heart sounds: Normal heart sounds. No murmur heard.    No friction rub. No gallop.   Pulmonary:      Effort: Pulmonary effort is normal. No respiratory distress.      Breath sounds: Normal breath sounds. No wheezing or rales.   Abdominal:      General: Bowel sounds are normal. There  is no distension.      Palpations: Abdomen is soft.      Tenderness: There is no abdominal tenderness. There is no guarding.   Musculoskeletal:         General: No tenderness or deformity. Normal range of motion.      Cervical back: Normal range of motion and neck supple.   Skin:     General: Skin is warm and dry.      Capillary Refill: Capillary refill takes less than 2 seconds.   Neurological:      Mental Status: He is alert and oriented to person, place, and time.   Psychiatric:         Behavior: Behavior normal.       Results Review:  I reviewed the patient's new clinical results.  I reviewed the patient's new imaging results and agree with the interpretation.  I reviewed the patient's other test results and agree with the interpretation  I personally viewed and interpreted the patient's EKG/Telemetry data    Lab Results (last 24 hours)     Procedure Component Value Units Date/Time    CBC & Differential [[id number]]  (Abnormal) Collected: 04/30/22 1949    Specimen: Blood Updated: 04/30/22 2016    Narrative:      The following orders were created for panel order CBC & Differential.  Procedure                               Abnormality         Status                     ---------                               -----------         ------                     CBC Auto Differential[[id number]]        Abnormal            Final result                 Please view results for these tests on the individual orders.    Comprehensive Metabolic Panel [[id number]]  (Abnormal) Collected: 04/30/22 1949    Specimen: Blood Updated: 04/30/22 2029     Glucose 263 mg/dL      BUN 26 mg/dL      Creatinine 0.69 mg/dL      Sodium 137 mmol/L      Potassium 3.9 mmol/L      Chloride 102 mmol/L      CO2 20.0 mmol/L      Calcium 8.3 mg/dL      Total Protein 5.4 g/dL      Albumin 3.60 g/dL      ALT (SGPT) <5 U/L      AST (SGOT) 9 U/L      Alkaline Phosphatase 61 U/L      Total Bilirubin 0.4 mg/dL      Globulin 1.8 gm/dL      A/G Ratio 2.0 g/dL       BUN/Creatinine Ratio 37.7     Anion Gap 15.0 mmol/L      eGFR 97.7 mL/min/1.73      Comment: National Kidney Foundation and American Society of Nephrology (ASN) Task Force recommended calculation based on the Chronic Kidney Disease Epidemiology Collaboration (CKD-EPI) equation refit without adjustment for race.       Narrative:      GFR Normal >60  Chronic Kidney Disease <60  Kidney Failure <15      CBC Auto Differential [[id number]]  (Abnormal) Collected: 04/30/22 1949    Specimen: Blood Updated: 04/30/22 2016     WBC 9.61 10*3/mm3      RBC 3.45 10*6/mm3      Hemoglobin 9.5 g/dL      Hematocrit 29.6 %      MCV 85.8 fL      MCH 27.5 pg      MCHC 32.1 g/dL      RDW 14.6 %      RDW-SD 45.9 fl      MPV 10.7 fL      Platelets 181 10*3/mm3      Neutrophil % 79.1 %      Lymphocyte % 10.5 %      Monocyte % 7.3 %      Eosinophil % 1.9 %      Basophil % 0.7 %      Immature Grans % 0.5 %      Neutrophils, Absolute 7.60 10*3/mm3      Lymphocytes, Absolute 1.01 10*3/mm3      Monocytes, Absolute 0.70 10*3/mm3      Eosinophils, Absolute 0.18 10*3/mm3      Basophils, Absolute 0.07 10*3/mm3      Immature Grans, Absolute 0.05 10*3/mm3      nRBC 0.0 /100 WBC     COVID PRE-OP / PRE-PROCEDURE SCREENING ORDER (NO ISOLATION) - Swab, Nasopharynx [[id number]] Collected: 04/30/22 1951    Specimen: Swab from Nasopharynx Updated: 04/30/22 2023    Narrative:      The following orders were created for panel order COVID PRE-OP / PRE-PROCEDURE SCREENING ORDER (NO ISOLATION) - Swab, Nasopharynx.  Procedure                               Abnormality         Status                     ---------                               -----------         ------                     COVID-19,APTIMA PANTHER(...[[id number]]                      In process                   Please view results for these tests on the individual orders.    COVID-19,APTIMA PANTHER(ADÁN),[facility], NP/OP SWAB IN UTM/VTM/SALINE TRANSPORT MEDIA,24 HR TAT - Swab, Nasopharynx [[id number]]  Collected: 04/30/22 1951    Specimen: Swab from Nasopharynx Updated: 04/30/22 2023          Imaging Results (Last 24 Hours)     Procedure Component Value Units Date/Time    CT Cervical Spine Without Contrast [[id number]] Collected: 04/30/22 1631     Updated: 04/30/22 1639    Narrative:      CT CERVICAL SPINE WO CONTRAST-     CLINICAL HISTORY: Patient fell. Neck pain.     TECHNIQUE: Multiple axial 1 mm thick images were obtained through the  cervical spine. Coronal and sagittal reconstructions were produced     Radiation dose reduction techniques were utilized, including automated  exposure control and exposure modulation based on body size.     COMPARISON: CT scan of the cervical spine dated 10/21/2020. .     FINDINGS: There are extensive multilevel degenerative disc changes and  to a lesser extent facet joint arthropathy at multiple levels that  results in straightening of the normal cervical lordosis. In addition,  there is slight anterolisthesis of C2 with respect to C3 due to facet  joint arthropathy. The overall alignment is unchanged since the  preceding study. All of the vertebral bodies are normal in height. The  facet joints are narrowed but intact. There is no evidence of acute  fracture or subluxation. In particular, the odontoid process is intact.     At C2-C3 there is mild broad-based posterior disc bulging that results  in no encroachment on the spinal canal. There is severe narrowing of the  left neural foramen due to bony spurring. At C3-C4 there is marked disc  space narrowing. There is endplate bony spurring that produces mild to  moderate central canal stenosis and also moderate bilateral foraminal  narrowing. Similar findings are evident at C4-C5. At C5-C6 there is  broad-based posterior disc bulging and endplate bony spurring that  produces moderately severe central narrowing of the canal and moderate  bilateral foraminal narrowing. Similar findings are present at C6-C7.       Impression:       Extensive multilevel degenerative disc changes as described.  There is no evidence of acute fracture or subluxation.     This report was finalized on 4/30/2022 4:36 PM by Dr. Robert Paul M.D.       CT Head Without Contrast [[id number]] Collected: 04/30/22 1625     Updated: 04/30/22 1636    Narrative:      CT HEAD WO CONTRAST-     CLINICAL HISTORY: Patient fell and struck forehead. Scalp laceration.  Patient anticoagulated. History of Parkinson's disease.     TECHNIQUE: Transverse 3 mm thick images were acquired from the base of  the skull to the vertex without IV contrast.     Radiation dose reduction techniques were utilized, including automated  exposure control and exposure modulation based on body size.     COMPARISON: CT head dated 10/21/2020.     FINDINGS: The brain and ventricular system appear structurally within  normal limits for patient of this age. Bilateral deep brain stimulator  electrodes remain in place without change. There is no evidence of acute  infarct or hemorrhage. There is no mass effect. A large amount of  bandage material covers the forehead. A small hematoma is evident within  the scalp overlying the frontal bone, most prominent in the right of  midline. Bone window images demonstrate no underlying skull fracture.  The visualized paranasal sinuses are well aerated.       Impression:      Postoperative changes as noted. No acute intracranial  abnormalities are identified.     This report was finalized on 4/30/2022 4:31 PM by Dr. Robert Paul M.D.             Results for orders placed during the hospital encounter of 03/23/22    Adult Transthoracic Echo Complete W/ Cont if Necessary Per Protocol    Interpretation Summary  · Left ventricular ejection fraction appears to be 61 - 65%. Left ventricular systolic function is normal.  · Left ventricular wall thickness is consistent with moderate concentric hypertrophy.  · Normal right ventricular cavity size and systolic function noted.  ·  The left atrial cavity is moderately dilated.  · Mild tricuspid valve regurgitation is present.  · Calculated right ventricular systolic pressure from tricuspid regurgitation is 38 mmHg.  · There is no evidence of pericardial effusion      ECG 12 Lead   Preliminary Result   HEART RATE= 117  bpm   RR Interval= 513  ms   ME Interval=   ms   P Horizontal Axis=   deg   P Front Axis=   deg   QRSD Interval= 86  ms   QT Interval= 357  ms   QRS Axis= 147  deg   T Wave Axis= -18  deg   - ABNORMAL ECG -   Junctional tachycardia   Left posterior fascicular block   Low voltage, extremity leads   Minimal ST depression, anterior leads   Borderline prolonged QT interval   Electronically Signed By:    Date and Time of Study: 2022-04-30 21:05:24           Assessment/Plan     Active Hospital Problems    Diagnosis  POA   • **Head injury due to trauma [S09.90XA]  Yes   • Parkinson's disease (HCC) [G20]  Yes   • Type 2 diabetes mellitus with hyperglycemia, without long-term current use of insulin (HCC) [E11.65]  Yes   • Paroxysmal atrial fibrillation (HCC) [I48.0]  Yes   • HLD (hyperlipidemia) [E78.5]  Yes   • Essential hypertension [I10]  Yes   • ASCVD (arteriosclerotic cardiovascular disease) [I25.10]  Yes      Resolved Hospital Problems   No resolved problems to display.       Mr. Reeder is a 71 y.o. non-smoker with a history of type 2 diabetes, Parkinson's disease, paroxysmal atrial fibrillation, HLD, HTN, and cardiovascular disease who presents with head laceration secondary to mechanical fall.    Head laceration secondary to mechanical fall  -For is currently wrapped with a Kerlix.  Patient received 15 sutures in the ED. he does complain of facial pain.  Will check an x-ray of his face to rule out any facial fractures.   -No hemorrhage noted on CT.  -Neurochecks every 4 hours  -Monitor on telemetry overnight  -Patient did have a significant amount of blood loss.  Will monitor H&H closely.  -Hemoglobin 9.5, repeat CBC in a.m.  and check iron panel.  -Supportive care with analgesics    Paroxysmal atrial fibrillation  -Slightly tachycardic on exam.  Will resume home antiarrhythmics as well as Eliquis and monitor his hemoglobin closely.    History of Parkinson's disease  -Resume carbidopa/levodopa    Cardiovascular disease/HLD  -Patient denies any chest pain, will resume his home regimen of aspirin and Lipitor.    Essential hypertension  -Stable, resume home regimen     Type 2 diabetes mellitus  -Accu-Cheks 4 times a day with meals and at bedtime  -Moderate dose correctional factor with hypoglycemic protocol  -Check hemoglobin A1c  -Hold oral diabetic medication      I discussed the patient's findings and my recommendations with patient, family and Dr. Benavides.    VTE Prophylaxis - Eliquis (home med).  Code Status - Full code.       ESME Barragan  [facility]  04/30/22  23:00 EDT

## 2022-05-01 NOTE — ED NOTES
This Rn changed pt's drsg on forehead x2 in the last 15 minutes.  This Rn walked in to find pt's dressings grossly saturated with blood dripping and pooled on bed around his neck and shoulders. This Rn changed padding underneath pt.

## 2022-05-02 LAB
ALBUMIN SERPL-MCNC: 3.3 G/DL (ref 3.5–5.2)
ALBUMIN/GLOB SERPL: 1.5 G/DL
ALP SERPL-CCNC: 52 U/L (ref 39–117)
ALT SERPL W P-5'-P-CCNC: 7 U/L (ref 1–41)
ANION GAP SERPL CALCULATED.3IONS-SCNC: 8.4 MMOL/L (ref 5–15)
AST SERPL-CCNC: 9 U/L (ref 1–40)
BASOPHILS # BLD AUTO: 0.04 10*3/MM3 (ref 0–0.2)
BASOPHILS NFR BLD AUTO: 0.7 % (ref 0–1.5)
BILIRUB SERPL-MCNC: 0.7 MG/DL (ref 0–1.2)
BUN SERPL-MCNC: 12 MG/DL (ref 8–23)
BUN/CREAT SERPL: 17.6 (ref 7–25)
CALCIUM SPEC-SCNC: 8.2 MG/DL (ref 8.6–10.5)
CHLORIDE SERPL-SCNC: 104 MMOL/L (ref 98–107)
CO2 SERPL-SCNC: 24.6 MMOL/L (ref 22–29)
CREAT SERPL-MCNC: 0.68 MG/DL (ref 0.76–1.27)
DEPRECATED RDW RBC AUTO: 45.3 FL (ref 37–54)
EGFRCR SERPLBLD CKD-EPI 2021: 97.5 ML/MIN/1.73
EOSINOPHIL # BLD AUTO: 0.2 10*3/MM3 (ref 0–0.4)
EOSINOPHIL NFR BLD AUTO: 3.6 % (ref 0.3–6.2)
ERYTHROCYTE [DISTWIDTH] IN BLOOD BY AUTOMATED COUNT: 14.6 % (ref 12.3–15.4)
FOLATE SERPL-MCNC: 7.46 NG/ML (ref 4.78–24.2)
GLOBULIN UR ELPH-MCNC: 2.2 GM/DL
GLUCOSE BLDC GLUCOMTR-MCNC: 200 MG/DL (ref 70–130)
GLUCOSE BLDC GLUCOMTR-MCNC: 252 MG/DL (ref 70–130)
GLUCOSE BLDC GLUCOMTR-MCNC: 273 MG/DL (ref 70–130)
GLUCOSE BLDC GLUCOMTR-MCNC: 290 MG/DL (ref 70–130)
GLUCOSE SERPL-MCNC: 188 MG/DL (ref 65–99)
HCT VFR BLD AUTO: 23 % (ref 37.5–51)
HCT VFR BLD AUTO: 23.7 % (ref 37.5–51)
HGB BLD-MCNC: 7.4 G/DL (ref 13–17.7)
HGB BLD-MCNC: 7.7 G/DL (ref 13–17.7)
IMM GRANULOCYTES # BLD AUTO: 0.02 10*3/MM3 (ref 0–0.05)
IMM GRANULOCYTES NFR BLD AUTO: 0.4 % (ref 0–0.5)
LYMPHOCYTES # BLD AUTO: 1.24 10*3/MM3 (ref 0.7–3.1)
LYMPHOCYTES NFR BLD AUTO: 22 % (ref 19.6–45.3)
MAGNESIUM SERPL-MCNC: 1.5 MG/DL (ref 1.6–2.4)
MCH RBC QN AUTO: 27.5 PG (ref 26.6–33)
MCHC RBC AUTO-ENTMCNC: 32.5 G/DL (ref 31.5–35.7)
MCV RBC AUTO: 84.6 FL (ref 79–97)
MONOCYTES # BLD AUTO: 0.43 10*3/MM3 (ref 0.1–0.9)
MONOCYTES NFR BLD AUTO: 7.6 % (ref 5–12)
NEUTROPHILS NFR BLD AUTO: 3.7 10*3/MM3 (ref 1.7–7)
NEUTROPHILS NFR BLD AUTO: 65.7 % (ref 42.7–76)
NRBC BLD AUTO-RTO: 0 /100 WBC (ref 0–0.2)
PLATELET # BLD AUTO: 133 10*3/MM3 (ref 140–450)
PMV BLD AUTO: 11.1 FL (ref 6–12)
POTASSIUM SERPL-SCNC: 3.4 MMOL/L (ref 3.5–5.2)
PROT SERPL-MCNC: 5.5 G/DL (ref 6–8.5)
RBC # BLD AUTO: 2.8 10*6/MM3 (ref 4.14–5.8)
SODIUM SERPL-SCNC: 137 MMOL/L (ref 136–145)
VIT B12 BLD-MCNC: 791 PG/ML (ref 211–946)
WBC NRBC COR # BLD: 5.63 10*3/MM3 (ref 3.4–10.8)

## 2022-05-02 PROCEDURE — 82962 GLUCOSE BLOOD TEST: CPT

## 2022-05-02 PROCEDURE — 82607 VITAMIN B-12: CPT | Performed by: HOSPITALIST

## 2022-05-02 PROCEDURE — 96375 TX/PRO/DX INJ NEW DRUG ADDON: CPT

## 2022-05-02 PROCEDURE — 82746 ASSAY OF FOLIC ACID SERUM: CPT | Performed by: HOSPITALIST

## 2022-05-02 PROCEDURE — G0378 HOSPITAL OBSERVATION PER HR: HCPCS

## 2022-05-02 PROCEDURE — 85014 HEMATOCRIT: CPT | Performed by: HOSPITALIST

## 2022-05-02 PROCEDURE — 63710000001 INSULIN LISPRO (HUMAN) PER 5 UNITS: Performed by: HOSPITALIST

## 2022-05-02 PROCEDURE — 85025 COMPLETE CBC W/AUTO DIFF WBC: CPT | Performed by: HOSPITALIST

## 2022-05-02 PROCEDURE — 85018 HEMOGLOBIN: CPT | Performed by: INTERNAL MEDICINE

## 2022-05-02 PROCEDURE — 83735 ASSAY OF MAGNESIUM: CPT | Performed by: HOSPITALIST

## 2022-05-02 PROCEDURE — 97530 THERAPEUTIC ACTIVITIES: CPT

## 2022-05-02 PROCEDURE — 85018 HEMOGLOBIN: CPT | Performed by: HOSPITALIST

## 2022-05-02 PROCEDURE — 85014 HEMATOCRIT: CPT | Performed by: INTERNAL MEDICINE

## 2022-05-02 PROCEDURE — 80053 COMPREHEN METABOLIC PANEL: CPT | Performed by: HOSPITALIST

## 2022-05-02 PROCEDURE — 99204 OFFICE O/P NEW MOD 45 MIN: CPT | Performed by: INTERNAL MEDICINE

## 2022-05-02 PROCEDURE — 97162 PT EVAL MOD COMPLEX 30 MIN: CPT

## 2022-05-02 RX ORDER — MAGNESIUM SULFATE HEPTAHYDRATE 40 MG/ML
4 INJECTION, SOLUTION INTRAVENOUS AS NEEDED
Status: DISCONTINUED | OUTPATIENT
Start: 2022-05-02 | End: 2022-05-06

## 2022-05-02 RX ORDER — CHOLECALCIFEROL (VITAMIN D3) 125 MCG
5 CAPSULE ORAL NIGHTLY PRN
Status: DISCONTINUED | OUTPATIENT
Start: 2022-05-02 | End: 2022-05-08 | Stop reason: HOSPADM

## 2022-05-02 RX ORDER — POTASSIUM CHLORIDE 750 MG/1
40 TABLET, FILM COATED, EXTENDED RELEASE ORAL AS NEEDED
Status: DISCONTINUED | OUTPATIENT
Start: 2022-05-02 | End: 2022-05-06

## 2022-05-02 RX ORDER — POTASSIUM CHLORIDE 1.5 G/1.77G
40 POWDER, FOR SOLUTION ORAL AS NEEDED
Status: DISCONTINUED | OUTPATIENT
Start: 2022-05-02 | End: 2022-05-06

## 2022-05-02 RX ORDER — MAGNESIUM SULFATE HEPTAHYDRATE 40 MG/ML
2 INJECTION, SOLUTION INTRAVENOUS AS NEEDED
Status: DISCONTINUED | OUTPATIENT
Start: 2022-05-02 | End: 2022-05-06

## 2022-05-02 RX ORDER — PANTOPRAZOLE SODIUM 40 MG/10ML
40 INJECTION, POWDER, LYOPHILIZED, FOR SOLUTION INTRAVENOUS
Status: DISCONTINUED | OUTPATIENT
Start: 2022-05-02 | End: 2022-05-06

## 2022-05-02 RX ADMIN — CITALOPRAM 20 MG: 20 TABLET, FILM COATED ORAL at 09:16

## 2022-05-02 RX ADMIN — INSULIN LISPRO 4 UNITS: 100 INJECTION, SOLUTION INTRAVENOUS; SUBCUTANEOUS at 09:15

## 2022-05-02 RX ADMIN — DOCUSATE SODIUM 50 MG: 50 CAPSULE, LIQUID FILLED ORAL at 21:47

## 2022-05-02 RX ADMIN — ALLOPURINOL 100 MG: 100 TABLET ORAL at 09:16

## 2022-05-02 RX ADMIN — CARVEDILOL 6.25 MG: 6.25 TABLET, FILM COATED ORAL at 21:47

## 2022-05-02 RX ADMIN — CARBIDOPA AND LEVODOPA 1 TABLET: 25; 100 TABLET, EXTENDED RELEASE ORAL at 09:15

## 2022-05-02 RX ADMIN — CARBIDOPA AND LEVODOPA 1 TABLET: 25; 100 TABLET, EXTENDED RELEASE ORAL at 11:31

## 2022-05-02 RX ADMIN — PANTOPRAZOLE SODIUM 40 MG: 40 INJECTION, POWDER, FOR SOLUTION INTRAVENOUS at 17:08

## 2022-05-02 RX ADMIN — ATORVASTATIN CALCIUM 40 MG: 20 TABLET, FILM COATED ORAL at 09:16

## 2022-05-02 RX ADMIN — ISOSORBIDE MONONITRATE 30 MG: 30 TABLET ORAL at 09:16

## 2022-05-02 RX ADMIN — INSULIN LISPRO 6 UNITS: 100 INJECTION, SOLUTION INTRAVENOUS; SUBCUTANEOUS at 11:31

## 2022-05-02 RX ADMIN — INSULIN LISPRO 6 UNITS: 100 INJECTION, SOLUTION INTRAVENOUS; SUBCUTANEOUS at 17:08

## 2022-05-02 RX ADMIN — HYDROCODONE BITARTRATE AND ACETAMINOPHEN 1 TABLET: 5; 325 TABLET ORAL at 23:24

## 2022-05-02 RX ADMIN — LEVOTHYROXINE SODIUM 12.5 MCG: 0.03 TABLET ORAL at 06:51

## 2022-05-02 RX ADMIN — CARVEDILOL 6.25 MG: 6.25 TABLET, FILM COATED ORAL at 09:16

## 2022-05-02 RX ADMIN — TRAZODONE HYDROCHLORIDE 25 MG: 50 TABLET ORAL at 21:46

## 2022-05-02 RX ADMIN — DONEPEZIL HYDROCHLORIDE 10 MG: 10 TABLET, FILM COATED ORAL at 21:47

## 2022-05-02 RX ADMIN — CARBIDOPA AND LEVODOPA 1 TABLET: 25; 100 TABLET, EXTENDED RELEASE ORAL at 17:08

## 2022-05-02 RX ADMIN — INSULIN GLARGINE-YFGN 10 UNITS: 100 INJECTION, SOLUTION SUBCUTANEOUS at 21:30

## 2022-05-02 RX ADMIN — CARBIDOPA AND LEVODOPA 1 TABLET: 25; 100 TABLET, EXTENDED RELEASE ORAL at 21:47

## 2022-05-02 RX ADMIN — HYDROCODONE BITARTRATE AND ACETAMINOPHEN 1 TABLET: 5; 325 TABLET ORAL at 16:04

## 2022-05-02 NOTE — PROGRESS NOTES
Name: Edilberto Reeder ADMIT: 2022   : 1950  PCP: Harvey Larson MD    MRN: [id number] LOS: 0 days   AGE/SEX: 72 y.o. male  ROOM: [facility]     Subjective   Subjective   Resting comfortably in bed.  Complains of headache but is tolerable with Tylenol.  He again denies prodromal dizziness, blurry vision, chest pain or palpitations prior to his fall    Review of Systems  Denies chest pain, shortness of breath, abdominal pain, fevers     Objective   Objective   Vital Signs  Temp:  [97.5 °F (36.4 °C)-98.3 °F (36.8 °C)] 97.5 °F (36.4 °C)  Heart Rate:  [115] 115  Resp:  [18-20] 20  BP: (101-143)/(61-83) 105/61  SpO2:  [98 %] 98 %  on   ;   Device (Oxygen Therapy): room air  Body mass index is 25.88 kg/m².  Physical Exam  Constitutional:       General: He is not in acute distress.     Appearance: He is not diaphoretic.   HENT:      Head:      Comments: Periorbital bruising.  Head wrapped in gauze  Cardiovascular:      Rate and Rhythm: Normal rate and regular rhythm.   Pulmonary:      Effort: Pulmonary effort is normal. No respiratory distress.      Breath sounds: No wheezing or rhonchi.   Abdominal:      Palpations: Abdomen is soft.      Tenderness: There is no abdominal tenderness. There is no guarding.   Musculoskeletal:      Right lower leg: No edema.      Left lower leg: No edema.   Skin:     Comments: Skin tears on chin and left elbow   Neurological:      Mental Status: He is alert.   Psychiatric:         Mood and Affect: Mood normal.         Results Review     I reviewed the patient's new clinical results.  Results from last 7 days   Lab Units 22  1251 22  0523 22  21422  1537 22   WBC 10*3/mm3  --  5.63 6.94  --  9.61   HEMOGLOBIN g/dL 7.4* 7.7*  7.7* 7.9*  7.9* 8.0* 9.5*   PLATELETS 10*3/mm3  --  133* 145  --  181     Results from last 7 days   Lab Units 22  0522 22  2144 22  1949   SODIUM mmol/L 137 138 137   POTASSIUM mmol/L 3.4* 3.8 3.9    CHLORIDE mmol/L 104 102 102   CO2 mmol/L 24.6 23.0 20.0*   BUN mg/dL 12 15 26*   CREATININE mg/dL 0.68* 0.63* 0.69*   GLUCOSE mg/dL 188* 208* 263*   Estimated Creatinine Clearance: 115.5 mL/min (A) (by C-G formula based on SCr of 0.68 mg/dL (L)).  Results from last 7 days   Lab Units 05/02/22 0522 04/30/22  1949   ALBUMIN g/dL 3.30* 3.60   BILIRUBIN mg/dL 0.7 0.4   ALK PHOS U/L 52 61   AST (SGOT) U/L 9 9   ALT (SGPT) U/L 7 <5     Results from last 7 days   Lab Units 05/02/22 0522 05/01/22 2144 04/30/22  1949   CALCIUM mg/dL 8.2* 8.1* 8.3*   ALBUMIN g/dL 3.30*  --  3.60   MAGNESIUM mg/dL 1.5*  --   --        COVID19   Date Value Ref Range Status   04/30/2022 Not Detected Not Detected - Ref. Range Final   03/23/2022 Not Detected Not Detected - Ref. Range Final     Hemoglobin A1C   Date/Time Value Ref Range Status   05/01/2022 2144 8.10 (H) 4.80 - 5.60 % Final     Glucose   Date/Time Value Ref Range Status   05/02/2022 1105 290 (H) 70 - 130 mg/dL Final     Comment:     Meter: [id number] : [id number] Matthew Harden NA   05/02/2022 0616 200 (H) 70 - 130 mg/dL Final     Comment:     Meter: [id number] : [id number] Tia Araujo NA   05/01/2022 2148 256 (H) 70 - 130 mg/dL Final     Comment:     Meter: [id number] : [id number] Joseph Gordon NA   05/01/2022 1548 274 (H) 70 - 130 mg/dL Final     Comment:     Meter: [id number] : [id number] Viridiana Pruitt NA   05/01/2022 1029 340 (H) 70 - 130 mg/dL Final     Comment:     Meter: [id number] : [id number] Giovani Olivia NA   05/01/2022 0554 280 (H) 70 - 130 mg/dL Final     Comment:     Meter: [id number] : [id number] Evonne MILLAN       CT Head Without Contrast  Narrative: CT HEAD WITHOUT CONTRAST     HISTORY: Fall. HISTORY of blood thinners.     COMPARISON: 04/30/2022     TECHNIQUE: Axial CT imaging was obtained through the brain. No IV  contrast was administered.     FINDINGS:  Bifrontal deep thalamic stimulators are again noted. This results  in  significant artifact. No obvious acute intracranial hemorrhage is seen.  There is diffuse atrophy. There is periventricular and deep white matter  microangiopathic change. There do appear to be tiny old infarcts within  the right cerebellar hemisphere. There is no midline shift or mass  effect. There is a frontal scalp hematoma, measuring 3.3 x 1.2 cm. This  has become larger and more well organized than on prior exam, although  generalized frontal scalp edema has improved. There is also some soft  tissue swelling seen overlying the dorsum of the nose, and there are  also appears to be bilateral. Orbital soft tissue swelling. Globes  appear intact.. Paranasal sinuses appear clear.     Impression:    1. No acute intracranial hemorrhage.  2. Interval enlargement of a frontal scalp hematoma, which has become  more well organized as well.     Radiation dose reduction techniques were utilized, including automated  exposure control and exposure modulation based on body size.     This report was finalized on 5/1/2022 11:05 PM by Dr. Alyssa Dos Santos M.D.     XR Facial Bones 3+ View  FACIAL BONE X-RAYS     CLINICAL HISTORY: Patient fell. Facial pain. Laceration.     A total 4 views were obtained. No bony abnormalities are identified.  There is no evidence of facial bone fracture. However, the nasal bone  was not well visualized on the lateral view and therefore a tiny distal  nasal bone fracture cannot be excluded. Also, the zygomatic arches were  not imaged. The paranasal sinuses are well aerated. Deep brain  stimulator electrodes are noted bilaterally.     This report was finalized on 5/1/2022 9:03 AM by Dr. Robert Paul M.D.       I reviewed the patient's daily medications.  Scheduled Medications  allopurinol, 100 mg, Oral, Daily  atorvastatin, 40 mg, Oral, Daily  carbidopa-levodopa ER, 1 tablet, Oral, 4x Daily  carvedilol, 6.25 mg, Oral, BID  citalopram, 20 mg, Oral, Daily  docusate sodium, 50 mg, Oral,  Nightly  donepezil, 10 mg, Oral, Nightly  insulin glargine, 10 Units, Subcutaneous, Nightly  insulin lispro, 0-9 Units, Subcutaneous, TID AC  iopamidol, 100 mL, Intravenous, Once in imaging  isosorbide mononitrate, 30 mg, Oral, Q24H  levothyroxine, 12.5 mcg, Oral, Q AM  pantoprazole, 40 mg, Intravenous, BID AC  traZODone, 25 mg, Oral, Nightly    Infusions   Diet  Diet Regular; Consistent Carbohydrate       Assessment/Plan     Active Hospital Problems    Diagnosis  POA   • **Head injury due to trauma [S09.90XA]  Yes   • Acute blood loss anemia [D62]  Yes   • Chronic anticoagulation [Z79.01]  Not Applicable   • Parkinson's disease (HCC) [G20]  Yes   • Type 2 diabetes mellitus with hyperglycemia, without long-term current use of insulin (HCC) [E11.65]  Yes   • Paroxysmal atrial fibrillation (HCC) [I48.0]  Yes   • HLD (hyperlipidemia) [E78.5]  Yes   • Essential hypertension [I10]  Yes   • ASCVD (arteriosclerotic cardiovascular disease) [I25.10]  Yes      Resolved Hospital Problems   No resolved problems to display.       72 y.o. non-smoker with a history of type 2 diabetes, Parkinson's disease, paroxysmal atrial fibrillation on chronic AC (Eliquis), HLD, HTN, and cardiovascular disease who presented with head laceration secondary to mechanical fall.  Hospital course has been complicated by significant anemia.    Today: GI consulted for iron deficiency anemia.  Tentative plan for endoscopy on 5/4.  Start IV PPI twice daily in case of upper GI bleed  PT has evaluated and recommends SNF on discharge.     Mechanical fall with large scalp laceration:   -head CT showed scalp hematoma but no intracranial bleeding. Facial bone XR without fractures.  Received 15 sutures in ER  -Holding home aspirin and Eliquis     Acute blood loss anemia.  Iron deficiency anemia:   -No active bleeding currently, and denies melena prior to admission.  GI evaluated, tentative plan for endoscopy on 5/4  -Continue to trend hemoglobin every 8 hours.  Start IV PPI twice daily in case of upper GI bleed  -Holding home aspirin and Eliquis     Paroxysmal A. fib, chronic AC (Eliquis): HRs slightly elevated, continue Coreg     Coronary artery disease: ASA on hold, continue Coreg/Imdur/Lipitor, no chest pain      HTN: Blood pressure acceptable acutely.  Continue Coreg and Imdur    Type 2 diabetes:   -A1c 8.1.  Holding home oral meds while inpatient  -Start basal insulin tonight, continue sliding scale     Parkinson's: with deep brain stimulator. No tremor, continue Sinemet, PT recommends SNF on discharge       · SCDs for DVT prophylaxis.  · Full code.  · Discussed with patient and nursing staff.  · Anticipate discharge to SNU facility in 2-3 days.  Pending endoscopy      Becky Sanches [facility]  05/02/22  15:02 EDT    Patient was placed in face mask on first look.  I wore protective equipment throughout this patient encounter including a face mask, gloves and protective eyewear.  Hand hygiene was performed before donning protective equipment and after removal when leaving the room.

## 2022-05-02 NOTE — CASE MANAGEMENT/SOCIAL WORK
Discharge Planning Assessment  [facility]     Patient Name: Edilberto Reeder  MRN: [id number]  Today's Date: 5/2/2022    Admit Date: 4/30/2022     Discharge Needs Assessment     Row Name 05/02/22 1406       Living Environment    People in Home spouse    Current Living Arrangements home    Primary Care Provided by self;spouse/significant other    Provides Primary Care For no one, unable/limited ability to care for self    Family Caregiver if Needed spouse;child(abilio), adult;sibling(s)    Quality of Family Relationships helpful;involved;supportive    Able to Return to Prior Arrangements yes       Resource/Environmental Concerns    Resource/Environmental Concerns none    Transportation Concerns no car       Transition Planning    Patient/Family Anticipates Transition to home with family    Patient/Family Anticipated Services at Transition none    Transportation Anticipated family or friend will provide       Discharge Needs Assessment    Readmission Within the Last 30 Days no previous admission in last 30 days    Equipment Currently Used at Home cane, straight;rollator    Concerns to be Addressed discharge planning;denies needs/concerns at this time    Anticipated Changes Related to Illness inability to care for self    Equipment Needed After Discharge other (see comments)    Discharge Facility/Level of Care Needs home with home health    Current Discharge Risk chronically ill               Discharge Plan     Row Name 05/02/22 1405       Plan    Plan Home with spouse, denies any dc needs at this time    Provided Post Acute Provider List? N/A    Patient/Family in Agreement with Plan yes    Plan Comments Spoke with pt at bedside, introduced self and explained CCP role. Verified facesheet and confirmed local pharmacy is [facility]. Pt denies problems with medication costs. Offered meds to beds and pt wishes to enroll. Pt has advance directive but not on file. Encouraged him to have brought in. PCP is   Harvey Larson. Pt lives in a 2 story home with his wife [place] [phone] with 2 steps to enter and also a ramp. Prior to admission pt is IADL with walker, but also has a cane. Pt doesn’t drive anymore due to his Parkinson’s disease. Pt states his wife or family transport him as needed. Pt has been to [facility] and used [facility] in the past. CCP discussed dc planning and pt states plan is home with wife. CCP encouraged pt to sit up in chair for meals and ambulate with nursing staff, to main mobility. CCP explained will continue to follow for dc planning needs. CCP contact provided. Ayaan KUMAR/LOIDA              Continued Care and Services - Admitted Since 4/30/2022    Coordination has not been started for this encounter.       Expected Discharge Date and Time     Expected Discharge Date Expected Discharge Time    May 2, 2022          Demographic Summary     Row Name 05/02/22 1405       General Information    Admission Type observation    Referral Source admission list    Reason for Consult discharge planning       Contact Information    Permission Granted to Share Info With family/designee               Functional Status     Row Name 05/02/22 1406       Functional Status    Usual Activity Tolerance good    Current Activity Tolerance moderate       Functional Status, IADL    Medications independent    Meal Preparation independent    Housekeeping assistive person    Laundry assistive person    Shopping assistive person       Mental Status    General Appearance WDL WDL       Mental Status Summary    Recent Changes in Mental Status/Cognitive Functioning no changes               Psychosocial    No documentation.                Abuse/Neglect    No documentation.                Legal    No documentation.                Substance Abuse    No documentation.                Patient Forms    No documentation.                   Sarah Charlton RN

## 2022-05-02 NOTE — PLAN OF CARE
Problem: Adult Inpatient Plan of Care  Goal: Plan of Care Review  Outcome: Ongoing, Progressing  Flowsheets  Taken 5/2/2022 0305 by Alecia Saravia, RN  Plan of Care Reviewed With:   patient   spouse  Outcome Evaluation: VITALS AS DOCUMENTED. NEURO ASSESSMENT INTACT. PERRL.  EQUAL AND STRONG. ALERT AND ORIENTED. WIFE AT BEDSIDE, SUPPORTIVE. CT HEAD COMPLETE, LABS COMPLETE, RESULTS CALLED TO A. NO NEW ORDERS TO NOTE. HGB. 7.9  ASYMPTOMATIC. NO S/SX OF DISTRESS. SCATTERED BRUISES AND SCABS. KERLEX HEAD BAND COVERING SUTURES, CDI. SINUS ON MONITOR.  Taken 5/1/2022 1805 by Jaspreet Tripp, RN  Progress: improving  Goal: Patient-Specific Goal (Individualized)  Outcome: Ongoing, Progressing  Goal: Absence of Hospital-Acquired Illness or Injury  Outcome: Ongoing, Progressing  Intervention: Identify and Manage Fall Risk  Description: Perform standard risk assessment on admission using a validated tool or comprehensive approach appropriate to the patient; reassess fall risk frequently, with change in status or transfer to another level of care.  Communicate fall injury risk to interprofessional healthcare team.  Determine need for increased observation, equipment and environmental modification, such as low bed, signage and supportive, nonskid footwear.  Adjust safety measures to individual developmental age, stage and identified risk factors.  Reinforce the importance of safety and physical activity with patient and family.  Perform regular intentional rounding to assess need for position change, pain assessment and personal needs, including assistance with toileting.  Recent Flowsheet Documentation  Taken 5/2/2022 0010 by Alecia Saravia, RN  Safety Promotion/Fall Prevention:   assistive device/personal items within reach   clutter free environment maintained   fall prevention program maintained   nonskid shoes/slippers when out of bed   room organization consistent   safety round/check completed   lighting  adjusted  Taken 5/1/2022 2139 by Alecia Saravia RN  Safety Promotion/Fall Prevention:   assistive device/personal items within reach   clutter free environment maintained   fall prevention program maintained   nonskid shoes/slippers when out of bed   room organization consistent   safety round/check completed   lighting adjusted  Taken 5/1/2022 1950 by Alecia Saravia RN  Safety Promotion/Fall Prevention:   assistive device/personal items within reach   clutter free environment maintained   fall prevention program maintained   nonskid shoes/slippers when out of bed   safety round/check completed   room organization consistent  Intervention: Prevent Skin Injury  Description: Perform a screening for skin injury risk, such as pressure or moisture associated skin damage on admission and at regular intervals throughout hospital stay.  Keep all areas of skin (especially folds) clean and dry.  Maintain adequate skin hydration.  Relieve and redistribute pressure and protect bony prominences; implement measures based on patient-specific risk factors.  Match turning and repositioning schedule to clinical condition.  Encourage weight shift frequently; assist with reposition if unable to complete independently.  Float heels off bed; avoid pressure on the Achilles tendon.  Keep skin free from extended contact with medical devices.  Encourage functional activity and mobility, as early as tolerated.  Use aids (e.g., slide boards, mechanical lift) during transfer.  Recent Flowsheet Documentation  Taken 5/2/2022 0010 by Alecia Saravia RN  Body Position:   right   tilted   neutral body alignment   neutral head position  Taken 5/1/2022 2139 by Alecia Saravia RN  Body Position:   left   tilted   neutral body alignment   neutral head position  Taken 5/1/2022 2100 by Alecia Saravia RN  Body Position:   neutral body alignment   neutral head position  Taken 5/1/2022 1950 by Alecia Saravia RN  Body Position:   neutral body alignment    neutral head position   supine   legs elevated  Intervention: Prevent and Manage VTE (Venous Thromboembolism) Risk  Description: Assess for VTE (venous thromboembolism) risk.  Encourage and assist with early ambulation.  Initiate and maintain compression or other therapy, as indicated, based on identified risk in accordance with organizational protocol and provider order.  Encourage both active and passive leg exercises while in bed, if unable to ambulate.  Recent Flowsheet Documentation  Taken 5/2/2022 0010 by Alecia Saravia RN  Activity Management: activity adjusted per tolerance  Taken 5/1/2022 2139 by Alecia Saravia RN  Activity Management: activity adjusted per tolerance  Taken 5/1/2022 2100 by Alecia Saravia RN  Activity Management: activity adjusted per tolerance  VTE Prevention/Management:   bilateral   dorsiflexion/plantar flexion performed  Taken 5/1/2022 1950 by Alecia Saravia RN  Activity Management: activity adjusted per tolerance  Intervention: Prevent Infection  Description: Maintain skin and mucous membrane integrity; promote hand, oral and pulmonary hygiene.  Optimize fluid balance, nutrition, sleep and glycemic control to maximize infection resistance.  Identify potential sources of infection early to prevent or mitigate progression of infection (e.g., wound, lines, devices).  Evaluate ongoing need for invasive devices; remove promptly when no longer indicated.  Recent Flowsheet Documentation  Taken 5/2/2022 0010 by Alecia Saravia RN  Infection Prevention:   environmental surveillance performed   equipment surfaces disinfected   hand hygiene promoted   personal protective equipment utilized   visitors restricted/screened   rest/sleep promoted   single patient room provided  Taken 5/1/2022 2139 by Alecia Saravia RN  Infection Prevention:   environmental surveillance performed   equipment surfaces disinfected   hand hygiene promoted   personal protective equipment utilized   rest/sleep  promoted   single patient room provided   visitors restricted/screened  Taken 5/1/2022 2100 by Alecia Saravia, RN  Infection Prevention:   equipment surfaces disinfected   environmental surveillance performed   hand hygiene promoted   personal protective equipment utilized   rest/sleep promoted   single patient room provided   visitors restricted/screened  Taken 5/1/2022 1950 by Alecia Saravia, RN  Infection Prevention:   environmental surveillance performed   equipment surfaces disinfected   hand hygiene promoted   personal protective equipment utilized   rest/sleep promoted   single patient room provided   visitors restricted/screened  Goal: Optimal Comfort and Wellbeing  Outcome: Ongoing, Progressing  Intervention: Monitor Pain and Promote Comfort  Description: Assess pain level, treatment efficacy and patient response at regular intervals using a consistent pain scale.  Consider the presence and impact of preexisting chronic pain.  Encourage patient and caregiver involvement in pain assessment, interventions and safety measures.  Recent Flowsheet Documentation  Taken 5/1/2022 2139 by Alecia Saravia, RN  Pain Management Interventions:   see MAR   quiet environment facilitated   care clustered  Intervention: Provide Person-Centered Care  Description: Use a family-focused approach to care.  Develop trust and rapport by proactively providing information, encouraging questions, addressing concerns and offering reassurance.  Acknowledge emotional response to hospitalization.  Recognize and utilize personal coping strategies.  Honor spiritual and cultural preferences.  Recent Flowsheet Documentation  Taken 5/1/2022 2100 by Alecia Saravia, RN  Trust Relationship/Rapport:   care explained   emotional support provided   choices provided   empathic listening provided   questions encouraged   questions answered   reassurance provided   thoughts/feelings acknowledged  Goal: Readiness for Transition of Care  Outcome: Ongoing,  Progressing     Problem: Fall Injury Risk  Goal: Absence of Fall and Fall-Related Injury  Outcome: Ongoing, Progressing  Intervention: Identify and Manage Contributors  Description: Develop a fall prevention plan with the patient and caregiver/family.  Provide reorientation, appropriate sensory stimulation and routines with changes in mental status to decrease risk of fall.  Promote use of personal vision and auditory aids.  Assess assistance level required for safe and effective self-care; provide support as needed, such as toileting, mobilization. For age 65 and older, implement timed toileting with assistance.  Encourage physical activity, such as performance of mobility and self-care at highest level of patient ability, multicomponent exercise program and provision of appropriate assistive devices.  If fall occurs, assess the severity of injury; implement fall injury protocol. Determine the cause and revise fall injury prevention plan.  Regularly review medication contribution to fall risk; adjust medication administration times to minimize risk of falling.  Consider risk related to polypharmacy and age.  Balance adequate pain management with potential for oversedation.  Recent Flowsheet Documentation  Taken 5/2/2022 0010 by Alecia Saravia, RN  Medication Review/Management: medications reviewed  Taken 5/1/2022 2139 by Alecia Saravia, RN  Medication Review/Management:   medications reviewed   high-risk medications identified  Taken 5/1/2022 2100 by Alecia Saravia, RN  Medication Review/Management: medications reviewed  Taken 5/1/2022 1950 by Alecia Saravia, RN  Medication Review/Management: medications reviewed  Intervention: Promote Injury-Free Environment  Description: Provide a safe, barrier-free environment that encourages independent activity.  Keep care area uncluttered and well-lighted.  Determine need for increased observation or monitoring.  Avoid use of devices that minimize mobility, such as restraints  or indwelling urinary catheter.  Recent Flowsheet Documentation  Taken 5/2/2022 0010 by Alecia Saravia RN  Safety Promotion/Fall Prevention:   assistive device/personal items within reach   clutter free environment maintained   fall prevention program maintained   nonskid shoes/slippers when out of bed   room organization consistent   safety round/check completed   lighting adjusted  Taken 5/1/2022 2139 by Alecia Saravia, RN  Safety Promotion/Fall Prevention:   assistive device/personal items within reach   clutter free environment maintained   fall prevention program maintained   nonskid shoes/slippers when out of bed   room organization consistent   safety round/check completed   lighting adjusted  Taken 5/1/2022 1950 by Alecia Saravia, RN  Safety Promotion/Fall Prevention:   assistive device/personal items within reach   clutter free environment maintained   fall prevention program maintained   nonskid shoes/slippers when out of bed   safety round/check completed   room organization consistent     Problem: Hypertension Comorbidity  Goal: Blood Pressure in Desired Range  Outcome: Ongoing, Progressing  Intervention: Maintain Blood Pressure Management  Description: Evaluate adherence to home antihypertensive regimen (e.g., exercise and activity, diet modification, medication).  Provide scheduled antihypertensive medication; consider administration time and effects (e.g., avoid giving diuretic prior to bedtime).  Monitor response to antihypertensive medication therapy (e.g., blood pressure, electrolyte levels, medication effects).  Minimize risk of orthostatic hypotension; encourage caution with position changes, particularly if elderly.  Recent Flowsheet Documentation  Taken 5/2/2022 0010 by Alecia Saravia, RN  Medication Review/Management: medications reviewed  Taken 5/1/2022 2139 by Alecia Saravia RN  Medication Review/Management:   medications reviewed   high-risk medications identified  Taken 5/1/2022 2100 by  Alecia Saravia, RN  Medication Review/Management: medications reviewed  Taken 5/1/2022 1950 by Alecia Saravia, RN  Medication Review/Management: medications reviewed   Goal Outcome Evaluation:  Plan of Care Reviewed With: patient, spouse           Outcome Evaluation: VITALS AS DOCUMENTED. NEURO ASSESSMENT INTACT. PERRL.  EQUAL AND STRONG. ALERT AND ORIENTED. WIFE AT BEDSIDE, SUPPORTIVE. CT HEAD COMPLETE, LABS COMPLETE, RESULTS CALLED TO A. NO NEW ORDERS TO NOTE. HGB. 7.9  ASYMPTOMATIC. NO S/SX OF DISTRESS. SCATTERED BRUISES AND SCABS. KERLEX HEAD BAND COVERING SUTURES, CDI. SINUS ON MONITOR.

## 2022-05-02 NOTE — H&P (VIEW-ONLY)
Gastroenterology   Initial Inpatient Consult Note    Referring Provider: jim Sanches    Reason for Consultation: anemia    Subjective     History of present illness:    72 y.o. male with a history of Parkinson's disease diabetes atrial fibrillation on Eliquis hyperlipidemia hypertension who came in from a fall.  The patient did have a large scalp laceration and a CT of the head that was reassuring.  Aspirin and Eliquis have been held.  He did have scalp lacerations that did lead to some blood loss.  His hemoglobins however have been between 7.7 and 9.5 with a baseline of a month ago at 11.8.  Although his iron level is 29 which is significantly decreased iron saturation decreased at 11%    Patient has had colonoscopy many years ago does not recall that he ever had any colon polyps.  But he states he is overdue.  He does not think he has ever had an EGD.  He has not complaining of any overt GI bleeding.    He does endorse intermittent dysphagia but no food impactions no hematemesis no coffee-ground emesis no odynophagia no previous EGD    Past Medical History:  Past Medical History:   Diagnosis Date   • Atrial fibrillation with RVR (HCC)    • Atrial flutter (HCC)    • Diabetes (HCC)    • HLD (hyperlipidemia) 1/27/2016   • Hypertension    • Parkinson's disease (HCC)     Advanced      Past Surgical History:  Past Surgical History:   Procedure Laterality Date   • BRAIN STIMULATOR     • JOINT REPLACEMENT      Bilateral shoulder and knee replacements      Social History:   Social History     Tobacco Use   • Smoking status: Never Smoker   • Smokeless tobacco: Never Used   • Tobacco comment: caffeine use   Substance Use Topics   • Alcohol use: Yes      Family History:  No family history on file.    Home Meds:  Medications Prior to Admission   Medication Sig Dispense Refill Last Dose   • acetaminophen (TYLENOL) 325 MG tablet Take 2 tablets by mouth Every 6 (Six) Hours As Needed for Mild Pain .   Past Week at Unknown  time   • allopurinol (ZYLOPRIM) 100 MG tablet Take 100 mg by mouth Daily.   4/30/2022 at Unknown time   • Alogliptin Benzoate 12.5 MG tablet Take  by mouth Daily. Half tablet daily   4/30/2022 at Unknown time   • apixaban (ELIQUIS) 5 MG tablet tablet Take 5 mg by mouth 2 (Two) Times a Day.   4/30/2022 at Unknown time   • aspirin 81 MG chewable tablet Chew 81 mg Every Other Day.   4/30/2022 at Unknown time   • Calcium Carbonate 1500 (600 Ca) MG tablet Take 650 mg by mouth Daily.   4/30/2022 at Unknown time   • Carbidopa-Levodopa ER 36. MG capsule controlled-release Take  by mouth 4 (Four) Times a Day. 4 tablets 4 times daily   4/30/2022 at Unknown time   • carvedilol (COREG) 6.25 MG tablet Take 1 tablet by mouth 2 (Two) Times a Day. 180 tablet 3 4/30/2022 at Unknown time   • citalopram (CeleXA) 20 MG tablet Take 20 mg by mouth Daily.   4/30/2022 at Unknown time   • docusate sodium (COLACE) 50 MG capsule Take  by mouth Every Night.   4/29/2022 at Unknown time   • donepezil (ARICEPT) 5 MG tablet Take 10 mg by mouth Every Night.   4/29/2022 at Unknown time   • isosorbide mononitrate (IMDUR) 30 MG 24 hr tablet Take 1 tablet by mouth Daily. 30 tablet 5 4/30/2022 at Unknown time   • levothyroxine (SYNTHROID, LEVOTHROID) 25 MCG tablet Take 12.5 mcg by mouth Daily.   4/30/2022 at Unknown time   • metFORMIN (GLUCOPHAGE) 500 MG tablet Take 500 mg by mouth 3 (Three) Times a Day.   4/30/2022 at Unknown time   • simvastatin (ZOCOR) 80 MG tablet Take 40 mg by mouth Every Night.   4/29/2022 at Unknown time   • traZODone (DESYREL) 50 MG tablet Take 25 mg by mouth Every Night.   4/29/2022 at Unknown time   • vitamin B-12 (CYANOCOBALAMIN) 1000 MCG tablet Take 500 mcg by mouth Daily.   4/30/2022 at Unknown time   • Polyvinyl Alcohol-Povidone PF (HYPOTEARS) 1.4-0.6 % ophthalmic solution 1-2 drops As Needed for Wound Care.   Unknown at Unknown time     Current Meds:   allopurinol, 100 mg, Oral, Daily  atorvastatin, 40 mg, Oral,  Daily  carbidopa-levodopa ER, 1 tablet, Oral, 4x Daily  carvedilol, 6.25 mg, Oral, BID  citalopram, 20 mg, Oral, Daily  docusate sodium, 50 mg, Oral, Nightly  donepezil, 10 mg, Oral, Nightly  insulin glargine, 10 Units, Subcutaneous, Nightly  insulin lispro, 0-9 Units, Subcutaneous, TID AC  iopamidol, 100 mL, Intravenous, Once in imaging  isosorbide mononitrate, 30 mg, Oral, Q24H  levothyroxine, 12.5 mcg, Oral, Q AM  traZODone, 25 mg, Oral, Nightly      Allergies:  Allergies   Allergen Reactions   • Bacitracin Anaphylaxis   • Neosporin [Neomycin-Bacitracin Zn-Polymyx] Other (See Comments)     BP drops and heart stops!   • Fish-Derived Products Hives   • Shellfish Allergy Hives   • Shrimp (Diagnostic) Hives     Review of Systems  Pertinent items are noted in HPI, all other systems reviewed and negative    Objective     Vital Signs  Temp:  [97.5 °F (36.4 °C)-98.3 °F (36.8 °C)] 97.5 °F (36.4 °C)  Heart Rate:  [111-115] 115  Resp:  [18-20] 20  BP: (101-143)/(61-83) 105/61    Physical Exam:  CONSTITUTIONAL:  today's vital signs reviewed  EARS NOSE THROAT: trachea midline and no deformity of the nares clear discoloration around the orbits with ecchymoses and discoloration  EYES: no scleral icterus  GASTROINTESTINAL: abdomen is soft, nontender, nondistended with normal active bowel sounds, no masses are appreciated  PSYCHIATRIC: appropriate mood and affect  RESPIRATORY: normal inspiratory effort with no increased work of breathing  NEUROLOGIC: patient is awake and alert  DERMATOLOGIC: skin is warm with no cyanosis  LYMPHATIC: no periumbilical lymphadenopathy     Results Review:              I reviewed the patient's new clinical results.    Results from last 7 days   Lab Units 05/02/22  0523 05/01/22  2144 05/01/22  1537 04/30/22  1949   WBC 10*3/mm3 5.63 6.94  --  9.61   HEMOGLOBIN g/dL 7.7*  7.7* 7.9*  7.9* 8.0* 9.5*   HEMATOCRIT % 23.7*  23.7* 23.6*  23.6* 25.0* 29.6*   PLATELETS 10*3/mm3 133* 145  --  181      Results from last 7 days   Lab Units 05/02/22  0522 05/01/22  2144 04/30/22  1949   SODIUM mmol/L 137 138 137   POTASSIUM mmol/L 3.4* 3.8 3.9   CHLORIDE mmol/L 104 102 102   CO2 mmol/L 24.6 23.0 20.0*   BUN mg/dL 12 15 26*   CREATININE mg/dL 0.68* 0.63* 0.69*   CALCIUM mg/dL 8.2* 8.1* 8.3*   BILIRUBIN mg/dL 0.7  --  0.4   ALK PHOS U/L 52  --  61   ALT (SGPT) U/L 7  --  <5   AST (SGOT) U/L 9  --  9   GLUCOSE mg/dL 188* 208* 263*         No results found for: LIPASE    Radiology:  CT Head Without Contrast   Final Result       1. No acute intracranial hemorrhage.   2. Interval enlargement of a frontal scalp hematoma, which has become   more well organized as well.       Radiation dose reduction techniques were utilized, including automated   exposure control and exposure modulation based on body size.       This report was finalized on 5/1/2022 11:05 PM by Dr. Alyssa Dos Santos M.D.          XR Facial Bones 3+ View   Final Result      CT Head Without Contrast   Final Result   Postoperative changes as noted. No acute intracranial   abnormalities are identified.       This report was finalized on 4/30/2022 4:31 PM by Dr. Robert Paul M.D.          CT Cervical Spine Without Contrast   Final Result   Extensive multilevel degenerative disc changes as described.   There is no evidence of acute fracture or subluxation.       This report was finalized on 4/30/2022 4:36 PM by Dr. Robert Paul M.D.              Assessment/Plan   Patient Active Problem List   Diagnosis   • ASCVD (arteriosclerotic cardiovascular disease)   • Paroxysmal atrial fibrillation (HCC)   • Diabetic retinopathy associated with type 2 diabetes mellitus (HCC)   • Essential hypertension   • HLD (hyperlipidemia)   • Hypothyroidism   • Peripheral neuropathy   • Renal insufficiency   • Type 2 diabetes mellitus with hyperglycemia, without long-term current use of insulin (HCC)   • Parkinson's disease (HCC)   • Dyspnea on exertion   • Atrial  fibrillation (CMS/HCC) [I48.91]   • Stroke-like symptoms   • Hypopotassemia   • Ankle pain   • Acute idiopathic gout of right ankle   • Generalized weakness   • Head injury due to trauma   • Acute blood loss anemia   • Chronic anticoagulation       Assessment:  1. Anemia.  The patient does have some component of GI blood loss from his recent trauma  2. Fall with face laceration  3. Candidate for colon cancer screening no previous polyps but overdue  4. No overt bleeding  5. Dysphagia  These problems are new to me.  Plan:  · The patient is off of his antiplatelets and this would be a reasonable time to consider doing an endoscopic evaluation.  It can be done as an outpatient if deemed appropriate.  If he is going to have it done while he is here patient should be on clear liquids starting in a.m. as he ate a full lunch meal today.  The procedures can be done on 5/4/2022  · Watch for overt signs of bleeding      I discussed the patients findings and my recommendations with patient and nursing staff.           Sina Birmingham M.D.  [facility] [place] [address]  Office: [phone]

## 2022-05-02 NOTE — PLAN OF CARE
Goal Outcome Evaluation:  Plan of Care Reviewed With: patient        Progress: no change  Outcome Evaluation: Patient is a 73 yo male who presented with LOB hitting his head. His head CT was negative and reports pain 7/10. PMHx inlcudes DM, afib, hypothroidism, and PD (with DBS). He lives with his spouse and has 1 RICARDO. He uses u-step walker at baseline and reports 2 falls per month. He presents today with decreased balance, strength, and overall functional decline. He completed bed mobility requiring Riaz, STS to rwx with Riaz, and ambulated 60ft using rwx requiring Riaz. He reports he freezes at doorways and shuffles which he feels is worse in the PM. Pt will continue to benefit from skilled PT to increase level of independence with bed mobility, transfers, and gait. PT rec SNF at discharge then OP neuro clinic that specializes in PD after dc from SNF.

## 2022-05-02 NOTE — THERAPY EVALUATION
Patient Name: Edilberto Reeder  : 1950    MRN: [id number]                              Today's Date: 2022       Admit Date: 2022    Visit Dx:     ICD-10-CM ICD-9-CM   1. Minor head injury, initial encounter  S09.90XA 959.01   2. Laceration of forehead, initial encounter  S01.81XA 873.42   3. Anemia, unspecified type  D64.9 285.9     Patient Active Problem List   Diagnosis   • ASCVD (arteriosclerotic cardiovascular disease)   • Paroxysmal atrial fibrillation (HCC)   • Diabetic retinopathy associated with type 2 diabetes mellitus (HCC)   • Essential hypertension   • HLD (hyperlipidemia)   • Hypothyroidism   • Peripheral neuropathy   • Renal insufficiency   • Type 2 diabetes mellitus with hyperglycemia, without long-term current use of insulin (HCC)   • Parkinson's disease (HCC)   • Dyspnea on exertion   • Atrial fibrillation (CMS/HCC) [I48.91]   • Stroke-like symptoms   • Hypopotassemia   • Ankle pain   • Acute idiopathic gout of right ankle   • Generalized weakness   • Head injury due to trauma   • Acute blood loss anemia   • Chronic anticoagulation     Past Medical History:   Diagnosis Date   • Atrial fibrillation with RVR (HCC)    • Atrial flutter (HCC)    • Diabetes (HCC)    • HLD (hyperlipidemia) 2016   • Hypertension    • Parkinson's disease (HCC)     Advanced      Past Surgical History:   Procedure Laterality Date   • BRAIN STIMULATOR     • JOINT REPLACEMENT      Bilateral shoulder and knee replacements      General Information     Row Name 22 1427          Physical Therapy Time and Intention    Document Type evaluation  -CB     Mode of Treatment individual therapy;physical therapy  -CB     Row Name 22 1427          General Information    Patient Profile Reviewed yes  -CB     Prior Level of Function independent:;gait;transfer;bed mobility  u-step walker  -CB     Existing Precautions/Restrictions fall  -CB     Barriers to Rehab previous functional deficit  -CB     Row Name  05/02/22 1427          Living Environment    People in Home spouse  -CB     Row Name 05/02/22 1427          Home Main Entrance    Number of Stairs, Main Entrance one  -CB     Row Name 05/02/22 1427          Cognition    Orientation Status (Cognition) oriented x 3  -CB     Row Name 05/02/22 1427          Safety Issues, Functional Mobility    Safety Issues Affecting Function (Mobility) problem-solving;sequencing abilities;awareness of need for assistance  -CB     Impairments Affecting Function (Mobility) balance;endurance/activity tolerance;strength;pain  -CB           User Key  (r) = Recorded By, (t) = Taken By, (c) = Cosigned By    Initials Name Provider Type    CB Shauna Gordillo, PT Physical Therapist               Mobility     Row Name 05/02/22 1428          Bed Mobility    Bed Mobility supine-sit;sit-supine  -CB     Supine-Sit [place] (Bed Mobility) minimum assist (75% patient effort);verbal cues;nonverbal cues (demo/gesture)  -CB     Sit-Supine [place] (Bed Mobility) minimum assist (75% patient effort);verbal cues;nonverbal cues (demo/gesture)  -CB     Row Name 05/02/22 1428          Sit-Stand Transfer    Sit-Stand [place] (Transfers) minimum assist (75% patient effort);verbal cues;nonverbal cues (demo/gesture)  -CB     Assistive Device (Sit-Stand Transfers) walker, front-wheeled  -CB     Row Name 05/02/22 1428          Gait/Stairs (Locomotion)    [place] Level (Gait) minimum assist (75% patient effort);verbal cues;nonverbal cues (demo/gesture)  -CB     Assistive Device (Gait) walker, front-wheeled  -CB     Distance in Feet (Gait) 60ft  -CB     Deviations/Abnormal Patterns (Gait) gait speed decreased;stride length decreased  -CB     Bilateral Gait Deviations forward flexed posture;heel strike decreased  -CB     Comment, (Gait/Stairs) pt reports he does shuffle and freezes at doorways. Cues to increase step length  -CB           User Key  (r) = Recorded By, (t) = Taken By, (c) = Cosigned By     Initials Name Provider Type    CB Shauna Gordillo PT Physical Therapist               Obj/Interventions     Row Name 05/02/22 1430          Range of Motion Comprehensive    General Range of Motion bilateral lower extremity ROM WFL  -CB     Row Name 05/02/22 1430          Strength Comprehensive (MMT)    Comment, General Manual Muscle Testing (MMT) Assessment generalized LE weakness  -CB     Row Name 05/02/22 1430          Balance    Balance Assessment sitting static balance;sitting dynamic balance;standing static balance;standing dynamic balance  -CB     Static Sitting Balance modified independence  -CB     Dynamic Sitting Balance modified independence  -CB     Position, Sitting Balance sitting edge of bed  -CB     Static Standing Balance contact guard  -CB     Dynamic Standing Balance minimal assist  -CB     Position/Device Used, Standing Balance supported;walker, rolling  -CB     Balance Interventions sitting;standing;sit to stand;supported;static;dynamic;minimal challenge  -CB     Row Name 05/02/22 1430          Sensory Assessment (Somatosensory)    Sensory Assessment (Somatosensory) sensation intact  -CB           User Key  (r) = Recorded By, (t) = Taken By, (c) = Cosigned By    Initials Name Provider Type    CB Shauna Gordillo PT Physical Therapist               Goals/Plan     Row Name 05/02/22 1438          Bed Mobility Goal 1 (PT)    Activity/Assistive Device (Bed Mobility Goal 1, PT) bed mobility activities, all  -CB     [place] Level/Cues Needed (Bed Mobility Goal 1, PT) modified independence  -CB     Time Frame (Bed Mobility Goal 1, PT) long term goal (LTG);1 week  -CB     Row Name 05/02/22 1438          Transfer Goal 1 (PT)    Activity/Assistive Device (Transfer Goal 1, PT) sit-to-stand/stand-to-sit;bed-to-chair/chair-to-bed  -CB     [place] Level/Cues Needed (Transfer Goal 1, PT) contact guard required  -CB     Time Frame (Transfer Goal 1, PT) long term goal (LTG);1 week  -CB     Row Name  05/02/22 1431          Gait Training Goal 1 (PT)    Activity/Assistive Device (Gait Training Goal 1, PT) gait (walking locomotion);walker, rolling  -CB     [place] Level (Gait Training Goal 1, PT) contact guard required  -CB     Distance (Gait Training Goal 1, PT) 100ft  -CB     Time Frame (Gait Training Goal 1, PT) long term goal (LTG);1 week  -CB     Row Name 05/02/22 1433          Therapy Assessment/Plan (PT)    Planned Therapy Interventions (PT) balance training;bed mobility training;gait training;home exercise program;patient/family education;strengthening;transfer training  -CB           User Key  (r) = Recorded By, (t) = Taken By, (c) = Cosigned By    Initials Name Provider Type    Shauna Celeste, PT Physical Therapist               Clinical Impression     Row Name 05/02/22 143          Pain    Pretreatment Pain Rating 7/10  -CB     Posttreatment Pain Rating 7/10  -CB     Pain Location - head  -CB     Pain Intervention(s) Repositioned;Rest;Ambulation/increased activity  -CB     Row Name 05/02/22 1436          Plan of Care Review    Plan of Care Reviewed With patient  -CB     Progress no change  -CB     Outcome Evaluation Patient is a 71 yo male who presented with LOB hitting his head. His head CT was negative and reports pain 7/10. PMHx inlcudes DM, afib, hypothroidism, and PD (with DBS). He lives with his spouse and has 1 RICARDO. He uses u-step walker at baseline and reports 2 falls per month. He presents today with decreased balance, strength, and overall functional decline. He completed bed mobility requiring Riaz, STS to rwx with Riaz, and ambulated 60ft using rwx requiring Riaz. He reports he freezes at doorways and shuffles which he feels is worse in the PM. Pt will continue to benefit from skilled PT to increase level of independence with bed mobility, transfers, and gait. PT rec SNF at discharge then OP neuro clinic that specializes in PD after dc from SNF.  -CB     Row Name 05/02/22 7361           Therapy Assessment/Plan (PT)    Rehab Potential (PT) good, to achieve stated therapy goals  -CB     Criteria for Skilled Interventions Met (PT) yes  -CB     Therapy Frequency (PT) 5 times/wk  -CB     Row Name 05/02/22 1430          Positioning and Restraints    Pre-Treatment Position in bed  -CB     Post Treatment Position bed  -CB     In Bed notified nsg;fowlers;call light within reach;encouraged to call for assist;exit alarm on;side rails up x2  -CB           User Key  (r) = Recorded By, (t) = Taken By, (c) = Cosigned By    Initials Name Provider Type    Shauna Celeste PT Physical Therapist               Outcome Measures     Row Name 05/02/22 1438          How much help from another person do you currently need...    Turning from your back to your side while in flat bed without using bedrails? 3  -CB     Moving from lying on back to sitting on the side of a flat bed without bedrails? 3  -CB     Moving to and from a bed to a chair (including a wheelchair)? 3  -CB     Standing up from a chair using your arms (e.g., wheelchair, bedside chair)? 3  -CB     Climbing 3-5 steps with a railing? 2  -CB     To walk in hospital room? 3  -CB     AM-PAC 6 Clicks Score (PT) 17  -CB     Highest level of mobility 5 --> Static standing  -CB     Row Name 05/02/22 1438          Functional Assessment    Outcome Measure Options AM-PAC 6 Clicks Basic Mobility (PT)  -CB           User Key  (r) = Recorded By, (t) = Taken By, (c) = Cosigned By    Initials Name Provider Type    Shauna Celeste PT Physical Therapist                             Physical Therapy Education                 Title: PT OT SLP Therapies (In Progress)     Topic: Physical Therapy (In Progress)     Point: Mobility training (Done)     Learning Progress Summary           Patient Acceptance, E,TB,D, VU,NR by VANIA at 5/2/2022 1438                   Point: Home exercise program (Not Started)     Learner Progress:  Not documented in this visit.          Point: Body  mechanics (Done)     Learning Progress Summary           Patient Acceptance, E,TB,D, VU,NR by CB at 5/2/2022 1438                   Point: Precautions (Done)     Learning Progress Summary           Patient Acceptance, E,TB,D, VU,NR by CB at 5/2/2022 1438                               User Key     Initials Effective Dates Name Provider Type Discipline    CB 10/22/21 -  Shauna Gordillo, PT Physical Therapist PT              PT Recommendation and Plan  Planned Therapy Interventions (PT): balance training, bed mobility training, gait training, home exercise program, patient/family education, strengthening, transfer training  Plan of Care Reviewed With: patient  Progress: no change  Outcome Evaluation: Patient is a 73 yo male who presented with LOB hitting his head. His head CT was negative and reports pain 7/10. PMHx inlcudes DM, afib, hypothroidism, and PD (with DBS). He lives with his spouse and has 1 RICARDO. He uses u-step walker at baseline and reports 2 falls per month. He presents today with decreased balance, strength, and overall functional decline. He completed bed mobility requiring Riaz, STS to rwx with Riaz, and ambulated 60ft using rwx requiring Riaz. He reports he freezes at doorways and shuffles which he feels is worse in the PM. Pt will continue to benefit from skilled PT to increase level of independence with bed mobility, transfers, and gait. PT rec SNF at discharge then OP neuro clinic that specializes in PD after dc from SNF.     Time Calculation:    PT Charges     Row Name 05/02/22 1443             Time Calculation    Start Time 1357  -CB      Stop Time 1416  -CB      Time Calculation (min) 19 min  -CB      PT Received On 05/02/22  -CB      PT - Next Appointment 05/03/22  -CB      PT Goal Re-Cert Due Date 05/09/22  -CB              Time Calculation- PT    Total Timed Code Minutes- PT 10 minute(s)  -CB              Timed Charges    27492 - PT Therapeutic Activity Minutes 10  -CB              Total  Minutes    Timed Charges Total Minutes 10  -CB       Total Minutes 10  -CB            User Key  (r) = Recorded By, (t) = Taken By, (c) = Cosigned By    Initials Name Provider Type    CB Shauna Gordillo, PT Physical Therapist              Therapy Charges for Today     Code Description Service Date Service Provider Modifiers Qty    75143674824 HC PT THERAPEUTIC ACT EA 15 MIN 5/2/2022 Shauna Gordillo, PT GP 1    98636669423 HC PT EVAL MOD COMPLEXITY 2 5/2/2022 Shauna Gordillo, PT GP 1          PT G-Codes  Outcome Measure Options: AM-PAC 6 Clicks Basic Mobility (PT)  AM-PAC 6 Clicks Score (PT): 17    Shauna Gordillo PT  5/2/2022

## 2022-05-02 NOTE — PROGRESS NOTES
Pt had multiple labs ordered at 0030 this morning that were to be drawn at 0600, they were never drawn  I asked day RN to call lab and get them done, he did so  It is now many hours later and they still are not available  Pt had serial H&H's ordered on admission and did not have the first one drawn until this afternoon--it unfortunately showed his Hgb had dropped over a gram  While there is no new source of bleeding evident clinically, this drop is still quite concerning  I have communicated with the RN on the floor this evening that I want this morning's labs (CBC w/ diff, BMP, Ferritin, Folate, Iron profile, A1c, LDH, THS, Retic count, and B12) drawn ASAP and that I want a SAFE report filed  CT head without contrast was ordered at noon and still has not been done either, I have asked RN to change this to STAT and get it done this evening with results called to service

## 2022-05-02 NOTE — CONSULTS
Gastroenterology   Initial Inpatient Consult Note    Referring Provider: jim Sanches    Reason for Consultation: anemia    Subjective     History of present illness:    72 y.o. male with a history of Parkinson's disease diabetes atrial fibrillation on Eliquis hyperlipidemia hypertension who came in from a fall.  The patient did have a large scalp laceration and a CT of the head that was reassuring.  Aspirin and Eliquis have been held.  He did have scalp lacerations that did lead to some blood loss.  His hemoglobins however have been between 7.7 and 9.5 with a baseline of a month ago at 11.8.  Although his iron level is 29 which is significantly decreased iron saturation decreased at 11%    Patient has had colonoscopy many years ago does not recall that he ever had any colon polyps.  But he states he is overdue.  He does not think he has ever had an EGD.  He has not complaining of any overt GI bleeding.    He does endorse intermittent dysphagia but no food impactions no hematemesis no coffee-ground emesis no odynophagia no previous EGD    Past Medical History:  Past Medical History:   Diagnosis Date   • Atrial fibrillation with RVR (HCC)    • Atrial flutter (HCC)    • Diabetes (HCC)    • HLD (hyperlipidemia) 1/27/2016   • Hypertension    • Parkinson's disease (HCC)     Advanced      Past Surgical History:  Past Surgical History:   Procedure Laterality Date   • BRAIN STIMULATOR     • JOINT REPLACEMENT      Bilateral shoulder and knee replacements      Social History:   Social History     Tobacco Use   • Smoking status: Never Smoker   • Smokeless tobacco: Never Used   • Tobacco comment: caffeine use   Substance Use Topics   • Alcohol use: Yes      Family History:  No family history on file.    Home Meds:  Medications Prior to Admission   Medication Sig Dispense Refill Last Dose   • acetaminophen (TYLENOL) 325 MG tablet Take 2 tablets by mouth Every 6 (Six) Hours As Needed for Mild Pain .   Past Week at Unknown  time   • allopurinol (ZYLOPRIM) 100 MG tablet Take 100 mg by mouth Daily.   4/30/2022 at Unknown time   • Alogliptin Benzoate 12.5 MG tablet Take  by mouth Daily. Half tablet daily   4/30/2022 at Unknown time   • apixaban (ELIQUIS) 5 MG tablet tablet Take 5 mg by mouth 2 (Two) Times a Day.   4/30/2022 at Unknown time   • aspirin 81 MG chewable tablet Chew 81 mg Every Other Day.   4/30/2022 at Unknown time   • Calcium Carbonate 1500 (600 Ca) MG tablet Take 650 mg by mouth Daily.   4/30/2022 at Unknown time   • Carbidopa-Levodopa ER 36. MG capsule controlled-release Take  by mouth 4 (Four) Times a Day. 4 tablets 4 times daily   4/30/2022 at Unknown time   • carvedilol (COREG) 6.25 MG tablet Take 1 tablet by mouth 2 (Two) Times a Day. 180 tablet 3 4/30/2022 at Unknown time   • citalopram (CeleXA) 20 MG tablet Take 20 mg by mouth Daily.   4/30/2022 at Unknown time   • docusate sodium (COLACE) 50 MG capsule Take  by mouth Every Night.   4/29/2022 at Unknown time   • donepezil (ARICEPT) 5 MG tablet Take 10 mg by mouth Every Night.   4/29/2022 at Unknown time   • isosorbide mononitrate (IMDUR) 30 MG 24 hr tablet Take 1 tablet by mouth Daily. 30 tablet 5 4/30/2022 at Unknown time   • levothyroxine (SYNTHROID, LEVOTHROID) 25 MCG tablet Take 12.5 mcg by mouth Daily.   4/30/2022 at Unknown time   • metFORMIN (GLUCOPHAGE) 500 MG tablet Take 500 mg by mouth 3 (Three) Times a Day.   4/30/2022 at Unknown time   • simvastatin (ZOCOR) 80 MG tablet Take 40 mg by mouth Every Night.   4/29/2022 at Unknown time   • traZODone (DESYREL) 50 MG tablet Take 25 mg by mouth Every Night.   4/29/2022 at Unknown time   • vitamin B-12 (CYANOCOBALAMIN) 1000 MCG tablet Take 500 mcg by mouth Daily.   4/30/2022 at Unknown time   • Polyvinyl Alcohol-Povidone PF (HYPOTEARS) 1.4-0.6 % ophthalmic solution 1-2 drops As Needed for Wound Care.   Unknown at Unknown time     Current Meds:   allopurinol, 100 mg, Oral, Daily  atorvastatin, 40 mg, Oral,  Daily  carbidopa-levodopa ER, 1 tablet, Oral, 4x Daily  carvedilol, 6.25 mg, Oral, BID  citalopram, 20 mg, Oral, Daily  docusate sodium, 50 mg, Oral, Nightly  donepezil, 10 mg, Oral, Nightly  insulin glargine, 10 Units, Subcutaneous, Nightly  insulin lispro, 0-9 Units, Subcutaneous, TID AC  iopamidol, 100 mL, Intravenous, Once in imaging  isosorbide mononitrate, 30 mg, Oral, Q24H  levothyroxine, 12.5 mcg, Oral, Q AM  traZODone, 25 mg, Oral, Nightly      Allergies:  Allergies   Allergen Reactions   • Bacitracin Anaphylaxis   • Neosporin [Neomycin-Bacitracin Zn-Polymyx] Other (See Comments)     BP drops and heart stops!   • Fish-Derived Products Hives   • Shellfish Allergy Hives   • Shrimp (Diagnostic) Hives     Review of Systems  Pertinent items are noted in HPI, all other systems reviewed and negative    Objective     Vital Signs  Temp:  [97.5 °F (36.4 °C)-98.3 °F (36.8 °C)] 97.5 °F (36.4 °C)  Heart Rate:  [111-115] 115  Resp:  [18-20] 20  BP: (101-143)/(61-83) 105/61    Physical Exam:  CONSTITUTIONAL:  today's vital signs reviewed  EARS NOSE THROAT: trachea midline and no deformity of the nares clear discoloration around the orbits with ecchymoses and discoloration  EYES: no scleral icterus  GASTROINTESTINAL: abdomen is soft, nontender, nondistended with normal active bowel sounds, no masses are appreciated  PSYCHIATRIC: appropriate mood and affect  RESPIRATORY: normal inspiratory effort with no increased work of breathing  NEUROLOGIC: patient is awake and alert  DERMATOLOGIC: skin is warm with no cyanosis  LYMPHATIC: no periumbilical lymphadenopathy     Results Review:              I reviewed the patient's new clinical results.    Results from last 7 days   Lab Units 05/02/22  0523 05/01/22  2144 05/01/22  1537 04/30/22  1949   WBC 10*3/mm3 5.63 6.94  --  9.61   HEMOGLOBIN g/dL 7.7*  7.7* 7.9*  7.9* 8.0* 9.5*   HEMATOCRIT % 23.7*  23.7* 23.6*  23.6* 25.0* 29.6*   PLATELETS 10*3/mm3 133* 145  --  181      Results from last 7 days   Lab Units 05/02/22  0522 05/01/22  2144 04/30/22  1949   SODIUM mmol/L 137 138 137   POTASSIUM mmol/L 3.4* 3.8 3.9   CHLORIDE mmol/L 104 102 102   CO2 mmol/L 24.6 23.0 20.0*   BUN mg/dL 12 15 26*   CREATININE mg/dL 0.68* 0.63* 0.69*   CALCIUM mg/dL 8.2* 8.1* 8.3*   BILIRUBIN mg/dL 0.7  --  0.4   ALK PHOS U/L 52  --  61   ALT (SGPT) U/L 7  --  <5   AST (SGOT) U/L 9  --  9   GLUCOSE mg/dL 188* 208* 263*         No results found for: LIPASE    Radiology:  CT Head Without Contrast   Final Result       1. No acute intracranial hemorrhage.   2. Interval enlargement of a frontal scalp hematoma, which has become   more well organized as well.       Radiation dose reduction techniques were utilized, including automated   exposure control and exposure modulation based on body size.       This report was finalized on 5/1/2022 11:05 PM by Dr. Alyssa Dos Santos M.D.          XR Facial Bones 3+ View   Final Result      CT Head Without Contrast   Final Result   Postoperative changes as noted. No acute intracranial   abnormalities are identified.       This report was finalized on 4/30/2022 4:31 PM by Dr. Robert Paul M.D.          CT Cervical Spine Without Contrast   Final Result   Extensive multilevel degenerative disc changes as described.   There is no evidence of acute fracture or subluxation.       This report was finalized on 4/30/2022 4:36 PM by Dr. Robert Paul M.D.              Assessment/Plan   Patient Active Problem List   Diagnosis   • ASCVD (arteriosclerotic cardiovascular disease)   • Paroxysmal atrial fibrillation (HCC)   • Diabetic retinopathy associated with type 2 diabetes mellitus (HCC)   • Essential hypertension   • HLD (hyperlipidemia)   • Hypothyroidism   • Peripheral neuropathy   • Renal insufficiency   • Type 2 diabetes mellitus with hyperglycemia, without long-term current use of insulin (HCC)   • Parkinson's disease (HCC)   • Dyspnea on exertion   • Atrial  fibrillation (CMS/HCC) [I48.91]   • Stroke-like symptoms   • Hypopotassemia   • Ankle pain   • Acute idiopathic gout of right ankle   • Generalized weakness   • Head injury due to trauma   • Acute blood loss anemia   • Chronic anticoagulation       Assessment:  1. Anemia.  The patient does have some component of GI blood loss from his recent trauma  2. Fall with face laceration  3. Candidate for colon cancer screening no previous polyps but overdue  4. No overt bleeding  5. Dysphagia  These problems are new to me.  Plan:  · The patient is off of his antiplatelets and this would be a reasonable time to consider doing an endoscopic evaluation.  It can be done as an outpatient if deemed appropriate.  If he is going to have it done while he is here patient should be on clear liquids starting in a.m. as he ate a full lunch meal today.  The procedures can be done on 5/4/2022  · Watch for overt signs of bleeding      I discussed the patients findings and my recommendations with patient and nursing staff.           Sina Birmingham M.D.  [facility] [place] [address]  Office: [phone]

## 2022-05-02 NOTE — NURSING NOTE
CALLED LAB AND ADDRESS LAB COLLECTION. WILL DRAW LABS. COMPLETED AT 2140. CT HEAD COMPLETE. REPORTS FINALIZED 2305. ALIYAH VICTORIA W/ JOCEA PAGED TO NOTIFY.

## 2022-05-03 LAB
ANION GAP SERPL CALCULATED.3IONS-SCNC: 11.1 MMOL/L (ref 5–15)
BASOPHILS # BLD AUTO: 0.03 10*3/MM3 (ref 0–0.2)
BASOPHILS NFR BLD AUTO: 0.7 % (ref 0–1.5)
BUN SERPL-MCNC: 12 MG/DL (ref 8–23)
BUN/CREAT SERPL: 16.9 (ref 7–25)
CALCIUM SPEC-SCNC: 8.4 MG/DL (ref 8.6–10.5)
CHLORIDE SERPL-SCNC: 104 MMOL/L (ref 98–107)
CO2 SERPL-SCNC: 24.9 MMOL/L (ref 22–29)
CREAT SERPL-MCNC: 0.71 MG/DL (ref 0.76–1.27)
DEPRECATED RDW RBC AUTO: 45 FL (ref 37–54)
EGFRCR SERPLBLD CKD-EPI 2021: 96.3 ML/MIN/1.73
EOSINOPHIL # BLD AUTO: 0.11 10*3/MM3 (ref 0–0.4)
EOSINOPHIL NFR BLD AUTO: 2.6 % (ref 0.3–6.2)
ERYTHROCYTE [DISTWIDTH] IN BLOOD BY AUTOMATED COUNT: 14.5 % (ref 12.3–15.4)
GLUCOSE BLDC GLUCOMTR-MCNC: 220 MG/DL (ref 70–130)
GLUCOSE BLDC GLUCOMTR-MCNC: 307 MG/DL (ref 70–130)
GLUCOSE BLDC GLUCOMTR-MCNC: 322 MG/DL (ref 70–130)
GLUCOSE BLDC GLUCOMTR-MCNC: 326 MG/DL (ref 70–130)
GLUCOSE SERPL-MCNC: 271 MG/DL (ref 65–99)
HCT VFR BLD AUTO: 21.7 % (ref 37.5–51)
HCT VFR BLD AUTO: 22.4 % (ref 37.5–51)
HCT VFR BLD AUTO: 23 % (ref 37.5–51)
HCT VFR BLD AUTO: 23 % (ref 37.5–51)
HCT VFR BLD AUTO: 23.9 % (ref 37.5–51)
HGB BLD-MCNC: 7 G/DL (ref 13–17.7)
HGB BLD-MCNC: 7 G/DL (ref 13–17.7)
HGB BLD-MCNC: 7.4 G/DL (ref 13–17.7)
HGB BLD-MCNC: 7.4 G/DL (ref 13–17.7)
HGB BLD-MCNC: 7.8 G/DL (ref 13–17.7)
IMM GRANULOCYTES # BLD AUTO: 0.01 10*3/MM3 (ref 0–0.05)
IMM GRANULOCYTES NFR BLD AUTO: 0.2 % (ref 0–0.5)
LYMPHOCYTES # BLD AUTO: 0.92 10*3/MM3 (ref 0.7–3.1)
LYMPHOCYTES NFR BLD AUTO: 21.6 % (ref 19.6–45.3)
MAGNESIUM SERPL-MCNC: 1.6 MG/DL (ref 1.6–2.4)
MCH RBC QN AUTO: 27.6 PG (ref 26.6–33)
MCHC RBC AUTO-ENTMCNC: 32.2 G/DL (ref 31.5–35.7)
MCV RBC AUTO: 85.8 FL (ref 79–97)
MONOCYTES # BLD AUTO: 0.39 10*3/MM3 (ref 0.1–0.9)
MONOCYTES NFR BLD AUTO: 9.2 % (ref 5–12)
NEUTROPHILS NFR BLD AUTO: 2.8 10*3/MM3 (ref 1.7–7)
NEUTROPHILS NFR BLD AUTO: 65.7 % (ref 42.7–76)
NRBC BLD AUTO-RTO: 0 /100 WBC (ref 0–0.2)
PHOSPHATE SERPL-MCNC: 3.5 MG/DL (ref 2.5–4.5)
PLATELET # BLD AUTO: 128 10*3/MM3 (ref 140–450)
PMV BLD AUTO: 10.8 FL (ref 6–12)
POTASSIUM SERPL-SCNC: 3.3 MMOL/L (ref 3.5–5.2)
RBC # BLD AUTO: 2.68 10*6/MM3 (ref 4.14–5.8)
SODIUM SERPL-SCNC: 140 MMOL/L (ref 136–145)
WBC NRBC COR # BLD: 4.26 10*3/MM3 (ref 3.4–10.8)

## 2022-05-03 PROCEDURE — 85018 HEMOGLOBIN: CPT | Performed by: STUDENT IN AN ORGANIZED HEALTH CARE EDUCATION/TRAINING PROGRAM

## 2022-05-03 PROCEDURE — 85018 HEMOGLOBIN: CPT | Performed by: INTERNAL MEDICINE

## 2022-05-03 PROCEDURE — 85025 COMPLETE CBC W/AUTO DIFF WBC: CPT | Performed by: STUDENT IN AN ORGANIZED HEALTH CARE EDUCATION/TRAINING PROGRAM

## 2022-05-03 PROCEDURE — 96376 TX/PRO/DX INJ SAME DRUG ADON: CPT

## 2022-05-03 PROCEDURE — 97535 SELF CARE MNGMENT TRAINING: CPT

## 2022-05-03 PROCEDURE — G0378 HOSPITAL OBSERVATION PER HR: HCPCS

## 2022-05-03 PROCEDURE — 84100 ASSAY OF PHOSPHORUS: CPT | Performed by: STUDENT IN AN ORGANIZED HEALTH CARE EDUCATION/TRAINING PROGRAM

## 2022-05-03 PROCEDURE — 96366 THER/PROPH/DIAG IV INF ADDON: CPT

## 2022-05-03 PROCEDURE — 63710000001 INSULIN LISPRO (HUMAN) PER 5 UNITS: Performed by: HOSPITALIST

## 2022-05-03 PROCEDURE — 82962 GLUCOSE BLOOD TEST: CPT

## 2022-05-03 PROCEDURE — 80048 BASIC METABOLIC PNL TOTAL CA: CPT | Performed by: STUDENT IN AN ORGANIZED HEALTH CARE EDUCATION/TRAINING PROGRAM

## 2022-05-03 PROCEDURE — 0 MAGNESIUM SULFATE 4 GM/100ML SOLUTION: Performed by: STUDENT IN AN ORGANIZED HEALTH CARE EDUCATION/TRAINING PROGRAM

## 2022-05-03 PROCEDURE — 85014 HEMATOCRIT: CPT | Performed by: STUDENT IN AN ORGANIZED HEALTH CARE EDUCATION/TRAINING PROGRAM

## 2022-05-03 PROCEDURE — 96365 THER/PROPH/DIAG IV INF INIT: CPT

## 2022-05-03 PROCEDURE — 85014 HEMATOCRIT: CPT | Performed by: INTERNAL MEDICINE

## 2022-05-03 PROCEDURE — 83735 ASSAY OF MAGNESIUM: CPT | Performed by: STUDENT IN AN ORGANIZED HEALTH CARE EDUCATION/TRAINING PROGRAM

## 2022-05-03 PROCEDURE — 99214 OFFICE O/P EST MOD 30 MIN: CPT | Performed by: NURSE PRACTITIONER

## 2022-05-03 PROCEDURE — 97166 OT EVAL MOD COMPLEX 45 MIN: CPT

## 2022-05-03 RX ORDER — POLYETHYLENE GLYCOL 3350 17 G/17G
0.5 POWDER, FOR SOLUTION ORAL
Status: COMPLETED | OUTPATIENT
Start: 2022-05-03 | End: 2022-05-03

## 2022-05-03 RX ADMIN — POLYETHYLENE GLYCOL 3350 0.5 BOTTLE: 17 POWDER, FOR SOLUTION ORAL at 20:25

## 2022-05-03 RX ADMIN — CARBIDOPA AND LEVODOPA 1 TABLET: 25; 100 TABLET, EXTENDED RELEASE ORAL at 09:07

## 2022-05-03 RX ADMIN — POTASSIUM CHLORIDE 40 MEQ: 10 TABLET, EXTENDED RELEASE ORAL at 13:56

## 2022-05-03 RX ADMIN — INSULIN GLARGINE-YFGN 10 UNITS: 100 INJECTION, SOLUTION SUBCUTANEOUS at 21:42

## 2022-05-03 RX ADMIN — CARVEDILOL 6.25 MG: 6.25 TABLET, FILM COATED ORAL at 09:07

## 2022-05-03 RX ADMIN — INSULIN LISPRO 7 UNITS: 100 INJECTION, SOLUTION INTRAVENOUS; SUBCUTANEOUS at 17:10

## 2022-05-03 RX ADMIN — LEVOTHYROXINE SODIUM 12.5 MCG: 0.03 TABLET ORAL at 06:16

## 2022-05-03 RX ADMIN — ALLOPURINOL 100 MG: 100 TABLET ORAL at 09:07

## 2022-05-03 RX ADMIN — HYDROCODONE BITARTRATE AND ACETAMINOPHEN 1 TABLET: 5; 325 TABLET ORAL at 09:08

## 2022-05-03 RX ADMIN — Medication 10 ML: at 20:26

## 2022-05-03 RX ADMIN — CITALOPRAM 20 MG: 20 TABLET, FILM COATED ORAL at 09:07

## 2022-05-03 RX ADMIN — PANTOPRAZOLE SODIUM 40 MG: 40 INJECTION, POWDER, FOR SOLUTION INTRAVENOUS at 17:10

## 2022-05-03 RX ADMIN — CARBIDOPA AND LEVODOPA 1 TABLET: 25; 100 TABLET, EXTENDED RELEASE ORAL at 13:56

## 2022-05-03 RX ADMIN — POLYETHYLENE GLYCOL 3350 0.5 BOTTLE: 17 POWDER, FOR SOLUTION ORAL at 17:45

## 2022-05-03 RX ADMIN — INSULIN LISPRO 7 UNITS: 100 INJECTION, SOLUTION INTRAVENOUS; SUBCUTANEOUS at 13:56

## 2022-05-03 RX ADMIN — TRAZODONE HYDROCHLORIDE 25 MG: 50 TABLET ORAL at 20:26

## 2022-05-03 RX ADMIN — CARBIDOPA AND LEVODOPA 1 TABLET: 25; 100 TABLET, EXTENDED RELEASE ORAL at 17:10

## 2022-05-03 RX ADMIN — PANTOPRAZOLE SODIUM 40 MG: 40 INJECTION, POWDER, FOR SOLUTION INTRAVENOUS at 06:16

## 2022-05-03 RX ADMIN — DOCUSATE SODIUM 50 MG: 50 CAPSULE, LIQUID FILLED ORAL at 20:25

## 2022-05-03 RX ADMIN — CARVEDILOL 6.25 MG: 6.25 TABLET, FILM COATED ORAL at 20:26

## 2022-05-03 RX ADMIN — ATORVASTATIN CALCIUM 40 MG: 20 TABLET, FILM COATED ORAL at 09:07

## 2022-05-03 RX ADMIN — DONEPEZIL HYDROCHLORIDE 10 MG: 10 TABLET, FILM COATED ORAL at 20:26

## 2022-05-03 RX ADMIN — INSULIN LISPRO 7 UNITS: 100 INJECTION, SOLUTION INTRAVENOUS; SUBCUTANEOUS at 06:41

## 2022-05-03 RX ADMIN — MAGNESIUM SULFATE HEPTAHYDRATE 4 G: 4 INJECTION, SOLUTION INTRAVENOUS at 10:18

## 2022-05-03 RX ADMIN — CARBIDOPA AND LEVODOPA 1 TABLET: 25; 100 TABLET, EXTENDED RELEASE ORAL at 20:26

## 2022-05-03 RX ADMIN — POTASSIUM CHLORIDE 40 MEQ: 10 TABLET, EXTENDED RELEASE ORAL at 10:25

## 2022-05-03 RX ADMIN — ISOSORBIDE MONONITRATE 30 MG: 30 TABLET ORAL at 09:07

## 2022-05-03 NOTE — PLAN OF CARE
Goal Outcome Evaluation:            Egd/ colonoscopy tomorrow. Consent signed and on chart. Npo after midnight. Prep started at 1730. Replaced k, replaced mag. Norco x 1 for pain. Checking hgb every eight hours. Walked and up with assist of 2. Safety maintained. SHANE. Lucero KUMAR

## 2022-05-03 NOTE — PROGRESS NOTES
Gastroenterology   Inpatient Progress Note    Reason for Follow Up:  Anemia    Subjective  Interval History:   Patient resting comfortably.    He continues on clear liquid diet.    Aspirin and Eliquis have been held.    Patient is interested in proceeding with EGD and colonoscopy tomorrow.    Current Facility-Administered Medications:   •  acetaminophen (TYLENOL) tablet 650 mg, 650 mg, Oral, Q4H PRN, Clary Moy APRN, 650 mg at 05/01/22 0921  •  allopurinol (ZYLOPRIM) tablet 100 mg, 100 mg, Oral, Daily, Clary Moy APRN, 100 mg at 05/03/22 0907  •  aluminum-magnesium hydroxide-simethicone (MAALOX MAX) 400-400-40 MG/5ML suspension 15 mL, 15 mL, Oral, Q6H PRN, Clary Moy APRN  •  atorvastatin (LIPITOR) tablet 40 mg, 40 mg, Oral, Daily, Clary Moy APRN, 40 mg at 05/03/22 0907  •  carbidopa-levodopa ER (SINEMET CR)  MG per tablet 1 tablet, 1 tablet, Oral, 4x Daily, Clary Moy APRN, 1 tablet at 05/03/22 0907  •  carvedilol (COREG) tablet 6.25 mg, 6.25 mg, Oral, BID, Clary Moy APRN, 6.25 mg at 05/03/22 0907  •  citalopram (CeleXA) tablet 20 mg, 20 mg, Oral, Daily, Clary Moy APRN, 20 mg at 05/03/22 0907  •  dextrose (D50W) (25 g/50 mL) IV injection 25 g, 25 g, Intravenous, Q15 Min PRN, Celso Benavides MD  •  dextrose (GLUTOSE) oral gel 15 g, 15 g, Oral, Q15 Min PRN, Celso Benavides MD  •  docusate sodium (COLACE) capsule 50 mg, 50 mg, Oral, Nightly, Clary Moy APRN, 50 mg at 05/02/22 2147  •  donepezil (ARICEPT) tablet 10 mg, 10 mg, Oral, Nightly, Clary Moy APRN, 10 mg at 05/02/22 2142  •  glucagon (human recombinant) (GLUCAGEN DIAGNOSTIC) injection 1 mg, 1 mg, Intramuscular, Q15 Min PRN, Celso Benavides MD  •  HYDROcodone-acetaminophen (NORCO) 5-325 MG per tablet 1 tablet, 1 tablet, Oral, Q6H PRN, Clary Moy APRN, 1 tablet at 05/03/22 0908  •  insulin glargine (LANTUS, SEMGLEE) injection 10 Units, 10 Units,  Subcutaneous, Nightly, Becky Sanches DO, 10 Units at 05/02/22 2130  •  insulin lispro (ADMELOG) injection 0-9 Units, 0-9 Units, Subcutaneous, TID AC, Jaspreet Pollock MD, 7 Units at 05/03/22 0641  •  iopamidol (ISOVUE-370) 76 % injection 100 mL, 100 mL, Intravenous, Once in imaging, Jaspreet Pollock MD  •  isosorbide mononitrate (IMDUR) 24 hr tablet 30 mg, 30 mg, Oral, Q24H, Becky Sanches DO, 30 mg at 05/03/22 0907  •  levothyroxine (SYNTHROID, LEVOTHROID) tablet 12.5 mcg, 12.5 mcg, Oral, Q AM, Clary Moy APRN, 12.5 mcg at 05/03/22 0616  •  Magnesium Sulfate 2 gram Bolus, followed by 8 gram infusion (total Mg dose 10 grams)- Mg less than or equal to 1mg/dL, 2 g, Intravenous, PRN **OR** Magnesium Sulfate 2 gram / 50mL Infusion (GIVE X 3 BAGS TO EQUAL 6GM TOTAL DOSE) - Mg 1.1 - 1.5 mg/dl, 2 g, Intravenous, PRN **OR** Magnesium Sulfate 4 gram infusion- Mg 1.6-1.9 mg/dL, 4 g, Intravenous, PRN, Becky Sanches DO, Last Rate: 25 mL/hr at 05/03/22 1018, 4 g at 05/03/22 1018  •  melatonin tablet 5 mg, 5 mg, Oral, Nightly PRN, Becky Sanches DO  •  morphine injection 2 mg, 2 mg, Intravenous, Q3H PRN, Clary Moy APRN  •  nitroglycerin (NITROSTAT) SL tablet 0.4 mg, 0.4 mg, Sublingual, Q5 Min PRN, Clary Moy APRN  •  ondansetron (ZOFRAN) tablet 4 mg, 4 mg, Oral, Q6H PRN **OR** ondansetron (ZOFRAN) injection 4 mg, 4 mg, Intravenous, Q6H PRN, Clary Moy APRN  •  pantoprazole (PROTONIX) injection 40 mg, 40 mg, Intravenous, BID AC, Becky Sanches, DO, 40 mg at 05/03/22 0616  •  potassium chloride (K-DUR,KLOR-CON) ER tablet 40 mEq, 40 mEq, Oral, PRN, Becky Sanches, DO, 40 mEq at 05/03/22 1025  •  potassium chloride (KLOR-CON) packet 40 mEq, 40 mEq, Oral, PRN, Becky Sanches, DO  •  potassium phosphate 45 mmol in sodium chloride 0.9 % 500 mL infusion, 45 mmol, Intravenous, PRN **OR** potassium phosphate 30 mmol in sodium chloride 0.9 % 250 mL infusion, 30 mmol,  Intravenous, PRN **OR** potassium phosphate 15 mmol in sodium chloride 0.9 % 100 mL infusion, 15 mmol, Intravenous, PRN **OR** sodium phosphates 40 mmol in sodium chloride 0.9 % 500 mL IVPB, 40 mmol, Intravenous, PRN **OR** sodium phosphates 20 mmol in sodium chloride 0.9 % 250 mL IVPB, 20 mmol, Intravenous, PRN, Becky Sanches DO  •  [COMPLETED] Insert peripheral IV, , , Once **AND** sodium chloride 0.9 % flush 10 mL, 10 mL, Intravenous, PRN, Amarilys Neal APRN  •  traZODone (DESYREL) tablet 25 mg, 25 mg, Oral, Nightly, Clary Moy APRN, 25 mg at 05/02/22 2146  Review of Systems:               The following systems were reviewed and negative;  gastrointestinal    Objective     Vital Signs  Temp:  [97.5 °F (36.4 °C)-98.3 °F (36.8 °C)] 98.3 °F (36.8 °C)  Heart Rate:  [] 76  Resp:  [18-21] 18  BP: (105-111)/(61-72) 105/67  Body mass index is 25.88 kg/m².                  Physical Exam:              General: patient awake, alert and cooperative              Eyes: no scleral icterus              Skin: warm and dry, not jaundiced, ecchymosis noted face and bilateral orbits, dressing intact to scalp.              Abdomen: soft, nontender, nondistended; normal bowel sounds              Psychiatric: Appropriate affect and behavior                Results Review:                I reviewed the patient's new clinical results.    Results from last 7 days   Lab Units 05/03/22  0812 05/03/22  0000 05/02/22  1643 05/02/22  1251 05/02/22  0523 05/01/22  2144   WBC 10*3/mm3 4.26  --   --   --  5.63 6.94   HEMOGLOBIN g/dL 7.4*  7.4* 7.0* 7.7*   < > 7.7*  7.7* 7.9*  7.9*   HEMATOCRIT % 23.0*  23.0* 22.4* 23.7*   < > 23.7*  23.7* 23.6*  23.6*   PLATELETS 10*3/mm3 128*  --   --   --  133* 145    < > = values in this interval not displayed.     Results from last 7 days   Lab Units 05/03/22  0811 05/02/22  0522 05/01/22  2144 04/30/22  1949   SODIUM mmol/L 140 137 138 137   POTASSIUM mmol/L 3.3* 3.4* 3.8  3.9   CHLORIDE mmol/L 104 104 102 102   CO2 mmol/L 24.9 24.6 23.0 20.0*   BUN mg/dL 12 12 15 26*   CREATININE mg/dL 0.71* 0.68* 0.63* 0.69*   CALCIUM mg/dL 8.4* 8.2* 8.1* 8.3*   BILIRUBIN mg/dL  --  0.7  --  0.4   ALK PHOS U/L  --  52  --  61   ALT (SGPT) U/L  --  7  --  <5   AST (SGOT) U/L  --  9  --  9   GLUCOSE mg/dL 271* 188* 208* 263*         No results found for: LIPASE    Radiology:  CT Head Without Contrast   Final Result       1. No acute intracranial hemorrhage.   2. Interval enlargement of a frontal scalp hematoma, which has become   more well organized as well.       Radiation dose reduction techniques were utilized, including automated   exposure control and exposure modulation based on body size.       This report was finalized on 5/1/2022 11:05 PM by Dr. Alyssa Dos Santos M.D.          XR Facial Bones 3+ View   Final Result      CT Head Without Contrast   Final Result   Postoperative changes as noted. No acute intracranial   abnormalities are identified.       This report was finalized on 4/30/2022 4:31 PM by Dr. Robert Paul M.D.          CT Cervical Spine Without Contrast   Final Result   Extensive multilevel degenerative disc changes as described.   There is no evidence of acute fracture or subluxation.       This report was finalized on 4/30/2022 4:36 PM by Dr. Robert Paul M.D.              Assessment/Plan     Patient Active Problem List   Diagnosis   • ASCVD (arteriosclerotic cardiovascular disease)   • Paroxysmal atrial fibrillation (HCC)   • Diabetic retinopathy associated with type 2 diabetes mellitus (HCC)   • Essential hypertension   • HLD (hyperlipidemia)   • Hypothyroidism   • Peripheral neuropathy   • Renal insufficiency   • Type 2 diabetes mellitus with hyperglycemia, without long-term current use of insulin (HCC)   • Parkinson's disease (HCC)   • Dyspnea on exertion   • Atrial fibrillation (CMS/HCC) [I48.91]   • Stroke-like symptoms   • Hypopotassemia   • Ankle pain   • Acute  idiopathic gout of right ankle   • Generalized weakness   • Head injury due to trauma   • Acute blood loss anemia   • Chronic anticoagulation       Assessment:  1. Anemia  2. Dysphagia  3. Previous anoscopy with no colon polyps per patient, he states he is due for colon cancer screening  4. No overt bleeding noted via GI track at this time      Plan:  · Continue new to monitor H&H.  If any evidence of acute GI bleed, please contact GI service    · Patient has been on clear liquid diet and off of antiplatelet therapy even recent fall, plans for EGD and colonoscopy tomorrow.  Orders placed for bowel prep and n.p.o. at midnight.    I discussed the patients findings and my recommendations with patient and nursing staff.            Taylor VICTORIA  [facility] [place]  [address] [place] [address]

## 2022-05-03 NOTE — PROGRESS NOTES
"    Name: Edilberto Reeder ADMIT: 2022   : 1950  PCP: Harvey Larson MD    MRN: [id number] LOS: 0 days   AGE/SEX: 72 y.o. male  ROOM: [facility]     Subjective   Subjective   Resting comfortably in bed.  His head is \"throbbing\" but better with pain meds. No melena since admission but say she has noticed dark stools \"off and on\" for the last month    Review of Systems  Denies chest pain, shortness of breath, abdominal pain, fevers     Objective   Objective   Vital Signs  Temp:  [97.9 °F (36.6 °C)-98.5 °F (36.9 °C)] 98.5 °F (36.9 °C)  Heart Rate:  [] 92  Resp:  [18-21] 18  BP: (105-121)/(62-75) 121/75  SpO2:  [98 %-100 %] 100 %  on   ;   Device (Oxygen Therapy): room air  Body mass index is 25.88 kg/m².  Physical Exam  Constitutional:       General: He is not in acute distress.     Appearance: He is not diaphoretic.   HENT:      Head:      Comments: Periorbital bruising.  Head wrapped in gauze  Cardiovascular:      Rate and Rhythm: Normal rate and regular rhythm.   Pulmonary:      Effort: Pulmonary effort is normal. No respiratory distress.      Breath sounds: No wheezing or rhonchi.   Abdominal:      Palpations: Abdomen is soft.      Tenderness: There is no abdominal tenderness. There is no guarding.   Musculoskeletal:      Right lower leg: No edema.      Left lower leg: No edema.   Skin:     Comments: Skin tears on chin and left elbow   Neurological:      Mental Status: He is alert.   Psychiatric:         Mood and Affect: Mood normal.         Results Review     I reviewed the patient's new clinical results.  Results from last 7 days   Lab Units 22  0812 22  0000 22  1643 22  1251 22  0523 22  2144 22  1537 22   WBC 10*3/mm3 4.26  --   --   --  5.63 6.94  --  9.61   HEMOGLOBIN g/dL 7.4*  7.4* 7.0* 7.7* 7.4* 7.7*  7.7* 7.9*  7.9*   < > 9.5*   PLATELETS 10*3/mm3 128*  --   --   --  133* 145  --  181    < > = values in this interval not " displayed.     Results from last 7 days   Lab Units 05/03/22  0811 05/02/22 0522 05/01/22 2144 04/30/22 1949   SODIUM mmol/L 140 137 138 137   POTASSIUM mmol/L 3.3* 3.4* 3.8 3.9   CHLORIDE mmol/L 104 104 102 102   CO2 mmol/L 24.9 24.6 23.0 20.0*   BUN mg/dL 12 12 15 26*   CREATININE mg/dL 0.71* 0.68* 0.63* 0.69*   GLUCOSE mg/dL 271* 188* 208* 263*   Estimated Creatinine Clearance: 110.6 mL/min (A) (by C-G formula based on SCr of 0.71 mg/dL (L)).  Results from last 7 days   Lab Units 05/02/22 0522 04/30/22 1949   ALBUMIN g/dL 3.30* 3.60   BILIRUBIN mg/dL 0.7 0.4   ALK PHOS U/L 52 61   AST (SGOT) U/L 9 9   ALT (SGPT) U/L 7 <5     Results from last 7 days   Lab Units 05/03/22 0811 05/02/22 0522 05/01/22 2144 04/30/22 1949   CALCIUM mg/dL 8.4* 8.2* 8.1* 8.3*   ALBUMIN g/dL  --  3.30*  --  3.60   MAGNESIUM mg/dL 1.6 1.5*  --   --    PHOSPHORUS mg/dL 3.5  --   --   --        COVID19   Date Value Ref Range Status   04/30/2022 Not Detected Not Detected - Ref. Range Final   03/23/2022 Not Detected Not Detected - Ref. Range Final     Hemoglobin A1C   Date/Time Value Ref Range Status   05/01/2022 2144 8.10 (H) 4.80 - 5.60 % Final     Glucose   Date/Time Value Ref Range Status   05/03/2022 1019 322 (H) 70 - 130 mg/dL Final     Comment:     Meter: [id number] : [id number] Viridiana Pruitt NA   05/03/2022 0624 326 (H) 70 - 130 mg/dL Final     Comment:     Meter: [id number] : [id number] Berna Jewels NA   05/02/2022 2051 273 (H) 70 - 130 mg/dL Final     Comment:     Meter: [id number] : [id number] Shyannejoi Khan NA   05/02/2022 1612 252 (H) 70 - 130 mg/dL Final     Comment:     Meter: [id number] : [id number] Matthew Harden    05/02/2022 1105 290 (H) 70 - 130 mg/dL Final     Comment:     Meter: [id number] : [id number] Matthew Harden    05/02/2022 0616 200 (H) 70 - 130 mg/dL Final     Comment:     Meter: [id number] : [id number] Tia Gayle    05/01/2022 2148 256 (H) 70 - 130 mg/dL Final     Comment:      Meter: [id number] : [id number] Joseph MILLAN       CT Head Without Contrast  Narrative: CT HEAD WITHOUT CONTRAST     HISTORY: Fall. HISTORY of blood thinners.     COMPARISON: 04/30/2022     TECHNIQUE: Axial CT imaging was obtained through the brain. No IV  contrast was administered.     FINDINGS:  Bifrontal deep thalamic stimulators are again noted. This results in  significant artifact. No obvious acute intracranial hemorrhage is seen.  There is diffuse atrophy. There is periventricular and deep white matter  microangiopathic change. There do appear to be tiny old infarcts within  the right cerebellar hemisphere. There is no midline shift or mass  effect. There is a frontal scalp hematoma, measuring 3.3 x 1.2 cm. This  has become larger and more well organized than on prior exam, although  generalized frontal scalp edema has improved. There is also some soft  tissue swelling seen overlying the dorsum of the nose, and there are  also appears to be bilateral. Orbital soft tissue swelling. Globes  appear intact.. Paranasal sinuses appear clear.     Impression:    1. No acute intracranial hemorrhage.  2. Interval enlargement of a frontal scalp hematoma, which has become  more well organized as well.     Radiation dose reduction techniques were utilized, including automated  exposure control and exposure modulation based on body size.     This report was finalized on 5/1/2022 11:05 PM by Dr. Alyssa Dos Santos M.D.     XR Facial Bones 3+ View  FACIAL BONE X-RAYS     CLINICAL HISTORY: Patient fell. Facial pain. Laceration.     A total 4 views were obtained. No bony abnormalities are identified.  There is no evidence of facial bone fracture. However, the nasal bone  was not well visualized on the lateral view and therefore a tiny distal  nasal bone fracture cannot be excluded. Also, the zygomatic arches were  not imaged. The paranasal sinuses are well aerated. Deep brain  stimulator electrodes are noted  bilaterally.     This report was finalized on 5/1/2022 9:03 AM by Dr. Robert Paul M.D.       I reviewed the patient's daily medications.  Scheduled Medications  allopurinol, 100 mg, Oral, Daily  atorvastatin, 40 mg, Oral, Daily  carbidopa-levodopa ER, 1 tablet, Oral, 4x Daily  carvedilol, 6.25 mg, Oral, BID  citalopram, 20 mg, Oral, Daily  docusate sodium, 50 mg, Oral, Nightly  donepezil, 10 mg, Oral, Nightly  insulin glargine, 10 Units, Subcutaneous, Nightly  insulin lispro, 0-9 Units, Subcutaneous, TID AC  iopamidol, 100 mL, Intravenous, Once in imaging  isosorbide mononitrate, 30 mg, Oral, Q24H  levothyroxine, 12.5 mcg, Oral, Q AM  pantoprazole, 40 mg, Intravenous, BID AC  polyethylene glycol, 0.5 bottle, Oral, Q4H  traZODone, 25 mg, Oral, Nightly    Infusions   Diet  Diet Clear Liquid; Consistent Carbohydrate  NPO Diet NPO Type: Strict NPO       Assessment/Plan     Active Hospital Problems    Diagnosis  POA   • **Head injury due to trauma [S09.90XA]  Yes   • Acute blood loss anemia [D62]  Yes   • Chronic anticoagulation [Z79.01]  Not Applicable   • Parkinson's disease (HCC) [G20]  Yes   • Type 2 diabetes mellitus with hyperglycemia, without long-term current use of insulin (HCC) [E11.65]  Yes   • Paroxysmal atrial fibrillation (HCC) [I48.0]  Yes   • HLD (hyperlipidemia) [E78.5]  Yes   • Essential hypertension [I10]  Yes   • ASCVD (arteriosclerotic cardiovascular disease) [I25.10]  Yes      Resolved Hospital Problems   No resolved problems to display.       72 y.o. non-smoker with a history of type 2 diabetes, Parkinson's disease, paroxysmal atrial fibrillation on chronic AC (Eliquis), HLD, HTN, and cardiovascular disease who presented with head laceration secondary to mechanical fall.  Hospital course has been complicated by significant anemia.    Today: EGD and c-scope tomorrow. Hg stable in the low 7s, continue to check every 8 hours and transfuse to goal of 7  Glucose high but started lantus yesterday and  "he will be NPO tomorrow and doing prep today. Will not increase insulin yet, continue sliding scale     Mechanical fall with large scalp laceration:   -head CT showed scalp hematoma but no intracranial bleeding. Facial bone XR without fractures.  Laceration sutured in ER  -Holding home aspirin and Eliquis     Acute blood loss anemia.  Iron deficiency anemia:   -No active bleeding currently, but reports melena \"off and on\" for the last month.  GI evaluated, tentative plan for endoscopy on 5/4  -Continue to trend hemoglobin every 8 hours. Start IV PPI twice daily in case of upper GI bleed  -Holding home aspirin and Eliquis     Paroxysmal A. fib, chronic AC (Eliquis): Heart rate under decent control, continue Coreg     Coronary artery disease: ASA on hold, continue Coreg/Imdur/Lipitor, no chest pain      HTN: Blood pressure acceptable acutely.  Continue Coreg and Imdur    Type 2 diabetes:   -A1c 8.1.  Holding home oral meds while inpatient  -continue basal and sliding scale insulin     Parkinson's: with deep brain stimulator. No tremor, continue Sinemet, PT recommends SNF on discharge. PT also recommends follow up in neuro movement disorder clinic, which I agree with.       · SCDs for DVT prophylaxis.  · Full code.  · Discussed with patient and nursing staff.  · Anticipate discharge to SNU facility in 2-3 days.  Pending endoscopy      Becky Sanches DO  [facility]  05/03/22  13:33 EDT    Patient was placed in face mask on first look.  I wore protective equipment throughout this patient encounter including a face mask, gloves and protective eyewear.  Hand hygiene was performed before donning protective equipment and after removal when leaving the room.        "

## 2022-05-03 NOTE — PLAN OF CARE
Goal Outcome Evaluation:  Plan of Care Reviewed With: patient        Progress: no change  Outcome Evaluation: A&O x4, PRN meds for pain, Q8 H&H, clear liquid diet at 0600, EGD on WED, up to [facility] with assist x1 and gait belt, will CTM

## 2022-05-03 NOTE — THERAPY EVALUATION
Patient Name: Edilberto Reeder  : 1950    MRN: [id number]                              Today's Date: 5/3/2022       Admit Date: 2022    Visit Dx:     ICD-10-CM ICD-9-CM   1. Minor head injury, initial encounter  S09.90XA 959.01   2. Laceration of forehead, initial encounter  S01.81XA 873.42   3. Anemia, unspecified type  D64.9 285.9   4. Acute blood loss anemia  D62 285.1   5. Screening for colon cancer  Z12.11 V76.51     Patient Active Problem List   Diagnosis   • ASCVD (arteriosclerotic cardiovascular disease)   • Paroxysmal atrial fibrillation (HCC)   • Diabetic retinopathy associated with type 2 diabetes mellitus (HCC)   • Essential hypertension   • HLD (hyperlipidemia)   • Hypothyroidism   • Peripheral neuropathy   • Renal insufficiency   • Type 2 diabetes mellitus with hyperglycemia, without long-term current use of insulin (HCC)   • Parkinson's disease (HCC)   • Dyspnea on exertion   • Atrial fibrillation (CMS/HCC) [I48.91]   • Stroke-like symptoms   • Hypopotassemia   • Ankle pain   • Acute idiopathic gout of right ankle   • Generalized weakness   • Head injury due to trauma   • Acute blood loss anemia   • Chronic anticoagulation     Past Medical History:   Diagnosis Date   • Atrial fibrillation with RVR (HCC)    • Atrial flutter (HCC)    • Diabetes (HCC)    • HLD (hyperlipidemia) 2016   • Hypertension    • Parkinson's disease (HCC)     Advanced      Past Surgical History:   Procedure Laterality Date   • BRAIN STIMULATOR     • JOINT REPLACEMENT      Bilateral shoulder and knee replacements      General Information     Row Name 22 1303          OT Time and Intention    Document Type evaluation  -RB     Mode of Treatment individual therapy;occupational therapy  -RB     Row Name 22 1305          General Information    Patient Profile Reviewed yes  -RB     Prior Level of Function ADL's;transfer;min assist:  -RB     Existing Precautions/Restrictions fall  -RB     Barriers to Rehab  previous functional deficit  -RB     Row Name 05/03/22 1303          Living Environment    People in Home spouse  -RB     Row Name 05/03/22 1303          Home Main Entrance    Number of Stairs, Main Entrance one  -RB     Row Name 05/03/22 1303          Cognition    Orientation Status (Cognition) oriented x 3  -RB     Row Name 05/03/22 1303          Safety Issues, Functional Mobility    Safety Issues Affecting Function (Mobility) safety precautions follow-through/compliance;safety precaution awareness;positioning of assistive device;awareness of need for assistance  -RB     Impairments Affecting Function (Mobility) balance;endurance/activity tolerance;pain;strength  -RB           User Key  (r) = Recorded By, (t) = Taken By, (c) = Cosigned By    Initials Name Provider Type    RB Tereza Camejo OT Occupational Therapist                 Mobility/ADL's     Row Name 05/03/22 1304          Bed Mobility    Bed Mobility supine-sit  -RB     Supine-Sit [place] (Bed Mobility) contact guard  -RB     Row Name 05/03/22 1304          Transfers    Transfers sit-stand transfer;stand-sit transfer;bed-chair transfer  -RB     Bed-Chair [place] (Transfers) contact guard  -RB     Assistive Device (Bed-Chair Transfers) walker, front-wheeled  -RB     Sit-Stand [place] (Transfers) contact guard  -RB     Stand-Sit [place] (Transfers) contact guard  -RB     Row Name 05/03/22 1304          Sit-Stand Transfer    Assistive Device (Sit-Stand Transfers) walker, front-wheeled  -RB     Row Name 05/03/22 1304          Stand-Sit Transfer    Assistive Device (Stand-Sit Transfers) walker, front-wheeled  -RB     Row Name 05/03/22 1304          Functional Mobility    Functional Mobility- Ind. Level contact guard assist  -RB     Functional Mobility- Safety Issues balance decreased during turns;step length decreased  -RB     Functional Mobility- Comment shuffled gait pattern  -RB     Row Name 05/03/22 1304          Activities of Daily  Living    BADL Assessment/Intervention lower body dressing;grooming  -RB     Row Name 05/03/22 1304          Lower Body Dressing Assessment/Training    [place] Level (Lower Body Dressing) lower body dressing skills;maximum assist (25% patient effort)  -RB     Position (Lower Body Dressing) edge of bed sitting  -RB     Row Name 05/03/22 1304          Grooming Assessment/Training    [place] Level (Grooming) grooming skills;contact guard assist  -RB     Position (Grooming) sink side;supported standing  -RB           User Key  (r) = Recorded By, (t) = Taken By, (c) = Cosigned By    Initials Name Provider Type    Tereza Silva OT Occupational Therapist               Obj/Interventions     Row Name 05/03/22 1306          Sensory Assessment (Somatosensory)    Sensory Assessment (Somatosensory) sensation intact  -RB     Row Name 05/03/22 1306          Vision Assessment/Intervention    Visual Impairment/Limitations WFL  -RB     Row Name 05/03/22 1306          Range of Motion Comprehensive    Comment, General Range of Motion baseline shoulder deficits, lacking ~50% AROM.  -RB     Row Name 05/03/22 1306          Strength Comprehensive (MMT)    Comment, General Manual Muscle Testing (MMT) Assessment generalized BUE/BLE weakness  -RB     Row Name 05/03/22 1306          Balance    Static Sitting Balance supervision;modified independence  -RB     Dynamic Sitting Balance supervision;modified independence  -RB     Position, Sitting Balance sitting in chair;sitting edge of bed  -RB     Static Standing Balance contact guard  -RB     Dynamic Standing Balance contact guard;minimal assist  -RB     Position/Device Used, Standing Balance walker, front-wheeled  -RB     Balance Interventions occupation based/functional task  -RB           User Key  (r) = Recorded By, (t) = Taken By, (c) = Cosigned By    Initials Name Provider Type    Tereza Silva OT Occupational Therapist               Goals/Plan     Row Name  05/03/22 1309          Bed Mobility Goal 1 (OT)    Activity/Assistive Device (Bed Mobility Goal 1, OT) bed mobility activities, all  -RB     Dante Level/Cues Needed (Bed Mobility Goal 1, OT) supervision required  -RB     Time Frame (Bed Mobility Goal 1, OT) short term goal (STG);2 weeks  -RB     Progress/Outcomes (Bed Mobility Goal 1, OT) continuing progress toward goal  -RB     Row Name 05/03/22 1309          Transfer Goal 1 (OT)    Activity/Assistive Device (Transfer Goal 1, OT) transfers, all  -RB     Dante Level/Cues Needed (Transfer Goal 1, OT) supervision required  -RB     Time Frame (Transfer Goal 1, OT) short term goal (STG);2 weeks  -RB     Progress/Outcome (Transfer Goal 1, OT) continuing progress toward goal  -RB     Row Name 05/03/22 1309          Dressing Goal 1 (OT)    Activity/Device (Dressing Goal 1, OT) dressing skills, all  -RB     Dante/Cues Needed (Dressing Goal 1, OT) supervision required  -RB     Time Frame (Dressing Goal 1, OT) short term goal (STG);2 weeks  -RB     Progress/Outcome (Dressing Goal 1, OT) continuing progress toward goal  -RB     Row Name 05/03/22 1309          Toileting Goal 1 (OT)    Activity/Device (Toileting Goal 1, OT) toileting skills, all  -RB     Dante Level/Cues Needed (Toileting Goal 1, OT) supervision required  -RB     Time Frame (Toileting Goal 1, OT) short term goal (STG);2 weeks  -RB     Progress/Outcome (Toileting Goal 1, OT) continuing progress toward goal  -RB     Row Name 05/03/22 1309          Grooming Goal 1 (OT)    Activity/Device (Grooming Goal 1, OT) grooming skills, all  -RB     Dante (Grooming Goal 1, OT) supervision required  -RB     Time Frame (Grooming Goal 1, OT) short term goal (STG);2 weeks  -RB     Progress/Outcome (Grooming Goal 1, OT) continuing progress toward goal  -RB     Row Name 05/03/22 1309          Therapy Assessment/Plan (OT)    Planned Therapy Interventions (OT) transfer/mobility  retraining;strengthening exercise;ROM/therapeutic exercise;activity tolerance training;adaptive equipment training;BADL retraining;occupation/activity based interventions;functional balance retraining;patient/caregiver education/training;neuromuscular control/coordination retraining  -RB           User Key  (r) = Recorded By, (t) = Taken By, (c) = Cosigned By    Initials Name Provider Type    RB Tereza Camejo, OT Occupational Therapist               Clinical Impression     Row Name 05/03/22 1308          Pain Assessment    Pretreatment Pain Rating 3/10  -RB     Posttreatment Pain Rating 3/10  -RB     Pain Location - head  -RB     Pain Intervention(s) Repositioned;Rest  -RB     Row Name 05/03/22 1308          Plan of Care Review    Plan of Care Reviewed With patient  -RB     Progress no change  -RB     Row Name 05/03/22 1308          Therapy Assessment/Plan (OT)    Rehab Potential (OT) good, to achieve stated therapy goals  -RB     Criteria for Skilled Therapeutic Interventions Met (OT) yes;skilled treatment is necessary  -RB     Therapy Frequency (OT) 5 times/wk  -RB     Row Name 05/03/22 1308          Therapy Plan Review/Discharge Plan (OT)    Anticipated Discharge Disposition (OT) home with /7 care;home with home health;skilled nursing facility  -RB     Row Name 05/03/22 1308          Vital Signs    O2 Delivery Pre Treatment room air  -RB     O2 Delivery Intra Treatment room air  -RB     O2 Delivery Post Treatment room air  -RB     Pre Patient Position Supine  -RB     Intra Patient Position Standing  -RB     Post Patient Position Sitting  -RB     Row Name 05/03/22 1308          Positioning and Restraints    Pre-Treatment Position in bed  -RB     Post Treatment Position chair  -RB     In Chair notified nsg;sitting;call light within reach;encouraged to call for assist;exit alarm on;with family/caregiver  -RB           User Key  (r) = Recorded By, (t) = Taken By, (c) = Cosigned By    Initials Name Provider Type     Tereza Silva OT Occupational Therapist               Outcome Measures     Row Name 05/03/22 1309          How much help from another is currently needed...    Putting on and taking off regular lower body clothing? 2  -RB     Bathing (including washing, rinsing, and drying) 2  -RB     Toileting (which includes using toilet bed pan or urinal) 2  -RB     Putting on and taking off regular upper body clothing 3  -RB     Taking care of personal grooming (such as brushing teeth) 3  -RB     Eating meals 3  -RB     AM-PAC 6 Clicks Score (OT) 15  -RB     Row Name 05/03/22 1309          Modified Agustina Scale    Modified Roscoe Scale 4 - Moderately severe disability.  Unable to walk without assistance, and unable to attend to own bodily needs without assistance.  -RB     Row Name 05/03/22 1309          Functional Assessment    Outcome Measure Options AM-PAC 6 Clicks Daily Activity (OT);Modified Roscoe  -RB           User Key  (r) = Recorded By, (t) = Taken By, (c) = Cosigned By    Initials Name Provider Type    Tereza Silva OT Occupational Therapist                Occupational Therapy Education                 Title: PT OT SLP Therapies (In Progress)     Topic: Occupational Therapy (Not Started)     Point: ADL training (Not Started)     Description:   Instruct learner(s) on proper safety adaptation and remediation techniques during self care or transfers.   Instruct in proper use of assistive devices.              Learner Progress:  Not documented in this visit.          Point: Home exercise program (Not Started)     Description:   Instruct learner(s) on appropriate technique for monitoring, assisting and/or progressing therapeutic exercises/activities.              Learner Progress:  Not documented in this visit.          Point: Precautions (Not Started)     Description:   Instruct learner(s) on prescribed precautions during self-care and functional transfers.              Learner Progress:  Not documented  in this visit.          Point: Body mechanics (Not Started)     Description:   Instruct learner(s) on proper positioning and spine alignment during self-care, functional mobility activities and/or exercises.              Learner Progress:  Not documented in this visit.                          OT Recommendation and Plan  Planned Therapy Interventions (OT): transfer/mobility retraining, strengthening exercise, ROM/therapeutic exercise, activity tolerance training, adaptive equipment training, BADL retraining, occupation/activity based interventions, functional balance retraining, patient/caregiver education/training, neuromuscular control/coordination retraining  Therapy Frequency (OT): 5 times/wk  Plan of Care Review  Plan of Care Reviewed With: patient  Progress: no change     Time Calculation:    Time Calculation- OT     Row Name 05/03/22 1310             Time Calculation- OT    OT Start Time 1235  -RB      OT Stop Time 1256  -RB      OT Time Calculation (min) 21 min  -RB      Total Timed Code Minutes- OT 11 minute(s)  -RB      OT Received On 05/03/22  -RB      OT - Next Appointment 05/04/22  -RB      OT Goal Re-Cert Due Date 05/17/22  -RB              Timed Charges    44483 - OT Self Care/Mgmt Minutes 11  -RB              Untimed Charges    OT Eval/Re-eval Minutes 10  -RB              Total Minutes    Timed Charges Total Minutes 11  -RB      Untimed Charges Total Minutes 10  -RB       Total Minutes 21  -RB            User Key  (r) = Recorded By, (t) = Taken By, (c) = Cosigned By    Initials Name Provider Type    RB Tereza Camejo OT Occupational Therapist              Therapy Charges for Today     Code Description Service Date Service Provider Modifiers Qty    [id number]  OT EVAL MOD COMPLEXITY 2 5/3/2022 Tereza Camejo OT GO 1    [id number]  OT SELF CARE/MGMT/TRAIN EA 15 MIN 5/3/2022 Tereza Camejo OT GO 1               Tereza Camejo OT  5/3/2022

## 2022-05-03 NOTE — PLAN OF CARE
Pt admitted to [facility] 2/2 to a fall off his deck while working on it. Pt diagnosed with a head injury and anemia. Pmhx includes Parkinson's, DM, Afib, and hypothyroidism. He lives with his wife in a home with one step to enter. He uses a U-step walker and Min A provided for ADL's. X2 falls reported per month. Today, CGA for bed mobility. Pt attempted LBD at the EOB, increased head pressure with looking downward and Max A provided from therapist to complete the task. He completed standing sinkside grooming ADL's and mobility with CGA. During x5 min of mobility he reported he is not at his baseline and felt very weak. OT discussed rehab vs. Home with 24/7 assistance and he voiced he will discuss with his wife.     Patient was not wearing a face mask during this therapy encounter. Therapist used appropriate personal protective equipment including mask and gloves. Mask used was standard procedure mask. Appropriate PPE was worn during the entire therapy session. Hand hygiene was completed before and after therapy session. Patient is not in enhanced droplet precautions.

## 2022-05-04 PROBLEM — D69.6 THROMBOCYTOPENIA: Status: ACTIVE | Noted: 2022-05-04

## 2022-05-04 PROBLEM — Z12.11 SCREENING FOR COLON CANCER: Status: ACTIVE | Noted: 2022-05-04

## 2022-05-04 PROBLEM — S09.90XA MINOR HEAD INJURY, INITIAL ENCOUNTER: Status: ACTIVE | Noted: 2022-05-04

## 2022-05-04 LAB
ANION GAP SERPL CALCULATED.3IONS-SCNC: 7.6 MMOL/L (ref 5–15)
BASOPHILS # BLD AUTO: 0.03 10*3/MM3 (ref 0–0.2)
BASOPHILS NFR BLD AUTO: 0.7 % (ref 0–1.5)
BUN SERPL-MCNC: 9 MG/DL (ref 8–23)
BUN/CREAT SERPL: 15.5 (ref 7–25)
CALCIUM SPEC-SCNC: 8.4 MG/DL (ref 8.6–10.5)
CHLORIDE SERPL-SCNC: 106 MMOL/L (ref 98–107)
CO2 SERPL-SCNC: 24.4 MMOL/L (ref 22–29)
CREAT SERPL-MCNC: 0.58 MG/DL (ref 0.76–1.27)
DEPRECATED RDW RBC AUTO: 47.6 FL (ref 37–54)
EGFRCR SERPLBLD CKD-EPI 2021: 102.3 ML/MIN/1.73
EOSINOPHIL # BLD AUTO: 0.18 10*3/MM3 (ref 0–0.4)
EOSINOPHIL NFR BLD AUTO: 3.9 % (ref 0.3–6.2)
ERYTHROCYTE [DISTWIDTH] IN BLOOD BY AUTOMATED COUNT: 14.8 % (ref 12.3–15.4)
GLUCOSE BLDC GLUCOMTR-MCNC: 159 MG/DL (ref 70–130)
GLUCOSE BLDC GLUCOMTR-MCNC: 183 MG/DL (ref 70–130)
GLUCOSE BLDC GLUCOMTR-MCNC: 231 MG/DL (ref 70–130)
GLUCOSE BLDC GLUCOMTR-MCNC: 256 MG/DL (ref 70–130)
GLUCOSE SERPL-MCNC: 144 MG/DL (ref 65–99)
HCT VFR BLD AUTO: 24.2 % (ref 37.5–51)
HGB BLD-MCNC: 7.6 G/DL (ref 13–17.7)
IMM GRANULOCYTES # BLD AUTO: 0.01 10*3/MM3 (ref 0–0.05)
IMM GRANULOCYTES NFR BLD AUTO: 0.2 % (ref 0–0.5)
LYMPHOCYTES # BLD AUTO: 1.22 10*3/MM3 (ref 0.7–3.1)
LYMPHOCYTES NFR BLD AUTO: 26.7 % (ref 19.6–45.3)
MAGNESIUM SERPL-MCNC: 1.9 MG/DL (ref 1.6–2.4)
MCH RBC QN AUTO: 28 PG (ref 26.6–33)
MCHC RBC AUTO-ENTMCNC: 31.4 G/DL (ref 31.5–35.7)
MCV RBC AUTO: 89.3 FL (ref 79–97)
MONOCYTES # BLD AUTO: 0.35 10*3/MM3 (ref 0.1–0.9)
MONOCYTES NFR BLD AUTO: 7.7 % (ref 5–12)
NEUTROPHILS NFR BLD AUTO: 2.78 10*3/MM3 (ref 1.7–7)
NEUTROPHILS NFR BLD AUTO: 60.8 % (ref 42.7–76)
NRBC BLD AUTO-RTO: 0 /100 WBC (ref 0–0.2)
PHOSPHATE SERPL-MCNC: 3.4 MG/DL (ref 2.5–4.5)
PLATELET # BLD AUTO: 139 10*3/MM3 (ref 140–450)
PMV BLD AUTO: 11 FL (ref 6–12)
POTASSIUM SERPL-SCNC: 3.8 MMOL/L (ref 3.5–5.2)
RBC # BLD AUTO: 2.71 10*6/MM3 (ref 4.14–5.8)
SARS-COV-2 RNA RESP QL NAA+PROBE: NOT DETECTED
SODIUM SERPL-SCNC: 138 MMOL/L (ref 136–145)
WBC NRBC COR # BLD: 4.57 10*3/MM3 (ref 3.4–10.8)

## 2022-05-04 PROCEDURE — 84100 ASSAY OF PHOSPHORUS: CPT | Performed by: STUDENT IN AN ORGANIZED HEALTH CARE EDUCATION/TRAINING PROGRAM

## 2022-05-04 PROCEDURE — 82962 GLUCOSE BLOOD TEST: CPT

## 2022-05-04 PROCEDURE — 96376 TX/PRO/DX INJ SAME DRUG ADON: CPT

## 2022-05-04 PROCEDURE — 99232 SBSQ HOSP IP/OBS MODERATE 35: CPT | Performed by: PHYSICIAN ASSISTANT

## 2022-05-04 PROCEDURE — 80048 BASIC METABOLIC PNL TOTAL CA: CPT | Performed by: STUDENT IN AN ORGANIZED HEALTH CARE EDUCATION/TRAINING PROGRAM

## 2022-05-04 PROCEDURE — U0003 INFECTIOUS AGENT DETECTION BY NUCLEIC ACID (DNA OR RNA); SEVERE ACUTE RESPIRATORY SYNDROME CORONAVIRUS 2 (SARS-COV-2) (CORONAVIRUS DISEASE [COVID-19]), AMPLIFIED PROBE TECHNIQUE, MAKING USE OF HIGH THROUGHPUT TECHNOLOGIES AS DESCRIBED BY CMS-2020-01-R: HCPCS | Performed by: PHYSICIAN ASSISTANT

## 2022-05-04 PROCEDURE — 63710000001 INSULIN LISPRO (HUMAN) PER 5 UNITS: Performed by: HOSPITALIST

## 2022-05-04 PROCEDURE — 83735 ASSAY OF MAGNESIUM: CPT | Performed by: STUDENT IN AN ORGANIZED HEALTH CARE EDUCATION/TRAINING PROGRAM

## 2022-05-04 PROCEDURE — 85025 COMPLETE CBC W/AUTO DIFF WBC: CPT | Performed by: STUDENT IN AN ORGANIZED HEALTH CARE EDUCATION/TRAINING PROGRAM

## 2022-05-04 PROCEDURE — 97110 THERAPEUTIC EXERCISES: CPT

## 2022-05-04 RX ADMIN — HYDROCODONE BITARTRATE AND ACETAMINOPHEN 1 TABLET: 5; 325 TABLET ORAL at 09:28

## 2022-05-04 RX ADMIN — DOCUSATE SODIUM 50 MG: 50 CAPSULE, LIQUID FILLED ORAL at 21:57

## 2022-05-04 RX ADMIN — CARBIDOPA AND LEVODOPA 1 TABLET: 25; 100 TABLET, EXTENDED RELEASE ORAL at 17:00

## 2022-05-04 RX ADMIN — DONEPEZIL HYDROCHLORIDE 10 MG: 10 TABLET, FILM COATED ORAL at 21:57

## 2022-05-04 RX ADMIN — CARBIDOPA AND LEVODOPA 1 TABLET: 25; 100 TABLET, EXTENDED RELEASE ORAL at 21:57

## 2022-05-04 RX ADMIN — INSULIN LISPRO 6 UNITS: 100 INJECTION, SOLUTION INTRAVENOUS; SUBCUTANEOUS at 17:00

## 2022-05-04 RX ADMIN — PANTOPRAZOLE SODIUM 40 MG: 40 INJECTION, POWDER, FOR SOLUTION INTRAVENOUS at 06:54

## 2022-05-04 RX ADMIN — TRAZODONE HYDROCHLORIDE 25 MG: 50 TABLET ORAL at 21:57

## 2022-05-04 RX ADMIN — LEVOTHYROXINE SODIUM 12.5 MCG: 0.03 TABLET ORAL at 06:54

## 2022-05-04 RX ADMIN — INSULIN GLARGINE-YFGN 10 UNITS: 100 INJECTION, SOLUTION SUBCUTANEOUS at 21:58

## 2022-05-04 RX ADMIN — POLYETHYLENE GLYCOL 3350 AND ELECTROLYTES WITH LEMON FLAVOR 2000 ML: 236; 22.74; 6.74; 5.86; 2.97 POWDER, FOR SOLUTION ORAL at 17:50

## 2022-05-04 RX ADMIN — CARVEDILOL 6.25 MG: 6.25 TABLET, FILM COATED ORAL at 21:58

## 2022-05-04 RX ADMIN — PANTOPRAZOLE SODIUM 40 MG: 40 INJECTION, POWDER, FOR SOLUTION INTRAVENOUS at 17:00

## 2022-05-04 NOTE — PLAN OF CARE
Goal Outcome Evaluation:  Plan of Care Reviewed With: patient        Progress: no change  Outcome Evaluation: VSS, bowel prep for EGD and Colonoscopy on 5/4, NPO, up to BSC, no c/o of pain or nausea, will CTM

## 2022-05-04 NOTE — PROGRESS NOTES
"    Name: Edilberto Reeder ADMIT: 2022   : 1950  PCP: Harvey Larson MD    MRN: [id number] LOS: 0 days   AGE/SEX: 72 y.o. male  ROOM: [facility]     Subjective   Subjective   Says he feels \"like I been hit by a truck\".  He tolerated bowel prep.      Review of Systems  Denies chest pain, shortness of breath, abdominal pain, fevers     Objective   Objective   Vital Signs  Temp:  [97.6 °F (36.4 °C)-98 °F (36.7 °C)] 98 °F (36.7 °C)  Heart Rate:  [75-83] 81  Resp:  [18] 18  BP: ()/(53-78) 105/67  SpO2:  [97 %-100 %] 99 %  on   ;   Device (Oxygen Therapy): room air  Body mass index is 25.88 kg/m².  Physical Exam  Constitutional:       General: He is not in acute distress.     Appearance: He is not diaphoretic.   HENT:      Head:      Comments: Periorbital bruising, improving.  Right scalp hematoma and sutured laceration  Cardiovascular:      Rate and Rhythm: Normal rate and regular rhythm.   Pulmonary:      Effort: Pulmonary effort is normal. No respiratory distress.      Breath sounds: No wheezing or rhonchi.   Abdominal:      Palpations: Abdomen is soft.      Tenderness: There is no abdominal tenderness. There is no guarding.   Musculoskeletal:      Right lower leg: No edema.      Left lower leg: No edema.   Skin:     Comments: Skin tears on chin and left elbow   Neurological:      Mental Status: He is alert.   Psychiatric:         Mood and Affect: Mood normal.         Results Review     I reviewed the patient's new clinical results.  Results from last 7 days   Lab Units 22  0547 22  2159 22  1512 22  0812 22  1251 22  0523 22  2144   WBC 10*3/mm3 4.57  --   --  4.26  --  5.63 6.94   HEMOGLOBIN g/dL 7.6* 7.8* 7.0* 7.4*  7.4*   < > 7.7*  7.7* 7.9*  7.9*   PLATELETS 10*3/mm3 139*  --   --  128*  --  133* 145    < > = values in this interval not displayed.     Results from last 7 days   Lab Units 22  0547 22  0811 22  0522 22  2144 "   SODIUM mmol/L 138 140 137 138   POTASSIUM mmol/L 3.8 3.3* 3.4* 3.8   CHLORIDE mmol/L 106 104 104 102   CO2 mmol/L 24.4 24.9 24.6 23.0   BUN mg/dL 9 12 12 15   CREATININE mg/dL 0.58* 0.71* 0.68* 0.63*   GLUCOSE mg/dL 144* 271* 188* 208*   Estimated Creatinine Clearance: 135.4 mL/min (A) (by C-G formula based on SCr of 0.58 mg/dL (L)).  Results from last 7 days   Lab Units 05/02/22  0522 04/30/22  1949   ALBUMIN g/dL 3.30* 3.60   BILIRUBIN mg/dL 0.7 0.4   ALK PHOS U/L 52 61   AST (SGOT) U/L 9 9   ALT (SGPT) U/L 7 <5     Results from last 7 days   Lab Units 05/04/22  0547 05/03/22  0811 05/02/22  0522 05/01/22  2144 04/30/22  1949   CALCIUM mg/dL 8.4* 8.4* 8.2* 8.1* 8.3*   ALBUMIN g/dL  --   --  3.30*  --  3.60   MAGNESIUM mg/dL 1.9 1.6 1.5*  --   --    PHOSPHORUS mg/dL 3.4 3.5  --   --   --        COVID19   Date Value Ref Range Status   04/30/2022 Not Detected Not Detected - Ref. Range Final   03/23/2022 Not Detected Not Detected - Ref. Range Final     Hemoglobin A1C   Date/Time Value Ref Range Status   05/01/2022 2144 8.10 (H) 4.80 - 5.60 % Final     Glucose   Date/Time Value Ref Range Status   05/04/2022 1031 183 (H) 70 - 130 mg/dL Final     Comment:     Meter: [id number] : [id number] Viridiana Pruitt NA   05/04/2022 0604 159 (H) 70 - 130 mg/dL Final     Comment:     Meter: [id number] : [id number] Evonne MILLAN   05/03/2022 2108 220 (H) 70 - 130 mg/dL Final     Comment:     Meter: [id number] : [id number] Evonne Matara    05/03/2022 1530 307 (H) 70 - 130 mg/dL Final     Comment:     Meter: [id number] : [id number] Kemp Sonal    05/03/2022 1019 322 (H) 70 - 130 mg/dL Final     Comment:     Meter: [id number] : [id number] Kemp Sonal MILLAN   05/03/2022 0624 326 (H) 70 - 130 mg/dL Final     Comment:     Meter: [id number] : [id number] Berna MILLAN   05/02/2022 2051 273 (H) 70 - 130 mg/dL Final     Comment:     Meter: [id number] : [id number] Solomon MILLAN       CT Head Without  Contrast  Narrative: CT HEAD WITHOUT CONTRAST     HISTORY: Fall. HISTORY of blood thinners.     COMPARISON: 04/30/2022     TECHNIQUE: Axial CT imaging was obtained through the brain. No IV  contrast was administered.     FINDINGS:  Bifrontal deep thalamic stimulators are again noted. This results in  significant artifact. No obvious acute intracranial hemorrhage is seen.  There is diffuse atrophy. There is periventricular and deep white matter  microangiopathic change. There do appear to be tiny old infarcts within  the right cerebellar hemisphere. There is no midline shift or mass  effect. There is a frontal scalp hematoma, measuring 3.3 x 1.2 cm. This  has become larger and more well organized than on prior exam, although  generalized frontal scalp edema has improved. There is also some soft  tissue swelling seen overlying the dorsum of the nose, and there are  also appears to be bilateral. Orbital soft tissue swelling. Globes  appear intact.. Paranasal sinuses appear clear.     Impression:    1. No acute intracranial hemorrhage.  2. Interval enlargement of a frontal scalp hematoma, which has become  more well organized as well.     Radiation dose reduction techniques were utilized, including automated  exposure control and exposure modulation based on body size.     This report was finalized on 5/1/2022 11:05 PM by Dr. Alyssa Dos Santos M.D.     XR Facial Bones 3+ View  FACIAL BONE X-RAYS     CLINICAL HISTORY: Patient fell. Facial pain. Laceration.     A total 4 views were obtained. No bony abnormalities are identified.  There is no evidence of facial bone fracture. However, the nasal bone  was not well visualized on the lateral view and therefore a tiny distal  nasal bone fracture cannot be excluded. Also, the zygomatic arches were  not imaged. The paranasal sinuses are well aerated. Deep brain  stimulator electrodes are noted bilaterally.     This report was finalized on 5/1/2022 9:03 AM by Dr. Robert Paul  JL SNOW reviewed the patient's daily medications.  Scheduled Medications  allopurinol, 100 mg, Oral, Daily  atorvastatin, 40 mg, Oral, Daily  carbidopa-levodopa ER, 1 tablet, Oral, 4x Daily  carvedilol, 6.25 mg, Oral, BID  citalopram, 20 mg, Oral, Daily  docusate sodium, 50 mg, Oral, Nightly  donepezil, 10 mg, Oral, Nightly  insulin glargine, 10 Units, Subcutaneous, Nightly  insulin lispro, 0-9 Units, Subcutaneous, TID AC  iopamidol, 100 mL, Intravenous, Once in imaging  isosorbide mononitrate, 30 mg, Oral, Q24H  levothyroxine, 12.5 mcg, Oral, Q AM  pantoprazole, 40 mg, Intravenous, BID AC  traZODone, 25 mg, Oral, Nightly    Infusions   Diet  NPO Diet NPO Type: Strict NPO       Assessment/Plan     Active Hospital Problems    Diagnosis  POA   • **Head injury due to trauma [S09.90XA]  Yes   • Thrombocytopenia (HCC) [D69.6]  Yes   • Acute blood loss anemia [D62]  Yes   • Chronic anticoagulation [Z79.01]  Not Applicable   • Parkinson's disease (HCC) [G20]  Yes   • Type 2 diabetes mellitus with hyperglycemia, without long-term current use of insulin (HCC) [E11.65]  Yes   • Paroxysmal atrial fibrillation (HCC) [I48.0]  Yes   • HLD (hyperlipidemia) [E78.5]  Yes   • Essential hypertension [I10]  Yes   • ASCVD (arteriosclerotic cardiovascular disease) [I25.10]  Yes      Resolved Hospital Problems   No resolved problems to display.       72 y.o. non-smoker with a history of type 2 diabetes, Parkinson's disease, paroxysmal atrial fibrillation on chronic AC (Eliquis), HLD, HTN, and cardiovascular disease who presented with head laceration secondary to mechanical fall.  Hospital course has been complicated by anemia- hg dropped to 7 from 11 one month prior.    Today: EGD and c-scope today. Change H+H to every 12 hours     Mechanical fall with large scalp laceration:   -head CT showed scalp hematoma but no intracranial bleeding. Facial bone XR without fractures.  Laceration sutured in ER  -Holding home aspirin and  "Eliquis     Acute blood loss anemia.  Iron deficiency anemia:   -No active bleeding currently, but reports melena \"off and on\" for the last month.  GI evaluated, plan for endoscopy on 5/4  -Continue to trend hemoglobin every 12 hours. Continue IV PPI twice daily in case of upper GI bleed  -Holding home aspirin and Eliquis     Paroxysmal A. fib, chronic AC (Eliquis): Heart rate under decent control, continue Coreg     Coronary artery disease: ASA on hold, continue Coreg/Imdur/Lipitor, no chest pain      HTN: Blood pressure acceptable acutely.  Continue Coreg and Imdur    Type 2 diabetes:   -A1c 8.1.  Holding home oral meds while inpatient  -continue basal and sliding scale insulin    Thrombocytopenia: Mild and no active bleeding.  Not on anticoagulation.  Continue to monitor daily CBC     Parkinson's: with deep brain stimulator. No tremor, continue Sinemet, PT recommends SNF on discharge. PT also recommends follow up in neuro movement disorder clinic, which I agree with.       · SCDs for DVT prophylaxis.  · Full code.  · Discussed with patient and nursing staff.  · Anticipate discharge to SNU facility 1-2 days.  Pending results of endoscopy      Becky Sanches DO  [facility]  05/04/22  13:06 EDT    Patient was placed in face mask on first look.  I wore protective equipment throughout this patient encounter including a face mask, gloves and protective eyewear.  Hand hygiene was performed before donning protective equipment and after removal when leaving the room.        "

## 2022-05-04 NOTE — NURSING NOTE
Plan for colonoscopy/EGD today, pt completed bowel prep but only had 1 formed BM overnight. Procedure is not yet scheduled so this RN wanted to ensure this will be done today before administering tap water enema. Paged at 1004 and again at 112, made answering service aware this was second page, still awaiting a call back.     Addendum 1331: placed a third page to GI MD. Spoke with Donita/answering service.     Called endoscopy lab, spoke with Florence Wallis, states pt is not on the schedule for today or tomorrow.     Addendum 1345: called GI answering service a fourth time, spoke with Yessica, she states they are unsure why a call back has not been received and that their manager is looking into this.     Spoke with Dr Ferrer at 9000. RN explained that pt's bowels are not clear and enema was held, pending on if the procedure would be done today. MD states pt will be placed on the schedule for tomorrow. States ok to resume clear liquid diet today, NPO at midnight, and states he will order more bowel prep. Pt informed.

## 2022-05-04 NOTE — PROGRESS NOTES
[facility]  Inpatient Progress Note    Reason for Follow-up: Anemia    Subjective     Interval History:   Was supposed to have EGD and colonoscopy today however he is not cleared out the stool yet.  We will continue prepping this evening and plan for bidirectional endoscopy tomorrow.  RN states he had 1 formed bowel movement overnight.    He denies abdominal pain, nausea, vomiting.  Tolerating clear liquid diet well.    5/4 CBC with hemoglobin 7.6-stable, normocytic.  Platelets 139.    Current Facility-Administered Medications:   •  acetaminophen (TYLENOL) tablet 650 mg, 650 mg, Oral, Q4H PRN, Clary Moy APRN, 650 mg at 05/01/22 0921  •  allopurinol (ZYLOPRIM) tablet 100 mg, 100 mg, Oral, Daily, Clary Moy APRN, 100 mg at 05/03/22 0907  •  aluminum-magnesium hydroxide-simethicone (MAALOX MAX) 400-400-40 MG/5ML suspension 15 mL, 15 mL, Oral, Q6H PRN, Clary Moy APRN  •  atorvastatin (LIPITOR) tablet 40 mg, 40 mg, Oral, Daily, Clary Moy APRN, 40 mg at 05/03/22 0907  •  carbidopa-levodopa ER (SINEMET CR)  MG per tablet 1 tablet, 1 tablet, Oral, 4x Daily, Clary Moy APRN, 1 tablet at 05/03/22 2026  •  carvedilol (COREG) tablet 6.25 mg, 6.25 mg, Oral, BID, Clary Moy APRN, 6.25 mg at 05/03/22 2026  •  citalopram (CeleXA) tablet 20 mg, 20 mg, Oral, Daily, Clary Moy APRN, 20 mg at 05/03/22 0907  •  dextrose (D50W) (25 g/50 mL) IV injection 25 g, 25 g, Intravenous, Q15 Min PRN, Celso Benavides MD  •  dextrose (GLUTOSE) oral gel 15 g, 15 g, Oral, Q15 Min PRN, Celso Benavides MD  •  docusate sodium (COLACE) capsule 50 mg, 50 mg, Oral, Nightly, Clary Moy APRN, 50 mg at 05/03/22 2025  •  donepezil (ARICEPT) tablet 10 mg, 10 mg, Oral, Nightly, Calry Moy APRN, 10 mg at 05/03/22 2026  •  glucagon (human recombinant) (GLUCAGEN DIAGNOSTIC) injection 1 mg, 1 mg, Intramuscular, Q15 Min PRN, Celso Benavides,  MD  •  HYDROcodone-acetaminophen (NORCO) 5-325 MG per tablet 1 tablet, 1 tablet, Oral, Q6H PRN, Clary Moy APRN, 1 tablet at 05/04/22 0928  •  insulin glargine (LANTUS, SEMGLEE) injection 10 Units, 10 Units, Subcutaneous, Nightly, Becky Sanches DO, 10 Units at 05/03/22 2142  •  insulin lispro (ADMELOG) injection 0-9 Units, 0-9 Units, Subcutaneous, TID AC, Jaspreet Pollock MD, 7 Units at 05/03/22 1710  •  iopamidol (ISOVUE-370) 76 % injection 100 mL, 100 mL, Intravenous, Once in imaging, Jaspreet Pollock MD  •  isosorbide mononitrate (IMDUR) 24 hr tablet 30 mg, 30 mg, Oral, Q24H, Becky Sanches DO, 30 mg at 05/03/22 0907  •  levothyroxine (SYNTHROID, LEVOTHROID) tablet 12.5 mcg, 12.5 mcg, Oral, Q AM, Clary Moy APRN, 12.5 mcg at 05/04/22 0654  •  Magnesium Sulfate 2 gram Bolus, followed by 8 gram infusion (total Mg dose 10 grams)- Mg less than or equal to 1mg/dL, 2 g, Intravenous, PRN **OR** Magnesium Sulfate 2 gram / 50mL Infusion (GIVE X 3 BAGS TO EQUAL 6GM TOTAL DOSE) - Mg 1.1 - 1.5 mg/dl, 2 g, Intravenous, PRN **OR** Magnesium Sulfate 4 gram infusion- Mg 1.6-1.9 mg/dL, 4 g, Intravenous, PRN, Becky Sanches DO, Stopped at 05/03/22 1711  •  melatonin tablet 5 mg, 5 mg, Oral, Nightly PRN, Becky Sanches DO  •  nitroglycerin (NITROSTAT) SL tablet 0.4 mg, 0.4 mg, Sublingual, Q5 Min PRN, Clary Moy APRN  •  ondansetron (ZOFRAN) tablet 4 mg, 4 mg, Oral, Q6H PRN **OR** ondansetron (ZOFRAN) injection 4 mg, 4 mg, Intravenous, Q6H PRN, Clary Moy, APRN  •  pantoprazole (PROTONIX) injection 40 mg, 40 mg, Intravenous, BID AC, Becky Sanches DO, 40 mg at 05/04/22 0654  •  polyethylene glycol (GoLYTELY) solution 2,000 mL, 2,000 mL, Oral, Q8H, Brittny Muñoz PA-C  •  potassium chloride (K-DUR,KLOR-CON) ER tablet 40 mEq, 40 mEq, Oral, PRN, Becky Sanches DO, 40 mEq at 05/03/22 1356  •  potassium chloride (KLOR-CON) packet 40 mEq, 40 mEq, Oral, PRN, Myron  DO Becky  •  potassium phosphate 45 mmol in sodium chloride 0.9 % 500 mL infusion, 45 mmol, Intravenous, PRN **OR** potassium phosphate 30 mmol in sodium chloride 0.9 % 250 mL infusion, 30 mmol, Intravenous, PRN **OR** potassium phosphate 15 mmol in sodium chloride 0.9 % 100 mL infusion, 15 mmol, Intravenous, PRN **OR** sodium phosphates 40 mmol in sodium chloride 0.9 % 500 mL IVPB, 40 mmol, Intravenous, PRN **OR** sodium phosphates 20 mmol in sodium chloride 0.9 % 250 mL IVPB, 20 mmol, Intravenous, PRN, Becky Sanches DO  •  [COMPLETED] Insert peripheral IV, , , Once **AND** sodium chloride 0.9 % flush 10 mL, 10 mL, Intravenous, PRN, Amarilys Neal APRN, 10 mL at 05/03/22 2026  •  traZODone (DESYREL) tablet 25 mg, 25 mg, Oral, Nightly, Clary Moy APRN, 25 mg at 05/03/22 2026  Review of Systems:    The following systems were reviewed and negative;  respiratory and cardiovascular    Objective     Vital Signs  Temp:  [97.6 °F (36.4 °C)-98 °F (36.7 °C)] 98 °F (36.7 °C)  Heart Rate:  [75-83] 81  Resp:  [18] 18  BP: ()/(53-78) 105/67  Body mass index is 25.88 kg/m².  No intake or output data in the 24 hours ending 05/04/22 1356  No intake/output data recorded.     Physical Exam:    General: patient awake, alert and cooperative   Eyes: Normal lids and lashes, no scleral icterus   Skin: warm and dry, not jaundiced   Pulm: regular and unlabored   Abdomen: soft, nontender, nondistended; normal bowel sounds   Psychiatric: Normal mood and behavior; memory intact     Results Review:     I reviewed the patient's new clinical results.    Results from last 7 days   Lab Units 05/04/22  0547 05/03/22  2159 05/03/22  1512 05/03/22  0812 05/02/22  1251 05/02/22  0523   WBC 10*3/mm3 4.57  --   --  4.26  --  5.63   HEMOGLOBIN g/dL 7.6* 7.8* 7.0* 7.4*  7.4*   < > 7.7*  7.7*   HEMATOCRIT % 24.2* 23.9* 21.7* 23.0*  23.0*   < > 23.7*  23.7*   PLATELETS 10*3/mm3 139*  --   --  128*  --  133*    < > =  values in this interval not displayed.     Results from last 7 days   Lab Units 05/04/22  0547 05/03/22  0811 05/02/22  0522 05/01/22  2144 04/30/22  1949   SODIUM mmol/L 138 140 137   < > 137   POTASSIUM mmol/L 3.8 3.3* 3.4*   < > 3.9   CHLORIDE mmol/L 106 104 104   < > 102   CO2 mmol/L 24.4 24.9 24.6   < > 20.0*   BUN mg/dL 9 12 12   < > 26*   CREATININE mg/dL 0.58* 0.71* 0.68*   < > 0.69*   CALCIUM mg/dL 8.4* 8.4* 8.2*   < > 8.3*   BILIRUBIN mg/dL  --   --  0.7  --  0.4   ALK PHOS U/L  --   --  52  --  61   ALT (SGPT) U/L  --   --  7  --  <5   AST (SGOT) U/L  --   --  9  --  9   GLUCOSE mg/dL 144* 271* 188*   < > 263*    < > = values in this interval not displayed.         No results found for: LIPASE    Radiology:  CT Head Without Contrast   Final Result       1. No acute intracranial hemorrhage.   2. Interval enlargement of a frontal scalp hematoma, which has become   more well organized as well.       Radiation dose reduction techniques were utilized, including automated   exposure control and exposure modulation based on body size.       This report was finalized on 5/1/2022 11:05 PM by Dr. Alyssa Dos Santos M.D.          XR Facial Bones 3+ View   Final Result      CT Head Without Contrast   Final Result   Postoperative changes as noted. No acute intracranial   abnormalities are identified.       This report was finalized on 4/30/2022 4:31 PM by Dr. Robert Paul M.D.          CT Cervical Spine Without Contrast   Final Result   Extensive multilevel degenerative disc changes as described.   There is no evidence of acute fracture or subluxation.       This report was finalized on 4/30/2022 4:36 PM by Dr. Robert Paul M.D.              Assessment/Plan     Patient Active Problem List   Diagnosis   • ASCVD (arteriosclerotic cardiovascular disease)   • Paroxysmal atrial fibrillation (HCC)   • Diabetic retinopathy associated with type 2 diabetes mellitus (HCC)   • Essential hypertension   • HLD  (hyperlipidemia)   • Hypothyroidism   • Peripheral neuropathy   • Renal insufficiency   • Type 2 diabetes mellitus with hyperglycemia, without long-term current use of insulin (HCC)   • Parkinson's disease (HCC)   • Dyspnea on exertion   • Atrial fibrillation (CMS/HCC) [I48.91]   • Stroke-like symptoms   • Hypopotassemia   • Ankle pain   • Acute idiopathic gout of right ankle   • Generalized weakness   • Head injury due to trauma   • Acute blood loss anemia   • Chronic anticoagulation   • Thrombocytopenia (HCC)   • Screening for colon cancer       Assessment:  1. Anemia, stable  2. Dysphagia      Plan:  · Previous colonoscopy with no colon polyps per patient but he does state he is due for colon cancer screening.  No overt bleeding noted from the GI tract at this time.  · Continues to have low hemoglobin around 7 although stable.  Continue to monitor H&H and transfuse per primary hospital team.  · Continue to monitor for evidence of overt GI bleed.  · Will reprep today with an additional 2 L of GoLytely this evening and clear liquid diet.  N.p.o. after midnight in preparation for bidirectional endoscopy tomorrow.    I discussed the patients findings and my recommendations with patient and nursing staff.    Dragon dictation used throughout this note.            Brittny Muñoz PA-C  [facility]  [address] [place] [address]  Office: [phone]

## 2022-05-04 NOTE — THERAPY TREATMENT NOTE
Patient Name: Edilberto Reeder  : 1950    MRN: [id number]                              Today's Date: 2022       Admit Date: 2022    Visit Dx:     ICD-10-CM ICD-9-CM   1. Minor head injury, initial encounter  S09.90XA 959.01   2. Laceration of forehead, initial encounter  S01.81XA 873.42   3. Anemia, unspecified type  D64.9 285.9   4. Acute blood loss anemia  D62 285.1   5. Screening for colon cancer  Z12.11 V76.51     Patient Active Problem List   Diagnosis   • ASCVD (arteriosclerotic cardiovascular disease)   • Paroxysmal atrial fibrillation (HCC)   • Diabetic retinopathy associated with type 2 diabetes mellitus (HCC)   • Essential hypertension   • HLD (hyperlipidemia)   • Hypothyroidism   • Peripheral neuropathy   • Renal insufficiency   • Type 2 diabetes mellitus with hyperglycemia, without long-term current use of insulin (HCC)   • Parkinson's disease (HCC)   • Dyspnea on exertion   • Atrial fibrillation (CMS/HCC) [I48.91]   • Stroke-like symptoms   • Hypopotassemia   • Ankle pain   • Acute idiopathic gout of right ankle   • Generalized weakness   • Head injury due to trauma   • Acute blood loss anemia   • Chronic anticoagulation   • Thrombocytopenia (HCC)   • Screening for colon cancer     Past Medical History:   Diagnosis Date   • Atrial fibrillation with RVR (HCC)    • Atrial flutter (HCC)    • Diabetes (HCC)    • HLD (hyperlipidemia) 2016   • Hypertension    • Parkinson's disease (HCC)     Advanced      Past Surgical History:   Procedure Laterality Date   • BRAIN STIMULATOR     • JOINT REPLACEMENT      Bilateral shoulder and knee replacements      General Information     Row Name 22 1534          Physical Therapy Time and Intention    Document Type therapy note (daily note)  -CB     Mode of Treatment individual therapy;physical therapy  -CB     Row Name 22 1534          General Information    Patient Profile Reviewed yes  -CB     Existing Precautions/Restrictions fall  -CB      Row Name 05/04/22 1534          Cognition    Orientation Status (Cognition) oriented x 3  -CB           User Key  (r) = Recorded By, (t) = Taken By, (c) = Cosigned By    Initials Name Provider Type    Shauna Celeste, PT Physical Therapist               Mobility     Row Name 05/04/22 1534          Bed Mobility    Comment, (Bed Mobility) pt denies bed mobility and trasnfers as he has been prepping for colonosopy all day. agreeable to bed exercises  -CB           User Key  (r) = Recorded By, (t) = Taken By, (c) = Cosigned By    Initials Name Provider Type    Shauna Celeste, PT Physical Therapist               Obj/Interventions     Row Name 05/04/22 1534          Motor Skills    Therapeutic Exercise --  supine ther ex AP, SAQ, SLR, hip add/abd, GS, HS x10 reps  -CB           User Key  (r) = Recorded By, (t) = Taken By, (c) = Cosigned By    Initials Name Provider Type    Shauna Celeste, PT Physical Therapist               Goals/Plan    No documentation.                Clinical Impression     Row Name 05/04/22 1536          Pain    Pretreatment Pain Rating 0/10 - no pain  -CB     Posttreatment Pain Rating 0/10 - no pain  -CB     Row Name 05/04/22 1536          Plan of Care Review    Plan of Care Reviewed With patient  -CB     Progress no change  -CB     Outcome Evaluation Patient is agreeable to bed exercises only as he has been prepping for a colonoscopy all day. He completed supine ther ex with demo provided and demonstrated understanding. Pt also educated again regarding OP neuro PT that specializes in PD after SNF stay. Will continue to progress pt as he tolerates.  -CB     Row Name 05/04/22 1536          Positioning and Restraints    Pre-Treatment Position in bed  -CB     Post Treatment Position bed  -CB     In Bed fowlers;call light within reach;encouraged to call for assist;exit alarm on;side rails up x2;notified nsg  -CB           User Key  (r) = Recorded By, (t) = Taken By, (c) = Cosigned By     Initials Name Provider Type    Shauna Celeste, DIONE Physical Therapist               Outcome Measures     Row Name 05/04/22 1537          How much help from another person do you currently need...    Turning from your back to your side while in flat bed without using bedrails? 3  -CB     Moving from lying on back to sitting on the side of a flat bed without bedrails? 3  -CB     Moving to and from a bed to a chair (including a wheelchair)? 3  -CB     Standing up from a chair using your arms (e.g., wheelchair, bedside chair)? 3  -CB     Climbing 3-5 steps with a railing? 2  -CB     To walk in hospital room? 3  -CB     AM-PAC 6 Clicks Score (PT) 17  -CB     Highest level of mobility 5 --> Static standing  -CB     Row Name 05/04/22 1537          Functional Assessment    Outcome Measure Options AM-PAC 6 Clicks Basic Mobility (PT)  -CB           User Key  (r) = Recorded By, (t) = Taken By, (c) = Cosigned By    Initials Name Provider Type    Shauna Celeste, DIONE Physical Therapist                             Physical Therapy Education                 Title: PT OT SLP Therapies (Done)     Topic: Physical Therapy (Done)     Point: Mobility training (Done)     Learning Progress Summary           Patient Acceptance, E,D,TB, VU,DU,NR by  at 5/3/2022 2037    Acceptance, E,TB,D, VU,DU,NR by  at 5/2/2022 2313    Acceptance, E,TB,D, VU,NR by  at 5/2/2022 1438                   Point: Home exercise program (Done)     Learning Progress Summary           Patient Acceptance, E,TB,D, VU,NR by  at 5/4/2022 1538    Acceptance, E,D,TB, VU,DU,NR by  at 5/3/2022 2037    Acceptance, E,TB,D, VU,DU,NR by  at 5/2/2022 2313                   Point: Body mechanics (Done)     Learning Progress Summary           Patient Acceptance, E,D,TB, VU,DU,NR by  at 5/3/2022 2037    Acceptance, E,TB,D, VU,DU,NR by  at 5/2/2022 2313    Acceptance, E,TB,D, VU,NR by  at 5/2/2022 1438                   Point: Precautions (Done)     Learning  Progress Summary           Patient Acceptance, E,D,TB, VU,DU,NR by  at 5/3/2022 2037    Acceptance, E,TB,D, VU,DU,NR by  at 5/2/2022 2313    Acceptance, E,TB,D, VU,NR by  at 5/2/2022 1438                               User Key     Initials Effective Dates Name Provider Type Discipline     06/16/21 -  Donita Caban, RN Registered Nurse Nurse     10/22/21 -  Shauna Gordillo, PT Physical Therapist PT              PT Recommendation and Plan  Planned Therapy Interventions (PT): balance training, bed mobility training, gait training, home exercise program, patient/family education, strengthening, transfer training  Plan of Care Reviewed With: patient  Progress: no change  Outcome Evaluation: Patient is agreeable to bed exercises only as he has been prepping for a colonoscopy all day. He completed supine ther ex with demo provided and demonstrated understanding. Pt also educated again regarding OP neuro PT that specializes in PD after SNF stay. Will continue to progress pt as he tolerates.     Time Calculation:    PT Charges     Row Name 05/04/22 1546             Time Calculation    Start Time 1436  -CB      Stop Time 1448  -CB      Time Calculation (min) 12 min  -CB      PT Received On 05/04/22  -      PT - Next Appointment 05/05/22  -CB              Time Calculation- PT    Total Timed Code Minutes- PT 12 minute(s)  -CB              Timed Charges    42907 - PT Therapeutic Exercise Minutes 12  -CB              Total Minutes    Timed Charges Total Minutes 12  -CB       Total Minutes 12  -CB            User Key  (r) = Recorded By, (t) = Taken By, (c) = Cosigned By    Initials Name Provider Type    CB Shauna Gordillo, PT Physical Therapist              Therapy Charges for Today     Code Description Service Date Service Provider Modifiers Qty    92920803241 HC PT THER PROC EA 15 MIN 5/4/2022 Shauna Gordillo, PT GP 1          PT G-Codes  Outcome Measure Options: AM-PAC 6 Clicks Basic Mobility (PT)  AM-PAC 6 Clicks Score  (PT): 17  AM-PAC 6 Clicks Score (OT): 15  Modified [place] Scale: 4 - Moderately severe disability.  Unable to walk without assistance, and unable to attend to own bodily needs without assistance.    Shauna Gordillo, PT  5/4/2022

## 2022-05-04 NOTE — PLAN OF CARE
Goal Outcome Evaluation:  Plan of Care Reviewed With: patient        Progress: no change  Outcome Evaluation: Patient is agreeable to bed exercises only as he has been prepping for a colonoscopy all day. He completed supine ther ex with demo provided and demonstrated understanding. Pt also educated again regarding OP neuro PT that specializes in PD after SNF stay. Will continue to progress pt as he tolerates.

## 2022-05-04 NOTE — PLAN OF CARE
Goal Outcome Evaluation:  Plan of Care Reviewed With: patient        Progress: no change     VSS on RA, A&O x 4. See previous nursing note, colonoscopy rescheduled for tomorrow 5/5. Pt very upset about this scheduling change, wished to escalate his concerns, pt talked to nurse manager Shena. Clear liquid diet, NPO at midnight. Bowel prep started at 1730, pt had large, loose, light brown BM at 1850. Wife at bedside.

## 2022-05-04 NOTE — SIGNIFICANT NOTE
05/04/22 1348   OTHER   Discipline occupational therapist   Rehab Time/Intention   Session Not Performed other (see comments)  (Pt declined due to not feeling well today. Waiting on colonoscopy. Will try back as time allows.)   Recommendation   OT - Next Appointment 05/05/22

## 2022-05-05 ENCOUNTER — ANESTHESIA EVENT (OUTPATIENT)
Dept: GASTROENTEROLOGY | Facility: HOSPITAL | Age: 72
End: 2022-05-05

## 2022-05-05 ENCOUNTER — ANESTHESIA (OUTPATIENT)
Dept: GASTROENTEROLOGY | Facility: HOSPITAL | Age: 72
End: 2022-05-05

## 2022-05-05 LAB
ANION GAP SERPL CALCULATED.3IONS-SCNC: 11 MMOL/L (ref 5–15)
BASOPHILS # BLD AUTO: 0.04 10*3/MM3 (ref 0–0.2)
BASOPHILS NFR BLD AUTO: 0.9 % (ref 0–1.5)
BUN SERPL-MCNC: 7 MG/DL (ref 8–23)
BUN/CREAT SERPL: 9.9 (ref 7–25)
CALCIUM SPEC-SCNC: 8.4 MG/DL (ref 8.6–10.5)
CHLORIDE SERPL-SCNC: 105 MMOL/L (ref 98–107)
CO2 SERPL-SCNC: 25 MMOL/L (ref 22–29)
CREAT SERPL-MCNC: 0.71 MG/DL (ref 0.76–1.27)
DEPRECATED RDW RBC AUTO: 45.2 FL (ref 37–54)
EGFRCR SERPLBLD CKD-EPI 2021: 96.3 ML/MIN/1.73
EOSINOPHIL # BLD AUTO: 0.13 10*3/MM3 (ref 0–0.4)
EOSINOPHIL NFR BLD AUTO: 2.9 % (ref 0.3–6.2)
ERYTHROCYTE [DISTWIDTH] IN BLOOD BY AUTOMATED COUNT: 14.8 % (ref 12.3–15.4)
GLUCOSE BLDC GLUCOMTR-MCNC: 122 MG/DL (ref 70–130)
GLUCOSE BLDC GLUCOMTR-MCNC: 145 MG/DL (ref 70–130)
GLUCOSE BLDC GLUCOMTR-MCNC: 216 MG/DL (ref 70–130)
GLUCOSE BLDC GLUCOMTR-MCNC: 269 MG/DL (ref 70–130)
GLUCOSE SERPL-MCNC: 131 MG/DL (ref 65–99)
HCT VFR BLD AUTO: 23.4 % (ref 37.5–51)
HCT VFR BLD AUTO: 29.3 % (ref 37.5–51)
HGB BLD-MCNC: 7.6 G/DL (ref 13–17.7)
HGB BLD-MCNC: 9.1 G/DL (ref 13–17.7)
IMM GRANULOCYTES # BLD AUTO: 0.01 10*3/MM3 (ref 0–0.05)
IMM GRANULOCYTES NFR BLD AUTO: 0.2 % (ref 0–0.5)
LYMPHOCYTES # BLD AUTO: 1.1 10*3/MM3 (ref 0.7–3.1)
LYMPHOCYTES NFR BLD AUTO: 24.6 % (ref 19.6–45.3)
MCH RBC QN AUTO: 27.4 PG (ref 26.6–33)
MCHC RBC AUTO-ENTMCNC: 32.5 G/DL (ref 31.5–35.7)
MCV RBC AUTO: 84.5 FL (ref 79–97)
MONOCYTES # BLD AUTO: 0.38 10*3/MM3 (ref 0.1–0.9)
MONOCYTES NFR BLD AUTO: 8.5 % (ref 5–12)
NEUTROPHILS NFR BLD AUTO: 2.81 10*3/MM3 (ref 1.7–7)
NEUTROPHILS NFR BLD AUTO: 62.9 % (ref 42.7–76)
NRBC BLD AUTO-RTO: 0.2 /100 WBC (ref 0–0.2)
PLATELET # BLD AUTO: 162 10*3/MM3 (ref 140–450)
PMV BLD AUTO: 10.9 FL (ref 6–12)
POTASSIUM SERPL-SCNC: 3.5 MMOL/L (ref 3.5–5.2)
RBC # BLD AUTO: 2.77 10*6/MM3 (ref 4.14–5.8)
SODIUM SERPL-SCNC: 141 MMOL/L (ref 136–145)
WBC NRBC COR # BLD: 4.47 10*3/MM3 (ref 3.4–10.8)

## 2022-05-05 PROCEDURE — 85025 COMPLETE CBC W/AUTO DIFF WBC: CPT | Performed by: STUDENT IN AN ORGANIZED HEALTH CARE EDUCATION/TRAINING PROGRAM

## 2022-05-05 PROCEDURE — 85018 HEMOGLOBIN: CPT | Performed by: INTERNAL MEDICINE

## 2022-05-05 PROCEDURE — 63710000001 INSULIN LISPRO (HUMAN) PER 5 UNITS: Performed by: INTERNAL MEDICINE

## 2022-05-05 PROCEDURE — 82962 GLUCOSE BLOOD TEST: CPT

## 2022-05-05 PROCEDURE — 88305 TISSUE EXAM BY PATHOLOGIST: CPT | Performed by: INTERNAL MEDICINE

## 2022-05-05 PROCEDURE — 45385 COLONOSCOPY W/LESION REMOVAL: CPT | Performed by: INTERNAL MEDICINE

## 2022-05-05 PROCEDURE — 96376 TX/PRO/DX INJ SAME DRUG ADON: CPT

## 2022-05-05 PROCEDURE — 25010000002 PROPOFOL 10 MG/ML EMULSION: Performed by: NURSE ANESTHETIST, CERTIFIED REGISTERED

## 2022-05-05 PROCEDURE — 43239 EGD BIOPSY SINGLE/MULTIPLE: CPT | Performed by: INTERNAL MEDICINE

## 2022-05-05 PROCEDURE — 25010000002 PHENYLEPHRINE 10 MG/ML SOLUTION: Performed by: NURSE ANESTHETIST, CERTIFIED REGISTERED

## 2022-05-05 PROCEDURE — 85014 HEMATOCRIT: CPT | Performed by: INTERNAL MEDICINE

## 2022-05-05 PROCEDURE — 80048 BASIC METABOLIC PNL TOTAL CA: CPT | Performed by: STUDENT IN AN ORGANIZED HEALTH CARE EDUCATION/TRAINING PROGRAM

## 2022-05-05 RX ORDER — PHENYLEPHRINE HYDROCHLORIDE 10 MG/ML
INJECTION INTRAVENOUS AS NEEDED
Status: DISCONTINUED | OUTPATIENT
Start: 2022-05-05 | End: 2022-05-05 | Stop reason: SURG

## 2022-05-05 RX ORDER — SODIUM CHLORIDE, SODIUM LACTATE, POTASSIUM CHLORIDE, CALCIUM CHLORIDE 600; 310; 30; 20 MG/100ML; MG/100ML; MG/100ML; MG/100ML
30 INJECTION, SOLUTION INTRAVENOUS CONTINUOUS PRN
Status: DISCONTINUED | OUTPATIENT
Start: 2022-05-05 | End: 2022-05-08 | Stop reason: HOSPADM

## 2022-05-05 RX ORDER — LIDOCAINE HYDROCHLORIDE 20 MG/ML
INJECTION, SOLUTION INFILTRATION; PERINEURAL AS NEEDED
Status: DISCONTINUED | OUTPATIENT
Start: 2022-05-05 | End: 2022-05-05 | Stop reason: SURG

## 2022-05-05 RX ORDER — PROMETHAZINE HYDROCHLORIDE 25 MG/1
25 SUPPOSITORY RECTAL ONCE AS NEEDED
Status: DISCONTINUED | OUTPATIENT
Start: 2022-05-05 | End: 2022-05-05 | Stop reason: HOSPADM

## 2022-05-05 RX ORDER — LIDOCAINE 50 MG/G
1 PATCH TOPICAL
Status: DISCONTINUED | OUTPATIENT
Start: 2022-05-06 | End: 2022-05-08 | Stop reason: HOSPADM

## 2022-05-05 RX ORDER — PROPOFOL 10 MG/ML
VIAL (ML) INTRAVENOUS CONTINUOUS PRN
Status: DISCONTINUED | OUTPATIENT
Start: 2022-05-05 | End: 2022-05-05 | Stop reason: SURG

## 2022-05-05 RX ORDER — PROMETHAZINE HYDROCHLORIDE 25 MG/1
25 TABLET ORAL ONCE AS NEEDED
Status: DISCONTINUED | OUTPATIENT
Start: 2022-05-05 | End: 2022-05-05 | Stop reason: HOSPADM

## 2022-05-05 RX ADMIN — CARVEDILOL 6.25 MG: 6.25 TABLET, FILM COATED ORAL at 20:45

## 2022-05-05 RX ADMIN — PANTOPRAZOLE SODIUM 40 MG: 40 INJECTION, POWDER, FOR SOLUTION INTRAVENOUS at 06:41

## 2022-05-05 RX ADMIN — PROPOFOL 50 MG: 10 INJECTION, EMULSION INTRAVENOUS at 11:42

## 2022-05-05 RX ADMIN — POTASSIUM CHLORIDE 40 MEQ: 10 TABLET, EXTENDED RELEASE ORAL at 17:54

## 2022-05-05 RX ADMIN — CARVEDILOL 6.25 MG: 6.25 TABLET, FILM COATED ORAL at 15:52

## 2022-05-05 RX ADMIN — PANTOPRAZOLE SODIUM 40 MG: 40 INJECTION, POWDER, FOR SOLUTION INTRAVENOUS at 16:02

## 2022-05-05 RX ADMIN — LEVOTHYROXINE SODIUM 12.5 MCG: 0.03 TABLET ORAL at 06:41

## 2022-05-05 RX ADMIN — ISOSORBIDE MONONITRATE 30 MG: 30 TABLET ORAL at 15:52

## 2022-05-05 RX ADMIN — LIDOCAINE HYDROCHLORIDE 60 MG: 20 INJECTION, SOLUTION INFILTRATION; PERINEURAL at 11:42

## 2022-05-05 RX ADMIN — POLYETHYLENE GLYCOL 3350 AND ELECTROLYTES WITH LEMON FLAVOR 2000 ML: 236; 22.74; 6.74; 5.86; 2.97 POWDER, FOR SOLUTION ORAL at 05:36

## 2022-05-05 RX ADMIN — CARBIDOPA AND LEVODOPA 1 TABLET: 25; 100 TABLET, EXTENDED RELEASE ORAL at 13:27

## 2022-05-05 RX ADMIN — INSULIN LISPRO 4 UNITS: 100 INJECTION, SOLUTION INTRAVENOUS; SUBCUTANEOUS at 16:02

## 2022-05-05 RX ADMIN — ALLOPURINOL 100 MG: 100 TABLET ORAL at 15:52

## 2022-05-05 RX ADMIN — TRAZODONE HYDROCHLORIDE 25 MG: 50 TABLET ORAL at 20:43

## 2022-05-05 RX ADMIN — PHENYLEPHRINE HYDROCHLORIDE 100 MCG: 10 INJECTION, SOLUTION INTRAVENOUS at 11:57

## 2022-05-05 RX ADMIN — CARBIDOPA AND LEVODOPA 1 TABLET: 25; 100 TABLET, EXTENDED RELEASE ORAL at 17:03

## 2022-05-05 RX ADMIN — DOCUSATE SODIUM 50 MG: 50 CAPSULE, LIQUID FILLED ORAL at 20:44

## 2022-05-05 RX ADMIN — CITALOPRAM 20 MG: 20 TABLET, FILM COATED ORAL at 15:52

## 2022-05-05 RX ADMIN — ATORVASTATIN CALCIUM 40 MG: 20 TABLET, FILM COATED ORAL at 16:02

## 2022-05-05 RX ADMIN — CARBIDOPA AND LEVODOPA 1 TABLET: 25; 100 TABLET, EXTENDED RELEASE ORAL at 20:45

## 2022-05-05 RX ADMIN — SODIUM CHLORIDE, POTASSIUM CHLORIDE, SODIUM LACTATE AND CALCIUM CHLORIDE: 600; 310; 30; 20 INJECTION, SOLUTION INTRAVENOUS at 11:38

## 2022-05-05 RX ADMIN — DONEPEZIL HYDROCHLORIDE 10 MG: 10 TABLET, FILM COATED ORAL at 20:43

## 2022-05-05 RX ADMIN — HYDROCODONE BITARTRATE AND ACETAMINOPHEN 1 TABLET: 5; 325 TABLET ORAL at 20:44

## 2022-05-05 RX ADMIN — PROPOFOL 180 MCG/KG/MIN: 10 INJECTION, EMULSION INTRAVENOUS at 11:43

## 2022-05-05 RX ADMIN — INSULIN GLARGINE-YFGN 10 UNITS: 100 INJECTION, SOLUTION SUBCUTANEOUS at 20:44

## 2022-05-05 NOTE — ANESTHESIA PREPROCEDURE EVALUATION
Anesthesia Evaluation     Patient summary reviewed and Nursing notes reviewed   NPO Solid Status: > 8 hours  NPO Liquid Status: > 4 hours           Airway   Mallampati: II  Neck ROM: full  No difficulty expected  Dental - normal exam     Pulmonary     breath sounds clear to auscultation  (+) shortness of breath,   Cardiovascular     Rhythm: regular    (+) hypertension, dysrhythmias Atrial Fib, Atrial Flutter, hyperlipidemia,       Neuro/Psych  (+) numbness,      ROS Comment: Parkinsons, with deep brain stimulator, diabetic retinopathy  GI/Hepatic/Renal/Endo    (+)   renal disease, diabetes mellitus, thyroid problem hypothyroidism    Musculoskeletal     Abdominal    Substance History      OB/GYN          Other   arthritis,          Phys Exam Other: Sutured laceration from fall 5 days ago                Anesthesia Plan    ASA 4     MAC     intravenous induction           CODE STATUS:    Code Status (Patient has no pulse and is not breathing): CPR (Attempt to Resuscitate)  Medical Interventions (Patient has pulse or is breathing): Full Support

## 2022-05-05 NOTE — PLAN OF CARE
Goal Outcome Evaluation:  Plan of Care Reviewed With: patient        Progress: improving  Outcome Evaluation: VSS, bowel prep given, plan for EGD & Colonoscopy on 5/5, no c/o of pain or nausea, Hgb stable, will CTM

## 2022-05-05 NOTE — PROGRESS NOTES
"    Name: Edilberto Reeder ADMIT: 2022   : 1950  PCP: Harvey Larson MD    MRN: [id number] LOS: 1 days   AGE/SEX: 72 y.o. male  ROOM: ENDO/ENDO     Subjective   Subjective   Doing okay, says \"I'm still here\".  Unfortunately had to go through bowel prep a second time today.  Bowel movements are clear this morning.    Review of Systems  Denies chest pain, shortness of breath, abdominal pain, fevers     Objective   Objective   Vital Signs  Temp:  [97.7 °F (36.5 °C)-98.1 °F (36.7 °C)] 97.8 °F (36.6 °C)  Heart Rate:  [71-84] 84  Resp:  [16-18] 18  BP: ()/(57-76) 130/76  SpO2:  [93 %-99 %] 99 %  on   ;   Device (Oxygen Therapy): room air  Body mass index is 25.88 kg/m².  Physical Exam  Constitutional:       General: He is not in acute distress.     Appearance: He is not diaphoretic.   HENT:      Head:      Comments: Periorbital bruising, improving.  Right scalp hematoma and sutured laceration  Cardiovascular:      Rate and Rhythm: Normal rate and regular rhythm.   Pulmonary:      Effort: Pulmonary effort is normal. No respiratory distress.      Breath sounds: No wheezing or rhonchi.   Abdominal:      Palpations: Abdomen is soft.      Tenderness: There is no abdominal tenderness. There is no guarding.   Musculoskeletal:      Right lower leg: No edema.      Left lower leg: No edema.   Skin:     Comments: Skin tears on chin and left elbow   Neurological:      Mental Status: He is alert.   Psychiatric:         Mood and Affect: Mood normal.         Results Review     I reviewed the patient's new clinical results.  Results from last 7 days   Lab Units 22  0630 22  0547 22  2159 22  1512 22  0812 22  1251 22  0523   WBC 10*3/mm3 4.47 4.57  --   --  4.26  --  5.63   HEMOGLOBIN g/dL 7.6* 7.6* 7.8* 7.0* 7.4*  7.4*   < > 7.7*  7.7*   PLATELETS 10*3/mm3 162 139*  --   --  128*  --  133*    < > = values in this interval not displayed.     Results from last 7 days   Lab " Units 05/05/22  0630 05/04/22  0547 05/03/22  0811 05/02/22 0522   SODIUM mmol/L 141 138 140 137   POTASSIUM mmol/L 3.5 3.8 3.3* 3.4*   CHLORIDE mmol/L 105 106 104 104   CO2 mmol/L 25.0 24.4 24.9 24.6   BUN mg/dL 7* 9 12 12   CREATININE mg/dL 0.71* 0.58* 0.71* 0.68*   GLUCOSE mg/dL 131* 144* 271* 188*   Estimated Creatinine Clearance: 110.6 mL/min (A) (by C-G formula based on SCr of 0.71 mg/dL (L)).  Results from last 7 days   Lab Units 05/02/22 0522 04/30/22 1949   ALBUMIN g/dL 3.30* 3.60   BILIRUBIN mg/dL 0.7 0.4   ALK PHOS U/L 52 61   AST (SGOT) U/L 9 9   ALT (SGPT) U/L 7 <5     Results from last 7 days   Lab Units 05/05/22 0630 05/04/22  0547 05/03/22  0811 05/02/22 0522 05/01/22  2144 04/30/22  1949   CALCIUM mg/dL 8.4* 8.4* 8.4* 8.2*   < > 8.3*   ALBUMIN g/dL  --   --   --  3.30*  --  3.60   MAGNESIUM mg/dL  --  1.9 1.6 1.5*  --   --    PHOSPHORUS mg/dL  --  3.4 3.5  --   --   --     < > = values in this interval not displayed.       COVID19   Date Value Ref Range Status   05/04/2022 Not Detected Not Detected - Ref. Range Final   04/30/2022 Not Detected Not Detected - Ref. Range Final     Glucose   Date/Time Value Ref Range Status   05/05/2022 0625 145 (H) 70 - 130 mg/dL Final     Comment:     [id number] : [id number] [facility]   05/04/2022 2114 231 (H) 70 - 130 mg/dL Final     Comment:     Meter: [id number] : [id number] Tia Gayle NA   05/04/2022 1535 256 (H) 70 - 130 mg/dL Final     Comment:     Meter: [id number] : [id number] Viridiana Pruitt    05/04/2022 1031 183 (H) 70 - 130 mg/dL Final     Comment:     Meter: [id number] : [id number] Viridiana Pruitt    05/04/2022 0604 159 (H) 70 - 130 mg/dL Final     Comment:     Meter: [id number] : [id number] Evonne Lemons    05/03/2022 2108 220 (H) 70 - 130 mg/dL Final     Comment:     Meter: [id number] : [id number] Evonne Lvgabriele    05/03/2022 1530 307 (H) 70 - 130 mg/dL Final     Comment:     Meter: [id number] :  [id number] Viridiana MILLAN       CT Head Without Contrast  Narrative: CT HEAD WITHOUT CONTRAST     HISTORY: Fall. HISTORY of blood thinners.     COMPARISON: 04/30/2022     TECHNIQUE: Axial CT imaging was obtained through the brain. No IV  contrast was administered.     FINDINGS:  Bifrontal deep thalamic stimulators are again noted. This results in  significant artifact. No obvious acute intracranial hemorrhage is seen.  There is diffuse atrophy. There is periventricular and deep white matter  microangiopathic change. There do appear to be tiny old infarcts within  the right cerebellar hemisphere. There is no midline shift or mass  effect. There is a frontal scalp hematoma, measuring 3.3 x 1.2 cm. This  has become larger and more well organized than on prior exam, although  generalized frontal scalp edema has improved. There is also some soft  tissue swelling seen overlying the dorsum of the nose, and there are  also appears to be bilateral. Orbital soft tissue swelling. Globes  appear intact.. Paranasal sinuses appear clear.     Impression:    1. No acute intracranial hemorrhage.  2. Interval enlargement of a frontal scalp hematoma, which has become  more well organized as well.     Radiation dose reduction techniques were utilized, including automated  exposure control and exposure modulation based on body size.     This report was finalized on 5/1/2022 11:05 PM by Dr. Alyssa Dos Santos M.D.     XR Facial Bones 3+ View  FACIAL BONE X-RAYS     CLINICAL HISTORY: Patient fell. Facial pain. Laceration.     A total 4 views were obtained. No bony abnormalities are identified.  There is no evidence of facial bone fracture. However, the nasal bone  was not well visualized on the lateral view and therefore a tiny distal  nasal bone fracture cannot be excluded. Also, the zygomatic arches were  not imaged. The paranasal sinuses are well aerated. Deep brain  stimulator electrodes are noted bilaterally.     This report was  finalized on 5/1/2022 9:03 AM by Dr. Robert Paul M.D.       I reviewed the patient's daily medications.  Scheduled Medications  allopurinol, 100 mg, Oral, Daily  atorvastatin, 40 mg, Oral, Daily  carbidopa-levodopa ER, 1 tablet, Oral, 4x Daily  carvedilol, 6.25 mg, Oral, BID  citalopram, 20 mg, Oral, Daily  docusate sodium, 50 mg, Oral, Nightly  donepezil, 10 mg, Oral, Nightly  insulin glargine, 10 Units, Subcutaneous, Nightly  insulin lispro, 0-9 Units, Subcutaneous, TID AC  iopamidol, 100 mL, Intravenous, Once in imaging  isosorbide mononitrate, 30 mg, Oral, Q24H  levothyroxine, 12.5 mcg, Oral, Q AM  pantoprazole, 40 mg, Intravenous, BID AC  traZODone, 25 mg, Oral, Nightly    Infusions  lactated ringers, 30 mL/hr    Diet  NPO Diet NPO Type: Strict NPO       Assessment/Plan     Active Hospital Problems    Diagnosis  POA   • **Head injury due to trauma [S09.90XA]  Yes   • Thrombocytopenia (HCC) [D69.6]  Yes   • Screening for colon cancer [Z12.11]  Not Applicable   • Minor head injury, initial encounter [S09.90XA]  Yes   • Acute blood loss anemia [D62]  Yes   • Chronic anticoagulation [Z79.01]  Not Applicable   • Parkinson's disease (HCC) [G20]  Yes   • Type 2 diabetes mellitus with hyperglycemia, without long-term current use of insulin (HCC) [E11.65]  Yes   • Paroxysmal atrial fibrillation (HCC) [I48.0]  Yes   • HLD (hyperlipidemia) [E78.5]  Yes   • Essential hypertension [I10]  Yes   • ASCVD (arteriosclerotic cardiovascular disease) [I25.10]  Yes      Resolved Hospital Problems   No resolved problems to display.       72 y.o. non-smoker with a history of type 2 diabetes, Parkinson's disease, paroxysmal atrial fibrillation on chronic AC (Eliquis), HLD, HTN, and cardiovascular disease who presented with head laceration secondary to mechanical fall.  Hospital course has been complicated by anemia- hg dropped to 7 from 11 one month prior.    Today: EGD and c-scope today.  Hemoglobin is stable, no melena  "noted     Mechanical fall with large scalp laceration:   -head CT showed scalp hematoma but no intracranial bleeding. Facial bone XR without fractures.  Laceration sutured in ER  -Holding home aspirin and Eliquis     Acute blood loss anemia.  Iron deficiency anemia:   -No active bleeding currently, but reports melena \"off and on\" for the last month.  GI evaluated, plan for endoscopy on 5/5  -Continue IV PPI twice daily in case of upper GI bleed  -Holding home aspirin and Eliquis     Paroxysmal A. fib, chronic AC (Eliquis): Heart rate under decent control, continue Coreg     Coronary artery disease: ASA on hold, continue Coreg/Imdur/Lipitor, no chest pain      HTN: Blood pressure acceptable acutely.  Continue Coreg and Imdur    Type 2 diabetes:   -A1c 8.1.  Holding home oral meds while inpatient  -continue basal and sliding scale insulin    Thrombocytopenia: Mild and no active bleeding.  Not on anticoagulation.  Continue to monitor daily CBC     Parkinson's: with deep brain stimulator. No tremor, continue Sinemet, PT recommends SNF on discharge. PT also recommends follow up in neuro movement disorder clinic, which I agree with.       · SCDs for DVT prophylaxis.  · Full code.  · Discussed with patient and nursing staff.  · Anticipate discharge to SNU facility 1-2 days.  Pending results of endoscopy      Becky Sanches DO  [facility]  05/05/22  10:54 EDT    Patient was placed in face mask on first look.  I wore protective equipment throughout this patient encounter including a face mask, gloves and protective eyewear.  Hand hygiene was performed before donning protective equipment and after removal when leaving the room.        "

## 2022-05-05 NOTE — ANESTHESIA POSTPROCEDURE EVALUATION
"Patient: Edilberto Reeder    Procedure Summary     Date: 05/05/22 Room / Location: [facility] ENDOSCOPY 5 / [facility] ENDOSCOPY    Anesthesia Start: 1136 Anesthesia Stop: 1214    Procedures:       COLONOSCOPY TO CECUM WITH COLD SNARE POLYPECTOMY (N/A )      ESOPHAGOGASTRODUODENOSCOPY WITH  COLD BIOPSIES (N/A Esophagus) Diagnosis:       Acute blood loss anemia      Screening for colon cancer      (Acute blood loss anemia [D62])      (Screening for colon cancer [Z12.11])    Surgeons: Luther Ferrer MD Provider: Cole Canas MD    Anesthesia Type: MAC ASA Status: 4          Anesthesia Type: MAC    Vitals  Vitals Value Taken Time   /84 05/05/22 1232   Temp     Pulse 89 05/05/22 1232   Resp 20 05/05/22 1232   SpO2 97 % 05/05/22 1232           Post Anesthesia Care and Evaluation    Patient location during evaluation: bedside  Patient participation: complete - patient participated  Level of consciousness: sleepy but conscious  Pain score: 0  Pain management: adequate  Airway patency: patent  Anesthetic complications: No anesthetic complications    Cardiovascular status: acceptable  Respiratory status: acceptable  Hydration status: acceptable    Comments: /84   Pulse 89   Temp 36.6 °C (97.8 °F) (Oral)   Resp 20   Ht 182 cm (71.65\")   Wt 85.7 kg (189 lb)   SpO2 97%   BMI 25.88 kg/m²         "

## 2022-05-05 NOTE — PLAN OF CARE
Problem: Adult Inpatient Plan of Care  Goal: Plan of Care Review  Outcome: Ongoing, Progressing  Flowsheets (Taken 5/5/2022 1728)  Progress: improving  Plan of Care Reviewed With:   patient   spouse  Outcome Evaluation: S/P EGD and colonoscopy today. Alert, doing well. No pain reported. Tolerating advancing diet. Hgb improved, up to 9 this afternoon. VSS. RA. CTM. Possible d/c soon. Updated patient and family on plan of care.

## 2022-05-06 LAB
ABO GROUP BLD: NORMAL
BLD GP AB SCN SERPL QL: NEGATIVE
GLUCOSE BLDC GLUCOMTR-MCNC: 208 MG/DL (ref 70–130)
GLUCOSE BLDC GLUCOMTR-MCNC: 248 MG/DL (ref 70–130)
GLUCOSE BLDC GLUCOMTR-MCNC: 279 MG/DL (ref 70–130)
GLUCOSE BLDC GLUCOMTR-MCNC: 297 MG/DL (ref 70–130)
HCT VFR BLD AUTO: 24.6 % (ref 37.5–51)
HGB BLD-MCNC: 7.4 G/DL (ref 13–17.7)
LAB AP CASE REPORT: NORMAL
PATH REPORT.FINAL DX SPEC: NORMAL
PATH REPORT.GROSS SPEC: NORMAL
RH BLD: POSITIVE
T&S EXPIRATION DATE: NORMAL

## 2022-05-06 PROCEDURE — 97530 THERAPEUTIC ACTIVITIES: CPT

## 2022-05-06 PROCEDURE — 86901 BLOOD TYPING SEROLOGIC RH(D): CPT | Performed by: HOSPITALIST

## 2022-05-06 PROCEDURE — 63710000001 INSULIN LISPRO (HUMAN) PER 5 UNITS: Performed by: INTERNAL MEDICINE

## 2022-05-06 PROCEDURE — 85018 HEMOGLOBIN: CPT | Performed by: PHYSICIAN ASSISTANT

## 2022-05-06 PROCEDURE — 86900 BLOOD TYPING SEROLOGIC ABO: CPT

## 2022-05-06 PROCEDURE — 97535 SELF CARE MNGMENT TRAINING: CPT

## 2022-05-06 PROCEDURE — 86900 BLOOD TYPING SEROLOGIC ABO: CPT | Performed by: HOSPITALIST

## 2022-05-06 PROCEDURE — 82962 GLUCOSE BLOOD TEST: CPT

## 2022-05-06 PROCEDURE — 86850 RBC ANTIBODY SCREEN: CPT | Performed by: HOSPITALIST

## 2022-05-06 PROCEDURE — G0378 HOSPITAL OBSERVATION PER HR: HCPCS

## 2022-05-06 PROCEDURE — P9016 RBC LEUKOCYTES REDUCED: HCPCS

## 2022-05-06 PROCEDURE — 86923 COMPATIBILITY TEST ELECTRIC: CPT

## 2022-05-06 PROCEDURE — 99232 SBSQ HOSP IP/OBS MODERATE 35: CPT | Performed by: PHYSICIAN ASSISTANT

## 2022-05-06 PROCEDURE — 86901 BLOOD TYPING SEROLOGIC RH(D): CPT

## 2022-05-06 PROCEDURE — 85014 HEMATOCRIT: CPT | Performed by: PHYSICIAN ASSISTANT

## 2022-05-06 PROCEDURE — 36430 TRANSFUSION BLD/BLD COMPNT: CPT

## 2022-05-06 RX ORDER — PANTOPRAZOLE SODIUM 40 MG/1
40 TABLET, DELAYED RELEASE ORAL
Status: DISCONTINUED | OUTPATIENT
Start: 2022-05-07 | End: 2022-05-08 | Stop reason: HOSPADM

## 2022-05-06 RX ORDER — FERROUS SULFATE 325(65) MG
325 TABLET ORAL
Status: DISCONTINUED | OUTPATIENT
Start: 2022-05-06 | End: 2022-05-08 | Stop reason: HOSPADM

## 2022-05-06 RX ADMIN — ISOSORBIDE MONONITRATE 30 MG: 30 TABLET ORAL at 08:37

## 2022-05-06 RX ADMIN — CARBIDOPA AND LEVODOPA 1 TABLET: 25; 100 TABLET, EXTENDED RELEASE ORAL at 08:37

## 2022-05-06 RX ADMIN — METFORMIN HYDROCHLORIDE 500 MG: 500 TABLET ORAL at 20:43

## 2022-05-06 RX ADMIN — CARBIDOPA AND LEVODOPA 1 TABLET: 25; 100 TABLET, EXTENDED RELEASE ORAL at 17:48

## 2022-05-06 RX ADMIN — LEVOTHYROXINE SODIUM 12.5 MCG: 0.03 TABLET ORAL at 06:47

## 2022-05-06 RX ADMIN — PANTOPRAZOLE SODIUM 40 MG: 40 INJECTION, POWDER, FOR SOLUTION INTRAVENOUS at 06:47

## 2022-05-06 RX ADMIN — FERROUS SULFATE TAB 325 MG (65 MG ELEMENTAL FE) 325 MG: 325 (65 FE) TAB at 17:48

## 2022-05-06 RX ADMIN — CARBIDOPA AND LEVODOPA 1 TABLET: 25; 100 TABLET, EXTENDED RELEASE ORAL at 12:24

## 2022-05-06 RX ADMIN — Medication 5 MG: at 00:48

## 2022-05-06 RX ADMIN — INSULIN LISPRO 4 UNITS: 100 INJECTION, SOLUTION INTRAVENOUS; SUBCUTANEOUS at 08:37

## 2022-05-06 RX ADMIN — CARBIDOPA AND LEVODOPA 1 TABLET: 25; 100 TABLET, EXTENDED RELEASE ORAL at 20:43

## 2022-05-06 RX ADMIN — ALLOPURINOL 100 MG: 100 TABLET ORAL at 08:37

## 2022-05-06 RX ADMIN — TRAZODONE HYDROCHLORIDE 25 MG: 50 TABLET ORAL at 20:43

## 2022-05-06 RX ADMIN — INSULIN LISPRO 4 UNITS: 100 INJECTION, SOLUTION INTRAVENOUS; SUBCUTANEOUS at 17:48

## 2022-05-06 RX ADMIN — CITALOPRAM 20 MG: 20 TABLET, FILM COATED ORAL at 08:37

## 2022-05-06 RX ADMIN — CARVEDILOL 6.25 MG: 6.25 TABLET, FILM COATED ORAL at 20:43

## 2022-05-06 RX ADMIN — DONEPEZIL HYDROCHLORIDE 10 MG: 10 TABLET, FILM COATED ORAL at 20:43

## 2022-05-06 RX ADMIN — DOCUSATE SODIUM 50 MG: 50 CAPSULE, LIQUID FILLED ORAL at 20:43

## 2022-05-06 RX ADMIN — CARVEDILOL 6.25 MG: 6.25 TABLET, FILM COATED ORAL at 08:37

## 2022-05-06 RX ADMIN — ATORVASTATIN CALCIUM 40 MG: 20 TABLET, FILM COATED ORAL at 08:37

## 2022-05-06 RX ADMIN — INSULIN GLARGINE-YFGN 10 UNITS: 100 INJECTION, SOLUTION SUBCUTANEOUS at 20:43

## 2022-05-06 RX ADMIN — INSULIN LISPRO 6 UNITS: 100 INJECTION, SOLUTION INTRAVENOUS; SUBCUTANEOUS at 12:24

## 2022-05-06 RX ADMIN — LIDOCAINE 1 PATCH: 50 PATCH CUTANEOUS at 00:43

## 2022-05-06 NOTE — THERAPY TREATMENT NOTE
Patient Name: Edilberto Reeder  : 1950    MRN: [id number]                              Today's Date: 2022       Admit Date: 2022    Visit Dx:     ICD-10-CM ICD-9-CM   1. Minor head injury, initial encounter  S09.90XA 959.01   2. Laceration of forehead, initial encounter  S01.81XA 873.42   3. Anemia, unspecified type  D64.9 285.9   4. Acute blood loss anemia  D62 285.1   5. Screening for colon cancer  Z12.11 V76.51     Patient Active Problem List   Diagnosis   • ASCVD (arteriosclerotic cardiovascular disease)   • Paroxysmal atrial fibrillation (HCC)   • Diabetic retinopathy associated with type 2 diabetes mellitus (HCC)   • Essential hypertension   • HLD (hyperlipidemia)   • Hypothyroidism   • Peripheral neuropathy   • Renal insufficiency   • Type 2 diabetes mellitus with hyperglycemia, without long-term current use of insulin (HCC)   • Parkinson's disease (HCC)   • Dyspnea on exertion   • Atrial fibrillation (CMS/HCC) [I48.91]   • Stroke-like symptoms   • Hypopotassemia   • Ankle pain   • Acute idiopathic gout of right ankle   • Generalized weakness   • Head injury due to trauma   • Acute blood loss anemia   • Chronic anticoagulation   • Thrombocytopenia (HCC)   • Screening for colon cancer   • Minor head injury, initial encounter     Past Medical History:   Diagnosis Date   • Atrial fibrillation with RVR (HCC)    • Atrial flutter (HCC)    • Diabetes (HCC)    • HLD (hyperlipidemia) 2016   • Hypertension    • Parkinson's disease (HCC)     Advanced      Past Surgical History:   Procedure Laterality Date   • BRAIN STIMULATOR     • COLONOSCOPY N/A 2022    Procedure: COLONOSCOPY TO CECUM WITH COLD SNARE POLYPECTOMY;  Surgeon: Luther Ferrer MD;  Location: [facility] ENDOSCOPY;  Service: Gastroenterology;  Laterality: N/A;  PRE:ANEMIA  POST:POLYPS/HEMORRHOIDS   • ENDOSCOPY N/A 2022    Procedure: ESOPHAGOGASTRODUODENOSCOPY WITH  COLD BIOPSIES;  Surgeon: Luther Ferrre MD;   Location: [facility] ENDOSCOPY;  Service: Gastroenterology;  Laterality: N/A;  PRE:ANEMIA  POST:DIVERTICULUM/GASTRITIS   • JOINT REPLACEMENT      Bilateral shoulder and knee replacements      General Information     Row Name 05/06/22 1347          Physical Therapy Time and Intention    Document Type therapy note (daily note)  -     Mode of Treatment physical therapy;individual therapy  -CF     Row Name 05/06/22 1347          General Information    Patient Profile Reviewed yes  -CF     Existing Precautions/Restrictions fall  -CF     Row Name 05/06/22 1347          Cognition    Orientation Status (Cognition) oriented x 3  -CF     Row Name 05/06/22 1347          Safety Issues, Functional Mobility    Safety Issues Affecting Function (Mobility) insight into deficits/self-awareness;awareness of need for assistance;safety precautions follow-through/compliance  -     Impairments Affecting Function (Mobility) balance;endurance/activity tolerance;pain;strength  -           User Key  (r) = Recorded By, (t) = Taken By, (c) = Cosigned By    Initials Name Provider Type     Jennifer Aguilar PT Physical Therapist               Mobility     Row Name 05/06/22 1348          Bed Mobility    Bed Mobility supine-sit  -CF     Supine-Sit [place] (Bed Mobility) contact guard  -CF     Assistive Device (Bed Mobility) head of bed elevated  -     Row Name 05/06/22 1348          Bed-Chair Transfer    Bed-Chair [place] (Transfers) contact guard  -CF     Assistive Device (Bed-Chair Transfers) walker, front-wheeled  -CF     Row Name 05/06/22 1348          Sit-Stand Transfer    Sit-Stand [place] (Transfers) contact guard  -CF     Assistive Device (Sit-Stand Transfers) walker, front-wheeled  -CF     Row Name 05/06/22 1348          Gait/Stairs (Locomotion)    [place] Level (Gait) minimum assist (75% patient effort);verbal cues;nonverbal cues (demo/gesture)  -     Assistive Device (Gait) walker, front-wheeled  -CF      Distance in Feet (Gait) 60'  -CF     Deviations/Abnormal Patterns (Gait) gait speed decreased;stride length decreased;festinating/shuffling;kelvin decreased  -CF     Bilateral Gait Deviations forward flexed posture;heel strike decreased  -CF     Comment, (Gait/Stairs) Shuffles and freezes a few times especially with R LE, cues for heel strike and to increase step length  -CF           User Key  (r) = Recorded By, (t) = Taken By, (c) = Cosigned By    Initials Name Provider Type    CF Jennifer Aguilar PT Physical Therapist               Obj/Interventions     Row Name 05/06/22 1425          Motor Skills    Therapeutic Exercise other (see comments)  seated B AP, laq, marches x10 reps  -CF           User Key  (r) = Recorded By, (t) = Taken By, (c) = Cosigned By    Initials Name Provider Type    Jennifer Regalado PT Physical Therapist               Goals/Plan    No documentation.                Clinical Impression     Row Name 05/06/22 1426          Pain    Pretreatment Pain Rating 0/10 - no pain  -CF     Posttreatment Pain Rating 0/10 - no pain  -CF     Row Name 05/06/22 1426          Plan of Care Review    Plan of Care Reviewed With patient  -CF     Outcome Evaluation Pt participated with PT this AM. Pt required cga for supine>sit and sit<>stand. Unable to progress ambulaton distance due to fatigue but no overt loss of balance noted. Cues for gait mechanics as pt demo shuffled gait and freezes at times. PT will plan to follow up monday to progress as able.  -CF     Row Name 05/06/22 1426          Vital Signs    O2 Delivery Pre Treatment room air  -CF     O2 Delivery Intra Treatment room air  -CF     O2 Delivery Post Treatment room air  -CF     Row Name 05/06/22 1426          Positioning and Restraints    Pre-Treatment Position in bed  -CF     Post Treatment Position chair  -CF     In Chair reclined;call light within reach;encouraged to call for assist;exit alarm on;notified nsg  -CF           User Key  (r) =  Recorded By, (t) = Taken By, (c) = Cosigned By    Initials Name Provider Type    Jennifer Regalado, DIONE Physical Therapist               Outcome Measures     Row Name 05/06/22 1428          How much help from another person do you currently need...    Turning from your back to your side while in flat bed without using bedrails? 3  -CF     Moving from lying on back to sitting on the side of a flat bed without bedrails? 3  -CF     Moving to and from a bed to a chair (including a wheelchair)? 3  -CF     Standing up from a chair using your arms (e.g., wheelchair, bedside chair)? 3  -CF     Climbing 3-5 steps with a railing? 2  -CF     To walk in hospital room? 3  -CF     AM-PAC 6 Clicks Score (PT) 17  -CF     Highest level of mobility 5 --> Static standing  -CF     Row Name 05/06/22 1428          Functional Assessment    Outcome Measure Options AM-PAC 6 Clicks Basic Mobility (PT)  -CF           User Key  (r) = Recorded By, (t) = Taken By, (c) = Cosigned By    Initials Name Provider Type    Jennifer Regalado PT Physical Therapist                             Physical Therapy Education                 Title: PT OT SLP Therapies (Done)     Topic: Physical Therapy (Done)     Point: Mobility training (Done)     Learning Progress Summary           Patient Acceptance, E, VU,NR by  at 5/6/2022 1428    Acceptance, E,TB,D, VU,DU,NR by  at 5/4/2022 2308    Acceptance, E,D,TB, VU,DU,NR by  at 5/3/2022 2037    Acceptance, E,TB,D, VU,DU,NR by  at 5/2/2022 2313    Acceptance, E,TB,D, VU,NR by  at 5/2/2022 1438                   Point: Home exercise program (Done)     Learning Progress Summary           Patient Acceptance, E, VU,NR by  at 5/6/2022 1428    Acceptance, E,TB,D, VU,DU,NR by  at 5/4/2022 2308    Acceptance, E,TB,D, VU,NR by  at 5/4/2022 1538    Acceptance, E,D,TB, VU,DU,NR by  at 5/3/2022 2037    Acceptance, E,TB,D, VU,DU,NR by  at 5/2/2022 1009                   Point: Body mechanics (Done)      Learning Progress Summary           Patient Acceptance, E, VU,NR by  at 5/6/2022 1428    Acceptance, E,TB,D, VU,DU,NR by  at 5/4/2022 2308    Acceptance, E,D,TB, VU,DU,NR by  at 5/3/2022 2037    Acceptance, E,TB,D, VU,DU,NR by  at 5/2/2022 2313    Acceptance, E,TB,D, VU,NR by  at 5/2/2022 1438                   Point: Precautions (Done)     Learning Progress Summary           Patient Acceptance, E, VU,NR by  at 5/6/2022 1428    Acceptance, E,TB,D, VU,DU,NR by  at 5/4/2022 2308    Acceptance, E,D,TB, VU,DU,NR by  at 5/3/2022 2037    Acceptance, E,TB,D, VU,DU,NR by  at 5/2/2022 2313    Acceptance, E,TB,D, VU,NR by  at 5/2/2022 1438                               User Key     Initials Effective Dates Name Provider Type Discipline     06/16/21 -  Donita Caban, RN Registered Nurse Nurse     06/16/21 -  Jennifer Aguilar, PT Physical Therapist PT     10/22/21 -  Shauna Gordillo PT Physical Therapist PT              PT Recommendation and Plan     Plan of Care Reviewed With: patient  Outcome Evaluation: Pt participated with PT this AM. Pt required cga for supine>sit and sit<>stand. Unable to progress ambulaton distance due to fatigue but no overt loss of balance noted. Cues for gait mechanics as pt demo shuffled gait and freezes at times. PT will plan to follow up monday to progress as able.     Time Calculation:    PT Charges     Row Name 05/06/22 1347             Time Calculation    Start Time 0852  -      Stop Time 0913  -      Time Calculation (min) 21 min  -      PT Received On 05/06/22  -      PT - Next Appointment 05/09/22  -            User Key  (r) = Recorded By, (t) = Taken By, (c) = Cosigned By    Initials Name Provider Type     Jennifer Aguilar, PT Physical Therapist              Therapy Charges for Today     Code Description Service Date Service Provider Modifiers Qty    [id number]  PT THERAPEUTIC ACT EA 15 MIN 5/6/2022 Jennifer Aguilar, PT GP 1          PT  G-Codes  Outcome Measure Options: AM-PAC 6 Clicks Basic Mobility (PT)  AM-PAC 6 Clicks Score (PT): 17  AM-PAC 6 Clicks Score (OT): 15  Modified [place] Scale: 4 - Moderately severe disability.  Unable to walk without assistance, and unable to attend to own bodily needs without assistance.    Jennifer Aguilar, PT  5/6/2022

## 2022-05-06 NOTE — PROGRESS NOTES
[facility]  Inpatient Progress Note    Reason for Follow-up: Anemia    Subjective     Interval History:   Patient denies any GI complaints today-denies abdominal pain, nausea, vomiting.  Tolerating p.o. intake well.  No BM today.    Last hemoglobin at 1500 yesterday was 9.1 which was improved from previous of 7.6.  Fortunately it is now back down to 7.4.  Hospitalist plans to transfuse today.    EGD yesterday unremarkable with biopsies showing minimal inflammation negative H. pylori.  Colonoscopy same time showed 2 polyps-1 tubular adenoma and 1 hyperplastic.    Current Facility-Administered Medications:   •  acetaminophen (TYLENOL) tablet 650 mg, 650 mg, Oral, Q4H PRN, Luther Ferrer MD, 650 mg at 05/01/22 0921  •  allopurinol (ZYLOPRIM) tablet 100 mg, 100 mg, Oral, Daily, Luther Ferrer MD, 100 mg at 05/06/22 0837  •  aluminum-magnesium hydroxide-simethicone (MAALOX MAX) 400-400-40 MG/5ML suspension 15 mL, 15 mL, Oral, Q6H PRN, Luther Ferrer MD  •  atorvastatin (LIPITOR) tablet 40 mg, 40 mg, Oral, Daily, Luther Ferrer MD, 40 mg at 05/06/22 0837  •  carbidopa-levodopa ER (SINEMET CR)  MG per tablet 1 tablet, 1 tablet, Oral, 4x Daily, Luther Ferrer MD, 1 tablet at 05/06/22 1224  •  carvedilol (COREG) tablet 6.25 mg, 6.25 mg, Oral, BID, Luther Ferrer MD, 6.25 mg at 05/06/22 0837  •  citalopram (CeleXA) tablet 20 mg, 20 mg, Oral, Daily, Luther Ferrer MD, 20 mg at 05/06/22 0837  •  dextrose (D50W) (25 g/50 mL) IV injection 25 g, 25 g, Intravenous, Q15 Min PRN, Luther Ferrer MD  •  dextrose (GLUTOSE) oral gel 15 g, 15 g, Oral, Q15 Min PRN, Luther Ferrer MD  •  docusate sodium (COLACE) capsule 50 mg, 50 mg, Oral, Nightly, Luther Ferrer MD, 50 mg at 05/05/22 2044  •  donepezil (ARICEPT) tablet 10 mg, 10 mg, Oral, Nightly, Luther Ferrer MD, 10 mg at 05/05/22 2043  •  ferrous sulfate tablet 325 mg,  325 mg, Oral, Daily With Breakfast, Brittny Muñoz PA-C  •  glucagon (human recombinant) (GLUCAGEN DIAGNOSTIC) injection 1 mg, 1 mg, Intramuscular, Q15 Min PRN, Luther Ferrer MD  •  HYDROcodone-acetaminophen (NORCO) 5-325 MG per tablet 1 tablet, 1 tablet, Oral, Q6H PRN, Pedro Diallo MD, 1 tablet at 05/05/22 2044  •  insulin glargine (LANTUS, SEMGLEE) injection 10 Units, 10 Units, Subcutaneous, Nightly, Luther Ferrer MD, 10 Units at 05/05/22 2044  •  insulin lispro (ADMELOG) injection 0-9 Units, 0-9 Units, Subcutaneous, TID AC, Luther Ferrer MD, 6 Units at 05/06/22 1224  •  iopamidol (ISOVUE-370) 76 % injection 100 mL, 100 mL, Intravenous, Once in imaging, Luther Ferrer MD  •  isosorbide mononitrate (IMDUR) 24 hr tablet 30 mg, 30 mg, Oral, Q24H, Luther Ferrer MD, 30 mg at 05/06/22 0837  •  lactated ringers infusion, 30 mL/hr, Intravenous, Continuous PRN, Luther Ferrer MD, New Bag at 05/05/22 1138  •  levothyroxine (SYNTHROID, LEVOTHROID) tablet 12.5 mcg, 12.5 mcg, Oral, Q AM, Luther Ferrer MD, 12.5 mcg at 05/06/22 0647  •  lidocaine (LIDODERM) 5 % 1 patch, 1 patch, Transdermal, Q24H, Shaan Hurtado, APRN, 1 patch at 05/06/22 0043  •  melatonin tablet 5 mg, 5 mg, Oral, Nightly PRN, Luther Ferrer MD, 5 mg at 05/06/22 0048  •  metFORMIN (GLUCOPHAGE) tablet 500 mg, 500 mg, Oral, BID With Meals, Pedro Diallo MD  •  nitroglycerin (NITROSTAT) SL tablet 0.4 mg, 0.4 mg, Sublingual, Q5 Min PRN, Luther Ferrer MD  •  ondansetron (ZOFRAN) tablet 4 mg, 4 mg, Oral, Q6H PRN **OR** ondansetron (ZOFRAN) injection 4 mg, 4 mg, Intravenous, Q6H PRN, Luther Ferrer MD  •  [START ON 5/7/2022] pantoprazole (PROTONIX) EC tablet 40 mg, 40 mg, Oral, Q AM, Pedro Diallo MD  •  [COMPLETED] Insert peripheral IV, , , Once **AND** sodium chloride 0.9 % flush 10 mL, 10 mL, Intravenous, PRN, Luther Ferrer MD, 10 mL at  05/03/22 2026  •  traZODone (DESYREL) tablet 25 mg, 25 mg, Oral, Nightly, Luther Ferrer MD, 25 mg at 05/05/22 2043  Review of Systems:    The following systems were reviewed and negative;  respiratory and cardiovascular.  He does complain of a headache and neck pain.    Objective     Vital Signs  Temp:  [97.7 °F (36.5 °C)-98 °F (36.7 °C)] 97.9 °F (36.6 °C)  Heart Rate:  [] 103  Resp:  [16-18] 18  BP: ()/(54-85) 90/54  Body mass index is 25.88 kg/m².  No intake or output data in the 24 hours ending 05/06/22 1438  No intake/output data recorded.     Physical Exam:    General: patient awake, alert and cooperative   Eyes: Normal lids and lashes, no scleral icterus   Skin: warm and dry, not jaundiced   Pulm:  regular and unlabored   Abdomen: soft, nontender, nondistended;    Psychiatric: Normal mood and behavior; memory intact     Results Review:     I reviewed the patient's new clinical results.    Results from last 7 days   Lab Units 05/06/22  1204 05/05/22  1501 05/05/22  0630 05/04/22  0547 05/03/22  1512 05/03/22  0812   WBC 10*3/mm3  --   --  4.47 4.57  --  4.26   HEMOGLOBIN g/dL 7.4* 9.1* 7.6* 7.6*   < > 7.4*  7.4*   HEMATOCRIT % 24.6* 29.3* 23.4* 24.2*   < > 23.0*  23.0*   PLATELETS 10*3/mm3  --   --  162 139*  --  128*    < > = values in this interval not displayed.     Results from last 7 days   Lab Units 05/05/22  0630 05/04/22  0547 05/03/22  0811 05/02/22  0522 05/01/22 2144 04/30/22  1949   SODIUM mmol/L 141 138 140 137   < > 137   POTASSIUM mmol/L 3.5 3.8 3.3* 3.4*   < > 3.9   CHLORIDE mmol/L 105 106 104 104   < > 102   CO2 mmol/L 25.0 24.4 24.9 24.6   < > 20.0*   BUN mg/dL 7* 9 12 12   < > 26*   CREATININE mg/dL 0.71* 0.58* 0.71* 0.68*   < > 0.69*   CALCIUM mg/dL 8.4* 8.4* 8.4* 8.2*   < > 8.3*   BILIRUBIN mg/dL  --   --   --  0.7  --  0.4   ALK PHOS U/L  --   --   --  52  --  61   ALT (SGPT) U/L  --   --   --  7  --  <5   AST (SGOT) U/L  --   --   --  9  --  9   GLUCOSE mg/dL 131*  144* 271* 188*   < > 263*    < > = values in this interval not displayed.         No results found for: LIPASE    Radiology:  CT Head Without Contrast   Final Result       1. No acute intracranial hemorrhage.   2. Interval enlargement of a frontal scalp hematoma, which has become   more well organized as well.       Radiation dose reduction techniques were utilized, including automated   exposure control and exposure modulation based on body size.       This report was finalized on 5/1/2022 11:05 PM by Dr. Alyssa Dos Santos M.D.          XR Facial Bones 3+ View   Final Result      CT Head Without Contrast   Final Result   Postoperative changes as noted. No acute intracranial   abnormalities are identified.       This report was finalized on 4/30/2022 4:31 PM by Dr. Robert Paul M.D.          CT Cervical Spine Without Contrast   Final Result   Extensive multilevel degenerative disc changes as described.   There is no evidence of acute fracture or subluxation.       This report was finalized on 4/30/2022 4:36 PM by Dr. Robert Paul M.D.              Assessment/Plan     Patient Active Problem List   Diagnosis   • ASCVD (arteriosclerotic cardiovascular disease)   • Paroxysmal atrial fibrillation (HCC)   • Diabetic retinopathy associated with type 2 diabetes mellitus (HCC)   • Essential hypertension   • HLD (hyperlipidemia)   • Hypothyroidism   • Peripheral neuropathy   • Renal insufficiency   • Type 2 diabetes mellitus with hyperglycemia, without long-term current use of insulin (HCC)   • Parkinson's disease (HCC)   • Dyspnea on exertion   • Atrial fibrillation (CMS/HCC) [I48.91]   • Stroke-like symptoms   • Hypopotassemia   • Ankle pain   • Acute idiopathic gout of right ankle   • Generalized weakness   • Head injury due to trauma   • Acute blood loss anemia   • Chronic anticoagulation   • Thrombocytopenia (HCC)   • Screening for colon cancer   • Minor head injury, initial encounter        Assessment:  1. Anemia, worsened today, hospitalist planning blood transfusion  2. Dysphagia  3. Status post fall with head injury  4. Paroxysmal A. fib, anticoagulation on hold  5. Colon polyps noted on colonoscopy, path with 1 tubular adenoma and 1 hyperplastic polyp      Plan:  · Colonoscopy EGD without evidence of bleeding site  · Unfortunately his hemoglobin has worsened today and he is requiring a transfusion.  Continue to monitor H&H and transfuse per primary hospital team.  · Continue to monitor for evidence of overt GI bleed.  If overt bleeding noted, could consider tagged red blood cell scan.  · Plan for office follow-up to discuss capsule endoscopy as outpatient.  Follow-up made with Dr. Ferrer.  · Diet as tolerated.    I discussed the patients findings and my recommendations with patient.    Dragon dictation used throughout this note.            Brittny Muñoz PA-C  [facility]  [address] [place] [address]  Office: [phone]

## 2022-05-06 NOTE — THERAPY TREATMENT NOTE
Patient Name: Edilberto Reeder  : 1950    MRN: [id number]                              Today's Date: 2022       Admit Date: 2022    Visit Dx:     ICD-10-CM ICD-9-CM   1. Minor head injury, initial encounter  S09.90XA 959.01   2. Laceration of forehead, initial encounter  S01.81XA 873.42   3. Anemia, unspecified type  D64.9 285.9   4. Acute blood loss anemia  D62 285.1   5. Screening for colon cancer  Z12.11 V76.51     Patient Active Problem List   Diagnosis   • ASCVD (arteriosclerotic cardiovascular disease)   • Paroxysmal atrial fibrillation (HCC)   • Diabetic retinopathy associated with type 2 diabetes mellitus (HCC)   • Essential hypertension   • HLD (hyperlipidemia)   • Hypothyroidism   • Peripheral neuropathy   • Renal insufficiency   • Type 2 diabetes mellitus with hyperglycemia, without long-term current use of insulin (HCC)   • Parkinson's disease (HCC)   • Dyspnea on exertion   • Atrial fibrillation (CMS/HCC) [I48.91]   • Stroke-like symptoms   • Hypopotassemia   • Ankle pain   • Acute idiopathic gout of right ankle   • Generalized weakness   • Head injury due to trauma   • Acute blood loss anemia   • Chronic anticoagulation   • Thrombocytopenia (HCC)   • Screening for colon cancer   • Minor head injury, initial encounter     Past Medical History:   Diagnosis Date   • Atrial fibrillation with RVR (HCC)    • Atrial flutter (HCC)    • Diabetes (HCC)    • HLD (hyperlipidemia) 2016   • Hypertension    • Parkinson's disease (HCC)     Advanced      Past Surgical History:   Procedure Laterality Date   • BRAIN STIMULATOR     • COLONOSCOPY N/A 2022    Procedure: COLONOSCOPY TO CECUM WITH COLD SNARE POLYPECTOMY;  Surgeon: Luther Ferrer MD;  Location: [facility];  Service: Gastroenterology;  Laterality: N/A;  PRE:ANEMIA  POST:POLYPS/HEMORRHOIDS   • ENDOSCOPY N/A 2022    Procedure: ESOPHAGOGASTRODUODENOSCOPY WITH  COLD BIOPSIES;  Surgeon: Luther Ferrer MD;   Location: [facility] ENDOSCOPY;  Service: Gastroenterology;  Laterality: N/A;  PRE:ANEMIA  POST:DIVERTICULUM/GASTRITIS   • JOINT REPLACEMENT      Bilateral shoulder and knee replacements      General Information     Row Name 05/06/22 1532          OT Time and Intention    Document Type therapy note (daily note)  -     Mode of Treatment occupational therapy;individual therapy  -     Row Name 05/06/22 1532          General Information    Patient Profile Reviewed yes  -     Existing Precautions/Restrictions fall  -     Row Name 05/06/22 1532          Cognition    Orientation Status (Cognition) oriented x 3  -     Row Name 05/06/22 1532          Safety Issues, Functional Mobility    Safety Issues Affecting Function (Mobility) insight into deficits/self-awareness;awareness of need for assistance  slow to respond, pleasant and agreeable  -     Impairments Affecting Function (Mobility) balance;endurance/activity tolerance;strength  -           User Key  (r) = Recorded By, (t) = Taken By, (c) = Cosigned By    Initials Name Provider Type     Laila Barcenas, OT Occupational Therapist                 Mobility/ADL's     Row Name 05/06/22 1532          Bed Mobility    Supine-Sit [place] (Bed Mobility) minimum assist (75% patient effort);verbal cues  -     Sit-Supine [place] (Bed Mobility) minimum assist (75% patient effort);verbal cues  -     Assistive Device (Bed Mobility) head of bed elevated  -[facility] Name 05/06/22 1532          Transfers    Transfers toilet transfer  -     Sit-Stand [place] (Transfers) minimum assist (75% patient effort)  -     [place] Level (Toilet Transfer) minimum assist (75% patient effort);verbal cues  -     Assistive Device (Toilet Transfer) commode, 3-in-1;walker, front-wheeled  -     Row Name 05/06/22 1532          Sit-Stand Transfer    Assistive Device (Sit-Stand Transfers) walker, front-wheeled  -[facility] Name 05/06/22 1532          Functional  Mobility    Functional Mobility- Ind. Level contact guard assist;minimum assist (75% patient effort)  -     Functional Mobility- Device walker, front-wheeled  -     Functional Mobility-Distance (Feet) 25  -     Functional Mobility- Comment to/from bathroom, unsteady, impaired standing posture, knees bent and forward flexed  -     Row Name 05/06/22 1532          Activities of Daily Living    BADL Assessment/Intervention toileting  -     Row Name 05/06/22 1532          Grooming Assessment/Training    [place] Level (Grooming) grooming skills;contact guard assist  -     Position (Grooming) sink side;supported standing  rwx with chair behind pt for safety with standing balance/standing tolerance. Pt stands ~ 5 minutes  -     Row Name 05/06/22 1532          Toileting Assessment/Training    [place] Level (Toileting) toileting skills;moderate assist (50% patient effort);verbal cues;nonverbal cues (demo/gesture)  -     Assistive Devices (Toileting) commode, 3-in-1;grab bar/safety frame  -     Comment, (Toileting) brief noted to have urine incontience, pt able to change and complete hygiene with mod A. Needs cues for safe technique, not standing to thread legs and sit for safety with balance  -           User Key  (r) = Recorded By, (t) = Taken By, (c) = Cosigned By    Initials Name Provider Type     Laila Barcenas OT Occupational Therapist               Obj/Interventions     Row Name 05/06/22 1535          Motor Skills    Motor Skills functional endurance  -     Functional Endurance fair -  -     Row Name 05/06/22 1535          Balance    Static Sitting Balance standby assist  -     Position, Sitting Balance sitting edge of bed  -     Static Standing Balance contact guard  -     Dynamic Standing Balance minimal assist  -     Position/Device Used, Standing Balance supported;walker, rolling  -     Balance Interventions standing;occupation based/functional task  -            "User Key  (r) = Recorded By, (t) = Taken By, (c) = Cosigned By    Initials Name Provider Type    Laila Dennis OT Occupational Therapist               Goals/Plan    No documentation.                Clinical Impression     Row Name 05/06/22 1536          Pain Assessment    Pretreatment Pain Rating 0/10 - no pain  -     Posttreatment Pain Rating 0/10 - no pain  -     Row Name 05/06/22 1536          Plan of Care Review    Plan of Care Reviewed With patient;spouse  -     Outcome Evaluation Pt seen today by OT, he worked on ADLs, transfers, and standing tolerance/balance. He is unsteady with his mobility to bathroom, he requires CGA/Min A for mobility tasks and Mod A for toileting with cues for safety. Pt spouse reports she feels he has gotten \"so weak\" since admission to the hospital. He is a falls risk 2/2 balance and weakness. She feels he does better in his own home enviroment so declining rehab stay at [facility]. OT discussed recommended use of gait belt and X1 person assist at all times while standing or mobilizing for safety. Pt and spouse agree and report pt will have 24/7 assist at [facility] and all needed DME. He would benefit from  OT at [facility].  -     Row Name 05/06/22 1536          Therapy Plan Review/Discharge Plan (OT)    Anticipated Discharge Disposition (OT) home with 24/7 care;home with home health;skilled nursing facility  -     Row Name 05/06/22 1536          Positioning and Restraints    Pre-Treatment Position in bed  -     Post Treatment Position bed  -SM     In Bed fowlers;call light within reach;encouraged to call for assist;exit alarm on;notified nsg;with family/caregiver  -           User Key  (r) = Recorded By, (t) = Taken By, (c) = Cosigned By    Initials Name Provider Type    Laila Dennis OT Occupational Therapist               Outcome Measures     Row Name 05/06/22 4230          How much help from another is currently needed...    Putting on and taking off regular lower body " clothing? 2  -SM     Bathing (including washing, rinsing, and drying) 2  -SM     Toileting (which includes using toilet bed pan or urinal) 2  -SM     Putting on and taking off regular upper body clothing 3  -SM     Taking care of personal grooming (such as brushing teeth) 3  -SM     Eating meals 3  -SM     AM-PAC 6 Clicks Score (OT) 15  -SM     Row Name 05/06/22 1428          How much help from another person do you currently need...    Turning from your back to your side while in flat bed without using bedrails? 3  -CF     Moving from lying on back to sitting on the side of a flat bed without bedrails? 3  -CF     Moving to and from a bed to a chair (including a wheelchair)? 3  -CF     Standing up from a chair using your arms (e.g., wheelchair, bedside chair)? 3  -CF     Climbing 3-5 steps with a railing? 2  -CF     To walk in hospital room? 3  -CF     AM-PAC 6 Clicks Score (PT) 17  -CF     Highest level of mobility 5 --> Static standing  -CF     Row Name 05/06/22 1539 05/06/22 1428       Functional Assessment    Outcome Measure Options AM-PAC 6 Clicks Daily Activity (OT)  - AM-PAC 6 Clicks Basic Mobility (PT)  -CF          User Key  (r) = Recorded By, (t) = Taken By, (c) = Cosigned By    Initials Name Provider Type    Laila Dennis, OT Occupational Therapist    CF Jennifer Aguilar, PT Physical Therapist                Occupational Therapy Education                 Title: PT OT SLP Therapies (Done)     Topic: Occupational Therapy (Done)     Point: ADL training (Done)     Description:   Instruct learner(s) on proper safety adaptation and remediation techniques during self care or transfers.   Instruct in proper use of assistive devices.              Learning Progress Summary           Patient Acceptance, E,TB,D, MUNDO,KATELYNN,NR by  at 5/4/2022 2308    Acceptance, E,D,TB, MUNDO,KATELYNN,NR by  at 5/3/2022 2037                   Point: Home exercise program (Done)     Description:   Instruct learner(s) on appropriate  "technique for monitoring, assisting and/or progressing therapeutic exercises/activities.              Learning Progress Summary           Patient Acceptance, E,TB,D, VU,DU,NR by  at 5/4/2022 2308    Acceptance, E,D,TB, VU,DU,NR by  at 5/3/2022 2037                   Point: Precautions (Done)     Description:   Instruct learner(s) on prescribed precautions during self-care and functional transfers.              Learning Progress Summary           Patient Acceptance, E,TB,D, VU,DU,NR by  at 5/4/2022 2308    Acceptance, E,D,TB, VU,DU,NR by  at 5/3/2022 2037                   Point: Body mechanics (Done)     Description:   Instruct learner(s) on proper positioning and spine alignment during self-care, functional mobility activities and/or exercises.              Learning Progress Summary           Patient Acceptance, E,TB,D, VU,DU,NR by  at 5/4/2022 2308    Acceptance, E,D,TB, VU,DU,NR by  at 5/3/2022 2037                               User Key     Initials Effective Dates Name Provider Type Discipline     06/16/21 -  Donita Caban, RN Registered Nurse Nurse              OT Recommendation and Plan     Plan of Care Review  Plan of Care Reviewed With: patient, spouse  Outcome Evaluation: Pt seen today by OT, he worked on ADLs, transfers, and standing tolerance/balance. He is unsteady with his mobility to bathroom, he requires CGA/Min A for mobility tasks and Mod A for toileting with cues for safety. Pt spouse reports she feels he has gotten \"so weak\" since admission to the hospital. He is a falls risk 2/2 balance and weakness. She feels he does better in his own home enviroment so declining rehab stay at [facility]. OT discussed recommended use of gait belt and X1 person assist at all times while standing or mobilizing for safety. Pt and spouse agree and report pt will have 24/7 assist at [facility] and all needed DME. He would benefit from  OT at [facility].     Time Calculation:    Time Calculation- OT     Row Name 05/06/22 1539 "             Time Calculation- OT    OT Start Time 1506  -SM      OT Stop Time 1530  -SM      OT Time Calculation (min) 24 min  -SM      Total Timed Code Minutes- OT 24 minute(s)  -SM              Timed Charges    21112 - OT Self Care/Mgmt Minutes 24  -SM              Total Minutes    Timed Charges Total Minutes 24  -SM       Total Minutes 24  -SM            User Key  (r) = Recorded By, (t) = Taken By, (c) = Cosigned By    Initials Name Provider Type     Laila Barcenas OT Occupational Therapist              Therapy Charges for Today     Code Description Service Date Service Provider Modifiers Qty    [id number] HC OT SELF CARE/MGMT/TRAIN EA 15 MIN 5/6/2022 Laila Barcenas OT GO 2               Laila Barcenas OT  5/6/2022

## 2022-05-06 NOTE — PROGRESS NOTES
DAILY PROGRESS NOTE  [facility]    Patient Identification:  Name: Edilberto Reeder  Age: 72 y.o.  Sex: male  :  1950  MRN: [id number]         Primary Care Physician: Harvey Larson MD    Subjective:  Interval History: History and ROS not the most reliable but ultimately he is not really complaining of anything new.  He is got anticipated aches and pains.  His mentation is not the best as he already has pre-existing issues with Parkinson's associated dementia.  He states he is breathing fine at this time and denies any chest pain.  He has some soreness in his neck and his head.  Denies any focal loss of function    Objective: No family at bedside.  Patient conversational pleasant and does not appear in any acute distress.  Nontoxic with fluent speech    Scheduled Meds:allopurinol, 100 mg, Oral, Daily  atorvastatin, 40 mg, Oral, Daily  carbidopa-levodopa ER, 1 tablet, Oral, 4x Daily  carvedilol, 6.25 mg, Oral, BID  citalopram, 20 mg, Oral, Daily  docusate sodium, 50 mg, Oral, Nightly  donepezil, 10 mg, Oral, Nightly  ferrous sulfate, 325 mg, Oral, Daily With Breakfast  insulin glargine, 10 Units, Subcutaneous, Nightly  insulin lispro, 0-9 Units, Subcutaneous, TID AC  iopamidol, 100 mL, Intravenous, Once in imaging  isosorbide mononitrate, 30 mg, Oral, Q24H  levothyroxine, 12.5 mcg, Oral, Q AM  lidocaine, 1 patch, Transdermal, Q24H  metFORMIN, 500 mg, Oral, BID With Meals  [START ON 2022] pantoprazole, 40 mg, Oral, Q AM  traZODone, 25 mg, Oral, Nightly      Continuous Infusions:lactated ringers, 30 mL/hr        Vital signs in last 24 hours:  Temp:  [97.3 °F (36.3 °C)-98 °F (36.7 °C)] 98 °F (36.7 °C)  Heart Rate:  [] 103  Resp:  [16-18] 18  BP: ()/(57-87) 102/64    Intake/Output:    Intake/Output Summary (Last 24 hours) at 2022 1322  Last data filed at 2022 1330  Gross per 24 hour   Intake 200 ml   Output --   Net 200 ml       Exam:  /64 (BP Location: Right  "arm, Patient Position: Lying)   Pulse 103   Temp 98 °F (36.7 °C) (Oral)   Resp 18   Ht 182 cm (71.65\")   Wt 85.7 kg (189 lb)   SpO2 93%   BMI 25.88 kg/m²     General Appearance:    Alert, cooperative, nontoxic                          Head:    Normocephalic, good hemostasis of laceration                           Eyes:    PERRL, conjunctivae/corneas clear, EOM's intact, both eyes                         Throat:   Oral mucosa pink and moist                           Neck:   Supple, no major crepitus with acceptable range of motion.  No swelling or JVD                         Lungs:    Clear to auscultation bilaterally, respirations unlabored                          Heart:    Regular rate and rhythm, S1 and S2 normal                  Abdomen:     Soft, nontender, bowel sounds active                 Extremities:   Skin tears without cellulitis, no cyanosis or edema                        Pulses:   Pulses palpable in all extremities                  Neurologic:   CNII-XII intact     Data Review:  Labs in chart were reviewed.    Assessment:  Active Hospital Problems    Diagnosis  POA   • **Head injury due to trauma [S09.90XA]  Yes   • Thrombocytopenia (HCC) [D69.6]  Yes   • Screening for colon cancer [Z12.11]  Not Applicable   • Minor head injury, initial encounter [S09.90XA]  Yes   • Acute blood loss anemia [D62]  Yes   • Chronic anticoagulation [Z79.01]  Not Applicable   • Parkinson's disease (HCC) [G20]  Yes   • Type 2 diabetes mellitus with hyperglycemia, without long-term current use of insulin (HCC) [E11.65]  Yes   • Paroxysmal atrial fibrillation (HCC) [I48.0]  Yes   • HLD (hyperlipidemia) [E78.5]  Yes   • Essential hypertension [I10]  Yes   • ASCVD (arteriosclerotic cardiovascular disease) [I25.10]  Yes      Resolved Hospital Problems   No resolved problems to display.       Plan:    Head injury with resulting acute blood loss anemia   -Hgb took a significant drop to 7.4 and patient is also having low " normal blood pressure/hypotensive   -I feel is medically in this patient's best interest to transfuse at least 1 unit today based on the history and transfusion was ordered if hospital will allow   -ASA/Eliquis remains on hold until safe to resume   -Scalp laceration/hematoma stable    PPI changed back to p.o. -GI found a couple polyps that are pending biopsy as well as gastropathy and a diverticulum but no active bleeding    PAF-RC-AC/ASA on hold    HTN stable    DM2 uncontrolled with an A1c of 8.1   -Continue sliding scale and add back metformin    TCP -normal on 5/5 and will recheck CBC in a.m.    Parkinson's disease with resulting dementia on Sinemet/donepezil      PT further evaluations pending -Case discussed with spouse over the phone and regardless of what PT notes state, she claims she wants to take the patient home with additional home health that she states she has used subacute rehab in the past and she claims that she will get much more benefit in the home setting that she will rehab.    I anticipate patient should be ready for discharge hopefully in the next couple days pending clinical improvement and further lab interpretation    Pedro Diallo MD  5/6/2022  13:22 EDT

## 2022-05-06 NOTE — PLAN OF CARE
Goal Outcome Evaluation:  Plan of Care Reviewed With: patient           Outcome Evaluation: Pt participated with PT this AM. Pt required cga for supine>sit and sit<>stand. Unable to progress ambulaton distance due to fatigue but no overt loss of balance noted. Cues for gait mechanics as pt demo shuffled gait and freezes at times. PT will plan to follow up monday to progress as able.

## 2022-05-06 NOTE — DISCHARGE PLACEMENT REQUEST
"Edilberto Yarbrough (72 y.o. Male)             Date of Birth   1950    Social Security Number       Address   [address] [place] [address]    Home Phone   [phone]    MRN   [id number]       Zoroastrian   None    Marital Status                               Admission Date   4/30/22    Admission Type   Emergency    Admitting Provider   Celso Benavides MD    Attending Provider   Pedro Diallo MD    Department, Room/Bed   [address], [address]       Discharge Date       Discharge Disposition       Discharge Destination                               Attending Provider: Pedro Diallo MD    Allergies: Bacitracin, Neosporin [Neomycin-bacitracin Zn-polymyx], Fish-derived Products, Shellfish Allergy, Shrimp (Diagnostic)    Isolation: None   Infection: None   Code Status: CPR   Advance Care Planning Activity    Ht: 182 cm (71.65\")   Wt: 85.7 kg (189 lb)    Admission Cmt: None   Principal Problem: Head injury due to trauma [S09.90XA]                 Active Insurance as of 4/30/2022     Primary Coverage     Payor Plan Insurance Group Employer/Plan Group    HUMANA MEDICARE REPLACEMENT HUMANA MEDICARE REPLACEMENT [id number]     Payor Plan Address Payor Plan Phone Number Payor Plan Fax Number Effective Dates    [address] [address] [phone]  1/1/2021 - None Entered    [facility] [id number]       Subscriber Name Subscriber Birth Date Member ID       EDILBERTO YARBROUGH 1950 [id number]                 Emergency Contacts      (Rel.) Home Phone Work Phone Mobile Phone    Jamaica Yarbrough (Spouse) [phone] -- [phone]    Varinder,Pete (Son) [phone] -- [phone]    VarinderBlanca (Daughter) [phone] -- --              "

## 2022-05-06 NOTE — PLAN OF CARE
"Goal Outcome Evaluation:  Plan of Care Reviewed With: patient, spouse           Outcome Evaluation: Pt seen today by OT, he worked on ADLs, transfers, and standing tolerance/balance. He is unsteady with his mobility to bathroom, he requires CGA/Min A for mobility tasks and Mod A for toileting with cues for safety. Pt spouse reports she feels he has gotten \"so weak\" since admission to the hospital. He is a falls risk 2/2 balance and weakness. She feels he does better in his own home enviroment so declining rehab stay at [facility]. OT discussed recommended use of gait belt and X1 person assist at all times while standing or mobilizing for safety. Pt and spouse agree and report pt will have 24/7 assist at [facility] and all needed DME. He would benefit from HH OT at [facility].    OT wore a mask, eye protection, gloves, and completed hand hygiene.   "

## 2022-05-06 NOTE — PLAN OF CARE
Goal Outcome Evaluation:  Plan of Care Reviewed With: patient           Outcome Evaluation: Patient alert and oriented x 4.  Report of posterior neck pain where informed LHA and lidocaine patch ordered and placed.  VSS. MAGNUS.

## 2022-05-06 NOTE — CASE MANAGEMENT/SOCIAL WORK
Continued Stay Note  [facility]     Patient Name: Edilberto Reeder  MRN: [id number]  Today's Date: 5/6/2022    Admit Date: 4/30/2022     Discharge Plan     Row Name 05/06/22 1556       Plan    Plan Home with spouse and [facility]    Patient/Family in Agreement with Plan yes    Plan Comments Spoke with wife, regarding HH setup for dc. She is agreeable for [facility] as they just used them recently. Unsure of dc this weekend per wife. CCP made referral to [facility]. ccp to follow. Ayaan KUMAR/CCP               Discharge Codes    No documentation.               Expected Discharge Date and Time     Expected Discharge Date Expected Discharge Time    May 7, 2022             Sarah Charlton, JOSÉ

## 2022-05-07 DIAGNOSIS — D50.9 IRON DEFICIENCY ANEMIA, UNSPECIFIED IRON DEFICIENCY ANEMIA TYPE: Primary | ICD-10-CM

## 2022-05-07 DIAGNOSIS — K92.2 GASTROINTESTINAL HEMORRHAGE, UNSPECIFIED GASTROINTESTINAL HEMORRHAGE TYPE: ICD-10-CM

## 2022-05-07 LAB
BH BB BLOOD EXPIRATION DATE: NORMAL
BH BB BLOOD TYPE BARCODE: 6200
BH BB DISPENSE STATUS: NORMAL
BH BB PRODUCT CODE: NORMAL
BH BB UNIT NUMBER: NORMAL
CROSSMATCH INTERPRETATION: NORMAL
DEPRECATED RDW RBC AUTO: 46.3 FL (ref 37–54)
ERYTHROCYTE [DISTWIDTH] IN BLOOD BY AUTOMATED COUNT: 14.9 % (ref 12.3–15.4)
GLUCOSE BLDC GLUCOMTR-MCNC: 168 MG/DL (ref 70–130)
GLUCOSE BLDC GLUCOMTR-MCNC: 224 MG/DL (ref 70–130)
GLUCOSE BLDC GLUCOMTR-MCNC: 280 MG/DL (ref 70–130)
GLUCOSE BLDC GLUCOMTR-MCNC: 291 MG/DL (ref 70–130)
HCT VFR BLD AUTO: 26.7 % (ref 37.5–51)
HGB BLD-MCNC: 8.6 G/DL (ref 13–17.7)
MCH RBC QN AUTO: 27.8 PG (ref 26.6–33)
MCHC RBC AUTO-ENTMCNC: 32.2 G/DL (ref 31.5–35.7)
MCV RBC AUTO: 86.4 FL (ref 79–97)
PLATELET # BLD AUTO: 158 10*3/MM3 (ref 140–450)
PMV BLD AUTO: 10.9 FL (ref 6–12)
RBC # BLD AUTO: 3.09 10*6/MM3 (ref 4.14–5.8)
UNIT  ABO: NORMAL
UNIT  RH: NORMAL
WBC NRBC COR # BLD: 5.22 10*3/MM3 (ref 3.4–10.8)

## 2022-05-07 PROCEDURE — 99214 OFFICE O/P EST MOD 30 MIN: CPT | Performed by: INTERNAL MEDICINE

## 2022-05-07 PROCEDURE — 63710000001 INSULIN LISPRO (HUMAN) PER 5 UNITS: Performed by: INTERNAL MEDICINE

## 2022-05-07 PROCEDURE — 85027 COMPLETE CBC AUTOMATED: CPT | Performed by: HOSPITALIST

## 2022-05-07 PROCEDURE — G0378 HOSPITAL OBSERVATION PER HR: HCPCS

## 2022-05-07 PROCEDURE — 82962 GLUCOSE BLOOD TEST: CPT

## 2022-05-07 RX ADMIN — FERROUS SULFATE TAB 325 MG (65 MG ELEMENTAL FE) 325 MG: 325 (65 FE) TAB at 08:48

## 2022-05-07 RX ADMIN — ISOSORBIDE MONONITRATE 30 MG: 30 TABLET ORAL at 08:48

## 2022-05-07 RX ADMIN — LEVOTHYROXINE SODIUM 12.5 MCG: 0.03 TABLET ORAL at 06:34

## 2022-05-07 RX ADMIN — INSULIN LISPRO 6 UNITS: 100 INJECTION, SOLUTION INTRAVENOUS; SUBCUTANEOUS at 17:49

## 2022-05-07 RX ADMIN — CARBIDOPA AND LEVODOPA 1 TABLET: 25; 100 TABLET, EXTENDED RELEASE ORAL at 12:30

## 2022-05-07 RX ADMIN — HYDROCODONE BITARTRATE AND ACETAMINOPHEN 1 TABLET: 5; 325 TABLET ORAL at 08:56

## 2022-05-07 RX ADMIN — DOCUSATE SODIUM 50 MG: 50 CAPSULE, LIQUID FILLED ORAL at 20:39

## 2022-05-07 RX ADMIN — CITALOPRAM 20 MG: 20 TABLET, FILM COATED ORAL at 08:48

## 2022-05-07 RX ADMIN — ATORVASTATIN CALCIUM 40 MG: 20 TABLET, FILM COATED ORAL at 08:48

## 2022-05-07 RX ADMIN — METFORMIN HYDROCHLORIDE 500 MG: 500 TABLET ORAL at 17:47

## 2022-05-07 RX ADMIN — INSULIN GLARGINE-YFGN 10 UNITS: 100 INJECTION, SOLUTION SUBCUTANEOUS at 22:41

## 2022-05-07 RX ADMIN — PANTOPRAZOLE SODIUM 40 MG: 40 TABLET, DELAYED RELEASE ORAL at 06:34

## 2022-05-07 RX ADMIN — CARBIDOPA AND LEVODOPA 1 TABLET: 25; 100 TABLET, EXTENDED RELEASE ORAL at 17:48

## 2022-05-07 RX ADMIN — CARVEDILOL 6.25 MG: 6.25 TABLET, FILM COATED ORAL at 08:48

## 2022-05-07 RX ADMIN — CARBIDOPA AND LEVODOPA 1 TABLET: 25; 100 TABLET, EXTENDED RELEASE ORAL at 20:39

## 2022-05-07 RX ADMIN — TRAZODONE HYDROCHLORIDE 25 MG: 50 TABLET ORAL at 20:39

## 2022-05-07 RX ADMIN — INSULIN LISPRO 4 UNITS: 100 INJECTION, SOLUTION INTRAVENOUS; SUBCUTANEOUS at 12:30

## 2022-05-07 RX ADMIN — ALLOPURINOL 100 MG: 100 TABLET ORAL at 08:48

## 2022-05-07 RX ADMIN — CARVEDILOL 6.25 MG: 6.25 TABLET, FILM COATED ORAL at 20:39

## 2022-05-07 RX ADMIN — CARBIDOPA AND LEVODOPA 1 TABLET: 25; 100 TABLET, EXTENDED RELEASE ORAL at 08:48

## 2022-05-07 RX ADMIN — LIDOCAINE 1 PATCH: 50 PATCH CUTANEOUS at 08:48

## 2022-05-07 RX ADMIN — DONEPEZIL HYDROCHLORIDE 10 MG: 10 TABLET, FILM COATED ORAL at 20:39

## 2022-05-07 RX ADMIN — METFORMIN HYDROCHLORIDE 500 MG: 500 TABLET ORAL at 08:48

## 2022-05-07 NOTE — PROGRESS NOTES
[facility]  Inpatient Progress Note    Reason for Follow Up: Iron deficiency anemia    Subjective     Interval History:   Feels somewhat better today.  No bowel movement since colonoscopy.  No abdominal pain.  Tolerating diet.    Current Facility-Administered Medications:   •  acetaminophen (TYLENOL) tablet 650 mg, 650 mg, Oral, Q4H PRN, Luther Ferrer MD, 650 mg at 05/01/22 0921  •  allopurinol (ZYLOPRIM) tablet 100 mg, 100 mg, Oral, Daily, Luther Ferrer MD, 100 mg at 05/07/22 0848  •  aluminum-magnesium hydroxide-simethicone (MAALOX MAX) 400-400-40 MG/5ML suspension 15 mL, 15 mL, Oral, Q6H PRN, Luther Ferrer MD  •  atorvastatin (LIPITOR) tablet 40 mg, 40 mg, Oral, Daily, Luther Ferrer MD, 40 mg at 05/07/22 0848  •  carbidopa-levodopa ER (SINEMET CR)  MG per tablet 1 tablet, 1 tablet, Oral, 4x Daily, Luther Ferrer MD, 1 tablet at 05/07/22 1230  •  carvedilol (COREG) tablet 6.25 mg, 6.25 mg, Oral, BID, Luther Ferrer MD, 6.25 mg at 05/07/22 0848  •  citalopram (CeleXA) tablet 20 mg, 20 mg, Oral, Daily, Luther Ferrer MD, 20 mg at 05/07/22 0848  •  dextrose (D50W) (25 g/50 mL) IV injection 25 g, 25 g, Intravenous, Q15 Min PRN, Luther Ferrer MD  •  dextrose (GLUTOSE) oral gel 15 g, 15 g, Oral, Q15 Min PRN, Luther Ferrer MD  •  docusate sodium (COLACE) capsule 50 mg, 50 mg, Oral, Nightly, Luther Ferrer MD, 50 mg at 05/06/22 2043  •  donepezil (ARICEPT) tablet 10 mg, 10 mg, Oral, Nightly, Luther Ferrer MD, 10 mg at 05/06/22 2043  •  ferrous sulfate tablet 325 mg, 325 mg, Oral, Daily With Breakfast, Brittny Muñoz PA-C, 325 mg at 05/07/22 0848  •  glucagon (human recombinant) (GLUCAGEN DIAGNOSTIC) injection 1 mg, 1 mg, Intramuscular, Q15 Min PRN, Luther Ferrer MD  •  HYDROcodone-acetaminophen (NORCO) 5-325 MG per tablet 1 tablet, 1 tablet, Oral, Q6H PRN, Pedro Diallo MD, 1  tablet at 05/07/22 0856  •  insulin glargine (LANTUS, SEMGLEE) injection 10 Units, 10 Units, Subcutaneous, Nightly, Luther Ferrer MD, 10 Units at 05/06/22 2043  •  insulin lispro (ADMELOG) injection 0-9 Units, 0-9 Units, Subcutaneous, TID AC, Luther Ferrer MD, 4 Units at 05/07/22 1230  •  iopamidol (ISOVUE-370) 76 % injection 100 mL, 100 mL, Intravenous, Once in imaging, Luther Ferrer MD  •  isosorbide mononitrate (IMDUR) 24 hr tablet 30 mg, 30 mg, Oral, Q24H, Luther Ferrer MD, 30 mg at 05/07/22 0848  •  lactated ringers infusion, 30 mL/hr, Intravenous, Continuous PRN, Luther Ferrer MD, New Bag at 05/05/22 1138  •  levothyroxine (SYNTHROID, LEVOTHROID) tablet 12.5 mcg, 12.5 mcg, Oral, Q AM, Luther Ferrer MD, 12.5 mcg at 05/07/22 0634  •  lidocaine (LIDODERM) 5 % 1 patch, 1 patch, Transdermal, Q24H, Shaan Hurtado APRN, 1 patch at 05/07/22 0848  •  melatonin tablet 5 mg, 5 mg, Oral, Nightly PRN, Luther Ferrer MD, 5 mg at 05/06/22 0048  •  metFORMIN (GLUCOPHAGE) tablet 500 mg, 500 mg, Oral, BID With Meals, Pedro Diallo MD, 500 mg at 05/07/22 0848  •  nitroglycerin (NITROSTAT) SL tablet 0.4 mg, 0.4 mg, Sublingual, Q5 Min PRN, Luther Ferrer MD  •  ondansetron (ZOFRAN) tablet 4 mg, 4 mg, Oral, Q6H PRN **OR** ondansetron (ZOFRAN) injection 4 mg, 4 mg, Intravenous, Q6H PRN, Luther Ferrer MD  •  pantoprazole (PROTONIX) EC tablet 40 mg, 40 mg, Oral, Q AM, Pedro Diallo MD, 40 mg at 05/07/22 0634  •  [COMPLETED] Insert peripheral IV, , , Once **AND** sodium chloride 0.9 % flush 10 mL, 10 mL, Intravenous, PRN, Luther Ferrer MD, 10 mL at 05/03/22 2026  •  traZODone (DESYREL) tablet 25 mg, 25 mg, Oral, Nightly, Luther Ferrer MD, 25 mg at 05/06/22 2043  Review of Systems:    The following systems were reviewed and negative;  respiratory and gastrointestinal    Objective     Vital Signs  Temp:  [97.9 °F (36.6  °C)-98.3 °F (36.8 °C)] 97.9 °F (36.6 °C)  Heart Rate:  [] 76  Resp:  [16-18] 18  BP: ()/(54-75) 90/62  Body mass index is 26.2 kg/m².    Intake/Output Summary (Last 24 hours) at 5/7/2022 1548  Last data filed at 5/7/2022 1300  Gross per 24 hour   Intake 660 ml   Output --   Net 660 ml     I/O this shift:  In: 120 [P.O.:120]  Out: -      Physical Exam:   General: patient awake, alert and cooperative   Eyes: Normal lids and lashes, no scleral icterus   Neck: supple, normal ROM   Skin: warm and dry, not jaundiced; laceration to left forehead   Pulm:  regular and unlabored   Abdomen: soft, nontender, nondistended    Extremities: no rash or edema   Psychiatric: Normal mood and behavior; memory intact     Results Review:     I reviewed the patient's new clinical results.    Results from last 7 days   Lab Units 05/07/22  0518 05/06/22  1204 05/05/22  1501 05/05/22  0630 05/04/22  0547   WBC 10*3/mm3 5.22  --   --  4.47 4.57   HEMOGLOBIN g/dL 8.6* 7.4* 9.1* 7.6* 7.6*   HEMATOCRIT % 26.7* 24.6* 29.3* 23.4* 24.2*   PLATELETS 10*3/mm3 158  --   --  162 139*     Results from last 7 days   Lab Units 05/05/22  0630 05/04/22  0547 05/03/22  0811 05/02/22  0522 05/01/22  2144 04/30/22  1949   SODIUM mmol/L 141 138 140 137   < > 137   POTASSIUM mmol/L 3.5 3.8 3.3* 3.4*   < > 3.9   CHLORIDE mmol/L 105 106 104 104   < > 102   CO2 mmol/L 25.0 24.4 24.9 24.6   < > 20.0*   BUN mg/dL 7* 9 12 12   < > 26*   CREATININE mg/dL 0.71* 0.58* 0.71* 0.68*   < > 0.69*   CALCIUM mg/dL 8.4* 8.4* 8.4* 8.2*   < > 8.3*   BILIRUBIN mg/dL  --   --   --  0.7  --  0.4   ALK PHOS U/L  --   --   --  52  --  61   ALT (SGPT) U/L  --   --   --  7  --  <5   AST (SGOT) U/L  --   --   --  9  --  9   GLUCOSE mg/dL 131* 144* 271* 188*   < > 263*    < > = values in this interval not displayed.         No results found for: LIPASE    Radiology:  CT Head Without Contrast   Final Result       1. No acute intracranial hemorrhage.   2. Interval enlargement of a  frontal scalp hematoma, which has become   more well organized as well.       Radiation dose reduction techniques were utilized, including automated   exposure control and exposure modulation based on body size.       This report was finalized on 5/1/2022 11:05 PM by Dr. Alyssa Dos Santos M.D.          XR Facial Bones 3+ View   Final Result      CT Head Without Contrast   Final Result   Postoperative changes as noted. No acute intracranial   abnormalities are identified.       This report was finalized on 4/30/2022 4:31 PM by Dr. Robert Paul M.D.          CT Cervical Spine Without Contrast   Final Result   Extensive multilevel degenerative disc changes as described.   There is no evidence of acute fracture or subluxation.       This report was finalized on 4/30/2022 4:36 PM by Dr. Robert Paul M.D.              Assessment/Plan     Patient Active Problem List   Diagnosis   • ASCVD (arteriosclerotic cardiovascular disease)   • Paroxysmal atrial fibrillation (HCC)   • Diabetic retinopathy associated with type 2 diabetes mellitus (HCC)   • Essential hypertension   • HLD (hyperlipidemia)   • Hypothyroidism   • Peripheral neuropathy   • Renal insufficiency   • Type 2 diabetes mellitus with hyperglycemia, without long-term current use of insulin (HCC)   • Parkinson's disease (HCC)   • Dyspnea on exertion   • Atrial fibrillation (CMS/HCC) [I48.91]   • Stroke-like symptoms   • Hypopotassemia   • Ankle pain   • Acute idiopathic gout of right ankle   • Generalized weakness   • Head injury due to trauma   • Acute blood loss anemia   • Chronic anticoagulation   • Thrombocytopenia (HCC)   • Screening for colon cancer   • Minor head injury, initial encounter     All problems are new to me today  Assessment:  1. Anemia   2. Dysphagia  3. Status post fall with head injury  4. Paroxysmal A. fib, anticoagulation on hold  5. Colon polyps noted on colonoscopy, path with 1 tubular adenoma and 1 hyperplastic  polyp        Plan:  · No overt bleeding-should this occur, recommend inpatient tagged red blood cell scan.  Otherwise we will plan for outpatient capsule study.  · Hemoglobin stable  · Diet as tolerated  · Okay to resume aspirin immediately.  Can resume Eliquis 5/9.    I discussed the patients findings and my recommendations with patient and family.    Lissa Enciso MD

## 2022-05-07 NOTE — PLAN OF CARE
Goal Outcome Evaluation:  Plan of Care Reviewed With: patient        Progress: no change  Outcome Evaluation: Pt denies any pain or discomfort. Pt received 1 prbc last hs. Will continue to monitor.

## 2022-05-07 NOTE — PROGRESS NOTES
"    DAILY PROGRESS NOTE  [facility]    Patient Identification:  Name: Edilberto Reeder  Age: 72 y.o.  Sex: male  :  1950  MRN: [id number]         Primary Care Physician: Harvey Larson MD    Subjective:  Interval History: \"I feel much better\" patient states he feels better from every aspect.  He still has some residual headache which is expected given his head trauma.  He otherwise is conversational and clinically looks much better.  He denies fever chills nausea vomiting    Objective: Clinically much improved    Scheduled Meds:allopurinol, 100 mg, Oral, Daily  atorvastatin, 40 mg, Oral, Daily  carbidopa-levodopa ER, 1 tablet, Oral, 4x Daily  carvedilol, 6.25 mg, Oral, BID  citalopram, 20 mg, Oral, Daily  docusate sodium, 50 mg, Oral, Nightly  donepezil, 10 mg, Oral, Nightly  ferrous sulfate, 325 mg, Oral, Daily With Breakfast  insulin glargine, 10 Units, Subcutaneous, Nightly  insulin lispro, 0-9 Units, Subcutaneous, TID AC  iopamidol, 100 mL, Intravenous, Once in imaging  isosorbide mononitrate, 30 mg, Oral, Q24H  levothyroxine, 12.5 mcg, Oral, Q AM  lidocaine, 1 patch, Transdermal, Q24H  metFORMIN, 500 mg, Oral, BID With Meals  pantoprazole, 40 mg, Oral, Q AM  traZODone, 25 mg, Oral, Nightly      Continuous Infusions:lactated ringers, 30 mL/hr        Vital signs in last 24 hours:  Temp:  [97.9 °F (36.6 °C)-98.3 °F (36.8 °C)] 98.3 °F (36.8 °C)  Heart Rate:  [] 90  Resp:  [16-18] 18  BP: ()/(54-75) 105/54    Intake/Output:    Intake/Output Summary (Last 24 hours) at 2022 0944  Last data filed at 2022 2222  Gross per 24 hour   Intake 540 ml   Output --   Net 540 ml       Exam:  /54 (BP Location: Right arm, Patient Position: Lying)   Pulse 90   Temp 98.3 °F (36.8 °C) (Oral)   Resp 18   Ht 182 cm (71.65\")   Wt 86.8 kg (191 lb 4.8 oz)   SpO2 96%   BMI 26.20 kg/m²     General Appearance:    Alert, cooperative, no distress, AAOx3                          Head:    " Stable hemostasis/hematoma                           Eyes:    PERRL, conjunctivae/corneas clear, EOM's intact, both eyes                         Throat:   Oral mucosa pink and moist                         Lungs:    Clear to auscultation bilaterally, respirations unlabored                         Heart:    Regular rate and rhythm, S1 and S2 normal                  Abdomen:     Soft, nontender, bowel sounds active                 Extremities: Skin tears without cellulitis, no cyanosis or edema                  Neurologic:   CNII-XII intact     Data Review:  Labs in chart were reviewed.    Assessment:  Active Hospital Problems    Diagnosis  POA   • **Head injury due to trauma [S09.90XA]  Yes   • Thrombocytopenia (HCC) [D69.6]  Yes   • Screening for colon cancer [Z12.11]  Not Applicable   • Minor head injury, initial encounter [S09.90XA]  Yes   • Acute blood loss anemia [D62]  Yes   • Chronic anticoagulation [Z79.01]  Not Applicable   • Parkinson's disease (HCC) [G20]  Yes   • Type 2 diabetes mellitus with hyperglycemia, without long-term current use of insulin (HCC) [E11.65]  Yes   • Paroxysmal atrial fibrillation (HCC) [I48.0]  Yes   • HLD (hyperlipidemia) [E78.5]  Yes   • Essential hypertension [I10]  Yes   • ASCVD (arteriosclerotic cardiovascular disease) [I25.10]  Yes      Resolved Hospital Problems   No resolved problems to display.       Plan:    Head injury with resulting acute blood loss anemia              -Status posttransfusion 1 unit packed red blood cells with hemoglobin improved to 8.6 with improved hemodynamic stability and resolved hypotension              -ASA/Eliquis remains on hold until safe to resume -await GI recommendations if they would like for this patient to remain off of these agents until capsule endoscopy is performed?              -Scalp laceration/hematoma stable     PPI changed back to p.o. -GI found a couple polyps that are pending biopsy as well as gastropathy and a diverticulum but  no active bleeding   -Need ASA/Eliquis recommendations on when safe to resume versus holding until capsule study is complete?     PAF-RC-AC/ASA on hold     HTN stable     DM2 uncontrolled with an A1c of 8.1              -Continue sliding scale and add back metformin     TCP -resolved     Parkinson's disease with resulting dementia on Sinemet/donepezil        PT/OT following    Spouse was very firm with conversation yesterday and the fact that she does not want her spouse to go to rehab and wants him to come home with home health    I anticipate patient should be ready for discharge perhaps tomorrow       Pedro Diallo MD  5/7/2022  09:44 EDT

## 2022-05-07 NOTE — PLAN OF CARE
Goal Outcome Evaluation:  Plan of Care Reviewed With: patient        Pt currently stable.  No falls or c/o pain.  Vitals stable, will continue to monitor.

## 2022-05-08 ENCOUNTER — READMISSION MANAGEMENT (OUTPATIENT)
Dept: CALL CENTER | Facility: HOSPITAL | Age: 72
End: 2022-05-08

## 2022-05-08 VITALS
OXYGEN SATURATION: 95 % | DIASTOLIC BLOOD PRESSURE: 61 MMHG | BODY MASS INDEX: 26.55 KG/M2 | TEMPERATURE: 97.6 F | RESPIRATION RATE: 18 BRPM | WEIGHT: 195.99 LBS | SYSTOLIC BLOOD PRESSURE: 124 MMHG | HEART RATE: 84 BPM | HEIGHT: 72 IN

## 2022-05-08 LAB
GLUCOSE BLDC GLUCOMTR-MCNC: 217 MG/DL (ref 70–130)
GLUCOSE BLDC GLUCOMTR-MCNC: 413 MG/DL (ref 70–130)

## 2022-05-08 PROCEDURE — 63710000001 INSULIN LISPRO (HUMAN) PER 5 UNITS: Performed by: INTERNAL MEDICINE

## 2022-05-08 PROCEDURE — 82962 GLUCOSE BLOOD TEST: CPT

## 2022-05-08 PROCEDURE — G0378 HOSPITAL OBSERVATION PER HR: HCPCS

## 2022-05-08 RX ORDER — ASPIRIN 81 MG/1
81 TABLET ORAL DAILY
Status: DISCONTINUED | OUTPATIENT
Start: 2022-05-08 | End: 2022-05-08 | Stop reason: HOSPADM

## 2022-05-08 RX ADMIN — PANTOPRAZOLE SODIUM 40 MG: 40 TABLET, DELAYED RELEASE ORAL at 06:04

## 2022-05-08 RX ADMIN — ISOSORBIDE MONONITRATE 30 MG: 30 TABLET ORAL at 08:48

## 2022-05-08 RX ADMIN — CARBIDOPA AND LEVODOPA 1 TABLET: 25; 100 TABLET, EXTENDED RELEASE ORAL at 08:48

## 2022-05-08 RX ADMIN — HYDROCODONE BITARTRATE AND ACETAMINOPHEN 1 TABLET: 5; 325 TABLET ORAL at 08:53

## 2022-05-08 RX ADMIN — METFORMIN HYDROCHLORIDE 500 MG: 500 TABLET ORAL at 08:48

## 2022-05-08 RX ADMIN — CARVEDILOL 6.25 MG: 6.25 TABLET, FILM COATED ORAL at 08:48

## 2022-05-08 RX ADMIN — ATORVASTATIN CALCIUM 40 MG: 20 TABLET, FILM COATED ORAL at 08:48

## 2022-05-08 RX ADMIN — CITALOPRAM 20 MG: 20 TABLET, FILM COATED ORAL at 08:48

## 2022-05-08 RX ADMIN — INSULIN LISPRO 9 UNITS: 100 INJECTION, SOLUTION INTRAVENOUS; SUBCUTANEOUS at 06:04

## 2022-05-08 RX ADMIN — ASPIRIN 81 MG: 81 TABLET, COATED ORAL at 08:53

## 2022-05-08 RX ADMIN — ALLOPURINOL 100 MG: 100 TABLET ORAL at 08:48

## 2022-05-08 RX ADMIN — FERROUS SULFATE TAB 325 MG (65 MG ELEMENTAL FE) 325 MG: 325 (65 FE) TAB at 08:48

## 2022-05-08 RX ADMIN — LIDOCAINE 1 PATCH: 50 PATCH CUTANEOUS at 08:55

## 2022-05-08 RX ADMIN — LEVOTHYROXINE SODIUM 12.5 MCG: 0.03 TABLET ORAL at 06:03

## 2022-05-08 NOTE — PLAN OF CARE
Goal Outcome Evaluation:  Plan of Care Reviewed With: patient        Progress: no change  Outcome Evaluation: Pt slept most of the hs no pain or discomfort voiced. Blood sugar today was 413 9 units of reg given . Will continue to monitor.

## 2022-05-08 NOTE — PLAN OF CARE
Goal Outcome Evaluation:  Plan of Care Reviewed With: patient        Pt to be discharged, will continue to monitor.

## 2022-05-08 NOTE — DISCHARGE SUMMARY
[facility]    Date of Discharge:  5/8/2022    PCP: Harvey Larson MD    Discharge Diagnosis:   Active Hospital Problems    Diagnosis  POA   • **Head injury due to trauma [S09.90XA]  Yes   • Thrombocytopenia (HCC) [D69.6]  Yes   • Screening for colon cancer [Z12.11]  Not Applicable   • Minor head injury, initial encounter [S09.90XA]  Yes   • Acute blood loss anemia [D62]  Yes   • Chronic anticoagulation [Z79.01]  Not Applicable   • Parkinson's disease (HCC) [G20]  Yes   • Type 2 diabetes mellitus with hyperglycemia, without long-term current use of insulin (HCC) [E11.65]  Yes   • Paroxysmal atrial fibrillation (HCC) [I48.0]  Yes   • HLD (hyperlipidemia) [E78.5]  Yes   • Essential hypertension [I10]  Yes   • ASCVD (arteriosclerotic cardiovascular disease) [I25.10]  Yes      Resolved Hospital Problems   No resolved problems to display.     Procedure(s):  COLONOSCOPY TO CECUM WITH COLD SNARE POLYPECTOMY  ESOPHAGOGASTRODUODENOSCOPY WITH  COLD BIOPSIES     Consults     Date and Time Order Name Status Description    5/2/2022 11:56 AM Inpatient Gastroenterology Consult Completed     4/30/2022  8:28 PM [facility] (on-call MD unless specified) Details          Hospital Course  72 y.o. male initially admitted for a fall on his porch.  Patient is already anticoagulated on Eliquis due to past history of chronic A. fib.  Aspirin and Eliquis were held.  Patient required transfusion of packed red blood cells on this admission and by discharge, hemoglobin had stabilized and improved to 8.6.  He did have some head trauma at admission and he has had suturing of the lesion on the forehead and the patient spouse at bedside states she will have no problem getting into see the PCP this upcoming week and the sutures need to be removed and the patient also needs close follow-up of his H&H.  ASA will be reinstated on day of discharge as GI is okay with resumption of aspirin today as well  as Eliquis tomorrow on 5/9/22.  I think this patient is going to need very close watch of his H&H and I recommended that the spouse get a PCP appointment next week both of reevaluate the patient as well as to recheck H&H.  I have counseled concerns post discharge to both and given the spouse's cues to watch for in case this patient develops any type of a delayed subdural bleed due to his age and need for anticoagulation.  He missed his cardiology appointment while here and they dose his anticoagulation.  I think this patient is reaching a point where anticoagulation needs to strongly be reconsidered in regards to risk and benefits especially if any further falls were to occur.  GI Dr. Goodwin performed upper and lower endoscopy on 5/5/2021.  See report for further clarification but basically he was found to have some erosive gastropathy that was not actively bleeding as well as a diverticulum.  The colonoscopy found multiple small polyps and I will defer to GI to follow-up on those biopsy results that are pending.  His thrombocytopenia has resolved by disposition.  He clinically looks much better as he is resting in bed with his arms behind his head in no apparent distress.  He is deemed medically stable to be discharged home today as the spouse declines any thoughts of subacute rehab.  Home health was ordered.  Case discussed amongst all and all in agreement with the above plan.  I think this patient can do well with very close outpatient follow-up and further capsule endoscopy pending per GI.    Condition on Discharge: Improved.     Temp:  [97.6 °F (36.4 °C)-98.8 °F (37.1 °C)] 97.6 °F (36.4 °C)  Heart Rate:  [] 84  Resp:  [16-18] 18  BP: ()/(55-67) 124/61  Body mass index is 26.84 kg/m².    Physical Exam  HENT:      Head: Normocephalic.      Comments: Good hemostasis to forehead lesion     Mouth/Throat:      Mouth: Mucous membranes are moist.      Pharynx: Oropharynx is clear.   Eyes:       Conjunctiva/sclera: Conjunctivae normal.      Pupils: Pupils are equal, round, and reactive to light.   Cardiovascular:      Rate and Rhythm: Normal rate. Rhythm irregular.   Pulmonary:      Effort: Pulmonary effort is normal. No respiratory distress.      Breath sounds: Normal breath sounds.   Abdominal:      General: Bowel sounds are normal. There is no distension.      Palpations: Abdomen is soft.      Tenderness: There is no abdominal tenderness.   Musculoskeletal:         General: No swelling.   Skin:     Coloration: Skin is not jaundiced.   Neurological:      Mental Status: He is alert. Mental status is at baseline.      Cranial Nerves: No cranial nerve deficit.       Disposition: Home or Self Care       Discharge Medications      Continue These Medications      Instructions Start Date   acetaminophen 325 MG tablet  Commonly known as: TYLENOL   650 mg, Oral, Every 6 Hours PRN      allopurinol 100 MG tablet  Commonly known as: ZYLOPRIM   100 mg, Oral, Daily      Alogliptin Benzoate 12.5 MG tablet   Oral, Daily, Half tablet daily      apixaban 5 MG tablet tablet  Commonly known as: ELIQUIS   5 mg, Oral, 2 Times Daily      aspirin 81 MG chewable tablet   81 mg, Oral, Every Other Day      Calcium Carbonate 1500 (600 Ca) MG tablet   650 mg, Oral, Daily      Carbidopa-Levodopa ER 36. MG capsule controlled-release   Oral, 4 Times Daily, 4 tablets 4 times daily      carvedilol 6.25 MG tablet  Commonly known as: COREG   6.25 mg, Oral, 2 Times Daily      citalopram 20 MG tablet  Commonly known as: CeleXA   20 mg, Oral, Daily      docusate sodium 50 MG capsule  Commonly known as: COLACE   Oral, Nightly      donepezil 5 MG tablet  Commonly known as: ARICEPT   10 mg, Oral, Nightly      isosorbide mononitrate 30 MG 24 hr tablet  Commonly known as: IMDUR   30 mg, Oral, Every 24 Hours Scheduled      levothyroxine 25 MCG tablet  Commonly known as: SYNTHROID, LEVOTHROID   12.5 mcg, Oral, Daily      metFORMIN 500 MG  tablet  Commonly known as: GLUCOPHAGE   500 mg, Oral, 3 Times Daily      Polyvinyl Alcohol-Povidone PF 1.4-0.6 % ophthalmic solution  Commonly known as: HYPOTEARS   1-2 drops, As Needed      simvastatin 80 MG tablet  Commonly known as: ZOCOR   40 mg, Oral, Nightly      traZODone 50 MG tablet  Commonly known as: DESYREL   25 mg, Oral, Nightly      vitamin B-12 1000 MCG tablet  Commonly known as: CYANOCOBALAMIN   500 mcg, Oral, Daily              Additional Instructions for the Follow-ups that You Need to Schedule     Ambulatory Referral to Home Health (Hospital)   As directed      Face to Face Visit Date: 5/8/2022    Follow-up provider for Plan of Care?: I treated the patient in an acute care facility and will not continue treatment after discharge.    Follow-up provider: ASH LARSON [[id number]]    Reason/Clinical Findings: Falls with acute blood loss anemia/anticoagulation    Describe mobility limitations that make leaving home difficult: Taxing effort to leave home    Nursing/Therapeutic Services Requested: Physical Therapy Occupational Therapy    PT orders: Home safety assessment Strengthening Therapeutic exercise Gait Training    Weight Bearing Status: As Tolerated    Occupational orders: Activities of daily living Home safety assessment Energy conservation Strengthening Cognition    Frequency: 1 Week 1         Discharge Follow-up with PCP   As directed       Currently Documented PCP:    Ash Larson MD    PCP Phone Number:    [phone]     Follow Up Details: Patient will be see PCP this week.  GI coordinating outpatient capsule endoscopy.  Patient to reschedule with missed cardiology appointment            Follow-up Information     Ash Larson MD .    Specialty: Family Medicine  Why: Patient will be see PCP this week.  GI coordinating outpatient capsule endoscopy.  Patient to reschedule with missed cardiology appointment  Contact information:  [address]  [facility]  [address]  [phone]                           Pedro Diallo MD  05/08/22  10:09 EDT    Discharge time spent greater than 30 minutes.

## 2022-05-09 NOTE — CASE MANAGEMENT/SOCIAL WORK
Case Management Discharge Note      Final Note: DC home with [facility].    Provided Post Acute Provider List?: N/A    Selected Continued Care - Discharged on 5/8/2022 Admission date: 4/30/2022 - Discharge disposition: Home or Self Care    Destination    No services have been selected for the patient.              Durable Medical Equipment    No services have been selected for the patient.              Dialysis/Infusion    No services have been selected for the patient.              Home Medical Care Coordination complete.    Service Provider Selected Services Address Phone Fax Patient Preferred    [facility] [address] [place] [id number] [phone] --          Therapy    No services have been selected for the patient.              Community Resources    No services have been selected for the patient.              Community & DME    No services have been selected for the patient.                       Final Discharge Disposition Code: 06 - home with home health care

## 2022-05-11 ENCOUNTER — READMISSION MANAGEMENT (OUTPATIENT)
Dept: CALL CENTER | Facility: HOSPITAL | Age: 72
End: 2022-05-11

## 2022-05-11 NOTE — OUTREACH NOTE
Medical Week 1 Survey    Flowsheet Row Responses   [facility] patient discharged from? [facility]   Does the patient have one of the following disease processes/diagnoses(primary or secondary)? Other   Week 1 attempt successful? Yes   Call start time 1545   Call end time 1551   Discharge diagnosis Head injury due to trauma, COLONOSCOPY TO CECUM WITH COLD SNARE POLYPECTOMY   Is patient permission given to speak with other caregiver? Yes   List who call center can speak with Jamaica Reeder Spouse    Person spoke with today (if not patient) and relationship Jamaica Reeder Spouse    Meds reviewed with patient/caregiver? Yes   Does the patient have all medications ordered at discharge? N/A  [No new meds ordered at discharge. ]   Is the patient taking all medications as directed (includes completed medication regime)? Yes   Comments regarding appointments Gastro appt 5/24   Does the patient have a primary care provider?  Yes   Does the patient have an appointment with their PCP within 7 days of discharge? Greater than 7 days   Comments regarding PCP PCP Dr Larson. Wife reports f/u appt in place next week.    Nursing Interventions Verified appointment date/time/provider   Has the patient kept scheduled appointments due by today? N/A   What is the Home health agency?   [facility]   Has home health visited the patient within 72 hours of discharge? Yes   Psychosocial issues? No   Did the patient receive a copy of their discharge instructions? Yes   Nursing interventions Reviewed instructions with patient  [spouse]   What is the patient's perception of their health status since discharge? Improving  [Spouse reports patient showing good improvement since discharge. ]   Is the patient/caregiver able to teach back signs and symptoms related to disease process for when to call PCP? Yes   Is the patient/caregiver able to teach back signs and symptoms related to disease process for when to call 911? Yes   Is the  patient/caregiver able to teach back the hierarchy of who to call/visit for symptoms/problems? PCP, Specialist, Home health nurse, Urgent Care, ED, 911 Yes   If the patient is a current smoker, are they able to teach back resources for cessation? Not a smoker   Week 1 call completed? Yes          EDGAR FULLER - Registered Nurse

## 2022-05-19 ENCOUNTER — TELEPHONE (OUTPATIENT)
Dept: GASTROENTEROLOGY | Facility: CLINIC | Age: 72
End: 2022-05-19

## 2022-05-20 NOTE — TELEPHONE ENCOUNTER
SPOKE WITH PT AND WIFE SCHEDULED ON JUN 13 AT 0800 AM DISCUSSED PREP MAILED INSTRUCTIONS TO PT VERIFIED MAILING ADDRESS

## 2022-05-24 ENCOUNTER — OFFICE VISIT (OUTPATIENT)
Dept: GASTROENTEROLOGY | Facility: CLINIC | Age: 72
End: 2022-05-24

## 2022-05-24 VITALS
TEMPERATURE: 97.8 F | BODY MASS INDEX: 23.62 KG/M2 | WEIGHT: 178.2 LBS | DIASTOLIC BLOOD PRESSURE: 78 MMHG | SYSTOLIC BLOOD PRESSURE: 126 MMHG | HEIGHT: 73 IN

## 2022-05-24 DIAGNOSIS — D50.9 IRON DEFICIENCY ANEMIA, UNSPECIFIED IRON DEFICIENCY ANEMIA TYPE: Primary | ICD-10-CM

## 2022-05-24 PROCEDURE — 99214 OFFICE O/P EST MOD 30 MIN: CPT | Performed by: INTERNAL MEDICINE

## 2022-05-24 NOTE — PROGRESS NOTES
Chief Complaint   Patient presents with   • Colon Polyps     Subjective     HPI  Edilberto Reeder is a 72 y.o. male who presents today for office follow up.  Patient was recently seen by our service during hospitalization after a fall when he suffered a scalp laceration.  He was found to be anemic he did have fairly significant bleeding from his scalp laceration but he did undergo GI evaluation to rule out any GI causes.    EGD erosive gastritis with biopsies showing minimal inflammation negative H. pylori.  Colonoscopy same time showed 2 polyps-1 tubular adenoma and 1 hyperplastic.    It was recommended by our service that he have a follow-up capsule endoscopy this is been scheduled for 13 June.  He is not having any GI problems denies any evidence of overt GI bleeding.  He had a hemoglobin checked 2 weeks ago which showed improvement from 8.6-9.3 following hospital discharge.    Objective   Vitals:    05/24/22 1424   BP: 126/78   Temp: 97.8 °F (36.6 °C)       Physical Exam  Vitals reviewed.   Constitutional:       Appearance: He is well-developed.   HENT:      Head: Normocephalic and atraumatic.   Neurological:      Mental Status: He is alert and oriented to person, place, and time.   Psychiatric:         Behavior: Behavior normal.         Thought Content: Thought content normal.         Judgment: Judgment normal.       The following data was reviewed by: Luther Ferrer MD on 05/24/2022:  Common labs    Common Labsle 5/5/22 5/5/22 5/5/22 5/6/22 5/7/22    0630 0630 1501     Glucose 131 (A)       BUN 7 (A)       Creatinine 0.71 (A)       Sodium 141       Potassium 3.5       Chloride 105       Calcium 8.4 (A)       WBC  4.47   5.22   Hemoglobin  7.6 (A) 9.1 (A) 7.4 (A) 8.6 (A)   Hematocrit  23.4 (A) 29.3 (A) 24.6 (A) 26.7 (A)   Platelets  162   158   (A) Abnormal value            Data reviewed: Recent hospitalization notes from 2022 with associated endoscopy results     Assessment & Plan   Assessment:     1.  Iron deficiency anemia, unspecified iron deficiency anemia type    2.      Colon polyps  3.       Erosive gastritis    Plan:   Video capsule endoscopy has been scheduled for next month to definitively rule out any GI source of the patient's iron deficiency anemia.  Some that is unremarkable then no further GI work-up be necessary.  We will repeat his H&H today to ensure continued increase of his hemoglobin.          Luther Ferrer M.D.  [facility], [facility]  [address] [place] [address]  Office: [phone]

## 2022-05-25 LAB
BASOPHILS # BLD AUTO: 0.1 X10E3/UL (ref 0–0.2)
BASOPHILS NFR BLD AUTO: 1 %
EOSINOPHIL # BLD AUTO: 0.3 X10E3/UL (ref 0–0.4)
EOSINOPHIL NFR BLD AUTO: 5 %
ERYTHROCYTE [DISTWIDTH] IN BLOOD BY AUTOMATED COUNT: 14.8 % (ref 11.6–15.4)
HCT VFR BLD AUTO: 29.9 % (ref 37.5–51)
HGB BLD-MCNC: 9.1 G/DL (ref 13–17.7)
IMM GRANULOCYTES # BLD AUTO: 0 X10E3/UL (ref 0–0.1)
IMM GRANULOCYTES NFR BLD AUTO: 0 %
LYMPHOCYTES # BLD AUTO: 1.1 X10E3/UL (ref 0.7–3.1)
LYMPHOCYTES NFR BLD AUTO: 18 %
MCH RBC QN AUTO: 25.4 PG (ref 26.6–33)
MCHC RBC AUTO-ENTMCNC: 30.4 G/DL (ref 31.5–35.7)
MCV RBC AUTO: 84 FL (ref 79–97)
MONOCYTES # BLD AUTO: 0.5 X10E3/UL (ref 0.1–0.9)
MONOCYTES NFR BLD AUTO: 7 %
NEUTROPHILS # BLD AUTO: 4.2 X10E3/UL (ref 1.4–7)
NEUTROPHILS NFR BLD AUTO: 69 %
PLATELET # BLD AUTO: 301 X10E3/UL (ref 150–450)
RBC # BLD AUTO: 3.58 X10E6/UL (ref 4.14–5.8)
WBC # BLD AUTO: 6.1 X10E3/UL (ref 3.4–10.8)

## 2022-05-31 ENCOUNTER — OFFICE VISIT (OUTPATIENT)
Dept: CARDIOLOGY | Facility: CLINIC | Age: 72
End: 2022-05-31

## 2022-05-31 VITALS
HEIGHT: 73 IN | DIASTOLIC BLOOD PRESSURE: 70 MMHG | BODY MASS INDEX: 25.18 KG/M2 | SYSTOLIC BLOOD PRESSURE: 114 MMHG | OXYGEN SATURATION: 97 % | WEIGHT: 190 LBS | HEART RATE: 125 BPM

## 2022-05-31 DIAGNOSIS — I48.19 ATRIAL FIBRILLATION, PERSISTENT: ICD-10-CM

## 2022-05-31 DIAGNOSIS — I10 ESSENTIAL HYPERTENSION: Primary | Chronic | ICD-10-CM

## 2022-05-31 DIAGNOSIS — G20 PARKINSON'S DISEASE: Chronic | ICD-10-CM

## 2022-05-31 PROCEDURE — 99214 OFFICE O/P EST MOD 30 MIN: CPT | Performed by: NURSE PRACTITIONER

## 2022-05-31 PROCEDURE — 93000 ELECTROCARDIOGRAM COMPLETE: CPT | Performed by: NURSE PRACTITIONER

## 2022-05-31 NOTE — PROGRESS NOTES
[facility] Follow Up Office Note     Encounter Date:22  Patient:Edilberto Reeder  :1950  MRN:[id number]      Chief Complaint:   Chief Complaint   Patient presents with   • Follow-up         History of Presenting Illness:        Edilberto Reeder is a 72 y.o. male who is here for follow-up of CAD, permanent atrial fibrillation.  He is a patient of Dr. Fugn.    Patient has past medical history that is significant for chronic atrial fibrillation, hypertension, coronary artery disease, diabetes with peripheral neuropathy, hyperlipidemia, memory loss, Parkinson's disease, and history of falls.    Patient was admitted for chest pain in 2022.  He was also evaluated by neurology during this admission for new symptoms felt unlikely to be stroke.  Skyla scan demonstrated small amount of basal inferior lateral ischemia with mild reduction in LVEF 46%.  This was consistent with a low risk study and he was medically managed.  He has been seen in the office since that time for follow-up and was having mild symptoms and continued on medical therapy.    Patient is chronically on aspirin and Eliquis.  He was recently admitted  to 2022 for fall and anemia requiring transfusion.  Patient fell and hit crown of head on dresser with large laceration and significant amount of bleeding.  CT head without hemorrhage.  He had upper and lower endoscopy by Dr. Goodwin during admission on 2021 which demonstrated erosive gastropathy but no active bleeding.  He was discharged back on ASA and clopidogrel.    Patient had repeat labwork on  with stable H/ H.    Since patient was discharged from the hospital he is been about his baseline.  He does report intermittent chest tightness that occurs with activity but no worsening since he was last seen in our office.  He also has occasional dyspnea on exertion, but is not very active at baseline.  He remains somewhat unstable on his feet.  Instead of in  patient rehab he is getting some home services.  He had another fall where he hit his left leg over the weekend, he required stitches but did not have significant bleeding.  He has some lower blood pressure ranges in the morning and intermittent lightheadedness.    Review of Systems:  Review of Systems   Cardiovascular: Negative for chest pain, dyspnea on exertion, leg swelling, orthopnea and palpitations.   Respiratory: Negative for shortness of breath.        Current Outpatient Medications on File Prior to Visit   Medication Sig Dispense Refill   • acetaminophen (TYLENOL) 325 MG tablet Take 2 tablets by mouth Every 6 (Six) Hours As Needed for Mild Pain .     • allopurinol (ZYLOPRIM) 100 MG tablet Take 100 mg by mouth Daily.     • Alogliptin Benzoate 12.5 MG tablet Take  by mouth Daily. Half tablet daily     • apixaban (ELIQUIS) 5 MG tablet tablet Take 5 mg by mouth 2 (Two) Times a Day.     • aspirin 81 MG chewable tablet Chew 81 mg Every Other Day.     • Calcium Carbonate 1500 (600 Ca) MG tablet Take 650 mg by mouth Daily.     • Carbidopa-Levodopa ER 36. MG capsule controlled-release Take  by mouth 4 (Four) Times a Day. 4 tablets 4 times daily     • citalopram (CeleXA) 20 MG tablet Take 20 mg by mouth Daily.     • docusate sodium (COLACE) 50 MG capsule Take  by mouth Every Night.     • isosorbide mononitrate (IMDUR) 30 MG 24 hr tablet Take 1 tablet by mouth Daily. 30 tablet 5   • levothyroxine (SYNTHROID, LEVOTHROID) 25 MCG tablet Take 12.5 mcg by mouth Daily.     • metFORMIN (GLUCOPHAGE) 500 MG tablet Take 500 mg by mouth 3 (Three) Times a Day.     • Polyvinyl Alcohol-Povidone PF (HYPOTEARS) 1.4-0.6 % ophthalmic solution 1-2 drops As Needed for Wound Care.     • simvastatin (ZOCOR) 80 MG tablet Take 40 mg by mouth Every Night.     • traZODone (DESYREL) 50 MG tablet Take 25 mg by mouth Every Night.     • triamcinolone (KENALOG) 0.1 % ointment      • vitamin B-12 (CYANOCOBALAMIN) 1000 MCG tablet Take 500 mcg  by mouth Daily.     • [DISCONTINUED] carvedilol (COREG) 6.25 MG tablet Take 1 tablet by mouth 2 (Two) Times a Day. 180 tablet 3   • donepezil (ARICEPT) 5 MG tablet Take 10 mg by mouth Every Night.       No current facility-administered medications on file prior to visit.       Allergies   Allergen Reactions   • Bacitracin Anaphylaxis   • Neosporin [Neomycin-Bacitracin Zn-Polymyx] Other (See Comments)     BP drops and heart stops!   • Fish-Derived Products Hives   • Shellfish Allergy Hives   • Shrimp (Diagnostic) Hives       Past Medical History:   Diagnosis Date   • Atrial fibrillation with RVR (HCC)    • Atrial flutter (HCC)    • Diabetes (HCC)    • HLD (hyperlipidemia) 01/27/2016   • Hypertension    • Parkinson's disease (HCC)     Advanced        Past Surgical History:   Procedure Laterality Date   • BRAIN STIMULATOR     • COLONOSCOPY N/A 5/5/2022    Procedure: COLONOSCOPY TO CECUM WITH COLD SNARE POLYPECTOMY;  Surgeon: Luther Ferrer MD;  Location: [facility] ENDOSCOPY;  Service: Gastroenterology;  Laterality: N/A;  PRE:ANEMIA  POST:POLYPS/HEMORRHOIDS   • ENDOSCOPY N/A 5/5/2022    Procedure: ESOPHAGOGASTRODUODENOSCOPY WITH  COLD BIOPSIES;  Surgeon: Luther Ferrer MD;  Location: [facility] ENDOSCOPY;  Service: Gastroenterology;  Laterality: N/A;  PRE:ANEMIA  POST:DIVERTICULUM/GASTRITIS   • JOINT REPLACEMENT      Bilateral shoulder and knee replacements       Social History     Socioeconomic History   • Marital status:    Tobacco Use   • Smoking status: Never Smoker   • Smokeless tobacco: Never Used   • Tobacco comment: caffeine use   Substance and Sexual Activity   • Alcohol use: Yes   • Drug use: No   • Sexual activity: Defer       History reviewed. No pertinent family history.    The following portions of the patient's history were reviewed and updated as appropriate: allergies, current medications, past family history, past medical history, past social history, past surgical history and problem  "list.       Objective:       Vitals:    05/31/22 1428   BP: 114/70   BP Location: Right arm   Patient Position: Sitting   Cuff Size: Adult   Pulse: (!) 125   SpO2: 97%   Weight: 86.2 kg (190 lb)   Height: 185.4 cm (73\")         Physical Exam:  Constitutional: awake and conversant  HENT: Oropharynx clear and membrane moist  Eyes: Normal conjunctiva, no sclera icterus  Neck: Supple  Cardiovascular: Regular rate and rhythm, No Murmur, Trace bilateral lower extremity edema  Pulmonary: Normal respiratory effort, normal lung sounds, no wheezing  Neurological: Alert and orient x 3  Skin: Warm, dry, no ecchymosis, no rash  Psych: Appropriate mood and affect. Normal judgment and insight         Lab Results   Component Value Date     05/05/2022     05/04/2022    K 3.5 05/05/2022    K 3.8 05/04/2022     05/05/2022     05/04/2022    CO2 25.0 05/05/2022    CO2 24.4 05/04/2022    BUN 7 (L) 05/05/2022    BUN 9 05/04/2022    CREATININE 0.71 (L) 05/05/2022    CREATININE 0.58 (L) 05/04/2022    EGFRIFNONA 83 09/17/2021    EGFRIFNONA 86 12/12/2018    GLUCOSE 131 (H) 05/05/2022    GLUCOSE 144 (H) 05/04/2022    CALCIUM 8.4 (L) 05/05/2022    CALCIUM 8.4 (L) 05/04/2022    ALBUMIN 3.30 (L) 05/02/2022    ALBUMIN 3.60 04/30/2022    BILITOT 0.7 05/02/2022    BILITOT 0.4 04/30/2022    AST 9 05/02/2022    AST 9 04/30/2022    ALT 7 05/02/2022    ALT <5 04/30/2022     Lab Results   Component Value Date    WBC 6.1 05/24/2022    WBC 5.22 05/07/2022    HGB 9.1 (L) 05/24/2022    HGB 8.6 (L) 05/07/2022    HCT 29.9 (L) 05/24/2022    HCT 26.7 (L) 05/07/2022    MCV 84 05/24/2022    MCV 86.4 05/07/2022     05/24/2022     05/07/2022     Lab Results   Component Value Date    CHOL 126 12/09/2018    TRIG 92 12/09/2018    TRIG 84 03/19/2015    HDL 30 (L) 12/09/2018    HDL 41 03/19/2015    LDL 78 12/09/2018    LDL 21 03/19/2015     Lab Results   Component Value Date    PROBNP 731.2 12/08/2018    PROBNP 337.7 04/19/2018 "     Lab Results   Component Value Date    TROPONINT 0.010 03/23/2022     Lab Results   Component Value Date    TSH 2.520 05/01/2022    TSH 4.950 (H) 03/23/2022           ECG 12 Lead    Date/Time: 5/31/2022 2:50 PM  Performed by: Anitha Barone APRN  Authorized by: Anitha Barone APRN   Comparison: compared with previous ECG from 4/30/2022  Similar to previous ECG  Rhythm: atrial fibrillation  Rate: tachycardic  Other findings: non-specific ST-T wave changes               Assessment:          Diagnosis Plan   1. Essential hypertension     2. Atrial fibrillation, persistent (HCC)  ECG 12 Lead   3. Parkinson's disease ([facility])            Plan:       Permanent atrial fibrillation:  · Heart rate about 100   · Patient maintained on Eliquis at hospital discharge.  He had recent admission for fall with significant bleeding from head laceration.  He is also being worked up by GI for anemia and completing a pill camera  · I discussed my concerns about anticoagulation.  His wife is still concerned about stroke risk  · I will discuss with Dr Fung    Essential hypertension:  · BP controlled.  He has actually had some lower readings at home  · I am going to change carvedilol to metoprolol tartrate and see if we can get better HR control with less BP effects  · Call in one week for BP and HR log. If Bps low will stop Imdur    Parkinson's disease:  · I am concerned about his recent falls with blood thinner.  I will discuss this with Dr. Fung    CAD:  · Reviewed nuclear stress test 3/2022. He still has occasional chest tightness and dyspnea on exertion but no worsening  · I think medical management remains the best approach with new anemia and recent hospitalization.  I discussed this with the patient and his wife    Patient presents today after recent hospitalization for anemia after fall at home with head laceration.  He continues to have multiple complaints including lightheadedness and fatigue.  His blood pressure  is normal range today, however he reports some low readings at home.  He remains somewhat tachycardic with heart rate around 100 and A. fib.  I am going to change his carvedilol to metoprolol tartrate to see if we can get more heart rate control with less blood pressure effect.  I will call him in a week for blood pressure log.  If he is having low readings at home I think we need to stop his Imdur.  Patient also has what appears to be stable intermittent chest tightness and dyspnea on exertion.  He is not very active at baseline.  I think ongoing medical management is the best strategy for this.  I am most concerned about his risk for falling with his deconditioning and Parkinson's with history of recent fall and being on Eliquis.  I discussed stopping Eliquis today with the patient and his wife.  They are concerned about his stroke risk.  They would appreciate input from Dr. Fung and I will reach out to him and then call the patient with his thoughts so we can make a shared decision.  I will have him follow-up with Dr. Caro in approximately 1 month.      Orders Placed This Encounter   Procedures   • ECG 12 Lead     This order was created via procedure documentation     Order Specific Question:   Release to patient     Answer:   Immediate            ESME Kelley  [facility]  05/31/22  15:25 EDT

## 2022-06-13 ENCOUNTER — TELEPHONE (OUTPATIENT)
Dept: GASTROENTEROLOGY | Facility: CLINIC | Age: 72
End: 2022-06-13

## 2022-06-13 ENCOUNTER — TELEPHONE (OUTPATIENT)
Dept: CARDIOLOGY | Facility: CLINIC | Age: 72
End: 2022-06-13

## 2022-06-13 NOTE — TELEPHONE ENCOUNTER
"----- Message from Sergio Rai MA sent at 6/9/2022 10:08 AM EDT -----  Pt's wife returning your phone call. He was seen in the officer by you 05/31/22. Notes state\" They would appreciate input from Dr. Fung and I will reach out to him and then call the patient with his thoughts so we can make a shared decision.\"  The wife stated that she has not heard from  yet.     Please them a call at [phone]. Thanks     "

## 2022-06-13 NOTE — TELEPHONE ENCOUNTER
I spoke with patient's wife regarding patient's anticoagulation and the risks with recent fall/ head laceration versus risk of stroke.    She is concerned about increased stroke risk off anticoagulation.    I feel it is best if Dr. Fung discusses this with them further either by phone or at their next clinic appointment.    At this time he will stay on Eliquis.  I discussed the concern for serious injury with falls and she verbalized understanding.

## 2022-08-05 ENCOUNTER — OFFICE VISIT (OUTPATIENT)
Dept: CARDIOLOGY | Facility: CLINIC | Age: 72
End: 2022-08-05

## 2022-08-05 VITALS
HEIGHT: 73 IN | SYSTOLIC BLOOD PRESSURE: 115 MMHG | OXYGEN SATURATION: 98 % | DIASTOLIC BLOOD PRESSURE: 64 MMHG | BODY MASS INDEX: 25.07 KG/M2 | HEART RATE: 108 BPM

## 2022-08-05 DIAGNOSIS — I48.21 PERMANENT ATRIAL FIBRILLATION: Primary | ICD-10-CM

## 2022-08-05 DIAGNOSIS — I10 ESSENTIAL HYPERTENSION: Chronic | ICD-10-CM

## 2022-08-05 PROCEDURE — 99214 OFFICE O/P EST MOD 30 MIN: CPT | Performed by: INTERNAL MEDICINE

## 2022-08-05 NOTE — PROGRESS NOTES
"      CARDIOLOGY    Jimbo Fung MD    ENCOUNTER DATE:  08/05/2022    Edilberto Reeder / 72 y.o. / male        CHIEF COMPLAINT / REASON FOR OFFICE VISIT     Essential hypertension (05/31/2022 Follow up)  Atrial fibrillation    HISTORY OF PRESENT ILLNESS       HPI  Edilberto Reeder is a 72 y.o. male who presents today for reevaluation.  Overall he is actually doing relatively well.  He caught his leg on a lawnmower and had to get stitches.  He went to [facility] in [place] after he had a fall.  He had a head laceration but not an internal bleed.  He is really only had 2 episodes where there has been a concern about anticoagulation.  I spoke with his wife via the phone.  Overall he is doing well he was actually working on a lawnmower when he got the stitches.  He has Parkinson's but so far has remained his overall stability.      The following portions of the patient's history were reviewed and updated as appropriate: allergies, current medications, past family history, past medical history, past social history, past surgical history and problem list.      VITAL SIGNS     Visit Vitals  /64 (BP Location: Left arm)   Pulse 108   Ht 185.4 cm (73\")   SpO2 98%   BMI 25.07 kg/m²         Wt Readings from Last 3 Encounters:   05/31/22 86.2 kg (190 lb)   05/24/22 80.8 kg (178 lb 3.2 oz)   05/08/22 88.9 kg (195 lb 15.8 oz)     Body mass index is 25.07 kg/m².      REVIEW OF SYSTEMS   ROS        PHYSICAL EXAMINATION     Vitals reviewed.   Constitutional:       Appearance: Healthy appearance.   Pulmonary:      Effort: Pulmonary effort is normal.   Cardiovascular:      Normal rate. Irregularly irregular rhythm. Normal S1. Normal S2.      Murmurs: There is no murmur.      No gallop. No click. No rub.   Pulses:     Intact distal pulses.   Edema:     Peripheral edema absent.   Neurological:      Mental Status: Alert and oriented to person, place and time.           REVIEWED DATA     Procedures    Cardiac " Procedures:  1.           ASSESSMENT & PLAN      Diagnosis Plan   1. Permanent atrial fibrillation (HCC)     2. Essential hypertension           SUMMARY/DISCUSSION  1. Chronic A. fib.  We did discuss about the anticoagulation.  At this point it sounds like the benefits still outweigh the risks.  I had a fairly long conversation saying that is going to change somewhere along the line but when I do not know.  Patient denies syncope or near syncope.  He did run out of his beta-blocker which is unfortunate.  He did not have any refills he finally got some refills.  He does need to remain on his Lopressor 12.5 twice daily.  Blood pressure little bit low but his heart rate was 108 today he was tolerated just fine.  2. Hypertension blood pressure low and low side.  3. Patient was told to stop his aspirin is not necessary at this time.  4. Follow-up 6 months sooner if issues.    Atrial Fibrillation and Atrial Flutter  Assessment  • The patient has persistent atrial fibrillation  • This is non-valvular in etiology  • The patient's CHADS2-VASc score is 3  • A [id number] score of 2 or more is considered a high risk for a thromboembolic event  • Apixaban prescribed            MEDICATIONS         Discharge Medications          Accurate as of August 5, 2022 11:21 AM. If you have any questions, ask your nurse or doctor.            Continue These Medications      Instructions Start Date   acetaminophen 325 MG tablet  Commonly known as: TYLENOL   650 mg, Oral, Every 6 Hours PRN      allopurinol 100 MG tablet  Commonly known as: ZYLOPRIM   100 mg, Oral, Daily      Alogliptin Benzoate 12.5 MG tablet   Oral, Daily, Half tablet daily      apixaban 5 MG tablet tablet  Commonly known as: ELIQUIS   5 mg, Oral, 2 Times Daily      aspirin 81 MG chewable tablet   81 mg, Oral, Every Other Day      Calcium Carbonate 1500 (600 Ca) MG tablet   650 mg, Oral, Daily      Carbidopa-Levodopa ER 36. MG capsule controlled-release   Oral, 4 Times  Daily, 4 tablets 4 times daily      citalopram 20 MG tablet  Commonly known as: CeleXA   20 mg, Oral, Daily      docusate sodium 50 MG capsule  Commonly known as: COLACE   Oral, Nightly      donepezil 5 MG tablet  Commonly known as: ARICEPT   10 mg, Oral, Nightly      isosorbide mononitrate 30 MG 24 hr tablet  Commonly known as: IMDUR   30 mg, Oral, Every 24 Hours Scheduled      levothyroxine 25 MCG tablet  Commonly known as: SYNTHROID, LEVOTHROID   12.5 mcg, Oral, Daily      metFORMIN 500 MG tablet  Commonly known as: GLUCOPHAGE   500 mg, Oral, 3 Times Daily      metoprolol tartrate 25 MG tablet  Commonly known as: LOPRESSOR   12.5 mg, Oral, 2 Times Daily      Polyvinyl Alcohol-Povidone PF 1.4-0.6 % ophthalmic solution  Commonly known as: HYPOTEARS   1-2 drops, As Needed      simvastatin 80 MG tablet  Commonly known as: ZOCOR   40 mg, Oral, Nightly      traZODone 50 MG tablet  Commonly known as: DESYREL   25 mg, Oral, Nightly      triamcinolone 0.1 % ointment  Commonly known as: KENALOG   No dose, route, or frequency recorded.      vitamin B-12 1000 MCG tablet  Commonly known as: CYANOCOBALAMIN   500 mcg, Oral, Daily                 **Dragon Disclaimer:   Much of this encounter note is an electronic transcription/translation of spoken language to printed text. The electronic translation of spoken language may permit erroneous, or at times, nonsensical words or phrases to be inadvertently transcribed. Although I have reviewed the note for such errors, some may still exist.

## 2022-10-17 RX ORDER — ISOSORBIDE MONONITRATE 30 MG/1
TABLET, EXTENDED RELEASE ORAL
Qty: 30 TABLET | Refills: 5 | Status: SHIPPED | OUTPATIENT
Start: 2022-10-17

## 2022-11-01 ENCOUNTER — HOSPITAL ENCOUNTER (INPATIENT)
Facility: HOSPITAL | Age: 72
LOS: 3 days | Discharge: HOME | End: 2022-11-04
Attending: INTERNAL MEDICINE | Admitting: INTERNAL MEDICINE

## 2022-11-01 ENCOUNTER — HOSPITAL ENCOUNTER (OUTPATIENT)
Dept: CARDIOLOGY | Facility: HOSPITAL | Age: 72
Discharge: HOME | End: 2022-11-01
Admitting: INTERNAL MEDICINE

## 2022-11-01 VITALS
OXYGEN SATURATION: 97 % | HEIGHT: 73 IN | HEART RATE: 52 BPM | DIASTOLIC BLOOD PRESSURE: 64 MMHG | SYSTOLIC BLOOD PRESSURE: 108 MMHG | WEIGHT: 220 LBS | BODY MASS INDEX: 29.16 KG/M2

## 2022-11-01 DIAGNOSIS — R94.31 ABNORMAL EKG: ICD-10-CM

## 2022-11-01 DIAGNOSIS — I10 ESSENTIAL HYPERTENSION: ICD-10-CM

## 2022-11-01 DIAGNOSIS — R06.02 SHORTNESS OF BREATH: ICD-10-CM

## 2022-11-01 DIAGNOSIS — I50.33 ACUTE ON CHRONIC DIASTOLIC CHF (CONGESTIVE HEART FAILURE): Primary | ICD-10-CM

## 2022-11-01 DIAGNOSIS — Z79.01 CHRONIC ANTICOAGULATION: Chronic | ICD-10-CM

## 2022-11-01 DIAGNOSIS — E11.65 TYPE 2 DIABETES MELLITUS WITH HYPERGLYCEMIA, WITHOUT LONG-TERM CURRENT USE OF INSULIN: ICD-10-CM

## 2022-11-01 DIAGNOSIS — I50.43 ACUTE ON CHRONIC COMBINED SYSTOLIC AND DIASTOLIC CONGESTIVE HEART FAILURE: ICD-10-CM

## 2022-11-01 DIAGNOSIS — I48.19 ATRIAL FIBRILLATION, PERSISTENT: ICD-10-CM

## 2022-11-01 DIAGNOSIS — R06.09 DYSPNEA ON EXERTION: Primary | ICD-10-CM

## 2022-11-01 LAB
ALBUMIN SERPL-MCNC: 3.8 G/DL (ref 3.5–5.2)
ALBUMIN/GLOB SERPL: 1.7 G/DL
ALP SERPL-CCNC: 93 U/L (ref 39–117)
ALT SERPL W P-5'-P-CCNC: 5 U/L (ref 1–41)
ANION GAP SERPL CALCULATED.3IONS-SCNC: 11.3 MMOL/L (ref 5–15)
AST SERPL-CCNC: 13 U/L (ref 1–40)
BASOPHILS # BLD AUTO: 0.03 10*3/MM3 (ref 0–0.2)
BASOPHILS NFR BLD AUTO: 0.5 % (ref 0–1.5)
BILIRUB SERPL-MCNC: 0.8 MG/DL (ref 0–1.2)
BUN SERPL-MCNC: 28 MG/DL (ref 8–23)
BUN/CREAT SERPL: 26.7 (ref 7–25)
CALCIUM SPEC-SCNC: 9.1 MG/DL (ref 8.6–10.5)
CHLORIDE SERPL-SCNC: 104 MMOL/L (ref 98–107)
CO2 SERPL-SCNC: 22.7 MMOL/L (ref 22–29)
CREAT SERPL-MCNC: 1.05 MG/DL (ref 0.76–1.27)
D DIMER PPP FEU-MCNC: 3.58 MCGFEU/ML (ref 0–0.49)
DEPRECATED RDW RBC AUTO: 45.8 FL (ref 37–54)
EGFRCR SERPLBLD CKD-EPI 2021: 74.5 ML/MIN/1.73
EOSINOPHIL # BLD AUTO: 0.09 10*3/MM3 (ref 0–0.4)
EOSINOPHIL NFR BLD AUTO: 1.4 % (ref 0.3–6.2)
ERYTHROCYTE [DISTWIDTH] IN BLOOD BY AUTOMATED COUNT: 17.3 % (ref 12.3–15.4)
GLOBULIN UR ELPH-MCNC: 2.2 GM/DL
GLUCOSE BLDC GLUCOMTR-MCNC: 264 MG/DL (ref 70–130)
GLUCOSE SERPL-MCNC: 134 MG/DL (ref 65–99)
HCT VFR BLD AUTO: 29.6 % (ref 37.5–51)
HGB BLD-MCNC: 9.2 G/DL (ref 13–17.7)
LYMPHOCYTES # BLD AUTO: 1.12 10*3/MM3 (ref 0.7–3.1)
LYMPHOCYTES NFR BLD AUTO: 18 % (ref 19.6–45.3)
MCH RBC QN AUTO: 22.7 PG (ref 26.6–33)
MCHC RBC AUTO-ENTMCNC: 31.1 G/DL (ref 31.5–35.7)
MCV RBC AUTO: 73.1 FL (ref 79–97)
MONOCYTES # BLD AUTO: 0.62 10*3/MM3 (ref 0.1–0.9)
MONOCYTES NFR BLD AUTO: 10 % (ref 5–12)
NEUTROPHILS NFR BLD AUTO: 4.35 10*3/MM3 (ref 1.7–7)
NEUTROPHILS NFR BLD AUTO: 69.9 % (ref 42.7–76)
NT-PROBNP SERPL-MCNC: 2693 PG/ML (ref 0–900)
PLATELET # BLD AUTO: 205 10*3/MM3 (ref 140–450)
PMV BLD AUTO: 9.4 FL (ref 6–12)
POTASSIUM SERPL-SCNC: 4.1 MMOL/L (ref 3.5–5.2)
PROT SERPL-MCNC: 6 G/DL (ref 6–8.5)
RBC # BLD AUTO: 4.05 10*6/MM3 (ref 4.14–5.8)
SODIUM SERPL-SCNC: 138 MMOL/L (ref 136–145)
TROPONIN T SERPL-MCNC: 0.02 NG/ML (ref 0–0.03)
TSH SERPL DL<=0.05 MIU/L-ACNC: 7.96 UIU/ML (ref 0.27–4.2)
WBC NRBC COR # BLD: 6.22 10*3/MM3 (ref 3.4–10.8)

## 2022-11-01 PROCEDURE — 82962 GLUCOSE BLOOD TEST: CPT

## 2022-11-01 PROCEDURE — 63710000001 INSULIN LISPRO (HUMAN) PER 5 UNITS: Performed by: NURSE PRACTITIONER

## 2022-11-01 PROCEDURE — 84443 ASSAY THYROID STIM HORMONE: CPT | Performed by: INTERNAL MEDICINE

## 2022-11-01 PROCEDURE — 63710000001 POTASSIUM CHLORIDE 10 MEQ TABLET CONTROLLED-RELEASE: Performed by: INTERNAL MEDICINE

## 2022-11-01 PROCEDURE — 80053 COMPREHEN METABOLIC PANEL: CPT

## 2022-11-01 PROCEDURE — 99223 1ST HOSP IP/OBS HIGH 75: CPT | Performed by: INTERNAL MEDICINE

## 2022-11-01 PROCEDURE — 83880 ASSAY OF NATRIURETIC PEPTIDE: CPT | Performed by: INTERNAL MEDICINE

## 2022-11-01 PROCEDURE — 94760 N-INVAS EAR/PLS OXIMETRY 1: CPT

## 2022-11-01 PROCEDURE — 85025 COMPLETE CBC W/AUTO DIFF WBC: CPT

## 2022-11-01 PROCEDURE — 84484 ASSAY OF TROPONIN QUANT: CPT | Performed by: INTERNAL MEDICINE

## 2022-11-01 PROCEDURE — A9270 NON-COVERED ITEM OR SERVICE: HCPCS | Performed by: INTERNAL MEDICINE

## 2022-11-01 PROCEDURE — 96374 THER/PROPH/DIAG INJ IV PUSH: CPT

## 2022-11-01 PROCEDURE — 93010 ELECTROCARDIOGRAM REPORT: CPT | Performed by: INTERNAL MEDICINE

## 2022-11-01 PROCEDURE — 36415 COLL VENOUS BLD VENIPUNCTURE: CPT

## 2022-11-01 PROCEDURE — 85379 FIBRIN DEGRADATION QUANT: CPT | Performed by: INTERNAL MEDICINE

## 2022-11-01 PROCEDURE — 93005 ELECTROCARDIOGRAM TRACING: CPT | Performed by: INTERNAL MEDICINE

## 2022-11-01 PROCEDURE — 25010000002 FUROSEMIDE PER 20 MG: Performed by: INTERNAL MEDICINE

## 2022-11-01 RX ORDER — NITROGLYCERIN 0.4 MG/1
0.4 TABLET SUBLINGUAL
Status: DISCONTINUED | OUTPATIENT
Start: 2022-11-01 | End: 2022-11-04 | Stop reason: HOSPADM

## 2022-11-01 RX ORDER — POTASSIUM CHLORIDE 750 MG/1
10 TABLET, FILM COATED, EXTENDED RELEASE ORAL ONCE
Status: COMPLETED | OUTPATIENT
Start: 2022-11-01 | End: 2022-11-01

## 2022-11-01 RX ORDER — ACETAMINOPHEN 325 MG/1
650 TABLET ORAL EVERY 6 HOURS PRN
Status: DISCONTINUED | OUTPATIENT
Start: 2022-11-01 | End: 2022-11-04 | Stop reason: HOSPADM

## 2022-11-01 RX ORDER — SODIUM CHLORIDE 0.9 % (FLUSH) 0.9 %
10 SYRINGE (ML) INJECTION AS NEEDED
Status: DISCONTINUED | OUTPATIENT
Start: 2022-11-01 | End: 2022-11-04 | Stop reason: HOSPADM

## 2022-11-01 RX ORDER — ASPIRIN 81 MG/1
81 TABLET, CHEWABLE ORAL EVERY OTHER DAY
Status: DISCONTINUED | OUTPATIENT
Start: 2022-11-01 | End: 2022-11-04 | Stop reason: HOSPADM

## 2022-11-01 RX ORDER — FUROSEMIDE 10 MG/ML
60 INJECTION INTRAMUSCULAR; INTRAVENOUS ONCE
Status: COMPLETED | OUTPATIENT
Start: 2022-11-01 | End: 2022-11-01

## 2022-11-01 RX ORDER — LEVOTHYROXINE SODIUM 0.03 MG/1
25 TABLET ORAL DAILY
Status: DISCONTINUED | OUTPATIENT
Start: 2022-11-01 | End: 2022-11-04 | Stop reason: HOSPADM

## 2022-11-01 RX ORDER — POTASSIUM CHLORIDE 20 MEQ/1
20 TABLET, EXTENDED RELEASE ORAL ONCE
Status: DISCONTINUED | OUTPATIENT
Start: 2022-11-01 | End: 2022-11-01

## 2022-11-01 RX ORDER — ISOSORBIDE MONONITRATE 30 MG/1
30 TABLET, EXTENDED RELEASE ORAL DAILY
Status: DISCONTINUED | OUTPATIENT
Start: 2022-11-01 | End: 2022-11-04 | Stop reason: HOSPADM

## 2022-11-01 RX ORDER — NICOTINE POLACRILEX 4 MG
15 LOZENGE BUCCAL
Status: DISCONTINUED | OUTPATIENT
Start: 2022-11-01 | End: 2022-11-04 | Stop reason: HOSPADM

## 2022-11-01 RX ORDER — POTASSIUM CHLORIDE 750 MG/1
10 TABLET, FILM COATED, EXTENDED RELEASE ORAL ONCE
Qty: 1 TABLET | Refills: 0 | Status: SHIPPED | OUTPATIENT
Start: 2022-11-01 | End: 2022-11-01 | Stop reason: SDUPTHER

## 2022-11-01 RX ORDER — CHOLECALCIFEROL (VITAMIN D3) 125 MCG
500 CAPSULE ORAL DAILY
Status: DISCONTINUED | OUTPATIENT
Start: 2022-11-01 | End: 2022-11-04 | Stop reason: HOSPADM

## 2022-11-01 RX ORDER — TRAZODONE HYDROCHLORIDE 50 MG/1
25 TABLET ORAL NIGHTLY
Status: DISCONTINUED | OUTPATIENT
Start: 2022-11-01 | End: 2022-11-04 | Stop reason: HOSPADM

## 2022-11-01 RX ORDER — INSULIN LISPRO 100 [IU]/ML
0-7 INJECTION, SOLUTION INTRAVENOUS; SUBCUTANEOUS
Status: DISCONTINUED | OUTPATIENT
Start: 2022-11-01 | End: 2022-11-02

## 2022-11-01 RX ORDER — DEXTROSE MONOHYDRATE 25 G/50ML
25 INJECTION, SOLUTION INTRAVENOUS
Status: DISCONTINUED | OUTPATIENT
Start: 2022-11-01 | End: 2022-11-04 | Stop reason: HOSPADM

## 2022-11-01 RX ORDER — ALLOPURINOL 100 MG/1
100 TABLET ORAL DAILY
Status: DISCONTINUED | OUTPATIENT
Start: 2022-11-01 | End: 2022-11-04 | Stop reason: HOSPADM

## 2022-11-01 RX ORDER — CITALOPRAM 20 MG/1
20 TABLET ORAL DAILY
Status: DISCONTINUED | OUTPATIENT
Start: 2022-11-01 | End: 2022-11-04 | Stop reason: HOSPADM

## 2022-11-01 RX ADMIN — FUROSEMIDE 60 MG: 10 INJECTION, SOLUTION INTRAMUSCULAR; INTRAVENOUS at 14:27

## 2022-11-01 RX ADMIN — POTASSIUM CHLORIDE 10 MEQ: 750 TABLET, FILM COATED, EXTENDED RELEASE ORAL at 14:33

## 2022-11-01 RX ADMIN — METOPROLOL TARTRATE 12.5 MG: 25 TABLET ORAL at 21:52

## 2022-11-01 RX ADMIN — APIXABAN 5 MG: 5 TABLET, FILM COATED ORAL at 21:54

## 2022-11-01 RX ADMIN — DOCUSATE SODIUM 50 MG: 50 CAPSULE, LIQUID FILLED ORAL at 21:54

## 2022-11-01 RX ADMIN — INSULIN LISPRO 4 UNITS: 100 INJECTION, SOLUTION INTRAVENOUS; SUBCUTANEOUS at 21:55

## 2022-11-01 RX ADMIN — TRAZODONE HYDROCHLORIDE 25 MG: 50 TABLET ORAL at 21:53

## 2022-11-01 NOTE — PROGRESS NOTES
250 mL urine output.  Patient is up x 1 asst to stand and use urinal.  Sat back in chair.  Call light in reach.  Instructed patient to call for assistance.     Shena Lugo RN

## 2022-11-01 NOTE — PROGRESS NOTES
200 mL urine output.  Patient is up x 1 asst to stand and use urinal.  Sat back in chair.  Call light in reach.  Instructed patient to call for assistance.    Shena Lugo RN

## 2022-11-01 NOTE — PROGRESS NOTES
Date of Office Visit: 2022  Encounter Provider: Cindy Gonzalez MD  Place of Service: [facility]  Patient Name: Edilberto Reeder  :1950    Chief complaint  Patient was referred by Dr. Larson for evaluation of progressive shortness of breath, edema.    History of Present Illness  Patient is a 72-year-old gentleman with history of atrial fibrillation, atrial flutter, diabetes, hypertension, Parkinson's disease followed by Dr. Resendez.  He has chronic atrial fibrillation.  In 3/2022 stress perfusion study was performed for chest pain that showed inferolateral wall ischemia with an ejection fraction 46% is felt her lower study in his treated medically.  He remains on aspirin and Eliquis therapy.  Echo showed an ejection fraction of 61 to 65% with moderate left ventricular hypertrophy.,  Moderate left atrial enlargement mild tricuspid regurgitation with RV systolic pressure of 38 mmHg.  On 2022 he fell with anemia requiring transfusion.  He was found to have a gastropathy without active bleeding.  He was placed back on aspirin and Plavix therapy.    Patient and wife states that he has had chronic edema prior to their trip in early October.  From  to  they were out of town and noticed slightly worsening edema.  On the return home they noticed even further worsening shortness of breath and edema.  He has had no edema involving his legs all the way up to his scrotum with some oozing from the lower extremity and groin the patient is noted intermittently.  In the setting he was also found to have pneumonia last week and was treated with antibiotics.  He states that the shortness of breath has improved slightly he denies any cough however he does continue to have more shortness of breath than usual and has had progressive lower extremity edema that is relatively unchanged.  He denies any pain or tenderness.  His legs are not hot or swollen and he has been taking  Eliquis consistently without fail.  He has not missed any of his other medications.  He did have some dietary indiscretions with salt but does not think it was a lower.    Past Medical History:   Diagnosis Date   • Atrial fibrillation with RVR (HCC)    • Atrial flutter (HCC)    • Diabetes (HCC)    • HLD (hyperlipidemia) 01/27/2016   • Hypertension    • Parkinson's disease (HCC)     Advanced      Past Surgical History:   Procedure Laterality Date   • BRAIN STIMULATOR     • COLONOSCOPY N/A 5/5/2022    Procedure: COLONOSCOPY TO CECUM WITH COLD SNARE POLYPECTOMY;  Surgeon: Luther Ferrer MD;  Location: [facility] ENDOSCOPY;  Service: Gastroenterology;  Laterality: N/A;  PRE:ANEMIA  POST:POLYPS/HEMORRHOIDS   • ENDOSCOPY N/A 5/5/2022    Procedure: ESOPHAGOGASTRODUODENOSCOPY WITH  COLD BIOPSIES;  Surgeon: Luther Ferrer MD;  Location: [facility] ENDOSCOPY;  Service: Gastroenterology;  Laterality: N/A;  PRE:ANEMIA  POST:DIVERTICULUM/GASTRITIS   • JOINT REPLACEMENT      Bilateral shoulder and knee replacements         Allergies as of 11/01/2022 - Reviewed 11/01/2022   Allergen Reaction Noted   • Bacitracin Anaphylaxis 11/14/2012   • Neosporin [neomycin-bacitracin zn-polymyx] Other (See Comments) 06/07/2013   • Fish-derived products Hives 01/04/2018   • Shellfish allergy Hives 02/12/2014   • Shrimp (diagnostic) Hives 01/04/2018     Social History     Socioeconomic History   • Marital status:    Tobacco Use   • Smoking status: Never   • Smokeless tobacco: Never   • Tobacco comments:     caffeine use   Vaping Use   • Vaping Use: Never used   Substance and Sexual Activity   • Alcohol use: Yes   • Drug use: No   • Sexual activity: Defer     History reviewed. No pertinent family history.  Review of Systems   Constitutional: Negative for chills, fever, weight gain and weight loss.   Cardiovascular: Positive for dyspnea on exertion and leg swelling.   Respiratory: Negative for cough, snoring and wheezing.   "  Hematologic/Lymphatic: Negative for bleeding problem. Does not bruise/bleed easily.   Skin: Negative for color change.   Musculoskeletal: Negative for falls, joint pain and myalgias.   Gastrointestinal: Negative for melena.   Genitourinary: Negative for hematuria.   Neurological: Negative for excessive daytime sleepiness.   Psychiatric/Behavioral: Negative for depression. The patient is not nervous/anxious.         Objective:     Vitals:    11/01/22 1156   BP: 108/64   BP Location: Right arm   Patient Position: Sitting   Pulse: 52   SpO2: 97%   Weight: 99.8 kg (220 lb)   Height: 185.4 cm (72.99\")     Body mass index is 29.03 kg/m².    Vitals reviewed.   Constitutional:       Appearance: Well-developed.   Eyes:      General: No scleral icterus.        Right eye: No discharge.      Conjunctiva/sclera: Conjunctivae normal.      Pupils: Pupils are equal, round, and reactive to light.   HENT:      Head: Normocephalic.      Nose: Nose normal.   Neck:      Thyroid: No thyromegaly.      Vascular: No JVD. JVD elevated.   Pulmonary:      Effort: Pulmonary effort is normal. No respiratory distress.      Breath sounds: Normal breath sounds. No wheezing. No rales.   Cardiovascular:      Normal rate. Irregularly irregular rhythm. Normal S1. Normal S2.      Murmurs: There is no murmur.      No gallop.   Pulses:     Carotid: 2+ bilaterally.     Radial: 2+ bilaterally.  Edema:     Thigh: bilateral 2+ edema of the thigh.     Pretibial: bilateral 2+ edema of the pretibial area.     Ankle: bilateral 2+ edema of the ankle.  Abdominal:      General: Bowel sounds are normal. There is no distension.      Palpations: Abdomen is soft.      Tenderness: There is no abdominal tenderness. There is no rebound.   Musculoskeletal: Normal range of motion.         General: No tenderness.      Cervical back: Normal range of motion and neck supple. Skin:     General: Skin is warm and dry.      Findings: No erythema or rash.   Neurological:      " Mental Status: Alert and oriented to person, place, and time.   Psychiatric:         Behavior: Behavior normal.         Thought Content: Thought content normal.         Judgment: Judgment normal.       Lab Review:     ECG 12 Lead    Date/Time: 11/1/2022 2:08 PM  Performed by: Cindy Gonzalez MD  Authorized by: Cindy Gonzalez MD   Comparison: compared with previous ECG   Similar to previous ECG  Rhythm: atrial fibrillation  Other findings: non-specific ST-T wave changes and low voltage    Clinical impression: abnormal EKG          Assessment:       Diagnosis Plan   1. Dyspnea on exertion  ECG 12 Lead    ECG 12 Lead    furosemide (LASIX) injection 60 mg      2. Abnormal EKG  CBC & Differential    Comprehensive Metabolic Panel    Troponin T    D-Dimer    proBNP    Troponin T    Troponin    D-Dimer    D-Dimer    proBNP    proBNP    ECG 12 Lead    ECG 12 Lead      3. Shortness of breath  CBC & Differential    Comprehensive Metabolic Panel    Troponin T    D-Dimer    proBNP    Troponin T    Troponin    D-Dimer    D-Dimer    proBNP    proBNP      4. Essential hypertension  CBC & Differential    Comprehensive Metabolic Panel    Troponin T    D-Dimer    proBNP    Troponin T    Troponin    D-Dimer    D-Dimer    proBNP    proBNP    ECG 12 Lead    ECG 12 Lead      5. Chronic anticoagulation  ECG 12 Lead    ECG 12 Lead      6. Type 2 diabetes mellitus with hyperglycemia, without long-term current use of insulin (HCC)  ECG 12 Lead    ECG 12 Lead      7. Acute on chronic combined systolic and diastolic congestive heart failure (HCC)  ECG 12 Lead    ECG 12 Lead    potassium chloride CR tablet 10 mEq      8. Atrial fibrillation, persistent (HCC)          Plan:       1.  Acute on chronic biventricular congestive heart failure.  Suspect dietary indiscretions with salt and recent pneumonia likely contributing to recent exacerbation low voltages raises the question of possible amyloidosis.  Will admit to hospital for IV diuresis the patient  and wife very anxious to be out by Friday a.m. for granddaughter's wedding rehearsal.  We will need to watch renal function and hypotension.  Of note he has been very compliant Otherwise including Eliquis.  If edema does not improve promptly with diuresis may need to check a venous Doppler ultrasound his D-dimer was positive.  Also check thyroid studies.  2.  Coronary artery disease with mildly abnormal stress test 3/2022.  No anginal symptoms.  Troponin negative.  3.  Anemia with GI bleed.  Clinically stable.  Will recheck in a.m. for additional diuresis that should improve   4.  Parkinson's disease  5.  Hypothyroidism  6.  Chronic anticoagulation      Time Spent: I spent 60 minutes caring for Edilberto on this date of service. This time includes time spent by me in the following activities: preparing for the visit, reviewing tests, obtaining and/or reviewing a separately obtained history, performing a medically appropriate examination and/or evaluation, counseling and educating the patient/family/caregiver, ordering medications, tests, or procedures, documenting information in the medical record and independently interpreting results and communicating that information with the patient/family/caregiver.   I spent 1 minutes on the separately reported service of ECG. This time is not included in the time used to support the E/M service also reported today.  20 different story is leaving Paula is leaving leaving first and then on the left no doing part-time at the part-time people at the hospital but is somebody shortage       Your medication list      ASK your doctor about these medications      Instructions Last Dose Given Next Dose Due   acetaminophen 325 MG tablet  Commonly known as: TYLENOL      Take 2 tablets by mouth Every 6 (Six) Hours As Needed for Mild Pain .       allopurinol 100 MG tablet  Commonly known as: ZYLOPRIM      Take 100 mg by mouth Daily.       Alogliptin Benzoate 12.5 MG tablet      Take  by mouth  Daily. Half tablet daily       apixaban 5 MG tablet tablet  Commonly known as: ELIQUIS      Take 5 mg by mouth 2 (Two) Times a Day.       aspirin 81 MG chewable tablet      Chew 81 mg Every Other Day.       Calcium Carbonate 1500 (600 Ca) MG tablet      Take 650 mg by mouth Daily.       Carbidopa-Levodopa ER 36. MG capsule controlled-release      Take  by mouth 4 (Four) Times a Day. 4 tablets 4 times daily       citalopram 20 MG tablet  Commonly known as: CeleXA      Take 20 mg by mouth Daily.       docusate sodium 50 MG capsule  Commonly known as: COLACE      Take  by mouth Every Night.       donepezil 5 MG tablet  Commonly known as: ARICEPT      Take 10 mg by mouth Every Night.       isosorbide mononitrate 30 MG 24 hr tablet  Commonly known as: IMDUR      TAKE ONE TABLET BY MOUTH DAILY       levothyroxine 25 MCG tablet  Commonly known as: SYNTHROID, LEVOTHROID      Take 1 tablet by mouth Daily.       metFORMIN 500 MG tablet  Commonly known as: GLUCOPHAGE      Take 500 mg by mouth 3 (Three) Times a Day.       metoprolol tartrate 25 MG tablet  Commonly known as: LOPRESSOR      Take 0.5 tablets by mouth 2 (Two) Times a Day.       Polyvinyl Alcohol-Povidone PF 1.4-0.6 % ophthalmic solution  Commonly known as: HYPOTEARS      1-2 drops As Needed for Wound Care.       simvastatin 80 MG tablet  Commonly known as: ZOCOR      Take 40 mg by mouth Every Night.       traZODone 50 MG tablet  Commonly known as: DESYREL      Take 25 mg by mouth Every Night.       triamcinolone 0.1 % ointment  Commonly known as: KENALOG           vitamin B-12 1000 MCG tablet  Commonly known as: CYANOCOBALAMIN      Take 500 mcg by mouth Daily.              Patient is no longer taking -.  I corrected the med list to reflect this.  I did not stop these medications.      Dictated utilizing Dragon dictation

## 2022-11-02 ENCOUNTER — APPOINTMENT (OUTPATIENT)
Dept: CARDIOLOGY | Facility: HOSPITAL | Age: 72
End: 2022-11-02

## 2022-11-02 PROBLEM — I50.33 ACUTE ON CHRONIC DIASTOLIC CHF (CONGESTIVE HEART FAILURE) (HCC): Status: ACTIVE | Noted: 2022-11-02

## 2022-11-02 PROBLEM — R06.02 SHORTNESS OF BREATH: Status: ACTIVE | Noted: 2022-11-02

## 2022-11-02 LAB
ANION GAP SERPL CALCULATED.3IONS-SCNC: 12 MMOL/L (ref 5–15)
AORTIC DIMENSIONLESS INDEX: 0.8 (DI)
BH CV ECHO MEAS - ACS: 2.44 CM
BH CV ECHO MEAS - AO MAX PG: 3.5 MMHG
BH CV ECHO MEAS - AO MEAN PG: 2.11 MMHG
BH CV ECHO MEAS - AO ROOT DIAM: 4 CM
BH CV ECHO MEAS - AO V2 MAX: 93.6 CM/SEC
BH CV ECHO MEAS - AO V2 VTI: 21.4 CM
BH CV ECHO MEAS - AVA(I,D): 2.6 CM2
BH CV ECHO MEAS - EDV(CUBED): 73 ML
BH CV ECHO MEAS - EDV(MOD-SP2): 68 ML
BH CV ECHO MEAS - EDV(MOD-SP4): 65 ML
BH CV ECHO MEAS - EF(MOD-BP): 46.5 %
BH CV ECHO MEAS - EF(MOD-SP2): 47.1 %
BH CV ECHO MEAS - EF(MOD-SP4): 46.2 %
BH CV ECHO MEAS - ESV(CUBED): 44 ML
BH CV ECHO MEAS - ESV(MOD-SP2): 36 ML
BH CV ECHO MEAS - ESV(MOD-SP4): 35 ML
BH CV ECHO MEAS - FS: 15.5 %
BH CV ECHO MEAS - IVS/LVPW: 1.09 CM
BH CV ECHO MEAS - IVSD: 1.16 CM
BH CV ECHO MEAS - LV MASS(C)D: 159.6 GRAMS
BH CV ECHO MEAS - LV MAX PG: 2.25 MMHG
BH CV ECHO MEAS - LV MEAN PG: 1.3 MMHG
BH CV ECHO MEAS - LV V1 MAX: 75 CM/SEC
BH CV ECHO MEAS - LV V1 VTI: 16.3 CM
BH CV ECHO MEAS - LVIDD: 4.2 CM
BH CV ECHO MEAS - LVIDS: 3.5 CM
BH CV ECHO MEAS - LVOT AREA: 3.5 CM2
BH CV ECHO MEAS - LVOT DIAM: 2.1 CM
BH CV ECHO MEAS - LVPWD: 1.07 CM
BH CV ECHO MEAS - MV DEC SLOPE: 493.1 CM/SEC2
BH CV ECHO MEAS - MV MAX PG: 6.9 MMHG
BH CV ECHO MEAS - MV MEAN PG: 1.81 MMHG
BH CV ECHO MEAS - MV P1/2T: 82.6 MSEC
BH CV ECHO MEAS - MV V2 VTI: 41.6 CM
BH CV ECHO MEAS - MVA(P1/2T): 2.7 CM2
BH CV ECHO MEAS - MVA(VTI): 1.36 CM2
BH CV ECHO MEAS - PA ACC TIME: 0.13 SEC
BH CV ECHO MEAS - PA PR(ACCEL): 20.8 MMHG
BH CV ECHO MEAS - PA V2 MAX: 53.6 CM/SEC
BH CV ECHO MEAS - RAP SYSTOLE: 15 MMHG
BH CV ECHO MEAS - RV MAX PG: 0.78 MMHG
BH CV ECHO MEAS - RV V1 MAX: 44.1 CM/SEC
BH CV ECHO MEAS - RV V1 VTI: 7.5 CM
BH CV ECHO MEAS - RVSP: 39.9 MMHG
BH CV ECHO MEAS - SV(LVOT): 56.5 ML
BH CV ECHO MEAS - SV(MOD-SP2): 32 ML
BH CV ECHO MEAS - SV(MOD-SP4): 30 ML
BH CV ECHO MEAS - TAPSE (>1.6): 1.1 CM
BH CV ECHO MEAS - TR MAX PG: 24.9 MMHG
BH CV ECHO MEAS - TR MAX VEL: 249.3 CM/SEC
BH CV XLRA - RV BASE: 3.9 CM
BH CV XLRA - RV LENGTH: 6.5 CM
BH CV XLRA - RV MID: 3.1 CM
BH CV XLRA - TDI S': 7 CM/SEC
BUN SERPL-MCNC: 26 MG/DL (ref 8–23)
BUN/CREAT SERPL: 24.3 (ref 7–25)
CALCIUM SPEC-SCNC: 8.6 MG/DL (ref 8.6–10.5)
CHLORIDE SERPL-SCNC: 101 MMOL/L (ref 98–107)
CO2 SERPL-SCNC: 26 MMOL/L (ref 22–29)
CREAT SERPL-MCNC: 1.07 MG/DL (ref 0.76–1.27)
DEPRECATED RDW RBC AUTO: 45.4 FL (ref 37–54)
EGFRCR SERPLBLD CKD-EPI 2021: 72.8 ML/MIN/1.73
ERYTHROCYTE [DISTWIDTH] IN BLOOD BY AUTOMATED COUNT: 17.3 % (ref 12.3–15.4)
GLUCOSE BLDC GLUCOMTR-MCNC: 158 MG/DL (ref 70–130)
GLUCOSE BLDC GLUCOMTR-MCNC: 171 MG/DL (ref 70–130)
GLUCOSE BLDC GLUCOMTR-MCNC: 289 MG/DL (ref 70–130)
GLUCOSE BLDC GLUCOMTR-MCNC: 331 MG/DL (ref 70–130)
GLUCOSE SERPL-MCNC: 173 MG/DL (ref 65–99)
HCT VFR BLD AUTO: 28.5 % (ref 37.5–51)
HGB BLD-MCNC: 8.8 G/DL (ref 13–17.7)
LEFT ATRIUM VOLUME INDEX: 36.4 ML/M2
MAXIMAL PREDICTED HEART RATE: 146 BPM
MCH RBC QN AUTO: 22.7 PG (ref 26.6–33)
MCHC RBC AUTO-ENTMCNC: 30.9 G/DL (ref 31.5–35.7)
MCV RBC AUTO: 73.6 FL (ref 79–97)
PLATELET # BLD AUTO: 204 10*3/MM3 (ref 140–450)
PMV BLD AUTO: 10.2 FL (ref 6–12)
POTASSIUM SERPL-SCNC: 3.7 MMOL/L (ref 3.5–5.2)
RBC # BLD AUTO: 3.87 10*6/MM3 (ref 4.14–5.8)
SINUS: 3.5 CM
SODIUM SERPL-SCNC: 139 MMOL/L (ref 136–145)
STRESS TARGET HR: 124 BPM
T4 FREE SERPL-MCNC: 1.35 NG/DL (ref 0.93–1.7)
WBC NRBC COR # BLD: 5.22 10*3/MM3 (ref 3.4–10.8)

## 2022-11-02 PROCEDURE — 97535 SELF CARE MNGMENT TRAINING: CPT

## 2022-11-02 PROCEDURE — 99232 SBSQ HOSP IP/OBS MODERATE 35: CPT | Performed by: INTERNAL MEDICINE

## 2022-11-02 PROCEDURE — 85027 COMPLETE CBC AUTOMATED: CPT | Performed by: INTERNAL MEDICINE

## 2022-11-02 PROCEDURE — 93306 TTE W/DOPPLER COMPLETE: CPT

## 2022-11-02 PROCEDURE — 92610 EVALUATE SWALLOWING FUNCTION: CPT

## 2022-11-02 PROCEDURE — 84439 ASSAY OF FREE THYROXINE: CPT | Performed by: INTERNAL MEDICINE

## 2022-11-02 PROCEDURE — 93306 TTE W/DOPPLER COMPLETE: CPT | Performed by: INTERNAL MEDICINE

## 2022-11-02 PROCEDURE — 63710000001 INSULIN LISPRO (HUMAN) PER 5 UNITS: Performed by: NURSE PRACTITIONER

## 2022-11-02 PROCEDURE — 80048 BASIC METABOLIC PNL TOTAL CA: CPT | Performed by: NURSE PRACTITIONER

## 2022-11-02 PROCEDURE — 97530 THERAPEUTIC ACTIVITIES: CPT

## 2022-11-02 PROCEDURE — 97165 OT EVAL LOW COMPLEX 30 MIN: CPT

## 2022-11-02 PROCEDURE — 97161 PT EVAL LOW COMPLEX 20 MIN: CPT

## 2022-11-02 PROCEDURE — 82962 GLUCOSE BLOOD TEST: CPT

## 2022-11-02 PROCEDURE — 25010000002 FUROSEMIDE PER 20 MG: Performed by: INTERNAL MEDICINE

## 2022-11-02 RX ORDER — FUROSEMIDE 10 MG/ML
60 INJECTION INTRAMUSCULAR; INTRAVENOUS
Status: DISCONTINUED | OUTPATIENT
Start: 2022-11-02 | End: 2022-11-04 | Stop reason: HOSPADM

## 2022-11-02 RX ORDER — ALOGLIPTIN 6.25 MG/1
6.25 TABLET, FILM COATED ORAL DAILY
Status: DISCONTINUED | OUTPATIENT
Start: 2022-11-02 | End: 2022-11-04 | Stop reason: HOSPADM

## 2022-11-02 RX ORDER — INSULIN LISPRO 100 [IU]/ML
0-7 INJECTION, SOLUTION INTRAVENOUS; SUBCUTANEOUS
Status: DISCONTINUED | OUTPATIENT
Start: 2022-11-03 | End: 2022-11-04 | Stop reason: HOSPADM

## 2022-11-02 RX ORDER — ROSUVASTATIN CALCIUM 20 MG/1
20 TABLET, COATED ORAL NIGHTLY
Status: DISCONTINUED | OUTPATIENT
Start: 2022-11-02 | End: 2022-11-04 | Stop reason: HOSPADM

## 2022-11-02 RX ADMIN — ALOGLIPTIN 6.25 MG: 6.25 TABLET, FILM COATED ORAL at 11:57

## 2022-11-02 RX ADMIN — METOPROLOL TARTRATE 12.5 MG: 25 TABLET ORAL at 08:49

## 2022-11-02 RX ADMIN — LEVODOPA AND CARBIDOPA 4 CAPSULE: 145; 36.25 CAPSULE, EXTENDED RELEASE ORAL at 18:19

## 2022-11-02 RX ADMIN — LEVOTHYROXINE SODIUM 25 MCG: 0.03 TABLET ORAL at 08:48

## 2022-11-02 RX ADMIN — LEVODOPA AND CARBIDOPA 4 CAPSULE: 145; 36.25 CAPSULE, EXTENDED RELEASE ORAL at 20:23

## 2022-11-02 RX ADMIN — APIXABAN 5 MG: 5 TABLET, FILM COATED ORAL at 08:49

## 2022-11-02 RX ADMIN — DOCUSATE SODIUM 50 MG: 50 CAPSULE, LIQUID FILLED ORAL at 20:22

## 2022-11-02 RX ADMIN — FUROSEMIDE 60 MG: 10 INJECTION, SOLUTION INTRAMUSCULAR; INTRAVENOUS at 18:16

## 2022-11-02 RX ADMIN — ALLOPURINOL 100 MG: 100 TABLET ORAL at 08:49

## 2022-11-02 RX ADMIN — ISOSORBIDE MONONITRATE 30 MG: 30 TABLET, EXTENDED RELEASE ORAL at 08:48

## 2022-11-02 RX ADMIN — LEVODOPA AND CARBIDOPA 4 CAPSULE: 145; 36.25 CAPSULE, EXTENDED RELEASE ORAL at 11:57

## 2022-11-02 RX ADMIN — FUROSEMIDE 60 MG: 10 INJECTION, SOLUTION INTRAMUSCULAR; INTRAVENOUS at 12:44

## 2022-11-02 RX ADMIN — Medication 500 MCG: at 08:48

## 2022-11-02 RX ADMIN — METOPROLOL TARTRATE 12.5 MG: 25 TABLET ORAL at 20:22

## 2022-11-02 RX ADMIN — ROSUVASTATIN CALCIUM 20 MG: 20 TABLET, FILM COATED ORAL at 20:23

## 2022-11-02 RX ADMIN — APIXABAN 5 MG: 5 TABLET, FILM COATED ORAL at 20:22

## 2022-11-02 RX ADMIN — CITALOPRAM 20 MG: 20 TABLET, FILM COATED ORAL at 08:49

## 2022-11-02 RX ADMIN — INSULIN LISPRO 2 UNITS: 100 INJECTION, SOLUTION INTRAVENOUS; SUBCUTANEOUS at 12:50

## 2022-11-02 RX ADMIN — INSULIN LISPRO 2 UNITS: 100 INJECTION, SOLUTION INTRAVENOUS; SUBCUTANEOUS at 07:14

## 2022-11-02 RX ADMIN — INSULIN LISPRO 4 UNITS: 100 INJECTION, SOLUTION INTRAVENOUS; SUBCUTANEOUS at 16:21

## 2022-11-02 RX ADMIN — TRAZODONE HYDROCHLORIDE 25 MG: 50 TABLET ORAL at 20:22

## 2022-11-02 RX ADMIN — ACETAMINOPHEN 650 MG: 325 TABLET, FILM COATED ORAL at 08:54

## 2022-11-02 NOTE — PLAN OF CARE
Goal Outcome Evaluation:  Plan of Care Reviewed With: patient           Outcome Evaluation: Pt admitted with BLE edema, CHF, with histpry of PD, HTN and DM2.  Pt currently demos decreased endurance, strength and balance with ADLs tasks.  Pt states that prior his wife would assist at home as needed with getting up from commode, socks and shoes and other ADL tasks. Pt walked in and out of bathroom today CGA wtih RWX, MOD/MIN to come to stand from commode with use of grab bar. Pt has all needed equipment at home. Pt may benefit from skilled OT services prior to d/c home with wife.

## 2022-11-02 NOTE — PLAN OF CARE
Goal Outcome Evaluation:  Plan of Care Reviewed With: patient           Outcome Evaluation: Pt is a 71 yo male who presents from home with LE edema and acute on chronic CHF, PMH of Parkinson's disease, HTN, and DM2. Prior to admission, pt was living at home with spouse and was mostly independent with household mobility; however, states his spouse assists with bed mobility and transfers as needed. Pt ambulates short distances with [employer] and has motorized scooter for community distances. Upon exam, pt demonstrates generalized weakness, impaired balance, impaired trunk control, and decreased endurance limiting mobility. Pt required mod A with bed mobility and min/mod A with transfers. Pt was able to ambulate 5' from bed to chair with rwx and min A. Pt will continue to benefit from PT to address impairments and increase independence with functional mobility. Recommend  PT at [facility] pending progress.

## 2022-11-02 NOTE — PLAN OF CARE
Goal Outcome Evaluation:      Clinical swallow evaluation completed. Pt voiced complaints of food sticking in throat and intermittent need to expectorate/vomit.     Mild dysarthria apparent but no overt clinical s/s aspiration noted at bedside on thin liquids via cup presentations, puree, soft solids, or regular consistencies. Nursing reported no noted difficulties with medications with thin liquids.    Of note, pt did receive a VFSS in 12/2108 which revealed moderate dysphagia at that time with deep penetration with thin liquids,and silent aspiration of nectar. A modified diet of reg with honey thick liquids was recommended at that time, however pt has been seen since that time in recent hospital admissions and the diet had been advanced.     Recommend:  VFSS to further assess pharyngeal /esophageal swallow and R/O aspiration    Continue current diet of reg/thin liquids for now.   Swallow strategies- upright for all meals. Small bites/sips. Alternate liquid/solid. Upright for 30 min after meals.     Medications whole with thin liquids, or crushed in puree , as tolerated    ST to follow.

## 2022-11-02 NOTE — THERAPY EVALUATION
I called pt in regards to scheduling her colon but there was no answer. I left a message on her voicemail to call the office back. Dr. Hernández is diagnosis for pt H/O adenomas? Can pt have Suprep? Please advise, Thank you.    Patient Name: Edilberto Reeder  : 1950    MRN: [id number]                              Today's Date: 2022       Admit Date: 2022    Visit Dx:     ICD-10-CM ICD-9-CM   1. Shortness of breath  R06.02 786.05   2. Essential hypertension  I10 401.9   3. Abnormal EKG  R94.31 794.31     Patient Active Problem List   Diagnosis   • ASCVD (arteriosclerotic cardiovascular disease)   • Permanent atrial fibrillation (HCC)   • Diabetic retinopathy associated with type 2 diabetes mellitus (HCC)   • Essential hypertension   • HLD (hyperlipidemia)   • Hypothyroidism   • Peripheral neuropathy   • Renal insufficiency   • Type 2 diabetes mellitus with hyperglycemia, without long-term current use of insulin (HCC)   • Parkinson's disease (HCC)   • Dyspnea on exertion   • Atrial fibrillation, persistent (HCC)   • Stroke-like symptoms   • Hypopotassemia   • Ankle pain   • Acute idiopathic gout of right ankle   • Generalized weakness   • Head injury due to trauma   • Acute blood loss anemia   • Chronic anticoagulation   • Thrombocytopenia (HCC)   • Screening for colon cancer   • Minor head injury, initial encounter   • Acute on chronic diastolic CHF (congestive heart failure) (HCC)   • Shortness of breath     Past Medical History:   Diagnosis Date   • Atrial fibrillation with RVR (HCC)    • Atrial flutter (HCC)    • Diabetes (HCC)    • HLD (hyperlipidemia) 2016   • Hypertension    • Parkinson's disease (HCC)     Advanced      Past Surgical History:   Procedure Laterality Date   • BRAIN STIMULATOR     • COLONOSCOPY N/A 2022    Procedure: COLONOSCOPY TO CECUM WITH COLD SNARE POLYPECTOMY;  Surgeon: Luther Ferrer MD;  Location: [facility] ENDOSCOPY;  Service: Gastroenterology;  Laterality: N/A;  PRE:ANEMIA  POST:POLYPS/HEMORRHOIDS   • ENDOSCOPY N/A 2022    Procedure: ESOPHAGOGASTRODUODENOSCOPY WITH  COLD BIOPSIES;  Surgeon: Luther Ferrer MD;  Location: [facility] ENDOSCOPY;  Service: Gastroenterology;   Laterality: N/A;  PRE:ANEMIA  POST:DIVERTICULUM/GASTRITIS   • JOINT REPLACEMENT      Bilateral shoulder and knee replacements      General Information     Row Name 11/02/22 1108          Physical Therapy Time and Intention    Document Type evaluation  -EE     Mode of Treatment individual therapy;physical therapy  -EE     Row Name 11/02/22 1108          General Information    Prior Level of Function independent:;min assist:;transfer;gait;all household mobility  Has Up walker for household distances, states he requires min A at times for STS. Motorized scooter for community distances. Pt also has transport WC, rwx at home.  -EE     Existing Precautions/Restrictions fall  -EE     Barriers to Rehab medically complex;previous functional deficit  -EE     Row Name 11/02/22 1108          Living Environment    People in Home spouse  -EE     Row Name 11/02/22 1108          Home Main Entrance    Number of Stairs, Main Entrance none  ramp present, WC accessible  -EE     Row Name 11/02/22 1108          Stairs Within Home, Primary    Number of Stairs, Within Home, Primary none  -EE     Row Name 11/02/22 1108          Cognition    Orientation Status (Cognition) oriented x 3  -EE     Row Name 11/02/22 1108          Safety Issues, Functional Mobility    Impairments Affecting Function (Mobility) balance;endurance/activity tolerance;postural/trunk control;strength  -EE           User Key  (r) = Recorded By, (t) = Taken By, (c) = Cosigned By    Initials Name Provider Type    EE Natasha Singer PT Physical Therapist               Mobility     Row Name 11/02/22 1133          Bed Mobility    Bed Mobility supine-sit  -EE     Supine-Sit [place] (Bed Mobility) moderate assist (50% patient effort)  -EE     Assistive Device (Bed Mobility) head of bed elevated  -EE     Row Name 11/02/22 1133          Sit-Stand Transfer    Sit-Stand [place] (Transfers) minimum assist (75% patient effort);moderate assist (50% patient effort);verbal cues   -EE     Assistive Device (Sit-Stand Transfers) walker, front-wheeled  -EE     Comment, (Sit-Stand Transfer) cues for hand placement  -EE     Row Name 11/02/22 1133          Gait/Stairs (Locomotion)    [place] Level (Gait) minimum assist (75% patient effort);verbal cues  -EE     Assistive Device (Gait) walker, front-wheeled  -EE     Distance in Feet (Gait) 5'  -EE     Deviations/Abnormal Patterns (Gait) kelvin decreased;festinating/shuffling;stride length decreased  -EE     Bilateral Gait Deviations heel strike decreased  -EE     Comment, (Gait/Stairs) cues for upright posture  -EE           User Key  (r) = Recorded By, (t) = Taken By, (c) = Cosigned By    Initials Name Provider Type    EE Natasha Singer PT Physical Therapist               Obj/Interventions     Row Name 11/02/22 1134          Range of Motion Comprehensive    General Range of Motion bilateral lower extremity ROM WFL  -EE     Row Name 11/02/22 1134          Strength Comprehensive (MMT)    General Manual Muscle Testing (MMT) Assessment lower extremity strength deficits identified  -EE     Comment, General Manual Muscle Testing (MMT) Assessment generalized weakness, B LEs grossly 3+/5  -EE     Row Name 11/02/22 1134          Motor Skills    Therapeutic Exercise knee;ankle  sitting B LAQ, ankle pumps x 5 reps each  -EE     Row Name 11/02/22 1134          Balance    Balance Assessment sitting static balance;standing static balance;standing dynamic balance  -EE     Static Sitting Balance contact guard  posterior lean, cues for anterior weight shift  -EE     Position, Sitting Balance unsupported;sitting edge of bed  -EE     Static Standing Balance contact guard;minimal assist  -EE     Dynamic Standing Balance minimal assist  -EE     Position/Device Used, Standing Balance supported;walker, rolling  -EE     Comment, Balance initial standing with forward flexed posture and mild posterior lean, able to correct to upright posture with verbal cues  -EE      Row Name 11/02/22 1134          Sensory Assessment (Somatosensory)    Sensory Assessment (Somatosensory) not tested  -EE           User Key  (r) = Recorded By, (t) = Taken By, (c) = Cosigned By    Initials Name Provider Type    EE Natasha Singer, PT Physical Therapist               Goals/Plan     Row Name 11/02/22 1141          Bed Mobility Goal 1 (PT)    Activity/Assistive Device (Bed Mobility Goal 1, PT) sit to supine/supine to sit  -EE     [place] Level/Cues Needed (Bed Mobility Goal 1, PT) standby assist  -EE     Time Frame (Bed Mobility Goal 1, PT) 1 week  -EE     Progress/Outcomes (Bed Mobility Goal 1, PT) goal ongoing  -EE     Row Name 11/02/22 1141          Transfer Goal 1 (PT)    Activity/Assistive Device (Transfer Goal 1, PT) sit-to-stand/stand-to-sit;bed-to-chair/chair-to-bed;walker, rolling  -EE     [place] Level/Cues Needed (Transfer Goal 1, PT) contact guard required  -EE     Time Frame (Transfer Goal 1, PT) 1 week  -EE     Progress/Outcome (Transfer Goal 1, PT) goal ongoing  -EE     Row Name 11/02/22 1141          Gait Training Goal 1 (PT)    Activity/Assistive Device (Gait Training Goal 1, PT) gait (walking locomotion);walker, rolling  -EE     [place] Level (Gait Training Goal 1, PT) contact guard required  -EE     Distance (Gait Training Goal 1, PT) 30'  -EE     Time Frame (Gait Training Goal 1, PT) 1 week  -EE     Progress/Outcome (Gait Training Goal 1, PT) goal ongoing  -EE     Row Name 11/02/22 1141          Therapy Assessment/Plan (PT)    Planned Therapy Interventions (PT) balance training;bed mobility training;gait training;home exercise program;patient/family education;ROM (range of motion);strengthening;transfer training;postural re-education  -EE           User Key  (r) = Recorded By, (t) = Taken By, (c) = Cosigned By    Initials Name Provider Type    EE Natasha Singer, PT Physical Therapist               Clinical Impression     Row Name 11/02/22 1138          Pain    Pretreatment  Pain Rating 0/10 - no pain  -EE     Posttreatment Pain Rating 0/10 - no pain  -EE     Row Name 11/02/22 1138          Plan of Care Review    Plan of Care Reviewed With patient  -EE     Outcome Evaluation Pt is a 71 yo male who presents from home with LE edema and acute on chronic CHF, PMH of Parkinson's disease, HTN, and DM2. Prior to admission, pt was living at home with spouse and was mostly independent with household mobility; however, states his spouse assists with bed mobility and transfers as needed. Pt ambulates short distances with UpWalker and has motorized scooter for community distances. Upon exam, pt demonstrates generalized weakness, impaired balance, impaired trunk control, and decreased endurance limiting mobility. Pt required mod A with bed mobility and min/mod A with transfers. Pt was able to ambulate 5' from bed to chair with rwx and min A. Pt will continue to benefit from PT to address impairments and increase independence with functional mobility. Recommend  PT at [place] pending progress.  -EE     Row Name 11/02/22 1138          Therapy Assessment/Plan (PT)    Rehab Potential (PT) good, to achieve stated therapy goals  -EE     Criteria for Skilled Interventions Met (PT) yes;meets criteria;skilled treatment is necessary  -EE     Therapy Frequency (PT) 6 times/wk  -EE     Row Name 11/02/22 1130          Positioning and Restraints    Pre-Treatment Position in bed  -EE     Post Treatment Position chair  -EE     In Chair reclined;call light within reach;encouraged to call for assist;exit alarm on;notified nsg;LUE elevated;RUE elevated;legs elevated  -EE           User Key  (r) = Recorded By, (t) = Taken By, (c) = Cosigned By    Initials Name Provider Type    EE Natasha Singer, PT Physical Therapist               Outcome Measures     Row Name 11/02/22 1142          How much help from another person do you currently need...    Turning from your back to your side while in flat bed without using bedrails? 3   -EE     Moving from lying on back to sitting on the side of a flat bed without bedrails? 2  -EE     Moving to and from a bed to a chair (including a wheelchair)? 3  -EE     Standing up from a chair using your arms (e.g., wheelchair, bedside chair)? 2  -EE     Climbing 3-5 steps with a railing? 1  -EE     To walk in hospital room? 3  -EE     AM-PAC 6 Clicks Score (PT) 14  -EE     Highest level of mobility 4 --> Transferred to chair/commode  -EE     Row Name 11/02/22 1148          Functional Assessment    Outcome Measure Options AM-PAC 6 Clicks Basic Mobility (PT)  -EE           User Key  (r) = Recorded By, (t) = Taken By, (c) = Cosigned By    Initials Name Provider Type    EE Natasha Singer, DIONE Physical Therapist                             Physical Therapy Education     Title: PT OT SLP Therapies (Done)     Topic: Physical Therapy (Done)     Point: Mobility training (Done)     Learning Progress Summary           Patient Acceptance, E,TB, VU,NR by EE at 11/2/2022 1148                   Point: Home exercise program (Done)     Learning Progress Summary           Patient Acceptance, E,TB, VU,NR by EE at 11/2/2022 1148                   Point: Body mechanics (Done)     Learning Progress Summary           Patient Acceptance, E,TB, VU,NR by EE at 11/2/2022 1148                   Point: Precautions (Done)     Learning Progress Summary           Patient Acceptance, E,TB, VU,NR by EE at 11/2/2022 1148                               User Key     Initials Effective Dates Name Provider Type Discipline    EE 06/16/21 -  Natasha Singer, DIONE Physical Therapist PT              PT Recommendation and Plan  Planned Therapy Interventions (PT): balance training, bed mobility training, gait training, home exercise program, patient/family education, ROM (range of motion), strengthening, transfer training, postural re-education  Plan of Care Reviewed With: patient  Outcome Evaluation: Pt is a 71 yo male who presents from home with LE edema and  acute on chronic CHF, PMH of Parkinson's disease, HTN, and DM2. Prior to admission, pt was living at home with spouse and was mostly independent with household mobility; however, states his spouse assists with bed mobility and transfers as needed. Pt ambulates short distances with UpWalker and has motorized scooter for community distances. Upon exam, pt demonstrates generalized weakness, impaired balance, impaired trunk control, and decreased endurance limiting mobility. Pt required mod A with bed mobility and min/mod A with transfers. Pt was able to ambulate 5' from bed to chair with rwx and min A. Pt will continue to benefit from PT to address impairments and increase independence with functional mobility. Recommend HH PT at [place] pending progress.     Time Calculation:    PT Charges     Row Name 11/02/22 1150             Time Calculation    Start Time 0929  -EE      Stop Time 0951  -EE      Time Calculation (min) 22 min  -EE      PT Received On 11/02/22  -EE      PT - Next Appointment 11/03/22  -EE      PT Goal Re-Cert Due Date 11/09/22  -EE         Time Calculation- PT    Total Timed Code Minutes- PT 10 minute(s)  -EE         Timed Charges    24283 - PT Therapeutic Activity Minutes 10  -EE         Total Minutes    Timed Charges Total Minutes 10  -EE       Total Minutes 10  -EE            User Key  (r) = Recorded By, (t) = Taken By, (c) = Cosigned By    Initials Name Provider Type    EE Natasha Singer PT Physical Therapist              Therapy Charges for Today     Code Description Service Date Service Provider Modifiers Qty    23900007184 HC PT THERAPEUTIC ACT EA 15 MIN 11/2/2022 Natasha Singer, PT GP 1    28412427227 HC PT EVAL LOW COMPLEXITY 2 11/2/2022 Natasha Singer, PT GP 1          PT G-Codes  Outcome Measure Options: AM-PAC 6 Clicks Basic Mobility (PT)  AM-PAC 6 Clicks Score (PT): 14    Natasha Singer PT  11/2/2022

## 2022-11-02 NOTE — PLAN OF CARE
Goal Outcome Evaluation:              Outcome Evaluation: VSS, AOX4. IV diurseing at this time, very good output. No new complaints, no pain complaints during shift. WCTM.

## 2022-11-02 NOTE — PLAN OF CARE
Goal Outcome Evaluation:  Plan of Care Reviewed With: patient, spouse        Pt received to room 2206 a-fib per monitor. Edema noted to scrotum and bilateral lower extremities. Pt becomes soa with minimal exertion. Pt and family oriented to roomvss call light within reach  Diuretic in Use: 60mg iv lasix today x 1 dose  Response to Diuretics (Output greater than intake): yes  Daily Weight (up or down):   O2 Requirements:ra   Functional Status (Activity level, tolerance and respiratory symptoms): soa  Discharge Plans: tbd  \

## 2022-11-02 NOTE — NURSING NOTE
Diuretic in Use: lasix  Response to Diuretics (Output greater than intake): yes  Daily Weight (up or down): down  O2 Requirements: Room air  Functional Status (Activity level, tolerance and respiratory symptoms):   Discharge Plans:   Patient denies pain nor discomfort, up with assist, strict intake and output, speech consult, cont to monitor.

## 2022-11-02 NOTE — H&P
Patient Name: Edilberto Reeder  :1950  72 y.o.      Patient Care Team:  Harvey Larson MD as PCP - General  Harvey Larson MD as PCP - Family Medicine    Chief Complaint:     Interval History:        Objective   Vital Signs  Temp:  [98.2 °F (36.8 °C)-98.5 °F (36.9 °C)] 98.2 °F (36.8 °C)  Heart Rate:  [52-83] 71  Resp:  [16] 16  BP: (108-160)/(64-97) 128/71    Intake/Output Summary (Last 24 hours) at 2022 0841  Last data filed at 2022 0615  Gross per 24 hour   Intake 120 ml   Output 951 ml   Net -831 ml     Flowsheet Rows    Flowsheet Row First Filed Value   Admission Height --   Admission Weight 99.3 kg (219 lb) Documented at 2022 1600          Physical Exam:   General Appearance:    Alert, cooperative, in no acute distress   Lungs:     Clear to auscultation.  Normal respiratory effort and rate.      Heart:    Regular rhythm and normal rate, normal S1 and S2, no murmurs, gallops or rubs.     Chest Wall:    No abnormalities observed   Abdomen:     Soft, nontender, positive bowel sounds.     Extremities:   no cyanosis, clubbing or edema.  No marked joint deformities.  Adequate musculoskeletal strength.       Results Review:    Results from last 7 days   Lab Units 22  0402   SODIUM mmol/L 139   POTASSIUM mmol/L 3.7   CHLORIDE mmol/L 101   CO2 mmol/L 26.0   BUN mg/dL 26*   CREATININE mg/dL 1.07   GLUCOSE mg/dL 173*   CALCIUM mg/dL 8.6     Results from last 7 days   Lab Units 22  1116   TROPONIN T ng/mL 0.021     Results from last 7 days   Lab Units 22  1116   WBC 10*3/mm3 6.22   HEMOGLOBIN g/dL 9.2*   HEMATOCRIT % 29.6*   PLATELETS 10*3/mm3 205                           Medication Review:   allopurinol, 100 mg, Oral, Daily  Alogliptin Benzoate, 6.25 mg, Oral, Daily  apixaban, 5 mg, Oral, Q12H  aspirin, 81 mg, Oral, Every Other Day  Carbidopa-Levodopa ER, 4 capsule, Oral, 4x Daily  citalopram, 20 mg, Oral, Daily  docusate sodium, 50 mg, Oral, Nightly  insulin lispro,  0-7 Units, Subcutaneous, TID AC  isosorbide mononitrate, 30 mg, Oral, Daily  levothyroxine, 25 mcg, Oral, Daily  metoprolol tartrate, 12.5 mg, Oral, BID  rosuvastatin, 20 mg, Oral, Nightly  traZODone, 25 mg, Oral, Nightly  vitamin B-12, 500 mcg, Oral, Daily              Assessment & Plan     There are no hospital problems to display for this patient.    1.  Acute on chronic biventricular congestive heart failure.  EF 61-65%.  Will obtain  Echo along with strain imaging.  TSH was also elevated, will check a T4.  Twice daily diuretics, furosemide 60 mg twice daily  2.  Coronary artery disease with mildly abnormal stress test 3/2022.  No anginal symptoms.  Troponin negative.  3.  Anemia with GI bleed.  Was supposed to have a CBC this morning, not yet available, will order  4.  Parkinson's disease  5.  Hypothyroidism-TSH is elevated, check T4  6.  Chronic anticoagulation  7. AFIB-rate controlled, continue metoprolol,      Amadeo Shields, III, MD  [facility]  11/02/22  08:41 EDT

## 2022-11-02 NOTE — THERAPY EVALUATION
Acute Care - Speech Language Pathology   Swallow Initial Evaluation [facility]     Patient Name: Edilberto Reeder  : 1950  MRN: [id number]  Today's Date: 2022               Admit Date: 2022    Visit Dx:     ICD-10-CM ICD-9-CM   1. Shortness of breath  R06.02 786.05   2. Essential hypertension  I10 401.9   3. Abnormal EKG  R94.31 794.31     Patient Active Problem List   Diagnosis   • ASCVD (arteriosclerotic cardiovascular disease)   • Permanent atrial fibrillation (HCC)   • Diabetic retinopathy associated with type 2 diabetes mellitus (HCC)   • Essential hypertension   • HLD (hyperlipidemia)   • Hypothyroidism   • Peripheral neuropathy   • Renal insufficiency   • Type 2 diabetes mellitus with hyperglycemia, without long-term current use of insulin (HCC)   • Parkinson's disease (HCC)   • Dyspnea on exertion   • Atrial fibrillation, persistent (HCC)   • Stroke-like symptoms   • Hypopotassemia   • Ankle pain   • Acute idiopathic gout of right ankle   • Generalized weakness   • Head injury due to trauma   • Acute blood loss anemia   • Chronic anticoagulation   • Thrombocytopenia (HCC)   • Screening for colon cancer   • Minor head injury, initial encounter   • Acute on chronic diastolic CHF (congestive heart failure) (HCC)   • Shortness of breath     Past Medical History:   Diagnosis Date   • Atrial fibrillation with RVR (HCC)    • Atrial flutter (HCC)    • Diabetes (HCC)    • HLD (hyperlipidemia) 2016   • Hypertension    • Parkinson's disease (HCC)     Advanced      Past Surgical History:   Procedure Laterality Date   • BRAIN STIMULATOR     • COLONOSCOPY N/A 2022    Procedure: COLONOSCOPY TO CECUM WITH COLD SNARE POLYPECTOMY;  Surgeon: Luther Ferrer MD;  Location: [facility] ENDOSCOPY;  Service: Gastroenterology;  Laterality: N/A;  PRE:ANEMIA  POST:POLYPS/HEMORRHOIDS   • ENDOSCOPY N/A 2022    Procedure: ESOPHAGOGASTRODUODENOSCOPY WITH  COLD BIOPSIES;  Surgeon: Luther Ferrer  MD Robert;  Location: [facility] ENDOSCOPY;  Service: Gastroenterology;  Laterality: N/A;  PRE:ANEMIA  POST:DIVERTICULUM/GASTRITIS   • JOINT REPLACEMENT      Bilateral shoulder and knee replacements       SLP Recommendation and Plan  SLP Swallowing Diagnosis: functional oral phase, R/O pharyngeal dysphagia, suspected esophageal dysphagia (11/02/22 0930)  SLP Diet Recommendation: regular textures, thin liquids (11/02/22 0930)  Recommended Precautions and Strategies: upright posture during/after eating, small bites of food and sips of liquid, multiple swallows per bite of food, multiple swallows per sip of liquid, alternate between small bites of food and sips of liquid, hard swallow with each bite or sip (11/02/22 0930)  SLP Rec. for Method of Medication Administration: meds whole, meds crushed, with thin liquids, with puree, as tolerated (11/02/22 0930)     Monitor for Signs of Aspiration: yes, notify SLP if any concerns (11/02/22 0930)  Recommended Diagnostics: VFSS (MBS) (11/02/22 0930)  Swallow Criteria for Skilled Therapeutic Interventions Met: demonstrates skilled criteria (11/02/22 0930)     Rehab Potential/Prognosis, Swallowing: good, to achieve stated therapy goals (11/02/22 0930)  Therapy Frequency (Swallow): PRN (11/02/22 0930)  Predicted Duration Therapy Intervention (Days): until discharge (11/02/22 0930)                                               SWALLOW EVALUATION (last 72 hours)     SLP Adult Swallow Evaluation     Row Name 11/02/22 0930                   Rehab Evaluation    Document Type evaluation  -SL        Subjective Information no complaints  -SL        Patient Observations alert;cooperative;agree to therapy  -SL        Patient/Family/Caregiver Comments/Observations pt stated that he had difficulty feeling like foods are stuck in thraot and has episodes of regurgitation  -SL        Patient Effort good  -SL        Symptoms Noted During/After Treatment none  -SL           General Information     Patient Profile Reviewed yes  -SL        Pertinent History Of Current Problem Admitted with increased SOB, CHF, PNA;  HX- Parkinsons disease, diabetes, atrial fib; prior VFSS in 2018 revealed mod dysphagia during that hospitalization with penetration of thin and 1 x aspiration with nectar  -SL        Current Method of Nutrition regular textures;thin liquids  -SL        Precautions/Limitations, Vision WFL;for purposes of eval  -SL        Precautions/Limitations, Hearing WFL;for purposes of eval  -SL        Prior Level of Function-Communication WFL  -SL        Prior Level of Function-Swallowing compensations (maneuvers, postures) needed  -SL        Plans/Goals Discussed with patient;family  -SL        Barriers to Rehab previous functional deficit  -SL        Patient's Goals for Discharge return home  -SL           Oral Motor Structure and Function    Dentition Assessment natural, present and adequate  -SL        Secretion Management WNL/WFL  -SL        Mucosal Quality moist, healthy  -SL        Volitional Swallow WFL  -SL           Oral Musculature and Cranial Nerve Assessment    Oral Motor General Assessment WFL;generalized oral motor weakness  -SL           General Eating/Swallowing Observations    Eating/Swallowing Skills self-fed;fed by SLP  -SL        Positioning During Eating upright 90 degree;upright in bed  -SL        Utensils Used spoon;cup  -SL        Consistencies Trialed ice chips;thin liquids;pureed;mixed consistency;soft textures;regular textures  -SL           Clinical Swallow Eval    Pharyngeal Phase suspected pharyngeal impairment  -SL        Esophageal Phase suspected esophageal impairment  -SL        Clinical Swallow Evaluation Summary The patient displayed fxnl oral phase of swallow with adequate mastication, bolus transfer,and A-P mvmts; swallow intiation was timely, laryngeal elevation appeared mildly reduced; pt did complain intermittently about feeling as though bolus was stuck higher in the  pahrynx, however no regurgitaiton elicited, and issue seemed to resolve with cued or spont double swallows and /or liquid assist; No overt clincial s/s aspiraiton noted on cup drinks of thin lqiuid, puree, soft solids, or reg consistencies;   would recommend VFSS to further assess pharyngeal swallow/scan esophagus due to pt's complaints of food sticking and to R/O aspiraiton , given recent PNA  -SL           SLP Evaluation Clinical Impression    SLP Swallowing Diagnosis functional oral phase;R/O pharyngeal dysphagia;suspected esophageal dysphagia  -        Functional Impact risk of aspiration/pneumonia  -        Rehab Potential/Prognosis, Swallowing good, to achieve stated therapy goals  -        Swallow Criteria for Skilled Therapeutic Interventions Met demonstrates skilled criteria  -SL           Recommendations    Therapy Frequency (Swallow) PRN  -SL        Predicted Duration Therapy Intervention (Days) until discharge  -        SLP Diet Recommendation regular textures;thin liquids  -        Recommended Diagnostics VFSS (MBS)  -        Recommended Precautions and Strategies upright posture during/after eating;small bites of food and sips of liquid;multiple swallows per bite of food;multiple swallows per sip of liquid;alternate between small bites of food and sips of liquid;hard swallow with each bite or sip  -        Oral Care Recommendations Oral Care before breakfast, after meals and PRN  -SL        SLP Rec. for Method of Medication Administration meds whole;meds crushed;with thin liquids;with puree;as tolerated  -        Monitor for Signs of Aspiration yes;notify SLP if any concerns  -           (LTG) Patient will demonstrate functional swallow for    Diet Texture (Demonstrate functional swallow) regular textures  -        Liquid viscosity (Demonstrate functional swallow) thin liquids  -        [place] (Demonstrate functional swallow) independently (over 90% accuracy)  -         Time Frame (Demonstrate functional swallow) by discharge  -SL        Comment (Demonstrate functional swallow) Complete VFSS to further assess pharyngeal phase, scan esophagis for possible dysfunction, and R/O aspiraiton  -SL           (STG) Patient will tolerate trials of    Consistencies Trialed (Tolerate trials) regular textures;thin liquids  -SL        Desired Outcome (Tolerate trials) without signs/symptoms of aspiration  -SL        [place] (Tolerate trials) independently (over 90% accuracy)  -SL        Time Frame (Tolerate trials) by discharge  -SL              User Key  (r) = Recorded By, (t) = Taken By, (c) = Cosigned By    Initials Name Effective Dates    Kim Carrizales MS CCC-SLP 06/16/21 -                 EDUCATION  The patient has been educated in the following areas:   Dysphagia (Swallowing Impairment).        SLP GOALS     Row Name 11/02/22 7687             (LTG) Patient will demonstrate functional swallow for    Diet Texture (Demonstrate functional swallow) regular textures  -SL      Liquid viscosity (Demonstrate functional swallow) thin liquids  -SL      [place] (Demonstrate functional swallow) independently (over 90% accuracy)  -SL      Time Frame (Demonstrate functional swallow) by discharge  -SL      Comment (Demonstrate functional swallow) Complete VFSS to further assess pharyngeal phase, scan esophagis for possible dysfunction, and R/O aspiraiton  -SL         (STG) Patient will tolerate trials of    Consistencies Trialed (Tolerate trials) regular textures;thin liquids  -SL      Desired Outcome (Tolerate trials) without signs/symptoms of aspiration  -SL      [place] (Tolerate trials) independently (over 90% accuracy)  -SL      Time Frame (Tolerate trials) by discharge  -SL            User Key  (r) = Recorded By, (t) = Taken By, (c) = Cosigned By    Initials Name Provider Type    Kim Carrizales MS CCC-SLP Speech and Language Pathologist                   Time Calculation:    Time  Calculation- SLP     Row Name 11/02/22 1249             Time Calculation- SLP    SLP Received On 11/02/22  -IHSAN            User Key  (r) = Recorded By, (t) = Taken By, (c) = Cosigned By    Initials Name Provider Type    Kim Carrizales MS CCC-SLP Speech and Language Pathologist                Therapy Charges for Today     Code Description Service Date Service Provider Modifiers Qty    [id number] HC ST EVAL ORAL PHARYNG SWALLOW 4 11/2/2022 Kim Ferrari MS CCC-SLP GN 1               Kim Ferrari MS CCC-NICOLETTE  11/2/2022

## 2022-11-02 NOTE — THERAPY EVALUATION
Patient Name: Edilberto Reeder  : 1950    MRN: [id number]                              Today's Date: 2022       Admit Date: 2022    Visit Dx:     ICD-10-CM ICD-9-CM   1. Shortness of breath  R06.02 786.05   2. Essential hypertension  I10 401.9   3. Abnormal EKG  R94.31 794.31     Patient Active Problem List   Diagnosis   • ASCVD (arteriosclerotic cardiovascular disease)   • Permanent atrial fibrillation (HCC)   • Diabetic retinopathy associated with type 2 diabetes mellitus (HCC)   • Essential hypertension   • HLD (hyperlipidemia)   • Hypothyroidism   • Peripheral neuropathy   • Renal insufficiency   • Type 2 diabetes mellitus with hyperglycemia, without long-term current use of insulin (HCC)   • Parkinson's disease (HCC)   • Dyspnea on exertion   • Atrial fibrillation, persistent (HCC)   • Stroke-like symptoms   • Hypopotassemia   • Ankle pain   • Acute idiopathic gout of right ankle   • Generalized weakness   • Head injury due to trauma   • Acute blood loss anemia   • Chronic anticoagulation   • Thrombocytopenia (HCC)   • Screening for colon cancer   • Minor head injury, initial encounter   • Acute on chronic diastolic CHF (congestive heart failure) (HCC)   • Shortness of breath     Past Medical History:   Diagnosis Date   • Atrial fibrillation with RVR (HCC)    • Atrial flutter (HCC)    • Diabetes (HCC)    • HLD (hyperlipidemia) 2016   • Hypertension    • Parkinson's disease (HCC)     Advanced      Past Surgical History:   Procedure Laterality Date   • BRAIN STIMULATOR     • COLONOSCOPY N/A 2022    Procedure: COLONOSCOPY TO CECUM WITH COLD SNARE POLYPECTOMY;  Surgeon: Luther Ferrer MD;  Location: [facility] ENDOSCOPY;  Service: Gastroenterology;  Laterality: N/A;  PRE:ANEMIA  POST:POLYPS/HEMORRHOIDS   • ENDOSCOPY N/A 2022    Procedure: ESOPHAGOGASTRODUODENOSCOPY WITH  COLD BIOPSIES;  Surgeon: Luther Ferrer MD;  Location: [facility] ENDOSCOPY;  Service: Gastroenterology;   Laterality: N/A;  PRE:ANEMIA  POST:DIVERTICULUM/GASTRITIS   • JOINT REPLACEMENT      Bilateral shoulder and knee replacements      General Information     Row Name 11/02/22 1148          OT Time and Intention    Document Type evaluation  -AF     Mode of Treatment individual therapy;occupational therapy  -     Row Name 11/02/22 1148          General Information    Patient Profile Reviewed yes  -AF     Prior Level of Function min assist:;ADL's  wife helps at home as needed  -AF     Existing Precautions/Restrictions fall  -AF     Barriers to Rehab previous functional deficit  -AF     Row Name 11/02/22 1148          Living Environment    People in Home spouse  -AF     Row Name 11/02/22 1148          Home Main Entrance    Number of Stairs, Main Entrance none  -AF     Row Name 11/02/22 1148          Cognition    Orientation Status (Cognition) oriented x 3  -AF     Row Name 11/02/22 1148          Safety Issues, Functional Mobility    Impairments Affecting Function (Mobility) balance;endurance/activity tolerance;strength;shortness of breath;postural/trunk control  -AF           User Key  (r) = Recorded By, (t) = Taken By, (c) = Cosigned By    Initials Name Provider Type    AF Vivi Wynne, OTR Occupational Therapist                 Mobility/ADL's     Row Name 11/02/22 1149          Bed Mobility    Comment, (Bed Mobility) up in recliner chair  -AF     Row Name 11/02/22 1149          Transfers    Transfers toilet transfer;stand-sit transfer;sit-stand transfer  -     Row Name 11/02/22 1149          Sit-Stand Transfer    Sit-Stand [place] (Transfers) moderate assist (50% patient effort);minimum assist (75% patient effort)  -AF     Assistive Device (Sit-Stand Transfers) walker, front-wheeled  -AF     Row Name 11/02/22 1149          Stand-Sit Transfer    Stand-Sit [place] (Transfers) minimum assist (75% patient effort)  -AF     Assistive Device (Stand-Sit Transfers) walker, front-wheeled  -AF     Comment,  (Stand-Sit Transfer) to recliner chair  -AF     Row Name 11/02/22 1149          Toilet Transfer    Type (Toilet Transfer) stand-sit;sit-stand  -AF     [place] Level (Toilet Transfer) moderate assist (50% patient effort);minimum assist (75% patient effort)  -AF     Assistive Device (Toilet Transfer) walker, front-wheeled;grab bars/safety frame  -AF     Comment, (Toilet Transfer) pt states that his commode height is similar to the one at the hospital, states that at times his wife has to help him stand up from the commode. had grab bars at commode at home  -AF     Row Name 11/02/22 1149          Functional Mobility    Functional Mobility- Comment pt walked to and from recliner chair into bathroom with RWX and back to chair, with CGA  -AF     Row Name 11/02/22 1149          Activities of Daily Living    BADL Assessment/Intervention lower body dressing  -     Row Name 11/02/22 1149          Lower Body Dressing Assessment/Training    [place] Level (Lower Body Dressing) don;doff;socks;dependent (less than 25% patient effort)  -AF     Comment, (Lower Body Dressing) states that his wife assists with socks and shoes at home, also if he is needed extra help with dressing tasks she assists  -AF           User Key  (r) = Recorded By, (t) = Taken By, (c) = Cosigned By    Initials Name Provider Type    AF Vivi Wynne, OTR Occupational Therapist               Obj/Interventions     Row Name 11/02/22 1152          Range of Motion Comprehensive    Comment, General Range of Motion Limited B shoulder flexion, states he has had shoulder surgeries, PROM WFL, states that he is able to get his shirt on and off at home  -AF     Row Name 11/02/22 1152          Strength Comprehensive (MMT)    General Manual Muscle Testing (MMT) Assessment upper extremity strength deficits identified  -     Row Name 11/02/22 1152          Upper Extremity (Manual Muscle Testing)    Comment, MMT: Upper Extremity B elbow 3+/5, shoulder flexion  2+/5  -AF     Row Name 11/02/22 1152          Motor Skills    Motor Skills functional endurance  -AF     Functional Endurance pt required seated rest break after walk to the bathroom  -AF     Row Name 11/02/22 1152          Balance    Static Standing Balance minimal assist;contact guard  -AF     Position/Device Used, Standing Balance walker, rolling;supported  -AF           User Key  (r) = Recorded By, (t) = Taken By, (c) = Cosigned By    Initials Name Provider Type    AF Vivi Wynne, OTR Occupational Therapist               Goals/Plan     Row Name 11/02/22 1159          Transfer Goal 1 (OT)    Activity/Assistive Device (Transfer Goal 1, OT) commode;walker, rolling  grab bars  -AF     [place] Level/Cues Needed (Transfer Goal 1, OT) minimum assist (75% or more patient effort);contact guard required  -AF     Time Frame (Transfer Goal 1, OT) long term goal (LTG)  -AF     Progress/Outcome (Transfer Goal 1, OT) goal ongoing  -     Row Name 11/02/22 1159          Toileting Goal 1 (OT)    Activity/Device (Toileting Goal 1, OT) toileting skills, all;grab bar/safety frame  -AF     [place] Level/Cues Needed (Toileting Goal 1, OT) minimum assist (75% or more patient effort)  -AF     Time Frame (Toileting Goal 1, OT) long term goal (LTG)  -AF     Progress/Outcome (Toileting Goal 1, OT) goal ongoing  -     Row Name 11/02/22 1159          Grooming Goal 1 (OT)    Activity/Device (Grooming Goal 1, OT) grooming skills, all  -AF     [place] (Grooming Goal 1, OT) standby assist  -AF     Strategies/Barriers (Grooming Goal 1, OT) standing at sink  -AF     Progress/Outcome (Grooming Goal 1, OT) goal ongoing  -     Row Name 11/02/22 1159          Therapy Assessment/Plan (OT)    Planned Therapy Interventions (OT) activity tolerance training;BADL retraining;patient/caregiver education/training;strengthening exercise;transfer/mobility retraining;occupation/activity based interventions  -AF           User Key  (r) =  Recorded By, (t) = Taken By, (c) = Cosigned By    Initials Name Provider Type    AF Vivi Wynne, OTR Occupational Therapist               Clinical Impression     Row Name 11/02/22 1155          Pain Assessment    Pretreatment Pain Rating 0/10 - no pain  -AF     Posttreatment Pain Rating 0/10 - no pain  -AF     Row Name 11/02/22 1155          Plan of Care Review    Plan of Care Reviewed With patient  -AF     Outcome Evaluation Pt admitted with BLE edema, CHF, with histpry of PD, HTN and DM2.  Pt currently demos decreased endurance, strength and balance with ADLs tasks.  Pt states that prior his wife would assist at home as needed with getting up from commode, socks and shoes and other ADL tasks. Pt walked in and out of bathroom today CGA wtih RWX, MOD/MIN to come to stand from commode with use of grab bar. Pt has all needed equipment at home. Pt may benefit from skilled OT services prior to d/c home with wife.  -AF     Row Name 11/02/22 1155          Therapy Assessment/Plan (OT)    Patient/Family Therapy Goal Statement (OT) To go to [facility] on saturday  -AF     Criteria for Skilled Therapeutic Interventions Met (OT) yes;skilled treatment is necessary  -AF     Therapy Frequency (OT) 5 times/wk  -AF     Row Name 11/02/22 1155          Therapy Plan Review/Discharge Plan (OT)    Anticipated Discharge Disposition (OT) home with assist;home with 24/7 care  -AF     Row Name 11/02/22 1155          Positioning and Restraints    Pre-Treatment Position sitting in chair/recliner  -AF     Post Treatment Position chair  recliner chair  -AF     In Chair sitting;with nsg  -AF           User Key  (r) = Recorded By, (t) = Taken By, (c) = Cosigned By    Initials Name Provider Type    Vivi Way, OTR Occupational Therapist               Outcome Measures     Row Name 11/02/22 1200          How much help from another is currently needed...    Putting on and taking off regular lower body clothing? 2  -AF      Bathing (including washing, rinsing, and drying) 3  -AF     Toileting (which includes using toilet bed pan or urinal) 3  -AF     Putting on and taking off regular upper body clothing 3  -AF     Taking care of personal grooming (such as brushing teeth) 3  -AF     Eating meals 4  -AF     AM-PAC 6 Clicks Score (OT) 18  -AF     Row Name 11/02/22 1148          How much help from another person do you currently need...    Turning from your back to your side while in flat bed without using bedrails? 3  -EE     Moving from lying on back to sitting on the side of a flat bed without bedrails? 2  -EE     Moving to and from a bed to a chair (including a wheelchair)? 3  -EE     Standing up from a chair using your arms (e.g., wheelchair, bedside chair)? 2  -EE     Climbing 3-5 steps with a railing? 1  -EE     To walk in hospital room? 3  -EE     AM-PAC 6 Clicks Score (PT) 14  -EE     Highest level of mobility 4 --> Transferred to chair/commode  -EE     Row Name 11/02/22 1200 11/02/22 1148       Functional Assessment    Outcome Measure Options AM-PAC 6 Clicks Daily Activity (OT)  -AF AM-PAC 6 Clicks Basic Mobility (PT)  -EE          User Key  (r) = Recorded By, (t) = Taken By, (c) = Cosigned By    Initials Name Provider Type    Natasha Gonsales, PT Physical Therapist    Vivi Way, OTR Occupational Therapist                Occupational Therapy Education     Title: PT OT SLP Therapies (In Progress)     Topic: Occupational Therapy (In Progress)     Point: ADL training (Done)     Description:   Instruct learner(s) on proper safety adaptation and remediation techniques during self care or transfers.   Instruct in proper use of assistive devices.              Learning Progress Summary           Patient Acceptance, E, VU by AF at 11/2/2022 1200    Comment: educated on OT benefits and purpose                   Point: Home exercise program (Not Started)     Description:   Instruct learner(s) on appropriate technique for monitoring,  assisting and/or progressing therapeutic exercises/activities.              Learner Progress:  Not documented in this visit.          Point: Precautions (Not Started)     Description:   Instruct learner(s) on prescribed precautions during self-care and functional transfers.              Learner Progress:  Not documented in this visit.          Point: Body mechanics (Not Started)     Description:   Instruct learner(s) on proper positioning and spine alignment during self-care, functional mobility activities and/or exercises.              Learner Progress:  Not documented in this visit.                      User Key     Initials Effective Dates Name Provider Type Discipline    AF 06/16/21 -  Vivi Wynne, OTR Occupational Therapist OT              OT Recommendation and Plan  Planned Therapy Interventions (OT): activity tolerance training, BADL retraining, patient/caregiver education/training, strengthening exercise, transfer/mobility retraining, occupation/activity based interventions  Therapy Frequency (OT): 5 times/wk  Plan of Care Review  Plan of Care Reviewed With: patient  Outcome Evaluation: Pt admitted with BLE edema, CHF, with histpry of PD, HTN and DM2.  Pt currently demos decreased endurance, strength and balance with ADLs tasks.  Pt states that prior his wife would assist at home as needed with getting up from commode, socks and shoes and other ADL tasks. Pt walked in and out of bathroom today CGA wtih RWX, MOD/MIN to come to stand from commode with use of grab bar. Pt has all needed equipment at home. Pt may benefit from skilled OT services prior to d/c home with wife.     Time Calculation:    Time Calculation- OT     Row Name 11/02/22 1201             Time Calculation- OT    OT Start Time 1025  -AF      OT Stop Time 1045  -AF      OT Time Calculation (min) 20 min  -AF      Total Timed Code Minutes- OT 10 minute(s)  -AF      OT Non-Billable Time (min) 10 min  -AF      OT Received On 11/02/22  -AF      OT  - Next Appointment 11/03/22  -AF      OT Goal Re-Cert Due Date 11/16/22  -AF         Timed Charges    77935 - OT Self Care/Mgmt Minutes 10  -AF         Untimed Charges    OT Eval/Re-eval Minutes 10  -AF         Total Minutes    Timed Charges Total Minutes 10  -AF      Untimed Charges Total Minutes 10  -AF       Total Minutes 20  -AF            User Key  (r) = Recorded By, (t) = Taken By, (c) = Cosigned By    Initials Name Provider Type    AF Vivi Wynne, OTR Occupational Therapist              Therapy Charges for Today     Code Description Service Date Service Provider Modifiers Qty    54518313123 HC OT EVAL LOW COMPLEXITY 2 11/2/2022 Vivi Wynne, OTR GO 1    09167039116 HC OT SELF CARE/MGMT/TRAIN EA 15 MIN 11/2/2022 Vivi Wynne OTMINA GO 1               [place]  11/2/2022

## 2022-11-02 NOTE — PROGRESS NOTES
Nutrition Services    Patient Name:  Edilberto Reeder  YOB: 1950  MRN: [id number]  Admit Date:  11/1/2022      Comment: MST 2/ swallowing difficulty:  Pt stated he has difficulty swallowing and feels like food gets stuck in his throat sometimes and intermittent need to expectorate/vomit. SLP evaluated swallow at the bedside today and recommended continue with Regular diet w/ thins today and VFSS planned for tomorrow. Pt stated he tolerated breakfast and lunch today w/ % po intake without any difficulties swallowing.     Recommend:  1. Diet per SLP recommendations.    Will follow per protocol.    CLINICAL NUTRITION ASSESSMENT      Reason for Assessment MST score 2+ difficulty swallowing     Diagnosis/Problem   CC: SOB, edema  Dx: SOB, abnormal EKG, HTN, chronic anticoagulation, DM, acute on chronic CHF   Medical/Surgical History Past Medical History:   Diagnosis Date   • Atrial fibrillation with RVR (HCC)    • Atrial flutter (HCC)    • Diabetes (HCC)    • HLD (hyperlipidemia) 01/27/2016   • Hypertension    • Parkinson's disease (HCC)     Advanced        Past Surgical History:   Procedure Laterality Date   • BRAIN STIMULATOR     • COLONOSCOPY N/A 5/5/2022    Procedure: COLONOSCOPY TO CECUM WITH COLD SNARE POLYPECTOMY;  Surgeon: Luther Ferrer MD;  Location: [facility] ENDOSCOPY;  Service: Gastroenterology;  Laterality: N/A;  PRE:ANEMIA  POST:POLYPS/HEMORRHOIDS   • ENDOSCOPY N/A 5/5/2022    Procedure: ESOPHAGOGASTRODUODENOSCOPY WITH  COLD BIOPSIES;  Surgeon: Luther Ferrer MD;  Location: [facility] ENDOSCOPY;  Service: Gastroenterology;  Laterality: N/A;  PRE:ANEMIA  POST:DIVERTICULUM/GASTRITIS   • JOINT REPLACEMENT      Bilateral shoulder and knee replacements        Encounter Information        Nutrition/Diet History:  Pt stated he has difficulty swallowing and feels like food gets stuck in his throat sometimes and intermittent need to expectorate/vomit. SLP evaluated swallow at the  "bedside today and recommended continue with Regular diet w/ thins today and VFSS planned for tomorrow. Pt stated he tolerated breakfast and lunch today w/ % po intake without any difficulties swallowing.    Food Preferences:    Supplements:    Factors Affecting Intake: swallow impairment     Anthropometrics        Current Height  Current Weight  BMI kg/m2 Height: 182.9 cm (72\")  Weight: 99.3 kg (219 lb) (11/02/22 1202)  Body mass index is 29.7 kg/m².       Admission Weight Weight: 99.3 kg (219 lb)    Ideal Body Weight (IBW) 171 lb   Usual Body Weight (UBW) 190 lb   Weight Change/Trend 30 lb wt gain x 5 months       Weight History Wt Readings from Last 30 Encounters:   11/02/22 1202 99.3 kg (219 lb)   11/01/22 1600 99.3 kg (219 lb)   11/01/22 1156 99.8 kg (220 lb)   05/31/22 1428 86.2 kg (190 lb)   05/24/22 1424 80.8 kg (178 lb 3.2 oz)   05/08/22 0520 88.9 kg (195 lb 15.8 oz)   05/07/22 0505 86.8 kg (191 lb 4.8 oz)   04/30/22 2356 85.7 kg (189 lb)   04/30/22 2203 86.1 kg (189 lb 13.1 oz)   04/08/22 0824 88 kg (194 lb)   03/24/22 1721 89 kg (196 lb 3.4 oz)   03/23/22 0608 89.6 kg (197 lb 8.5 oz)   09/17/21 2008 113 kg (250 lb)   05/01/21 1511 113 kg (250 lb)   09/11/20 1359 99.3 kg (219 lb)   09/10/19 1355 95.3 kg (210 lb)   08/16/19 1418 94.8 kg (209 lb)   12/14/18 0550 110 kg (242 lb 8.1 oz)   12/12/18 0500 111 kg (243 lb 13.3 oz)   12/11/18 0500 115 kg (253 lb 12 oz)   12/10/18 0500 110 kg (242 lb 15.2 oz)   12/09/18 1209 106 kg (233 lb)   12/09/18 0520 106 kg (233 lb 14.5 oz)   12/08/18 1640 104 kg (229 lb)   12/08/18 1202 107 kg (235 lb 8 oz)   08/10/18 1429 103 kg (226 lb)   04/19/18 0924 106 kg (233 lb)   02/02/18 0954 102 kg (224 lb)   01/05/18 0932 103 kg (226 lb)   01/04/18 1902 103 kg (226 lb 4 oz)   01/04/18 1216 103 kg (227 lb)   01/29/15 1224 108 kg (238 lb 0.1 oz)   09/10/14 1550 109 kg (239 lb 3.9 oz)   02/12/14 1355 112 kg (247 lb 0.1 oz)   06/07/13 1300 110 kg (242 lb)         "     Tests/Procedures        Tests/Procedures Clinical Swallow Evaluation, MESSI     Labs       Pertinent Labs    Results from last 7 days   Lab Units 11/02/22  0402 11/01/22  1116   SODIUM mmol/L 139 138   POTASSIUM mmol/L 3.7 4.1   CHLORIDE mmol/L 101 104   CO2 mmol/L 26.0 22.7   BUN mg/dL 26* 28*   CREATININE mg/dL 1.07 1.05   CALCIUM mg/dL 8.6 9.1   BILIRUBIN mg/dL  --  0.8   ALK PHOS U/L  --  93   ALT (SGPT) U/L  --  5   AST (SGOT) U/L  --  13   GLUCOSE mg/dL 173* 134*     Results from last 7 days   Lab Units 11/02/22  0911 11/01/22  1116   HEMOGLOBIN g/dL 8.8* 9.2*   HEMATOCRIT % 28.5* 29.6*   WBC 10*3/mm3 5.22 6.22   ALBUMIN g/dL  --  3.80     Results from last 7 days   Lab Units 11/02/22  0911 11/01/22  1116   PLATELETS 10*3/mm3 204 205     COVID19   Date Value Ref Range Status   05/04/2022 Not Detected Not Detected - Ref. Range Final     Lab Results   Component Value Date    HGBA1C 8.10 (H) 05/01/2022          Medications           Scheduled Medications allopurinol, 100 mg, Oral, Daily  Alogliptin Benzoate, 6.25 mg, Oral, Daily  apixaban, 5 mg, Oral, Q12H  aspirin, 81 mg, Oral, Every Other Day  Carbidopa-Levodopa ER, 4 capsule, Oral, 4x Daily  citalopram, 20 mg, Oral, Daily  docusate sodium, 50 mg, Oral, Nightly  furosemide, 60 mg, Intravenous, BID  insulin lispro, 0-7 Units, Subcutaneous, TID AC  isosorbide mononitrate, 30 mg, Oral, Daily  levothyroxine, 25 mcg, Oral, Daily  metoprolol tartrate, 12.5 mg, Oral, BID  rosuvastatin, 20 mg, Oral, Nightly  traZODone, 25 mg, Oral, Nightly  vitamin B-12, 500 mcg, Oral, Daily       Infusions     PRN Medications •  acetaminophen  •  dextrose  •  dextrose  •  glucagon (human recombinant)  •  influenza vaccine  •  nitroglycerin  •  sodium chloride     Physical Findings        Physical Appearance alert, oriented, overweight, somnolent     NFPE Not applicable   --  Edema  generalized, 3+ (moderate)   Gastrointestinal non-distended , normoactive, last bowel movement:    Tubes/Drains none   Oral/Mouth Cavity WNL   Skin skin intact, bruising    --  Current Nutrition Orders & Evaluation of Intake       Oral Nutrition     Food Allergies seafood/shellfish   Current PO Diet Diet Regular; Cardiac, Consistent Carbohydrate   Supplement n/a   PO Evaluation     Trending % PO Intake %       --  PES STATEMENT / NUTRITION DIAGNOSIS      Nutrition Dx Problem  Problem: Swallowing Difficulty  Etiology: Functional Diagnosis and Factors Affecting Nutrition  Signs/Symptoms: Report/Observation and SLP/Swallow Evaluation    Comment:    --  NUTRITION INTERVENTION      Intervention Goal(s) Maintain nutrition status, Reduce/improve symptoms, Meet estimated needs, Disease management/therapy, Tolerate PO , Maintain intake and Appropriate weight loss         RD Intervention/Action Follow Tx Progress, Care plan reviewed and Recommend/ordered:          Prescription/Orders:       PO Diet Per SLP      Supplements       Enteral Nutrition       Parenteral Nutrition    New Prescription Ordered?    --      Monitor/Evaluation Per protocol   Education Will instruct as appropriate   --    RD to follow per protocol.      Electronically signed by:  Elayne Helms RD  11/02/22 16:12 EDT

## 2022-11-03 ENCOUNTER — APPOINTMENT (OUTPATIENT)
Dept: CARDIOLOGY | Facility: HOSPITAL | Age: 72
End: 2022-11-03

## 2022-11-03 LAB
ANION GAP SERPL CALCULATED.3IONS-SCNC: 7.2 MMOL/L (ref 5–15)
BH CV LOW VAS RIGHT MID FEMORAL SPONT: 1
BH CV LOW VAS RIGHT POPLITEAL SPONT: 1
BH CV LOWER VASCULAR LEFT COMMON FEMORAL AUGMENT: NORMAL
BH CV LOWER VASCULAR LEFT COMMON FEMORAL COMPETENT: NORMAL
BH CV LOWER VASCULAR LEFT COMMON FEMORAL COMPRESS: NORMAL
BH CV LOWER VASCULAR LEFT COMMON FEMORAL PHASIC: NORMAL
BH CV LOWER VASCULAR LEFT COMMON FEMORAL SPONT: NORMAL
BH CV LOWER VASCULAR LEFT DISTAL FEMORAL COMPRESS: NORMAL
BH CV LOWER VASCULAR LEFT GASTRONEMIUS COMPRESS: NORMAL
BH CV LOWER VASCULAR LEFT GREATER SAPH AK COMPRESS: NORMAL
BH CV LOWER VASCULAR LEFT GREATER SAPH BK COMPRESS: NORMAL
BH CV LOWER VASCULAR LEFT LESSER SAPH COMPRESS: NORMAL
BH CV LOWER VASCULAR LEFT MID FEMORAL AUGMENT: NORMAL
BH CV LOWER VASCULAR LEFT MID FEMORAL COMPETENT: NORMAL
BH CV LOWER VASCULAR LEFT MID FEMORAL COMPRESS: NORMAL
BH CV LOWER VASCULAR LEFT MID FEMORAL PHASIC: NORMAL
BH CV LOWER VASCULAR LEFT MID FEMORAL SPONT: NORMAL
BH CV LOWER VASCULAR LEFT PERONEAL COMPRESS: NORMAL
BH CV LOWER VASCULAR LEFT POPLITEAL AUGMENT: NORMAL
BH CV LOWER VASCULAR LEFT POPLITEAL COMPETENT: NORMAL
BH CV LOWER VASCULAR LEFT POPLITEAL COMPRESS: NORMAL
BH CV LOWER VASCULAR LEFT POPLITEAL PHASIC: NORMAL
BH CV LOWER VASCULAR LEFT POPLITEAL SPONT: NORMAL
BH CV LOWER VASCULAR LEFT POSTERIOR TIBIAL COMPRESS: NORMAL
BH CV LOWER VASCULAR LEFT PROFUNDA FEMORAL COMPRESS: NORMAL
BH CV LOWER VASCULAR LEFT PROXIMAL FEMORAL COMPRESS: NORMAL
BH CV LOWER VASCULAR LEFT SAPHENOFEMORAL JUNCTION COMPRESS: NORMAL
BH CV LOWER VASCULAR RIGHT COMMON FEMORAL AUGMENT: NORMAL
BH CV LOWER VASCULAR RIGHT COMMON FEMORAL COMPETENT: NORMAL
BH CV LOWER VASCULAR RIGHT COMMON FEMORAL COMPRESS: NORMAL
BH CV LOWER VASCULAR RIGHT COMMON FEMORAL PHASIC: NORMAL
BH CV LOWER VASCULAR RIGHT COMMON FEMORAL SPONT: NORMAL
BH CV LOWER VASCULAR RIGHT DISTAL FEMORAL COMPRESS: NORMAL
BH CV LOWER VASCULAR RIGHT GASTRONEMIUS COMPRESS: NORMAL
BH CV LOWER VASCULAR RIGHT GREATER SAPH AK COMPRESS: NORMAL
BH CV LOWER VASCULAR RIGHT GREATER SAPH BK COMPRESS: NORMAL
BH CV LOWER VASCULAR RIGHT LESSER SAPH COMPRESS: NORMAL
BH CV LOWER VASCULAR RIGHT MID FEMORAL AUGMENT: NORMAL
BH CV LOWER VASCULAR RIGHT MID FEMORAL COMPETENT: NORMAL
BH CV LOWER VASCULAR RIGHT MID FEMORAL COMPRESS: NORMAL
BH CV LOWER VASCULAR RIGHT MID FEMORAL PHASIC: NORMAL
BH CV LOWER VASCULAR RIGHT MID FEMORAL SPONT: NORMAL
BH CV LOWER VASCULAR RIGHT PERONEAL COMPRESS: NORMAL
BH CV LOWER VASCULAR RIGHT POPLITEAL AUGMENT: NORMAL
BH CV LOWER VASCULAR RIGHT POPLITEAL COMPETENT: NORMAL
BH CV LOWER VASCULAR RIGHT POPLITEAL COMPRESS: NORMAL
BH CV LOWER VASCULAR RIGHT POPLITEAL PHASIC: NORMAL
BH CV LOWER VASCULAR RIGHT POPLITEAL SPONT: NORMAL
BH CV LOWER VASCULAR RIGHT POSTERIOR TIBIAL COMPRESS: NORMAL
BH CV LOWER VASCULAR RIGHT PROFUNDA FEMORAL COMPRESS: NORMAL
BH CV LOWER VASCULAR RIGHT PROXIMAL FEMORAL COMPRESS: NORMAL
BH CV LOWER VASCULAR RIGHT SAPHENOFEMORAL JUNCTION COMPRESS: NORMAL
BUN SERPL-MCNC: 25 MG/DL (ref 8–23)
BUN/CREAT SERPL: 22.9 (ref 7–25)
CALCIUM SPEC-SCNC: 8.2 MG/DL (ref 8.6–10.5)
CHLORIDE SERPL-SCNC: 95 MMOL/L (ref 98–107)
CO2 SERPL-SCNC: 32.8 MMOL/L (ref 22–29)
CREAT SERPL-MCNC: 1.09 MG/DL (ref 0.76–1.27)
EGFRCR SERPLBLD CKD-EPI 2021: 71.2 ML/MIN/1.73
GLUCOSE BLDC GLUCOMTR-MCNC: 171 MG/DL (ref 70–130)
GLUCOSE BLDC GLUCOMTR-MCNC: 226 MG/DL (ref 70–130)
GLUCOSE BLDC GLUCOMTR-MCNC: 248 MG/DL (ref 70–130)
GLUCOSE BLDC GLUCOMTR-MCNC: 321 MG/DL (ref 70–130)
GLUCOSE SERPL-MCNC: 257 MG/DL (ref 65–99)
MAXIMAL PREDICTED HEART RATE: 146 BPM
POTASSIUM SERPL-SCNC: 3.3 MMOL/L (ref 3.5–5.2)
POTASSIUM SERPL-SCNC: 3.8 MMOL/L (ref 3.5–5.2)
SODIUM SERPL-SCNC: 135 MMOL/L (ref 136–145)
STRESS TARGET HR: 124 BPM

## 2022-11-03 PROCEDURE — 99232 SBSQ HOSP IP/OBS MODERATE 35: CPT | Performed by: NURSE PRACTITIONER

## 2022-11-03 PROCEDURE — 84132 ASSAY OF SERUM POTASSIUM: CPT | Performed by: INTERNAL MEDICINE

## 2022-11-03 PROCEDURE — 80048 BASIC METABOLIC PNL TOTAL CA: CPT | Performed by: INTERNAL MEDICINE

## 2022-11-03 PROCEDURE — 93970 EXTREMITY STUDY: CPT

## 2022-11-03 PROCEDURE — 63710000001 INSULIN LISPRO (HUMAN) PER 5 UNITS: Performed by: NURSE PRACTITIONER

## 2022-11-03 PROCEDURE — 25010000002 FUROSEMIDE PER 20 MG: Performed by: INTERNAL MEDICINE

## 2022-11-03 PROCEDURE — 82962 GLUCOSE BLOOD TEST: CPT

## 2022-11-03 RX ORDER — POTASSIUM CHLORIDE 1.5 G/1.77G
40 POWDER, FOR SOLUTION ORAL AS NEEDED
Status: DISCONTINUED | OUTPATIENT
Start: 2022-11-03 | End: 2022-11-04 | Stop reason: HOSPADM

## 2022-11-03 RX ORDER — POTASSIUM CHLORIDE 7.45 MG/ML
10 INJECTION INTRAVENOUS
Status: DISCONTINUED | OUTPATIENT
Start: 2022-11-03 | End: 2022-11-04 | Stop reason: HOSPADM

## 2022-11-03 RX ORDER — POTASSIUM CHLORIDE 750 MG/1
40 TABLET, FILM COATED, EXTENDED RELEASE ORAL AS NEEDED
Status: DISCONTINUED | OUTPATIENT
Start: 2022-11-03 | End: 2022-11-04 | Stop reason: HOSPADM

## 2022-11-03 RX ADMIN — POTASSIUM CHLORIDE 40 MEQ: 750 TABLET, EXTENDED RELEASE ORAL at 11:28

## 2022-11-03 RX ADMIN — LEVODOPA AND CARBIDOPA 4 CAPSULE: 145; 36.25 CAPSULE, EXTENDED RELEASE ORAL at 17:47

## 2022-11-03 RX ADMIN — APIXABAN 5 MG: 5 TABLET, FILM COATED ORAL at 20:43

## 2022-11-03 RX ADMIN — ISOSORBIDE MONONITRATE 30 MG: 30 TABLET, EXTENDED RELEASE ORAL at 08:24

## 2022-11-03 RX ADMIN — LEVOTHYROXINE SODIUM 25 MCG: 0.03 TABLET ORAL at 08:24

## 2022-11-03 RX ADMIN — INSULIN LISPRO 5 UNITS: 100 INJECTION, SOLUTION INTRAVENOUS; SUBCUTANEOUS at 20:43

## 2022-11-03 RX ADMIN — ALLOPURINOL 100 MG: 100 TABLET ORAL at 08:24

## 2022-11-03 RX ADMIN — POTASSIUM CHLORIDE 40 MEQ: 750 TABLET, EXTENDED RELEASE ORAL at 16:18

## 2022-11-03 RX ADMIN — ACETAMINOPHEN 650 MG: 325 TABLET, FILM COATED ORAL at 07:00

## 2022-11-03 RX ADMIN — METOPROLOL TARTRATE 12.5 MG: 25 TABLET ORAL at 20:43

## 2022-11-03 RX ADMIN — LEVODOPA AND CARBIDOPA 4 CAPSULE: 145; 36.25 CAPSULE, EXTENDED RELEASE ORAL at 11:28

## 2022-11-03 RX ADMIN — ASPIRIN 81 MG: 81 TABLET, CHEWABLE ORAL at 08:24

## 2022-11-03 RX ADMIN — Medication 500 MCG: at 08:24

## 2022-11-03 RX ADMIN — LEVODOPA AND CARBIDOPA 4 CAPSULE: 145; 36.25 CAPSULE, EXTENDED RELEASE ORAL at 20:44

## 2022-11-03 RX ADMIN — FUROSEMIDE 60 MG: 10 INJECTION, SOLUTION INTRAMUSCULAR; INTRAVENOUS at 17:47

## 2022-11-03 RX ADMIN — INSULIN LISPRO 3 UNITS: 100 INJECTION, SOLUTION INTRAVENOUS; SUBCUTANEOUS at 08:24

## 2022-11-03 RX ADMIN — CITALOPRAM 20 MG: 20 TABLET, FILM COATED ORAL at 08:24

## 2022-11-03 RX ADMIN — FUROSEMIDE 60 MG: 10 INJECTION, SOLUTION INTRAMUSCULAR; INTRAVENOUS at 08:24

## 2022-11-03 RX ADMIN — DOCUSATE SODIUM 50 MG: 50 CAPSULE, LIQUID FILLED ORAL at 20:43

## 2022-11-03 RX ADMIN — ROSUVASTATIN CALCIUM 20 MG: 20 TABLET, FILM COATED ORAL at 20:43

## 2022-11-03 RX ADMIN — INSULIN LISPRO 2 UNITS: 100 INJECTION, SOLUTION INTRAVENOUS; SUBCUTANEOUS at 11:28

## 2022-11-03 RX ADMIN — INSULIN LISPRO 3 UNITS: 100 INJECTION, SOLUTION INTRAVENOUS; SUBCUTANEOUS at 16:19

## 2022-11-03 RX ADMIN — LEVODOPA AND CARBIDOPA 4 CAPSULE: 145; 36.25 CAPSULE, EXTENDED RELEASE ORAL at 08:27

## 2022-11-03 RX ADMIN — TRAZODONE HYDROCHLORIDE 25 MG: 50 TABLET ORAL at 20:43

## 2022-11-03 RX ADMIN — APIXABAN 5 MG: 5 TABLET, FILM COATED ORAL at 08:24

## 2022-11-03 NOTE — PLAN OF CARE
Goal Outcome Evaluation:   Pt alert, Ox4, VSS, no complaints during the shift.     Diuretic in Use: Lasix  Response to Diuretics (Output greater than intake): Greater output  Daily Weight (up or down): down   O2 Requirements: RA  Functional Status (Activity level, tolerance and respiratory symptoms): x1 assist with walker to bathroom.   Discharge Plans: Home at some point when medically stable.

## 2022-11-03 NOTE — PLAN OF CARE
Goal Outcome Evaluation:              Outcome Evaluation: VFSS canceled this date d/t patient taking a shower during scheduled assessment. Will continue to follow.       Further discussion with patient, he wishes to participate in VFSS next date, despite possible refusal of diet modifications. He is agreeable to therapy. Reduced intensity of voice appreciated; he would be a good candidate for EMST.

## 2022-11-03 NOTE — PROGRESS NOTES
"    Patient Name: Edilberto Reeder  :1950  72 y.o.      Patient Care Team:  Harvey Larson MD as PCP - General  Harvey Larson MD as PCP - Family Medicine    Chief Complaint: follow up heart failure    Interval History: excellent diuresis. He is feeling better. Edema improving.        Objective   Vital Signs  Temp:  [97.8 °F (36.6 °C)-98.2 °F (36.8 °C)] 98.2 °F (36.8 °C)  Heart Rate:  [52-71] 52  Resp:  [16] 16  BP: (116-126)/(63-75) 126/75    Intake/Output Summary (Last 24 hours) at 11/3/2022 1503  Last data filed at 11/3/2022 1400  Gross per 24 hour   Intake 900 ml   Output 3775 ml   Net -2875 ml     Flowsheet Rows    Flowsheet Row First Filed Value   Admission Height 182.9 cm (72\") Documented at 2022 1202   Admission Weight 99.3 kg (219 lb) Documented at 2022 1600          Physical Exam:   General Appearance:    Alert, cooperative, in no acute distress   Lungs:     Clear to auscultation.  Normal respiratory effort and rate.      Heart:    irregular rhythm and normal rate, normal S1 and S2, no murmurs, gallops or rubs.     Chest Wall:    No abnormalities observed   Abdomen:     Soft, nontender, positive bowel sounds.     Extremities:   no cyanosis, clubbingNo marked joint deformities.  Adequate musculoskeletal strength.  2+ edema     Results Review:    Results from last 7 days   Lab Units 22  0318   SODIUM mmol/L 135*   POTASSIUM mmol/L 3.3*   CHLORIDE mmol/L 95*   CO2 mmol/L 32.8*   BUN mg/dL 25*   CREATININE mg/dL 1.09   GLUCOSE mg/dL 257*   CALCIUM mg/dL 8.2*     Results from last 7 days   Lab Units 22  1116   TROPONIN T ng/mL 0.021     Results from last 7 days   Lab Units 22  0911   WBC 10*3/mm3 5.22   HEMOGLOBIN g/dL 8.8*   HEMATOCRIT % 28.5*   PLATELETS 10*3/mm3 204                           Medication Review:   allopurinol, 100 mg, Oral, Daily  Alogliptin Benzoate, 6.25 mg, Oral, Daily  apixaban, 5 mg, Oral, Q12H  aspirin, 81 mg, Oral, Every Other " Day  Carbidopa-Levodopa ER, 4 capsule, Oral, 4x Daily  citalopram, 20 mg, Oral, Daily  docusate sodium, 50 mg, Oral, Nightly  furosemide, 60 mg, Intravenous, BID  insulin lispro, 0-7 Units, Subcutaneous, 4x Daily With Meals & Nightly  isosorbide mononitrate, 30 mg, Oral, Daily  levothyroxine, 25 mcg, Oral, Daily  metoprolol tartrate, 12.5 mg, Oral, BID  rosuvastatin, 20 mg, Oral, Nightly  traZODone, 25 mg, Oral, Nightly  vitamin B-12, 500 mcg, Oral, Daily              Assessment & Plan   1. Acute on chronic biventricular heart failure. EF 61-65%. Repeat echocardiogram with mildly reduced LVEF - again no anginal symptoms. Reviewed with Dr. Shields. Continue medical therapy. Continue IV diuretics today. TSH elevated. Free T4 normal.   2. Coronary artery disease  with mildly abnormal stress test in 3/2022. He denies anginal symptoms. Troponin negative.   3. Anemia with GI bleed. H/H relatively stable.  4. Parkinson's disease  5. Atrial fibrillation, rate controlled. Anticoagulated with apixaban.   6. Elevated ddimer, compliant with apixaban. Lower extremity doppler without DVT.    ESME Ceballos  [facility]  11/03/22  15:03 EDT

## 2022-11-03 NOTE — PLAN OF CARE
Goal Outcome Evaluation:      AO x4. All VSS. No c/o pain. K+ 3.3 this AM - K+ replacement protocol initiated; recheck at 2000.      Progress: improving     Diuretic in Use: IV Lasix  Response to Diuretics (Output greater than intake): output greater  Daily Weight (up or down): down  O2 Requirements: RA; no SOB reported  Functional Status (Activity level, tolerance and respiratory symptoms): Assist x1 with walker  Discharge Plans: Possible d/c tomorrow      Problem: Adult Inpatient Plan of Care  Goal: Plan of Care Review  Outcome: Ongoing, Progressing  Flowsheets  Taken 11/3/2022 1846 by Digna Apple, RN  Progress: improving  Taken 11/2/2022 1155 by Vivi Wynne, OTR  Plan of Care Reviewed With: patient

## 2022-11-04 ENCOUNTER — APPOINTMENT (OUTPATIENT)
Dept: GENERAL RADIOLOGY | Facility: HOSPITAL | Age: 72
End: 2022-11-04

## 2022-11-04 VITALS
BODY MASS INDEX: 26.41 KG/M2 | DIASTOLIC BLOOD PRESSURE: 80 MMHG | WEIGHT: 195 LBS | RESPIRATION RATE: 16 BRPM | TEMPERATURE: 97.9 F | HEIGHT: 72 IN | SYSTOLIC BLOOD PRESSURE: 115 MMHG | HEART RATE: 65 BPM | OXYGEN SATURATION: 96 %

## 2022-11-04 LAB
ANION GAP SERPL CALCULATED.3IONS-SCNC: 10.5 MMOL/L (ref 5–15)
BUN SERPL-MCNC: 22 MG/DL (ref 8–23)
BUN/CREAT SERPL: 24.7 (ref 7–25)
CALCIUM SPEC-SCNC: 8.1 MG/DL (ref 8.6–10.5)
CHLORIDE SERPL-SCNC: 94 MMOL/L (ref 98–107)
CO2 SERPL-SCNC: 32.5 MMOL/L (ref 22–29)
CREAT SERPL-MCNC: 0.89 MG/DL (ref 0.76–1.27)
EGFRCR SERPLBLD CKD-EPI 2021: 89.9 ML/MIN/1.73
GLUCOSE BLDC GLUCOMTR-MCNC: 187 MG/DL (ref 70–130)
GLUCOSE BLDC GLUCOMTR-MCNC: 251 MG/DL (ref 70–130)
GLUCOSE SERPL-MCNC: 177 MG/DL (ref 65–99)
POTASSIUM SERPL-SCNC: 3.7 MMOL/L (ref 3.5–5.2)
SODIUM SERPL-SCNC: 137 MMOL/L (ref 136–145)

## 2022-11-04 PROCEDURE — 90662 IIV NO PRSV INCREASED AG IM: CPT | Performed by: NURSE PRACTITIONER

## 2022-11-04 PROCEDURE — G0008 ADMIN INFLUENZA VIRUS VAC: HCPCS | Performed by: NURSE PRACTITIONER

## 2022-11-04 PROCEDURE — 99238 HOSP IP/OBS DSCHRG MGMT 30/<: CPT | Performed by: NURSE PRACTITIONER

## 2022-11-04 PROCEDURE — 63710000001 INSULIN LISPRO (HUMAN) PER 5 UNITS: Performed by: NURSE PRACTITIONER

## 2022-11-04 PROCEDURE — 80048 BASIC METABOLIC PNL TOTAL CA: CPT | Performed by: INTERNAL MEDICINE

## 2022-11-04 PROCEDURE — 92611 MOTION FLUOROSCOPY/SWALLOW: CPT

## 2022-11-04 PROCEDURE — 82962 GLUCOSE BLOOD TEST: CPT

## 2022-11-04 PROCEDURE — 25010000002 FUROSEMIDE PER 20 MG: Performed by: INTERNAL MEDICINE

## 2022-11-04 PROCEDURE — 25010000002 INFLUENZA VAC HIGH-DOSE QUAD 0.7 ML SUSPENSION PREFILLED SYRINGE: Performed by: NURSE PRACTITIONER

## 2022-11-04 PROCEDURE — 74230 X-RAY XM SWLNG FUNCJ C+: CPT

## 2022-11-04 RX ORDER — FUROSEMIDE 40 MG/1
60 TABLET ORAL DAILY
Qty: 90 TABLET | Refills: 3 | Status: SHIPPED | OUTPATIENT
Start: 2022-11-04

## 2022-11-04 RX ORDER — POTASSIUM CHLORIDE 750 MG/1
10 TABLET, FILM COATED, EXTENDED RELEASE ORAL DAILY
Qty: 90 TABLET | Refills: 3 | Status: SHIPPED | OUTPATIENT
Start: 2022-11-04 | End: 2022-11-17

## 2022-11-04 RX ORDER — ROSUVASTATIN CALCIUM 20 MG/1
20 TABLET, COATED ORAL NIGHTLY
Qty: 90 TABLET | Refills: 3 | Status: SHIPPED | OUTPATIENT
Start: 2022-11-04

## 2022-11-04 RX ADMIN — LEVODOPA AND CARBIDOPA 4 CAPSULE: 145; 36.25 CAPSULE, EXTENDED RELEASE ORAL at 09:54

## 2022-11-04 RX ADMIN — Medication 500 MCG: at 09:53

## 2022-11-04 RX ADMIN — BARIUM SULFATE 50 ML: 400 SUSPENSION ORAL at 09:01

## 2022-11-04 RX ADMIN — INFLUENZA A VIRUS A/VICTORIA/2570/2019 IVR-215 (H1N1) ANTIGEN (FORMALDEHYDE INACTIVATED), INFLUENZA A VIRUS A/DARWIN/9/2021 SAN-010 (H3N2) ANTIGEN (FORMALDEHYDE INACTIVATED), INFLUENZA B VIRUS B/PHUKET/3073/2013 ANTIGEN (FORMALDEHYDE INACTIVATED), AND INFLUENZA B VIRUS B/MICHIGAN/01/2021 ANTIGEN (FORMALDEHYDE INACTIVATED) 0.7 ML: 60; 60; 60; 60 INJECTION, SUSPENSION INTRAMUSCULAR at 12:34

## 2022-11-04 RX ADMIN — LEVOTHYROXINE SODIUM 25 MCG: 0.03 TABLET ORAL at 09:53

## 2022-11-04 RX ADMIN — BARIUM SULFATE 55 ML: 0.81 POWDER, FOR SUSPENSION ORAL at 09:01

## 2022-11-04 RX ADMIN — CITALOPRAM 20 MG: 20 TABLET, FILM COATED ORAL at 09:53

## 2022-11-04 RX ADMIN — INSULIN LISPRO 2 UNITS: 100 INJECTION, SOLUTION INTRAVENOUS; SUBCUTANEOUS at 09:54

## 2022-11-04 RX ADMIN — ISOSORBIDE MONONITRATE 30 MG: 30 TABLET, EXTENDED RELEASE ORAL at 09:53

## 2022-11-04 RX ADMIN — ALLOPURINOL 100 MG: 100 TABLET ORAL at 09:53

## 2022-11-04 RX ADMIN — FUROSEMIDE 60 MG: 10 INJECTION, SOLUTION INTRAMUSCULAR; INTRAVENOUS at 10:04

## 2022-11-04 RX ADMIN — BARIUM SULFATE 4 ML: 980 POWDER, FOR SUSPENSION ORAL at 09:01

## 2022-11-04 RX ADMIN — METOPROLOL TARTRATE 12.5 MG: 25 TABLET ORAL at 09:53

## 2022-11-04 RX ADMIN — INSULIN LISPRO 4 UNITS: 100 INJECTION, SOLUTION INTRAVENOUS; SUBCUTANEOUS at 11:51

## 2022-11-04 RX ADMIN — APIXABAN 5 MG: 5 TABLET, FILM COATED ORAL at 09:53

## 2022-11-04 RX ADMIN — LEVODOPA AND CARBIDOPA 4 CAPSULE: 145; 36.25 CAPSULE, EXTENDED RELEASE ORAL at 11:51

## 2022-11-04 RX ADMIN — BARIUM SULFATE 1 TEASPOON(S): 0.6 CREAM ORAL at 09:01

## 2022-11-04 NOTE — PLAN OF CARE
Goal Outcome Evaluation:               Admitted for shortness of breath, chf exacerbation. Pt responded well to iv diuretics. VSS no complaints voiced. Appropriate for discharge with follow up

## 2022-11-04 NOTE — MBS/VFSS/FEES
Acute Care - Speech Language Pathology   Swallow Initial Evaluation [facility]     Patient Name: Edilberto Reeder  : 1950  MRN: [id number]  Today's Date: 2022               Admit Date: 2022    Visit Dx:     ICD-10-CM ICD-9-CM   1. Acute on chronic diastolic CHF (congestive heart failure) (HCC)  I50.33 428.33     428.0   2. Shortness of breath  R06.02 786.05   3. Essential hypertension  I10 401.9   4. Abnormal EKG  R94.31 794.31     Patient Active Problem List   Diagnosis   • ASCVD (arteriosclerotic cardiovascular disease)   • Permanent atrial fibrillation (HCC)   • Diabetic retinopathy associated with type 2 diabetes mellitus (HCC)   • Essential hypertension   • HLD (hyperlipidemia)   • Hypothyroidism   • Peripheral neuropathy   • Renal insufficiency   • Type 2 diabetes mellitus with hyperglycemia, without long-term current use of insulin (HCC)   • Parkinson's disease (HCC)   • Dyspnea on exertion   • Atrial fibrillation, persistent (HCC)   • Stroke-like symptoms   • Hypopotassemia   • Ankle pain   • Acute idiopathic gout of right ankle   • Generalized weakness   • Head injury due to trauma   • Acute blood loss anemia   • Chronic anticoagulation   • Thrombocytopenia (HCC)   • Screening for colon cancer   • Minor head injury, initial encounter   • Acute on chronic diastolic CHF (congestive heart failure) (HCC)   • Shortness of breath     Past Medical History:   Diagnosis Date   • Atrial fibrillation with RVR (HCC)    • Atrial flutter (HCC)    • Diabetes (HCC)    • HLD (hyperlipidemia) 2016   • Hypertension    • Parkinson's disease (HCC)     Advanced      Past Surgical History:   Procedure Laterality Date   • BRAIN STIMULATOR     • COLONOSCOPY N/A 2022    Procedure: COLONOSCOPY TO CECUM WITH COLD SNARE POLYPECTOMY;  Surgeon: Luther Ferrer MD;  Location: [facility] ENDOSCOPY;  Service: Gastroenterology;  Laterality: N/A;  PRE:ANEMIA  POST:POLYPS/HEMORRHOIDS   • ENDOSCOPY N/A 2022     Procedure: ESOPHAGOGASTRODUODENOSCOPY WITH  COLD BIOPSIES;  Surgeon: Luther Ferrer MD;  Location: [facility] ENDOSCOPY;  Service: Gastroenterology;  Laterality: N/A;  PRE:ANEMIA  POST:DIVERTICULUM/GASTRITIS   • JOINT REPLACEMENT      Bilateral shoulder and knee replacements       SLP Recommendation and Plan  SLP Swallowing Diagnosis: moderate, oral dysphagia, pharyngeal dysphagia (11/04/22 0800)  SLP Diet Recommendation: regular textures, other (see comments) (pt refused thickened liquids) (11/04/22 0800)  Recommended Precautions and Strategies: small bites of food and sips of liquid, upright posture during/after eating, no straw (11/04/22 0800)  SLP Rec. for Method of Medication Administration: meds whole, meds crushed, with puree, as tolerated (11/04/22 0800)     Monitor for Signs of Aspiration: yes, notify SLP if any concerns (11/04/22 0800)     Swallow Criteria for Skilled Therapeutic Interventions Met: demonstrates skilled criteria (11/04/22 0800)  Anticipated Discharge Disposition (SLP): home with OP services (11/04/22 0800)     Therapy Frequency (Swallow): evaluation only, other (see comments) (Pt d/cing this date) (11/04/22 0800)                                           Plan of Care Reviewed With: patient  Outcome Evaluation: VFSS completed. Pt with instances of non transient penetration with nectar and honey. Silent aspiration with large drinks thins via cup. No aspiration observed with small sips thins via cup x3. Pt pleasantly refused thickened liqudis. SLP recs regular and thins, small sips, meds whole with puree. Small bites/sips. Increased time spend with mastication. SLP recs dysphagia and voice treatment. Pt would be a great candidate for EMST.      SWALLOW EVALUATION (last 72 hours)     SLP Adult Swallow Evaluation     Row Name 11/04/22 0800 11/02/22 0930                Rehab Evaluation    Document Type evaluation  -SH evaluation  -SL       Subjective Information -- no complaints  -SL        Patient Observations -- alert;cooperative;agree to therapy  -SL       Patient/Family/Caregiver Comments/Observations -- pt stated that he had difficulty feeling like foods are stuck in thraot and has episodes of regurgitation  -SL       Patient Effort good  -SH good  -SL       Symptoms Noted During/After Treatment -- none  -SL          General Information    Patient Profile Reviewed yes  -SH yes  -SL       Pertinent History Of Current Problem PD  -SH Admitted with increased SOB, CHF, PNA;  HX- Parkinsons disease, diabetes, atrial fib; prior VFSS in 2018 revealed mod dysphagia during that hospitalization with penetration of thin and 1 x aspiration with nectar  -SL       Current Method of Nutrition regular textures;thin liquids  - regular textures;thin liquids  -SL       Precautions/Limitations, Vision WFL;for purposes of eval  -SH WFL;for purposes of eval  -SL       Precautions/Limitations, Hearing WFL;for purposes of eval  -SH WFL;for purposes of eval  -SL       Prior Level of Function-Communication motor speech impairment  -SH WFL  -SL       Prior Level of Function-Swallowing compensations (maneuvers, postures) needed  -SH compensations (maneuvers, postures) needed  -SL       Plans/Goals Discussed with patient;agreed upon  - patient;family  -       Barriers to Rehab medically complex  -SH previous functional deficit  -SL       Patient's Goals for Discharge patient did not state  -SH return home  -SL          Pain    Additional Documentation Pain Scale: Numbers Pre/Post-Treatment (Group)  -SH --          Pain Scale: Numbers Pre/Post-Treatment    Pretreatment Pain Rating 0/10 - no pain  -SH --          Oral Motor Structure and Function    Dentition Assessment -- natural, present and adequate  -SL       Secretion Management -- WNL/WFL  -SL       Mucosal Quality -- moist, healthy  -SL       Volitional Swallow -- WFL  -SL          Oral Musculature and Cranial Nerve Assessment    Oral Motor General Assessment  generalized oral motor weakness  -SH WFL;generalized oral motor weakness  -SL          General Eating/Swallowing Observations    Eating/Swallowing Skills -- self-fed;fed by SLP  -SL       Positioning During Eating -- upright 90 degree;upright in bed  -SL       Utensils Used -- spoon;cup  -SL       Consistencies Trialed -- ice chips;thin liquids;pureed;mixed consistency;soft textures;regular textures  -SL          Clinical Swallow Eval    Pharyngeal Phase -- suspected pharyngeal impairment  -SL       Esophageal Phase -- suspected esophageal impairment  -SL       Clinical Swallow Evaluation Summary -- The patient displayed fxnl oral phase of swallow with adequate mastication, bolus transfer,and A-P mvmts; swallow intiation was timely, laryngeal elevation appeared mildly reduced; pt did complain intermittently about feeling as though bolus was stuck higher in the pahrynx, however no regurgitaiton elicited, and issue seemed to resolve with cued or spont double swallows and /or liquid assist; No overt clincial s/s aspiraiton noted on cup drinks of thin lqiuid, puree, soft solids, or reg consistencies;   would recommend VFSS to further assess pharyngeal swallow/scan esophagus due to pt's complaints of food sticking and to R/O aspiraiton , given recent PNA  -SL          MBS/VFSS Interpretation    VFSS Summary Patient presents with moderate oropharyngeal dysphagia characterized by reduced hyolaryngeal elevation/excursion and upper esophageal sphincter opening, lingual weakness, suspected poor glottic closure, and weak velopharyngeal port closure. Deep brain stimulator wires observed. Swallow elicited at the valleculae with honey via spoon. No penetration observed. Trace, non transient and deep penetration with honey via cup. Spill past the valleculae with honey via straw and nectar via spoon without penetration. Mild lingual residue post swallow with honey trials. Trace and transient penetration during the swallow with  nectar via cup. Spill past the valleculae before the swallow with nectar via straw without penetration. Transient, deep penetration during the swallow with small drink thins via cup. Silent aspiration during the swallow with regular bolus size thins via cup. Spill past the valleculae before the swallow with puree. Swallow elicited at the valleculae with soft solids and regular. Trace upper esophageal residue post swallow. Normal swallow initiation and no penetration noted with cued small drink thins via cup at the end of the assessment.  Intelligibility poor at the end of the assessment  - --          SLP Communication to Radiology    Summary Statement Radiologist present: Dr. Gonzalez. Patient with instances of non transient penetration with nectar and honey. Silent aspiration with large drinks thins via cup. No aspiration observed with small sips thins via cup. No significant pharyngeal residue with solids.  - --          SLP Evaluation Clinical Impression    SLP Swallowing Diagnosis moderate;oral dysphagia;pharyngeal dysphagia  - functional oral phase;R/O pharyngeal dysphagia;suspected esophageal dysphagia  -       Functional Impact -- risk of aspiration/pneumonia  -       Rehab Potential/Prognosis, Swallowing -- good, to achieve stated therapy goals  -       Swallow Criteria for Skilled Therapeutic Interventions Met demonstrates skilled criteria  - demonstrates skilled criteria  -          Recommendations    Therapy Frequency (Swallow) evaluation only;other (see comments)  Pt d/cing this date  - PRN  -       Predicted Duration Therapy Intervention (Days) -- until discharge  -       SLP Diet Recommendation regular textures;other (see comments)  pt refused thickened liquids  - regular textures;thin liquids  -       Recommended Diagnostics -- VFSS (MBS)  -       Recommended Precautions and Strategies small bites of food and sips of liquid;upright posture during/after eating;no straw  -  upright posture during/after eating;small bites of food and sips of liquid;multiple swallows per bite of food;multiple swallows per sip of liquid;alternate between small bites of food and sips of liquid;hard swallow with each bite or sip  -       Oral Care Recommendations Oral Care BID/PRN  - Oral Care before breakfast, after meals and PRN  -SL       SLP Rec. for Method of Medication Administration meds whole;meds crushed;with puree;as tolerated  - meds whole;meds crushed;with thin liquids;with puree;as tolerated  -       Monitor for Signs of Aspiration yes;notify SLP if any concerns  - yes;notify SLP if any concerns  -       Anticipated Discharge Disposition (SLP) home with OP services  - --          (LTG) Patient will demonstrate functional swallow for    Diet Texture (Demonstrate functional swallow) -- regular textures  -SL       Liquid viscosity (Demonstrate functional swallow) -- thin liquids  -       [place] (Demonstrate functional swallow) -- independently (over 90% accuracy)  -       Time Frame (Demonstrate functional swallow) -- by discharge  -       Comment (Demonstrate functional swallow) -- Complete VFSS to further assess pharyngeal phase, scan esophagis for possible dysfunction, and R/O aspiraiton  -          (STG) Patient will tolerate trials of    Consistencies Trialed (Tolerate trials) -- regular textures;thin liquids  -       Desired Outcome (Tolerate trials) -- without signs/symptoms of aspiration  -SL       [place] (Tolerate trials) -- independently (over 90% accuracy)  -       Time Frame (Tolerate trials) -- by discharge  -             User Key  (r) = Recorded By, (t) = Taken By, (c) = Cosigned By    Initials Name Effective Dates     Kim Ferrari MS CCC-SLP 06/16/21 -      Laila Bach MS CCC-SLP 06/16/21 -                 EDUCATION  The patient has been educated in the following areas:   Dysphagia (Swallowing Impairment).        SLP GOALS     Row Name  11/02/22 0930             (LTG) Patient will demonstrate functional swallow for    Diet Texture (Demonstrate functional swallow) regular textures  -SL      Liquid viscosity (Demonstrate functional swallow) thin liquids  -SL      [place] (Demonstrate functional swallow) independently (over 90% accuracy)  -SL      Time Frame (Demonstrate functional swallow) by discharge  -SL      Comment (Demonstrate functional swallow) Complete VFSS to further assess pharyngeal phase, scan esophagis for possible dysfunction, and R/O aspiraiton  -SL         (STG) Patient will tolerate trials of    Consistencies Trialed (Tolerate trials) regular textures;thin liquids  -SL      Desired Outcome (Tolerate trials) without signs/symptoms of aspiration  -SL      [place] (Tolerate trials) independently (over 90% accuracy)  -SL      Time Frame (Tolerate trials) by discharge  -            User Key  (r) = Recorded By, (t) = Taken By, (c) = Cosigned By    Initials Name Provider Type     Kim Ferrari MS CCC-SLP Speech and Language Pathologist                   Time Calculation:    Time Calculation- SLP     Row Name 11/04/22 1527             Time Calculation- SLP    SLP Start Time 0730  -      SLP Received On 11/04/22  -            User Key  (r) = Recorded By, (t) = Taken By, (c) = Cosigned By    Initials Name Provider Type     Laila Bach MS CCC-SLP Speech and Language Pathologist                Therapy Charges for Today     Code Description Service Date Service Provider Modifiers Qty    [id number] HC ST MOTION FLUORO EVAL SWALLOW 6 11/4/2022 Laila Bach MS CCC-SLP GN 1               Laila Bach MS CCC-NICOLETTE  11/4/2022

## 2022-11-04 NOTE — PLAN OF CARE
Goal Outcome Evaluation:  Plan of Care Reviewed With: patient        Progress: improving  Outcome Evaluation: No complaints overnight. Rested comfortably. VSS. Afib on tele. Strict I/O.    Diuretic in Use: IV lasix  Response to Diuretics (Output greater than intake): output > intput  Daily Weight (up or down): down  O2 Requirements: room air  Functional Status (Activity level, tolerance and respiratory symptoms): up with assist x 1 and walker  Discharge Plans: Home

## 2022-11-04 NOTE — PROGRESS NOTES
[facility] Clinical Pharmacy Services: Pharmacy Education - Heart Failure Medications    Edilberto Reeder was counseled over all medications related to their heart failure diagnosis.      Counseling points included the following:  Explained indication of each medication, and patient's need for these medications.  Went over dosing and frequency of each medication.  Discussed any special administration, storage, or monitoring instructions with medications.  Discussed all important drug interactions, including over-the-counter medications and supplements.  Explained possible side effects for each medication.  Instructed the patient not to begin or discontinue any medications without informing his/her physician/pharmacist.  Addressed any specific questions the patient had in regards to their medication.    Edilberto Reeder and family expressed understanding and had no further questions.    Sara Fowler, Pharm.D., [facility]   Clinical Pharmacist   Phone Extension #[phone]

## 2022-11-04 NOTE — DISCHARGE SUMMARY
Hospital Discharge    Patient Name: Edilberto Reeder  Age/Sex: 72 y.o. male  : 1950  MRN: [id number]    Encounter Provider: ESME Ceballos  Referring Provider: Cindy Gonzalez MD  Place of Service: [facility] CARDIOLOGY  Patient Care Team:  Harvey Larson MD as PCP - General  Harvey Larson MD as PCP - Family Medicine         Date of Discharge:  2022   Date of Admit: 2022    Discharge Condition: Stable  Discharge Diagnosis:    Shortness of breath    ASCVD (arteriosclerotic cardiovascular disease)    Type 2 diabetes mellitus with hyperglycemia, without long-term current use of insulin (HCC)    Atrial fibrillation, persistent (HCC)    Acute on chronic diastolic CHF (congestive heart failure) (HCC)      Hospital Course:   Edilberto Reeder is a 72 y.o. male who is followed by Dr. Fung for atrial fibrillation on apixaban. He had a mildly abnormal stress test in 2022 and medical therapy for presumed coronary artery disease was recommended. He is on ASA. He was admitted in May 2022 for anemia requiring transfusion and GIB. ASA and apixaban eventually restarted. He traveled in October and noticed worsening lower extrmeity edema. He was short of breath. He was treated by PCP for pneumonia. He was seen by Dr. Gonzalez in the office on  and was noted to be in decompensated HFpEF. He had weeping lower extremity edema all the way up to his scrotum. He was directly admitted for treatment of heart failure and received IV diuretic with excellent response. His renal function has remained stable. Elevated ddimer. Lower extremity doppler without DVT. He had an echocardiogram that showed EF ~46% (reviewed personally by Dr. Shields) very mild regional wall motion abnormality. We will treat him for heart failure and follow up as outpatient. His granddaughter is getting  tomorrow and he is requesting discharge. He will be started on oral diuretics. I stressed the  importance of continuing to take these despite his plans this weekend and he acknowledged the importance. He will follow up in our office early next with with ESME [place]. He will call sooner with problems. He had a speech eval that was abnormal. Outpatient speech therapy recommended which I will order.     Objective:  Temp:  [97.5 °F (36.4 °C)-98.5 °F (36.9 °C)] 97.9 °F (36.6 °C)  Heart Rate:  [61-77] 65  Resp:  [16-18] 16  BP: ()/(59-84) 115/80    Intake/Output Summary (Last 24 hours) at 11/4/2022 1116  Last data filed at 11/4/2022 1100  Gross per 24 hour   Intake 720 ml   Output 4000 ml   Net -3280 ml     Body mass index is 26.45 kg/m².      11/02/22  1202 11/03/22  0500 11/04/22  0635   Weight: 99.3 kg (219 lb) 98.6 kg (217 lb 6 oz) 88.5 kg (195 lb)     Weight change: -10.9 kg (-24 lb)    Physical Exam:  Constitutional: He is oriented to person, place, and time. He appears well-developed. He does not appear ill.   HENT:   Head: Normocephalic and atraumatic. Head is without contusion.   Right Ear: Hearing normal. No drainage.   Left Ear: Hearing normal. No drainage.   Nose: No nasal deformity. No epistaxis.   Eyes: Lids are normal. Right eye exhibits no exudate. Left eye exhibits no exudate.  Neck: No JVD present. Carotid bruit is not present. No tracheal deviation present. No thyroid mass and no thyromegaly present.   Cardiovascular: Normal rate, regular rhythm and normal heart sounds.    Pulses:       Posterior tibial pulses are 2+ on the right side, and 2+ on the left side.   Pulmonary/Chest: Effort normal and breath sounds normal.   Abdominal: Soft. Normal appearance and bowel sounds are normal. There is no tenderness.   Musculoskeletal: Normal range of motion.        Right shoulder: He exhibits no deformity.        Left shoulder: He exhibits no deformity.   Neurological: He is alert and oriented to person, place, and time. He has normal strength.   Skin: Skin is warm, dry and intact. No rash  noted. 1-2 edema lower extremities  Psychiatric: He has a normal mood and affect. His behavior is normal. Thought content normal.   Vitals reviewed.    Procedures Performed  Echo 11/2/22  Left ventricular wall thickness is consistent with mild concentric hypertrophy.  •  The following left ventricular wall segments are hypokinetic: basal inferoseptal, mid inferoseptal and basal inferoseptal.  •  Mildly reduced right ventricular systolic function noted.  •  Left atrial volume is mildly increased.  •  Estimated right ventricular systolic pressure from tricuspid regurgitation is mildly elevated (35-45 mmHg).  •  Mild pulmonary hypertension is present.  •  Mild dilation of the aortic root is present.         Consults:  Consults     No orders found from 10/3/2022 to 11/2/2022.          Pertinent Test Results:  Results from last 7 days   Lab Units 11/04/22  0356 11/03/22  2030 11/03/22  0318 11/02/22  0402 11/01/22  1116   SODIUM mmol/L 137  --  135* 139 138   POTASSIUM mmol/L 3.7 3.8 3.3* 3.7 4.1   CHLORIDE mmol/L 94*  --  95* 101 104   CO2 mmol/L 32.5*  --  32.8* 26.0 22.7   BUN mg/dL 22  --  25* 26* 28*   CREATININE mg/dL 0.89  --  1.09 1.07 1.05   GLUCOSE mg/dL 177*  --  257* 173* 134*   CALCIUM mg/dL 8.1*  --  8.2* 8.6 9.1   AST (SGOT) U/L  --   --   --   --  13   ALT (SGPT) U/L  --   --   --   --  5     Results from last 7 days   Lab Units 11/01/22  1116   TROPONIN T ng/mL 0.021     Results from last 7 days   Lab Units 11/02/22  0911 11/01/22  1116   WBC 10*3/mm3 5.22 6.22   HEMOGLOBIN g/dL 8.8* 9.2*   HEMATOCRIT % 28.5* 29.6*   PLATELETS 10*3/mm3 204 205                   Invalid input(s): LDLCALC  Results from last 7 days   Lab Units 11/01/22  1116   PROBNP pg/mL 2,693.0*     Results from last 7 days   Lab Units 11/01/22  1116   TSH uIU/mL 7.960*       Discharge Medications     Discharge Medications      New Medications      Instructions Start Date   furosemide 40 MG tablet  Commonly known as: LASIX   60 mg, Oral,  Daily      rosuvastatin 20 MG tablet  Commonly known as: CRESTOR   20 mg, Oral, Nightly         Changes to Medications      Instructions Start Date   potassium chloride 10 MEQ CR tablet  What changed: when to take this   10 mEq, Oral, Daily         Continue These Medications      Instructions Start Date   acetaminophen 325 MG tablet  Commonly known as: TYLENOL   650 mg, Oral, Every 6 Hours PRN      allopurinol 100 MG tablet  Commonly known as: ZYLOPRIM   100 mg, Oral, Daily      Alogliptin Benzoate 12.5 MG tablet   Oral, Daily, Half tablet daily      apixaban 5 MG tablet tablet  Commonly known as: ELIQUIS   5 mg, Oral, 2 Times Daily      aspirin 81 MG chewable tablet   81 mg, Oral, Every Other Day      Calcium Carbonate 1500 (600 Ca) MG tablet   650 mg, Oral, Daily      Carbidopa-Levodopa ER 36. MG capsule controlled-release   Oral, 4 Times Daily, 4 tablets 4 times daily      citalopram 20 MG tablet  Commonly known as: CeleXA   20 mg, Oral, Daily      docusate sodium 50 MG capsule  Commonly known as: COLACE   Oral, Nightly      donepezil 5 MG tablet  Commonly known as: ARICEPT   10 mg, Oral, Nightly      isosorbide mononitrate 30 MG 24 hr tablet  Commonly known as: IMDUR   TAKE ONE TABLET BY MOUTH DAILY      levothyroxine 25 MCG tablet  Commonly known as: SYNTHROID, LEVOTHROID   25 mcg, Oral, Daily      metFORMIN 500 MG tablet  Commonly known as: GLUCOPHAGE   500 mg, Oral, 3 Times Daily      metoprolol tartrate 25 MG tablet  Commonly known as: LOPRESSOR   12.5 mg, Oral, 2 Times Daily      Polyvinyl Alcohol-Povidone PF 1.4-0.6 % ophthalmic solution  Commonly known as: HYPOTEARS   1-2 drops, As Needed      traZODone 50 MG tablet  Commonly known as: DESYREL   25 mg, Oral, Nightly      vitamin B-12 1000 MCG tablet  Commonly known as: CYANOCOBALAMIN   500 mcg, Oral, Daily             Discharge Diet:    Dietary Orders (From admission, onward)     Start     Ordered    11/01/22 1843  Diet Regular; Cardiac, Consistent  Carbohydrate  Diet Effective Now        Question Answer Comment   Diet Texture / Consistency Regular    Common Modifiers Cardiac    Common Modifiers Consistent Carbohydrate        11/01/22 1844                Activity at Discharge:   as tolerated    Discharge disposition: home     Discharge Instructions and Follow ups:  Future Appointments   Date Time Provider Department Center   11/17/2022  2:00 PM Lissa Zepeda APRN [facility]   2/17/2023  2:00 PM Jimbo Fung MD [facility]      Follow-up Information     Harvey Larson MD .    Specialty: Family Medicine  Contact information:  [address] [facility] [address] [phone]                         Test Results Pending at Discharge: none     ESME Ceballos  11/04/22  11:16 EDT    Time: Discharge 25 min

## 2022-11-04 NOTE — PLAN OF CARE
Goal Outcome Evaluation:  Plan of Care Reviewed With: patient           Outcome Evaluation: VFSS completed. Pt with instances of non transient penetration with nectar and honey. Silent aspiration with large drinks thins via cup. No aspiration observed with small sips thins via cup x3. Pt pleasantly refused thickened liquids. SLP recs regular and thins, small sips, meds whole with puree. Small bites/sips. Pt to spend increased time spend with mastication. SLP recs dysphagia and voice treatment. Pt would be a great candidate for EMST. Continue outpatient speech therapy, MD please order if in agreement.       Patient was not wearing a face mask during this therapy encounter. Therapist used appropriate personal protective equipment including mask, eye protection and gloves.  Mask used was standard procedure mask. Appropriate PPE was worn during the entire therapy session. Hand hygiene was completed before and after therapy session. Patient is not in enhanced droplet precautions.

## 2022-11-05 ENCOUNTER — READMISSION MANAGEMENT (OUTPATIENT)
Dept: CALL CENTER | Facility: HOSPITAL | Age: 72
End: 2022-11-05

## 2022-11-05 NOTE — OUTREACH NOTE
Prep Survey    Flowsheet Row Responses   [facility] facility patient discharged from? [facility]   Is LACE score < 7 ? No   Emergency Room discharge w/ pulse ox? No   Eligibility Readm Mgmt   Discharge diagnosis Acute on chronic diastolic CHF    Does the patient have one of the following disease processes/diagnoses(primary or secondary)? CHF   Does the patient have Home health ordered? No   Is there a DME ordered? No   Prep survey completed? Yes          GERALD TITUS - Registered Nurse

## 2022-11-16 ENCOUNTER — READMISSION MANAGEMENT (OUTPATIENT)
Dept: CALL CENTER | Facility: HOSPITAL | Age: 72
End: 2022-11-16

## 2022-11-16 NOTE — OUTREACH NOTE
CHF Week 2 Survey    Flowsheet Row Responses   [facility] patient discharged from? [facility]   Does the patient have one of the following disease processes/diagnoses(primary or secondary)? CHF   Week 2 attempt successful? Yes   Call start time 1541   Call end time 1547   Discharge diagnosis Acute on chronic diastolic CHF    Person spoke with today (if not patient) and relationship wife   Meds reviewed with patient/caregiver? Yes   Is the patient having any side effects they believe may be caused by any medication additions or changes? No   Does the patient have all medications ordered at discharge? Yes   Is the patient taking all medications as directed (includes completed medication regime)? Yes   Has the patient kept scheduled appointments due by today? N/A   Pulse Ox monitoring None   Psychosocial issues? No   Comments SOA is back to baseline, LE edema scrotal to feet improving, wearing support stockings, no longer weeping.   What is the patient's perception of their health status since discharge? Improving   Nursing interventions Nurse provided patient education   Is the patient able to teach back signs and symptoms of worsening condition? (i.e. weight gain, shortness of air, etc.) Yes   Is the patient able to teach back Heart Failure Zones? No   CHF Nursing Interventions Education provided on various zones   CHF Zone this Call Green Zone   Green Zone Patient reports doing well, No new or worsening shortness of breath, Physical activity level is normal for you   Green Zone Interventions Meds as directed, Low sodium diet, Follow up visits planned   CHF Week 2 call completed? Yes   Is the patient interested in additional calls from an ambulatory ?  NOTE:  applies to high risk patients requiring additional follow-up. No   Would this patient benefit from a Referral to Amb Social Work? No   Call end time 1547          CHLOE ANTUNEZ - Registered Nurse

## 2022-11-17 ENCOUNTER — OFFICE VISIT (OUTPATIENT)
Dept: CARDIOLOGY | Facility: CLINIC | Age: 72
End: 2022-11-17

## 2022-11-17 VITALS
HEIGHT: 72 IN | DIASTOLIC BLOOD PRESSURE: 74 MMHG | BODY MASS INDEX: 26.45 KG/M2 | OXYGEN SATURATION: 97 % | HEART RATE: 75 BPM | SYSTOLIC BLOOD PRESSURE: 146 MMHG

## 2022-11-17 DIAGNOSIS — I10 ESSENTIAL HYPERTENSION: Chronic | ICD-10-CM

## 2022-11-17 DIAGNOSIS — E11.65 TYPE 2 DIABETES MELLITUS WITH HYPERGLYCEMIA, WITHOUT LONG-TERM CURRENT USE OF INSULIN: ICD-10-CM

## 2022-11-17 DIAGNOSIS — I50.23 ACUTE ON CHRONIC HFREF (HEART FAILURE WITH REDUCED EJECTION FRACTION): Primary | ICD-10-CM

## 2022-11-17 DIAGNOSIS — I48.19 ATRIAL FIBRILLATION, PERSISTENT: ICD-10-CM

## 2022-11-17 PROCEDURE — 99214 OFFICE O/P EST MOD 30 MIN: CPT | Performed by: NURSE PRACTITIONER

## 2022-11-17 RX ORDER — SPIRONOLACTONE 25 MG/1
25 TABLET ORAL DAILY
Qty: 30 TABLET | Refills: 11 | Status: SHIPPED | OUTPATIENT
Start: 2022-11-17

## 2022-11-17 NOTE — PROGRESS NOTES
CARDIOLOGY        Patient Name: Edilberto Reeder  :1950  Age: 72 y.o.  Primary Cardiologist: Jimbo Fung MD  Encounter Provider:  ESME Kim    Date of Service: 22            CHIEF COMPLAINT / REASON FOR OFFICE VISIT     Atrial Fibrillation (BHE 2 wk f/u)      HISTORY OF PRESENT ILLNESS       HPI  Edilberto Reeder is a 72 y.o. male who presents today for 2-week hospital follow-up.     Pt has a  history significant for permanent atrial fibrillation, chronic diastolic heart failure, hypertension, type 2 diabetes.    Review of medical records reveals patient hospitalized with discharge date 2022.  Patient presented with lower extremity edema and shortness of breath.  He was treated by PCP for pneumonia.  Patient was evaluated in clinic on 2022 and was noted to be in decompensated HFrEF with weeping lower extremity edema all the way up to his scrotum.  Patient was directly admitted for treatment of heart failure and IV diuretics.  Renal function remained stable.  Lower extremity Doppler negative for DVT.  Echocardiogram revealed LVEF approximately 46% with very mild regional motion abnormalities.  Patient was transitioned to oral diuretics.    Patient presents today with his wife.  Reports that he is still having signs of volume overload that are mainly in the lower extremities bilateral.  Patient is using compression hose.  He is taking oral furosemide as scheduled.  Base weight 205-208 pounds.  He feels that his weight has been stable at home.  Patient does have shortness of breath with very minimal exertion, he is able to complete activities of daily living but gets short of breath with any exertion beyond that.  He reports occasional episodes of lightheadedness and reports extreme generalized fatigue.  Denies orthopnea    The following portions of the patient's history were reviewed and updated as appropriate: allergies, current medications, past family history, past  "medical history, past social history, past surgical history and problem list.      VITAL SIGNS     Visit Vitals  /74 (BP Location: Left arm, Patient Position: Sitting, Cuff Size: Adult)   Pulse 75   Ht 182.9 cm (72\")   SpO2 97%   BMI 26.45 kg/m²         Wt Readings from Last 3 Encounters:   11/04/22 88.5 kg (195 lb)   11/01/22 99.8 kg (220 lb)   05/31/22 86.2 kg (190 lb)     Body mass index is 26.45 kg/m².      REVIEW OF SYSTEMS   Review of Systems   Constitutional: Positive for malaise/fatigue. Negative for chills, fever, weight gain and weight loss.   Cardiovascular: Positive for dyspnea on exertion and leg swelling. Negative for chest pain.   Respiratory: Negative for cough, shortness of breath, snoring and wheezing.    Hematologic/Lymphatic: Negative for bleeding problem. Does not bruise/bleed easily.   Skin: Negative for color change.   Musculoskeletal: Negative for falls, joint pain and myalgias.   Gastrointestinal: Negative for melena.   Genitourinary: Negative for hematuria.   Neurological: Negative for excessive daytime sleepiness and light-headedness.   Psychiatric/Behavioral: Negative for depression. The patient is not nervous/anxious.    All other systems reviewed and are negative.          PHYSICAL EXAMINATION     Constitutional:       Appearance: Normal appearance. Well-developed.   Eyes:      Conjunctiva/sclera: Conjunctivae normal.   Neck:      Vascular: No carotid bruit.   Pulmonary:      Effort: Pulmonary effort is normal.      Breath sounds: Normal breath sounds.   Cardiovascular:      Normal rate. Regular rhythm. Normal S1. Normal S2.      Murmurs: There is no murmur.      No gallop. No click. No rub.   Edema:     Pretibial: bilateral 2+ pitting edema of the pretibial area.     Ankle: bilateral 2+ pitting edema of the ankle.     Feet: bilateral 2+ pitting edema of the feet.  Musculoskeletal: Normal range of motion. Skin:     General: Skin is warm and dry.   Neurological:      Mental Status: " Alert and oriented to person, place, and time.      GCS: GCS eye subscore is 4. GCS verbal subscore is 5. GCS motor subscore is 6.   Psychiatric:         Speech: Speech normal.         Behavior: Behavior normal.         Thought Content: Thought content normal.         Judgment: Judgment normal.           REVIEWED DATA     Procedures    Cardiac Procedures:  1. Echocardiogram 1/5/2018.  EF 66%.  Normal LV cavity size and wall thickness noted.  All LV wall segments contract normally.  LV diastolic function is normal.  No valvular stenosis or insufficiency noted.  2. ZIO monitor 5/4/2018.  16 episodes of irregular atrial tachycardia longest lasting 24 seconds.  3. Echocardiogram 12/9/2018.  Saline bubble study negative for PFO.  EF 73%.  No evidence of pericardial effusion.  4. Echocardiogram 11/2/2022.  Mildly reduced RV systolic function.  Hypokinesis of the inferior septal wall.  Mild pulmonary hypertension.  Mild LVH.  LVEF approximately 43%.    BUN   Date Value Ref Range Status   11/04/2022 22 8 - 23 mg/dL Final     Creatinine   Date Value Ref Range Status   11/04/2022 0.89 0.76 - 1.27 mg/dL Final     Potassium   Date Value Ref Range Status   11/04/2022 3.7 3.5 - 5.2 mmol/L Final     ALT (SGPT)   Date Value Ref Range Status   11/01/2022 5 1 - 41 U/L Final     AST (SGOT)   Date Value Ref Range Status   11/01/2022 13 1 - 40 U/L Final           ASSESSMENT & PLAN     Diagnoses and all orders for this visit:    1. Acute on chronic HFrEF (heart failure with reduced ejection fraction) (HCC) (Primary)  · Patient with recent hospitalization for volume overload  · Patient continues to have lower extremity edema and symptoms consistent with New York Heart Association class III  · Continue metoprolol tartrate 0.5 mg twice daily  · Continue furosemide 60 mg/day  · Discontinue potassium supplement to add spironolactone 25 mg/day  · Patient will need repeat BMP which will be arranged at his scheduled follow-up with PCP on  12/1    2. Atrial fibrillation, persistent (HCC)  · Heart rate currently controlled  · Continue metoprolol tartrate 12.5 mg twice daily  · Patient does complain of heart palpitations intermittently  · Anticoagulated with apixaban    3. Essential hypertension  · Blood pressure slightly elevated in clinic at 146/74  · Continue metoprolol tartrate, furosemide.  Add spironolactone as per #1    4. Type 2 diabetes mellitus with hyperglycemia, without long-term current use of insulin (HCC)    Other orders  -     spironolactone (ALDACTONE) 25 MG tablet; Take 1 tablet by mouth Daily.  Dispense: 30 tablet; Refill: 11          No follow-ups on file.    Future Appointments       Provider Department Center    2/17/2023 2:00 PM Jimbo Fung MD [facility] CARDIOLOGY [facility]                MEDICATIONS         Discharge Medications          Accurate as of November 17, 2022  3:30 PM. If you have any questions, ask your nurse or doctor.            New Medications      Instructions Start Date   spironolactone 25 MG tablet  Commonly known as: ALDACTONE  Started by: ESME Kim   25 mg, Oral, Daily         Continue These Medications      Instructions Start Date   acetaminophen 325 MG tablet  Commonly known as: TYLENOL   650 mg, Oral, Every 6 Hours PRN      allopurinol 100 MG tablet  Commonly known as: ZYLOPRIM   100 mg, Oral, Daily      Alogliptin Benzoate 12.5 MG tablet   Oral, Daily, Half tablet daily      apixaban 5 MG tablet tablet  Commonly known as: ELIQUIS   5 mg, Oral, 2 Times Daily      aspirin 81 MG chewable tablet   81 mg, Oral, Every Other Day      Calcium Carbonate 1500 (600 Ca) MG tablet   650 mg, Oral, Daily      Carbidopa-Levodopa ER 36. MG capsule controlled-release   Oral, 4 Times Daily, 4 tablets 4 times daily      citalopram 20 MG tablet  Commonly known as: CeleXA   20 mg, Oral, Daily      docusate sodium 50 MG capsule  Commonly known as: COLACE   Oral, Nightly      donepezil 5 MG  tablet  Commonly known as: ARICEPT   10 mg, Oral, Nightly      furosemide 40 MG tablet  Commonly known as: LASIX   60 mg, Oral, Daily      isosorbide mononitrate 30 MG 24 hr tablet  Commonly known as: IMDUR   TAKE ONE TABLET BY MOUTH DAILY      levothyroxine 25 MCG tablet  Commonly known as: SYNTHROID, LEVOTHROID   25 mcg, Oral, Daily      metFORMIN 500 MG tablet  Commonly known as: GLUCOPHAGE   500 mg, Oral, 3 Times Daily      metoprolol tartrate 25 MG tablet  Commonly known as: LOPRESSOR   12.5 mg, Oral, 2 Times Daily      Polyvinyl Alcohol-Povidone PF 1.4-0.6 % ophthalmic solution  Commonly known as: HYPOTEARS   1-2 drops, As Needed      rosuvastatin 20 MG tablet  Commonly known as: CRESTOR   20 mg, Oral, Nightly      traZODone 50 MG tablet  Commonly known as: DESYREL   25 mg, Oral, Nightly      vitamin B-12 1000 MCG tablet  Commonly known as: CYANOCOBALAMIN   500 mcg, Oral, Daily         Stop These Medications    potassium chloride 10 MEQ CR tablet  Stopped by: ESME Kim                **Dragon Disclaimer:   Much of this encounter note is an electronic transcription/translation of spoken language to printed text. The electronic translation of spoken language may permit erroneous, or at times, nonsensical words or phrases to be inadvertently transcribed. Although I have reviewed the note for such errors, some may still exist.

## 2022-12-01 ENCOUNTER — TRANSCRIBE ORDERS (OUTPATIENT)
Dept: ADMINISTRATIVE | Facility: HOSPITAL | Age: 72
End: 2022-12-01

## 2022-12-01 DIAGNOSIS — R05.9 COUGH, UNSPECIFIED TYPE: Primary | ICD-10-CM

## 2022-12-01 DIAGNOSIS — J18.9 PNEUMONIA DUE TO INFECTIOUS ORGANISM, UNSPECIFIED LATERALITY, UNSPECIFIED PART OF LUNG: ICD-10-CM

## 2022-12-20 ENCOUNTER — HOSPITAL ENCOUNTER (OUTPATIENT)
Dept: CT IMAGING | Facility: HOSPITAL | Age: 72
End: 2022-12-20

## 2022-12-22 ENCOUNTER — HOSPITAL ENCOUNTER (OUTPATIENT)
Dept: CT IMAGING | Facility: HOSPITAL | Age: 72
Discharge: HOME | End: 2022-12-22
Admitting: FAMILY MEDICINE

## 2022-12-22 DIAGNOSIS — J18.9 PNEUMONIA DUE TO INFECTIOUS ORGANISM, UNSPECIFIED LATERALITY, UNSPECIFIED PART OF LUNG: ICD-10-CM

## 2022-12-22 DIAGNOSIS — R05.9 COUGH, UNSPECIFIED TYPE: ICD-10-CM

## 2022-12-22 PROCEDURE — 82565 ASSAY OF CREATININE: CPT

## 2022-12-22 PROCEDURE — 71260 CT THORAX DX C+: CPT

## 2022-12-22 PROCEDURE — 25010000002 IOPAMIDOL 61 % SOLUTION: Performed by: FAMILY MEDICINE

## 2022-12-22 RX ADMIN — IOPAMIDOL 100 ML: 612 INJECTION, SOLUTION INTRAVENOUS at 15:12

## 2022-12-26 LAB — CREAT BLDA-MCNC: 1.3 MG/DL (ref 0.6–1.3)

## 2023-01-01 ENCOUNTER — APPOINTMENT (OUTPATIENT)
Dept: CT IMAGING | Facility: HOSPITAL | Age: 73
End: 2023-01-01
Payer: MEDICARE

## 2023-01-01 ENCOUNTER — APPOINTMENT (OUTPATIENT)
Dept: GENERAL RADIOLOGY | Facility: HOSPITAL | Age: 73
End: 2023-01-01
Payer: MEDICARE

## 2023-01-01 ENCOUNTER — APPOINTMENT (OUTPATIENT)
Dept: CARDIOLOGY | Facility: HOSPITAL | Age: 73
End: 2023-01-01
Payer: MEDICARE

## 2023-01-01 ENCOUNTER — TELEPHONE (OUTPATIENT)
Dept: CARDIOLOGY | Facility: CLINIC | Age: 73
End: 2023-01-01

## 2023-01-01 ENCOUNTER — HOSPITAL ENCOUNTER (INPATIENT)
Facility: HOSPITAL | Age: 73
LOS: 2 days | End: 2023-11-07
Attending: EMERGENCY MEDICINE | Admitting: INTERNAL MEDICINE
Payer: MEDICARE

## 2023-01-01 VITALS
HEART RATE: 105 BPM | TEMPERATURE: 97.8 F | HEIGHT: 72 IN | RESPIRATION RATE: 16 BRPM | OXYGEN SATURATION: 93 % | DIASTOLIC BLOOD PRESSURE: 79 MMHG | WEIGHT: 218.03 LBS | SYSTOLIC BLOOD PRESSURE: 102 MMHG | BODY MASS INDEX: 29.53 KG/M2

## 2023-01-01 DIAGNOSIS — N17.9 AKI (ACUTE KIDNEY INJURY): ICD-10-CM

## 2023-01-01 DIAGNOSIS — I95.9 HYPOTENSION, UNSPECIFIED HYPOTENSION TYPE: Primary | ICD-10-CM

## 2023-01-01 DIAGNOSIS — R41.82 ALTERED MENTAL STATUS, UNSPECIFIED ALTERED MENTAL STATUS TYPE: ICD-10-CM

## 2023-01-01 LAB
ALBUMIN SERPL ELPH-MCNC: 2.8 G/DL (ref 2.9–4.4)
ALBUMIN SERPL-MCNC: 2.4 G/DL (ref 3.5–5.2)
ALBUMIN SERPL-MCNC: 2.8 G/DL (ref 3.5–5.2)
ALBUMIN SERPL-MCNC: 3.3 G/DL (ref 3.5–5.2)
ALBUMIN SERPL-MCNC: 3.4 G/DL (ref 3.5–5.2)
ALBUMIN/GLOB SERPL: 1.1 G/DL
ALBUMIN/GLOB SERPL: 1.1 {RATIO} (ref 0.7–1.7)
ALBUMIN/GLOB SERPL: 1.3 G/DL
ALP SERPL-CCNC: 171 U/L (ref 39–117)
ALP SERPL-CCNC: 276 U/L (ref 39–117)
ALPHA1 GLOB SERPL ELPH-MCNC: 0.4 G/DL (ref 0–0.4)
ALPHA2 GLOB SERPL ELPH-MCNC: 0.5 G/DL (ref 0.4–1)
ALT SERPL W P-5'-P-CCNC: 5 U/L (ref 1–41)
ALT SERPL W P-5'-P-CCNC: 5 U/L (ref 1–41)
ANION GAP SERPL CALCULATED.3IONS-SCNC: 10.2 MMOL/L (ref 5–15)
ANION GAP SERPL CALCULATED.3IONS-SCNC: 11.9 MMOL/L (ref 5–15)
ANION GAP SERPL CALCULATED.3IONS-SCNC: 13 MMOL/L (ref 5–15)
ANION GAP SERPL CALCULATED.3IONS-SCNC: 13.3 MMOL/L (ref 5–15)
AORTIC ARCH: 2.6 CM
AORTIC DIMENSIONLESS INDEX: 0.6 (DI)
APTT PPP: 78.7 SECONDS (ref 22.7–35.4)
APTT PPP: 82.2 SECONDS (ref 22.7–35.4)
ASCENDING AORTA: 3.6 CM
AST SERPL-CCNC: 33 U/L (ref 1–40)
AST SERPL-CCNC: 44 U/L (ref 1–40)
B-GLOBULIN SERPL ELPH-MCNC: 0.8 G/DL (ref 0.7–1.3)
BACTERIA SPEC AEROBE CULT: NO GROWTH
BACTERIA UR QL AUTO: ABNORMAL /HPF
BASOPHILS # BLD AUTO: 0.04 10*3/MM3 (ref 0–0.2)
BASOPHILS # BLD AUTO: 0.05 10*3/MM3 (ref 0–0.2)
BASOPHILS NFR BLD AUTO: 0.8 % (ref 0–1.5)
BASOPHILS NFR BLD AUTO: 1.1 % (ref 0–1.5)
BH CV ECHO MEAS - AO MAX PG: 3.4 MMHG
BH CV ECHO MEAS - AO MEAN PG: 2 MMHG
BH CV ECHO MEAS - AO V2 MAX: 92.3 CM/SEC
BH CV ECHO MEAS - AO V2 VTI: 22.4 CM
BH CV ECHO MEAS - AVA(I,D): 2.01 CM2
BH CV ECHO MEAS - EDV(CUBED): 64 ML
BH CV ECHO MEAS - EDV(MOD-SP2): 152 ML
BH CV ECHO MEAS - EDV(MOD-SP4): 110 ML
BH CV ECHO MEAS - EF(MOD-BP): 64.8 %
BH CV ECHO MEAS - EF(MOD-SP2): 67.1 %
BH CV ECHO MEAS - EF(MOD-SP4): 61.8 %
BH CV ECHO MEAS - ESV(CUBED): 8.7 ML
BH CV ECHO MEAS - ESV(MOD-SP2): 50 ML
BH CV ECHO MEAS - ESV(MOD-SP4): 42 ML
BH CV ECHO MEAS - FS: 48.6 %
BH CV ECHO MEAS - IVS/LVPW: 1 CM
BH CV ECHO MEAS - IVSD: 1.3 CM
BH CV ECHO MEAS - LAT PEAK E' VEL: 6 CM/SEC
BH CV ECHO MEAS - LV MASS(C)D: 186.5 GRAMS
BH CV ECHO MEAS - LV MAX PG: 1.56 MMHG
BH CV ECHO MEAS - LV MEAN PG: 1 MMHG
BH CV ECHO MEAS - LV V1 MAX: 62.5 CM/SEC
BH CV ECHO MEAS - LV V1 VTI: 14.5 CM
BH CV ECHO MEAS - LVIDD: 4 CM
BH CV ECHO MEAS - LVIDS: 2.06 CM
BH CV ECHO MEAS - LVOT AREA: 3.1 CM2
BH CV ECHO MEAS - LVOT DIAM: 1.99 CM
BH CV ECHO MEAS - LVPWD: 1.3 CM
BH CV ECHO MEAS - MED PEAK E' VEL: 5.2 CM/SEC
BH CV ECHO MEAS - MR MAX PG: 44.6 MMHG
BH CV ECHO MEAS - MR MAX VEL: 333.9 CM/SEC
BH CV ECHO MEAS - MV DEC SLOPE: 219 CM/SEC2
BH CV ECHO MEAS - MV DEC TIME: 367 SEC
BH CV ECHO MEAS - MV E MAX VEL: 110.1 CM/SEC
BH CV ECHO MEAS - MV MAX PG: 4.1 MMHG
BH CV ECHO MEAS - MV MEAN PG: 1.65 MMHG
BH CV ECHO MEAS - MV P1/2T: 134.3 MSEC
BH CV ECHO MEAS - MV V2 VTI: 36.3 CM
BH CV ECHO MEAS - MVA(P1/2T): 1.64 CM2
BH CV ECHO MEAS - MVA(VTI): 1.24 CM2
BH CV ECHO MEAS - PA ACC TIME: 0.13 SEC
BH CV ECHO MEAS - PA V2 MAX: 58.4 CM/SEC
BH CV ECHO MEAS - PULM DIAS VEL: 36.9 CM/SEC
BH CV ECHO MEAS - PULM S/D: 1.03
BH CV ECHO MEAS - PULM SYS VEL: 37.9 CM/SEC
BH CV ECHO MEAS - QP/QS: 0.76
BH CV ECHO MEAS - RAP SYSTOLE: 15 MMHG
BH CV ECHO MEAS - RV MAX PG: 1.28 MMHG
BH CV ECHO MEAS - RV V1 MAX: 56.6 CM/SEC
BH CV ECHO MEAS - RV V1 VTI: 13.9 CM
BH CV ECHO MEAS - RVOT DIAM: 1.77 CM
BH CV ECHO MEAS - RVSP: 40.2 MMHG
BH CV ECHO MEAS - SUP REN AO DIAM: 1.5 CM
BH CV ECHO MEAS - SV(LVOT): 44.9 ML
BH CV ECHO MEAS - SV(MOD-SP2): 102 ML
BH CV ECHO MEAS - SV(MOD-SP4): 68 ML
BH CV ECHO MEAS - SV(RVOT): 34.2 ML
BH CV ECHO MEAS - TAPSE (>1.6): 1.31 CM
BH CV ECHO MEAS - TR MAX PG: 25.2 MMHG
BH CV ECHO MEAS - TR MAX VEL: 251.1 CM/SEC
BH CV ECHO MEASUREMENTS AVERAGE E/E' RATIO: 19.66
BH CV XLRA - RV BASE: 4 CM
BH CV XLRA - RV LENGTH: 7.4 CM
BH CV XLRA - RV MID: 3.7 CM
BH CV XLRA - TDI S': 5 CM/SEC
BILIRUB SERPL-MCNC: 1.8 MG/DL (ref 0–1.2)
BILIRUB SERPL-MCNC: 2.1 MG/DL (ref 0–1.2)
BILIRUB UR QL STRIP: NEGATIVE
BUN SERPL-MCNC: 31 MG/DL (ref 8–23)
BUN SERPL-MCNC: 35 MG/DL (ref 8–23)
BUN SERPL-MCNC: 35 MG/DL (ref 8–23)
BUN SERPL-MCNC: 39 MG/DL (ref 8–23)
BUN/CREAT SERPL: 10.2 (ref 7–25)
BUN/CREAT SERPL: 11.2 (ref 7–25)
BUN/CREAT SERPL: 9.3 (ref 7–25)
BUN/CREAT SERPL: 9.5 (ref 7–25)
CALCIUM SPEC-SCNC: 6.5 MG/DL (ref 8.6–10.5)
CALCIUM SPEC-SCNC: 8.2 MG/DL (ref 8.6–10.5)
CALCIUM SPEC-SCNC: 8.8 MG/DL (ref 8.6–10.5)
CALCIUM SPEC-SCNC: 9 MG/DL (ref 8.6–10.5)
CHLORIDE SERPL-SCNC: 101 MMOL/L (ref 98–107)
CHLORIDE SERPL-SCNC: 109 MMOL/L (ref 98–107)
CHLORIDE SERPL-SCNC: 96 MMOL/L (ref 98–107)
CHLORIDE SERPL-SCNC: 98 MMOL/L (ref 98–107)
CLARITY UR: ABNORMAL
CO2 SERPL-SCNC: 16.8 MMOL/L (ref 22–29)
CO2 SERPL-SCNC: 19.7 MMOL/L (ref 22–29)
CO2 SERPL-SCNC: 20.1 MMOL/L (ref 22–29)
CO2 SERPL-SCNC: 23 MMOL/L (ref 22–29)
COLOR UR: ABNORMAL
CREAT SERPL-MCNC: 3.05 MG/DL (ref 0.76–1.27)
CREAT SERPL-MCNC: 3.12 MG/DL (ref 0.76–1.27)
CREAT SERPL-MCNC: 3.67 MG/DL (ref 0.76–1.27)
CREAT SERPL-MCNC: 4.19 MG/DL (ref 0.76–1.27)
D-LACTATE SERPL-SCNC: 1.6 MMOL/L (ref 0.5–2)
D-LACTATE SERPL-SCNC: 2.2 MMOL/L (ref 0.5–2)
DEPRECATED RDW RBC AUTO: 49.1 FL (ref 37–54)
DEPRECATED RDW RBC AUTO: 50.9 FL (ref 37–54)
DEPRECATED RDW RBC AUTO: 51.3 FL (ref 37–54)
DEPRECATED RDW RBC AUTO: 51.8 FL (ref 37–54)
DEPRECATED RDW RBC AUTO: 52.9 FL (ref 37–54)
EGFRCR SERPLBLD CKD-EPI 2021: 14.1 ML/MIN/1.73
EGFRCR SERPLBLD CKD-EPI 2021: 16.5 ML/MIN/1.73
EGFRCR SERPLBLD CKD-EPI 2021: 20 ML/MIN/1.73
EGFRCR SERPLBLD CKD-EPI 2021: 20.6 ML/MIN/1.73
EOSINOPHIL # BLD AUTO: 0.08 10*3/MM3 (ref 0–0.4)
EOSINOPHIL # BLD AUTO: 0.16 10*3/MM3 (ref 0–0.4)
EOSINOPHIL NFR BLD AUTO: 1.7 % (ref 0.3–6.2)
EOSINOPHIL NFR BLD AUTO: 3.5 % (ref 0.3–6.2)
ERYTHROCYTE [DISTWIDTH] IN BLOOD BY AUTOMATED COUNT: 18.1 % (ref 12.3–15.4)
ERYTHROCYTE [DISTWIDTH] IN BLOOD BY AUTOMATED COUNT: 18.4 % (ref 12.3–15.4)
ERYTHROCYTE [DISTWIDTH] IN BLOOD BY AUTOMATED COUNT: 18.5 % (ref 12.3–15.4)
ERYTHROCYTE [DISTWIDTH] IN BLOOD BY AUTOMATED COUNT: 18.6 % (ref 12.3–15.4)
ERYTHROCYTE [DISTWIDTH] IN BLOOD BY AUTOMATED COUNT: 18.7 % (ref 12.3–15.4)
GAMMA GLOB SERPL ELPH-MCNC: 1.1 G/DL (ref 0.4–1.8)
GEN 5 2HR TROPONIN T REFLEX: 119 NG/L
GLOBULIN SER-MCNC: 2.8 G/DL (ref 2.2–3.9)
GLOBULIN UR ELPH-MCNC: 1.9 GM/DL
GLOBULIN UR ELPH-MCNC: 3 GM/DL
GLUCOSE BLDC GLUCOMTR-MCNC: 108 MG/DL (ref 70–130)
GLUCOSE BLDC GLUCOMTR-MCNC: 143 MG/DL (ref 70–130)
GLUCOSE BLDC GLUCOMTR-MCNC: 145 MG/DL (ref 70–130)
GLUCOSE BLDC GLUCOMTR-MCNC: 147 MG/DL (ref 70–130)
GLUCOSE BLDC GLUCOMTR-MCNC: 164 MG/DL (ref 70–130)
GLUCOSE BLDC GLUCOMTR-MCNC: 198 MG/DL (ref 70–130)
GLUCOSE BLDC GLUCOMTR-MCNC: 33 MG/DL (ref 70–130)
GLUCOSE BLDC GLUCOMTR-MCNC: 49 MG/DL (ref 70–130)
GLUCOSE BLDC GLUCOMTR-MCNC: 55 MG/DL (ref 70–130)
GLUCOSE BLDC GLUCOMTR-MCNC: 56 MG/DL (ref 70–130)
GLUCOSE BLDC GLUCOMTR-MCNC: 57 MG/DL (ref 70–130)
GLUCOSE BLDC GLUCOMTR-MCNC: 67 MG/DL (ref 70–130)
GLUCOSE BLDC GLUCOMTR-MCNC: 70 MG/DL (ref 70–130)
GLUCOSE BLDC GLUCOMTR-MCNC: 77 MG/DL (ref 70–130)
GLUCOSE BLDC GLUCOMTR-MCNC: 80 MG/DL (ref 70–130)
GLUCOSE BLDC GLUCOMTR-MCNC: 84 MG/DL (ref 70–130)
GLUCOSE BLDC GLUCOMTR-MCNC: 88 MG/DL (ref 70–130)
GLUCOSE BLDC GLUCOMTR-MCNC: 91 MG/DL (ref 70–130)
GLUCOSE BLDC GLUCOMTR-MCNC: 93 MG/DL (ref 70–130)
GLUCOSE BLDC GLUCOMTR-MCNC: 93 MG/DL (ref 70–130)
GLUCOSE SERPL-MCNC: 108 MG/DL (ref 65–99)
GLUCOSE SERPL-MCNC: 53 MG/DL (ref 65–99)
GLUCOSE SERPL-MCNC: 71 MG/DL (ref 65–99)
GLUCOSE SERPL-MCNC: 86 MG/DL (ref 65–99)
GLUCOSE UR STRIP-MCNC: NEGATIVE MG/DL
HCT VFR BLD AUTO: 26.9 % (ref 37.5–51)
HCT VFR BLD AUTO: 27.2 % (ref 37.5–51)
HCT VFR BLD AUTO: 27.9 % (ref 37.5–51)
HCT VFR BLD AUTO: 28.5 % (ref 37.5–51)
HCT VFR BLD AUTO: 31.1 % (ref 37.5–51)
HGB BLD-MCNC: 8.3 G/DL (ref 13–17.7)
HGB BLD-MCNC: 8.5 G/DL (ref 13–17.7)
HGB BLD-MCNC: 8.6 G/DL (ref 13–17.7)
HGB BLD-MCNC: 8.8 G/DL (ref 13–17.7)
HGB BLD-MCNC: 9.6 G/DL (ref 13–17.7)
HGB UR QL STRIP.AUTO: NEGATIVE
HYALINE CASTS UR QL AUTO: ABNORMAL /LPF
IGA SERPL-MCNC: 358 MG/DL (ref 61–437)
IGG SERPL-MCNC: 1306 MG/DL (ref 603–1613)
IGM SERPL-MCNC: 54 MG/DL (ref 15–143)
IMM GRANULOCYTES # BLD AUTO: 0.01 10*3/MM3 (ref 0–0.05)
IMM GRANULOCYTES NFR BLD AUTO: 0.2 % (ref 0–0.5)
INR PPP: 3.81 (ref 0.9–1.1)
INR PPP: 4.01 (ref 0.9–1.1)
INR PPP: 6.41 (ref 0.9–1.1)
INTERPRETATION SERPL IEP-IMP: ABNORMAL
INTERPRETATION: NORMAL
KAPPA LC FREE SER-MCNC: 119.1 MG/L (ref 3.3–19.4)
KAPPA LC FREE/LAMBDA FREE SER: 2.57 {RATIO} (ref 0.26–1.65)
KETONES UR QL STRIP: ABNORMAL
LABORATORY COMMENT REPORT: ABNORMAL
LAMBDA LC FREE SERPL-MCNC: 46.4 MG/L (ref 5.7–26.3)
LEFT ATRIUM VOLUME INDEX: 31.7 ML/M2
LEUKOCYTE ESTERASE UR QL STRIP.AUTO: ABNORMAL
LYMPHOCYTES # BLD AUTO: 0.5 10*3/MM3 (ref 0.7–3.1)
LYMPHOCYTES # BLD AUTO: 0.69 10*3/MM3 (ref 0.7–3.1)
LYMPHOCYTES NFR BLD AUTO: 10.6 % (ref 19.6–45.3)
LYMPHOCYTES NFR BLD AUTO: 15.1 % (ref 19.6–45.3)
M PROTEIN SERPL ELPH-MCNC: ABNORMAL G/DL
MAGNESIUM SERPL-MCNC: 1.7 MG/DL (ref 1.6–2.4)
MAGNESIUM SERPL-MCNC: 1.8 MG/DL (ref 1.6–2.4)
MAGNESIUM SERPL-MCNC: 1.9 MG/DL (ref 1.6–2.4)
MCH RBC QN AUTO: 23.3 PG (ref 26.6–33)
MCH RBC QN AUTO: 23.5 PG (ref 26.6–33)
MCH RBC QN AUTO: 23.6 PG (ref 26.6–33)
MCH RBC QN AUTO: 24.1 PG (ref 26.6–33)
MCH RBC QN AUTO: 24.2 PG (ref 26.6–33)
MCHC RBC AUTO-ENTMCNC: 30.8 G/DL (ref 31.5–35.7)
MCHC RBC AUTO-ENTMCNC: 30.9 G/DL (ref 31.5–35.7)
MCHC RBC AUTO-ENTMCNC: 31.3 G/DL (ref 31.5–35.7)
MCV RBC AUTO: 74.5 FL (ref 79–97)
MCV RBC AUTO: 76.2 FL (ref 79–97)
MCV RBC AUTO: 76.4 FL (ref 79–97)
MCV RBC AUTO: 78 FL (ref 79–97)
MCV RBC AUTO: 78.3 FL (ref 79–97)
MONOCYTES # BLD AUTO: 0.47 10*3/MM3 (ref 0.1–0.9)
MONOCYTES # BLD AUTO: 0.67 10*3/MM3 (ref 0.1–0.9)
MONOCYTES NFR BLD AUTO: 10.3 % (ref 5–12)
MONOCYTES NFR BLD AUTO: 14.2 % (ref 5–12)
NEUTROPHILS NFR BLD AUTO: 3.2 10*3/MM3 (ref 1.7–7)
NEUTROPHILS NFR BLD AUTO: 3.43 10*3/MM3 (ref 1.7–7)
NEUTROPHILS NFR BLD AUTO: 69.8 % (ref 42.7–76)
NEUTROPHILS NFR BLD AUTO: 72.5 % (ref 42.7–76)
NITRITE UR QL STRIP: NEGATIVE
NORMAL PLASMA PT: 13.6 SECONDS (ref 11.7–14.2)
NRBC BLD AUTO-RTO: 0.2 /100 WBC (ref 0–0.2)
NT-PROBNP SERPL-MCNC: 4943 PG/ML (ref 0–1800)
NT-PROBNP SERPL-MCNC: 7962 PG/ML (ref 0–1800)
PH UR STRIP.AUTO: <=5 [PH] (ref 5–8)
PHOSPHATE SERPL-MCNC: 3.8 MG/DL (ref 2.5–4.5)
PHOSPHATE SERPL-MCNC: 5.1 MG/DL (ref 2.5–4.5)
PLATELET # BLD AUTO: 103 10*3/MM3 (ref 140–450)
PLATELET # BLD AUTO: 106 10*3/MM3 (ref 140–450)
PLATELET # BLD AUTO: 127 10*3/MM3 (ref 140–450)
PMV BLD AUTO: 10.1 FL (ref 6–12)
PMV BLD AUTO: 10.2 FL (ref 6–12)
PMV BLD AUTO: 9.6 FL (ref 6–12)
PMV BLD AUTO: 9.8 FL (ref 6–12)
PMV BLD AUTO: 9.9 FL (ref 6–12)
POTASSIUM SERPL-SCNC: 3.2 MMOL/L (ref 3.5–5.2)
POTASSIUM SERPL-SCNC: 4 MMOL/L (ref 3.5–5.2)
POTASSIUM SERPL-SCNC: 4.1 MMOL/L (ref 3.5–5.2)
POTASSIUM SERPL-SCNC: 4.5 MMOL/L (ref 3.5–5.2)
PROCALCITONIN SERPL-MCNC: 0.67 NG/ML (ref 0–0.25)
PROCALCITONIN SERPL-MCNC: 1.35 NG/ML (ref 0–0.25)
PROT SERPL-MCNC: 4.3 G/DL (ref 6–8.5)
PROT SERPL-MCNC: 5.6 G/DL (ref 6–8.5)
PROT SERPL-MCNC: 6.3 G/DL (ref 6–8.5)
PROT UR QL STRIP: ABNORMAL
PROTHROMBIN TIME: 38 SECONDS (ref 11.7–14.2)
PROTHROMBIN TIME: 38.4 SECONDS (ref 11.7–14.2)
PROTHROMBIN TIME: 40.1 SECONDS (ref 11.7–14.2)
PROTHROMBIN TIME: 58.1 SECONDS (ref 11.7–14.2)
PT MIX W PLASMA: 17.9 SECONDS (ref 11.7–14.2)
QT INTERVAL: 646 MS
QTC INTERVAL: 584 MS
RBC # BLD AUTO: 3.45 10*6/MM3 (ref 4.14–5.8)
RBC # BLD AUTO: 3.65 10*6/MM3 (ref 4.14–5.8)
RBC # BLD AUTO: 3.66 10*6/MM3 (ref 4.14–5.8)
RBC # BLD AUTO: 3.73 10*6/MM3 (ref 4.14–5.8)
RBC # BLD AUTO: 3.97 10*6/MM3 (ref 4.14–5.8)
RBC # UR STRIP: ABNORMAL /HPF
REF LAB TEST METHOD: ABNORMAL
SINUS: 3.1 CM
SODIUM SERPL-SCNC: 131 MMOL/L (ref 136–145)
SODIUM SERPL-SCNC: 132 MMOL/L (ref 136–145)
SODIUM SERPL-SCNC: 133 MMOL/L (ref 136–145)
SODIUM SERPL-SCNC: 136 MMOL/L (ref 136–145)
SP GR UR STRIP: 1.02 (ref 1–1.03)
SQUAMOUS #/AREA URNS HPF: ABNORMAL /HPF
STJ: 2.6 CM
T4 FREE SERPL-MCNC: 1.28 NG/DL (ref 0.82–1.77)
THROMBIN TIME: 18.2 SECONDS (ref 15.7–20.4)
THYROPEROXIDASE AB SERPL-ACNC: 21 IU/ML (ref 0–34)
TROPONIN T DELTA: -19 NG/L
TROPONIN T SERPL HS-MCNC: 138 NG/L
TSH SERPL DL<=0.005 MIU/L-ACNC: 17.5 UIU/ML (ref 0.45–4.5)
TSH SERPL DL<=0.05 MIU/L-ACNC: 17.2 UIU/ML (ref 0.27–4.2)
URATE SERPL-MCNC: 3.4 MG/DL (ref 3.4–7)
UROBILINOGEN UR QL STRIP: ABNORMAL
WBC # UR STRIP: ABNORMAL /HPF
WBC NRBC COR # BLD: 4.26 10*3/MM3 (ref 3.4–10.8)
WBC NRBC COR # BLD: 4.58 10*3/MM3 (ref 3.4–10.8)
WBC NRBC COR # BLD: 4.73 10*3/MM3 (ref 3.4–10.8)
WBC NRBC COR # BLD: 4.78 10*3/MM3 (ref 3.4–10.8)
WBC NRBC COR # BLD: 5.96 10*3/MM3 (ref 3.4–10.8)

## 2023-01-01 PROCEDURE — 85610 PROTHROMBIN TIME: CPT | Performed by: INTERNAL MEDICINE

## 2023-01-01 PROCEDURE — 85027 COMPLETE CBC AUTOMATED: CPT | Performed by: INTERNAL MEDICINE

## 2023-01-01 PROCEDURE — 25010000002 CEFEPIME PER 500 MG: Performed by: INTERNAL MEDICINE

## 2023-01-01 PROCEDURE — 83605 ASSAY OF LACTIC ACID: CPT | Performed by: EMERGENCY MEDICINE

## 2023-01-01 PROCEDURE — 80069 RENAL FUNCTION PANEL: CPT | Performed by: INTERNAL MEDICINE

## 2023-01-01 PROCEDURE — 74176 CT ABD & PELVIS W/O CONTRAST: CPT

## 2023-01-01 PROCEDURE — 83735 ASSAY OF MAGNESIUM: CPT | Performed by: EMERGENCY MEDICINE

## 2023-01-01 PROCEDURE — 82784 ASSAY IGA/IGD/IGG/IGM EACH: CPT | Performed by: INTERNAL MEDICINE

## 2023-01-01 PROCEDURE — 87040 BLOOD CULTURE FOR BACTERIA: CPT | Performed by: EMERGENCY MEDICINE

## 2023-01-01 PROCEDURE — 85025 COMPLETE CBC W/AUTO DIFF WBC: CPT | Performed by: INTERNAL MEDICINE

## 2023-01-01 PROCEDURE — 93306 TTE W/DOPPLER COMPLETE: CPT

## 2023-01-01 PROCEDURE — 83735 ASSAY OF MAGNESIUM: CPT

## 2023-01-01 PROCEDURE — 97530 THERAPEUTIC ACTIVITIES: CPT

## 2023-01-01 PROCEDURE — 99232 SBSQ HOSP IP/OBS MODERATE 35: CPT | Performed by: STUDENT IN AN ORGANIZED HEALTH CARE EDUCATION/TRAINING PROGRAM

## 2023-01-01 PROCEDURE — 84484 ASSAY OF TROPONIN QUANT: CPT | Performed by: EMERGENCY MEDICINE

## 2023-01-01 PROCEDURE — 84100 ASSAY OF PHOSPHORUS: CPT | Performed by: INTERNAL MEDICINE

## 2023-01-01 PROCEDURE — 25010000002 POTASSIUM CHLORIDE 10 MEQ/100ML SOLUTION: Performed by: HOSPITALIST

## 2023-01-01 PROCEDURE — 71045 X-RAY EXAM CHEST 1 VIEW: CPT

## 2023-01-01 PROCEDURE — 25510000001 PERFLUTREN (DEFINITY) 8.476 MG IN SODIUM CHLORIDE (PF) 0.9 % 10 ML INJECTION: Performed by: INTERNAL MEDICINE

## 2023-01-01 PROCEDURE — 25010000002 CALCIUM GLUCONATE-NACL 1-0.675 GM/50ML-% SOLUTION: Performed by: HOSPITALIST

## 2023-01-01 PROCEDURE — 25810000003 SODIUM CHLORIDE 0.9 % SOLUTION: Performed by: EMERGENCY MEDICINE

## 2023-01-01 PROCEDURE — 84145 PROCALCITONIN (PCT): CPT | Performed by: EMERGENCY MEDICINE

## 2023-01-01 PROCEDURE — 93005 ELECTROCARDIOGRAM TRACING: CPT | Performed by: EMERGENCY MEDICINE

## 2023-01-01 PROCEDURE — 85730 THROMBOPLASTIN TIME PARTIAL: CPT | Performed by: INTERNAL MEDICINE

## 2023-01-01 PROCEDURE — 99233 SBSQ HOSP IP/OBS HIGH 50: CPT | Performed by: INTERNAL MEDICINE

## 2023-01-01 PROCEDURE — 82948 REAGENT STRIP/BLOOD GLUCOSE: CPT

## 2023-01-01 PROCEDURE — 83880 ASSAY OF NATRIURETIC PEPTIDE: CPT | Performed by: EMERGENCY MEDICINE

## 2023-01-01 PROCEDURE — 25010000002 CEFEPIME PER 500 MG: Performed by: EMERGENCY MEDICINE

## 2023-01-01 PROCEDURE — 84550 ASSAY OF BLOOD/URIC ACID: CPT | Performed by: HOSPITALIST

## 2023-01-01 PROCEDURE — 87086 URINE CULTURE/COLONY COUNT: CPT | Performed by: EMERGENCY MEDICINE

## 2023-01-01 PROCEDURE — 84443 ASSAY THYROID STIM HORMONE: CPT | Performed by: NURSE PRACTITIONER

## 2023-01-01 PROCEDURE — 25010000002 LORAZEPAM PER 2 MG: Performed by: INTERNAL MEDICINE

## 2023-01-01 PROCEDURE — 85610 PROTHROMBIN TIME: CPT | Performed by: EMERGENCY MEDICINE

## 2023-01-01 PROCEDURE — 25010000002 DOBUTAMINE PER 250 MG: Performed by: INTERNAL MEDICINE

## 2023-01-01 PROCEDURE — 81001 URINALYSIS AUTO W/SCOPE: CPT | Performed by: EMERGENCY MEDICINE

## 2023-01-01 PROCEDURE — 80053 COMPREHEN METABOLIC PANEL: CPT | Performed by: EMERGENCY MEDICINE

## 2023-01-01 PROCEDURE — 86334 IMMUNOFIX E-PHORESIS SERUM: CPT | Performed by: INTERNAL MEDICINE

## 2023-01-01 PROCEDURE — 25010000002 FENTANYL CITRATE (PF) 50 MCG/ML SOLUTION: Performed by: INTERNAL MEDICINE

## 2023-01-01 PROCEDURE — 25810000003 SODIUM CHLORIDE 0.9 % SOLUTION

## 2023-01-01 PROCEDURE — 93010 ELECTROCARDIOGRAM REPORT: CPT | Performed by: INTERNAL MEDICINE

## 2023-01-01 PROCEDURE — 80053 COMPREHEN METABOLIC PANEL: CPT | Performed by: INTERNAL MEDICINE

## 2023-01-01 PROCEDURE — 99222 1ST HOSP IP/OBS MODERATE 55: CPT | Performed by: INTERNAL MEDICINE

## 2023-01-01 PROCEDURE — 84145 PROCALCITONIN (PCT): CPT | Performed by: INTERNAL MEDICINE

## 2023-01-01 PROCEDURE — 99285 EMERGENCY DEPT VISIT HI MDM: CPT

## 2023-01-01 PROCEDURE — 85025 COMPLETE CBC W/AUTO DIFF WBC: CPT | Performed by: EMERGENCY MEDICINE

## 2023-01-01 PROCEDURE — 86376 MICROSOMAL ANTIBODY EACH: CPT | Performed by: INTERNAL MEDICINE

## 2023-01-01 PROCEDURE — 84443 ASSAY THYROID STIM HORMONE: CPT | Performed by: INTERNAL MEDICINE

## 2023-01-01 PROCEDURE — 36415 COLL VENOUS BLD VENIPUNCTURE: CPT

## 2023-01-01 PROCEDURE — 25010000002 ONDANSETRON PER 1 MG

## 2023-01-01 PROCEDURE — 99223 1ST HOSP IP/OBS HIGH 75: CPT | Performed by: INTERNAL MEDICINE

## 2023-01-01 PROCEDURE — 85611 PROTHROMBIN TEST: CPT | Performed by: INTERNAL MEDICINE

## 2023-01-01 PROCEDURE — 83880 ASSAY OF NATRIURETIC PEPTIDE: CPT | Performed by: INTERNAL MEDICINE

## 2023-01-01 PROCEDURE — 83521 IG LIGHT CHAINS FREE EACH: CPT | Performed by: INTERNAL MEDICINE

## 2023-01-01 PROCEDURE — 93306 TTE W/DOPPLER COMPLETE: CPT | Performed by: INTERNAL MEDICINE

## 2023-01-01 PROCEDURE — 84439 ASSAY OF FREE THYROXINE: CPT | Performed by: INTERNAL MEDICINE

## 2023-01-01 PROCEDURE — 70450 CT HEAD/BRAIN W/O DYE: CPT

## 2023-01-01 PROCEDURE — 25010000002 MORPHINE PER 10 MG: Performed by: INTERNAL MEDICINE

## 2023-01-01 PROCEDURE — 85730 THROMBOPLASTIN TIME PARTIAL: CPT | Performed by: EMERGENCY MEDICINE

## 2023-01-01 PROCEDURE — 84165 PROTEIN E-PHORESIS SERUM: CPT | Performed by: INTERNAL MEDICINE

## 2023-01-01 PROCEDURE — 97162 PT EVAL MOD COMPLEX 30 MIN: CPT

## 2023-01-01 RX ORDER — LORAZEPAM 2 MG/ML
0.5 CONCENTRATE ORAL
Status: DISCONTINUED | OUTPATIENT
Start: 2023-01-01 | End: 2023-11-08 | Stop reason: HOSPADM

## 2023-01-01 RX ORDER — BUMETANIDE 0.25 MG/ML
2 INJECTION INTRAMUSCULAR; INTRAVENOUS EVERY 12 HOURS
Status: DISCONTINUED | OUTPATIENT
Start: 2023-01-01 | End: 2023-01-01

## 2023-01-01 RX ORDER — HYDROMORPHONE HYDROCHLORIDE 1 MG/ML
0.5 INJECTION, SOLUTION INTRAMUSCULAR; INTRAVENOUS; SUBCUTANEOUS
Status: CANCELLED | OUTPATIENT
Start: 2023-01-01 | End: 2023-11-12

## 2023-01-01 RX ORDER — LORAZEPAM 2 MG/ML
2 INJECTION INTRAMUSCULAR
Status: DISCONTINUED | OUTPATIENT
Start: 2023-01-01 | End: 2023-11-08 | Stop reason: HOSPADM

## 2023-01-01 RX ORDER — TIZANIDINE 4 MG/1
2 TABLET ORAL EVERY 8 HOURS PRN
Status: DISCONTINUED | OUTPATIENT
Start: 2023-01-01 | End: 2023-11-08 | Stop reason: HOSPADM

## 2023-01-01 RX ORDER — SODIUM CHLORIDE 0.9 % (FLUSH) 0.9 %
10 SYRINGE (ML) INJECTION EVERY 12 HOURS SCHEDULED
Status: DISCONTINUED | OUTPATIENT
Start: 2023-01-01 | End: 2023-11-08 | Stop reason: HOSPADM

## 2023-01-01 RX ORDER — SODIUM CHLORIDE 9 MG/ML
40 INJECTION, SOLUTION INTRAVENOUS AS NEEDED
Status: DISCONTINUED | OUTPATIENT
Start: 2023-01-01 | End: 2023-11-08 | Stop reason: HOSPADM

## 2023-01-01 RX ORDER — MORPHINE SULFATE 20 MG/ML
5 SOLUTION ORAL
Status: CANCELLED | OUTPATIENT
Start: 2023-01-01 | End: 2023-11-12

## 2023-01-01 RX ORDER — GLYCOPYRROLATE 0.2 MG/ML
0.2 INJECTION INTRAMUSCULAR; INTRAVENOUS
Status: CANCELLED | OUTPATIENT
Start: 2023-01-01

## 2023-01-01 RX ORDER — PROCHLORPERAZINE 25 MG
25 SUPPOSITORY, RECTAL RECTAL EVERY 12 HOURS PRN
Status: CANCELLED | OUTPATIENT
Start: 2023-01-01

## 2023-01-01 RX ORDER — MORPHINE SULFATE 20 MG/ML
10 SOLUTION ORAL
Status: CANCELLED | OUTPATIENT
Start: 2023-01-01 | End: 2023-11-12

## 2023-01-01 RX ORDER — POLYETHYLENE GLYCOL 3350 17 G/17G
17 POWDER, FOR SOLUTION ORAL DAILY PRN
Status: CANCELLED | OUTPATIENT
Start: 2023-01-01

## 2023-01-01 RX ORDER — SODIUM CHLORIDE 0.9 % (FLUSH) 0.9 %
10 SYRINGE (ML) INJECTION AS NEEDED
Status: DISCONTINUED | OUTPATIENT
Start: 2023-01-01 | End: 2023-11-08 | Stop reason: HOSPADM

## 2023-01-01 RX ORDER — BISACODYL 5 MG/1
5 TABLET, DELAYED RELEASE ORAL DAILY PRN
Status: CANCELLED | OUTPATIENT
Start: 2023-01-01

## 2023-01-01 RX ORDER — ONDANSETRON 2 MG/ML
4 INJECTION INTRAMUSCULAR; INTRAVENOUS EVERY 6 HOURS PRN
Status: DISCONTINUED | OUTPATIENT
Start: 2023-01-01 | End: 2023-11-08 | Stop reason: HOSPADM

## 2023-01-01 RX ORDER — ACETAMINOPHEN 160 MG/5ML
650 SOLUTION ORAL EVERY 4 HOURS PRN
Status: DISCONTINUED | OUTPATIENT
Start: 2023-01-01 | End: 2023-11-08 | Stop reason: HOSPADM

## 2023-01-01 RX ORDER — DIPHENOXYLATE HYDROCHLORIDE AND ATROPINE SULFATE 2.5; .025 MG/1; MG/1
1 TABLET ORAL
Status: DISCONTINUED | OUTPATIENT
Start: 2023-01-01 | End: 2023-11-08 | Stop reason: HOSPADM

## 2023-01-01 RX ORDER — GLYCOPYRROLATE 0.2 MG/ML
0.4 INJECTION INTRAMUSCULAR; INTRAVENOUS
Status: CANCELLED | OUTPATIENT
Start: 2023-01-01

## 2023-01-01 RX ORDER — CHOLECALCIFEROL (VITAMIN D3) 125 MCG
500 CAPSULE ORAL 2 TIMES DAILY
Status: DISCONTINUED | OUTPATIENT
Start: 2023-01-01 | End: 2023-11-08 | Stop reason: HOSPADM

## 2023-01-01 RX ORDER — BUMETANIDE 0.25 MG/ML
3 INJECTION INTRAMUSCULAR; INTRAVENOUS EVERY 8 HOURS
Status: DISCONTINUED | OUTPATIENT
Start: 2023-01-01 | End: 2023-11-08 | Stop reason: HOSPADM

## 2023-01-01 RX ORDER — FERROUS SULFATE 325(65) MG
325 TABLET ORAL
Status: DISCONTINUED | OUTPATIENT
Start: 2023-01-01 | End: 2023-11-08 | Stop reason: HOSPADM

## 2023-01-01 RX ORDER — LEVOTHYROXINE SODIUM 0.05 MG/1
50 TABLET ORAL
Status: DISCONTINUED | OUTPATIENT
Start: 2023-01-01 | End: 2023-11-08 | Stop reason: HOSPADM

## 2023-01-01 RX ORDER — MORPHINE SULFATE 20 MG/ML
20 SOLUTION ORAL
Status: CANCELLED | OUTPATIENT
Start: 2023-01-01 | End: 2023-11-12

## 2023-01-01 RX ORDER — ACETAMINOPHEN 650 MG/1
650 SUPPOSITORY RECTAL EVERY 4 HOURS PRN
Status: DISCONTINUED | OUTPATIENT
Start: 2023-01-01 | End: 2023-11-08 | Stop reason: HOSPADM

## 2023-01-01 RX ORDER — LORAZEPAM 2 MG/ML
2 INJECTION INTRAMUSCULAR
Status: CANCELLED | OUTPATIENT
Start: 2023-01-01 | End: 2023-11-17

## 2023-01-01 RX ORDER — NOREPINEPHRINE BITARTRATE 0.03 MG/ML
.02-.3 INJECTION, SOLUTION INTRAVENOUS
Status: DISCONTINUED | OUTPATIENT
Start: 2023-01-01 | End: 2023-11-08 | Stop reason: HOSPADM

## 2023-01-01 RX ORDER — HYDROMORPHONE HYDROCHLORIDE 2 MG/ML
1.5 INJECTION, SOLUTION INTRAMUSCULAR; INTRAVENOUS; SUBCUTANEOUS
Status: CANCELLED | OUTPATIENT
Start: 2023-01-01 | End: 2023-11-12

## 2023-01-01 RX ORDER — ACETAMINOPHEN 325 MG/1
650 TABLET ORAL EVERY 4 HOURS PRN
Status: DISCONTINUED | OUTPATIENT
Start: 2023-01-01 | End: 2023-11-08 | Stop reason: HOSPADM

## 2023-01-01 RX ORDER — LORAZEPAM 2 MG/ML
1 INJECTION INTRAMUSCULAR
Status: CANCELLED | OUTPATIENT
Start: 2023-01-01 | End: 2023-11-17

## 2023-01-01 RX ORDER — BUMETANIDE 0.25 MG/ML
2 INJECTION INTRAMUSCULAR; INTRAVENOUS EVERY 8 HOURS
Status: DISCONTINUED | OUTPATIENT
Start: 2023-01-01 | End: 2023-01-01

## 2023-01-01 RX ORDER — LORAZEPAM 2 MG/ML
1 INJECTION INTRAMUSCULAR
Status: DISCONTINUED | OUTPATIENT
Start: 2023-01-01 | End: 2023-11-08 | Stop reason: HOSPADM

## 2023-01-01 RX ORDER — DOBUTAMINE HYDROCHLORIDE 100 MG/100ML
5 INJECTION INTRAVENOUS
Status: DISCONTINUED | OUTPATIENT
Start: 2023-01-01 | End: 2023-11-08 | Stop reason: HOSPADM

## 2023-01-01 RX ORDER — ECHINACEA PURPUREA EXTRACT 125 MG
1 TABLET ORAL AS NEEDED
Status: CANCELLED | OUTPATIENT
Start: 2023-01-01

## 2023-01-01 RX ORDER — CALCIUM GLUCONATE 20 MG/ML
1000 INJECTION, SOLUTION INTRAVENOUS ONCE
Status: COMPLETED | OUTPATIENT
Start: 2023-01-01 | End: 2023-01-01

## 2023-01-01 RX ORDER — LORAZEPAM 2 MG/ML
1 CONCENTRATE ORAL
Status: CANCELLED | OUTPATIENT
Start: 2023-01-01 | End: 2023-11-17

## 2023-01-01 RX ORDER — ACETAMINOPHEN 160 MG/5ML
650 SOLUTION ORAL EVERY 4 HOURS PRN
Status: CANCELLED | OUTPATIENT
Start: 2023-01-01

## 2023-01-01 RX ORDER — IPRATROPIUM BROMIDE AND ALBUTEROL SULFATE 2.5; .5 MG/3ML; MG/3ML
3 SOLUTION RESPIRATORY (INHALATION) EVERY 6 HOURS PRN
Status: DISCONTINUED | OUTPATIENT
Start: 2023-01-01 | End: 2023-11-08 | Stop reason: HOSPADM

## 2023-01-01 RX ORDER — ACETAMINOPHEN 325 MG/1
650 TABLET ORAL EVERY 4 HOURS PRN
Status: CANCELLED | OUTPATIENT
Start: 2023-01-01

## 2023-01-01 RX ORDER — POTASSIUM CHLORIDE 750 MG/1
40 TABLET, FILM COATED, EXTENDED RELEASE ORAL ONCE
Status: DISCONTINUED | OUTPATIENT
Start: 2023-01-01 | End: 2023-01-01

## 2023-01-01 RX ORDER — BISACODYL 5 MG/1
5 TABLET, DELAYED RELEASE ORAL DAILY PRN
Status: DISCONTINUED | OUTPATIENT
Start: 2023-01-01 | End: 2023-11-08 | Stop reason: HOSPADM

## 2023-01-01 RX ORDER — SCOLOPAMINE TRANSDERMAL SYSTEM 1 MG/1
1 PATCH, EXTENDED RELEASE TRANSDERMAL
Status: CANCELLED | OUTPATIENT
Start: 2023-01-01

## 2023-01-01 RX ORDER — LORAZEPAM 2 MG/ML
0.5 INJECTION INTRAMUSCULAR
Status: DISCONTINUED | OUTPATIENT
Start: 2023-01-01 | End: 2023-11-08 | Stop reason: HOSPADM

## 2023-01-01 RX ORDER — DEXTROSE MONOHYDRATE 25 G/50ML
25 INJECTION, SOLUTION INTRAVENOUS
Status: DISCONTINUED | OUTPATIENT
Start: 2023-01-01 | End: 2023-11-08 | Stop reason: HOSPADM

## 2023-01-01 RX ORDER — AMOXICILLIN 250 MG
2 CAPSULE ORAL 2 TIMES DAILY
Status: CANCELLED | OUTPATIENT
Start: 2023-01-01

## 2023-01-01 RX ORDER — LORAZEPAM 2 MG/ML
2 CONCENTRATE ORAL
Status: CANCELLED | OUTPATIENT
Start: 2023-01-01 | End: 2023-11-17

## 2023-01-01 RX ORDER — MORPHINE SULFATE 2 MG/ML
2 INJECTION, SOLUTION INTRAMUSCULAR; INTRAVENOUS EVERY 4 HOURS PRN
Status: DISCONTINUED | OUTPATIENT
Start: 2023-01-01 | End: 2023-11-08 | Stop reason: HOSPADM

## 2023-01-01 RX ORDER — LORAZEPAM 2 MG/ML
0.5 CONCENTRATE ORAL
Status: CANCELLED | OUTPATIENT
Start: 2023-01-01 | End: 2023-11-17

## 2023-01-01 RX ORDER — IBUPROFEN 600 MG/1
1 TABLET ORAL
Status: DISCONTINUED | OUTPATIENT
Start: 2023-01-01 | End: 2023-11-08 | Stop reason: HOSPADM

## 2023-01-01 RX ORDER — LORAZEPAM 2 MG/ML
0.5 INJECTION INTRAMUSCULAR
Status: CANCELLED | OUTPATIENT
Start: 2023-01-01 | End: 2023-11-17

## 2023-01-01 RX ORDER — NITROGLYCERIN 0.4 MG/1
0.4 TABLET SUBLINGUAL
Status: DISCONTINUED | OUTPATIENT
Start: 2023-01-01 | End: 2023-11-08 | Stop reason: HOSPADM

## 2023-01-01 RX ORDER — KETOROLAC TROMETHAMINE 30 MG/ML
15 INJECTION, SOLUTION INTRAMUSCULAR; INTRAVENOUS EVERY 6 HOURS PRN
Status: CANCELLED | OUTPATIENT
Start: 2023-01-01 | End: 2023-11-12

## 2023-01-01 RX ORDER — BISACODYL 10 MG
10 SUPPOSITORY, RECTAL RECTAL DAILY PRN
Status: CANCELLED | OUTPATIENT
Start: 2023-01-01

## 2023-01-01 RX ORDER — MORPHINE SULFATE 2 MG/ML
2 INJECTION, SOLUTION INTRAMUSCULAR; INTRAVENOUS
Status: CANCELLED | OUTPATIENT
Start: 2023-01-01 | End: 2023-11-12

## 2023-01-01 RX ORDER — PROCHLORPERAZINE EDISYLATE 5 MG/ML
5 INJECTION INTRAMUSCULAR; INTRAVENOUS EVERY 6 HOURS PRN
Status: CANCELLED | OUTPATIENT
Start: 2023-01-01

## 2023-01-01 RX ORDER — LACTULOSE 10 G/15ML
20 SOLUTION ORAL DAILY
Status: DISCONTINUED | OUTPATIENT
Start: 2023-01-01 | End: 2023-11-08 | Stop reason: HOSPADM

## 2023-01-01 RX ORDER — DIPHENOXYLATE HYDROCHLORIDE AND ATROPINE SULFATE 2.5; .025 MG/1; MG/1
1 TABLET ORAL
Status: CANCELLED | OUTPATIENT
Start: 2023-01-01

## 2023-01-01 RX ORDER — FENTANYL CITRATE 50 UG/ML
50 INJECTION, SOLUTION INTRAMUSCULAR; INTRAVENOUS
Status: DISCONTINUED | OUTPATIENT
Start: 2023-01-01 | End: 2023-01-01

## 2023-01-01 RX ORDER — NICOTINE POLACRILEX 4 MG
15 LOZENGE BUCCAL
Status: DISCONTINUED | OUTPATIENT
Start: 2023-01-01 | End: 2023-11-08 | Stop reason: HOSPADM

## 2023-01-01 RX ORDER — SODIUM CHLORIDE 9 MG/ML
50 INJECTION, SOLUTION INTRAVENOUS CONTINUOUS
Status: DISCONTINUED | OUTPATIENT
Start: 2023-01-01 | End: 2023-01-01

## 2023-01-01 RX ORDER — MORPHINE SULFATE 2 MG/ML
4 INJECTION, SOLUTION INTRAMUSCULAR; INTRAVENOUS
Status: CANCELLED | OUTPATIENT
Start: 2023-01-01 | End: 2023-11-12

## 2023-01-01 RX ORDER — POLYETHYLENE GLYCOL 3350 17 G/17G
17 POWDER, FOR SOLUTION ORAL DAILY PRN
Status: DISCONTINUED | OUTPATIENT
Start: 2023-01-01 | End: 2023-11-08 | Stop reason: HOSPADM

## 2023-01-01 RX ORDER — LORAZEPAM 2 MG/ML
2 CONCENTRATE ORAL
Status: DISCONTINUED | OUTPATIENT
Start: 2023-01-01 | End: 2023-11-08 | Stop reason: HOSPADM

## 2023-01-01 RX ORDER — PROCHLORPERAZINE MALEATE 5 MG/1
5 TABLET ORAL EVERY 6 HOURS PRN
Status: CANCELLED | OUTPATIENT
Start: 2023-01-01

## 2023-01-01 RX ORDER — BISACODYL 10 MG
10 SUPPOSITORY, RECTAL RECTAL DAILY PRN
Status: DISCONTINUED | OUTPATIENT
Start: 2023-01-01 | End: 2023-11-08 | Stop reason: HOSPADM

## 2023-01-01 RX ORDER — POTASSIUM CHLORIDE 7.45 MG/ML
10 INJECTION INTRAVENOUS
Status: COMPLETED | OUTPATIENT
Start: 2023-01-01 | End: 2023-01-01

## 2023-01-01 RX ORDER — AMOXICILLIN 250 MG
2 CAPSULE ORAL 2 TIMES DAILY
Status: DISCONTINUED | OUTPATIENT
Start: 2023-01-01 | End: 2023-11-08 | Stop reason: HOSPADM

## 2023-01-01 RX ORDER — ACETAMINOPHEN 650 MG/1
650 SUPPOSITORY RECTAL EVERY 4 HOURS PRN
Status: CANCELLED | OUTPATIENT
Start: 2023-01-01

## 2023-01-01 RX ORDER — LISINOPRIL 2.5 MG/1
2.5 TABLET ORAL DAILY
COMMUNITY
Start: 2023-01-01

## 2023-01-01 RX ORDER — MORPHINE SULFATE 10 MG/ML
6 INJECTION INTRAMUSCULAR; INTRAVENOUS; SUBCUTANEOUS
Status: CANCELLED | OUTPATIENT
Start: 2023-01-01 | End: 2023-11-12

## 2023-01-01 RX ORDER — LORAZEPAM 2 MG/ML
1 CONCENTRATE ORAL
Status: DISCONTINUED | OUTPATIENT
Start: 2023-01-01 | End: 2023-11-08 | Stop reason: HOSPADM

## 2023-01-01 RX ADMIN — SODIUM CHLORIDE 500 ML: 9 INJECTION, SOLUTION INTRAVENOUS at 03:54

## 2023-01-01 RX ADMIN — POTASSIUM CHLORIDE 10 MEQ: 7.46 INJECTION, SOLUTION INTRAVENOUS at 23:44

## 2023-01-01 RX ADMIN — DEXTROSE MONOHYDRATE 25 G: 25 INJECTION, SOLUTION INTRAVENOUS at 01:06

## 2023-01-01 RX ADMIN — FENTANYL CITRATE 50 MCG: 50 INJECTION, SOLUTION INTRAMUSCULAR; INTRAVENOUS at 11:27

## 2023-01-01 RX ADMIN — Medication 10 ML: at 21:49

## 2023-01-01 RX ADMIN — POTASSIUM CHLORIDE 10 MEQ: 7.46 INJECTION, SOLUTION INTRAVENOUS at 21:47

## 2023-01-01 RX ADMIN — LORAZEPAM 2 MG: 2 INJECTION INTRAMUSCULAR; INTRAVENOUS at 14:32

## 2023-01-01 RX ADMIN — ONDANSETRON 4 MG: 2 INJECTION INTRAMUSCULAR; INTRAVENOUS at 10:01

## 2023-01-01 RX ADMIN — POTASSIUM CHLORIDE 10 MEQ: 7.46 INJECTION, SOLUTION INTRAVENOUS at 00:53

## 2023-01-01 RX ADMIN — SODIUM CHLORIDE 500 ML: 9 INJECTION, SOLUTION INTRAVENOUS at 01:26

## 2023-01-01 RX ADMIN — FENTANYL CITRATE 50 MCG: 50 INJECTION, SOLUTION INTRAMUSCULAR; INTRAVENOUS at 03:29

## 2023-01-01 RX ADMIN — Medication 0.02 MCG/KG/MIN: at 03:55

## 2023-01-01 RX ADMIN — DEXTROSE MONOHYDRATE 25 G: 25 INJECTION, SOLUTION INTRAVENOUS at 19:56

## 2023-01-01 RX ADMIN — CALCIUM GLUCONATE 1000 MG: 20 INJECTION, SOLUTION INTRAVENOUS at 20:41

## 2023-01-01 RX ADMIN — LEVOTHYROXINE SODIUM 50 MCG: 50 TABLET ORAL at 09:17

## 2023-01-01 RX ADMIN — FENTANYL CITRATE 50 MCG: 50 INJECTION, SOLUTION INTRAMUSCULAR; INTRAVENOUS at 16:15

## 2023-01-01 RX ADMIN — FENTANYL CITRATE 50 MCG: 50 INJECTION, SOLUTION INTRAMUSCULAR; INTRAVENOUS at 20:03

## 2023-01-01 RX ADMIN — CEFEPIME 2000 MG: 2 INJECTION, POWDER, FOR SOLUTION INTRAVENOUS at 13:43

## 2023-01-01 RX ADMIN — MORPHINE SULFATE 2 MG: 2 INJECTION, SOLUTION INTRAMUSCULAR; INTRAVENOUS at 11:52

## 2023-01-01 RX ADMIN — Medication 10 ML: at 10:32

## 2023-01-01 RX ADMIN — Medication 0.08 MCG/KG/MIN: at 17:11

## 2023-01-01 RX ADMIN — CEFEPIME 2000 MG: 2 INJECTION, POWDER, FOR SOLUTION INTRAVENOUS at 02:36

## 2023-01-01 RX ADMIN — Medication 10 ML: at 21:35

## 2023-01-01 RX ADMIN — FENTANYL CITRATE 50 MCG: 50 INJECTION, SOLUTION INTRAMUSCULAR; INTRAVENOUS at 00:00

## 2023-01-01 RX ADMIN — SODIUM CHLORIDE 500 ML: 9 INJECTION, SOLUTION INTRAVENOUS at 23:57

## 2023-01-01 RX ADMIN — PERFLUTREN 2 ML: 6.52 INJECTION, SUSPENSION INTRAVENOUS at 09:40

## 2023-01-01 RX ADMIN — BUMETANIDE 2 MG: 0.25 INJECTION INTRAMUSCULAR; INTRAVENOUS at 11:09

## 2023-01-01 RX ADMIN — FENTANYL CITRATE 50 MCG: 50 INJECTION, SOLUTION INTRAMUSCULAR; INTRAVENOUS at 18:35

## 2023-01-01 RX ADMIN — DOBUTAMINE IN DEXTROSE 15 MCG/KG/MIN: 100 INJECTION, SOLUTION INTRAVENOUS at 02:40

## 2023-01-01 RX ADMIN — POTASSIUM CHLORIDE 10 MEQ: 7.46 INJECTION, SOLUTION INTRAVENOUS at 22:51

## 2023-01-01 RX ADMIN — DOBUTAMINE IN DEXTROSE 5 MCG/KG/MIN: 100 INJECTION, SOLUTION INTRAVENOUS at 11:10

## 2023-01-01 RX ADMIN — DOBUTAMINE IN DEXTROSE 15 MCG/KG/MIN: 100 INJECTION, SOLUTION INTRAVENOUS at 23:39

## 2023-01-01 RX ADMIN — DEXTROSE MONOHYDRATE 25 G: 25 INJECTION, SOLUTION INTRAVENOUS at 07:49

## 2023-01-01 RX ADMIN — CEFEPIME 2000 MG: 2 INJECTION, POWDER, FOR SOLUTION INTRAVENOUS at 15:00

## 2023-01-01 RX ADMIN — DEXTROSE MONOHYDRATE 25 G: 25 INJECTION, SOLUTION INTRAVENOUS at 12:22

## 2023-01-01 RX ADMIN — BUMETANIDE 2 MG: 0.25 INJECTION INTRAMUSCULAR; INTRAVENOUS at 00:53

## 2023-01-01 RX ADMIN — DEXTROSE MONOHYDRATE 25 G: 25 INJECTION, SOLUTION INTRAVENOUS at 12:16

## 2023-01-01 RX ADMIN — Medication 10 ML: at 09:18

## 2023-01-01 RX ADMIN — CEFEPIME 2000 MG: 2 INJECTION, POWDER, FOR SOLUTION INTRAVENOUS at 01:03

## 2023-01-01 RX ADMIN — DOCUSATE SODIUM 50MG AND SENNOSIDES 8.6MG 2 TABLET: 8.6; 5 TABLET, FILM COATED ORAL at 09:17

## 2023-01-01 RX ADMIN — BUMETANIDE 2 MG: 0.25 INJECTION INTRAMUSCULAR; INTRAVENOUS at 17:05

## 2023-01-01 RX ADMIN — DOBUTAMINE IN DEXTROSE 5 MCG/KG/MIN: 100 INJECTION, SOLUTION INTRAVENOUS at 19:02

## 2023-01-01 RX ADMIN — SODIUM CHLORIDE 50 ML/HR: 9 INJECTION, SOLUTION INTRAVENOUS at 04:45

## 2023-01-01 RX ADMIN — LORAZEPAM 2 MG: 2 INJECTION INTRAMUSCULAR; INTRAVENOUS at 19:12

## 2023-01-01 RX ADMIN — DOBUTAMINE IN DEXTROSE 15 MCG/KG/MIN: 100 INJECTION, SOLUTION INTRAVENOUS at 05:51

## 2023-01-01 RX ADMIN — CEFEPIME 2000 MG: 2 INJECTION, POWDER, FOR SOLUTION INTRAVENOUS at 02:07

## 2023-01-01 RX ADMIN — FERROUS SULFATE TAB 325 MG (65 MG ELEMENTAL FE) 325 MG: 325 (65 FE) TAB at 09:17

## 2023-01-01 RX ADMIN — FENTANYL CITRATE 50 MCG: 50 INJECTION, SOLUTION INTRAMUSCULAR; INTRAVENOUS at 13:43

## 2023-01-01 RX ADMIN — ACETAMINOPHEN 650 MG: 325 TABLET ORAL at 19:03

## 2023-01-01 RX ADMIN — FENTANYL CITRATE 50 MCG: 50 INJECTION, SOLUTION INTRAMUSCULAR; INTRAVENOUS at 22:09

## 2023-01-01 RX ADMIN — Medication 500 MCG: at 09:17

## 2023-01-01 RX ADMIN — BUMETANIDE 2 MG: 0.25 INJECTION INTRAMUSCULAR; INTRAVENOUS at 23:05

## 2023-01-01 RX ADMIN — Medication 10 ML: at 09:01

## 2023-02-02 NOTE — TELEPHONE ENCOUNTER
Caller: [place] Reeder    Relationship: Emergency Contact    Best call back number: [phone]    VOICEMAIL WAS LEFT FOR PT TO RESCHEDULE APPT ON 2.17.23, PROVIDER OUT OF OFFICE.  WIFE IS NOT OKAY WITH NEXT AVAILABLE 4.28.23, APPT HAS BEEN RESCHEDULED BEFORE.  PLEASE CALL PT AND ADVISE IF PT SHOULD BE SEEN SOONER THEN NEXT AVAILABLE.

## 2023-02-14 ENCOUNTER — OFFICE VISIT (OUTPATIENT)
Dept: CARDIOLOGY | Facility: CLINIC | Age: 73
End: 2023-02-14
Payer: MEDICARE

## 2023-02-14 VITALS
WEIGHT: 186 LBS | OXYGEN SATURATION: 95 % | DIASTOLIC BLOOD PRESSURE: 78 MMHG | SYSTOLIC BLOOD PRESSURE: 138 MMHG | HEART RATE: 64 BPM | BODY MASS INDEX: 25.19 KG/M2 | HEIGHT: 72 IN

## 2023-02-14 DIAGNOSIS — I50.32 CHRONIC DIASTOLIC HEART FAILURE: ICD-10-CM

## 2023-02-14 DIAGNOSIS — I10 ESSENTIAL HYPERTENSION: Chronic | ICD-10-CM

## 2023-02-14 DIAGNOSIS — I48.21 PERMANENT ATRIAL FIBRILLATION: Primary | ICD-10-CM

## 2023-02-14 PROCEDURE — 99214 OFFICE O/P EST MOD 30 MIN: CPT | Performed by: INTERNAL MEDICINE

## 2023-02-14 NOTE — PROGRESS NOTES
"      CARDIOLOGY    Jimbo Fung MD    ENCOUNTER DATE:  02/14/2023    Edilberto Reeder / 72 y.o. / male        CHIEF COMPLAINT / REASON FOR OFFICE VISIT     Acute on chronic HFrEF (heart failure with reduced ejection (11/17/2022 Follow up)  Chronic A-fib  Hypertension    HISTORY OF PRESENT ILLNESS       HPI  Edilberto Reeder is a 72 y.o. male who presents today for reevaluation.  Overall he is actually doing well.  Since discharge from the hospital he is gotten more strength and is breathing better.  He has not had any recurrent swelling.  He does get some tightness from time to time in his chest but he says this has been going on for years.  His blood pressure at home is running about 130/70 fairly consistent.      The following portions of the patient's history were reviewed and updated as appropriate: allergies, current medications, past family history, past medical history, past social history, past surgical history and problem list.      VITAL SIGNS     Visit Vitals  /78 (BP Location: Left arm)   Pulse 64   Ht 182.9 cm (72\")   Wt 84.4 kg (186 lb)   SpO2 95%   BMI 25.23 kg/m²         Wt Readings from Last 3 Encounters:   02/14/23 84.4 kg (186 lb)   11/04/22 88.5 kg (195 lb)   11/01/22 99.8 kg (220 lb)     Body mass index is 25.23 kg/m².      REVIEW OF SYSTEMS   ROS        PHYSICAL EXAMINATION     Vitals reviewed.   Constitutional:       Appearance: Healthy appearance.   Pulmonary:      Effort: Pulmonary effort is normal.   Cardiovascular:      Normal rate. Regular rhythm. Normal S1. Normal S2.      Murmurs: There is no murmur.      No gallop. No click. No rub.   Pulses:     Intact distal pulses.   Edema:     Peripheral edema absent.   Neurological:      Mental Status: Alert and oriented to person, place and time.           REVIEWED DATA     Procedures    Cardiac Procedures:  1.           ASSESSMENT & PLAN      Diagnosis Plan   1. Permanent atrial fibrillation (HCC)        2. Essential hypertension "        3. Chronic diastolic heart failure (HCC)              SUMMARY/DISCUSSION  1. Chronic A-fib.  Clinically stable.  Heart rate stable patient is on Eliquis.  2. Chronic diastolic heart failure.  Currently appears stable near card association class III.  3. Hypertension blood pressure is good  4. This point I continue same follow-up in 6 months sooner if issues.        MEDICATIONS         Discharge Medications          Accurate as of February 14, 2023  3:03 PM. If you have any questions, ask your nurse or doctor.            Continue These Medications      Instructions Start Date   acetaminophen 325 MG tablet  Commonly known as: TYLENOL   650 mg, Oral, Every 6 Hours PRN      allopurinol 100 MG tablet  Commonly known as: ZYLOPRIM   100 mg, Oral, Daily      Alogliptin Benzoate 12.5 MG tablet   Oral, Daily, Half tablet daily      apixaban 5 MG tablet tablet  Commonly known as: ELIQUIS   5 mg, Oral, 2 Times Daily      Calcium Carbonate 1500 (600 Ca) MG tablet   650 mg, Oral, Daily      Carbidopa-Levodopa ER 36. MG capsule controlled-release   Oral, 4 Times Daily, 4 tablets 4 times daily      citalopram 20 MG tablet  Commonly known as: CeleXA   20 mg, Oral, Daily      docusate sodium 50 MG capsule  Commonly known as: COLACE   Oral, Nightly      donepezil 5 MG tablet  Commonly known as: ARICEPT   10 mg, Oral, Nightly      furosemide 40 MG tablet  Commonly known as: LASIX   60 mg, Oral, Daily      isosorbide mononitrate 30 MG 24 hr tablet  Commonly known as: IMDUR   TAKE ONE TABLET BY MOUTH DAILY      levothyroxine 25 MCG tablet  Commonly known as: SYNTHROID, LEVOTHROID   25 mcg, Oral, Daily      metFORMIN 500 MG tablet  Commonly known as: GLUCOPHAGE   500 mg, Oral, 3 Times Daily      metoprolol tartrate 25 MG tablet  Commonly known as: LOPRESSOR   12.5 mg, Oral, 2 Times Daily      Polyvinyl Alcohol-Povidone PF 1.4-0.6 % ophthalmic solution  Commonly known as: HYPOTEARS   1-2 drops, As Needed      rosuvastatin 20  MG tablet  Commonly known as: CRESTOR   20 mg, Oral, Nightly      spironolactone 25 MG tablet  Commonly known as: ALDACTONE   25 mg, Oral, Daily      traZODone 50 MG tablet  Commonly known as: DESYREL   25 mg, Oral, Nightly      vitamin B-12 1000 MCG tablet  Commonly known as: CYANOCOBALAMIN   500 mcg, Oral, Daily         Stop These Medications    aspirin 81 MG chewable tablet  Stopped by: Jimbo Fung MD                **Dragon Disclaimer:   Much of this encounter note is an electronic transcription/translation of spoken language to printed text. The electronic translation of spoken language may permit erroneous, or at times, nonsensical words or phrases to be inadvertently transcribed. Although I have reviewed the note for such errors, some may still exist.

## 2023-04-12 RX ORDER — ISOSORBIDE MONONITRATE 30 MG/1
TABLET, EXTENDED RELEASE ORAL
Qty: 90 TABLET | Refills: 0 | Status: SHIPPED | OUTPATIENT
Start: 2023-04-12

## 2023-08-04 ENCOUNTER — APPOINTMENT (OUTPATIENT)
Dept: CT IMAGING | Facility: HOSPITAL | Age: 73
DRG: 811 | End: 2023-08-04
Payer: MEDICARE

## 2023-08-04 ENCOUNTER — APPOINTMENT (OUTPATIENT)
Dept: GENERAL RADIOLOGY | Facility: HOSPITAL | Age: 73
DRG: 811 | End: 2023-08-04
Payer: MEDICARE

## 2023-08-04 ENCOUNTER — HOSPITAL ENCOUNTER (INPATIENT)
Facility: HOSPITAL | Age: 73
LOS: 2 days | Discharge: HOME | DRG: 811 | End: 2023-08-08
Attending: EMERGENCY MEDICINE | Admitting: HOSPITALIST
Payer: MEDICARE

## 2023-08-04 DIAGNOSIS — R79.89 ELEVATED TROPONIN: ICD-10-CM

## 2023-08-04 DIAGNOSIS — I95.9 HYPOTENSION, UNSPECIFIED HYPOTENSION TYPE: ICD-10-CM

## 2023-08-04 DIAGNOSIS — R55 SYNCOPE, UNSPECIFIED SYNCOPE TYPE: Primary | ICD-10-CM

## 2023-08-04 DIAGNOSIS — S51.812A LACERATION OF LEFT FOREARM, INITIAL ENCOUNTER: ICD-10-CM

## 2023-08-04 DIAGNOSIS — D64.9 ANEMIA, UNSPECIFIED TYPE: ICD-10-CM

## 2023-08-04 DIAGNOSIS — R77.8 ELEVATED TROPONIN: ICD-10-CM

## 2023-08-04 LAB
ABO GROUP BLD: NORMAL
ALBUMIN SERPL-MCNC: 3.3 G/DL (ref 3.5–5.2)
ALBUMIN/GLOB SERPL: 1.3 G/DL
ALP SERPL-CCNC: 119 U/L (ref 39–117)
ALT SERPL W P-5'-P-CCNC: 8 U/L (ref 1–41)
ANION GAP SERPL CALCULATED.3IONS-SCNC: 24 MMOL/L (ref 5–15)
APTT PPP: 32.2 SECONDS (ref 22.7–35.4)
AST SERPL-CCNC: 32 U/L (ref 1–40)
BASOPHILS # BLD AUTO: 0.05 10*3/MM3 (ref 0–0.2)
BASOPHILS NFR BLD AUTO: 0.4 % (ref 0–1.5)
BILIRUB SERPL-MCNC: 0.9 MG/DL (ref 0–1.2)
BLD GP AB SCN SERPL QL: NEGATIVE
BUN SERPL-MCNC: 26 MG/DL (ref 8–23)
BUN/CREAT SERPL: 17.4 (ref 7–25)
CALCIUM SPEC-SCNC: 9.1 MG/DL (ref 8.6–10.5)
CHLORIDE SERPL-SCNC: 101 MMOL/L (ref 98–107)
CK SERPL-CCNC: 153 U/L (ref 20–200)
CO2 SERPL-SCNC: 14 MMOL/L (ref 22–29)
CREAT SERPL-MCNC: 1.49 MG/DL (ref 0.76–1.27)
DEPRECATED RDW RBC AUTO: 49.4 FL (ref 37–54)
EGFRCR SERPLBLD CKD-EPI 2021: 48.6 ML/MIN/1.73
EOSINOPHIL # BLD AUTO: 0.13 10*3/MM3 (ref 0–0.4)
EOSINOPHIL NFR BLD AUTO: 1.1 % (ref 0.3–6.2)
ERYTHROCYTE [DISTWIDTH] IN BLOOD BY AUTOMATED COUNT: 17.1 % (ref 12.3–15.4)
GEN 5 2HR TROPONIN T REFLEX: 123 NG/L
GLOBULIN UR ELPH-MCNC: 2.5 GM/DL
GLUCOSE BLDC GLUCOMTR-MCNC: 112 MG/DL (ref 70–130)
GLUCOSE SERPL-MCNC: 126 MG/DL (ref 65–99)
HCT VFR BLD AUTO: 23.9 % (ref 37.5–51)
HGB BLD-MCNC: 7.5 G/DL (ref 13–17.7)
IMM GRANULOCYTES # BLD AUTO: 0.13 10*3/MM3 (ref 0–0.05)
IMM GRANULOCYTES NFR BLD AUTO: 1.1 % (ref 0–0.5)
INR PPP: 2.38 (ref 0.9–1.1)
LYMPHOCYTES # BLD AUTO: 1.35 10*3/MM3 (ref 0.7–3.1)
LYMPHOCYTES NFR BLD AUTO: 11.3 % (ref 19.6–45.3)
MCH RBC QN AUTO: 25 PG (ref 26.6–33)
MCHC RBC AUTO-ENTMCNC: 31.4 G/DL (ref 31.5–35.7)
MCV RBC AUTO: 79.7 FL (ref 79–97)
MONOCYTES # BLD AUTO: 0.69 10*3/MM3 (ref 0.1–0.9)
MONOCYTES NFR BLD AUTO: 5.8 % (ref 5–12)
NEUTROPHILS NFR BLD AUTO: 80.3 % (ref 42.7–76)
NEUTROPHILS NFR BLD AUTO: 9.61 10*3/MM3 (ref 1.7–7)
NRBC BLD AUTO-RTO: 0.1 /100 WBC (ref 0–0.2)
PLATELET # BLD AUTO: 194 10*3/MM3 (ref 140–450)
PMV BLD AUTO: 10.6 FL (ref 6–12)
POTASSIUM SERPL-SCNC: 3.6 MMOL/L (ref 3.5–5.2)
PROT SERPL-MCNC: 5.8 G/DL (ref 6–8.5)
PROTHROMBIN TIME: 26.5 SECONDS (ref 11.7–14.2)
RBC # BLD AUTO: 3 10*6/MM3 (ref 4.14–5.8)
RH BLD: POSITIVE
SODIUM SERPL-SCNC: 139 MMOL/L (ref 136–145)
T&S EXPIRATION DATE: NORMAL
T4 FREE SERPL-MCNC: 1.4 NG/DL (ref 0.93–1.7)
TROPONIN T DELTA: 7 NG/L
TROPONIN T SERPL HS-MCNC: 116 NG/L
TSH SERPL DL<=0.05 MIU/L-ACNC: 7.37 UIU/ML (ref 0.27–4.2)
WBC NRBC COR # BLD: 11.96 10*3/MM3 (ref 3.4–10.8)

## 2023-08-04 PROCEDURE — 85610 PROTHROMBIN TIME: CPT | Performed by: EMERGENCY MEDICINE

## 2023-08-04 PROCEDURE — 80053 COMPREHEN METABOLIC PANEL: CPT | Performed by: EMERGENCY MEDICINE

## 2023-08-04 PROCEDURE — 93005 ELECTROCARDIOGRAM TRACING: CPT | Performed by: EMERGENCY MEDICINE

## 2023-08-04 PROCEDURE — 73090 X-RAY EXAM OF FOREARM: CPT

## 2023-08-04 PROCEDURE — 70450 CT HEAD/BRAIN W/O DYE: CPT

## 2023-08-04 PROCEDURE — 86850 RBC ANTIBODY SCREEN: CPT | Performed by: EMERGENCY MEDICINE

## 2023-08-04 PROCEDURE — 82948 REAGENT STRIP/BLOOD GLUCOSE: CPT

## 2023-08-04 PROCEDURE — G0378 HOSPITAL OBSERVATION PER HR: HCPCS

## 2023-08-04 PROCEDURE — 73030 X-RAY EXAM OF SHOULDER: CPT

## 2023-08-04 PROCEDURE — 36430 TRANSFUSION BLD/BLD COMPNT: CPT

## 2023-08-04 PROCEDURE — 86900 BLOOD TYPING SEROLOGIC ABO: CPT

## 2023-08-04 PROCEDURE — 86923 COMPATIBILITY TEST ELECTRIC: CPT

## 2023-08-04 PROCEDURE — 0HQEXZZ REPAIR LEFT LOWER ARM SKIN, EXTERNAL APPROACH: ICD-10-PCS | Performed by: EMERGENCY MEDICINE

## 2023-08-04 PROCEDURE — 72125 CT NECK SPINE W/O DYE: CPT

## 2023-08-04 PROCEDURE — 85025 COMPLETE CBC W/AUTO DIFF WBC: CPT | Performed by: EMERGENCY MEDICINE

## 2023-08-04 PROCEDURE — 84443 ASSAY THYROID STIM HORMONE: CPT | Performed by: HOSPITALIST

## 2023-08-04 PROCEDURE — 84439 ASSAY OF FREE THYROXINE: CPT | Performed by: HOSPITALIST

## 2023-08-04 PROCEDURE — P9016 RBC LEUKOCYTES REDUCED: HCPCS

## 2023-08-04 PROCEDURE — 85730 THROMBOPLASTIN TIME PARTIAL: CPT | Performed by: EMERGENCY MEDICINE

## 2023-08-04 PROCEDURE — 93010 ELECTROCARDIOGRAM REPORT: CPT | Performed by: INTERNAL MEDICINE

## 2023-08-04 PROCEDURE — 84484 ASSAY OF TROPONIN QUANT: CPT | Performed by: EMERGENCY MEDICINE

## 2023-08-04 PROCEDURE — 86900 BLOOD TYPING SEROLOGIC ABO: CPT | Performed by: EMERGENCY MEDICINE

## 2023-08-04 PROCEDURE — 73502 X-RAY EXAM HIP UNI 2-3 VIEWS: CPT

## 2023-08-04 PROCEDURE — 99285 EMERGENCY DEPT VISIT HI MDM: CPT

## 2023-08-04 PROCEDURE — 86901 BLOOD TYPING SEROLOGIC RH(D): CPT | Performed by: EMERGENCY MEDICINE

## 2023-08-04 PROCEDURE — 82550 ASSAY OF CK (CPK): CPT | Performed by: EMERGENCY MEDICINE

## 2023-08-04 PROCEDURE — 71045 X-RAY EXAM CHEST 1 VIEW: CPT

## 2023-08-04 RX ORDER — ACETAMINOPHEN 325 MG/1
650 TABLET ORAL EVERY 4 HOURS PRN
Status: DISCONTINUED | OUTPATIENT
Start: 2023-08-04 | End: 2023-08-08 | Stop reason: HOSPADM

## 2023-08-04 RX ORDER — BISACODYL 5 MG/1
5 TABLET, DELAYED RELEASE ORAL DAILY PRN
Status: DISCONTINUED | OUTPATIENT
Start: 2023-08-04 | End: 2023-08-08 | Stop reason: HOSPADM

## 2023-08-04 RX ORDER — CITALOPRAM 20 MG/1
20 TABLET ORAL DAILY
Status: DISCONTINUED | OUTPATIENT
Start: 2023-08-05 | End: 2023-08-08 | Stop reason: HOSPADM

## 2023-08-04 RX ORDER — ISOSORBIDE MONONITRATE 30 MG/1
30 TABLET, EXTENDED RELEASE ORAL DAILY
Status: DISCONTINUED | OUTPATIENT
Start: 2023-08-05 | End: 2023-08-05

## 2023-08-04 RX ORDER — POLYETHYLENE GLYCOL 3350 17 G/17G
17 POWDER, FOR SOLUTION ORAL DAILY PRN
Status: DISCONTINUED | OUTPATIENT
Start: 2023-08-04 | End: 2023-08-08 | Stop reason: HOSPADM

## 2023-08-04 RX ORDER — SODIUM CHLORIDE 0.9 % (FLUSH) 0.9 %
10 SYRINGE (ML) INJECTION AS NEEDED
Status: DISCONTINUED | OUTPATIENT
Start: 2023-08-04 | End: 2023-08-08 | Stop reason: HOSPADM

## 2023-08-04 RX ORDER — LEVOTHYROXINE SODIUM 0.03 MG/1
25 TABLET ORAL DAILY
Status: DISCONTINUED | OUTPATIENT
Start: 2023-08-05 | End: 2023-08-06

## 2023-08-04 RX ORDER — ACETAMINOPHEN 160 MG/5ML
650 SOLUTION ORAL EVERY 4 HOURS PRN
Status: DISCONTINUED | OUTPATIENT
Start: 2023-08-04 | End: 2023-08-08 | Stop reason: HOSPADM

## 2023-08-04 RX ORDER — ACETAMINOPHEN 650 MG/1
650 SUPPOSITORY RECTAL EVERY 4 HOURS PRN
Status: DISCONTINUED | OUTPATIENT
Start: 2023-08-04 | End: 2023-08-08 | Stop reason: HOSPADM

## 2023-08-04 RX ORDER — NITROGLYCERIN 0.4 MG/1
0.4 TABLET SUBLINGUAL
Status: DISCONTINUED | OUTPATIENT
Start: 2023-08-04 | End: 2023-08-08 | Stop reason: HOSPADM

## 2023-08-04 RX ORDER — MULTIPLE VITAMINS W/ MINERALS TAB 9MG-400MCG
1 TAB ORAL DAILY
COMMUNITY

## 2023-08-04 RX ORDER — DONEPEZIL HYDROCHLORIDE 10 MG/1
10 TABLET, FILM COATED ORAL NIGHTLY
Status: DISCONTINUED | OUTPATIENT
Start: 2023-08-04 | End: 2023-08-08 | Stop reason: HOSPADM

## 2023-08-04 RX ORDER — NICOTINE POLACRILEX 4 MG
15 LOZENGE BUCCAL
Status: DISCONTINUED | OUTPATIENT
Start: 2023-08-04 | End: 2023-08-08 | Stop reason: HOSPADM

## 2023-08-04 RX ORDER — GLIPIZIDE 5 MG/1
5 TABLET ORAL EVERY MORNING
COMMUNITY

## 2023-08-04 RX ORDER — IBUPROFEN 600 MG/1
1 TABLET ORAL
Status: DISCONTINUED | OUTPATIENT
Start: 2023-08-04 | End: 2023-08-08 | Stop reason: HOSPADM

## 2023-08-04 RX ORDER — INSULIN LISPRO 100 [IU]/ML
2-7 INJECTION, SOLUTION INTRAVENOUS; SUBCUTANEOUS
Status: DISCONTINUED | OUTPATIENT
Start: 2023-08-04 | End: 2023-08-08 | Stop reason: HOSPADM

## 2023-08-04 RX ORDER — SODIUM CHLORIDE 0.9 % (FLUSH) 0.9 %
10 SYRINGE (ML) INJECTION EVERY 12 HOURS SCHEDULED
Status: DISCONTINUED | OUTPATIENT
Start: 2023-08-04 | End: 2023-08-08 | Stop reason: HOSPADM

## 2023-08-04 RX ORDER — LEVODOPA AND CARBIDOPA 95; 23.75 MG/1; MG/1
1 CAPSULE, EXTENDED RELEASE ORAL AS NEEDED
COMMUNITY

## 2023-08-04 RX ORDER — TRAZODONE HYDROCHLORIDE 50 MG/1
25 TABLET ORAL NIGHTLY
Status: DISCONTINUED | OUTPATIENT
Start: 2023-08-04 | End: 2023-08-08 | Stop reason: HOSPADM

## 2023-08-04 RX ORDER — POTASSIUM CHLORIDE 750 MG/1
10 TABLET, FILM COATED, EXTENDED RELEASE ORAL DAILY
COMMUNITY
End: 2023-08-08 | Stop reason: HOSPADM

## 2023-08-04 RX ORDER — AMOXICILLIN 250 MG
2 CAPSULE ORAL 2 TIMES DAILY
Status: DISCONTINUED | OUTPATIENT
Start: 2023-08-04 | End: 2023-08-08 | Stop reason: HOSPADM

## 2023-08-04 RX ORDER — ALLOPURINOL 100 MG/1
100 TABLET ORAL DAILY
Status: DISCONTINUED | OUTPATIENT
Start: 2023-08-05 | End: 2023-08-08 | Stop reason: HOSPADM

## 2023-08-04 RX ORDER — ONDANSETRON 2 MG/ML
4 INJECTION INTRAMUSCULAR; INTRAVENOUS EVERY 6 HOURS PRN
Status: DISCONTINUED | OUTPATIENT
Start: 2023-08-04 | End: 2023-08-08 | Stop reason: HOSPADM

## 2023-08-04 RX ORDER — SODIUM CHLORIDE 9 MG/ML
40 INJECTION, SOLUTION INTRAVENOUS AS NEEDED
Status: DISCONTINUED | OUTPATIENT
Start: 2023-08-04 | End: 2023-08-08 | Stop reason: HOSPADM

## 2023-08-04 RX ORDER — ROSUVASTATIN CALCIUM 20 MG/1
20 TABLET, COATED ORAL NIGHTLY
Status: DISCONTINUED | OUTPATIENT
Start: 2023-08-04 | End: 2023-08-08 | Stop reason: HOSPADM

## 2023-08-04 RX ORDER — TIZANIDINE 4 MG/1
2 TABLET ORAL EVERY 8 HOURS PRN
Status: DISCONTINUED | OUTPATIENT
Start: 2023-08-04 | End: 2023-08-08 | Stop reason: HOSPADM

## 2023-08-04 RX ORDER — DEXTROSE MONOHYDRATE 25 G/50ML
25 INJECTION, SOLUTION INTRAVENOUS
Status: DISCONTINUED | OUTPATIENT
Start: 2023-08-04 | End: 2023-08-08 | Stop reason: HOSPADM

## 2023-08-04 RX ORDER — CHOLECALCIFEROL (VITAMIN D3) 125 MCG
500 CAPSULE ORAL 2 TIMES DAILY
Status: DISCONTINUED | OUTPATIENT
Start: 2023-08-04 | End: 2023-08-08 | Stop reason: HOSPADM

## 2023-08-04 RX ORDER — ONDANSETRON 4 MG/1
4 TABLET, FILM COATED ORAL EVERY 6 HOURS PRN
Status: DISCONTINUED | OUTPATIENT
Start: 2023-08-04 | End: 2023-08-08 | Stop reason: HOSPADM

## 2023-08-04 RX ORDER — LIDOCAINE HYDROCHLORIDE AND EPINEPHRINE 10; 10 MG/ML; UG/ML
10 INJECTION, SOLUTION INFILTRATION; PERINEURAL ONCE
Status: COMPLETED | OUTPATIENT
Start: 2023-08-04 | End: 2023-08-04

## 2023-08-04 RX ORDER — BISACODYL 10 MG
10 SUPPOSITORY, RECTAL RECTAL DAILY PRN
Status: DISCONTINUED | OUTPATIENT
Start: 2023-08-04 | End: 2023-08-08 | Stop reason: HOSPADM

## 2023-08-04 RX ORDER — TIZANIDINE 2 MG/1
2 TABLET ORAL EVERY 8 HOURS PRN
COMMUNITY

## 2023-08-04 RX ORDER — HYDROCODONE BITARTRATE AND ACETAMINOPHEN 5; 325 MG/1; MG/1
1 TABLET ORAL EVERY 6 HOURS PRN
Status: DISCONTINUED | OUTPATIENT
Start: 2023-08-04 | End: 2023-08-05

## 2023-08-04 RX ADMIN — Medication 500 MCG: at 22:56

## 2023-08-04 RX ADMIN — TRAZODONE HYDROCHLORIDE 25 MG: 50 TABLET ORAL at 22:56

## 2023-08-04 RX ADMIN — Medication 10 ML: at 22:56

## 2023-08-04 RX ADMIN — ROSUVASTATIN CALCIUM 20 MG: 20 TABLET, FILM COATED ORAL at 22:56

## 2023-08-04 RX ADMIN — DONEPEZIL HYDROCHLORIDE 10 MG: 10 TABLET, FILM COATED ORAL at 22:56

## 2023-08-04 RX ADMIN — ACETAMINOPHEN 650 MG: 325 TABLET, FILM COATED ORAL at 22:02

## 2023-08-04 RX ADMIN — SODIUM CHLORIDE, POTASSIUM CHLORIDE, SODIUM LACTATE AND CALCIUM CHLORIDE 1000 ML: 600; 310; 30; 20 INJECTION, SOLUTION INTRAVENOUS at 15:55

## 2023-08-04 RX ADMIN — LIDOCAINE HYDROCHLORIDE,EPINEPHRINE BITARTRATE 10 ML: 10; .01 INJECTION, SOLUTION INFILTRATION; PERINEURAL at 16:45

## 2023-08-04 NOTE — ED NOTES
"Nursing report ED to floor  Edilberto Reeder  73 y.o.  male    HPI :   Chief Complaint   Patient presents with    Fall    Syncope    Hypotension       Admitting doctor:   Paul Cummings MD    Admitting diagnosis:   The primary encounter diagnosis was Syncope, unspecified syncope type. Diagnoses of Hypotension, unspecified hypotension type, Laceration of left forearm, initial encounter, Anemia, unspecified type, and Elevated troponin were also pertinent to this visit.    Code status:   Current Code Status       Date Active Code Status Order ID Comments User Context       Prior            Allergies:   Bacitracin, Neosporin [neomycin-bacitracin zn-polymyx], Fish-derived products, Shellfish allergy, and Shrimp (diagnostic)    Isolation:   No active isolations    Intake and Output    Intake/Output Summary (Last 24 hours) at 8/4/2023 1833  Last data filed at 8/4/2023 1645  Gross per 24 hour   Intake 1750 ml   Output --   Net 1750 ml       Weight:       08/04/23  1536   Weight: 84.4 kg (186 lb)       Most recent vitals:   Vitals:    08/04/23 1536 08/04/23 1631 08/04/23 1703 08/04/23 1753   BP: 91/52 97/56 102/66 90/58   BP Location:    Left arm   Patient Position:    Lying   Pulse: 78 97 110 99   Resp: 16   18   Temp: 98.2 øF (36.8 øC)      TempSrc: Tympanic      SpO2: 98% 97% 94% 91%   Weight: 84.4 kg (186 lb)      Height: 182.9 cm (72\")          Active LDAs/IV Access:   Lines, Drains & Airways       Active LDAs       Name Placement date Placement time Site Days    Peripheral IV 08/04/23 1534 Right Antecubital 08/04/23  1534  Antecubital  less than 1                    Labs (abnormal labs have a star):   Labs Reviewed   COMPREHENSIVE METABOLIC PANEL - Abnormal; Notable for the following components:       Result Value    Glucose 126 (*)     BUN 26 (*)     Creatinine 1.49 (*)     CO2 14.0 (*)     Total Protein 5.8 (*)     Albumin 3.3 (*)     Alkaline Phosphatase 119 (*)     Anion Gap 24.0 (*)     eGFR 48.6 (*)     All " other components within normal limits    Narrative:     GFR Normal >60  Chronic Kidney Disease <60  Kidney Failure <15    The GFR formula is only valid for adults with stable renal function between ages 18 and 70.   PROTIME-INR - Abnormal; Notable for the following components:    Protime 26.5 (*)     INR 2.38 (*)     All other components within normal limits   SINGLE HSTROPONIN T - Abnormal; Notable for the following components:    HS Troponin T 116 (*)     All other components within normal limits    Narrative:     High Sensitive Troponin T Reference Range:  <10.0 ng/L- Negative Female for AMI  <15.0 ng/L- Negative Male for AMI  >=10 - Abnormal Female indicating possible myocardial injury.  >=15 - Abnormal Male indicating possible myocardial injury.   Clinicians would have to utilize clinical acumen, EKG, Troponin, and serial changes to determine if it is an Acute Myocardial Infarction or myocardial injury due to an underlying chronic condition.        CBC WITH AUTO DIFFERENTIAL - Abnormal; Notable for the following components:    WBC 11.96 (*)     RBC 3.00 (*)     Hemoglobin 7.5 (*)     Hematocrit 23.9 (*)     MCH 25.0 (*)     MCHC 31.4 (*)     RDW 17.1 (*)     Neutrophil % 80.3 (*)     Lymphocyte % 11.3 (*)     Immature Grans % 1.1 (*)     Neutrophils, Absolute 9.61 (*)     Immature Grans, Absolute 0.13 (*)     All other components within normal limits   APTT - Normal   CK - Normal   HIGH SENSITIVITIY TROPONIN T 2HR   TYPE AND SCREEN   PREPARE RBC   CBC AND DIFFERENTIAL    Narrative:     The following orders were created for panel order CBC & Differential.  Procedure                               Abnormality         Status                     ---------                               -----------         ------                     CBC Auto Differential[[id number]]        Abnormal            Final result                 Please view results for these tests on the individual orders.       EKG:   ECG 12 Lead Syncope    Preliminary Result   HEART RATE= 83  bpm   RR Interval= 723  ms   NY Interval= 59  ms   P Horizontal Axis= 218  deg   P Front Axis= 0  deg   QRSD Interval= 98  ms   QT Interval= 466  ms   QRS Axis= 190  deg   T Wave Axis= -1  deg   - ABNORMAL ECG -   Sinus rhythm   Multiform ventricular premature complexes   Short NY interval   Right axis deviation   Low voltage, extremity leads   Borderline repolarization abnormality   Prolonged QT interval   Electronically Signed By:    Date and Time of Study: 2023-08-04 15:54:38          Meds given in ED:   Medications   sodium chloride 0.9 % flush 10 mL (has no administration in time range)   iopamidol (ISOVUE-300) 61 % injection 100 mL (has no administration in time range)   lactated ringers bolus 1,000 mL (0 mL Intravenous Stopped 8/4/23 4285)   lidocaine 1% - EPINEPHrine 1:814378 (XYLOCAINE W/EPI) 1 %-1:183024 injection 10 mL (10 mL Injection Given by Other 8/4/23 1015)       Imaging results:  CT Cervical Spine Without Contrast    Result Date: 8/4/2023  1. Cervical degenerative disease with no fractures seen.  Radiation dose reduction techniques were utilized, including automated exposure control and exposure modulation based on body size.       XR Chest 1 View    Result Date: 8/4/2023  No focal pulmonary consolidation. Follow-up as clinical indications persist.  This report was finalized on 8/4/2023 4:14 PM by Dr. Myron Rutledge M.D.       Ambulatory status:   - bedrest      Social issues:   Social History     Socioeconomic History    Marital status:    Tobacco Use    Smoking status: Never    Smokeless tobacco: Never    Tobacco comments:     caffeine use   Vaping Use    Vaping Use: Never used   Substance and Sexual Activity    Alcohol use: Yes    Drug use: No    Sexual activity: Defer       NIH Stroke Scale:       Harvey Monzon RN  08/04/23 18:33 EDT

## 2023-08-04 NOTE — H&P
HISTORY AND PHYSICAL   [facility]        Patient Identification:  Name: Edilberto Reeder  Age: 73 y.o.  Sex: male  :  1950  MRN: [id number]                     Primary Care Physician: Harvey Larson MD    Chief Complaint: Fall, syncope..    History of Present Illness:   Pleasant 73-year-old gentleman with a history of Parkinson's disease as well as anticoagulation for chronic atrial fibrillation, chronic anemia and reported chronic diastolic CHF.  He presents after sustaining a fall resulting in a laceration, significant bleeding and then syncope.  Patient states she fell while trying to do laundry.  He denies any loss of consciousness at this point.  However he was unable to get back up and when his wife found him there is a significant amount of blood all over the floor and all over the patient himself.  She attempted to wrap the arm and when she attempted to stand him up to bring him to the emergency room he passed out.  EMS was called.  On arrival is reported that his blood pressure was 70/40.  He did have IV fluid resuscitation in route and apparently after presentation also.  Presently blood pressure running a little over 100 systolic.  Patient reports no chest pain or palpitations.  On questioning he does state that he has been feeling a little short of breath ever since the fall.    Past Medical History:  Past Medical History:   Diagnosis Date    Atrial fibrillation with RVR     Atrial flutter     Diabetes     HLD (hyperlipidemia) 2016    Hypertension     Parkinson's disease     Advanced      Past Surgical History:  Past Surgical History:   Procedure Laterality Date    BRAIN STIMULATOR      COLONOSCOPY N/A 2022    Procedure: COLONOSCOPY TO CECUM WITH COLD SNARE POLYPECTOMY;  Surgeon: Luther Ferrer MD;  Location: [facility] ENDOSCOPY;  Service: Gastroenterology;  Laterality: N/A;  PRE:ANEMIA  POST:POLYPS/HEMORRHOIDS    ENDOSCOPY N/A 2022    Procedure:  ESOPHAGOGASTRODUODENOSCOPY WITH  COLD BIOPSIES;  Surgeon: Luther Ferrer MD;  Location: [facility] ENDOSCOPY;  Service: Gastroenterology;  Laterality: N/A;  PRE:ANEMIA  POST:DIVERTICULUM/GASTRITIS    JOINT REPLACEMENT      Bilateral shoulder and knee replacements      Home Meds:  (Not in a hospital admission)      Allergies:  Allergies   Allergen Reactions    Bacitracin Anaphylaxis    Neosporin [Neomycin-Bacitracin Zn-Polymyx] Other (See Comments)     BP drops and heart stops!    Fish-Derived Products Hives    Shellfish Allergy Hives    Shrimp (Diagnostic) Hives     Immunizations:  Immunization History   Administered Date(s) Administered    COVID-19 (PFIZER) Purple Cap Monovalent 01/15/2021, 2021, 2021    Fluzone High-Dose 65+yrs 2022    Td (TDVAX) 1998     Social History:   Social History     Social History Narrative    Not on file     Social History     Tobacco Use    Smoking status: Never    Smokeless tobacco: Never    Tobacco comments:     caffeine use   Substance Use Topics    Alcohol use: Yes     Family History:  History reviewed. No pertinent family history.     Review of Systems  Review of Systems   Constitutional: Negative.    HENT: Negative.     Eyes: Negative.    Respiratory:          As per history of present notes.   Cardiovascular:         As per history of present illness.   Gastrointestinal: Negative.    Endocrine: Negative.    Genitourinary: Negative.    Musculoskeletal:         Laceration left arm.  Now sutures via your physician.   Skin:         See above.   Allergic/Immunologic: Negative.    Neurological: Negative.    Hematological: Negative.    Psychiatric/Behavioral: Negative.       Objective:  T Max 24 hrs: Temp (24hrs), Av.2 øF (36.8 øC), Min:98.2 øF (36.8 øC), Max:98.2 øF (36.8 øC)    Vitals Ranges:   Temp:  [98.2 øF (36.8 øC)] 98.2 øF (36.8 øC)  Heart Rate:  [] 99  Resp:  [16-18] 18  BP: ()/(52-66) 90/58      Exam:  Physical Exam  Constitutional:        General: He is not in acute distress.     Appearance: Normal appearance. He is normal weight. He is not ill-appearing, toxic-appearing or diaphoretic.   HENT:      Head: Normocephalic and atraumatic.      Right Ear: External ear normal.      Left Ear: External ear normal.      Nose: Nose normal.      Mouth/Throat:      Comments: Mucous membranes borderline dry.  Eyes:      General: No scleral icterus.        Right eye: No discharge.         Left eye: No discharge.      Extraocular Movements: Extraocular movements intact.      Conjunctiva/sclera: Conjunctivae normal.   Cardiovascular:      Rate and Rhythm: Normal rate and regular rhythm.      Heart sounds: Normal heart sounds.   Pulmonary:      Effort: Pulmonary effort is normal.      Breath sounds: Normal breath sounds.   Abdominal:      General: Abdomen is flat. Bowel sounds are normal. There is no distension.      Palpations: Abdomen is soft. There is no mass.      Tenderness: There is no abdominal tenderness. There is no guarding or rebound.   Musculoskeletal:      Cervical back: Neck supple.      Right lower leg: Edema present.      Left lower leg: Edema present.      Comments: Trace partially pitting pedal edema bilaterally.   Skin:     General: Skin is warm and dry.   Neurological:      Mental Status: He is alert and oriented to person, place, and time.   Psychiatric:         Mood and Affect: Mood normal.         Behavior: Behavior normal.         Thought Content: Thought content normal.         Judgment: Judgment normal.       Data Review:  All labs and radiology reviewed.    Assessment:  Syncope: Secondary to acute blood loss in the setting of atrial fibrillation, patient dependent.  Also with elevated at bedtime troponin.  Repeat pending.  Inverted T waves laterally leads.  Admit to monitored unit.  Cardiology consultation.  Anemia-acute on chronic-symptomatic: Transfuse 1 unit packed RBCs.  Put Eliquis on hold.  Monitor.  Check iron panel, folate  etc.  Atrial fibrillation: Presently paced.  Continue rate control.  Anticoagulation on hold.  Cardiology consultation.  Assess risk versus benefits of ongoing anticoagulation.  Hypotension: Secondary acute blood loss.  Status post fluid resuscitation.  Transfuse as noted above.  Monitor closely.  CKD: Baseline uncertain.  Fair amount of variability in previous creatinine levels.  He does appear to be above his baseline at present however.  Monitor closely.  Diabetes type 2: Keep Glucotrol metformin on hold.  Monitor closely sliding-scale insulin.  Hypothyroid: Check TSH and continue levothyroxine.  Parkinson's disease: Continue home meds.          Plan:  Please see above.  Discussed with patient and wife at bedside.  Discussed with ER provider.    Paul Cummings MD  8/4/2023  18:20 EDT    EMR Dragon/Transcription disclaimer:   Much of this encounter note is an electronic transcription/translation of spoken language to printed text. The electronic translation of spoken language may permit erroneous, or at times, nonsensical words or phrases to be inadvertently transcribed; Although I have reviewed the note for such errors, some may still exist.

## 2023-08-04 NOTE — ED PROVIDER NOTES
EMERGENCY DEPARTMENT ENCOUNTER    Room Number:  16/16  Date seen:  8/4/2023  PCP: Harvey Larson MD  Historian(s): Patient, paramedics      HPI:  Chief Complaint: Found down, forearm injury, syncope, low blood pressure  A complete HPI/ROS/PMH/PSH/SH/FH are unobtainable / limited due to: Patient has history of Parkinson's dementia  Context: Edilberto Reeder is a 73 y.o. male who presents to the ED via EMS from home after he was found down on the floor by his wife.  He may have been on the ground for at least 40 minutes but it is not known exactly how long he was down.  She noticed that he was bleeding from his left forearm.  While she was trying to bandage of his forearm he had a syncopal episode.  Paramedics arrived and found him with a low blood pressure of 70/40.  They started some IV fluids in route.  Patient complains of some pain in the left shoulder, left hip and left arm as well as the neck      PAST MEDICAL HISTORY  Active Ambulatory Problems     Diagnosis Date Noted    ASCVD (arteriosclerotic cardiovascular disease) 01/27/2016    Permanent atrial fibrillation 01/27/2016    Diabetic retinopathy associated with type 2 diabetes mellitus 01/27/2016    Essential hypertension 01/27/2016    HLD (hyperlipidemia) 01/27/2016    Hypothyroidism 01/27/2016    Peripheral neuropathy 01/27/2016    Renal insufficiency 01/27/2016    Type 2 diabetes mellitus with hyperglycemia, without long-term current use of insulin     Parkinson's disease     Dyspnea on exertion 04/19/2018    Atrial fibrillation, persistent 08/17/2018    Stroke-like symptoms 12/08/2018    Hypopotassemia 12/08/2018    Ankle pain 09/17/2021    Acute idiopathic gout of right ankle 09/18/2021    Generalized weakness 03/23/2022    Head injury due to trauma 04/30/2022    Acute blood loss anemia 05/01/2022    Chronic anticoagulation 05/01/2022    Thrombocytopenia 05/04/2022    Screening for colon cancer 05/04/2022    Minor head injury, initial encounter  05/04/2022    Chronic diastolic heart failure 11/02/2022    Shortness of breath 11/02/2022     Resolved Ambulatory Problems     Diagnosis Date Noted    Atrial fibrillation with RVR 01/04/2018    Chest pain 03/23/2022    Episodes of staring 03/23/2022    Diplopia 03/23/2022    Headache 03/23/2022    Metabolic encephalopathy 03/25/2022     Past Medical History:   Diagnosis Date    Atrial flutter     Diabetes     Hypertension          PAST SURGICAL HISTORY  Past Surgical History:   Procedure Laterality Date    BRAIN STIMULATOR      COLONOSCOPY N/A 5/5/2022    Procedure: COLONOSCOPY TO CECUM WITH COLD SNARE POLYPECTOMY;  Surgeon: Luther Ferrer MD;  Location: [facility];  Service: Gastroenterology;  Laterality: N/A;  PRE:ANEMIA  POST:POLYPS/HEMORRHOIDS    ENDOSCOPY N/A 5/5/2022    Procedure: ESOPHAGOGASTRODUODENOSCOPY WITH  COLD BIOPSIES;  Surgeon: Luther Ferrer MD;  Location: [facility];  Service: Gastroenterology;  Laterality: N/A;  PRE:ANEMIA  POST:DIVERTICULUM/GASTRITIS    JOINT REPLACEMENT      Bilateral shoulder and knee replacements         FAMILY HISTORY  History reviewed. No pertinent family history.      SOCIAL HISTORY  Social History     Socioeconomic History    Marital status:    Tobacco Use    Smoking status: Never    Smokeless tobacco: Never    Tobacco comments:     caffeine use   Vaping Use    Vaping Use: Never used   Substance and Sexual Activity    Alcohol use: Yes    Drug use: No    Sexual activity: Defer         ALLERGIES  Bacitracin, Neosporin [neomycin-bacitracin zn-polymyx], Fish-derived products, Shellfish allergy, and Shrimp (diagnostic)        REVIEW OF SYSTEMS  Review of Systems         PHYSICAL EXAM  ED Triage Vitals [08/04/23 1536]   Temp Heart Rate Resp BP SpO2   98.2 øF (36.8 øC) 78 16 91/52 98 %      Temp src Heart Rate Source Patient Position BP Location FiO2 (%)   Tympanic Monitor -- -- --       Physical Exam      GENERAL: Elderly gentleman, has some  dried blood in various places on the body, appears uncomfortable but no acute distress  HENT: nares patent, normocephalic and atraumatic, dry mucous membranes  EYES: no scleral icterus, EOMI, normal conjunctivae  CV: regular rhythm, normal rate, normal distal pulses  RESPIRATORY: normal effort, no stridor, lungs are clear, no chest wall tenderness  ABDOMEN: soft, no abdominal tenderness  MUSCULOSKELETAL: There is a laceration wound noted to the left mid dorsal forearm that is dressed with Kerlix.  Patient has some mild tenderness to the left shoulder into the left hip as well.  No obvious deformity evident.  Seems to have normal range of motion at these joints.  NEURO: alert, moves all extremities, follows commands, no focal motor deficits apparent.  PSYCH:  calm, cooperative  SKIN: warm, dry    Vital signs and nursing notes reviewed.        LAB RESULTS  Recent Results (from the past 24 hour(s))   Comprehensive Metabolic Panel    Collection Time: 08/04/23  3:52 PM    Specimen: Blood   Result Value Ref Range    Glucose 126 (H) 65 - 99 mg/dL    BUN 26 (H) 8 - 23 mg/dL    Creatinine 1.49 (H) 0.76 - 1.27 mg/dL    Sodium 139 136 - 145 mmol/L    Potassium 3.6 3.5 - 5.2 mmol/L    Chloride 101 98 - 107 mmol/L    CO2 14.0 (L) 22.0 - 29.0 mmol/L    Calcium 9.1 8.6 - 10.5 mg/dL    Total Protein 5.8 (L) 6.0 - 8.5 g/dL    Albumin 3.3 (L) 3.5 - 5.2 g/dL    ALT (SGPT) 8 1 - 41 U/L    AST (SGOT) 32 1 - 40 U/L    Alkaline Phosphatase 119 (H) 39 - 117 U/L    Total Bilirubin 0.9 0.0 - 1.2 mg/dL    Globulin 2.5 gm/dL    A/G Ratio 1.3 g/dL    BUN/Creatinine Ratio 17.4 7.0 - 25.0    Anion Gap 24.0 (H) 5.0 - 15.0 mmol/L    eGFR 48.6 (L) >60.0 mL/min/1.73   aPTT    Collection Time: 08/04/23  3:52 PM    Specimen: Blood   Result Value Ref Range    PTT 32.2 22.7 - 35.4 seconds   Protime-INR    Collection Time: 08/04/23  3:52 PM    Specimen: Blood   Result Value Ref Range    Protime 26.5 (H) 11.7 - 14.2 Seconds    INR 2.38 (H) 0.90 - 1.10    Single High Sensitivity Troponin T    Collection Time: 08/04/23  3:52 PM    Specimen: Blood   Result Value Ref Range    HS Troponin T 116 (C) <15 ng/L   CK    Collection Time: 08/04/23  3:52 PM    Specimen: Blood   Result Value Ref Range    Creatine Kinase 153 20 - 200 U/L   CBC Auto Differential    Collection Time: 08/04/23  3:52 PM    Specimen: Blood   Result Value Ref Range    WBC 11.96 (H) 3.40 - 10.80 10*3/mm3    RBC 3.00 (L) 4.14 - 5.80 10*6/mm3    Hemoglobin 7.5 (L) 13.0 - 17.7 g/dL    Hematocrit 23.9 (L) 37.5 - 51.0 %    MCV 79.7 79.0 - 97.0 fL    MCH 25.0 (L) 26.6 - 33.0 pg    MCHC 31.4 (L) 31.5 - 35.7 g/dL    RDW 17.1 (H) 12.3 - 15.4 %    RDW-SD 49.4 37.0 - 54.0 fl    MPV 10.6 6.0 - 12.0 fL    Platelets 194 140 - 450 10*3/mm3    Neutrophil % 80.3 (H) 42.7 - 76.0 %    Lymphocyte % 11.3 (L) 19.6 - 45.3 %    Monocyte % 5.8 5.0 - 12.0 %    Eosinophil % 1.1 0.3 - 6.2 %    Basophil % 0.4 0.0 - 1.5 %    Immature Grans % 1.1 (H) 0.0 - 0.5 %    Neutrophils, Absolute 9.61 (H) 1.70 - 7.00 10*3/mm3    Lymphocytes, Absolute 1.35 0.70 - 3.10 10*3/mm3    Monocytes, Absolute 0.69 0.10 - 0.90 10*3/mm3    Eosinophils, Absolute 0.13 0.00 - 0.40 10*3/mm3    Basophils, Absolute 0.05 0.00 - 0.20 10*3/mm3    Immature Grans, Absolute 0.13 (H) 0.00 - 0.05 10*3/mm3    nRBC 0.1 0.0 - 0.2 /100 WBC   ECG 12 Lead Syncope    Collection Time: 08/04/23  3:54 PM   Result Value Ref Range    QT Interval 466 ms       Ordered the above labs and reviewed the results.        RADIOLOGY  XR Forearm 2 View Left    Result Date: 8/4/2023  PROCEDURE:  XR FOREARM 2 VW LEFT-  HISTORY: Fall, laceration.  COMPARISON: None.  FINDINGS:   2 views of the left forearm were obtained.  There is soft tissue irregularity overlying the mid ventral forearm, likely reflecting the patient's reported laceration. No definite radiopaque foreign bodies are identified. There is a small amount of soft tissue gas. No acute fracture or malalignment is identified.  There is  scattered mild osteoarthritis..  There is calcific atherosclerosis.  This report was finalized on 8/4/2023 4:20 PM by Dr. Natasha Vasquez M.D.      CT Cervical Spine Without Contrast    Result Date: 8/4/2023  CT OF THE CERVICAL SPINE WITHOUT CONTRAST INCLUDING RECONSTRUCTION IMAGES 08/04/2023  HISTORY: Fell, neck pain.  TECHNIQUE: Axial images were obtained from the skull base to the upper thoracic spine. Sagittal and coronal reconstruction images were reviewed.  FINDINGS: There is mild degenerative disease of the C1-C2 articulation. There is approximately 6 mm anterolisthesis of C2 on C3 with some C2-3 disc space narrowing. C3-4, C4-5, C5-C6 and C6-7 mild-to-moderate spondylosis is seen with some mild spondylosis of C7-T1.. No other subluxation is seen. No cervical spine fractures are seen. Stimulator leads are seen in the soft tissues of the right side of the neck. There is some left carotid calcification.      1. Cervical degenerative disease with no fractures seen.  Radiation dose reduction techniques were utilized, including automated exposure control and exposure modulation based on body size.       XR Chest 1 View    Result Date: 8/4/2023  XR CHEST 1 VW-  HISTORY: Male who is 73 years-old,  syncope  TECHNIQUE: Frontal view of the chest  COMPARISON: 03/23/2022  FINDINGS: The heart size is normal. Pulmonary vasculature is unremarkable. Aorta is calcified. Skin folds are seen on the right. No focal pulmonary consolidation, pleural effusion, or pneumothorax. No acute osseous process.      No focal pulmonary consolidation. Follow-up as clinical indications persist.  This report was finalized on 8/4/2023 4:14 PM by Dr. Myron Rutledge M.D.      XR Hip With or Without Pelvis 2 - 3 View Left, XR Shoulder 2+ View Left    Result Date: 8/4/2023  XR HIP W OR WO PELVIS 2-3 VIEW LEFT-, XR SHOULDER 2+ VW LEFT-  Clinical: Fell, pain  AP PELVIS LEFT HIP FINDINGS: Symmetric moderate bilateral hip joint degeneration. No  fracture of the left hip or hemipelvis. Vascular calcifications within the soft tissues, no soft tissue gas or radiopaque foreign body seen. The remainder is unremarkable.  LEFT SHOULDER FINDINGS: The acromioclavicular alignment is satisfactory, there are some widening of the joint. No acute osseous abnormality is demonstrated. Old-appearing deformity of the left fifth rib. Total left shoulder replacement prosthesis in position. The humeral socket appears along the lower margin of the glenoid component. There is no gross indication of dislocation. The remainder is unremarkable.  This report was finalized on 8/4/2023 5:45 PM by Dr. Yan Gonzalez M.D.       Ordered the above noted radiological studies. Reviewed by me in PACS.          PROCEDURES  Laceration Repair    Date/Time: 8/4/2023 5:18 PM  Performed by: Ulises Boland MD  Authorized by: Ulises Boland MD     Consent:     Consent obtained:  Verbal    Consent given by:  Patient and spouse    Risks, benefits, and alternatives were discussed: yes      Risks discussed:  Infection and pain  Universal protocol:     Procedure explained and questions answered to patient or proxy's satisfaction: yes      Patient identity confirmed:  Verbally with patient  Anesthesia:     Anesthesia method:  Local infiltration    Local anesthetic:  Lidocaine 1% WITH epi  Laceration details:     Location:  Shoulder/arm    Shoulder/arm location:  L lower arm    Length (cm):  6  Pre-procedure details:     Preparation:  Patient was prepped and draped in usual sterile fashion and imaging obtained to evaluate for foreign bodies  Exploration:     Hemostasis achieved with:  Direct pressure    Imaging obtained: x-ray      Imaging outcome: foreign body not noted      Wound exploration: wound explored through full range of motion and entire depth of wound visualized      Wound extent: no foreign bodies/material noted, no nerve damage noted, no tendon damage noted, no underlying fracture noted  and no vascular damage noted      Contaminated: no    Treatment:     Area cleansed with:  Saline    Amount of cleaning:  Standard    Irrigation solution:  Sterile saline    Irrigation volume:  500ml    Irrigation method:  Pressure wash  Skin repair:     Repair method:  Sutures    Suture size:  4-0    Suture material:  Prolene    Suture technique:  Simple interrupted    Number of sutures:  8  Approximation:     Approximation:  Close  Repair type:     Repair type:  Simple  Post-procedure details:     Dressing:  Non-adherent dressing    Procedure completion:  Tolerated well, no immediate complications      MEDICATIONS GIVEN IN ER  Medications   sodium chloride 0.9 % flush 10 mL (has no administration in time range)   iopamidol (ISOVUE-300) 61 % injection 100 mL (has no administration in time range)   lactated ringers bolus 1,000 mL (0 mL Intravenous Stopped 8/4/23 1645)   lidocaine 1% - EPINEPHrine 1:044276 (XYLOCAINE W/EPI) 1 %-1:809313 injection 10 mL (10 mL Injection Given by Other 8/4/23 1645)           MEDICAL DECISION MAKING, PROGRESS, and CONSULTS    All labs have been independently reviewed by me.  All radiology studies have been reviewed by me and I have also reviewed the radiology report.   EKG's independently viewed and interpreted by me.  Discussion below represents my analysis of pertinent findings related to patient's condition, differential diagnosis, treatment plan and final disposition.    EKG           EKG time/Interp time: 1554/1603  Rhythm/Rate: Sinus rhythm, 83 bpm, 1 PVC  P waves and NJ: Present, 59 ms  QRS, axis: 98 ms, right axis deviation  ST and T waves: No ST segment elevations notable.    Independently interpreted by me contemporaneously with treatment    Additional sources:  - Discussed/ obtained information from independent historians:  I discussed with paramedics to receive report on patient's condition on arrival and treatments initiated in route to the hospital      - External (non-ED)  record review: I reviewed previous cardiology office note from February 14, 2023 when he had follow-up care for atrial fibrillation, hypertension and diastolic heart failure.    - Chronic or social conditions impacting care: Elderly patient with parkinsonism and multiple other medical problems who lives at home with his wife on chronic anticoagulation.        Orders placed during this visit:  Orders Placed This Encounter   Procedures    Laceration Repair    XR Chest 1 View    CT Head Without Contrast    XR Forearm 2 View Left    XR Hip With or Without Pelvis 2 - 3 View Left    XR Shoulder 2+ View Left    CT Cervical Spine Without Contrast    Comprehensive Metabolic Panel    aPTT    Protime-INR    Single High Sensitivity Troponin T    CK    CBC Auto Differential    High Sensitivity Troponin T 2Hr    Monitor Blood Pressure    Pulse Oximetry, Continuous    Verify Informed Consent for Blood Product Administration    [facility] (on-call MD unless specified) Details    ECG 12 Lead Syncope    Type & Screen    Prepare RBC, 1 Units    Insert Peripheral IV    Initiate Observation Status    CBC & Differential           Differential diagnosis includes but is not limited to:    Intracranial hemorrhage, forearm laceration, syncope, dehydration, arrhythmia, acute stroke, acute MI      Independent interpretation of labs, radiology studies, and discussions with consultants:  ED Course as of 08/04/23 1828   Fri Aug 04, 2023   1606 Systolic is in the 90s right now.  Continuing some IV fluids.  Ordering multiple radiology studies to look for any osseous injuries of the affected areas and also to look at the head to rule out any intracranial injury or C-spine fracture. [JOSE MARIA]   1606 Given his presenting blood pressure with hypotension, he will certainly need to be admitted today for further management in context of this syncope complaint [JOSE MARIA]   1722 HS Troponin T(!!): 116 [JOSE MARIA]   1722 Hemoglobin(!): 7.5 [JOSE MARIA]   1722 Hemoglobin(!): 7.5 [JOSE MARIA]   1722  Hematocrit(!): 23.9 [JOSE MARIA]   1722 INR(!): 2.38 [JOSE MARIA]   1722 Creatinine(!): 1.49 [JOSE MARIA]   1722 CO2(!): 14.0 [JOSE MARIA]   1722 I independently interpreted the Chest X-ray and my findings are: Postoperative changes, no Pneumothorax, No Effusion, No Infiltrate   [JOSE MARIA]   1722 XR Forearm 2 View Left [JOSE MARIA]   1755 I independently interpreted the x-ray of the left hip and my findings are, no fracture or dislocation [JOSE MARIA]   1755 I just discussed with Dr. Cummings from [facility] about this patient.  He agrees to accept him to the hospitalist team for further management on telemetry tonight. [JOSE MARIA]      ED Course User Index  [JOSE MARIA] Ulises Boland MD         DIAGNOSIS  Final diagnoses:   Syncope, unspecified syncope type   Hypotension, unspecified hypotension type   Laceration of left forearm, initial encounter   Anemia, unspecified type   Elevated troponin         DISPOSITION  Observation to [facility], telemetry        Latest Documented Vital Signs:  As of 18:28 EDT  BP- 90/58 HR- 99 Temp- 98.2 øF (36.8 øC) (Tympanic) O2 sat- 91%              --    Please note that portions of this were completed with a voice recognition program.       Note Disclaimer: At [facility], we believe that sharing information builds trust and better relationships. You are receiving this note because you are receiving care at [facility] or recently visited. It is possible you will see health information before a provider has talked with you about it. This kind of information can be easy to misunderstand. To help you fully understand what it means for your health, we urge you to discuss this note with your provider.             Ulises Boland MD  08/04/23 1550

## 2023-08-04 NOTE — ED NOTES
Unwitnessed fall at home.  Wife found him sitting and bleeding.  While she was bandaging him up he had a syncopal episode.  On ems arrival bp was 70/40.  Pt c/o left shoulder, left arm and left hip pain.

## 2023-08-04 NOTE — PROGRESS NOTES
Clinical Pharmacy Services: Medication History    Edilberto Reeder is a 73 y.o. male presenting to [facility] for   Chief Complaint   Patient presents with    Fall    Syncope    Hypotension       He  has a past medical history of Atrial fibrillation with RVR, Atrial flutter, Diabetes, HLD (hyperlipidemia) (01/27/2016), Hypertension, and Parkinson's disease.    Allergies as of 08/04/2023 - Reviewed 08/04/2023   Allergen Reaction Noted    Bacitracin Anaphylaxis 11/14/2012    Neosporin [neomycin-bacitracin zn-polymyx] Other (See Comments) 06/07/2013    Fish-derived products Hives 01/04/2018    Shellfish allergy Hives 02/12/2014    Shrimp (diagnostic) Hives 01/04/2018       Medication information was obtained from: Patient Med List/Family Member  Pharmacy and Phone Number:     Prior to Admission Medications       Prescriptions Last Dose Informant Patient Reported? Taking?    acetaminophen (TYLENOL) 325 MG tablet  Family Member No Yes    Take 2 tablets by mouth Every 6 (Six) Hours As Needed for Mild Pain .    allopurinol (ZYLOPRIM) 100 MG tablet  Family Member Yes Yes    Take 1 tablet by mouth Daily.    apixaban (ELIQUIS) 5 MG tablet tablet  Family Member Yes Yes    Take 1 tablet by mouth 2 (Two) Times a Day.    Calcium Carbonate Antacid (CALCIUM CARBONATE PO)  Family Member Yes Yes    Take 650 mg by mouth Daily.    Carbidopa-Levodopa ER (Rytary) 23.75-95 MG capsule controlled-release  Family Member Yes Yes    Take 1 capsule by mouth As Needed.    Carbidopa-Levodopa ER 36. MG capsule controlled-release  Family Member Yes Yes    Take 4 capsules by mouth 4 (Four) Times a Day.    citalopram (CeleXA) 20 MG tablet  Family Member Yes Yes    Take 1 tablet by mouth Daily.    docusate sodium (COLACE) 50 MG capsule  Family Member Yes Yes    Take 2 capsules by mouth Every Night.    donepezil (ARICEPT) 5 MG tablet  Family Member Yes Yes    Take 2 tablets by mouth Every Night.    furosemide (LASIX) 40 MG tablet   Family Member No Yes    TAKE 1 AND 1/2 TABLET BY MOUTH DAILY    Patient taking differently:  Take 1.5 tablets by mouth Daily.    glipizide (GLUCOTROL) 5 MG tablet  Family Member Yes Yes    Take 1 tablet by mouth Every Morning.    isosorbide mononitrate (IMDUR) 30 MG 24 hr tablet  Family Member No Yes    TAKE ONE TABLET BY MOUTH DAILY    Patient taking differently:  Take 1 tablet by mouth Daily.    levothyroxine (SYNTHROID, LEVOTHROID) 25 MCG tablet  Family Member Yes Yes    Take 1 tablet by mouth Daily.    metFORMIN (GLUCOPHAGE) 500 MG tablet  Family Member Yes Yes    Take 1 tablet by mouth 2 (Two) Times a Day With Meals.    metoprolol tartrate (LOPRESSOR) 25 MG tablet  Family Member No Yes    TAKE 1/2 TABLET BY MOUTH TWICE A DAY    Patient taking differently:  Take 0.5 tablets by mouth 2 (Two) Times a Day.    multivitamin with minerals (MULTIVITAMIN ADULT PO)  Family Member Yes Yes    Take 1 tablet by mouth Daily.    potassium chloride 10 MEQ CR tablet  Family Member Yes Yes    Take 1 tablet by mouth Daily.    rosuvastatin (CRESTOR) 20 MG tablet  Family Member No Yes    Take 1 tablet by mouth Every Night.    spironolactone (ALDACTONE) 25 MG tablet  Family Member No Yes    Take 1 tablet by mouth Daily.    tiZANidine (ZANAFLEX) 2 MG tablet  Family Member Yes Yes    Take 1 tablet by mouth Every 8 (Eight) Hours As Needed for Muscle Spasms.    traZODone (DESYREL) 50 MG tablet  Family Member Yes Yes    Take 0.5 tablets by mouth Every Night.    vitamin B-12 (CYANOCOBALAMIN) 1000 MCG tablet  Family Member Yes Yes    Take 0.5 tablets by mouth 2 (Two) Times a Day.              Medication notes: Patient family provided a medication list. There are quite a few medications that the pt family is sure pt is taking but unable to track on surescripts.     This medication list is complete to the best of my knowledge as of 8/4/2023    Please call if questions.    Estephanie Sanchez  Medication History Technician   [phone]    8/4/2023 17:53  EDT

## 2023-08-05 LAB
ANION GAP SERPL CALCULATED.3IONS-SCNC: 10.3 MMOL/L (ref 5–15)
BASOPHILS # BLD AUTO: 0.03 10*3/MM3 (ref 0–0.2)
BASOPHILS NFR BLD AUTO: 0.4 % (ref 0–1.5)
BH BB BLOOD EXPIRATION DATE: NORMAL
BH BB BLOOD TYPE BARCODE: 6200
BH BB DISPENSE STATUS: NORMAL
BH BB PRODUCT CODE: NORMAL
BH BB UNIT NUMBER: NORMAL
BUN SERPL-MCNC: 26 MG/DL (ref 8–23)
BUN/CREAT SERPL: 21.7 (ref 7–25)
CALCIUM SPEC-SCNC: 8.6 MG/DL (ref 8.6–10.5)
CHLORIDE SERPL-SCNC: 103 MMOL/L (ref 98–107)
CO2 SERPL-SCNC: 23.7 MMOL/L (ref 22–29)
CREAT SERPL-MCNC: 1.2 MG/DL (ref 0.76–1.27)
CROSSMATCH INTERPRETATION: NORMAL
DEPRECATED RDW RBC AUTO: 50.8 FL (ref 37–54)
EGFRCR SERPLBLD CKD-EPI 2021: 63.1 ML/MIN/1.73
EOSINOPHIL # BLD AUTO: 0.05 10*3/MM3 (ref 0–0.4)
EOSINOPHIL NFR BLD AUTO: 0.7 % (ref 0.3–6.2)
ERYTHROCYTE [DISTWIDTH] IN BLOOD BY AUTOMATED COUNT: 18.1 % (ref 12.3–15.4)
FOLATE SERPL-MCNC: >20 NG/ML (ref 4.78–24.2)
GLUCOSE BLDC GLUCOMTR-MCNC: 139 MG/DL (ref 70–130)
GLUCOSE BLDC GLUCOMTR-MCNC: 189 MG/DL (ref 70–130)
GLUCOSE BLDC GLUCOMTR-MCNC: 190 MG/DL (ref 70–130)
GLUCOSE BLDC GLUCOMTR-MCNC: 86 MG/DL (ref 70–130)
GLUCOSE SERPL-MCNC: 73 MG/DL (ref 65–99)
HBA1C MFR BLD: 6.6 % (ref 4.8–5.6)
HCT VFR BLD AUTO: 17.8 % (ref 37.5–51)
HCT VFR BLD AUTO: 21.2 % (ref 37.5–51)
HGB BLD-MCNC: 5.9 G/DL (ref 13–17.7)
HGB BLD-MCNC: 7 G/DL (ref 13–17.7)
IMM GRANULOCYTES # BLD AUTO: 0.02 10*3/MM3 (ref 0–0.05)
IMM GRANULOCYTES NFR BLD AUTO: 0.3 % (ref 0–0.5)
IRON 24H UR-MRATE: 19 MCG/DL (ref 59–158)
IRON SATN MFR SERPL: 7 % (ref 20–50)
LYMPHOCYTES # BLD AUTO: 0.91 10*3/MM3 (ref 0.7–3.1)
LYMPHOCYTES NFR BLD AUTO: 13 % (ref 19.6–45.3)
MCH RBC QN AUTO: 25.4 PG (ref 26.6–33)
MCHC RBC AUTO-ENTMCNC: 33 G/DL (ref 31.5–35.7)
MCV RBC AUTO: 76.8 FL (ref 79–97)
MONOCYTES # BLD AUTO: 0.66 10*3/MM3 (ref 0.1–0.9)
MONOCYTES NFR BLD AUTO: 9.4 % (ref 5–12)
NEUTROPHILS NFR BLD AUTO: 5.33 10*3/MM3 (ref 1.7–7)
NEUTROPHILS NFR BLD AUTO: 76.2 % (ref 42.7–76)
NRBC BLD AUTO-RTO: 0 /100 WBC (ref 0–0.2)
PLATELET # BLD AUTO: 131 10*3/MM3 (ref 140–450)
PMV BLD AUTO: 10.5 FL (ref 6–12)
POTASSIUM SERPL-SCNC: 4 MMOL/L (ref 3.5–5.2)
QT INTERVAL: 466 MS
RBC # BLD AUTO: 2.76 10*6/MM3 (ref 4.14–5.8)
SODIUM SERPL-SCNC: 137 MMOL/L (ref 136–145)
TIBC SERPL-MCNC: 286 MCG/DL (ref 298–536)
TRANSFERRIN SERPL-MCNC: 192 MG/DL (ref 200–360)
UNIT  ABO: NORMAL
UNIT  RH: NORMAL
VIT B12 BLD-MCNC: 1618 PG/ML (ref 211–946)
WBC NRBC COR # BLD: 7 10*3/MM3 (ref 3.4–10.8)

## 2023-08-05 PROCEDURE — 97110 THERAPEUTIC EXERCISES: CPT

## 2023-08-05 PROCEDURE — G0378 HOSPITAL OBSERVATION PER HR: HCPCS

## 2023-08-05 PROCEDURE — 99222 1ST HOSP IP/OBS MODERATE 55: CPT | Performed by: INTERNAL MEDICINE

## 2023-08-05 PROCEDURE — 82948 REAGENT STRIP/BLOOD GLUCOSE: CPT

## 2023-08-05 PROCEDURE — 82746 ASSAY OF FOLIC ACID SERUM: CPT | Performed by: HOSPITALIST

## 2023-08-05 PROCEDURE — 97166 OT EVAL MOD COMPLEX 45 MIN: CPT

## 2023-08-05 PROCEDURE — 85014 HEMATOCRIT: CPT | Performed by: STUDENT IN AN ORGANIZED HEALTH CARE EDUCATION/TRAINING PROGRAM

## 2023-08-05 PROCEDURE — 82607 VITAMIN B-12: CPT | Performed by: HOSPITALIST

## 2023-08-05 PROCEDURE — 83540 ASSAY OF IRON: CPT | Performed by: HOSPITALIST

## 2023-08-05 PROCEDURE — 36430 TRANSFUSION BLD/BLD COMPNT: CPT

## 2023-08-05 PROCEDURE — 85018 HEMOGLOBIN: CPT | Performed by: STUDENT IN AN ORGANIZED HEALTH CARE EDUCATION/TRAINING PROGRAM

## 2023-08-05 PROCEDURE — P9016 RBC LEUKOCYTES REDUCED: HCPCS

## 2023-08-05 PROCEDURE — 80048 BASIC METABOLIC PNL TOTAL CA: CPT | Performed by: HOSPITALIST

## 2023-08-05 PROCEDURE — 83036 HEMOGLOBIN GLYCOSYLATED A1C: CPT | Performed by: HOSPITALIST

## 2023-08-05 PROCEDURE — 97162 PT EVAL MOD COMPLEX 30 MIN: CPT

## 2023-08-05 PROCEDURE — 85025 COMPLETE CBC W/AUTO DIFF WBC: CPT | Performed by: HOSPITALIST

## 2023-08-05 PROCEDURE — 63710000001 INSULIN LISPRO (HUMAN) PER 5 UNITS: Performed by: HOSPITALIST

## 2023-08-05 PROCEDURE — 86900 BLOOD TYPING SEROLOGIC ABO: CPT

## 2023-08-05 PROCEDURE — 84466 ASSAY OF TRANSFERRIN: CPT | Performed by: HOSPITALIST

## 2023-08-05 RX ORDER — HYDROCODONE BITARTRATE AND ACETAMINOPHEN 7.5; 325 MG/1; MG/1
1 TABLET ORAL EVERY 6 HOURS PRN
Status: DISCONTINUED | OUTPATIENT
Start: 2023-08-05 | End: 2023-08-08 | Stop reason: HOSPADM

## 2023-08-05 RX ORDER — SODIUM CHLORIDE 9 MG/ML
75 INJECTION, SOLUTION INTRAVENOUS CONTINUOUS
Status: DISCONTINUED | OUTPATIENT
Start: 2023-08-05 | End: 2023-08-06

## 2023-08-05 RX ADMIN — HYDROCODONE BITARTRATE AND ACETAMINOPHEN 1 TABLET: 7.5; 325 TABLET ORAL at 13:53

## 2023-08-05 RX ADMIN — SODIUM CHLORIDE 75 ML/HR: 9 INJECTION, SOLUTION INTRAVENOUS at 13:35

## 2023-08-05 RX ADMIN — HYDROCODONE BITARTRATE AND ACETAMINOPHEN 1 TABLET: 5; 325 TABLET ORAL at 08:56

## 2023-08-05 RX ADMIN — Medication 10 ML: at 20:07

## 2023-08-05 RX ADMIN — Medication 500 MCG: at 20:04

## 2023-08-05 RX ADMIN — ALLOPURINOL 100 MG: 100 TABLET ORAL at 08:55

## 2023-08-05 RX ADMIN — TRAZODONE HYDROCHLORIDE 25 MG: 50 TABLET ORAL at 20:04

## 2023-08-05 RX ADMIN — HYDROCODONE BITARTRATE AND ACETAMINOPHEN 1 TABLET: 7.5; 325 TABLET ORAL at 20:04

## 2023-08-05 RX ADMIN — ROSUVASTATIN CALCIUM 20 MG: 20 TABLET, FILM COATED ORAL at 20:04

## 2023-08-05 RX ADMIN — INSULIN LISPRO 2 UNITS: 100 INJECTION, SOLUTION INTRAVENOUS; SUBCUTANEOUS at 16:35

## 2023-08-05 RX ADMIN — SENNOSIDES AND DOCUSATE SODIUM 2 TABLET: 50; 8.6 TABLET ORAL at 08:56

## 2023-08-05 RX ADMIN — ISOSORBIDE MONONITRATE 30 MG: 30 TABLET, EXTENDED RELEASE ORAL at 08:55

## 2023-08-05 RX ADMIN — INSULIN LISPRO 2 UNITS: 100 INJECTION, SOLUTION INTRAVENOUS; SUBCUTANEOUS at 21:43

## 2023-08-05 RX ADMIN — CITALOPRAM 20 MG: 20 TABLET, FILM COATED ORAL at 08:55

## 2023-08-05 RX ADMIN — DONEPEZIL HYDROCHLORIDE 10 MG: 10 TABLET, FILM COATED ORAL at 20:04

## 2023-08-05 RX ADMIN — Medication 10 ML: at 08:51

## 2023-08-05 RX ADMIN — LEVOTHYROXINE SODIUM 25 MCG: 0.03 TABLET ORAL at 08:55

## 2023-08-05 RX ADMIN — Medication 500 MCG: at 08:55

## 2023-08-05 RX ADMIN — METOPROLOL TARTRATE 12.5 MG: 25 TABLET, FILM COATED ORAL at 08:56

## 2023-08-05 NOTE — CONSULTS
"Nutrition Services    Patient Name:  Edilberto Reeder  YOB: 1950  MRN: [id number]  Admit Date:  8/4/2023  Assessment Date:  08/05/23    NUTRITION SCREENING      Reason for Encounter Consult per nurse admission screen   Diagnosis/Problem Syncope        PO Diet Diet: Regular/House Diet, Cardiac Diets, Diabetic Diets; Healthy Heart (2-3 Na+); Consistent Carbohydrate; Texture: Regular Texture (IDDSI 7); Fluid Consistency: Thin (IDDSI 0)   PO Supplements/Snacks N/a   PO Intake % Not documented at this time       Labs  Listed below, reviewed   Physical Findings Alert, oriented   GI Function Last documented BM on 8/2   Skin Status Pale, bruising, abrasion, stage 1 L posterior back (skin tear)       Height:  Weight:  BMI:   Category  Weight Trend Height: 182.9 cm (72\")  Weight: 79.1 kg (174 lb 6.4 oz) (08/04/23 2132)  Body mass index is 23.65 kg/mý.  Normal/Healthy (18.4 - 24.9)  Stable       Intervention/Plan Continue current diet order and encourage adequate PO intake. Pt with allergy fish-derived products, verified allergy on ordering platform. Will follow course.         Results from last 7 days   Lab Units 08/05/23  0628 08/04/23  1552   SODIUM mmol/L 137 139   POTASSIUM mmol/L 4.0 3.6   CHLORIDE mmol/L 103 101   CO2 mmol/L 23.7 14.0*   BUN mg/dL 26* 26*   CREATININE mg/dL 1.20 1.49*   CALCIUM mg/dL 8.6 9.1   BILIRUBIN mg/dL  --  0.9   ALK PHOS U/L  --  119*   ALT (SGPT) U/L  --  8   AST (SGOT) U/L  --  32   GLUCOSE mg/dL 73 126*     Results from last 7 days   Lab Units 08/05/23  0628   HEMOGLOBIN g/dL 7.0*   HEMATOCRIT % 21.2*     COVID19   Date Value Ref Range Status   05/04/2022 Not Detected Not Detected - Ref. Range Final     Lab Results   Component Value Date    HGBA1C 6.60 (H) 08/05/2023       RD to follow up per protocol.    Electronically signed by:  Loren Babb RD  08/05/23 15:01 EDT  "

## 2023-08-05 NOTE — PLAN OF CARE
Goal Outcome Evaluation:  Plan of Care Reviewed With: patient           Outcome Evaluation: Pt seen for OT eval this date. Pt admitted following syncope episodes at home. Pt lives with wife. Has history of Parkinsons, uses Ustep walker and was mod I with badls per wife. Pt demonstrating good UE rom and strength. Pt having low BP, 80/54 while in bed, unable to get up because of this. Functional mobility NT this date. Plan to cont acute OT to address mobilty and safety and assess dc needs.  OT wore appropriate PPE during session and performed hand hygiene before/after session.       Anticipated Discharge Disposition (OT): home with home health, home with assist

## 2023-08-05 NOTE — CONSULTS
[facility]        Patient Name: Edilberto Reeder  Age/Sex: 73 y.o. male  : 1950  MRN: [id number]    Date of Admission: 2023  Date of Encounter Visit: 23  Encounter Provider: Jaspreet Smith MD  Referring Provider: Ulises Boland MD  Place of Service: [facility] CARDIOLOGY  Patient Care Team:  Harvey Larson MD as PCP - General  Harvey Larson MD as PCP - Family Medicine    Subjective:     Chief Complaint: Fall, Syncopal Episode     Reason for consult: Afib, Fall, Elevated Troponin    History of Present Illness:  Edilberto Reeder is a 73 y.o. male who follows with Dr. Fung in our office and has a past medical history significant for Parkinson's disease, chronic atrial fibrillation (on anticoagulation), chronic anemia, DM II, hypothyroidism, and CHF.  Patient was admitted with a fall resulting in laceration of his left arm, significant bleeding, and drop in hemoglobin.  We have been asked to be see for fall, afib, and elevated troponin.    Mr. Reeder was doing laundry when he fell which resulted in laceration of his left forearm. He denies losing consciousness but was unable to get up after falling. His wife found him with a significant amount of blood around him. She attempted to help him out of the floor and he had a syncopal episode. EMS was called and he was found to have a blood pressure of 70/40. He was fluid resuscitated in route and in the ED.     Workup in the ED revealed an INR of 2.38, Hgb 7.5, CXR with no acute findings. HS troponin 116 then 123. Creatinine kinase 153. X-ray of the left arm, shoulder, and hip are negative.  He denies angina or shortness of air above baseline.  No orthopnea, PND or edema.  No palpitations or dizziness since admission.  He feels like the loss of consciousness came after seeing all of the blood on the ground.    He received 1 unit of PRBC, Eliquis is being held. His laceration was sutured and  is currently dressed which is clean dry and intact.    Mr. Reeder was hospitalized in November 2022 with lower extremity edema and shortness of breath. He was noted to be in decompensated HFrEF with weeping of his lower extremities. His LVEF was found to be 46% with very mild regional motion abnormalities. He was discharged on oral diuretics. He was last seen in the office in February of this year and his chronic atrial fibrillation, chronic diastolic heart failure, and hypertension were felt to be stable.  He had a nuclear stress study last year which showed no significant ischemia.    HPI was reviewed, updated and amended when necessary.    Prior Cardiac Testing:  ECHO 11/2/22    Left ventricular wall thickness is consistent with mild concentric hypertrophy.    The following left ventricular wall segments are hypokinetic: basal inferoseptal, mid inferoseptal and basal inferoseptal.    Mildly reduced right ventricular systolic function noted.    Left atrial volume is mildly increased.    Estimated right ventricular systolic pressure from tricuspid regurgitation is mildly elevated (35-45 mmHg).    Mild pulmonary hypertension is present.    Mild dilation of the aortic root is present.  Stress Test with Myocardial Perfusion 3/25/22  There is a very small amount of basal inferolateral ischemia.  Left ventricular ejection fraction is mildly reduced. (Calculated EF = 46%).  Impressions are consistent with a low risk study.  Holter Monitor 4/19/18  An abnormal monitor study.  There were 16 episodes of an irregular atrial tachycardia with the fastest at 164 bpm lasting only for a total of 8 beats. The longest episode was 24.5 seconds at a rate of 107 bpm.    Past Medical History:  Past Medical History:   Diagnosis Date    Atrial fibrillation with RVR     Atrial flutter     Diabetes     HLD (hyperlipidemia) 01/27/2016    Hypertension     Parkinson's disease     Advanced        Past Surgical History:   Procedure  Laterality Date    BRAIN STIMULATOR      COLONOSCOPY N/A 5/5/2022    Procedure: COLONOSCOPY TO CECUM WITH COLD SNARE POLYPECTOMY;  Surgeon: Luther Ferrer MD;  Location: [facility] ENDOSCOPY;  Service: Gastroenterology;  Laterality: N/A;  PRE:ANEMIA  POST:POLYPS/HEMORRHOIDS    ENDOSCOPY N/A 5/5/2022    Procedure: ESOPHAGOGASTRODUODENOSCOPY WITH  COLD BIOPSIES;  Surgeon: Luther Ferrer MD;  Location: [facility] ENDOSCOPY;  Service: Gastroenterology;  Laterality: N/A;  PRE:ANEMIA  POST:DIVERTICULUM/GASTRITIS    JOINT REPLACEMENT      Bilateral shoulder and knee replacements       Home Medications:   Medications Prior to Admission   Medication Sig Dispense Refill Last Dose    acetaminophen (TYLENOL) 325 MG tablet Take 2 tablets by mouth Every 6 (Six) Hours As Needed for Mild Pain .       allopurinol (ZYLOPRIM) 100 MG tablet Take 1 tablet by mouth Daily.       apixaban (ELIQUIS) 5 MG tablet tablet Take 1 tablet by mouth 2 (Two) Times a Day.       Calcium Carbonate Antacid (CALCIUM CARBONATE PO) Take 650 mg by mouth Daily.       Carbidopa-Levodopa ER (Rytary) 23.75-95 MG capsule controlled-release Take 1 capsule by mouth As Needed.       Carbidopa-Levodopa ER 36. MG capsule controlled-release Take 4 capsules by mouth 4 (Four) Times a Day.       citalopram (CeleXA) 20 MG tablet Take 1 tablet by mouth Daily.       docusate sodium (COLACE) 50 MG capsule Take 2 capsules by mouth Every Night.       donepezil (ARICEPT) 5 MG tablet Take 2 tablets by mouth Every Night.       furosemide (LASIX) 40 MG tablet TAKE 1 AND 1/2 TABLET BY MOUTH DAILY (Patient taking differently: Take 1.5 tablets by mouth Daily.) 90 tablet 3     glipizide (GLUCOTROL) 5 MG tablet Take 1 tablet by mouth Every Morning.       isosorbide mononitrate (IMDUR) 30 MG 24 hr tablet TAKE ONE TABLET BY MOUTH DAILY (Patient taking differently: Take 1 tablet by mouth Daily.) 90 tablet 0     levothyroxine (SYNTHROID, LEVOTHROID) 25 MCG tablet Take 1 tablet  by mouth Daily.       metFORMIN (GLUCOPHAGE) 500 MG tablet Take 1 tablet by mouth 2 (Two) Times a Day With Meals.       metoprolol tartrate (LOPRESSOR) 25 MG tablet TAKE 1/2 TABLET BY MOUTH TWICE A DAY (Patient taking differently: Take 0.5 tablets by mouth 2 (Two) Times a Day.) 30 tablet 11     multivitamin with minerals (MULTIVITAMIN ADULT PO) Take 1 tablet by mouth Daily.       potassium chloride 10 MEQ CR tablet Take 1 tablet by mouth Daily.       rosuvastatin (CRESTOR) 20 MG tablet Take 1 tablet by mouth Every Night. 90 tablet 3     spironolactone (ALDACTONE) 25 MG tablet Take 1 tablet by mouth Daily. 30 tablet 11     tiZANidine (ZANAFLEX) 2 MG tablet Take 1 tablet by mouth Every 8 (Eight) Hours As Needed for Muscle Spasms.       traZODone (DESYREL) 50 MG tablet Take 0.5 tablets by mouth Every Night.       vitamin B-12 (CYANOCOBALAMIN) 1000 MCG tablet Take 0.5 tablets by mouth 2 (Two) Times a Day.          Allergies:  Allergies   Allergen Reactions    Bacitracin Anaphylaxis    Neosporin [Neomycin-Bacitracin Zn-Polymyx] Other (See Comments)     BP drops and heart stops!    Fish-Derived Products Hives    Shellfish Allergy Hives    Shrimp (Diagnostic) Hives       Past Social History:  Social History     Socioeconomic History    Marital status:    Tobacco Use    Smoking status: Never    Smokeless tobacco: Never    Tobacco comments:     caffeine use   Vaping Use    Vaping Use: Never used   Substance and Sexual Activity    Alcohol use: Yes    Drug use: No    Sexual activity: Defer     Past surgical, medical, social and family history was reviewed, updated and amended when necessary.    Review of Systems   Constitutional: Positive for malaise/fatigue. Negative for chills and fever.   HENT:  Negative for hoarse voice and sore throat.    Eyes:  Negative for double vision and photophobia.   Cardiovascular:  Negative for chest pain, leg swelling, near-syncope, orthopnea, palpitations, paroxysmal nocturnal dyspnea  and syncope.   Respiratory:  Negative for cough and wheezing.    Skin:  Negative for poor wound healing and rash.   Musculoskeletal:  Negative for arthritis and joint swelling.   Gastrointestinal:  Negative for bloating, abdominal pain, hematemesis and hematochezia.   Neurological:  Negative for dizziness and focal weakness.   Psychiatric/Behavioral:  Negative for depression and suicidal ideas.        Objective:   Temp:  [98.1 øF (36.7 øC)-98.7 øF (37.1 øC)] 98.1 øF (36.7 øC)  Heart Rate:  [] 78  Resp:  [16-18] 18  BP: ()/(49-68) 112/52     Intake/Output Summary (Last 24 hours) at 8/5/2023 0816  Last data filed at 8/5/2023 0054  Gross per 24 hour   Intake 2128.75 ml   Output --   Net 2128.75 ml     Body mass index is 23.65 kg/mý.      08/04/23  1536 08/04/23  2132   Weight: 84.4 kg (186 lb) 79.1 kg (174 lb 6.4 oz)     Weight change:     Vitals reviewed.   Constitutional:       Appearance: Not in distress. Chronically ill-appearing.   Neck:      Vascular: No JVR. JVD normal.   Pulmonary:      Effort: Pulmonary effort is normal.      Breath sounds: No wheezing. No rhonchi. No rales.   Chest:      Chest wall: Not tender to palpatation.   Cardiovascular:      PMI at left midclavicular line. Normal rate. Irregularly irregular rhythm. Normal S1. Normal S2.       Murmurs: There is no murmur.      No gallop.  No click. No rub.   Pulses:     Intact distal pulses.   Edema:     Peripheral edema absent.   Abdominal:      General: Bowel sounds are normal.      Palpations: Abdomen is soft.      Tenderness: There is no abdominal tenderness.   Musculoskeletal: Normal range of motion.         General: No tenderness. Skin:     General: Skin is warm and dry.   Neurological:      General: No focal deficit present.      Mental Status: Alert and oriented to person, place and time.         Lab Review:   Results from last 7 days   Lab Units 08/05/23  0628 08/04/23  1552   SODIUM mmol/L 137 139   POTASSIUM mmol/L 4.0 3.6    CHLORIDE mmol/L 103 101   CO2 mmol/L 23.7 14.0*   BUN mg/dL 26* 26*   CREATININE mg/dL 1.20 1.49*   GLUCOSE mg/dL 73 126*   CALCIUM mg/dL 8.6 9.1   AST (SGOT) U/L  --  32   ALT (SGPT) U/L  --  8     Results from last 7 days   Lab Units 08/04/23  1747 08/04/23  1552   CK TOTAL U/L  --  153   HSTROP T ng/L 123* 116*     Results from last 7 days   Lab Units 08/05/23  0628 08/04/23  1552   WBC 10*3/mm3 7.00 11.96*   HEMOGLOBIN g/dL 7.0* 7.5*   HEMATOCRIT % 21.2* 23.9*   PLATELETS 10*3/mm3 131* 194     Results from last 7 days   Lab Units 08/04/23  1552   INR  2.38*   APTT seconds 32.2               Invalid input(s): LDLCALC      Results from last 7 days   Lab Units 08/04/23  1747   TSH uIU/mL 7.370*       Echo EF Estimated  No results found for: ECHOEFEST    EKG:   I personally viewed and interpreted the patient's EKG    EKG 8/4/23    EKG 11/1/22    Imaging:  Imaging Results (Most Recent)       Procedure Component Value Units Date/Time    CT Head Without Contrast [[id number]] Collected: 08/04/23 1834     Updated: 08/04/23 1834    Narrative:      CT OF THE BRAIN WITHOUT CONTRAST 08/04/2023     HISTORY: Fell. Head injury. Syncope.     TECHNIQUE: Axial images were obtained through the brain without  intravenous contrast.     FINDINGS: There is mild-to-moderate diffuse atrophy. Brain stimulator  leads are seen bilaterally terminating in the inferior aspects of both  thalami.     There is no evidence of acute infarction, hemorrhage, midline shift or  mass effect. No acute bony abnormalities are seen.       Impression:      1. No acute process.     Radiation dose reduction techniques were utilized, including automated  exposure control and exposure modulation based on body size.          CT Cervical Spine Without Contrast [[id number]] Collected: 08/04/23 1819     Updated: 08/04/23 1819    Narrative:      CT OF THE CERVICAL SPINE WITHOUT CONTRAST INCLUDING RECONSTRUCTION  IMAGES 08/04/2023     HISTORY: Fell, neck pain.      TECHNIQUE: Axial images were obtained from the skull base to the upper  thoracic spine. Sagittal and coronal reconstruction images were  reviewed.     FINDINGS: There is mild degenerative disease of the C1-C2 articulation.  There is approximately 6 mm anterolisthesis of C2 on C3 with some C2-3  disc space narrowing. C3-4, C4-5, C5-C6 and C6-7 mild-to-moderate  spondylosis is seen with some mild spondylosis of C7-T1.. No other  subluxation is seen. No cervical spine fractures are seen. Stimulator  leads are seen in the soft tissues of the right side of the neck. There  is some left carotid calcification.       Impression:      1. Cervical degenerative disease with no fractures seen.     Radiation dose reduction techniques were utilized, including automated  exposure control and exposure modulation based on body size.          XR Hip With or Without Pelvis 2 - 3 View Left [[id number]] Collected: 08/04/23 1742     Updated: 08/04/23 1748    Narrative:      XR HIP W OR WO PELVIS 2-3 VIEW LEFT-, XR SHOULDER 2+ VW LEFT-     Clinical: Fell, pain     AP PELVIS LEFT HIP FINDINGS: Symmetric moderate bilateral hip joint  degeneration. No fracture of the left hip or hemipelvis. Vascular  calcifications within the soft tissues, no soft tissue gas or radiopaque  foreign body seen. The remainder is unremarkable.     LEFT SHOULDER FINDINGS: The acromioclavicular alignment is satisfactory,  there are some widening of the joint. No acute osseous abnormality is  demonstrated. Old-appearing deformity of the left fifth rib. Total left  shoulder replacement prosthesis in position. The humeral socket appears  along the lower margin of the glenoid component. There is no gross  indication of dislocation. The remainder is unremarkable.     This report was finalized on 8/4/2023 5:45 PM by Dr. Yan Gonzalez M.D.       XR Shoulder 2+ View Left [[id number]] Collected: 08/04/23 1742     Updated: 08/04/23 1748    Narrative:      XR HIP W OR WO  PELVIS 2-3 VIEW LEFT-, XR SHOULDER 2+ VW LEFT-     Clinical: Fell, pain     AP PELVIS LEFT HIP FINDINGS: Symmetric moderate bilateral hip joint  degeneration. No fracture of the left hip or hemipelvis. Vascular  calcifications within the soft tissues, no soft tissue gas or radiopaque  foreign body seen. The remainder is unremarkable.     LEFT SHOULDER FINDINGS: The acromioclavicular alignment is satisfactory,  there are some widening of the joint. No acute osseous abnormality is  demonstrated. Old-appearing deformity of the left fifth rib. Total left  shoulder replacement prosthesis in position. The humeral socket appears  along the lower margin of the glenoid component. There is no gross  indication of dislocation. The remainder is unremarkable.     This report was finalized on 8/4/2023 5:45 PM by Dr. Yan Gonzalez M.D.       XR Forearm 2 View Left [[id number]] Collected: 08/04/23 1619     Updated: 08/04/23 1624    Narrative:      PROCEDURE:  XR FOREARM 2 VW LEFT-     HISTORY: Fall, laceration.     COMPARISON: None.     FINDINGS:       2 views of the left forearm were obtained.     There is soft tissue irregularity overlying the mid ventral forearm,  likely reflecting the patient's reported laceration. No definite  radiopaque foreign bodies are identified. There is a small amount of  soft tissue gas. No acute fracture or malalignment is identified.  There  is scattered mild osteoarthritis..  There is calcific atherosclerosis.     This report was finalized on 8/4/2023 4:20 PM by Dr. Natasha Vasquez M.D.       XR Chest 1 View [[id number]] Collected: 08/04/23 1612     Updated: 08/04/23 1617    Narrative:      XR CHEST 1 VW-     HISTORY: Male who is 73 years-old,  syncope     TECHNIQUE: Frontal view of the chest     COMPARISON: 03/23/2022     FINDINGS: The heart size is normal. Pulmonary vasculature is  unremarkable. Aorta is calcified. Skin folds are seen on the right. No  focal pulmonary consolidation, pleural effusion,  or pneumothorax. No  acute osseous process.       Impression:      No focal pulmonary consolidation. Follow-up as clinical  indications persist.     This report was finalized on 8/4/2023 4:14 PM by Dr. Myron Rutledge M.D.                   Assessment:     Active Hospital Problems    Diagnosis  POA    **Syncope [R55]  Yes      Resolved Hospital Problems   No resolved problems to display.          Plan:     Elevated cardiac enzymes -NSTEMI type II in the setting of acute diastolic heart failure, CKD and drop in hemoglobin.  No angina.  No indication for ischemic work-up at this time.  Chronic diastolic heart failure -euvolemic on exam.  Blood pressure and heart rate are well controlled.  Continue current medical therapy.  Chronic atrial fibrillation -heart rate is controlled.  Anticoagulation held for obvious reasons.  Monitor closely.  Acute blood loss anemia -the patient received a unit of blood without improvement of hemoglobin and is dropping further this morning.  Work-up and management per internal medicine.  Agreed to hold anticoagulation in the interim.  Syncope -this sounds like a vasodepressor episode after seeing a significant amount of bleeding.  No rapid or slow arrhythmia on telemetry.  No indication for further testing at this time.    Thank you for allowing me to participate in the care of Edilberto Reeder. Feel free to contact me directly with any further questions or concerns.    Jaspreet Smith MD  [facility]  08/05/23  08:16 EDT      EMR Dragon/Transcription disclaimer:   Parts of this note may be an electronic transcription/translation of spoken language to printed text using the Dragon dictation system

## 2023-08-05 NOTE — PLAN OF CARE
Goal Outcome Evaluation:  Patient was pleasant today. Complained of pain  on his knee and back, pain med given per order, PT walked with patient today and noted BP was low in the 80's, rechecked and got in the 90's/50's, fluid @ N/S was started, treated Blood sugar @ one time today, HGB resulted to 5.9, patient currently receiving 1 unit of blood and one more unit after. PT recommends skilled physical therapy. Cardiology saw pt today and  no ischemic workup planned. OT planned to continue to work with the patient.

## 2023-08-05 NOTE — THERAPY EVALUATION
Patient Name: Edilberto Reeder  : 1950    MRN: [id number]                              Today's Date: 2023       Admit Date: 2023    Visit Dx:     ICD-10-CM ICD-9-CM   1. Syncope, unspecified syncope type  R55 780.2   2. Hypotension, unspecified hypotension type  I95.9 458.9   3. Laceration of left forearm, initial encounter  S51.812A 881.00   4. Anemia, unspecified type  D64.9 285.9   5. Elevated troponin  R77.8 790.6     Patient Active Problem List   Diagnosis    ASCVD (arteriosclerotic cardiovascular disease)    Permanent atrial fibrillation    Diabetic retinopathy associated with type 2 diabetes mellitus    Essential hypertension    HLD (hyperlipidemia)    Hypothyroidism    Peripheral neuropathy    Renal insufficiency    Type 2 diabetes mellitus with hyperglycemia, without long-term current use of insulin    Parkinson's disease    Dyspnea on exertion    Atrial fibrillation, persistent    Stroke-like symptoms    Hypopotassemia    Ankle pain    Acute idiopathic gout of right ankle    Generalized weakness    Head injury due to trauma    Acute blood loss anemia    Chronic anticoagulation    Thrombocytopenia    Screening for colon cancer    Minor head injury, initial encounter    Chronic diastolic heart failure    Shortness of breath    Syncope     Past Medical History:   Diagnosis Date    Atrial fibrillation with RVR     Atrial flutter     Diabetes     HLD (hyperlipidemia) 2016    Hypertension     Parkinson's disease     Advanced      Past Surgical History:   Procedure Laterality Date    BRAIN STIMULATOR      COLONOSCOPY N/A 2022    Procedure: COLONOSCOPY TO CECUM WITH COLD SNARE POLYPECTOMY;  Surgeon: Luther Ferrer MD;  Location: [facility] ENDOSCOPY;  Service: Gastroenterology;  Laterality: N/A;  PRE:ANEMIA  POST:POLYPS/HEMORRHOIDS    ENDOSCOPY N/A 2022    Procedure: ESOPHAGOGASTRODUODENOSCOPY WITH  COLD BIOPSIES;  Surgeon: Luther Ferrer MD;  Location: [facility] ENDOSCOPY;   Service: Gastroenterology;  Laterality: N/A;  PRE:ANEMIA  POST:DIVERTICULUM/GASTRITIS    JOINT REPLACEMENT      Bilateral shoulder and knee replacements      General Information       Row Name 08/05/23 1241          OT Time and Intention    Document Type evaluation  -     Mode of Treatment co-treatment;occupational therapy  -       Row Name 08/05/23 [facility]          General Information    Patient Profile Reviewed yes  -KB     Prior Level of Function ADL's;independent:  -KB     Barriers to Rehab medically complex;previous functional deficit  -       Row Name 08/05/23 1241          Living Environment    People in Home spouse  -       Row Name 08/05/23 1241          Cognition    Orientation Status (Cognition) oriented x 4  -KB       Row Name 08/05/23 1241          Safety Issues, Functional Mobility    Impairments Affecting Function (Mobility) motor planning;muscle tone abnormal  -               User Key  (r) = Recorded By, (t) = Taken By, (c) = Cosigned By      Initials Name Provider Type    Elizabeth Leon OT Occupational Therapist                     Mobility/ADL's       Row Name 08/05/23 1242          Bed Mobility    Comment, (Bed Mobility) NT due to low BP  -KB       [facility] Name 08/05/23 [facility]          Transfers    Comment, (Transfers) NT due to low BP  -KB       [facility] Name 08/05/23 [facility]          Functional Mobility    Functional Mobility- Comment typically uses UStep walker  -       Row Name 08/05/23 1242          Activities of Daily Living    BADL Assessment/Intervention feeding;grooming  -[facility] Name 08/05/23 [facility]          Self-Feeding Assessment/Training    [place] Level (Feeding) feeding skills;set up  -[facility] Name 08/05/23 1242          Grooming Assessment/Training    [place] Level (Grooming) wash face, hands;set up  -               User Key  (r) = Recorded By, (t) = Taken By, (c) = Cosigned By      Initials Name Provider Type    Elizabeth Leon OT Occupational Therapist                    Obj/Interventions       [facility] Name 08/05/23 1243          Sensory Assessment (Somatosensory)    Sensory Assessment (Somatosensory) sensation intact  -[facility] Name 08/05/23 1243          Vision Assessment/Intervention    Visual Impairment/Limitations WNL  -[facility] Name 08/05/23 1243          Range of Motion Comprehensive    General Range of Motion bilateral upper extremity ROM WFL  -KB       Row Name 08/05/23 1243          Strength Comprehensive (MMT)    Comment, General Manual Muscle Testing (MMT) Assessment UE grossly 4/5  -[facility] Name 08/05/23 1243          Motor Skills    Motor Skills functional endurance  -[facility] Name 08/05/23 1243          Balance    Comment, Balance NT  -               User Key  (r) = Recorded By, (t) = Taken By, (c) = Cosigned By      Initials Name Provider Type    Elizabeth Leon, JESSE Occupational Therapist                   Goals/Plan       Row Name 08/05/23 1312          Bed Mobility Goal 1 (OT)    Activity/Assistive Device (Bed Mobility Goal 1, OT) bed mobility activities, all  -KB     [place] Level/Cues Needed (Bed Mobility Goal 1, OT) standby assist  -KB     Time Frame (Bed Mobility Goal 1, OT) 2 weeks  -KB     Progress/Outcomes (Bed Mobility Goal 1, OT) goal ongoing  -KB       Row Name 08/05/23 1312          Transfer Goal 1 (OT)    Activity/Assistive Device (Transfer Goal 1, OT) toilet  -KB     [place] Level/Cues Needed (Transfer Goal 1, OT) standby assist  -KB     Time Frame (Transfer Goal 1, OT) 2 weeks  -KB     Progress/Outcome (Transfer Goal 1, OT) goal ongoing  -KB       Row Name 08/05/23 1312          Dressing Goal 1 (OT)    Activity/Device (Dressing Goal 1, OT) dressing skills, all  -KB     [place]/Cues Needed (Dressing Goal 1, OT) standby assist  -KB     Time Frame (Dressing Goal 1, OT) 2 weeks  -KB     Progress/Outcome (Dressing Goal 1, OT) goal ongoing  -KB       Row Name 08/05/23 1312          Toileting Goal 1 (OT)     Activity/Device (Toileting Goal 1, OT) adjust/manage clothing;perform perineal hygiene  -KB     [place] Level/Cues Needed (Toileting Goal 1, OT) standby assist  -KB     Time Frame (Toileting Goal 1, OT) 2 weeks  -KB     Progress/Outcome (Toileting Goal 1, OT) goal ongoing  -KB       Row Name 08/05/23 1312          Therapy Assessment/Plan (OT)    Planned Therapy Interventions (OT) transfer/mobility retraining;patient/caregiver education/training;adaptive equipment training  -               User Key  (r) = Recorded By, (t) = Taken By, (c) = Cosigned By      Initials Name Provider Type    KB Elizabeth Wilson, JESSE Occupational Therapist                   Clinical Impression       Row Name 08/05/23 1304          Pain Assessment    Pretreatment Pain Rating 0/10 - no pain  -KB     Posttreatment Pain Rating 0/10 - no pain  -KB       Row Name 08/05/23 1302          Plan of Care Review    Plan of Care Reviewed With patient  -KB     Outcome Evaluation Pt seen for OT eval this date. Pt admitted following syncope episodes at home. Pt lives with wife. Has history of Parkinsons, uses Ustep walker and was mod I with badls per wife. Pt demonstrating good UE rom and strength. Pt having low BP, 80/54 while in bed, unable to get up because of this. Functional mobility NT this date. Plan to cont acute OT to address mobilty and safety and assess dc needs.  -       Row Name 08/05/23 130          Therapy Assessment/Plan (OT)    Rehab Potential (OT) good, to achieve stated therapy goals  -     Criteria for Skilled Therapeutic Interventions Met (OT) yes;meets criteria;skilled treatment is necessary  -KB     Therapy Frequency (OT) 5 times/wk  -KB       Row Name 08/05/23 1307          Therapy Plan Review/Discharge Plan (OT)    Anticipated Discharge Disposition (OT) home with home health;home with assist  -KB       Row Name 08/05/23 1301          Positioning and Restraints    Pre-Treatment Position in bed  -KB     Post Treatment  Position bed  -KB     In Bed notified nsg;with family/caregiver;call light within reach;encouraged to call for assist;exit alarm on  -KB               User Key  (r) = Recorded By, (t) = Taken By, (c) = Cosigned By      Initials Name Provider Type    Elizabeth Leon OT Occupational Therapist                   Outcome Measures       Row Name 08/05/23 1313          How much help from another is currently needed...    Putting on and taking off regular lower body clothing? 1  -KB     Bathing (including washing, rinsing, and drying) 1  -KB     Toileting (which includes using toilet bed pan or urinal) 1  -KB     Putting on and taking off regular upper body clothing 2  -KB     Taking care of personal grooming (such as brushing teeth) 3  -KB     Eating meals 3  -KB     AM-PAC 6 Clicks Score (OT) 11  -KB       Row Name 08/05/23 0905          How much help from another person do you currently need...    Turning from your back to your side while in flat bed without using bedrails? 2  -CJ     Moving from lying on back to sitting on the side of a flat bed without bedrails? 2  -CJ     Moving to and from a bed to a chair (including a wheelchair)? 2  -CJ     Standing up from a chair using your arms (e.g., wheelchair, bedside chair)? 2  -CJ     Climbing 3-5 steps with a railing? 2  -CJ     To walk in hospital room? 2  -CJ     AM-PAC 6 Clicks Score (PT) 12  -CJ     Highest level of mobility 4 --> Transferred to chair/commode  -CJ       Row Name 08/05/23 1313          Functional Assessment    Outcome Measure Options AM-PAC 6 Clicks Daily Activity (OT)  -KB               User Key  (r) = Recorded By, (t) = Taken By, (c) = Cosigned By      Initials Name Provider Type    Jenni Muniz, JOSÉ Registered Nurse    Elizabeth Leon OT Occupational Therapist                    Occupational Therapy Education       Title: PT OT SLP Therapies (In Progress)       Topic: Occupational Therapy (Done)       Point: Home exercise program (Done)        Description:   Instruct learner(s) on appropriate technique for monitoring, assisting and/or progressing therapeutic exercises/activities.                  Learning Progress Summary             Patient JOSE MARIA Saravia VU by SAMIR at 8/5/2023 1313    Comment: discussed HEP to be done while in bed, general rom                                         User Key       Initials Effective Dates Name Provider Type Discipline    SAMIR 01/03/23 -  Elizabeth Wilson, OT Occupational Therapist OT                  OT Recommendation and Plan  Planned Therapy Interventions (OT): transfer/mobility retraining, patient/caregiver education/training, adaptive equipment training  Therapy Frequency (OT): 5 times/wk  Plan of Care Review  Plan of Care Reviewed With: patient  Outcome Evaluation: Pt seen for OT eval this date. Pt admitted following syncope episodes at home. Pt lives with wife. Has history of Parkinsons, uses Ustep walker and was mod I with badls per wife. Pt demonstrating good UE rom and strength. Pt having low BP, 80/54 while in bed, unable to get up because of this. Functional mobility NT this date. Plan to cont acute OT to address mobilty and safety and assess dc needs.     Time Calculation:   Evaluation Complexity (OT)  Review Occupational Profile/Medical/Therapy History Complexity: expanded/moderate complexity  Assessment, Occupational Performance/Identification of Deficit Complexity: 3-5 performance deficits  Clinical Decision Making Complexity (OT): detailed assessment/moderate complexity  Overall Complexity of Evaluation (OT): moderate complexity     Time Calculation- OT       Row Name 08/05/23 1314             Time Calculation- OT    OT Start Time 1130  -KB      OT Stop Time 1145  -KB      OT Time Calculation (min) 15 min  -KB      Total Timed Code Minutes- OT 10 minute(s)  -KB      OT Received On 08/05/23  -KB      OT - Next Appointment 08/07/23  -KB      OT Goal Re-Cert Due Date 08/19/23  -KB         Timed Charges    98235 -  OT Therapeutic Exercise Minutes 10  -KB         Untimed Charges    OT Eval/Re-eval Minutes 5  -KB         Total Minutes    Timed Charges Total Minutes 10  -KB      Untimed Charges Total Minutes 5  -KB       Total Minutes 15  -KB                User Key  (r) = Recorded By, (t) = Taken By, (c) = Cosigned By      Initials Name Provider Type    Elizabeth Leon OT Occupational Therapist                  Therapy Charges for Today       Code Description Service Date Service Provider Modifiers Qty    [id number]  OT THER PROC EA 15 MIN 8/5/2023 Elizabeth Wilson OT GO 1    [id number]  OT EVAL MOD COMPLEXITY 2 8/5/2023 Elizabeth Wilson OT GO 1                 Elizabeth Wilson OT  8/5/2023

## 2023-08-05 NOTE — PLAN OF CARE
Goal Outcome Evaluation:              Outcome Evaluation: Pt is a 74 y/o male admitted to the hospital for syncope at home resulting in a RUE laceration. The patient has a hx of Parkinsons and used a Ustep walker with mod I per wife. The patient currently demosntreates impaired LLE ROM and bilateral strength deficits. BP taken in supine at 80/54, unable to mobilize. All other functional mobility not tested due to BP. The patient will benefit from skilled physical therap to increase LE ROM, strength, and endurance once medically stable. PT d/c recommendation is currently home with assitance, though this could change with further observation and treatment.      Anticipated Discharge Disposition (PT): home with assist

## 2023-08-05 NOTE — THERAPY EVALUATION
Patient Name: Edilberto Reeder  : 1950    MRN: [id number]                              Today's Date: 2023       Admit Date: 2023    Visit Dx:     ICD-10-CM ICD-9-CM   1. Syncope, unspecified syncope type  R55 780.2   2. Hypotension, unspecified hypotension type  I95.9 458.9   3. Laceration of left forearm, initial encounter  S51.812A 881.00   4. Anemia, unspecified type  D64.9 285.9   5. Elevated troponin  R77.8 790.6     Patient Active Problem List   Diagnosis    ASCVD (arteriosclerotic cardiovascular disease)    Permanent atrial fibrillation    Diabetic retinopathy associated with type 2 diabetes mellitus    Essential hypertension    HLD (hyperlipidemia)    Hypothyroidism    Peripheral neuropathy    Renal insufficiency    Type 2 diabetes mellitus with hyperglycemia, without long-term current use of insulin    Parkinson's disease    Dyspnea on exertion    Atrial fibrillation, persistent    Stroke-like symptoms    Hypopotassemia    Ankle pain    Acute idiopathic gout of right ankle    Generalized weakness    Head injury due to trauma    Acute blood loss anemia    Chronic anticoagulation    Thrombocytopenia    Screening for colon cancer    Minor head injury, initial encounter    Chronic diastolic heart failure    Shortness of breath    Syncope     Past Medical History:   Diagnosis Date    Atrial fibrillation with RVR     Atrial flutter     Diabetes     HLD (hyperlipidemia) 2016    Hypertension     Parkinson's disease     Advanced      Past Surgical History:   Procedure Laterality Date    BRAIN STIMULATOR      COLONOSCOPY N/A 2022    Procedure: COLONOSCOPY TO CECUM WITH COLD SNARE POLYPECTOMY;  Surgeon: Luther Ferrer MD;  Location: [facility] ENDOSCOPY;  Service: Gastroenterology;  Laterality: N/A;  PRE:ANEMIA  POST:POLYPS/HEMORRHOIDS    ENDOSCOPY N/A 2022    Procedure: ESOPHAGOGASTRODUODENOSCOPY WITH  COLD BIOPSIES;  Surgeon: Luther Ferrer MD;  Location: [facility] ENDOSCOPY;   Service: Gastroenterology;  Laterality: N/A;  PRE:ANEMIA  POST:DIVERTICULUM/GASTRITIS    JOINT REPLACEMENT      Bilateral shoulder and knee replacements      General Information       Row Name 08/05/23 1411          Physical Therapy Time and Intention    Document Type evaluation  -RD     Mode of Treatment co-treatment;physical therapy  -RD       Row Name 08/05/23 1411          General Information    Patient Profile Reviewed yes  -RD     Prior Level of Function independent:;ADL's  -RD     Existing Precautions/Restrictions cardiac  -RD     Barriers to Rehab medically complex;previous functional deficit  -RD       Row Name 08/05/23 1411          Living Environment    People in Home spouse  -RD               User Key  (r) = Recorded By, (t) = Taken By, (c) = Cosigned By      Initials Name Provider Type    Kale Schaeffer PT Physical Therapist                   Mobility       Row Name 08/05/23 1411          Bed Mobility    Comment, (Bed Mobility) NT due to low BP  -RD       Row Name 08/05/23 1411          Transfers    Comment, (Transfers) NT due to low BP.  -RD               User Key  (r) = Recorded By, (t) = Taken By, (c) = Cosigned By      Initials Name Provider Type    Kale Schaeffer PT Physical Therapist                   Obj/Interventions       Row Name 08/05/23 1412          Range of Motion Comprehensive    General Range of Motion lower extremity range of motion deficits identified  -RD     Comment, General Range of Motion Left knee 0-60 pain due to recent fall.  -RD       Row Name 08/05/23 1412          Strength Comprehensive (MMT)    Comment, General Manual Muscle Testing (MMT) Assessment BLE grossly 4/5  -RD       Row Name 08/05/23 1412          Motor Skills    Motor Skills functional endurance  -RD               User Key  (r) = Recorded By, (t) = Taken By, (c) = Cosigned By      Initials Name Provider Type    Kale Schaeffer PT Physical Therapist                   Goals/Plan       Row Name 08/05/23 1416           Bed Mobility Goal 1 (PT)    Activity/Assistive Device (Bed Mobility Goal 1, PT) bed mobility activities, all  -RD     [place] Level/Cues Needed (Bed Mobility Goal 1, PT) independent  -RD     Time Frame (Bed Mobility Goal 1, PT) short term goal (STG);5 days  -RD     Progress/Outcomes (Bed Mobility Goal 1, PT) new goal  -RD       Row Name 08/05/23 1416          Transfer Goal 1 (PT)    Activity/Assistive Device (Transfer Goal 1, PT) transfers, all  -RD     [place] Level/Cues Needed (Transfer Goal 1, PT) minimum assist (75% or more patient effort);supervision required  -RD     Time Frame (Transfer Goal 1, PT) short term goal (STG);5 days  -RD     Progress/Outcome (Transfer Goal 1, PT) new goal  -RD       Row Name 08/05/23 1416          Gait Training Goal 1 (PT)    Activity/Assistive Device (Gait Training Goal 1, PT) gait (walking locomotion)  -RD     [place] Level (Gait Training Goal 1, PT) minimum assist (75% or more patient effort);moderate assist (50-74% patient effort);supervision required  -RD     Distance (Gait Training Goal 1, PT) 50ft  -RD     Time Frame (Gait Training Goal 1, PT) short term goal (STG);5 days  -RD     Progress/Outcome (Gait Training Goal 1, PT) new goal  -RD               User Key  (r) = Recorded By, (t) = Taken By, (c) = Cosigned By      Initials Name Provider Type    Kale Schaeffer PT Physical Therapist                   Clinical Impression       Row Name 08/05/23 1413          Pain    Pretreatment Pain Rating 0/10 - no pain  -RD     Posttreatment Pain Rating 0/10 - no pain  -RD       Row Name 08/05/23 1413          Plan of Care Review    Outcome Evaluation Pt is a 72 y/o male admitted to the hospital for syncope at home resulting in a RUE laceration. The patient has a hx of Parkinsons and used a Ustep walker with mod I per wife. The patient currently demosntreates impaired LLE ROM and bilateral strength deficits. BP taken in supine at 80/54, unable to mobilize. All  other functional mobility not tested due to BP. The patient will benefit from skilled physical therap to increase LE ROM, strength, and endurance once medically stable. PT d/c recommendation is currently home with assitance, though this could change with further observation and treatment.  -RD       Row Name 08/05/23 1413          Therapy Assessment/Plan (PT)    Rehab Potential (PT) fair, will monitor progress closely  -RD     Criteria for Skilled Interventions Met (PT) yes;skilled treatment is necessary  -RD     Therapy Frequency (PT) 6 times/wk  -RD               User Key  (r) = Recorded By, (t) = Taken By, (c) = Cosigned By      Initials Name Provider Type    Kale Schaeffer, PT Physical Therapist                   Outcome Measures       Row Name 08/05/23 1450 08/05/23 1417       How much help from another person do you currently need...    Turning from your back to your side while in flat bed without using bedrails? 1  -CJ 1  -RD    Moving from lying on back to sitting on the side of a flat bed without bedrails? 1  -CJ 1  -RD    Moving to and from a bed to a chair (including a wheelchair)? 1  -CJ 1  -RD    Standing up from a chair using your arms (e.g., wheelchair, bedside chair)? 1  -CJ 1  -RD    Climbing 3-5 steps with a railing? 1  -CJ 1  -RD    To walk in hospital room? 1  -CJ 1  -RD    AM-PAC 6 Clicks Score (PT) 6  -CJ 6  -RD    Highest level of mobility 2 --> Bed activities/dependent transfer  -CJ 2 --> Bed activities/dependent transfer  -RD      Row Name 08/05/23 0905          How much help from another person do you currently need...    Turning from your back to your side while in flat bed without using bedrails? 2  -CJ     Moving from lying on back to sitting on the side of a flat bed without bedrails? 2  -CJ     Moving to and from a bed to a chair (including a wheelchair)? 2  -CJ     Standing up from a chair using your arms (e.g., wheelchair, bedside chair)? 2  -CJ     Climbing 3-5 steps with a  railing? 2  -CJ     To walk in hospital room? 2  -CJ     AM-PAC 6 Clicks Score (PT) 12  -     Highest level of mobility 4 --> Transferred to chair/commode  -       Row Name 08/05/23 1417 08/05/23 1313       Functional Assessment    Outcome Measure Options AM-PAC 6 Clicks Basic Mobility (PT)  -RD AM-PAC 6 Clicks Daily Activity (OT)  -SAMIR              User Key  (r) = Recorded By, (t) = Taken By, (c) = Cosigned By      Initials Name Provider Type     Jenni Ray, RN Registered Nurse    Elizabeth Leon, JESSE Occupational Therapist    Kale Schaeffer, PT Physical Therapist                                 Physical Therapy Education       Title: PT OT SLP Therapies (In Progress)       Topic: Physical Therapy (Done)       Point: Mobility training (Done)       Learning Progress Summary             Patient Acceptance, E,TB, VU,NR by  at 8/5/2023 1417   Significant Other Acceptance, E,TB, VU,NR by LIDA at 8/5/2023 1417                         Point: Home exercise program (Done)       Learning Progress Summary             Patient Acceptance, E,TB, VU,NR by  at 8/5/2023 1417   Significant Other Acceptance, E,TB, VU,NR by RD at 8/5/2023 1417                         Point: Body mechanics (Done)       Learning Progress Summary             Patient Acceptance, E,TB, VU,NR by  at 8/5/2023 1417   Significant Other Acceptance, E,TB, VU,NR by RD at 8/5/2023 1417                         Point: Precautions (Done)       Learning Progress Summary             Patient Acceptance, E,TB, VU,NR by  at 8/5/2023 1417   Significant Other Acceptance, E,TB, VU,NR by  at 8/5/2023 1417                                         User Key       Initials Effective Dates Name Provider Type Discipline    RD 05/03/23 -  Kale Carrera, PT Physical Therapist PT                  PT Recommendation and Plan     Outcome Evaluation: Pt is a 74 y/o male admitted to the hospital for syncope at home resulting in a RUE laceration. The patient has a hx of  Parkinsons and used a Ustep walker with mod I per wife. The patient currently demosntreates impaired LLE ROM and bilateral strength deficits. BP taken in supine at 80/54, unable to mobilize. All other functional mobility not tested due to BP. The patient will benefit from skilled physical therap to increase LE ROM, strength, and endurance once medically stable. PT d/c recommendation is currently home with assitance, though this could change with further observation and treatment.     Time Calculation:         PT Charges       Row Name 08/05/23 1418             Time Calculation    Start Time 1130  -RD      Stop Time 1145  -RD      Time Calculation (min) 15 min  -RD      PT Non-Billable Time (min) 5 min  -RD      PT Received On 08/05/23  -RD      PT - Next Appointment 08/07/23  -RD      PT Goal Re-Cert Due Date 08/12/23  -RD         Time Calculation- PT    Total Timed Code Minutes- PT 10 minute(s)  -RD         Timed Charges    49990 - PT Therapeutic Exercise Minutes 10  -RD         Untimed Charges    PT Eval/Re-eval Minutes 5  -RD         Total Minutes    Timed Charges Total Minutes 10  -RD      Untimed Charges Total Minutes 5  -RD       Total Minutes 15  -RD                User Key  (r) = Recorded By, (t) = Taken By, (c) = Cosigned By      Initials Name Provider Type    RD Kale Carrera, PT Physical Therapist                  Therapy Charges for Today       Code Description Service Date Service Provider Modifiers Qty    93455843264 HC PT THER PROC EA 15 MIN 8/5/2023 Kale Carrera, PT GP 1    20130621262 HC PT EVAL MOD COMPLEXITY 4 8/5/2023 Kale Carrera, PT GP 1            PT G-Codes  Outcome Measure Options: AM-PAC 6 Clicks Basic Mobility (PT)  AM-PAC 6 Clicks Score (PT): 6  AM-PAC 6 Clicks Score (OT): 11  PT Discharge Summary  Anticipated Discharge Disposition (PT): home with assist    Kale Carrera PT  8/5/2023

## 2023-08-05 NOTE — PROGRESS NOTES
Name: Edilberto Reeder ADMIT: 2023   : 1950  PCP: Harvey Larson MD    MRN: [id number] LOS: 0 days   AGE/SEX: 73 y.o. male  ROOM: [facility]     Subjective   Subjective   Resting in bed, complaining of left arm pain secondary to laceration/sutures.  Blood pressure still borderline.  No additional bleeding noted from his arm.       Objective   Objective   Vital Signs  Temp:  [97.5 øF (36.4 øC)-98.7 øF (37.1 øC)] 97.5 øF (36.4 øC)  Heart Rate:  [] 76  Resp:  [16-18] 18  BP: ()/(45-68) 90/45  SpO2:  [91 %-100 %] 97 %  on   ;   Device (Oxygen Therapy): room air  Body mass index is 23.65 kg/mý.  Physical Exam  Constitutional:       General: He is not in acute distress.     Appearance: He is ill-appearing. He is not toxic-appearing.   Cardiovascular:      Rate and Rhythm: Normal rate. Rhythm irregular.   Pulmonary:      Effort: Pulmonary effort is normal. No respiratory distress.      Breath sounds: Normal breath sounds. No wheezing.   Abdominal:      General: Abdomen is flat. There is no distension.      Palpations: Abdomen is soft.      Tenderness: There is no abdominal tenderness.   Musculoskeletal:         General: No tenderness.      Right lower leg: No edema.      Left lower leg: No edema.      Comments: Left upper extremity with Ace bandage in place   Skin:     General: Skin is warm and dry.   Neurological:      General: No focal deficit present.      Mental Status: He is alert and oriented to person, place, and time. Mental status is at baseline.      Motor: No weakness.      Comments: Patient intermittently closing left eye, reports this is chronic for him       Results Review     I reviewed the patient's new clinical results.  Results from last 7 days   Lab Units 23  0628 23  1552   WBC 10*3/mm3 7.00 11.96*   HEMOGLOBIN g/dL 7.0* 7.5*   PLATELETS 10*3/mm3 131* 194     Results from last 7 days   Lab Units 23  0628 23  1552   SODIUM mmol/L 137 139   POTASSIUM  mmol/L 4.0 3.6   CHLORIDE mmol/L 103 101   CO2 mmol/L 23.7 14.0*   BUN mg/dL 26* 26*   CREATININE mg/dL 1.20 1.49*   GLUCOSE mg/dL 73 126*   Estimated Creatinine Clearance: 59.5 mL/min (by C-G formula based on SCr of 1.2 mg/dL).  Results from last 7 days   Lab Units 08/04/23  1552   ALBUMIN g/dL 3.3*   BILIRUBIN mg/dL 0.9   ALK PHOS U/L 119*   AST (SGOT) U/L 32   ALT (SGPT) U/L 8     Results from last 7 days   Lab Units 08/05/23  0628 08/04/23  1552   CALCIUM mg/dL 8.6 9.1   ALBUMIN g/dL  --  3.3*       COVID19   Date Value Ref Range Status   05/04/2022 Not Detected Not Detected - Ref. Range Final   04/30/2022 Not Detected Not Detected - Ref. Range Final     Hemoglobin A1C   Date/Time Value Ref Range Status   08/05/2023 0628 6.60 (H) 4.80 - 5.60 % Final     Glucose   Date/Time Value Ref Range Status   08/05/2023 1123 139 (H) 70 - 130 mg/dL Final     Comment:     Meter: [id number] : [id number] Corky Tatianna NA   08/05/2023 0615 86 70 - 130 mg/dL Final     Comment:     Meter: [id number] : [id number] Louie Alicea NA   08/04/2023 2134 112 70 - 130 mg/dL Final     Comment:     Meter: [id number] : [id number] Louie Deanne NA       CT Head Without Contrast  Narrative: CT OF THE BRAIN WITHOUT CONTRAST 08/04/2023     HISTORY: Fell. Head injury. Syncope.     TECHNIQUE: Axial images were obtained through the brain without  intravenous contrast.     FINDINGS: There is mild-to-moderate diffuse atrophy. Brain stimulator  leads are seen bilaterally terminating in the inferior aspects of both  thalami.     There is no evidence of acute infarction, hemorrhage, midline shift or  mass effect. No acute bony abnormalities are seen.     Impression: 1. No acute process.     Radiation dose reduction techniques were utilized, including automated  exposure control and exposure modulation based on body size.        CT Cervical Spine Without Contrast  Narrative: CT OF THE CERVICAL SPINE WITHOUT CONTRAST INCLUDING  RECONSTRUCTION  IMAGES 08/04/2023     HISTORY: Fell, neck pain.     TECHNIQUE: Axial images were obtained from the skull base to the upper  thoracic spine. Sagittal and coronal reconstruction images were  reviewed.     FINDINGS: There is mild degenerative disease of the C1-C2 articulation.  There is approximately 6 mm anterolisthesis of C2 on C3 with some C2-3  disc space narrowing. C3-4, C4-5, C5-C6 and C6-7 mild-to-moderate  spondylosis is seen with some mild spondylosis of C7-T1.. No other  subluxation is seen. No cervical spine fractures are seen. Stimulator  leads are seen in the soft tissues of the right side of the neck. There  is some left carotid calcification.     Impression: 1. Cervical degenerative disease with no fractures seen.     Radiation dose reduction techniques were utilized, including automated  exposure control and exposure modulation based on body size.        XR Hip With or Without Pelvis 2 - 3 View Left, XR Shoulder 2+ View Left  XR HIP W OR WO PELVIS 2-3 VIEW LEFT-, XR SHOULDER 2+ VW LEFT-     Clinical: Fell, pain     AP PELVIS LEFT HIP FINDINGS: Symmetric moderate bilateral hip joint  degeneration. No fracture of the left hip or hemipelvis. Vascular  calcifications within the soft tissues, no soft tissue gas or radiopaque  foreign body seen. The remainder is unremarkable.     LEFT SHOULDER FINDINGS: The acromioclavicular alignment is satisfactory,  there are some widening of the joint. No acute osseous abnormality is  demonstrated. Old-appearing deformity of the left fifth rib. Total left  shoulder replacement prosthesis in position. The humeral socket appears  along the lower margin of the glenoid component. There is no gross  indication of dislocation. The remainder is unremarkable.     This report was finalized on 8/4/2023 5:45 PM by Dr. Yan Gonzalez M.D.     XR Forearm 2 View Left  PROCEDURE:  XR FOREARM 2 VW LEFT-     HISTORY: Fall, laceration.     COMPARISON: None.     FINDINGS:        2 views of the left forearm were obtained.     There is soft tissue irregularity overlying the mid ventral forearm,  likely reflecting the patient's reported laceration. No definite  radiopaque foreign bodies are identified. There is a small amount of  soft tissue gas. No acute fracture or malalignment is identified.  There  is scattered mild osteoarthritis..  There is calcific atherosclerosis.     This report was finalized on 8/4/2023 4:20 PM by Dr. Natasha Vasquez M.D.     XR Chest 1 View  Narrative: XR CHEST 1 VW-     HISTORY: Male who is 73 years-old,  syncope     TECHNIQUE: Frontal view of the chest     COMPARISON: 03/23/2022     FINDINGS: The heart size is normal. Pulmonary vasculature is  unremarkable. Aorta is calcified. Skin folds are seen on the right. No  focal pulmonary consolidation, pleural effusion, or pneumothorax. No  acute osseous process.     Impression: No focal pulmonary consolidation. Follow-up as clinical  indications persist.     This report was finalized on 8/4/2023 4:14 PM by Dr. Myron Rutledge M.D.       Scheduled Medications  allopurinol, 100 mg, Oral, Daily  Carbidopa-Levodopa ER, 4 capsule, Oral, 4x Daily  citalopram, 20 mg, Oral, Daily  donepezil, 10 mg, Oral, Nightly  insulin lispro, 2-7 Units, Subcutaneous, 4x Daily AC & at Bedtime  iopamidol, 100 mL, Intravenous, Once in imaging  isosorbide mononitrate, 30 mg, Oral, Daily  levothyroxine, 25 mcg, Oral, Daily  metoprolol tartrate, 12.5 mg, Oral, BID  rosuvastatin, 20 mg, Oral, Nightly  senna-docusate sodium, 2 tablet, Oral, BID  sodium chloride, 10 mL, Intravenous, Q12H  traZODone, 25 mg, Oral, Nightly  vitamin B-12, 500 mcg, Oral, BID    Infusions  sodium chloride, 75 mL/hr, Last Rate: 75 mL/hr (08/05/23 1335)    Diet  Diet: Regular/House Diet, Cardiac Diets, Diabetic Diets; Healthy Heart (2-3 Na+); Consistent Carbohydrate; Texture: Regular Texture (IDDSI 7); Fluid Consistency: Thin (IDDSI 0)         I have personally  reviewed:  [x]  Laboratory   []  Microbiology   [x]  Radiology   [x]  EKG/Telemetry   [x]  Cardiology/Vascular   []  Pathology   [x]  Records    Assessment/Plan     Active Hospital Problems    Diagnosis  POA    **Syncope [R55]  Yes      Resolved Hospital Problems   No resolved problems to display.       73 y.o. male admitted with Syncope.    Syncope  Acute on chronic anemia  Symptomatic anemia  -Transfused 1 unit PRBC, repeat hemoglobin still down trended, repeat hemoglobin ordered for this afternoon; plan on additional transfusions if hemoglobin is less than 7  -Syncope secondary to acute blood loss from laceration    T-wave inversions  -Lateral leads with inverted T waves, new; troponin elevated to 123  -Cardiology consulted, patient denies chest pain    Left upper extremity laceration  - Sutured in the ER  -Will need to follow-up with PCP for suture removal in 10 to 14 days    Chronic atrial fibrillation  -Continue metoprolol, Eliquis on hold given blood loss anemia    Hypertension  Hypotension secondary to blood loss  -Discontinue Imdur; continue metoprolol for A-fib  -Start IVF    CKD 3A  -Creatinine baseline around 1.0, elevated to 1.5 on admission, improved with IVF to 1.2 overnight  -Resume IVF    Type 2 diabetes  -Continue SSI as needed    Hypothyroidism  -Continue Synthroid, TSH mildly elevated, will increase levothyroxine    Parkinson's disease  -Continue home Sinemet    SCDs for DVT prophylaxis.  Full code.  Discussed with patient and nursing staff.  Anticipate discharge  pending PT/OT evaluation  timing yet to be determined.      Samina Barillas MD  [facility]  08/05/23  13:52 EDT    I wore protective equipment throughout this patient encounter including gloves.  Hand hygiene was performed before donning protective equipment and after removal when leaving the room.         Copied text in this note has been reviewed and is accurate as of 08/05/23.

## 2023-08-05 NOTE — PLAN OF CARE
Problem: Adult Inpatient Plan of Care  Goal: Plan of Care Review  Outcome: Ongoing, Progressing   Goal Outcome Evaluation:  Pt admitted after having a fall and syncopal episode at home. Pt with left forearm laceration, sutured and wrapped in ed. Lt shoulder and hip xray neg. CT head neg. Hgb 7.5, 1 unit PRBC given. Tylenol given for pain, see mar. Afib on the monitor, controlled rate. RA. Alert and oriented x4, but forgetful at times. Cardiology consulted. Pt's wife brought home supply of carbidopa/levadopa to use while in hospital, med in locked cabinet. Pt rested throughout the night with no other issues or complaints. Spouse remained at bedside.

## 2023-08-06 LAB
ANION GAP SERPL CALCULATED.3IONS-SCNC: 10 MMOL/L (ref 5–15)
BH BB BLOOD EXPIRATION DATE: NORMAL
BH BB BLOOD EXPIRATION DATE: NORMAL
BH BB BLOOD TYPE BARCODE: 6200
BH BB BLOOD TYPE BARCODE: 6200
BH BB DISPENSE STATUS: NORMAL
BH BB DISPENSE STATUS: NORMAL
BH BB PRODUCT CODE: NORMAL
BH BB PRODUCT CODE: NORMAL
BH BB UNIT NUMBER: NORMAL
BH BB UNIT NUMBER: NORMAL
BUN SERPL-MCNC: 27 MG/DL (ref 8–23)
BUN/CREAT SERPL: 25 (ref 7–25)
CALCIUM SPEC-SCNC: 8.1 MG/DL (ref 8.6–10.5)
CHLORIDE SERPL-SCNC: 104 MMOL/L (ref 98–107)
CO2 SERPL-SCNC: 21 MMOL/L (ref 22–29)
CREAT SERPL-MCNC: 1.08 MG/DL (ref 0.76–1.27)
CROSSMATCH INTERPRETATION: NORMAL
CROSSMATCH INTERPRETATION: NORMAL
DEPRECATED RDW RBC AUTO: 49 FL (ref 37–54)
EGFRCR SERPLBLD CKD-EPI 2021: 71.6 ML/MIN/1.73
ERYTHROCYTE [DISTWIDTH] IN BLOOD BY AUTOMATED COUNT: 17.3 % (ref 12.3–15.4)
GLUCOSE BLDC GLUCOMTR-MCNC: 156 MG/DL (ref 70–130)
GLUCOSE BLDC GLUCOMTR-MCNC: 201 MG/DL (ref 70–130)
GLUCOSE BLDC GLUCOMTR-MCNC: 225 MG/DL (ref 70–130)
GLUCOSE BLDC GLUCOMTR-MCNC: 255 MG/DL (ref 70–130)
GLUCOSE SERPL-MCNC: 189 MG/DL (ref 65–99)
HCT VFR BLD AUTO: 23.1 % (ref 37.5–51)
HCT VFR BLD AUTO: 24.2 % (ref 37.5–51)
HGB BLD-MCNC: 7.7 G/DL (ref 13–17.7)
HGB BLD-MCNC: 7.9 G/DL (ref 13–17.7)
MAGNESIUM SERPL-MCNC: 1.9 MG/DL (ref 1.6–2.4)
MCH RBC QN AUTO: 26.3 PG (ref 26.6–33)
MCHC RBC AUTO-ENTMCNC: 33.3 G/DL (ref 31.5–35.7)
MCV RBC AUTO: 78.8 FL (ref 79–97)
PHOSPHATE SERPL-MCNC: 2.6 MG/DL (ref 2.5–4.5)
PLATELET # BLD AUTO: 106 10*3/MM3 (ref 140–450)
PMV BLD AUTO: 10.2 FL (ref 6–12)
POTASSIUM SERPL-SCNC: 3.7 MMOL/L (ref 3.5–5.2)
RBC # BLD AUTO: 2.93 10*6/MM3 (ref 4.14–5.8)
SODIUM SERPL-SCNC: 135 MMOL/L (ref 136–145)
UNIT  ABO: NORMAL
UNIT  ABO: NORMAL
UNIT  RH: NORMAL
UNIT  RH: NORMAL
WBC NRBC COR # BLD: 4.61 10*3/MM3 (ref 3.4–10.8)

## 2023-08-06 PROCEDURE — 83735 ASSAY OF MAGNESIUM: CPT | Performed by: STUDENT IN AN ORGANIZED HEALTH CARE EDUCATION/TRAINING PROGRAM

## 2023-08-06 PROCEDURE — 99232 SBSQ HOSP IP/OBS MODERATE 35: CPT

## 2023-08-06 PROCEDURE — 85018 HEMOGLOBIN: CPT | Performed by: STUDENT IN AN ORGANIZED HEALTH CARE EDUCATION/TRAINING PROGRAM

## 2023-08-06 PROCEDURE — 80048 BASIC METABOLIC PNL TOTAL CA: CPT | Performed by: STUDENT IN AN ORGANIZED HEALTH CARE EDUCATION/TRAINING PROGRAM

## 2023-08-06 PROCEDURE — 85014 HEMATOCRIT: CPT | Performed by: STUDENT IN AN ORGANIZED HEALTH CARE EDUCATION/TRAINING PROGRAM

## 2023-08-06 PROCEDURE — 63710000001 INSULIN LISPRO (HUMAN) PER 5 UNITS: Performed by: HOSPITALIST

## 2023-08-06 PROCEDURE — 82948 REAGENT STRIP/BLOOD GLUCOSE: CPT

## 2023-08-06 PROCEDURE — 85027 COMPLETE CBC AUTOMATED: CPT | Performed by: STUDENT IN AN ORGANIZED HEALTH CARE EDUCATION/TRAINING PROGRAM

## 2023-08-06 PROCEDURE — 84100 ASSAY OF PHOSPHORUS: CPT | Performed by: STUDENT IN AN ORGANIZED HEALTH CARE EDUCATION/TRAINING PROGRAM

## 2023-08-06 RX ORDER — LEVOTHYROXINE SODIUM 0.05 MG/1
50 TABLET ORAL DAILY
Status: DISCONTINUED | OUTPATIENT
Start: 2023-08-07 | End: 2023-08-06

## 2023-08-06 RX ORDER — LEVOTHYROXINE SODIUM 0.05 MG/1
50 TABLET ORAL
Status: DISCONTINUED | OUTPATIENT
Start: 2023-08-07 | End: 2023-08-08 | Stop reason: HOSPADM

## 2023-08-06 RX ADMIN — TRAZODONE HYDROCHLORIDE 25 MG: 50 TABLET ORAL at 20:05

## 2023-08-06 RX ADMIN — LEVOTHYROXINE SODIUM 25 MCG: 0.03 TABLET ORAL at 08:39

## 2023-08-06 RX ADMIN — SENNOSIDES AND DOCUSATE SODIUM 2 TABLET: 50; 8.6 TABLET ORAL at 20:05

## 2023-08-06 RX ADMIN — Medication 10 ML: at 20:09

## 2023-08-06 RX ADMIN — SENNOSIDES AND DOCUSATE SODIUM 2 TABLET: 50; 8.6 TABLET ORAL at 08:39

## 2023-08-06 RX ADMIN — ROSUVASTATIN CALCIUM 20 MG: 20 TABLET, FILM COATED ORAL at 20:05

## 2023-08-06 RX ADMIN — INSULIN LISPRO 2 UNITS: 100 INJECTION, SOLUTION INTRAVENOUS; SUBCUTANEOUS at 18:37

## 2023-08-06 RX ADMIN — METOPROLOL TARTRATE 12.5 MG: 25 TABLET, FILM COATED ORAL at 08:39

## 2023-08-06 RX ADMIN — INSULIN LISPRO 3 UNITS: 100 INJECTION, SOLUTION INTRAVENOUS; SUBCUTANEOUS at 11:53

## 2023-08-06 RX ADMIN — Medication 500 MCG: at 08:39

## 2023-08-06 RX ADMIN — DONEPEZIL HYDROCHLORIDE 10 MG: 10 TABLET, FILM COATED ORAL at 20:05

## 2023-08-06 RX ADMIN — INSULIN LISPRO 4 UNITS: 100 INJECTION, SOLUTION INTRAVENOUS; SUBCUTANEOUS at 21:09

## 2023-08-06 RX ADMIN — CITALOPRAM 20 MG: 20 TABLET, FILM COATED ORAL at 08:39

## 2023-08-06 RX ADMIN — Medication 500 MCG: at 20:05

## 2023-08-06 RX ADMIN — INSULIN LISPRO 2 UNITS: 100 INJECTION, SOLUTION INTRAVENOUS; SUBCUTANEOUS at 08:38

## 2023-08-06 RX ADMIN — METOPROLOL TARTRATE 12.5 MG: 25 TABLET, FILM COATED ORAL at 20:05

## 2023-08-06 RX ADMIN — ALLOPURINOL 100 MG: 100 TABLET ORAL at 08:39

## 2023-08-06 RX ADMIN — HYDROCODONE BITARTRATE AND ACETAMINOPHEN 1 TABLET: 7.5; 325 TABLET ORAL at 20:09

## 2023-08-06 NOTE — PROGRESS NOTES
Electrophysiology Follow-Up Note      Patient Name: Edilberto Reeder  Age/Sex: 73 y.o. male  : 1950  MRN: [id number]      Day of Service: 23       Chief Complaint/Follow-up: Afib, fall    S: says he is doing okay today. No new complaints. Received 2 units PRBC last night.       Temp:  [97.1 øF (36.2 øC)-97.9 øF (36.6 øC)] 97.5 øF (36.4 øC)  Heart Rate:  [62-76] 68  Resp:  [16-18] 18  BP: ()/(45-60) 112/52     PHYSICAL EXAM:    General Appearance: No acute distress, well developed and well nourished.   Eyes: Conjunctiva and lids: No erythema, swelling, or discharge. Sclera non-icteric.   HENT: Atraumatic, normocephalic. External eyes, ears, and nose normal.   Respiratory: No signs of respiratory distress. Respiration rhythm and depth normal.   Clear to auscultation. No rales, crackles, rhonchi, or wheezing auscultated.   Cardiovascular:  Heart Rate and Rhythm: Normal, Heart Sounds: Normal S1 and S2.  Gastrointestinal:  Abdomen soft, non-distended, non-tender.  Musculoskeletal: Normal movement of extremities  Skin: Warm and dry.   Psychiatric: Patient alert and oriented to person, place, and time. Speech and behavior appropriate. Normal mood and affect.      Results from last 7 days   Lab Units 23  0637 23  0623  1552   SODIUM mmol/L 135* 137 139   POTASSIUM mmol/L 3.7 4.0 3.6   CHLORIDE mmol/L 104 103 101   CO2 mmol/L 21.0* 23.7 14.0*   BUN mg/dL 27* 26* 26*   CREATININE mg/dL 1.08 1.20 1.49*   GLUCOSE mg/dL 189* 73 126*   CALCIUM mg/dL 8.1* 8.6 9.1     Results from last 7 days   Lab Units 23  0637 23  1553 23  0628 23  1552   WBC 10*3/mm3 4.61  --  7.00 11.96*   HEMOGLOBIN g/dL 7.7* 5.9* 7.0* 7.5*   HEMATOCRIT % 23.1* 17.8* 21.2* 23.9*   PLATELETS 10*3/mm3 106*  --  131* 194     Results from last 7 days   Lab Units 23  1552   INR  2.38*     Results from last 7 days   Lab Units 23  1747 23  1552   CK TOTAL U/L  --  153    HSTROP T ng/L 123* 116*     Results from last 7 days   Lab Units 08/04/23  1747   TSH uIU/mL 7.370*           Current Medications:   Scheduled Meds:allopurinol, 100 mg, Oral, Daily  Carbidopa-Levodopa ER, 4 capsule, Oral, 4x Daily  citalopram, 20 mg, Oral, Daily  donepezil, 10 mg, Oral, Nightly  insulin lispro, 2-7 Units, Subcutaneous, 4x Daily AC & at Bedtime  iopamidol, 100 mL, Intravenous, Once in imaging  levothyroxine, 25 mcg, Oral, Daily  metoprolol tartrate, 12.5 mg, Oral, BID  rosuvastatin, 20 mg, Oral, Nightly  senna-docusate sodium, 2 tablet, Oral, BID  sodium chloride, 10 mL, Intravenous, Q12H  traZODone, 25 mg, Oral, Nightly  vitamin B-12, 500 mcg, Oral, BID            Syncope       Plan:   Elevated troponin, NSTEMI II--- felt to be secondary to CKD, diastolic HF, and anemia. No angina. No ischemic workup recommended.  Chronic diastolic HF--- euvolemic on exam. Denies dyspnea. VSS.  Chronic AF--- heart rate well controlled on metoprolol. AC being held secondary to anemia and transfusions.   Acute blood loss--- received two more units of blood last night. Only minimal increase in HGB. 7.7 this morning. Workup per internal medicine. Continue to hold AC.     His cardiac status is stable. No further recommendations at this time. We will see patient as needed.       ESME Godoy  08/06/23  11:10 EDT

## 2023-08-06 NOTE — PROGRESS NOTES
"    Name: Edilberto Reeder ADMIT: 2023   : 1950  PCP: Harvey Larson MD    MRN: [id number] LOS: 0 days   AGE/SEX: 73 y.o. male  ROOM: [facility]     Subjective   Subjective   Resting in bed, eating lunch. Blood pressure is better today. He is complaining of his hands feeling \"tight and puffy.       Objective   Objective   Vital Signs  Temp:  [97.1 øF (36.2 øC)-97.9 øF (36.6 øC)] 97.6 øF (36.4 øC)  Heart Rate:  [62-75] 71  Resp:  [16-18] 18  BP: ()/(52-60) 102/56  SpO2:  [93 %-100 %] 100 %  on   ;   Device (Oxygen Therapy): room air  Body mass index is 23.65 kg/mý.  Physical Exam  Constitutional:       General: He is not in acute distress.     Appearance: He is ill-appearing. He is not toxic-appearing.   Cardiovascular:      Rate and Rhythm: Normal rate. Rhythm irregular.   Pulmonary:      Effort: Pulmonary effort is normal. No respiratory distress.      Breath sounds: Normal breath sounds. No wheezing.   Abdominal:      General: Abdomen is flat. There is no distension.      Palpations: Abdomen is soft.      Tenderness: There is no abdominal tenderness.   Musculoskeletal:         General: No tenderness.      Right lower leg: No edema.      Left lower leg: No edema.      Comments: Left upper extremity with Ace bandage in place   Skin:     General: Skin is warm and dry.   Neurological:      General: No focal deficit present.      Mental Status: He is alert and oriented to person, place, and time. Mental status is at baseline.      Motor: No weakness.      Comments: Patient intermittently closing left eye, reports this is chronic for him       Results Review     I reviewed the patient's new clinical results.  Results from last 7 days   Lab Units 23  1351 23  0637 23  1553 23  0628 23  1552   WBC 10*3/mm3  --  4.61  --  7.00 11.96*   HEMOGLOBIN g/dL 7.9* 7.7* 5.9* 7.0* 7.5*   PLATELETS 10*3/mm3  --  106*  --  131* 194     Results from last 7 days   Lab Units 23  0637 " 08/05/23  0628 08/04/23  1552   SODIUM mmol/L 135* 137 139   POTASSIUM mmol/L 3.7 4.0 3.6   CHLORIDE mmol/L 104 103 101   CO2 mmol/L 21.0* 23.7 14.0*   BUN mg/dL 27* 26* 26*   CREATININE mg/dL 1.08 1.20 1.49*   GLUCOSE mg/dL 189* 73 126*   Estimated Creatinine Clearance: 66.1 mL/min (by C-G formula based on SCr of 1.08 mg/dL).  Results from last 7 days   Lab Units 08/04/23  1552   ALBUMIN g/dL 3.3*   BILIRUBIN mg/dL 0.9   ALK PHOS U/L 119*   AST (SGOT) U/L 32   ALT (SGPT) U/L 8     Results from last 7 days   Lab Units 08/06/23  0637 08/05/23  0628 08/04/23  1552   CALCIUM mg/dL 8.1* 8.6 9.1   ALBUMIN g/dL  --   --  3.3*   MAGNESIUM mg/dL 1.9  --   --    PHOSPHORUS mg/dL 2.6  --   --        COVID19   Date Value Ref Range Status   05/04/2022 Not Detected Not Detected - Ref. Range Final   04/30/2022 Not Detected Not Detected - Ref. Range Final     Hemoglobin A1C   Date/Time Value Ref Range Status   08/05/2023 0628 6.60 (H) 4.80 - 5.60 % Final     Glucose   Date/Time Value Ref Range Status   08/06/2023 1634 156 (H) 70 - 130 mg/dL Final     Comment:     Meter: [id number] : [id number] Corky Tatianna NA   08/06/2023 1137 201 (H) 70 - 130 mg/dL Final     Comment:     Meter: [id number] : [id number] Corky Tatianna NA   08/06/2023 0614 225 (H) 70 - 130 mg/dL Final     Comment:     Meter: [id number] : [id number] Louie Deanne NA   08/05/2023 2114 189 (H) 70 - 130 mg/dL Final     Comment:     Meter: [id number] : [id number] Louie Deanne NA   08/05/2023 1615 190 (H) 70 - 130 mg/dL Final     Comment:     Meter: [id number] : [id number] Corky Tatianna NA   08/05/2023 1123 139 (H) 70 - 130 mg/dL Final     Comment:     Meter: [id number] : [id number] Corky Tatianna NA   08/05/2023 0615 86 70 - 130 mg/dL Final     Comment:     Meter: [id number] : [id number] Louie MILLAN       CT Cervical Spine Without Contrast  Narrative: CT OF THE CERVICAL SPINE WITHOUT CONTRAST INCLUDING RECONSTRUCTION  IMAGES 08/04/2023      HISTORY: Fell, neck pain.     TECHNIQUE: Axial images were obtained from the skull base to the upper  thoracic spine. Sagittal and coronal reconstruction images were  reviewed.     FINDINGS: There is mild degenerative disease of the C1-C2 articulation.  There is approximately 6 mm anterolisthesis of C2 on C3 with some C2-3  disc space narrowing. C3-4, C4-5, C5-C6 and C6-7 mild-to-moderate  spondylosis is seen with some mild spondylosis of C7-T1.. No other  subluxation is seen. No cervical spine fractures are seen. Stimulator  leads are seen in the soft tissues of the right side of the neck. There  is some left carotid calcification.     Impression: 1. Cervical degenerative disease with no fractures seen.     Radiation dose reduction techniques were utilized, including automated  exposure control and exposure modulation based on body size.     This report was finalized on 8/5/2023 7:17 PM by Dr. Earnest Pepper M.D.     CT Head Without Contrast  Narrative: CT OF THE BRAIN WITHOUT CONTRAST 08/04/2023     HISTORY: Fell. Head injury. Syncope.     TECHNIQUE: Axial images were obtained through the brain without  intravenous contrast.     FINDINGS: There is mild-to-moderate diffuse atrophy. Brain stimulator  leads are seen bilaterally terminating in the inferior aspects of both  thalami.     There is no evidence of acute infarction, hemorrhage, midline shift or  mass effect. No acute bony abnormalities are seen.     Impression: 1. No acute process.     Radiation dose reduction techniques were utilized, including automated  exposure control and exposure modulation based on body size.     This report was finalized on 8/5/2023 7:17 PM by Dr. Earnest Pepper M.D.       Scheduled Medications  allopurinol, 100 mg, Oral, Daily  Carbidopa-Levodopa ER, 4 capsule, Oral, 4x Daily  citalopram, 20 mg, Oral, Daily  donepezil, 10 mg, Oral, Nightly  insulin lispro, 2-7 Units, Subcutaneous, 4x Daily AC & at Bedtime  iopamidol, 100  "mL, Intravenous, Once in imaging  levothyroxine, 25 mcg, Oral, Daily  metoprolol tartrate, 12.5 mg, Oral, BID  rosuvastatin, 20 mg, Oral, Nightly  senna-docusate sodium, 2 tablet, Oral, BID  sodium chloride, 10 mL, Intravenous, Q12H  traZODone, 25 mg, Oral, Nightly  vitamin B-12, 500 mcg, Oral, BID    Infusions  sodium chloride, 75 mL/hr, Last Rate: 75 mL/hr (08/05/23 2310)    Diet  Diet: Regular/House Diet, Cardiac Diets, Diabetic Diets; Healthy Heart (2-3 Na+); Consistent Carbohydrate; Texture: Regular Texture (IDDSI 7); Fluid Consistency: Thin (IDDSI 0)         I have personally reviewed:  [x]  Laboratory   []  Microbiology   []  Radiology   [x]  EKG/Telemetry   []  Cardiology/Vascular   []  Pathology   []  Records    Assessment/Plan     Active Hospital Problems    Diagnosis  POA    **Syncope [R55]  Yes    Chronic diastolic heart failure [I50.32]  Yes    Chronic anticoagulation [Z79.01]  Not Applicable    Parkinson's disease [G20]  Yes    Diabetic retinopathy associated with type 2 diabetes mellitus [E11.319]  Yes    Essential hypertension [I10]  Yes    HLD (hyperlipidemia) [E78.5]  Yes    Hypothyroidism [E03.9]  Yes    ASCVD (arteriosclerotic cardiovascular disease) [I25.10]  Yes    Permanent atrial fibrillation [I48.21]  Yes    Peripheral neuropathy [G62.9]  Yes      Resolved Hospital Problems   No resolved problems to display.       73 y.o. male admitted with Syncope.    Syncope  Acute on chronic anemia  Symptomatic anemia  -Transfused 1 unit PRBC, repeat hemoglobin still down trended, repeat hemoglobin ordered for this afternoon; plan on additional transfusions if hemoglobin is less than 7  -Syncope secondary to acute blood loss from laceration  - patient received additional 2 U overnight with improvement in hgb from 5.9 to 7.7. Repeat hgb this afternoon stable at 7.9.  - Hold off on additional PRBC- pt c/o \"feeling puffy\"- repeat CBC in am    T-wave inversions  -Lateral leads with inverted T " waves, new; troponin elevated to 123  -Cardiology consulted, patient denies chest pain  - no interventions planned    Left upper extremity laceration  - Sutured in the ER  -Will need to follow-up with PCP for suture removal in 10 to 14 days    Chronic atrial fibrillation  -Continue metoprolol, Eliquis on hold given blood loss anemia    Hypertension  Hypotension secondary to blood loss  -Discontinued Imdur 8/7; continue metoprolol for A-fib  -BP improved, pt c/o edema- DC IVF  - if Cr fcn stable resume lasix tomorrow    CKD 3A  -Creatinine baseline around 1.0, elevated to 1.5 on admission, improved with IVF to 1.2 overnight  -Cr back ro baseline 1.0  - if stable will resume lasix tomorrow    Type 2 diabetes  -Continue SSI as needed    Hypothyroidism  -Continue Synthroid, TSH mildly elevated, will increase levothyroxine    Parkinson's disease  -Continue home Sinemet    SCDs for DVT prophylaxis.  Full code.  Discussed with patient and nursing staff.  Anticipate discharge  pending PT/OT evaluation  timing yet to be determined.      Samina Barillas MD  [facility]  08/06/23  16:46 EDT    I wore protective equipment throughout this patient encounter including gloves.  Hand hygiene was performed before donning protective equipment and after removal when leaving the room.         Copied text in this note has been reviewed and is accurate as of 08/06/23.

## 2023-08-06 NOTE — PLAN OF CARE
Problem: Adult Inpatient Plan of Care  Goal: Plan of Care Review  Outcome: Ongoing, Progressing   Goal Outcome Evaluation:  VSS. RA. Afib. Pt received 2 units of PRBCs last night. PRN pain meds given, see mar. Pt rested throughout the night with no other issues or complaints.

## 2023-08-06 NOTE — PLAN OF CARE
Goal Outcome Evaluation:           Progress: improving          Pt A&Ox4. On room air. Wife at bedside attentive to patient. Pt had some +2 Edema on right hand, slowly decreasing. Pt's HGB stable, up to 7.9 as of 1300. RN discussed plan of care with wife.

## 2023-08-07 LAB
ANION GAP SERPL CALCULATED.3IONS-SCNC: 9.6 MMOL/L (ref 5–15)
BUN SERPL-MCNC: 20 MG/DL (ref 8–23)
BUN/CREAT SERPL: 20 (ref 7–25)
CALCIUM SPEC-SCNC: 8.1 MG/DL (ref 8.6–10.5)
CHLORIDE SERPL-SCNC: 100 MMOL/L (ref 98–107)
CO2 SERPL-SCNC: 22.4 MMOL/L (ref 22–29)
CREAT SERPL-MCNC: 1 MG/DL (ref 0.76–1.27)
DEPRECATED RDW RBC AUTO: 50.2 FL (ref 37–54)
EGFRCR SERPLBLD CKD-EPI 2021: 78.5 ML/MIN/1.73
ERYTHROCYTE [DISTWIDTH] IN BLOOD BY AUTOMATED COUNT: 17.1 % (ref 12.3–15.4)
GLUCOSE BLDC GLUCOMTR-MCNC: 162 MG/DL (ref 70–130)
GLUCOSE BLDC GLUCOMTR-MCNC: 171 MG/DL (ref 70–130)
GLUCOSE BLDC GLUCOMTR-MCNC: 192 MG/DL (ref 70–130)
GLUCOSE BLDC GLUCOMTR-MCNC: 209 MG/DL (ref 70–130)
GLUCOSE SERPL-MCNC: 144 MG/DL (ref 65–99)
HCT VFR BLD AUTO: 23.4 % (ref 37.5–51)
HGB BLD-MCNC: 7.5 G/DL (ref 13–17.7)
MAGNESIUM SERPL-MCNC: 1.5 MG/DL (ref 1.6–2.4)
MCH RBC QN AUTO: 26.1 PG (ref 26.6–33)
MCHC RBC AUTO-ENTMCNC: 32.1 G/DL (ref 31.5–35.7)
MCV RBC AUTO: 81.5 FL (ref 79–97)
PHOSPHATE SERPL-MCNC: 2.1 MG/DL (ref 2.5–4.5)
PLATELET # BLD AUTO: 97 10*3/MM3 (ref 140–450)
PMV BLD AUTO: 10 FL (ref 6–12)
POTASSIUM SERPL-SCNC: 3.7 MMOL/L (ref 3.5–5.2)
RBC # BLD AUTO: 2.87 10*6/MM3 (ref 4.14–5.8)
SODIUM SERPL-SCNC: 132 MMOL/L (ref 136–145)
WBC NRBC COR # BLD: 4.52 10*3/MM3 (ref 3.4–10.8)

## 2023-08-07 PROCEDURE — 85027 COMPLETE CBC AUTOMATED: CPT | Performed by: STUDENT IN AN ORGANIZED HEALTH CARE EDUCATION/TRAINING PROGRAM

## 2023-08-07 PROCEDURE — 63710000001 INSULIN LISPRO (HUMAN) PER 5 UNITS: Performed by: HOSPITALIST

## 2023-08-07 PROCEDURE — 25010000002 MAGNESIUM SULFATE 2 GM/50ML SOLUTION: Performed by: STUDENT IN AN ORGANIZED HEALTH CARE EDUCATION/TRAINING PROGRAM

## 2023-08-07 PROCEDURE — 80048 BASIC METABOLIC PNL TOTAL CA: CPT | Performed by: STUDENT IN AN ORGANIZED HEALTH CARE EDUCATION/TRAINING PROGRAM

## 2023-08-07 PROCEDURE — 97530 THERAPEUTIC ACTIVITIES: CPT

## 2023-08-07 PROCEDURE — 84100 ASSAY OF PHOSPHORUS: CPT | Performed by: STUDENT IN AN ORGANIZED HEALTH CARE EDUCATION/TRAINING PROGRAM

## 2023-08-07 PROCEDURE — 82948 REAGENT STRIP/BLOOD GLUCOSE: CPT

## 2023-08-07 PROCEDURE — 83735 ASSAY OF MAGNESIUM: CPT | Performed by: STUDENT IN AN ORGANIZED HEALTH CARE EDUCATION/TRAINING PROGRAM

## 2023-08-07 RX ORDER — MAGNESIUM SULFATE HEPTAHYDRATE 40 MG/ML
2 INJECTION, SOLUTION INTRAVENOUS
Status: COMPLETED | OUTPATIENT
Start: 2023-08-07 | End: 2023-08-07

## 2023-08-07 RX ADMIN — APIXABAN 5 MG: 5 TABLET, FILM COATED ORAL at 22:00

## 2023-08-07 RX ADMIN — SENNOSIDES AND DOCUSATE SODIUM 2 TABLET: 50; 8.6 TABLET ORAL at 08:28

## 2023-08-07 RX ADMIN — LEVOTHYROXINE SODIUM 50 MCG: 0.05 TABLET ORAL at 06:51

## 2023-08-07 RX ADMIN — Medication 500 MCG: at 21:59

## 2023-08-07 RX ADMIN — SENNOSIDES AND DOCUSATE SODIUM 2 TABLET: 50; 8.6 TABLET ORAL at 21:59

## 2023-08-07 RX ADMIN — INSULIN LISPRO 2 UNITS: 100 INJECTION, SOLUTION INTRAVENOUS; SUBCUTANEOUS at 17:15

## 2023-08-07 RX ADMIN — DONEPEZIL HYDROCHLORIDE 10 MG: 10 TABLET, FILM COATED ORAL at 21:59

## 2023-08-07 RX ADMIN — Medication 500 MCG: at 08:28

## 2023-08-07 RX ADMIN — Medication 2 PACKET: at 11:37

## 2023-08-07 RX ADMIN — Medication 10 ML: at 11:37

## 2023-08-07 RX ADMIN — INSULIN LISPRO 3 UNITS: 100 INJECTION, SOLUTION INTRAVENOUS; SUBCUTANEOUS at 22:02

## 2023-08-07 RX ADMIN — MAGNESIUM SULFATE HEPTAHYDRATE 2 G: 2 INJECTION, SOLUTION INTRAVENOUS at 13:58

## 2023-08-07 RX ADMIN — ROSUVASTATIN CALCIUM 20 MG: 20 TABLET, FILM COATED ORAL at 21:59

## 2023-08-07 RX ADMIN — APIXABAN 5 MG: 5 TABLET, FILM COATED ORAL at 13:59

## 2023-08-07 RX ADMIN — INSULIN LISPRO 2 UNITS: 100 INJECTION, SOLUTION INTRAVENOUS; SUBCUTANEOUS at 12:00

## 2023-08-07 RX ADMIN — CITALOPRAM 20 MG: 20 TABLET, FILM COATED ORAL at 08:28

## 2023-08-07 RX ADMIN — MAGNESIUM SULFATE HEPTAHYDRATE 2 G: 2 INJECTION, SOLUTION INTRAVENOUS at 15:33

## 2023-08-07 RX ADMIN — Medication 10 ML: at 22:00

## 2023-08-07 RX ADMIN — INSULIN LISPRO 2 UNITS: 100 INJECTION, SOLUTION INTRAVENOUS; SUBCUTANEOUS at 08:28

## 2023-08-07 RX ADMIN — MAGNESIUM SULFATE HEPTAHYDRATE 2 G: 2 INJECTION, SOLUTION INTRAVENOUS at 11:37

## 2023-08-07 RX ADMIN — METOPROLOL TARTRATE 12.5 MG: 25 TABLET, FILM COATED ORAL at 08:28

## 2023-08-07 RX ADMIN — ALLOPURINOL 100 MG: 100 TABLET ORAL at 08:28

## 2023-08-07 RX ADMIN — TRAZODONE HYDROCHLORIDE 25 MG: 50 TABLET ORAL at 22:00

## 2023-08-07 NOTE — CASE MANAGEMENT/SOCIAL WORK
Discharge Planning Assessment  [facility]     Patient Name: Edilberto Reeder  MRN: [id number]  Today's Date: 8/7/2023    Admit Date: 8/4/2023    Plan: Home w/ assist of family   Discharge Needs Assessment       Row Name 08/07/23 1553       Living Environment    People in Home spouse    Current Living Arrangements home    Potentially Unsafe Housing Conditions none    Primary Care Provided by spouse/significant other    Provides Primary Care For no one, unable/limited ability to care for self    Family Caregiver if Needed spouse    Quality of Family Relationships helpful;involved;supportive    Able to Return to Prior Arrangements yes       Resource/Environmental Concerns    Resource/Environmental Concerns none       Transition Planning    Patient/Family Anticipates Transition to home    Patient/Family Anticipated Services at Transition none    Transportation Anticipated family or friend will provide       Discharge Needs Assessment    Readmission Within the Last 30 Days no previous admission in last 30 days    Equipment Currently Used at Home cane, straight;walker, rolling;wheelchair;wheelchair, motorized;shower chair;grab bar    Concerns to be Addressed denies needs/concerns at this time    Anticipated Changes Related to Illness none    Equipment Needed After Discharge none    Provided Post Acute Provider List? N/A    Provided Post Acute Provider Quality & Resource List? N/A                   Discharge Plan       Row Name 08/07/23 1554       Plan    Plan Home w/ assist of family    Plan Comments S/W pt and his spouse [place] at bedside.  Facesheet and pharmacy info confirmed.  Pt lives in a 1.5 story house w/ [place] who assists him at home as needed.  Other family members assist a couple of days a week.   Home DME includes a cane, walker. w/c, scooter, shower chair and grab bars in the bathroom.  [place] provides transport as needed.  Pt has used HH in the past and has been to SNF at [facility].  CCP discussed  possible HH, but pt declines.  [place] states she makes sure he does his exercises at home and is in agreement that HH is not needed at this time.  She will privide transport home upon DC.  CCP will continue to follow and assist as needed. .........Kim DONAHUE/ LOIDA                  Continued Care and Services - Admitted Since 8/4/2023    Coordination has not been started for this encounter.          Demographic Summary       Row Name 08/07/23 1556       General Information    Admission Type inpatient    Arrived From home    Required Notices Provided Important Message from Medicare    Referral Source admission list    Reason for Consult discharge planning    Preferred Language English                   Functional Status       Row Name 08/07/23 1552       Functional Status    Usual Activity Tolerance moderate    Current Activity Tolerance moderate       Functional Status, IADL    Medications assistive person    Meal Preparation assistive person    Housekeeping assistive person    Laundry assistive person    Shopping assistive person       Employment/    Employment Status retired                               Kim Javed RN

## 2023-08-07 NOTE — THERAPY TREATMENT NOTE
Patient Name: Edilberto Reeder  : 1950    MRN: [id number]                              Today's Date: 2023       Admit Date: 2023    Visit Dx:     ICD-10-CM ICD-9-CM   1. Syncope, unspecified syncope type  R55 780.2   2. Hypotension, unspecified hypotension type  I95.9 458.9   3. Laceration of left forearm, initial encounter  S51.812A 881.00   4. Anemia, unspecified type  D64.9 285.9   5. Elevated troponin  R77.8 790.6     Patient Active Problem List   Diagnosis    ASCVD (arteriosclerotic cardiovascular disease)    Permanent atrial fibrillation    Diabetic retinopathy associated with type 2 diabetes mellitus    Essential hypertension    HLD (hyperlipidemia)    Hypothyroidism    Peripheral neuropathy    Renal insufficiency    Type 2 diabetes mellitus with hyperglycemia, without long-term current use of insulin    Parkinson's disease    Dyspnea on exertion    Atrial fibrillation, persistent    Stroke-like symptoms    Hypopotassemia    Ankle pain    Acute idiopathic gout of right ankle    Generalized weakness    Head injury due to trauma    Acute blood loss anemia    Chronic anticoagulation    Thrombocytopenia    Screening for colon cancer    Minor head injury, initial encounter    Chronic diastolic heart failure    Shortness of breath    Syncope     Past Medical History:   Diagnosis Date    Atrial fibrillation with RVR     Atrial flutter     Diabetes     HLD (hyperlipidemia) 2016    Hypertension     Parkinson's disease     Advanced      Past Surgical History:   Procedure Laterality Date    BRAIN STIMULATOR      COLONOSCOPY N/A 2022    Procedure: COLONOSCOPY TO CECUM WITH COLD SNARE POLYPECTOMY;  Surgeon: Luther Ferrer MD;  Location: [facility] ENDOSCOPY;  Service: Gastroenterology;  Laterality: N/A;  PRE:ANEMIA  POST:POLYPS/HEMORRHOIDS    ENDOSCOPY N/A 2022    Procedure: ESOPHAGOGASTRODUODENOSCOPY WITH  COLD BIOPSIES;  Surgeon: Luther Ferrer MD;  Location: [facility] ENDOSCOPY;   Service: Gastroenterology;  Laterality: N/A;  PRE:ANEMIA  POST:DIVERTICULUM/GASTRITIS    JOINT REPLACEMENT      Bilateral shoulder and knee replacements      General Information       [facility] Name 08/07/23 1526          Physical Therapy Time and Intention    Document Type therapy note (daily note)  -     Mode of Treatment physical therapy  -[facility] Name 08/07/23 1526          General Information    Existing Precautions/Restrictions cardiac;fall  -[facility] Name 08/07/23 1526          Cognition    Orientation Status (Cognition) oriented x 4  -[facility] Name 08/07/23 1526          Safety Issues, Functional Mobility    Impairments Affecting Function (Mobility) motor planning;muscle tone abnormal;pain;postural/trunk control;balance;endurance/activity tolerance;strength  -     Comment, Safety Issues/Impairments (Mobility) gt belt and non skid socks donned; PD at [place] w/ use of U-step walker  -               User Key  (r) = Recorded By, (t) = Taken By, (c) = Cosigned By      Initials Name Provider Type     Wilma Lund PTA Physical Therapist Assistant                   Mobility       [facility] Name 08/07/23 1526          Bed Mobility    Bed Mobility supine-sit;sit-supine  -     Supine-Sit [place] (Bed Mobility) contact guard;verbal cues;nonverbal cues (demo/gesture)  -     Sit-Supine [place] (Bed Mobility) minimum assist (75% patient effort);verbal cues;nonverbal cues (demo/gesture)  -     Comment, (Bed Mobility) extra time to perform; assist for B LE to bed  -[facility] Name 08/07/23 1526          Sit-Stand Transfer    Sit-Stand [place] (Transfers) minimum assist (75% patient effort);verbal cues;nonverbal cues (demo/gesture)  -     Assistive Device (Sit-Stand Transfers) walker, front-wheeled  -[facility] Name 08/07/23 1526          Gait/Stairs (Locomotion)    [place] Level (Gait) minimum assist (75% patient effort);verbal cues;nonverbal cues (demo/gesture);contact guard   -PH     Assistive Device (Gait) walker, front-wheeled  -PH     Distance in Feet (Gait) 30'  -PH     Deviations/Abnormal Patterns (Gait) kelvin decreased;festinating/shuffling;gait speed decreased;stride length decreased;weight shifting decreased  -PH     Bilateral Gait Deviations forward flexed posture;heel strike decreased  -PH     [place] Level (Stairs) unable to assess  -PH     Comment, (Gait/Stairs) slow and mildly unsteady w/ R step through L step to pattern; pt report of pain in R knee; fatigue and pain limtied distance  -PH               User Key  (r) = Recorded By, (t) = Taken By, (c) = Cosigned By      Initials Name Provider Type     Wilma Lund PTA Physical Therapist Assistant                   Obj/Interventions       Row Name 08/07/23 1528          Motor Skills    Therapeutic Exercise other (see comments)  BAP, LAQ, seated march; x 5-10 reps  -PH       Row Name 08/07/23 1528          Balance    Balance Assessment sitting static balance;standing static balance  -PH     Static Sitting Balance standby assist  -PH     Static Standing Balance contact guard  -PH     Position/Device Used, Standing Balance walker, front-wheeled  -PH               User Key  (r) = Recorded By, (t) = Taken By, (c) = Cosigned By      Initials Name Provider Type     Wilma Lund PTA Physical Therapist Assistant                   Goals/Plan    No documentation.                  Clinical Impression       Row Name 08/07/23 1529          Pain    Pretreatment Pain Rating 5/10  -PH     Posttreatment Pain Rating 5/10  -PH     Pre/Posttreatment Pain Comment Pt repport of pain in R UE and L knee  -PH     Pain Intervention(s) Ambulation/increased activity;Repositioned;Rest;Cold pack  -PH       Row Name 08/07/23 1529          Plan of Care Review    Plan of Care Reviewed With patient  -PH     Progress improving  -PH     Outcome Evaluation Pt seen by PT this date for tx. Pt sat up to EOB req CGA. Pt stood w/ min A  and use of fww. Pt amb approx 30' w/ R step through and L step to pattern. Pt reported pain in L knee. Pain and fatigue limited distance. Pt performed a few B LE ther ex at EOB before returning to bed req min A to assist B LE. PT will prog as pt chastity.  -       Row Name 08/07/23 1529          Positioning and Restraints    Pre-Treatment Position in bed  -PH     Post Treatment Position bed  -PH     In Bed fowlers;call light within reach;encouraged to call for assist;exit alarm on;notified nsg;with family/caregiver  -               User Key  (r) = Recorded By, (t) = Taken By, (c) = Cosigned By      Initials Name Provider Type     Wilma Lund PTA Physical Therapist Assistant                   Outcome Measures       Row Name 08/07/23 1531 08/07/23 0828       How much help from another person do you currently need...    Turning from your back to your side while in flat bed without using bedrails? 3  -PH 3  -MR    Moving from lying on back to sitting on the side of a flat bed without bedrails? 3  -PH 3  -MR    Moving to and from a bed to a chair (including a wheelchair)? 3  -PH 3  -MR    Standing up from a chair using your arms (e.g., wheelchair, bedside chair)? 3  -PH 2  -MR    Climbing 3-5 steps with a railing? 2  -PH 2  -MR    To walk in hospital room? 3  -PH 2  -MR    AM-PAC 6 Clicks Score (PT) 17  -PH 15  -MR    Highest level of mobility 5 --> Static standing  -PH 4 --> Transferred to chair/commode  -MR      Row Name 08/07/23 1531          Functional Assessment    Outcome Measure Options AM-PAC 6 Clicks Basic Mobility (PT)  -               User Key  (r) = Recorded By, (t) = Taken By, (c) = Cosigned By      Initials Name Provider Type    Wilma Fenton PTA Physical Therapist Assistant    Karissa Lyle, RN Registered Nurse                                 Physical Therapy Education       Title: PT OT SLP Therapies (In Progress)       Topic: Physical Therapy (Done)       Point: Mobility  training (Done)       Learning Progress Summary             Patient Acceptance, E,TB, VU,NR by  at 8/7/2023 1532    Acceptance, E,TB, VU,NR by RD at 8/5/2023 1417   Significant Other Acceptance, E,TB, VU,NR by RD at 8/5/2023 1417                         Point: Home exercise program (Done)       Learning Progress Summary             Patient Acceptance, E,TB, VU,NR by  at 8/7/2023 1532    Acceptance, E,TB, VU,NR by RD at 8/5/2023 1417   Significant Other Acceptance, E,TB, VU,NR by RD at 8/5/2023 1417                         Point: Body mechanics (Done)       Learning Progress Summary             Patient Acceptance, E,TB, VU,NR by  at 8/7/2023 1532    Acceptance, E,TB, VU,NR by RD at 8/5/2023 1417   Significant Other Acceptance, E,TB, VU,NR by RD at 8/5/2023 1417                         Point: Precautions (Done)       Learning Progress Summary             Patient Acceptance, E,TB, VU,NR by  at 8/7/2023 1532    Acceptance, E,TB, VU,NR by RD at 8/5/2023 1417   Significant Other Acceptance, E,TB, VU,NR by RD at 8/5/2023 1417                                         User Key       Initials Effective Dates Name Provider Type Discipline     06/16/21 -  Wilma Lund, PTA Physical Therapist Assistant PT    RD 05/03/23 -  Kale Carrera PT Physical Therapist PT                  PT Recommendation and Plan     Plan of Care Reviewed With: patient  Progress: improving  Outcome Evaluation: Pt seen by PT this date for tx. Pt sat up to EOB req CGA. Pt stood w/ min A and use of fww. Pt amb approx 30' w/ R step through and L step to pattern. Pt reported pain in L knee. Pain and fatigue limited distance. Pt performed a few B LE ther ex at EOB before returning to bed req min A to assist B LE. PT will prog as pt chastity.     Time Calculation:         PT Charges       Row Name 08/07/23 1532             Time Calculation    Start Time 1431  -PH      Stop Time 1454  -PH      Time Calculation (min) 23 min  -      PT Received On  08/07/23  -PH      PT - Next Appointment 08/08/23  -PH         Timed Charges    25419 - PT Therapeutic Activity Minutes 23  -PH         Total Minutes    Timed Charges Total Minutes 23  -PH       Total Minutes 23  -PH                User Key  (r) = Recorded By, (t) = Taken By, (c) = Cosigned By      Initials Name Provider Type    PH Wilma Lund PTA Physical Therapist Assistant                  Therapy Charges for Today       Code Description Service Date Service Provider Modifiers Qty    12018577565  PT THERAPEUTIC ACT EA 15 MIN 8/7/2023 Wilma Lund PTA GP 2            PT G-Codes  Outcome Measure Options: AM-PAC 6 Clicks Basic Mobility (PT)  AM-PAC 6 Clicks Score (PT): 17  AM-PAC 6 Clicks Score (OT): 11  PT Discharge Summary  Anticipated Discharge Disposition (PT): home with home health, home with assist    Wilma Lund PTA  8/7/2023

## 2023-08-07 NOTE — PROGRESS NOTES
"    Name: Edilberto Reeder ADMIT: 2023   : 1950  PCP: Harvey Larson MD    MRN: [id number] LOS: 1 days   AGE/SEX: 73 y.o. male  ROOM: [facility]     Subjective   Subjective   Resting in bed, accompanied by wife.  The patient reports his hands do not feel \"puffy \"today.  Hemoglobin is stable, no further evidence of bleeding.       Objective   Objective   Vital Signs  Temp:  [97.5 øF (36.4 øC)-98.2 øF (36.8 øC)] 98.2 øF (36.8 øC)  Heart Rate:  [58-71] 65  Resp:  [18] 18  BP: (102-111)/(56-57) 103/57  SpO2:  [99 %-100 %] 99 %  on   ;   Device (Oxygen Therapy): room air  Body mass index is 23.65 kg/mý.  Physical Exam  Constitutional:       General: He is not in acute distress.     Appearance: He is ill-appearing. He is not toxic-appearing.   Cardiovascular:      Rate and Rhythm: Normal rate. Rhythm irregular.   Pulmonary:      Effort: Pulmonary effort is normal. No respiratory distress.      Breath sounds: Normal breath sounds. No wheezing.   Abdominal:      General: Abdomen is flat. There is no distension.      Palpations: Abdomen is soft.      Tenderness: There is no abdominal tenderness.   Musculoskeletal:         General: No tenderness.      Right lower leg: No edema.      Left lower leg: No edema.      Comments: Left upper extremity with Ace bandage in place   Skin:     General: Skin is warm and dry.   Neurological:      General: No focal deficit present.      Mental Status: He is alert and oriented to person, place, and time. Mental status is at baseline.      Motor: No weakness.      Comments: Patient intermittently closing left eye, reports this is chronic for him       Results Review     I reviewed the patient's new clinical results.  Results from last 7 days   Lab Units 23  0547 23  1351 23  0637 23  1553 23  0628 23  1552   WBC 10*3/mm3 4.52  --  4.61  --  7.00 11.96*   HEMOGLOBIN g/dL 7.5* 7.9* 7.7* 5.9* 7.0* 7.5*   PLATELETS 10*3/mm3 97*  --  106*  --  131* 194 "       Results from last 7 days   Lab Units 08/07/23  0547 08/06/23  0637 08/05/23  0628 08/04/23  1552   SODIUM mmol/L 132* 135* 137 139   POTASSIUM mmol/L 3.7 3.7 4.0 3.6   CHLORIDE mmol/L 100 104 103 101   CO2 mmol/L 22.4 21.0* 23.7 14.0*   BUN mg/dL 20 27* 26* 26*   CREATININE mg/dL 1.00 1.08 1.20 1.49*   GLUCOSE mg/dL 144* 189* 73 126*     Estimated Creatinine Clearance: 71.4 mL/min (by C-G formula based on SCr of 1 mg/dL).  Results from last 7 days   Lab Units 08/04/23  1552   ALBUMIN g/dL 3.3*   BILIRUBIN mg/dL 0.9   ALK PHOS U/L 119*   AST (SGOT) U/L 32   ALT (SGPT) U/L 8       Results from last 7 days   Lab Units 08/07/23  0547 08/06/23  0637 08/05/23  0628 08/04/23  1552   CALCIUM mg/dL 8.1* 8.1* 8.6 9.1   ALBUMIN g/dL  --   --   --  3.3*   MAGNESIUM mg/dL 1.5* 1.9  --   --    PHOSPHORUS mg/dL 2.1* 2.6  --   --          COVID19   Date Value Ref Range Status   05/04/2022 Not Detected Not Detected - Ref. Range Final   04/30/2022 Not Detected Not Detected - Ref. Range Final     Hemoglobin A1C   Date/Time Value Ref Range Status   08/05/2023 0628 6.60 (H) 4.80 - 5.60 % Final     Glucose   Date/Time Value Ref Range Status   08/07/2023 0603 162 (H) 70 - 130 mg/dL Final     Comment:     Meter: [id number] : [id number] Oscar Shoemaker NA   08/06/2023 2105 255 (H) 70 - 130 mg/dL Final     Comment:     Meter: [id number] : [id number] Oscar Shoemaker NA   08/06/2023 1634 156 (H) 70 - 130 mg/dL Final     Comment:     Meter: [id number] : [id number] Corky Campuzano NA   08/06/2023 1137 201 (H) 70 - 130 mg/dL Final     Comment:     Meter: [id number] : [id number] Corky Tatianna    08/06/2023 0614 225 (H) 70 - 130 mg/dL Final     Comment:     Meter: [id number] : [id number] [facility]   08/05/2023 2114 189 (H) 70 - 130 mg/dL Final     Comment:     Meter: [id number] : [id number] [facility]   08/05/2023 1615 190 (H) 70 - 130 mg/dL Final     Comment:     Meter: [id number] : [id number] Corky  Tatianna NA       CT Cervical Spine Without Contrast  Narrative: CT OF THE CERVICAL SPINE WITHOUT CONTRAST INCLUDING RECONSTRUCTION  IMAGES 08/04/2023     HISTORY: Fell, neck pain.     TECHNIQUE: Axial images were obtained from the skull base to the upper  thoracic spine. Sagittal and coronal reconstruction images were  reviewed.     FINDINGS: There is mild degenerative disease of the C1-C2 articulation.  There is approximately 6 mm anterolisthesis of C2 on C3 with some C2-3  disc space narrowing. C3-4, C4-5, C5-C6 and C6-7 mild-to-moderate  spondylosis is seen with some mild spondylosis of C7-T1.. No other  subluxation is seen. No cervical spine fractures are seen. Stimulator  leads are seen in the soft tissues of the right side of the neck. There  is some left carotid calcification.     Impression: 1. Cervical degenerative disease with no fractures seen.     Radiation dose reduction techniques were utilized, including automated  exposure control and exposure modulation based on body size.     This report was finalized on 8/5/2023 7:17 PM by Dr. Earnest Pepper M.D.     CT Head Without Contrast  Narrative: CT OF THE BRAIN WITHOUT CONTRAST 08/04/2023     HISTORY: Fell. Head injury. Syncope.     TECHNIQUE: Axial images were obtained through the brain without  intravenous contrast.     FINDINGS: There is mild-to-moderate diffuse atrophy. Brain stimulator  leads are seen bilaterally terminating in the inferior aspects of both  thalami.     There is no evidence of acute infarction, hemorrhage, midline shift or  mass effect. No acute bony abnormalities are seen.     Impression: 1. No acute process.     Radiation dose reduction techniques were utilized, including automated  exposure control and exposure modulation based on body size.     This report was finalized on 8/5/2023 7:17 PM by Dr. Earnest Pepper M.D.       Scheduled Medications  allopurinol, 100 mg, Oral, Daily  apixaban, 5 mg, Oral, Q12H  Carbidopa-Levodopa  ER, 4 capsule, Oral, 4x Daily  citalopram, 20 mg, Oral, Daily  donepezil, 10 mg, Oral, Nightly  insulin lispro, 2-7 Units, Subcutaneous, 4x Daily AC & at Bedtime  iopamidol, 100 mL, Intravenous, Once in imaging  levothyroxine, 50 mcg, Oral, Q AM  magnesium sulfate, 2 g, Intravenous, Q2H  metoprolol tartrate, 12.5 mg, Oral, BID  rosuvastatin, 20 mg, Oral, Nightly  senna-docusate sodium, 2 tablet, Oral, BID  sodium chloride, 10 mL, Intravenous, Q12H  traZODone, 25 mg, Oral, Nightly  vitamin B-12, 500 mcg, Oral, BID    Infusions     Diet  Diet: Regular/House Diet, Cardiac Diets, Diabetic Diets; Healthy Heart (2-3 Na+); Consistent Carbohydrate; Texture: Regular Texture (IDDSI 7); Fluid Consistency: Thin (IDDSI 0)         I have personally reviewed:  [x]  Laboratory   []  Microbiology   []  Radiology   [x]  EKG/Telemetry   []  Cardiology/Vascular   []  Pathology   []  Records    Assessment/Plan     Active Hospital Problems    Diagnosis  POA    **Syncope [R55]  Yes    Chronic diastolic heart failure [I50.32]  Yes    Chronic anticoagulation [Z79.01]  Not Applicable    Parkinson's disease [G20]  Yes    Diabetic retinopathy associated with type 2 diabetes mellitus [E11.319]  Yes    Essential hypertension [I10]  Yes    HLD (hyperlipidemia) [E78.5]  Yes    Hypothyroidism [E03.9]  Yes    ASCVD (arteriosclerotic cardiovascular disease) [I25.10]  Yes    Permanent atrial fibrillation [I48.21]  Yes    Peripheral neuropathy [G62.9]  Yes      Resolved Hospital Problems   No resolved problems to display.       73 y.o. male admitted with Syncope.    Syncope  Acute on chronic anemia  Symptomatic anemia  -Transfused 1 unit PRBC, repeat hemoglobin still down trended, repeat hemoglobin ordered for this afternoon; plan on additional transfusions if hemoglobin is less than 7  -Syncope secondary to acute blood loss from laceration  - patient received additional 2 U overnight with improvement in hgb from 5.9 to 7.7. Repeat hgb this  afternoon stable at 7.9.  -Hemoglobin stable, plan to resume Eliquis today and if hemoglobin remains stable tomorrow morning- discharge    T-wave inversions  -Lateral leads with inverted T waves, new; troponin elevated to 123  -Cardiology consulted, patient denies chest pain  - no interventions planned    Left upper extremity laceration  - Sutured in the ER  -Will need to follow-up with PCP for suture removal in 10 to 14 days    Chronic atrial fibrillation  -Continue metoprolol, Eliquis on hold given blood loss anemia  -Resume Eliquis    Hypertension  Hypotension secondary to blood loss  -Discontinued Imdur 8/7; continue metoprolol for A-fib  -BP improved, pt c/o edema- DC IVF  -Resume diuretics at discharge    CKD 3A  -Creatinine baseline around 1.0, elevated to 1.5 on admission, improved with IVF to 1.2 overnight  -Cr back ro baseline 1.0  -Resume Lasix at discharge    Type 2 diabetes  -Continue SSI as needed    Hypothyroidism  -Continue Synthroid, TSH mildly elevated, will increase levothyroxine    Parkinson's disease  -Continue home Sinemet    SCDs for DVT prophylaxis.  Full code.  Discussed with patient and nursing staff.  Anticipate discharge home with home health tomorrow.      Samina Barillas MD  [facility]  08/07/23  11:39 EDT    I wore protective equipment throughout this patient encounter including gloves.  Hand hygiene was performed before donning protective equipment and after removal when leaving the room.         Copied text in this note has been reviewed and is accurate as of 08/07/23.

## 2023-08-07 NOTE — PLAN OF CARE
Problem: Adult Inpatient Plan of Care  Goal: Plan of Care Review  Outcome: Ongoing, Progressing   Goal Outcome Evaluation:  VSS. RA. PRN pain med given, see mar. Pt rested throughout the night with no issues or complaints.

## 2023-08-07 NOTE — PLAN OF CARE
Goal Outcome Evaluation:           Progress: improving          Pt A&Ox4. On room air. Wife at bedside. Pt sat up in recliner for a few hours. RN changed dressings on arm and back. Skin tears healing well. Pt requested from wife to bring walker from home. Mag and Phosphorus replaced. IV dressing change done.

## 2023-08-07 NOTE — PLAN OF CARE
Goal Outcome Evaluation:  Plan of Care Reviewed With: patient        Progress: improving  Outcome Evaluation: Pt seen by PT this date for tx. Pt sat up to EOB req CGA. Pt stood w/ min A and use of fww. Pt amb approx 30' w/ R step through and L step to pattern. Pt reported pain in L knee. Pain and fatigue limited distance. Pt performed a few B LE ther ex at EOB before returning to bed req min A to assist B LE. PT will prog as pt chastity.      Anticipated Discharge Disposition (PT): home with home health, home with assist

## 2023-08-08 ENCOUNTER — READMISSION MANAGEMENT (OUTPATIENT)
Dept: CALL CENTER | Facility: HOSPITAL | Age: 73
End: 2023-08-08
Payer: MEDICARE

## 2023-08-08 VITALS
SYSTOLIC BLOOD PRESSURE: 111 MMHG | RESPIRATION RATE: 16 BRPM | DIASTOLIC BLOOD PRESSURE: 54 MMHG | HEART RATE: 71 BPM | WEIGHT: 174.4 LBS | HEIGHT: 72 IN | OXYGEN SATURATION: 98 % | BODY MASS INDEX: 23.62 KG/M2 | TEMPERATURE: 97.5 F

## 2023-08-08 LAB
ANION GAP SERPL CALCULATED.3IONS-SCNC: 10.2 MMOL/L (ref 5–15)
BUN SERPL-MCNC: 14 MG/DL (ref 8–23)
BUN/CREAT SERPL: 16.5 (ref 7–25)
CALCIUM SPEC-SCNC: 8 MG/DL (ref 8.6–10.5)
CHLORIDE SERPL-SCNC: 100 MMOL/L (ref 98–107)
CO2 SERPL-SCNC: 21.8 MMOL/L (ref 22–29)
CREAT SERPL-MCNC: 0.85 MG/DL (ref 0.76–1.27)
DEPRECATED RDW RBC AUTO: 52.4 FL (ref 37–54)
EGFRCR SERPLBLD CKD-EPI 2021: 90.6 ML/MIN/1.73
ERYTHROCYTE [DISTWIDTH] IN BLOOD BY AUTOMATED COUNT: 17.7 % (ref 12.3–15.4)
GLUCOSE BLDC GLUCOMTR-MCNC: 126 MG/DL (ref 70–130)
GLUCOSE BLDC GLUCOMTR-MCNC: 165 MG/DL (ref 70–130)
GLUCOSE SERPL-MCNC: 109 MG/DL (ref 65–99)
HCT VFR BLD AUTO: 24.5 % (ref 37.5–51)
HGB BLD-MCNC: 8 G/DL (ref 13–17.7)
MAGNESIUM SERPL-MCNC: 2.1 MG/DL (ref 1.6–2.4)
MCH RBC QN AUTO: 26.1 PG (ref 26.6–33)
MCHC RBC AUTO-ENTMCNC: 32.7 G/DL (ref 31.5–35.7)
MCV RBC AUTO: 80.1 FL (ref 79–97)
PHOSPHATE SERPL-MCNC: 2.4 MG/DL (ref 2.5–4.5)
PLATELET # BLD AUTO: 119 10*3/MM3 (ref 140–450)
PMV BLD AUTO: 10.3 FL (ref 6–12)
POTASSIUM SERPL-SCNC: 3.7 MMOL/L (ref 3.5–5.2)
RBC # BLD AUTO: 3.06 10*6/MM3 (ref 4.14–5.8)
SODIUM SERPL-SCNC: 132 MMOL/L (ref 136–145)
WBC NRBC COR # BLD: 4.49 10*3/MM3 (ref 3.4–10.8)

## 2023-08-08 PROCEDURE — 80048 BASIC METABOLIC PNL TOTAL CA: CPT | Performed by: STUDENT IN AN ORGANIZED HEALTH CARE EDUCATION/TRAINING PROGRAM

## 2023-08-08 PROCEDURE — 84100 ASSAY OF PHOSPHORUS: CPT | Performed by: STUDENT IN AN ORGANIZED HEALTH CARE EDUCATION/TRAINING PROGRAM

## 2023-08-08 PROCEDURE — 63710000001 INSULIN LISPRO (HUMAN) PER 5 UNITS: Performed by: HOSPITALIST

## 2023-08-08 PROCEDURE — 82948 REAGENT STRIP/BLOOD GLUCOSE: CPT

## 2023-08-08 PROCEDURE — 83735 ASSAY OF MAGNESIUM: CPT | Performed by: STUDENT IN AN ORGANIZED HEALTH CARE EDUCATION/TRAINING PROGRAM

## 2023-08-08 PROCEDURE — 85027 COMPLETE CBC AUTOMATED: CPT | Performed by: STUDENT IN AN ORGANIZED HEALTH CARE EDUCATION/TRAINING PROGRAM

## 2023-08-08 RX ORDER — HYDROCODONE BITARTRATE AND ACETAMINOPHEN 7.5; 325 MG/1; MG/1
1 TABLET ORAL EVERY 8 HOURS PRN
Qty: 9 TABLET | Refills: 0 | Status: SHIPPED | OUTPATIENT
Start: 2023-08-08 | End: 2023-08-11

## 2023-08-08 RX ORDER — LEVOTHYROXINE SODIUM 0.05 MG/1
50 TABLET ORAL
Qty: 30 TABLET | Refills: 0 | Status: SHIPPED | OUTPATIENT
Start: 2023-08-09

## 2023-08-08 RX ADMIN — INSULIN LISPRO 2 UNITS: 100 INJECTION, SOLUTION INTRAVENOUS; SUBCUTANEOUS at 12:11

## 2023-08-08 RX ADMIN — ALLOPURINOL 100 MG: 100 TABLET ORAL at 08:24

## 2023-08-08 RX ADMIN — SENNOSIDES AND DOCUSATE SODIUM 2 TABLET: 50; 8.6 TABLET ORAL at 08:23

## 2023-08-08 RX ADMIN — METOPROLOL TARTRATE 12.5 MG: 25 TABLET, FILM COATED ORAL at 08:23

## 2023-08-08 RX ADMIN — Medication 10 ML: at 08:25

## 2023-08-08 RX ADMIN — LEVOTHYROXINE SODIUM 50 MCG: 0.05 TABLET ORAL at 05:49

## 2023-08-08 RX ADMIN — CITALOPRAM 20 MG: 20 TABLET, FILM COATED ORAL at 08:24

## 2023-08-08 RX ADMIN — Medication 500 MCG: at 08:24

## 2023-08-08 RX ADMIN — APIXABAN 5 MG: 5 TABLET, FILM COATED ORAL at 08:23

## 2023-08-08 NOTE — DISCHARGE SUMMARY
Patient Name: Edilberto Reeder  : 1950  MRN: [id number]    Date of Admission: 2023  Date of Discharge:  2023  Primary Care Physician: Harvey Larson MD      Chief Complaint:   Fall, Syncope, and Hypotension      Discharge Diagnoses     Active Hospital Problems    Diagnosis  POA    **Syncope [R55]  Yes    Chronic diastolic heart failure [I50.32]  Yes    Chronic anticoagulation [Z79.01]  Not Applicable    Parkinson's disease [G20]  Yes    Diabetic retinopathy associated with type 2 diabetes mellitus [E11.319]  Yes    Essential hypertension [I10]  Yes    HLD (hyperlipidemia) [E78.5]  Yes    Hypothyroidism [E03.9]  Yes    ASCVD (arteriosclerotic cardiovascular disease) [I25.10]  Yes    Permanent atrial fibrillation [I48.21]  Yes    Peripheral neuropathy [G62.9]  Yes      Resolved Hospital Problems   No resolved problems to display.        Hospital Course     Mr. Reeder is a 73 y.o. male with a history of Parkinson's, chronic atrial fibrillation on Eliquis, anemia, chronic diastolic CHF who presented to [facility] initially complaining of laceration to arm and fall/syncopal episode.  Please see the admitting history and physical for further details.  He was found to have symptomatic anemia/acute blood loss anemia and was admitted to the hospital for further evaluation and treatment.  Prior to hospital arrival, patient's blood pressure was reportedly 70/40 with EMS.  He received IV fluid resuscitation as well as 1 unit PRBCs with subsequent improvement in his blood pressure.  His Eliquis and blood pressure meds were held and he was admitted to the hospital.  The patient required a further 2 units of PRBCs and his hemoglobin stabilized.  Eliquis resumed and the patient's hemoglobin remained stable.  Cardiology consulted on admission for elevated troponin; cardiology believes the patient's elevated troponin to be a type II NSTEMI in the setting of acute anemia, CKD and  diastolic heart failure.  Cardiology does not recommend additional ischemic work-up at this time.  The patient has continued to to have normal/low normal blood pressures.  I have recommended to him and his wife that he continue to hold his Imdur, Aldactone, and Lasix and follow-up with his PCP in a week for posthospital follow-up visit/blood pressure check.  The patient is stable for discharge.  Day of Discharge     Subjective:  Resting in bed, hemoglobin stable. Hopeful for discharge today.     Physical Exam:  Temp:  [97.5 øF (36.4 øC)] 97.5 øF (36.4 øC)  Heart Rate:  [58-80] 71  Resp:  [16] 16  BP: ()/(53-68) 111/54  Body mass index is 23.65 kg/mý.  Physical Exam  Constitutional:       General: He is not in acute distress.     Appearance: He is ill-appearing. He is not toxic-appearing.   Cardiovascular:      Rate and Rhythm: Normal rate. Rhythm irregular.   Pulmonary:      Effort: Pulmonary effort is normal. No respiratory distress.      Breath sounds: Normal breath sounds. No wheezing.   Abdominal:      General: Abdomen is flat. There is no distension.      Palpations: Abdomen is soft.      Tenderness: There is no abdominal tenderness.   Musculoskeletal:         General: No tenderness.      Right lower leg: No edema.      Left lower leg: No edema.      Comments: Left upper extremity with Ace bandage in place   Skin:     General: Skin is warm and dry.   Neurological:      General: No focal deficit present.      Mental Status: He is alert and oriented to person, place, and time. Mental status is at baseline.      Motor: No weakness.      Comments: Patient intermittently closing left eye, reports this is chronic for him       Consultants     Consult Orders (all) (From admission, onward)       Start     Ordered    08/04/23 2132  Inpatient Nutrition Consult  Once        Provider:  (Not yet assigned)    08/04/23 2131 08/04/23 2131  Inpatient Case Management  Consult  Once        Provider:  (Not  yet assigned)    08/04/23 2131 08/04/23 2126  Inpatient Cardiology Consult  Once        Specialty:  Cardiology  Provider:  Jimbo Fung MD    08/04/23 2125 08/04/23 1749  [facility] (on-call MD unless specified) Details  Once        Specialty:  Hospitalist  Provider:  Paul Cummings MD    08/04/23 1749                  Procedures     Imaging Results (All)       Procedure Component Value Units Date/Time    CT Cervical Spine Without Contrast [[id number]] Collected: 08/04/23 1819     Updated: 08/05/23 1921    Narrative:      CT OF THE CERVICAL SPINE WITHOUT CONTRAST INCLUDING RECONSTRUCTION  IMAGES 08/04/2023     HISTORY: Fell, neck pain.     TECHNIQUE: Axial images were obtained from the skull base to the upper  thoracic spine. Sagittal and coronal reconstruction images were  reviewed.     FINDINGS: There is mild degenerative disease of the C1-C2 articulation.  There is approximately 6 mm anterolisthesis of C2 on C3 with some C2-3  disc space narrowing. C3-4, C4-5, C5-C6 and C6-7 mild-to-moderate  spondylosis is seen with some mild spondylosis of C7-T1.. No other  subluxation is seen. No cervical spine fractures are seen. Stimulator  leads are seen in the soft tissues of the right side of the neck. There  is some left carotid calcification.       Impression:      1. Cervical degenerative disease with no fractures seen.     Radiation dose reduction techniques were utilized, including automated  exposure control and exposure modulation based on body size.     This report was finalized on 8/5/2023 7:17 PM by Dr. Earnest Pepper M.D.       CT Head Without Contrast [[id number]] Collected: 08/04/23 1834     Updated: 08/05/23 1920    Narrative:      CT OF THE BRAIN WITHOUT CONTRAST 08/04/2023     HISTORY: Fell. Head injury. Syncope.     TECHNIQUE: Axial images were obtained through the brain without  intravenous contrast.     FINDINGS: There is mild-to-moderate diffuse atrophy. Brain stimulator  leads are seen  bilaterally terminating in the inferior aspects of both  thalami.     There is no evidence of acute infarction, hemorrhage, midline shift or  mass effect. No acute bony abnormalities are seen.       Impression:      1. No acute process.     Radiation dose reduction techniques were utilized, including automated  exposure control and exposure modulation based on body size.     This report was finalized on 8/5/2023 7:17 PM by Dr. Earnest Pepper M.D.       XR Hip With or Without Pelvis 2 - 3 View Left [[id number]] Collected: 08/04/23 1742     Updated: 08/04/23 1748    Narrative:      XR HIP W OR WO PELVIS 2-3 VIEW LEFT-, XR SHOULDER 2+ VW LEFT-     Clinical: Fell, pain     AP PELVIS LEFT HIP FINDINGS: Symmetric moderate bilateral hip joint  degeneration. No fracture of the left hip or hemipelvis. Vascular  calcifications within the soft tissues, no soft tissue gas or radiopaque  foreign body seen. The remainder is unremarkable.     LEFT SHOULDER FINDINGS: The acromioclavicular alignment is satisfactory,  there are some widening of the joint. No acute osseous abnormality is  demonstrated. Old-appearing deformity of the left fifth rib. Total left  shoulder replacement prosthesis in position. The humeral socket appears  along the lower margin of the glenoid component. There is no gross  indication of dislocation. The remainder is unremarkable.     This report was finalized on 8/4/2023 5:45 PM by Dr. Yan Gonzalez M.D.       XR Shoulder 2+ View Left [[id number]] Collected: 08/04/23 1742     Updated: 08/04/23 1748    Narrative:      XR HIP W OR WO PELVIS 2-3 VIEW LEFT-, XR SHOULDER 2+ VW LEFT-     Clinical: Fell, pain     AP PELVIS LEFT HIP FINDINGS: Symmetric moderate bilateral hip joint  degeneration. No fracture of the left hip or hemipelvis. Vascular  calcifications within the soft tissues, no soft tissue gas or radiopaque  foreign body seen. The remainder is unremarkable.     LEFT SHOULDER FINDINGS: The  acromioclavicular alignment is satisfactory,  there are some widening of the joint. No acute osseous abnormality is  demonstrated. Old-appearing deformity of the left fifth rib. Total left  shoulder replacement prosthesis in position. The humeral socket appears  along the lower margin of the glenoid component. There is no gross  indication of dislocation. The remainder is unremarkable.     This report was finalized on 8/4/2023 5:45 PM by Dr. Yan Gonzalez M.D.       XR Forearm 2 View Left [[id number]] Collected: 08/04/23 1619     Updated: 08/04/23 1624    Narrative:      PROCEDURE:  XR FOREARM 2 VW LEFT-     HISTORY: Fall, laceration.     COMPARISON: None.     FINDINGS:       2 views of the left forearm were obtained.     There is soft tissue irregularity overlying the mid ventral forearm,  likely reflecting the patient's reported laceration. No definite  radiopaque foreign bodies are identified. There is a small amount of  soft tissue gas. No acute fracture or malalignment is identified.  There  is scattered mild osteoarthritis..  There is calcific atherosclerosis.     This report was finalized on 8/4/2023 4:20 PM by Dr. Natasha Vasquez M.D.       XR Chest 1 View [[id number]] Collected: 08/04/23 1612     Updated: 08/04/23 1617    Narrative:      XR CHEST 1 VW-     HISTORY: Male who is 73 years-old,  syncope     TECHNIQUE: Frontal view of the chest     COMPARISON: 03/23/2022     FINDINGS: The heart size is normal. Pulmonary vasculature is  unremarkable. Aorta is calcified. Skin folds are seen on the right. No  focal pulmonary consolidation, pleural effusion, or pneumothorax. No  acute osseous process.       Impression:      No focal pulmonary consolidation. Follow-up as clinical  indications persist.     This report was finalized on 8/4/2023 4:14 PM by Dr. Myron Rutledge M.D.               Pertinent Labs     Results from last 7 days   Lab Units 08/08/23  0524 08/07/23  0547 08/06/23  1351 08/06/23  0637  08/05/23  1553 08/05/23  0628   WBC 10*3/mm3 4.49 4.52  --  4.61  --  7.00   HEMOGLOBIN g/dL 8.0* 7.5* 7.9* 7.7*   < > 7.0*   PLATELETS 10*3/mm3 119* 97*  --  106*  --  131*    < > = values in this interval not displayed.     Results from last 7 days   Lab Units 08/08/23  0524 08/07/23  0547 08/06/23 0637 08/05/23 0628   SODIUM mmol/L 132* 132* 135* 137   POTASSIUM mmol/L 3.7 3.7 3.7 4.0   CHLORIDE mmol/L 100 100 104 103   CO2 mmol/L 21.8* 22.4 21.0* 23.7   BUN mg/dL 14 20 27* 26*   CREATININE mg/dL 0.85 1.00 1.08 1.20   GLUCOSE mg/dL 109* 144* 189* 73   Estimated Creatinine Clearance: 84 mL/min (by C-G formula based on SCr of 0.85 mg/dL).  Results from last 7 days   Lab Units 08/04/23  1552   ALBUMIN g/dL 3.3*   BILIRUBIN mg/dL 0.9   ALK PHOS U/L 119*   AST (SGOT) U/L 32   ALT (SGPT) U/L 8     Results from last 7 days   Lab Units 08/08/23  0524 08/07/23  0547 08/06/23  0637 08/05/23 0628 08/04/23  1552   CALCIUM mg/dL 8.0* 8.1* 8.1* 8.6 9.1   ALBUMIN g/dL  --   --   --   --  3.3*   MAGNESIUM mg/dL 2.1 1.5* 1.9  --   --    PHOSPHORUS mg/dL 2.4* 2.1* 2.6  --   --        Results from last 7 days   Lab Units 08/04/23  1747 08/04/23  1552   CK TOTAL U/L  --  153   HSTROP T ng/L 123* 116*           Invalid input(s): LDLCALC        Test Results Pending at Discharge       Discharge Details        Discharge Medications        New Medications        Instructions Start Date   HYDROcodone-acetaminophen 7.5-325 MG per tablet  Commonly known as: NORCO   1 tablet, Oral, Every 8 Hours PRN             Changes to Medications        Instructions Start Date   levothyroxine 50 MCG tablet  Commonly known as: SYNTHROID, LEVOTHROID  What changed:   medication strength  how much to take  when to take this   50 mcg, Oral, Every Early Morning   Start Date: August 9, 2023            Continue These Medications        Instructions Start Date   acetaminophen 325 MG tablet  Commonly known as: TYLENOL   650 mg, Oral, Every 6 Hours PRN       allopurinol 100 MG tablet  Commonly known as: ZYLOPRIM   100 mg, Oral, Daily      apixaban 5 MG tablet tablet  Commonly known as: ELIQUIS   5 mg, Oral, 2 Times Daily      CALCIUM CARBONATE PO   650 mg, Oral, Daily      Carbidopa-Levodopa ER 36. MG capsule controlled-release   4 capsules, Oral, 4 Times Daily      Rytary 23.75-95 MG capsule controlled-release  Generic drug: Carbidopa-Levodopa ER   1 capsule, Oral, As Needed      citalopram 20 MG tablet  Commonly known as: CeleXA   20 mg, Oral, Daily      docusate sodium 50 MG capsule  Commonly known as: COLACE   100 mg, Oral, Nightly      donepezil 5 MG tablet  Commonly known as: ARICEPT   10 mg, Oral, Nightly      glipizide 5 MG tablet  Commonly known as: GLUCOTROL   5 mg, Oral, Every Morning      metFORMIN 500 MG tablet  Commonly known as: GLUCOPHAGE   500 mg, Oral, 2 Times Daily With Meals      metoprolol tartrate 25 MG tablet  Commonly known as: LOPRESSOR   TAKE 1/2 TABLET BY MOUTH TWICE A DAY      multivitamin with minerals tablet tablet   1 tablet, Oral, Daily      rosuvastatin 20 MG tablet  Commonly known as: CRESTOR   20 mg, Oral, Nightly      tiZANidine 2 MG tablet  Commonly known as: ZANAFLEX   2 mg, Oral, Every 8 Hours PRN      traZODone 50 MG tablet  Commonly known as: DESYREL   25 mg, Oral, Nightly      vitamin B-12 1000 MCG tablet  Commonly known as: CYANOCOBALAMIN   500 mcg, Oral, 2 Times Daily             Stop These Medications      furosemide 40 MG tablet  Commonly known as: LASIX     isosorbide mononitrate 30 MG 24 hr tablet  Commonly known as: IMDUR     potassium chloride 10 MEQ CR tablet     spironolactone 25 MG tablet  Commonly known as: ALDACTONE              Allergies   Allergen Reactions    Bacitracin Anaphylaxis    Neosporin [Neomycin-Bacitracin Zn-Polymyx] Other (See Comments)     BP drops and heart stops!    Fish-Derived Products Hives    Shellfish Allergy Hives    Shrimp (Diagnostic) Hives         Discharge Disposition:  Home  or Self Care    Discharge Diet:  Diet Order   Procedures    Diet: Regular/House Diet, Cardiac Diets, Diabetic Diets; Healthy Heart (2-3 Na+); Consistent Carbohydrate; Texture: Regular Texture (IDDSI 7); Fluid Consistency: Thin (IDDSI 0)       Discharge Activity:   Activity Instructions       Activity as Tolerated              CODE STATUS:    Code Status and Medical Interventions:   Ordered at: 08/04/23 1843     Code Status (Patient has no pulse and is not breathing):    CPR (Attempt to Resuscitate)     Medical Interventions (Patient has pulse or is breathing):    Full Support     Release to patient:    Routine Release       Future Appointments   Date Time Provider Department Center   9/7/2023  2:20 PM Jimbo Fung MD [facility]     Additional Instructions for the Follow-ups that You Need to Schedule       Discharge Follow-up with PCP   As directed       Currently Documented PCP:    Harvey Larson MD    PCP Phone Number:    [phone]     Follow Up Details: 1 week post-hospital follow up- hold lasix, imdur, aldactone and potassium until you can follow up with Dr. Larson.        Discharge Follow-up with Specified Provider: Cardiology at your scheduled follow up   As directed      To: Cardiology at your scheduled follow up               Follow-up Information       Harvey Larson MD Follow up on 8/8/2023.    Specialty: Family Medicine  Why: 1 week post-hospital follow up- hold lasix, imdur, aldactone and potassium until you can follow up with Dr. Larson.  Appointment will be on Monday,, August 21, 2023 at 3:15pm  Contact information:  [address] [facility] [address] [phone]                             Additional Instructions for the Follow-ups that You Need to Schedule       Discharge Follow-up with PCP   As directed       Currently Documented PCP:    Harvey Larson MD    PCP Phone Number:    [phone]     Follow Up Details: 1 week post-hospital follow up- hold lasix, imdur,  aldactone and potassium until you can follow up with Dr. Larson.        Discharge Follow-up with Specified Provider: Cardiology at your scheduled follow up   As directed      To: Cardiology at your scheduled follow up            Time Spent on Discharge:  I spent greater than 30 minutes on this discharge activity which included: face-to-face encounter with the patient, reviewing the data in the system, coordination of the care with the nursing staff as well as consultants, documentation, and entering orders.       Samina Barillas MD  [facility]  08/08/23  12:41 EDT

## 2023-08-09 NOTE — OUTREACH NOTE
Prep Survey      Flowsheet Row Responses   [facility] patient discharged from? [place]   Is LACE score < 7 ? No   Eligibility Readm Mgmt   Discharge diagnosis Syncope   Does the patient have one of the following disease processes/diagnoses(primary or secondary)? CHF   Does the patient have Home health ordered? No   Is there a DME ordered? No   Prep survey completed? Yes            Sunita STANLEY - Registered Nurse

## 2023-08-10 ENCOUNTER — READMISSION MANAGEMENT (OUTPATIENT)
Dept: CALL CENTER | Facility: HOSPITAL | Age: 73
End: 2023-08-10
Payer: MEDICARE

## 2023-08-10 NOTE — OUTREACH NOTE
CHF Week 1 Survey      Flowsheet Row Responses   [facility] patient discharged from? [place]   Does the patient have one of the following disease processes/diagnoses(primary or secondary)? CHF   CHF Week 1 attempt successful? Yes   Call start time 0831   Call end time 0835   Discharge diagnosis Syncope   Is patient permission given to speak with other caregiver? Yes   List who call center can speak with [place] spouse   Person spoke with today (if not patient) and relationship [place] spouse   Meds reviewed with patient/caregiver? Yes   Is the patient having any side effects they believe may be caused by any medication additions or changes? No   Does the patient have all medications ordered at discharge? Yes   Is the patient taking all medications as directed (includes completed medication regime)? Yes   Comments regarding appointments 9/7/23 Cards apt   Does the patient have a primary care provider?  Yes   Has the patient kept scheduled appointments due by today? N/A   Home health comments [facility] per spouse   Pulse Ox monitoring None  [pt does not wear oxygen]   Psychosocial issues? No   Did the patient receive a copy of their discharge instructions? Yes   Nursing interventions Reviewed instructions with patient   What is the patient's perception of their health status since discharge? Improving  [BS and BP stable-still weak- per spouse ]   Nursing interventions Nurse provided patient education   Is the patient able to teach back signs and symptoms of worsening condition? (i.e. weight gain, shortness of air, etc.) Yes   If the patient is a current smoker, are they able to teach back resources for cessation? Not a smoker   Is the patient/caregiver able to teach back the hierarchy of who to call/visit for symptoms/problems? PCP, Specialist, Home health nurse, Urgent Care, ED, 911 Yes   Is the patient able to teach back Heart Failure Zones? Yes   CHF Nursing Interventions  Education provided on various zones   CHF Zone this Call Green Zone   Green Zone Patient reports doing well, No new or worsening shortness of breath, Physical activity level is normal for you, No new swelling -  feet, ankles and legs look normal for you, Weight check stable, No chest pain   Green Zone Interventions Daily weight check, Meds as directed, Low sodium diet, Follow up visits planned    CHF Week 1 call completed? Yes   Call end time 0835            Jannet H - Registered Nurse

## 2023-08-18 ENCOUNTER — READMISSION MANAGEMENT (OUTPATIENT)
Dept: CALL CENTER | Facility: HOSPITAL | Age: 73
End: 2023-08-18
Payer: MEDICARE

## 2023-08-18 NOTE — OUTREACH NOTE
CHF Week 2 Survey      Flowsheet Row Responses   [facility] patient discharged from? [place]   Does the patient have one of the following disease processes/diagnoses(primary or secondary)? CHF   Week 2 attempt successful? No  [Daughter states patient is improving, but unsure of meds. Please call patient or patient's spouse for updates.]   Call start time 1313   Unsuccessful attempts Attempt 1   General alerts for this patient Please call patient or patient's spouse for updates.   Is patient permission given to speak with other caregiver? Yes   Person spoke with today (if not patient) and relationship Blanca-daughter.   Meds reviewed with patient/caregiver? Yes   Is the patient having any side effects they believe may be caused by any medication additions or changes? No   Does the patient have all medications ordered at discharge? Yes   Is the patient taking all medications as directed (includes completed medication regime)? Yes            Ann MORE - Registered Nurse

## 2023-09-07 ENCOUNTER — TELEPHONE (OUTPATIENT)
Dept: CARDIOLOGY | Facility: CLINIC | Age: 73
End: 2023-09-07

## 2023-09-07 ENCOUNTER — OFFICE VISIT (OUTPATIENT)
Dept: CARDIOLOGY | Facility: CLINIC | Age: 73
End: 2023-09-07
Payer: MEDICARE

## 2023-09-07 ENCOUNTER — HOSPITAL ENCOUNTER (OUTPATIENT)
Dept: CARDIOLOGY | Facility: HOSPITAL | Age: 73
Discharge: HOME | End: 2023-09-07
Admitting: NURSE PRACTITIONER
Payer: MEDICARE

## 2023-09-07 VITALS
OXYGEN SATURATION: 100 % | HEIGHT: 72 IN | SYSTOLIC BLOOD PRESSURE: 100 MMHG | DIASTOLIC BLOOD PRESSURE: 54 MMHG | WEIGHT: 195 LBS | HEART RATE: 61 BPM | BODY MASS INDEX: 26.41 KG/M2

## 2023-09-07 DIAGNOSIS — I50.32 CHRONIC DIASTOLIC HEART FAILURE: ICD-10-CM

## 2023-09-07 DIAGNOSIS — I25.10 ASCVD (ARTERIOSCLEROTIC CARDIOVASCULAR DISEASE): ICD-10-CM

## 2023-09-07 DIAGNOSIS — E78.2 MIXED HYPERLIPIDEMIA: Primary | Chronic | ICD-10-CM

## 2023-09-07 DIAGNOSIS — I48.19 ATRIAL FIBRILLATION, PERSISTENT: ICD-10-CM

## 2023-09-07 DIAGNOSIS — I10 ESSENTIAL HYPERTENSION: Chronic | ICD-10-CM

## 2023-09-07 DIAGNOSIS — E11.65 TYPE 2 DIABETES MELLITUS WITH HYPERGLYCEMIA, WITHOUT LONG-TERM CURRENT USE OF INSULIN: ICD-10-CM

## 2023-09-07 PROBLEM — J40 BRONCHITIS: Status: ACTIVE | Noted: 2023-07-11

## 2023-09-07 PROBLEM — R05.1 ACUTE COUGH: Status: ACTIVE | Noted: 2023-07-11

## 2023-09-07 PROBLEM — D03.60: Status: ACTIVE | Noted: 2023-02-21

## 2023-09-07 PROBLEM — K64.9 HEMORRHOIDS: Status: ACTIVE | Noted: 2023-09-07

## 2023-09-07 PROBLEM — F32.A DEPRESSION: Status: ACTIVE | Noted: 2023-09-07

## 2023-09-07 PROBLEM — Z98.890 H/O SPINAL SURGERY: Status: ACTIVE | Noted: 2017-07-03

## 2023-09-07 PROBLEM — Z86.69 HISTORY OF PARKINSON'S DISEASE: Status: ACTIVE | Noted: 2023-09-07

## 2023-09-07 PROBLEM — Z86.010 HISTORY OF COLON POLYPS: Status: ACTIVE | Noted: 2023-09-07

## 2023-09-07 LAB
ANION GAP SERPL CALCULATED.3IONS-SCNC: 11.4 MMOL/L (ref 5–15)
BUN SERPL-MCNC: 24 MG/DL (ref 8–23)
BUN/CREAT SERPL: 17.1 (ref 7–25)
CALCIUM SPEC-SCNC: 9.7 MG/DL (ref 8.6–10.5)
CHLORIDE SERPL-SCNC: 99 MMOL/L (ref 98–107)
CO2 SERPL-SCNC: 26.6 MMOL/L (ref 22–29)
CREAT SERPL-MCNC: 1.4 MG/DL (ref 0.76–1.27)
EGFRCR SERPLBLD CKD-EPI 2021: 52.4 ML/MIN/1.73
GLUCOSE SERPL-MCNC: 119 MG/DL (ref 65–99)
NT-PROBNP SERPL-MCNC: 1685 PG/ML (ref 0–1800)
POTASSIUM SERPL-SCNC: 3.9 MMOL/L (ref 3.5–5.2)
SODIUM SERPL-SCNC: 137 MMOL/L (ref 136–145)

## 2023-09-07 PROCEDURE — 83880 ASSAY OF NATRIURETIC PEPTIDE: CPT | Performed by: NURSE PRACTITIONER

## 2023-09-07 PROCEDURE — 36415 COLL VENOUS BLD VENIPUNCTURE: CPT

## 2023-09-07 PROCEDURE — 80048 BASIC METABOLIC PNL TOTAL CA: CPT | Performed by: NURSE PRACTITIONER

## 2023-09-07 RX ORDER — FERROUS SULFATE 325(65) MG
TABLET ORAL
COMMUNITY
Start: 2023-08-29

## 2023-09-07 RX ORDER — SPIRONOLACTONE 25 MG/1
25 TABLET ORAL DAILY
COMMUNITY
End: 2023-09-07 | Stop reason: SDUPTHER

## 2023-09-07 RX ORDER — POTASSIUM CHLORIDE 750 MG/1
10 TABLET, FILM COATED, EXTENDED RELEASE ORAL DAILY
Qty: 30 TABLET | Refills: 11 | Status: SHIPPED | OUTPATIENT
Start: 2023-09-07

## 2023-09-07 RX ORDER — FUROSEMIDE 40 MG/1
40 TABLET ORAL 2 TIMES DAILY
COMMUNITY
End: 2023-09-07 | Stop reason: SDUPTHER

## 2023-09-07 RX ORDER — POTASSIUM CHLORIDE 750 MG/1
10 TABLET, FILM COATED, EXTENDED RELEASE ORAL DAILY
COMMUNITY
End: 2023-09-07 | Stop reason: SDUPTHER

## 2023-09-07 RX ORDER — SPIRONOLACTONE 25 MG/1
25 TABLET ORAL DAILY
Qty: 30 TABLET | Refills: 11 | Status: SHIPPED | OUTPATIENT
Start: 2023-09-07

## 2023-09-07 RX ORDER — FUROSEMIDE 40 MG/1
40 TABLET ORAL 2 TIMES DAILY
Qty: 60 TABLET | Refills: 11 | Status: SHIPPED | OUTPATIENT
Start: 2023-09-07

## 2023-09-07 NOTE — TELEPHONE ENCOUNTER
Spoke to wife about labs. I want him to hold his aldactone until I see him next week and repeat the labs.

## 2023-09-07 NOTE — PROGRESS NOTES
Date of Office Visit: 2023  Encounter Provider: ESME Enamorado  Place of Service: [facility] CARDIOLOGY  Patient Name: Edilberto Reeder  :1950    Chief Complaint   Patient presents with    Permanent atrial fibrillation    Follow-up   :     HPI: Edilberto Reeder is a 73 y.o. male who is a patient of  Dr. Fung and is new to me today.  I believe I saw him once in the hospital last year.  He has a history of Parkinson's, chronic A-fib on anticoagulation, chronic anemia, type 2 diabetes mellitus, hypothyroidism and diastolic heart failure.  He had a mildly abnormal stress test in March of last year.  His echo was stable we treated him medically.  He has permanent A-fib and his rate has been controlled.  He has been anticoagulated with Eliquis 5 mg twice a day.  In November of last year he was seen for some diastolic and systolic heart failure his EF is actually 46 he has some mild regional wall motion abnormality.  Continue MAP medically and he was to follow-up in the office.  At that appointment he was reporting that he was still having some signs of volume overload.  We kept him on his same Lasix regimen but added some spironolactone.    He was admitted to the hospital last month after a fall.  He also had an elevation in his troponin.  He was anemic and his Eliquis was held he ended up getting a unit of blood.  He had a laceration which was sutured.  His blood pressure was low on arrival.  We decided to hold his Eliquis at that time.    He did end up going back on his Eliquis.  Since he has been home for the last several weeks he has noticed that he is gaining weight and has had some swelling as well as shortness of breath.  He started back on his Lasix at 40 mg 1-1/2 pills daily.  He also restarted his spironolactone and potassium supplement.  He comes in today because he just feels like he is not getting anywhere.  He seems to still be up about 20  pounds.    Previous testing and notes have been reviewed by me.   Past Medical History:   Diagnosis Date    Atrial fibrillation with RVR     Atrial flutter     Diabetes     HLD (hyperlipidemia) 01/27/2016    Hypertension     Parkinson's disease     Advanced        Past Surgical History:   Procedure Laterality Date    BRAIN STIMULATOR      COLONOSCOPY N/A 5/5/2022    Procedure: COLONOSCOPY TO CECUM WITH COLD SNARE POLYPECTOMY;  Surgeon: Luther Ferrer MD;  Location: [facility] ENDOSCOPY;  Service: Gastroenterology;  Laterality: N/A;  PRE:ANEMIA  POST:POLYPS/HEMORRHOIDS    ENDOSCOPY N/A 5/5/2022    Procedure: ESOPHAGOGASTRODUODENOSCOPY WITH  COLD BIOPSIES;  Surgeon: Luther Ferrer MD;  Location: [facility] ENDOSCOPY;  Service: Gastroenterology;  Laterality: N/A;  PRE:ANEMIA  POST:DIVERTICULUM/GASTRITIS    JOINT REPLACEMENT      Bilateral shoulder and knee replacements       Social History     Socioeconomic History    Marital status:    Tobacco Use    Smoking status: Never    Smokeless tobacco: Never    Tobacco comments:     caffeine use   Vaping Use    Vaping Use: Never used   Substance and Sexual Activity    Alcohol use: Yes    Drug use: No    Sexual activity: Defer       History reviewed. No pertinent family history.    Review of Systems   Constitutional: Negative for diaphoresis and malaise/fatigue.   Cardiovascular:  Positive for dyspnea on exertion and leg swelling. Negative for chest pain, claudication, irregular heartbeat, near-syncope, orthopnea, palpitations, paroxysmal nocturnal dyspnea and syncope.   Respiratory:  Negative for cough, shortness of breath and sleep disturbances due to breathing.    Musculoskeletal:  Negative for falls.   Neurological:  Negative for dizziness and weakness.   Psychiatric/Behavioral:  Negative for altered mental status and substance abuse.      Allergies   Allergen Reactions    Bacitracin Anaphylaxis    Neosporin [Neomycin-Bacitracin Zn-Polymyx] Other (See  Comments)     BP drops and heart stops!    Fish-Derived Products Hives    Shellfish Allergy Hives    Shrimp (Diagnostic) Hives         Current Outpatient Medications:     acetaminophen (TYLENOL) 325 MG tablet, Take 2 tablets by mouth Every 6 (Six) Hours As Needed for Mild Pain ., Disp: , Rfl:     allopurinol (ZYLOPRIM) 100 MG tablet, Take 1 tablet by mouth Daily., Disp: , Rfl:     apixaban (ELIQUIS) 5 MG tablet tablet, Take 1 tablet by mouth 2 (Two) Times a Day., Disp: , Rfl:     Calcium Carbonate Antacid (CALCIUM CARBONATE PO), Take 650 mg by mouth Daily., Disp: , Rfl:     Carbidopa-Levodopa ER 36. MG capsule controlled-release, Take 4 capsules by mouth 4 (Four) Times a Day., Disp: , Rfl:     citalopram (CeleXA) 20 MG tablet, Take 1 tablet by mouth Daily., Disp: , Rfl:     docusate sodium (COLACE) 50 MG capsule, Take 2 capsules by mouth Every Night., Disp: , Rfl:     donepezil (ARICEPT) 5 MG tablet, Take 2 tablets by mouth Every Night., Disp: , Rfl:     ferrous sulfate 325 (65 FE) MG tablet, , Disp: , Rfl:     glipizide (GLUCOTROL) 5 MG tablet, Take 1 tablet by mouth Every Morning., Disp: , Rfl:     levothyroxine (SYNTHROID, LEVOTHROID) 50 MCG tablet, Take 1 tablet by mouth Every Morning., Disp: 30 tablet, Rfl: 0    metFORMIN (GLUCOPHAGE) 500 MG tablet, Take 1 tablet by mouth 2 (Two) Times a Day With Meals., Disp: , Rfl:     metoprolol tartrate (LOPRESSOR) 25 MG tablet, TAKE 1/2 TABLET BY MOUTH TWICE A DAY (Patient taking differently: Take 0.5 tablets by mouth 2 (Two) Times a Day.), Disp: 30 tablet, Rfl: 11    multivitamin with minerals tablet tablet, Take 1 tablet by mouth Daily., Disp: , Rfl:     rosuvastatin (CRESTOR) 20 MG tablet, Take 1 tablet by mouth Every Night., Disp: 90 tablet, Rfl: 3    tiZANidine (ZANAFLEX) 2 MG tablet, Take 1 tablet by mouth Every 8 (Eight) Hours As Needed for Muscle Spasms., Disp: , Rfl:     traZODone (DESYREL) 50 MG tablet, Take 0.5 tablets by mouth Every Night., Disp: , Rfl:  "    vitamin B-12 (CYANOCOBALAMIN) 1000 MCG tablet, Take 0.5 tablets by mouth 2 (Two) Times a Day., Disp: , Rfl:       Objective:     Vitals:    09/07/23 1444   BP: 100/54   Pulse: 61   SpO2: 100%   Weight: 88.5 kg (195 lb)   Height: 182.9 cm (72\")     Body mass index is 26.45 kg/m².    PHYSICAL EXAM:    Constitutional:       General: Not in acute distress.     Appearance: Normal appearance. Well-developed.   Eyes:      Pupils: Pupils are equal, round, and reactive to light.   HENT:      Head: Normocephalic.   Neck:      Vascular: No carotid bruit or JVD.   Pulmonary:      Effort: Pulmonary effort is normal. No tachypnea.      Breath sounds: Normal breath sounds. No wheezing. No rales.   Cardiovascular:      Normal rate. Regular rhythm.      No gallop.    Pulses:     Intact distal pulses.   Edema:     Peripheral edema present.     Pretibial: bilateral 1+ edema of the pretibial area.     Ankle: bilateral 1+ edema of the ankle.  Abdominal:      General: Bowel sounds are normal.      Palpations: Abdomen is soft.      Tenderness: There is no abdominal tenderness.   Musculoskeletal: Normal range of motion.      Cervical back: Normal range of motion and neck supple. No edema. Skin:     General: Skin is warm and dry.   Neurological:      Mental Status: Alert and oriented to person, place, and time.         ECG 12 Lead    Date/Time: 9/7/2023 3:23 PM  Performed by: Zohra Angulo APRN  Authorized by: Zohra Angulo APRN   Comparison: compared with previous ECG from 8/4/2023  Similar to previous ECG  Rhythm: atrial fibrillation  Rate: normal  Conduction: incomplete right bundle branch block and left anterior fascicular block  QRS axis: normal    Clinical impression: abnormal EKG          Assessment:       Diagnosis Plan   1. Mixed hyperlipidemia     On statin   2. Essential hypertension     Blood pressure stable on current regimen   3. Acute on Chronic diastolic heart failure     We will increase Lasix to 40 mg twice a " day.  We will get labs today.  I want to bring him back in a week we will repeat labs at that time   4. Atrial fibrillation, persistent     Rate controlled anticoagulated with Eliquis.  No angina symptoms.  Follow-up in 1 week   5. ASCVD (arteriosclerotic cardiovascular disease)        6. Type 2 diabetes mellitus with hyperglycemia, without long-term current use of insulin          No orders of the defined types were placed in this encounter.         Plan:       Follow up in 1 week          Your medication list            Accurate as of September 7, 2023  2:54 PM. If you have any questions, ask your nurse or doctor.                CONTINUE taking these medications        Instructions Last Dose Given Next Dose Due   acetaminophen 325 MG tablet  Commonly known as: TYLENOL      Take 2 tablets by mouth Every 6 (Six) Hours As Needed for Mild Pain .       allopurinol 100 MG tablet  Commonly known as: ZYLOPRIM      Take 1 tablet by mouth Daily.       apixaban 5 MG tablet tablet  Commonly known as: ELIQUIS      Take 1 tablet by mouth 2 (Two) Times a Day.       CALCIUM CARBONATE PO      Take 650 mg by mouth Daily.       Carbidopa-Levodopa ER 36. MG capsule controlled-release      Take 4 capsules by mouth 4 (Four) Times a Day.       citalopram 20 MG tablet  Commonly known as: CeleXA      Take 1 tablet by mouth Daily.       docusate sodium 50 MG capsule  Commonly known as: COLACE      Take 2 capsules by mouth Every Night.       donepezil 5 MG tablet  Commonly known as: ARICEPT      Take 2 tablets by mouth Every Night.       ferrous sulfate 325 (65 FE) MG tablet           glipizide 5 MG tablet  Commonly known as: GLUCOTROL      Take 1 tablet by mouth Every Morning.       levothyroxine 50 MCG tablet  Commonly known as: SYNTHROID, LEVOTHROID      Take 1 tablet by mouth Every Morning.       metFORMIN 500 MG tablet  Commonly known as: GLUCOPHAGE      Take 1 tablet by mouth 2 (Two) Times a Day With Meals.       metoprolol  tartrate 25 MG tablet  Commonly known as: LOPRESSOR      TAKE 1/2 TABLET BY MOUTH TWICE A DAY       multivitamin with minerals tablet tablet      Take 1 tablet by mouth Daily.       rosuvastatin 20 MG tablet  Commonly known as: CRESTOR      Take 1 tablet by mouth Every Night.       tiZANidine 2 MG tablet  Commonly known as: ZANAFLEX      Take 1 tablet by mouth Every 8 (Eight) Hours As Needed for Muscle Spasms.       traZODone 50 MG tablet  Commonly known as: DESYREL      Take 0.5 tablets by mouth Every Night.       vitamin B-12 1000 MCG tablet  Commonly known as: CYANOCOBALAMIN      Take 0.5 tablets by mouth 2 (Two) Times a Day.                  As always, it has been a pleasure to participate in your patient's care.      Sincerely,     Zohra VICTORIA

## 2023-09-13 ENCOUNTER — HOSPITAL ENCOUNTER (OUTPATIENT)
Dept: CARDIOLOGY | Facility: HOSPITAL | Age: 73
Discharge: HOME | End: 2023-09-13
Admitting: NURSE PRACTITIONER
Payer: MEDICARE

## 2023-09-13 ENCOUNTER — OFFICE VISIT (OUTPATIENT)
Dept: CARDIOLOGY | Facility: CLINIC | Age: 73
End: 2023-09-13
Payer: MEDICARE

## 2023-09-13 VITALS
HEART RATE: 55 BPM | WEIGHT: 194.4 LBS | DIASTOLIC BLOOD PRESSURE: 58 MMHG | HEIGHT: 72 IN | BODY MASS INDEX: 26.33 KG/M2 | OXYGEN SATURATION: 98 % | SYSTOLIC BLOOD PRESSURE: 110 MMHG

## 2023-09-13 DIAGNOSIS — E11.65 TYPE 2 DIABETES MELLITUS WITH HYPERGLYCEMIA, WITHOUT LONG-TERM CURRENT USE OF INSULIN: ICD-10-CM

## 2023-09-13 DIAGNOSIS — I48.21 PERMANENT ATRIAL FIBRILLATION: ICD-10-CM

## 2023-09-13 DIAGNOSIS — I48.21 PERMANENT ATRIAL FIBRILLATION: Primary | ICD-10-CM

## 2023-09-13 DIAGNOSIS — I50.32 CHRONIC DIASTOLIC HEART FAILURE: ICD-10-CM

## 2023-09-13 DIAGNOSIS — I10 ESSENTIAL HYPERTENSION: Chronic | ICD-10-CM

## 2023-09-13 DIAGNOSIS — E78.2 MIXED HYPERLIPIDEMIA: Chronic | ICD-10-CM

## 2023-09-13 LAB
ANION GAP SERPL CALCULATED.3IONS-SCNC: 14 MMOL/L (ref 5–15)
BUN SERPL-MCNC: 27 MG/DL (ref 8–23)
BUN/CREAT SERPL: 20.6 (ref 7–25)
CALCIUM SPEC-SCNC: 9.4 MG/DL (ref 8.6–10.5)
CHLORIDE SERPL-SCNC: 96 MMOL/L (ref 98–107)
CO2 SERPL-SCNC: 26 MMOL/L (ref 22–29)
CREAT SERPL-MCNC: 1.31 MG/DL (ref 0.76–1.27)
EGFRCR SERPLBLD CKD-EPI 2021: 56.8 ML/MIN/1.73
GLUCOSE SERPL-MCNC: 66 MG/DL (ref 65–99)
NT-PROBNP SERPL-MCNC: 2075 PG/ML (ref 0–1800)
POTASSIUM SERPL-SCNC: 3.2 MMOL/L (ref 3.5–5.2)
SODIUM SERPL-SCNC: 136 MMOL/L (ref 136–145)

## 2023-09-13 PROCEDURE — 80048 BASIC METABOLIC PNL TOTAL CA: CPT | Performed by: NURSE PRACTITIONER

## 2023-09-13 PROCEDURE — 63710000001 POTASSIUM CHLORIDE 10 MEQ TABLET CONTROLLED-RELEASE: Performed by: NURSE PRACTITIONER

## 2023-09-13 PROCEDURE — A9270 NON-COVERED ITEM OR SERVICE: HCPCS | Performed by: NURSE PRACTITIONER

## 2023-09-13 PROCEDURE — 83880 ASSAY OF NATRIURETIC PEPTIDE: CPT | Performed by: NURSE PRACTITIONER

## 2023-09-13 PROCEDURE — 36415 COLL VENOUS BLD VENIPUNCTURE: CPT

## 2023-09-13 RX ORDER — POTASSIUM CHLORIDE 1500 MG/1
20 TABLET, EXTENDED RELEASE ORAL 2 TIMES DAILY
Qty: 60 TABLET | Refills: 11 | Status: SHIPPED | OUTPATIENT
Start: 2023-09-13

## 2023-09-13 RX ORDER — POTASSIUM CHLORIDE 20 MEQ/1
60 TABLET, EXTENDED RELEASE ORAL ONCE
Status: DISCONTINUED | OUTPATIENT
Start: 2023-09-13 | End: 2023-09-13

## 2023-09-13 RX ORDER — POTASSIUM CHLORIDE 20 MEQ/1
60 TABLET, EXTENDED RELEASE ORAL ONCE
Status: COMPLETED | OUTPATIENT
Start: 2023-09-13 | End: 2023-09-13

## 2023-09-13 RX ORDER — FUROSEMIDE 40 MG/1
80 TABLET ORAL 2 TIMES DAILY
Qty: 60 TABLET | Refills: 11 | Status: SHIPPED | OUTPATIENT
Start: 2023-09-13

## 2023-09-13 RX ADMIN — POTASSIUM CHLORIDE 60 MEQ: 750 TABLET, EXTENDED RELEASE ORAL at 12:44

## 2023-09-13 NOTE — PROGRESS NOTES
Date of Office Visit: 2023  Encounter Provider: ESME Enamorado  Place of Service: [facility] CARDIOLOGY  Patient Name: Edilberto Reeder  :1950    Chief Complaint   Patient presents with    1 week follow up    Atrial Fibrillation   :     HPI:  Edilberto Reeder is a 73 y.o. male who is a patient of  Dr. Fung and is known to me today.  I believe I saw him once in the hospital last year.  He has a history of Parkinson's, chronic A-fib on anticoagulation, chronic anemia, type 2 diabetes mellitus, hypothyroidism and diastolic heart failure.  He had a mildly abnormal stress test in March of last year.  His echo was stable we treated him medically.  He has permanent A-fib and his rate has been controlled.  He has been anticoagulated with Eliquis 5 mg twice a day.  In November of last year he was seen for some diastolic and systolic heart failure his EF is actually 46 he has some mild regional wall motion abnormality.  Continue MAP medically and he was to follow-up in the office.  At that appointment he was reporting that he was still having some signs of volume overload.  We kept him on his same Lasix regimen but added some spironolactone.     He was admitted to the hospital last month after a fall. He also had an elevation in his troponin. He was anemic and his Eliquis was held he ended up getting a unit of blood. He had a laceration which was sutured. His blood pressure was low on arrival. We decided to hold his Eliquis at that time. He did go back on his Eliquis.He came to see me last week and was short of breath and having edema. He had gone back on his lasix to 60mg daily. He had also gone back on his spironolactone and potassium.  I increase his Lasix to twice a day and got some labs his BUN and creatinine went up a little bit and I told him to stop his spironolactone.    So he comes in today he is not really feeling much better he is down about a pound.  Not  much decrease in edema still short of breath with walking around.  Previous testing and notes have been reviewed by me.   Past Medical History:   Diagnosis Date    Atrial fibrillation with RVR     Atrial flutter     Diabetes     HLD (hyperlipidemia) 01/27/2016    Hypertension     Parkinson's disease     Advanced        Past Surgical History:   Procedure Laterality Date    BRAIN STIMULATOR      COLONOSCOPY N/A 5/5/2022    Procedure: COLONOSCOPY TO CECUM WITH COLD SNARE POLYPECTOMY;  Surgeon: Luther Ferrer MD;  Location: [facility] ENDOSCOPY;  Service: Gastroenterology;  Laterality: N/A;  PRE:ANEMIA  POST:POLYPS/HEMORRHOIDS    ENDOSCOPY N/A 5/5/2022    Procedure: ESOPHAGOGASTRODUODENOSCOPY WITH  COLD BIOPSIES;  Surgeon: Luther Ferrer MD;  Location: [facility] ENDOSCOPY;  Service: Gastroenterology;  Laterality: N/A;  PRE:ANEMIA  POST:DIVERTICULUM/GASTRITIS    JOINT REPLACEMENT      Bilateral shoulder and knee replacements       Social History     Socioeconomic History    Marital status:    Tobacco Use    Smoking status: Never    Smokeless tobacco: Never    Tobacco comments:     caffeine use   Vaping Use    Vaping Use: Never used   Substance and Sexual Activity    Alcohol use: Yes    Drug use: No    Sexual activity: Defer       History reviewed. No pertinent family history.    Review of Systems   Constitutional: Negative for diaphoresis and malaise/fatigue.   Cardiovascular:  Positive for dyspnea on exertion and leg swelling. Negative for chest pain, claudication, irregular heartbeat, near-syncope, orthopnea, palpitations, paroxysmal nocturnal dyspnea and syncope.   Respiratory:  Negative for cough, shortness of breath and sleep disturbances due to breathing.    Musculoskeletal:  Negative for falls.   Neurological:  Negative for dizziness and weakness.   Psychiatric/Behavioral:  Negative for altered mental status and substance abuse.      Allergies   Allergen Reactions    Bacitracin Anaphylaxis     Neosporin [Neomycin-Bacitracin Zn-Polymyx] Other (See Comments)     BP drops and heart stops!    Fish-Derived Products Hives    Shellfish Allergy Hives    Shrimp (Diagnostic) Hives         Current Outpatient Medications:     acetaminophen (TYLENOL) 325 MG tablet, Take 2 tablets by mouth Every 6 (Six) Hours As Needed for Mild Pain ., Disp: , Rfl:     allopurinol (ZYLOPRIM) 100 MG tablet, Take 1 tablet by mouth Daily., Disp: , Rfl:     apixaban (ELIQUIS) 5 MG tablet tablet, Take 1 tablet by mouth 2 (Two) Times a Day., Disp: , Rfl:     Calcium Carbonate Antacid (CALCIUM CARBONATE PO), Take 650 mg by mouth Daily., Disp: , Rfl:     Carbidopa-Levodopa ER 36. MG capsule controlled-release, Take 4 capsules by mouth 4 (Four) Times a Day., Disp: , Rfl:     citalopram (CeleXA) 20 MG tablet, Take 1 tablet by mouth Daily., Disp: , Rfl:     docusate sodium (COLACE) 50 MG capsule, Take 2 capsules by mouth Every Night., Disp: , Rfl:     donepezil (ARICEPT) 5 MG tablet, Take 2 tablets by mouth Every Night., Disp: , Rfl:     ferrous sulfate 325 (65 FE) MG tablet, , Disp: , Rfl:     furosemide (LASIX) 40 MG tablet, Take 1 tablet by mouth 2 (Two) Times a Day., Disp: 60 tablet, Rfl: 11    glipizide (GLUCOTROL) 5 MG tablet, Take 1 tablet by mouth Every Morning., Disp: , Rfl:     levothyroxine (SYNTHROID, LEVOTHROID) 50 MCG tablet, Take 1 tablet by mouth Every Morning., Disp: 30 tablet, Rfl: 0    metFORMIN (GLUCOPHAGE) 500 MG tablet, Take 1 tablet by mouth 2 (Two) Times a Day With Meals., Disp: , Rfl:     metoprolol tartrate (LOPRESSOR) 25 MG tablet, TAKE 1/2 TABLET BY MOUTH TWICE A DAY (Patient taking differently: Take 0.5 tablets by mouth 2 (Two) Times a Day.), Disp: 30 tablet, Rfl: 11    multivitamin with minerals tablet tablet, Take 1 tablet by mouth Daily., Disp: , Rfl:     potassium chloride 10 MEQ CR tablet, Take 1 tablet by mouth Daily., Disp: 30 tablet, Rfl: 11    rosuvastatin (CRESTOR) 20 MG tablet, Take 1 tablet by mouth  "Every Night., Disp: 90 tablet, Rfl: 3    tiZANidine (ZANAFLEX) 2 MG tablet, Take 1 tablet by mouth Every 8 (Eight) Hours As Needed for Muscle Spasms., Disp: , Rfl:     traZODone (DESYREL) 50 MG tablet, Take 0.5 tablets by mouth Every Night., Disp: , Rfl:     vitamin B-12 (CYANOCOBALAMIN) 1000 MCG tablet, Take 0.5 tablets by mouth 2 (Two) Times a Day., Disp: , Rfl:     spironolactone (ALDACTONE) 25 MG tablet, Take 1 tablet by mouth Daily., Disp: 30 tablet, Rfl: 11      Objective:     Vitals:    09/13/23 1014   BP: 110/58   BP Location: Right arm   Patient Position: Sitting   Cuff Size: Adult   Pulse: 55   SpO2: 98%   Weight: 88.2 kg (194 lb 6.4 oz)   Height: 182.9 cm (72\")     Body mass index is 26.37 kg/m².    PHYSICAL EXAM:    Constitutional:       General: Not in acute distress.     Appearance: Normal appearance. Well-developed.   Eyes:      Pupils: Pupils are equal, round, and reactive to light.   HENT:      Head: Normocephalic.   Neck:      Vascular: No carotid bruit or JVD.   Pulmonary:      Effort: Pulmonary effort is normal. No tachypnea.      Breath sounds: Normal breath sounds. No wheezing. No rales.   Cardiovascular:      Normal rate. Irregularly irregular rhythm.      No gallop.    Pulses:     Intact distal pulses.   Edema:     Peripheral edema present.     Pretibial: bilateral 1+ edema of the pretibial area.     Ankle: bilateral 1+ edema of the ankle.  Abdominal:      General: Bowel sounds are normal.      Palpations: Abdomen is soft.      Tenderness: There is no abdominal tenderness.   Musculoskeletal: Normal range of motion.      Cervical back: Normal range of motion and neck supple. No edema. Skin:     General: Skin is warm and dry.   Neurological:      Mental Status: Alert and oriented to person, place, and time.         ECG 12 Lead    Date/Time: 9/13/2023 2:43 PM  Performed by: Zohra Angulo APRN  Authorized by: Zohra Angulo APRN   Comparison: compared with previous ECG from " 8/4/2023  Similar to previous ECG  Rhythm: atrial fibrillation  Rate: normal  QRS axis: normal  Other findings: non-specific ST-T wave changes    Clinical impression: abnormal EKG          Assessment:       Diagnosis Plan   1. Permanent atrial fibrillation     Rate controlled. On eliquis 5mg BID   2. Mixed hyperlipidemia     ON statin therapy. Goal less than 70   3. Essential hypertension     Bp well controlled   4. Acute on Chronic diastolic heart failure     Still looks overloaded. Labs repeated. BUN and Cr slightly better. Will increase Lasix to 80mg BID. His potassium was low will replace and increase his daily potassium    5. Type 2 diabetes mellitus with hyperglycemia, without long-term current use of insulin     On oral therapy.      No orders of the defined types were placed in this encounter.         Plan:       Follow up in 1 week         Your medication list            Accurate as of September 13, 2023 10:45 AM. If you have any questions, ask your nurse or doctor.                CONTINUE taking these medications        Instructions Last Dose Given Next Dose Due   acetaminophen 325 MG tablet  Commonly known as: TYLENOL      Take 2 tablets by mouth Every 6 (Six) Hours As Needed for Mild Pain .       allopurinol 100 MG tablet  Commonly known as: ZYLOPRIM      Take 1 tablet by mouth Daily.       apixaban 5 MG tablet tablet  Commonly known as: ELIQUIS      Take 1 tablet by mouth 2 (Two) Times a Day.       CALCIUM CARBONATE PO      Take 650 mg by mouth Daily.       Carbidopa-Levodopa ER 36. MG capsule controlled-release      Take 4 capsules by mouth 4 (Four) Times a Day.       citalopram 20 MG tablet  Commonly known as: CeleXA      Take 1 tablet by mouth Daily.       docusate sodium 50 MG capsule  Commonly known as: COLACE      Take 2 capsules by mouth Every Night.       donepezil 5 MG tablet  Commonly known as: ARICEPT      Take 2 tablets by mouth Every Night.       ferrous sulfate 325 (65 FE) MG  tablet           furosemide 40 MG tablet  Commonly known as: LASIX      Take 1 tablet by mouth 2 (Two) Times a Day.       glipizide 5 MG tablet  Commonly known as: GLUCOTROL      Take 1 tablet by mouth Every Morning.       levothyroxine 50 MCG tablet  Commonly known as: SYNTHROID, LEVOTHROID      Take 1 tablet by mouth Every Morning.       metFORMIN 500 MG tablet  Commonly known as: GLUCOPHAGE      Take 1 tablet by mouth 2 (Two) Times a Day With Meals.       metoprolol tartrate 25 MG tablet  Commonly known as: LOPRESSOR      TAKE 1/2 TABLET BY MOUTH TWICE A DAY       multivitamin with minerals tablet tablet      Take 1 tablet by mouth Daily.       potassium chloride 10 MEQ CR tablet      Take 1 tablet by mouth Daily.       rosuvastatin 20 MG tablet  Commonly known as: CRESTOR      Take 1 tablet by mouth Every Night.       spironolactone 25 MG tablet  Commonly known as: ALDACTONE      Take 1 tablet by mouth Daily.       tiZANidine 2 MG tablet  Commonly known as: ZANAFLEX      Take 1 tablet by mouth Every 8 (Eight) Hours As Needed for Muscle Spasms.       traZODone 50 MG tablet  Commonly known as: DESYREL      Take 0.5 tablets by mouth Every Night.       vitamin B-12 1000 MCG tablet  Commonly known as: CYANOCOBALAMIN      Take 0.5 tablets by mouth 2 (Two) Times a Day.                  As always, it has been a pleasure to participate in your patient's care.      Sincerely,     Zohra VICTORIA

## 2023-09-13 NOTE — PROGRESS NOTES
60meq KCL ordered.  We only have a supply of 10meq each po Unable to find match in epic.   Potassium chloride ER 10meq Rajendra NDC: [phone] LOT#: [id number].   6 tablets given PO.  Pt tolerated without difficulty.    Thank you,  Taylor Doll RN  [facility]  [facility]

## 2023-09-20 ENCOUNTER — TELEPHONE (OUTPATIENT)
Dept: CARDIOLOGY | Facility: CLINIC | Age: 73
End: 2023-09-20

## 2023-09-20 ENCOUNTER — OFFICE VISIT (OUTPATIENT)
Dept: CARDIOLOGY | Facility: CLINIC | Age: 73
End: 2023-09-20
Payer: MEDICARE

## 2023-09-20 ENCOUNTER — TELEPHONE (OUTPATIENT)
Dept: CARDIOLOGY | Facility: CLINIC | Age: 73
End: 2023-09-20
Payer: MEDICARE

## 2023-09-20 ENCOUNTER — HOSPITAL ENCOUNTER (OUTPATIENT)
Dept: CARDIOLOGY | Facility: HOSPITAL | Age: 73
Discharge: HOME | End: 2023-09-20
Payer: MEDICARE

## 2023-09-20 VITALS
HEIGHT: 72 IN | BODY MASS INDEX: 24.52 KG/M2 | DIASTOLIC BLOOD PRESSURE: 80 MMHG | SYSTOLIC BLOOD PRESSURE: 140 MMHG | WEIGHT: 181 LBS | HEART RATE: 62 BPM

## 2023-09-20 DIAGNOSIS — I50.32 CHRONIC DIASTOLIC HEART FAILURE: Primary | ICD-10-CM

## 2023-09-20 DIAGNOSIS — I48.21 PERMANENT ATRIAL FIBRILLATION: Primary | ICD-10-CM

## 2023-09-20 DIAGNOSIS — I10 ESSENTIAL HYPERTENSION: Chronic | ICD-10-CM

## 2023-09-20 DIAGNOSIS — I50.32 CHRONIC DIASTOLIC HEART FAILURE: ICD-10-CM

## 2023-09-20 DIAGNOSIS — E78.2 MIXED HYPERLIPIDEMIA: Chronic | ICD-10-CM

## 2023-09-20 LAB
ANION GAP SERPL CALCULATED.3IONS-SCNC: 16 MMOL/L (ref 5–15)
BUN SERPL-MCNC: 32 MG/DL (ref 8–23)
BUN/CREAT SERPL: 18.9 (ref 7–25)
CALCIUM SPEC-SCNC: 9.3 MG/DL (ref 8.6–10.5)
CHLORIDE SERPL-SCNC: 91 MMOL/L (ref 98–107)
CO2 SERPL-SCNC: 27 MMOL/L (ref 22–29)
CREAT SERPL-MCNC: 1.69 MG/DL (ref 0.76–1.27)
EGFRCR SERPLBLD CKD-EPI 2021: 41.8 ML/MIN/1.73
GLUCOSE SERPL-MCNC: 47 MG/DL (ref 65–99)
MAGNESIUM SERPL-MCNC: 2.5 MG/DL (ref 1.6–2.4)
POTASSIUM SERPL-SCNC: 2.5 MMOL/L (ref 3.5–5.2)
SODIUM SERPL-SCNC: 134 MMOL/L (ref 136–145)

## 2023-09-20 PROCEDURE — 36415 COLL VENOUS BLD VENIPUNCTURE: CPT

## 2023-09-20 PROCEDURE — 80048 BASIC METABOLIC PNL TOTAL CA: CPT | Performed by: NURSE PRACTITIONER

## 2023-09-20 PROCEDURE — 83735 ASSAY OF MAGNESIUM: CPT | Performed by: NURSE PRACTITIONER

## 2023-09-20 RX ORDER — LISINOPRIL 2.5 MG/1
2.5 TABLET ORAL DAILY
Qty: 30 TABLET | Refills: 11 | Status: SHIPPED | OUTPATIENT
Start: 2023-09-20

## 2023-09-20 NOTE — PROGRESS NOTES
Date of Office Visit: 2023  Encounter Provider: ESME Enamorado  Place of Service: [facility] CARDIOLOGY  Patient Name: Edilberto Reeder  :1950    Chief Complaint   Patient presents with    Follow-up    Atrial Fibrillation   :     HPI: Edilberto Reeder is a 73 y.o. male who is a patient of  Dr. Fung and is known to me today. He has a history of Parkinson's, chronic A-fib on anticoagulation, chronic anemia, type 2 diabetes mellitus, hypothyroidism and diastolic heart failure.  He had a mildly abnormal stress test in March of last year.  His echo was stable we treated him medically.  He has permanent A-fib and his rate has been controlled.  He has been anticoagulated with Eliquis 5 mg twice a day.  In November of last year he was seen for some diastolic and systolic heart failure his EF is actually 46 he has some mild regional wall motion abnormality.  Continue MAP medically and he was to follow-up in the office.  At that appointment he was reporting that he was still having some signs of volume overload.  We kept him on his same Lasix regimen but added some spironolactone.      He was admitted to the hospital last month after a fall. He also had an elevation in his troponin. He was anemic and his Eliquis was held he ended up getting a unit of blood. He had a laceration which was sutured. His blood pressure was low on arrival. We decided to hold his Eliquis at that time. He did go back on his Eliquis. I saw him at the beginning of September and was short of breath. He went back on his lasix on his own at home. I increased his lasix to 80mg in the am and 40mg in the pm. He had also restarted on aldactone and his BUN and Cr went up so we stopped it.     I saw him last week and He wasn't feeling much better. His lasix was increased to 80mg BID. HE is here for follow up today. He is down 13 lbs since last week. His breathing is doing much better. He had to have a  tooth removed yesterday and has been on some pain medicine so he has had some issues with being off balance and got up in the night and fell. His wife watches his closely and he understands not to get up alone. His edema is down and his breathing is better. No chest pain or palpitations.    Previous testing and notes have been reviewed by me.   Past Medical History:   Diagnosis Date    Atrial fibrillation with RVR     Atrial flutter     Diabetes     HLD (hyperlipidemia) 01/27/2016    Hypertension     Parkinson's disease     Advanced        Past Surgical History:   Procedure Laterality Date    BRAIN STIMULATOR      COLONOSCOPY N/A 5/5/2022    Procedure: COLONOSCOPY TO CECUM WITH COLD SNARE POLYPECTOMY;  Surgeon: Luther Ferrer MD;  Location: [facility] ENDOSCOPY;  Service: Gastroenterology;  Laterality: N/A;  PRE:ANEMIA  POST:POLYPS/HEMORRHOIDS    ENDOSCOPY N/A 5/5/2022    Procedure: ESOPHAGOGASTRODUODENOSCOPY WITH  COLD BIOPSIES;  Surgeon: Luther Ferrer MD;  Location: [facility] ENDOSCOPY;  Service: Gastroenterology;  Laterality: N/A;  PRE:ANEMIA  POST:DIVERTICULUM/GASTRITIS    JOINT REPLACEMENT      Bilateral shoulder and knee replacements       Social History     Socioeconomic History    Marital status:    Tobacco Use    Smoking status: Never    Smokeless tobacco: Never    Tobacco comments:     caffeine use   Vaping Use    Vaping Use: Never used   Substance and Sexual Activity    Alcohol use: Yes    Drug use: No    Sexual activity: Defer       No family history on file.    Review of Systems   Constitutional: Negative for diaphoresis and malaise/fatigue.   Cardiovascular:  Positive for dyspnea on exertion and leg swelling. Negative for chest pain, claudication, irregular heartbeat, near-syncope, orthopnea, palpitations, paroxysmal nocturnal dyspnea and syncope.   Respiratory:  Negative for cough, shortness of breath and sleep disturbances due to breathing.    Musculoskeletal:  Negative for falls.    Neurological:  Negative for dizziness and weakness.   Psychiatric/Behavioral:  Negative for altered mental status and substance abuse.      Allergies   Allergen Reactions    Bacitracin Anaphylaxis    Neosporin [Neomycin-Bacitracin Zn-Polymyx] Other (See Comments)     BP drops and heart stops!    Fish-Derived Products Hives    Shellfish Allergy Hives    Shrimp (Diagnostic) Hives         Current Outpatient Medications:     acetaminophen (TYLENOL) 325 MG tablet, Take 2 tablets by mouth Every 6 (Six) Hours As Needed for Mild Pain ., Disp: , Rfl:     allopurinol (ZYLOPRIM) 100 MG tablet, Take 1 tablet by mouth Daily., Disp: , Rfl:     apixaban (ELIQUIS) 5 MG tablet tablet, Take 1 tablet by mouth 2 (Two) Times a Day., Disp: , Rfl:     Calcium Carbonate Antacid (CALCIUM CARBONATE PO), Take 650 mg by mouth Daily., Disp: , Rfl:     Carbidopa-Levodopa ER 36. MG capsule controlled-release, Take 4 capsules by mouth 4 (Four) Times a Day., Disp: , Rfl:     citalopram (CeleXA) 20 MG tablet, Take 1 tablet by mouth Daily., Disp: , Rfl:     docusate sodium (COLACE) 50 MG capsule, Take 2 capsules by mouth Every Night., Disp: , Rfl:     donepezil (ARICEPT) 5 MG tablet, Take 2 tablets by mouth Every Night., Disp: , Rfl:     ferrous sulfate 325 (65 FE) MG tablet, , Disp: , Rfl:     furosemide (LASIX) 40 MG tablet, Take 2 tablets by mouth 2 (Two) Times a Day., Disp: 60 tablet, Rfl: 11    glipizide (GLUCOTROL) 5 MG tablet, Take 1 tablet by mouth Every Morning., Disp: , Rfl:     levothyroxine (SYNTHROID, LEVOTHROID) 50 MCG tablet, Take 1 tablet by mouth Every Morning., Disp: 30 tablet, Rfl: 0    metFORMIN (GLUCOPHAGE) 500 MG tablet, Take 1 tablet by mouth 2 (Two) Times a Day With Meals., Disp: , Rfl:     metoprolol tartrate (LOPRESSOR) 25 MG tablet, TAKE 1/2 TABLET BY MOUTH TWICE A DAY (Patient taking differently: Take 0.5 tablets by mouth 2 (Two) Times a Day.), Disp: 30 tablet, Rfl: 11    multivitamin with minerals tablet tablet,  "Take 1 tablet by mouth Daily., Disp: , Rfl:     potassium chloride (K-TAB) 20 MEQ tablet controlled-release ER tablet, Take 1 tablet by mouth 2 (Two) Times a Day., Disp: 60 tablet, Rfl: 11    rosuvastatin (CRESTOR) 20 MG tablet, Take 1 tablet by mouth Every Night., Disp: 90 tablet, Rfl: 3    tiZANidine (ZANAFLEX) 2 MG tablet, Take 1 tablet by mouth Every 8 (Eight) Hours As Needed for Muscle Spasms., Disp: , Rfl:     traZODone (DESYREL) 50 MG tablet, Take 0.5 tablets by mouth Every Night., Disp: , Rfl:     vitamin B-12 (CYANOCOBALAMIN) 1000 MCG tablet, Take 0.5 tablets by mouth 2 (Two) Times a Day., Disp: , Rfl:       Objective:     Vitals:    09/20/23 0942   BP: 140/80   Pulse: 62   Weight: 82.1 kg (181 lb)   Height: 182.9 cm (72\")     Body mass index is 24.55 kg/m².    PHYSICAL EXAM:    Constitutional:       General: Not in acute distress.     Appearance: Normal appearance. Well-developed.   Eyes:      Pupils: Pupils are equal, round, and reactive to light.   HENT:      Head: Normocephalic.   Neck:      Vascular: No carotid bruit or JVD.   Pulmonary:      Effort: Pulmonary effort is normal. No tachypnea.      Breath sounds: Normal breath sounds. No wheezing. No rales.   Cardiovascular:      Normal rate. Irregularly irregular rhythm.      No gallop.    Pulses:     Intact distal pulses.   Edema:     Peripheral edema present.     Pretibial: bilateral trace edema of the pretibial area.     Ankle: bilateral trace edema of the ankle.  Abdominal:      General: Bowel sounds are normal.      Palpations: Abdomen is soft.      Tenderness: There is no abdominal tenderness.   Musculoskeletal: Normal range of motion.      Cervical back: Normal range of motion and neck supple. No edema. Skin:     General: Skin is warm and dry.   Neurological:      Mental Status: Alert and oriented to person, place, and time.         ECG 12 Lead    Date/Time: 9/20/2023 9:54 AM  Performed by: Zohra Angulo APRN  Authorized by: Zohra Angulo " ESME Shah   Comparison: compared with previous ECG from 9/13/2023  Similar to previous ECG  Rhythm: atrial fibrillation  Rate: normal  QRS axis: normal  Other findings: non-specific ST-T wave changes and poor R wave progression    Clinical impression: abnormal EKG          Assessment:       Diagnosis Plan   1. Permanent atrial fibrillation     Rate controlled. On eliquis for AC. NO issues with bleeding   2. Mixed hyperlipidemia     Continue on statin therapy   3. Essential hypertension     BP up a little bit. Will add lisinopril 2.5mg daily as his EF was down slightly on echo last year   4. Chronic combined systolic/diastolic heart failure     Will check labs today. Looks euvolemic today. Will check labs to follow up on potassium and Cr. Will likely go back down on lasix for maintenance dose.      Orders Placed This Encounter   Procedures    ECG 12 Lead     This order was created via procedure documentation     Order Specific Question:   Release to patient     Answer:   Routine Release [[id number]]          Plan:       Follow up in 2 weeks.          Your medication list            Accurate as of September 20, 2023  9:55 AM. If you have any questions, ask your nurse or doctor.                CONTINUE taking these medications        Instructions Last Dose Given Next Dose Due   acetaminophen 325 MG tablet  Commonly known as: TYLENOL      Take 2 tablets by mouth Every 6 (Six) Hours As Needed for Mild Pain .       allopurinol 100 MG tablet  Commonly known as: ZYLOPRIM      Take 1 tablet by mouth Daily.       apixaban 5 MG tablet tablet  Commonly known as: ELIQUIS      Take 1 tablet by mouth 2 (Two) Times a Day.       CALCIUM CARBONATE PO      Take 650 mg by mouth Daily.       Carbidopa-Levodopa ER 36. MG capsule controlled-release      Take 4 capsules by mouth 4 (Four) Times a Day.       citalopram 20 MG tablet  Commonly known as: CeleXA      Take 1 tablet by mouth Daily.       docusate sodium 50 MG  capsule  Commonly known as: COLACE      Take 2 capsules by mouth Every Night.       donepezil 5 MG tablet  Commonly known as: ARICEPT      Take 2 tablets by mouth Every Night.       ferrous sulfate 325 (65 FE) MG tablet           furosemide 40 MG tablet  Commonly known as: LASIX      Take 2 tablets by mouth 2 (Two) Times a Day.       glipizide 5 MG tablet  Commonly known as: GLUCOTROL      Take 1 tablet by mouth Every Morning.       levothyroxine 50 MCG tablet  Commonly known as: SYNTHROID, LEVOTHROID      Take 1 tablet by mouth Every Morning.       metFORMIN 500 MG tablet  Commonly known as: GLUCOPHAGE      Take 1 tablet by mouth 2 (Two) Times a Day With Meals.       metoprolol tartrate 25 MG tablet  Commonly known as: LOPRESSOR      TAKE 1/2 TABLET BY MOUTH TWICE A DAY       multivitamin with minerals tablet tablet      Take 1 tablet by mouth Daily.       potassium chloride ER 20 MEQ tablet controlled-release ER tablet  Commonly known as: K-TAB      Take 1 tablet by mouth 2 (Two) Times a Day.       rosuvastatin 20 MG tablet  Commonly known as: CRESTOR      Take 1 tablet by mouth Every Night.       tiZANidine 2 MG tablet  Commonly known as: ZANAFLEX      Take 1 tablet by mouth Every 8 (Eight) Hours As Needed for Muscle Spasms.       traZODone 50 MG tablet  Commonly known as: DESYREL      Take 0.5 tablets by mouth Every Night.       vitamin B-12 1000 MCG tablet  Commonly known as: CYANOCOBALAMIN      Take 0.5 tablets by mouth 2 (Two) Times a Day.                  As always, it has been a pleasure to participate in your patient's care.      Sincerely,     Zohra VICTORIA

## 2023-09-20 NOTE — TELEPHONE ENCOUNTER
Speek to wife about labs today. Cr up and potassium is low. Hold lasix for 2 days and take potassium 60 meq today. Repeat labs on Friday

## 2023-09-22 ENCOUNTER — TELEPHONE (OUTPATIENT)
Dept: CARDIOLOGY | Facility: CLINIC | Age: 73
End: 2023-09-22
Payer: MEDICARE

## 2023-09-22 ENCOUNTER — LAB (OUTPATIENT)
Dept: LAB | Facility: HOSPITAL | Age: 73
End: 2023-09-22
Payer: MEDICARE

## 2023-09-22 DIAGNOSIS — I50.32 CHRONIC DIASTOLIC HEART FAILURE: ICD-10-CM

## 2023-09-22 LAB
ANION GAP SERPL CALCULATED.3IONS-SCNC: 12.6 MMOL/L (ref 5–15)
BUN SERPL-MCNC: 31 MG/DL (ref 8–23)
BUN/CREAT SERPL: 20.8 (ref 7–25)
CALCIUM SPEC-SCNC: 9.3 MG/DL (ref 8.6–10.5)
CHLORIDE SERPL-SCNC: 97 MMOL/L (ref 98–107)
CO2 SERPL-SCNC: 26.4 MMOL/L (ref 22–29)
CREAT SERPL-MCNC: 1.49 MG/DL (ref 0.76–1.27)
EGFRCR SERPLBLD CKD-EPI 2021: 48.6 ML/MIN/1.73
GLUCOSE SERPL-MCNC: 53 MG/DL (ref 65–99)
POTASSIUM SERPL-SCNC: 3.8 MMOL/L (ref 3.5–5.2)
SODIUM SERPL-SCNC: 136 MMOL/L (ref 136–145)

## 2023-09-22 PROCEDURE — 80048 BASIC METABOLIC PNL TOTAL CA: CPT

## 2023-09-22 PROCEDURE — 36415 COLL VENOUS BLD VENIPUNCTURE: CPT

## 2023-09-22 RX ORDER — FUROSEMIDE 40 MG/1
40 TABLET ORAL DAILY
Qty: 30 TABLET | Refills: 11 | OUTPATIENT
Start: 2023-09-22

## 2023-09-22 RX ORDER — POTASSIUM CHLORIDE 1500 MG/1
20 TABLET, EXTENDED RELEASE ORAL DAILY
Qty: 30 TABLET | Refills: 11 | COMMUNITY
Start: 2023-09-22

## 2023-09-22 NOTE — TELEPHONE ENCOUNTER
Spoke to patient's wife about lab work.  Potassium is better renal function improving.  Discussed with her to do Lasix 40 mg once a day and potassium 20 mEq once a day starting tomorrow I will see him in 2 weeks.

## 2023-09-26 ENCOUNTER — HOSPITAL ENCOUNTER (OUTPATIENT)
Facility: HOSPITAL | Age: 73
Setting detail: OBSERVATION
Discharge: HOME | End: 2023-09-29
Attending: EMERGENCY MEDICINE | Admitting: HOSPITALIST
Payer: MEDICARE

## 2023-09-26 DIAGNOSIS — R07.9 CHEST PAIN, UNSPECIFIED TYPE: ICD-10-CM

## 2023-09-26 DIAGNOSIS — E16.2 HYPOGLYCEMIA: Primary | ICD-10-CM

## 2023-09-26 LAB
ALBUMIN SERPL-MCNC: 3.9 G/DL (ref 3.5–5.2)
ALBUMIN/GLOB SERPL: 1.2 G/DL
ALP SERPL-CCNC: 219 U/L (ref 39–117)
ALT SERPL W P-5'-P-CCNC: <5 U/L (ref 1–41)
ANION GAP SERPL CALCULATED.3IONS-SCNC: 13 MMOL/L (ref 5–15)
AST SERPL-CCNC: 33 U/L (ref 1–40)
BASOPHILS # BLD AUTO: 0.09 10*3/MM3 (ref 0–0.2)
BASOPHILS NFR BLD AUTO: 1.6 % (ref 0–1.5)
BILIRUB SERPL-MCNC: 0.9 MG/DL (ref 0–1.2)
BUN SERPL-MCNC: 27 MG/DL (ref 8–23)
BUN/CREAT SERPL: 16.6 (ref 7–25)
CALCIUM SPEC-SCNC: 9 MG/DL (ref 8.6–10.5)
CHLORIDE SERPL-SCNC: 94 MMOL/L (ref 98–107)
CO2 SERPL-SCNC: 24 MMOL/L (ref 22–29)
CREAT SERPL-MCNC: 1.63 MG/DL (ref 0.76–1.27)
DEPRECATED RDW RBC AUTO: 45.1 FL (ref 37–54)
EGFRCR SERPLBLD CKD-EPI 2021: 43.7 ML/MIN/1.73
EOSINOPHIL # BLD AUTO: 0.24 10*3/MM3 (ref 0–0.4)
EOSINOPHIL NFR BLD AUTO: 4.3 % (ref 0.3–6.2)
ERYTHROCYTE [DISTWIDTH] IN BLOOD BY AUTOMATED COUNT: 16.3 % (ref 12.3–15.4)
GLOBULIN UR ELPH-MCNC: 3.2 GM/DL
GLUCOSE BLDC GLUCOMTR-MCNC: 72 MG/DL (ref 70–130)
GLUCOSE BLDC GLUCOMTR-MCNC: 98 MG/DL (ref 70–130)
GLUCOSE SERPL-MCNC: 134 MG/DL (ref 65–99)
HCT VFR BLD AUTO: 28.4 % (ref 37.5–51)
HGB BLD-MCNC: 8.8 G/DL (ref 13–17.7)
IMM GRANULOCYTES # BLD AUTO: 0.02 10*3/MM3 (ref 0–0.05)
IMM GRANULOCYTES NFR BLD AUTO: 0.4 % (ref 0–0.5)
LYMPHOCYTES # BLD AUTO: 0.86 10*3/MM3 (ref 0.7–3.1)
LYMPHOCYTES NFR BLD AUTO: 15.6 % (ref 19.6–45.3)
MAGNESIUM SERPL-MCNC: 1.4 MG/DL (ref 1.6–2.4)
MCH RBC QN AUTO: 23.8 PG (ref 26.6–33)
MCHC RBC AUTO-ENTMCNC: 31 G/DL (ref 31.5–35.7)
MCV RBC AUTO: 76.8 FL (ref 79–97)
MONOCYTES # BLD AUTO: 0.48 10*3/MM3 (ref 0.1–0.9)
MONOCYTES NFR BLD AUTO: 8.7 % (ref 5–12)
NEUTROPHILS NFR BLD AUTO: 3.83 10*3/MM3 (ref 1.7–7)
NEUTROPHILS NFR BLD AUTO: 69.4 % (ref 42.7–76)
NRBC BLD AUTO-RTO: 0 /100 WBC (ref 0–0.2)
PLATELET # BLD AUTO: 212 10*3/MM3 (ref 140–450)
PMV BLD AUTO: 9.8 FL (ref 6–12)
POTASSIUM SERPL-SCNC: 4 MMOL/L (ref 3.5–5.2)
PROT SERPL-MCNC: 7.1 G/DL (ref 6–8.5)
RBC # BLD AUTO: 3.7 10*6/MM3 (ref 4.14–5.8)
SODIUM SERPL-SCNC: 131 MMOL/L (ref 136–145)
WBC NRBC COR # BLD: 5.52 10*3/MM3 (ref 3.4–10.8)

## 2023-09-26 PROCEDURE — 85025 COMPLETE CBC W/AUTO DIFF WBC: CPT | Performed by: PHYSICIAN ASSISTANT

## 2023-09-26 PROCEDURE — 96366 THER/PROPH/DIAG IV INF ADDON: CPT

## 2023-09-26 PROCEDURE — 96365 THER/PROPH/DIAG IV INF INIT: CPT

## 2023-09-26 PROCEDURE — 99284 EMERGENCY DEPT VISIT MOD MDM: CPT

## 2023-09-26 PROCEDURE — 82948 REAGENT STRIP/BLOOD GLUCOSE: CPT

## 2023-09-26 PROCEDURE — 80053 COMPREHEN METABOLIC PANEL: CPT | Performed by: PHYSICIAN ASSISTANT

## 2023-09-26 PROCEDURE — 83735 ASSAY OF MAGNESIUM: CPT | Performed by: PHYSICIAN ASSISTANT

## 2023-09-26 RX ORDER — DEXTROSE AND SODIUM CHLORIDE 5; .45 G/100ML; G/100ML
75 INJECTION, SOLUTION INTRAVENOUS CONTINUOUS
Status: DISCONTINUED | OUTPATIENT
Start: 2023-09-26 | End: 2023-09-27

## 2023-09-26 RX ORDER — SODIUM CHLORIDE 0.9 % (FLUSH) 0.9 %
10 SYRINGE (ML) INJECTION AS NEEDED
Status: DISCONTINUED | OUTPATIENT
Start: 2023-09-26 | End: 2023-09-29 | Stop reason: SDUPTHER

## 2023-09-26 RX ADMIN — DEXTROSE AND SODIUM CHLORIDE 100 ML/HR: 5; 450 INJECTION, SOLUTION INTRAVENOUS at 22:55

## 2023-09-27 ENCOUNTER — APPOINTMENT (OUTPATIENT)
Dept: CARDIOLOGY | Facility: HOSPITAL | Age: 73
End: 2023-09-27
Payer: MEDICARE

## 2023-09-27 ENCOUNTER — APPOINTMENT (OUTPATIENT)
Dept: GENERAL RADIOLOGY | Facility: HOSPITAL | Age: 73
End: 2023-09-27
Payer: MEDICARE

## 2023-09-27 PROBLEM — N18.31 STAGE 3A CHRONIC KIDNEY DISEASE: Status: ACTIVE | Noted: 2023-01-01

## 2023-09-27 PROBLEM — E16.2 HYPOGLYCEMIA: Status: ACTIVE | Noted: 2023-09-27

## 2023-09-27 PROBLEM — N17.9 AKI (ACUTE KIDNEY INJURY): Status: ACTIVE | Noted: 2023-09-27

## 2023-09-27 LAB
ANION GAP SERPL CALCULATED.3IONS-SCNC: 13 MMOL/L (ref 5–15)
BASOPHILS # BLD AUTO: 0.04 10*3/MM3 (ref 0–0.2)
BASOPHILS NFR BLD AUTO: 0.9 % (ref 0–1.5)
BH CV ECHO MEAS - ACS: 2.28 CM
BH CV ECHO MEAS - AO MAX PG: 4.5 MMHG
BH CV ECHO MEAS - AO MEAN PG: 2.47 MMHG
BH CV ECHO MEAS - AO ROOT DIAM: 3.5 CM
BH CV ECHO MEAS - AO V2 MAX: 106.4 CM/SEC
BH CV ECHO MEAS - AO V2 VTI: 22.9 CM
BH CV ECHO MEAS - AVA(I,D): 2.6 CM2
BH CV ECHO MEAS - EDV(CUBED): 56.9 ML
BH CV ECHO MEAS - EDV(MOD-SP2): 68 ML
BH CV ECHO MEAS - EDV(MOD-SP4): 83 ML
BH CV ECHO MEAS - EF(MOD-BP): 56.4 %
BH CV ECHO MEAS - EF(MOD-SP2): 66.2 %
BH CV ECHO MEAS - EF(MOD-SP4): 50.6 %
BH CV ECHO MEAS - ESV(CUBED): 26.6 ML
BH CV ECHO MEAS - ESV(MOD-SP2): 23 ML
BH CV ECHO MEAS - ESV(MOD-SP4): 41 ML
BH CV ECHO MEAS - FS: 22.4 %
BH CV ECHO MEAS - IVS/LVPW: 1.13 CM
BH CV ECHO MEAS - IVSD: 1.16 CM
BH CV ECHO MEAS - LAT PEAK E' VEL: 8.4 CM/SEC
BH CV ECHO MEAS - LV MASS(C)D: 136.5 GRAMS
BH CV ECHO MEAS - LV MAX PG: 2.8 MMHG
BH CV ECHO MEAS - LV MEAN PG: 1.41 MMHG
BH CV ECHO MEAS - LV V1 MAX: 83.7 CM/SEC
BH CV ECHO MEAS - LV V1 VTI: 17.8 CM
BH CV ECHO MEAS - LVIDD: 3.8 CM
BH CV ECHO MEAS - LVIDS: 3 CM
BH CV ECHO MEAS - LVOT AREA: 3.3 CM2
BH CV ECHO MEAS - LVOT DIAM: 2.05 CM
BH CV ECHO MEAS - LVPWD: 1.03 CM
BH CV ECHO MEAS - MED PEAK E' VEL: 5.7 CM/SEC
BH CV ECHO MEAS - MV DEC SLOPE: 490.1 CM/SEC2
BH CV ECHO MEAS - MV DEC TIME: 0.15 SEC
BH CV ECHO MEAS - MV E MAX VEL: 142 CM/SEC
BH CV ECHO MEAS - MV MAX PG: 7.3 MMHG
BH CV ECHO MEAS - MV MEAN PG: 2.6 MMHG
BH CV ECHO MEAS - MV P1/2T: 76.6 MSEC
BH CV ECHO MEAS - MV V2 VTI: 33.9 CM
BH CV ECHO MEAS - MVA(P1/2T): 2.9 CM2
BH CV ECHO MEAS - MVA(VTI): 1.74 CM2
BH CV ECHO MEAS - PA ACC TIME: 0.17 SEC
BH CV ECHO MEAS - RAP SYSTOLE: 15 MMHG
BH CV ECHO MEAS - RV MAX PG: 0.94 MMHG
BH CV ECHO MEAS - RV V1 MAX: 48.4 CM/SEC
BH CV ECHO MEAS - RV V1 VTI: 11.9 CM
BH CV ECHO MEAS - RVSP: 38.5 MMHG
BH CV ECHO MEAS - SV(LVOT): 58.9 ML
BH CV ECHO MEAS - SV(MOD-SP2): 45 ML
BH CV ECHO MEAS - SV(MOD-SP4): 42 ML
BH CV ECHO MEAS - TAPSE (>1.6): 1.26 CM
BH CV ECHO MEAS - TR MAX PG: 23.5 MMHG
BH CV ECHO MEAS - TR MAX VEL: 242.2 CM/SEC
BH CV ECHO MEASUREMENTS AVERAGE E/E' RATIO: 20.14
BH CV XLRA - RV BASE: 3.7 CM
BH CV XLRA - RV LENGTH: 6.5 CM
BH CV XLRA - RV MID: 3.5 CM
BH CV XLRA - TDI S': 7.6 CM/SEC
BILIRUB UR QL STRIP: NEGATIVE
BUN SERPL-MCNC: 25 MG/DL (ref 8–23)
BUN/CREAT SERPL: 17 (ref 7–25)
CALCIUM SPEC-SCNC: 8.8 MG/DL (ref 8.6–10.5)
CHLORIDE SERPL-SCNC: 95 MMOL/L (ref 98–107)
CLARITY UR: CLEAR
CO2 SERPL-SCNC: 23 MMOL/L (ref 22–29)
COLOR UR: YELLOW
CREAT SERPL-MCNC: 1.47 MG/DL (ref 0.76–1.27)
CREAT UR-MCNC: 83.5 MG/DL
DEPRECATED RDW RBC AUTO: 45.9 FL (ref 37–54)
EGFRCR SERPLBLD CKD-EPI 2021: 49.4 ML/MIN/1.73
EOSINOPHIL # BLD AUTO: 0.26 10*3/MM3 (ref 0–0.4)
EOSINOPHIL NFR BLD AUTO: 5.9 % (ref 0.3–6.2)
ERYTHROCYTE [DISTWIDTH] IN BLOOD BY AUTOMATED COUNT: 16.5 % (ref 12.3–15.4)
GEN 5 2HR TROPONIN T REFLEX: 99 NG/L
GLUCOSE BLDC GLUCOMTR-MCNC: 107 MG/DL (ref 70–130)
GLUCOSE BLDC GLUCOMTR-MCNC: 118 MG/DL (ref 70–130)
GLUCOSE BLDC GLUCOMTR-MCNC: 146 MG/DL (ref 70–130)
GLUCOSE BLDC GLUCOMTR-MCNC: 65 MG/DL (ref 70–130)
GLUCOSE BLDC GLUCOMTR-MCNC: 80 MG/DL (ref 70–130)
GLUCOSE BLDC GLUCOMTR-MCNC: 85 MG/DL (ref 70–130)
GLUCOSE BLDC GLUCOMTR-MCNC: 86 MG/DL (ref 70–130)
GLUCOSE BLDC GLUCOMTR-MCNC: 88 MG/DL (ref 70–130)
GLUCOSE BLDC GLUCOMTR-MCNC: 95 MG/DL (ref 70–130)
GLUCOSE BLDC GLUCOMTR-MCNC: 99 MG/DL (ref 70–130)
GLUCOSE SERPL-MCNC: 52 MG/DL (ref 65–99)
GLUCOSE UR STRIP-MCNC: NEGATIVE MG/DL
HCT VFR BLD AUTO: 27.7 % (ref 37.5–51)
HGB BLD-MCNC: 8.7 G/DL (ref 13–17.7)
HGB UR QL STRIP.AUTO: NEGATIVE
IMM GRANULOCYTES # BLD AUTO: 0.02 10*3/MM3 (ref 0–0.05)
IMM GRANULOCYTES NFR BLD AUTO: 0.5 % (ref 0–0.5)
INR PPP: 2.11 (ref 0.9–1.1)
KETONES UR QL STRIP: NEGATIVE
LEUKOCYTE ESTERASE UR QL STRIP.AUTO: NEGATIVE
LYMPHOCYTES # BLD AUTO: 0.82 10*3/MM3 (ref 0.7–3.1)
LYMPHOCYTES NFR BLD AUTO: 18.6 % (ref 19.6–45.3)
MAGNESIUM SERPL-MCNC: 2.5 MG/DL (ref 1.6–2.4)
MCH RBC QN AUTO: 24 PG (ref 26.6–33)
MCHC RBC AUTO-ENTMCNC: 31.4 G/DL (ref 31.5–35.7)
MCV RBC AUTO: 76.5 FL (ref 79–97)
MONOCYTES # BLD AUTO: 0.47 10*3/MM3 (ref 0.1–0.9)
MONOCYTES NFR BLD AUTO: 10.6 % (ref 5–12)
NEUTROPHILS NFR BLD AUTO: 2.81 10*3/MM3 (ref 1.7–7)
NEUTROPHILS NFR BLD AUTO: 63.5 % (ref 42.7–76)
NITRITE UR QL STRIP: NEGATIVE
NRBC BLD AUTO-RTO: 0 /100 WBC (ref 0–0.2)
PH UR STRIP.AUTO: 5.5 [PH] (ref 5–8)
PLATELET # BLD AUTO: 202 10*3/MM3 (ref 140–450)
PMV BLD AUTO: 9.8 FL (ref 6–12)
POTASSIUM SERPL-SCNC: 3.8 MMOL/L (ref 3.5–5.2)
PROT ?TM UR-MCNC: 23.1 MG/DL
PROT UR QL STRIP: ABNORMAL
PROT/CREAT UR: 276.6 MG/G CREA (ref 0–200)
PROTHROMBIN TIME: 24.1 SECONDS (ref 11.7–14.2)
QT INTERVAL: 642 MS
QTC INTERVAL: 653 MS
RBC # BLD AUTO: 3.62 10*6/MM3 (ref 4.14–5.8)
SODIUM SERPL-SCNC: 131 MMOL/L (ref 136–145)
SP GR UR STRIP: 1.01 (ref 1–1.03)
TROPONIN T DELTA: -1 NG/L
TROPONIN T SERPL HS-MCNC: 100 NG/L
TROPONIN T SERPL HS-MCNC: 100 NG/L
UROBILINOGEN UR QL STRIP: ABNORMAL
WBC NRBC COR # BLD: 4.42 10*3/MM3 (ref 3.4–10.8)

## 2023-09-27 PROCEDURE — 93306 TTE W/DOPPLER COMPLETE: CPT

## 2023-09-27 PROCEDURE — 85610 PROTHROMBIN TIME: CPT | Performed by: NURSE PRACTITIONER

## 2023-09-27 PROCEDURE — 93010 ELECTROCARDIOGRAM REPORT: CPT | Performed by: INTERNAL MEDICINE

## 2023-09-27 PROCEDURE — G0378 HOSPITAL OBSERVATION PER HR: HCPCS

## 2023-09-27 PROCEDURE — 84484 ASSAY OF TROPONIN QUANT: CPT | Performed by: INTERNAL MEDICINE

## 2023-09-27 PROCEDURE — 93005 ELECTROCARDIOGRAM TRACING: CPT | Performed by: EMERGENCY MEDICINE

## 2023-09-27 PROCEDURE — 80048 BASIC METABOLIC PNL TOTAL CA: CPT | Performed by: NURSE PRACTITIONER

## 2023-09-27 PROCEDURE — 96366 THER/PROPH/DIAG IV INF ADDON: CPT

## 2023-09-27 PROCEDURE — 82948 REAGENT STRIP/BLOOD GLUCOSE: CPT

## 2023-09-27 PROCEDURE — 71045 X-RAY EXAM CHEST 1 VIEW: CPT

## 2023-09-27 PROCEDURE — 96368 THER/DIAG CONCURRENT INF: CPT

## 2023-09-27 PROCEDURE — 82570 ASSAY OF URINE CREATININE: CPT | Performed by: INTERNAL MEDICINE

## 2023-09-27 PROCEDURE — 97162 PT EVAL MOD COMPLEX 30 MIN: CPT

## 2023-09-27 PROCEDURE — 81003 URINALYSIS AUTO W/O SCOPE: CPT | Performed by: INTERNAL MEDICINE

## 2023-09-27 PROCEDURE — 84484 ASSAY OF TROPONIN QUANT: CPT | Performed by: EMERGENCY MEDICINE

## 2023-09-27 PROCEDURE — 99214 OFFICE O/P EST MOD 30 MIN: CPT | Performed by: INTERNAL MEDICINE

## 2023-09-27 PROCEDURE — 25010000002 MAGNESIUM SULFATE 2 GM/50ML SOLUTION: Performed by: INTERNAL MEDICINE

## 2023-09-27 PROCEDURE — 93306 TTE W/DOPPLER COMPLETE: CPT | Performed by: INTERNAL MEDICINE

## 2023-09-27 PROCEDURE — 97530 THERAPEUTIC ACTIVITIES: CPT

## 2023-09-27 PROCEDURE — 83735 ASSAY OF MAGNESIUM: CPT | Performed by: NURSE PRACTITIONER

## 2023-09-27 PROCEDURE — 84156 ASSAY OF PROTEIN URINE: CPT | Performed by: INTERNAL MEDICINE

## 2023-09-27 PROCEDURE — 85025 COMPLETE CBC W/AUTO DIFF WBC: CPT | Performed by: NURSE PRACTITIONER

## 2023-09-27 RX ORDER — BISACODYL 10 MG
10 SUPPOSITORY, RECTAL RECTAL DAILY PRN
Status: DISCONTINUED | OUTPATIENT
Start: 2023-09-27 | End: 2023-09-29 | Stop reason: HOSPADM

## 2023-09-27 RX ORDER — SODIUM CHLORIDE 0.9 % (FLUSH) 0.9 %
10 SYRINGE (ML) INJECTION AS NEEDED
Status: DISCONTINUED | OUTPATIENT
Start: 2023-09-27 | End: 2023-09-29 | Stop reason: HOSPADM

## 2023-09-27 RX ORDER — DEXTROSE MONOHYDRATE 25 G/50ML
25 INJECTION, SOLUTION INTRAVENOUS
Status: DISCONTINUED | OUTPATIENT
Start: 2023-09-27 | End: 2023-09-29 | Stop reason: HOSPADM

## 2023-09-27 RX ORDER — ACETAMINOPHEN 650 MG/1
650 SUPPOSITORY RECTAL EVERY 4 HOURS PRN
Status: DISCONTINUED | OUTPATIENT
Start: 2023-09-27 | End: 2023-09-29 | Stop reason: HOSPADM

## 2023-09-27 RX ORDER — ONDANSETRON 2 MG/ML
4 INJECTION INTRAMUSCULAR; INTRAVENOUS EVERY 6 HOURS PRN
Status: DISCONTINUED | OUTPATIENT
Start: 2023-09-27 | End: 2023-09-29 | Stop reason: HOSPADM

## 2023-09-27 RX ORDER — NICOTINE POLACRILEX 4 MG
15 LOZENGE BUCCAL
Status: DISCONTINUED | OUTPATIENT
Start: 2023-09-27 | End: 2023-09-29 | Stop reason: HOSPADM

## 2023-09-27 RX ORDER — CARBIDOPA AND LEVODOPA 25; 100 MG/1; MG/1
1 TABLET, EXTENDED RELEASE ORAL 4 TIMES DAILY
Status: DISCONTINUED | OUTPATIENT
Start: 2023-09-27 | End: 2023-09-29 | Stop reason: HOSPADM

## 2023-09-27 RX ORDER — SODIUM CHLORIDE 9 MG/ML
40 INJECTION, SOLUTION INTRAVENOUS AS NEEDED
Status: DISCONTINUED | OUTPATIENT
Start: 2023-09-27 | End: 2023-09-29 | Stop reason: HOSPADM

## 2023-09-27 RX ORDER — ROSUVASTATIN CALCIUM 20 MG/1
20 TABLET, COATED ORAL NIGHTLY
Status: DISCONTINUED | OUTPATIENT
Start: 2023-09-27 | End: 2023-09-29 | Stop reason: HOSPADM

## 2023-09-27 RX ORDER — TRAZODONE HYDROCHLORIDE 50 MG/1
25 TABLET ORAL NIGHTLY
Status: DISCONTINUED | OUTPATIENT
Start: 2023-09-27 | End: 2023-09-29 | Stop reason: HOSPADM

## 2023-09-27 RX ORDER — NITROGLYCERIN 0.4 MG/1
0.4 TABLET SUBLINGUAL
Status: DISCONTINUED | OUTPATIENT
Start: 2023-09-27 | End: 2023-09-29 | Stop reason: HOSPADM

## 2023-09-27 RX ORDER — INSULIN LISPRO 100 [IU]/ML
2-7 INJECTION, SOLUTION INTRAVENOUS; SUBCUTANEOUS
Status: DISCONTINUED | OUTPATIENT
Start: 2023-09-27 | End: 2023-09-29

## 2023-09-27 RX ORDER — ACETAMINOPHEN 325 MG/1
650 TABLET ORAL EVERY 4 HOURS PRN
Status: DISCONTINUED | OUTPATIENT
Start: 2023-09-27 | End: 2023-09-29 | Stop reason: HOSPADM

## 2023-09-27 RX ORDER — BISACODYL 5 MG/1
5 TABLET, DELAYED RELEASE ORAL DAILY PRN
Status: DISCONTINUED | OUTPATIENT
Start: 2023-09-27 | End: 2023-09-29 | Stop reason: HOSPADM

## 2023-09-27 RX ORDER — SODIUM CHLORIDE 0.9 % (FLUSH) 0.9 %
10 SYRINGE (ML) INJECTION EVERY 12 HOURS SCHEDULED
Status: DISCONTINUED | OUTPATIENT
Start: 2023-09-27 | End: 2023-09-29 | Stop reason: HOSPADM

## 2023-09-27 RX ORDER — POLYETHYLENE GLYCOL 3350 17 G/17G
17 POWDER, FOR SOLUTION ORAL DAILY PRN
Status: DISCONTINUED | OUTPATIENT
Start: 2023-09-27 | End: 2023-09-29 | Stop reason: HOSPADM

## 2023-09-27 RX ORDER — ACETAMINOPHEN 160 MG/5ML
650 SOLUTION ORAL EVERY 4 HOURS PRN
Status: DISCONTINUED | OUTPATIENT
Start: 2023-09-27 | End: 2023-09-29 | Stop reason: HOSPADM

## 2023-09-27 RX ORDER — DOCUSATE SODIUM 100 MG/1
100 CAPSULE, LIQUID FILLED ORAL NIGHTLY
Status: DISCONTINUED | OUTPATIENT
Start: 2023-09-27 | End: 2023-09-29 | Stop reason: HOSPADM

## 2023-09-27 RX ORDER — DONEPEZIL HYDROCHLORIDE 10 MG/1
10 TABLET, FILM COATED ORAL NIGHTLY
Status: DISCONTINUED | OUTPATIENT
Start: 2023-09-27 | End: 2023-09-29 | Stop reason: HOSPADM

## 2023-09-27 RX ORDER — MAGNESIUM SULFATE HEPTAHYDRATE 40 MG/ML
2 INJECTION, SOLUTION INTRAVENOUS ONCE
Status: COMPLETED | OUTPATIENT
Start: 2023-09-27 | End: 2023-09-27

## 2023-09-27 RX ORDER — CITALOPRAM 20 MG/1
20 TABLET ORAL DAILY
Status: DISCONTINUED | OUTPATIENT
Start: 2023-09-27 | End: 2023-09-29 | Stop reason: HOSPADM

## 2023-09-27 RX ORDER — TIZANIDINE 4 MG/1
2 TABLET ORAL EVERY 8 HOURS PRN
Status: DISCONTINUED | OUTPATIENT
Start: 2023-09-27 | End: 2023-09-29 | Stop reason: HOSPADM

## 2023-09-27 RX ORDER — DEXTROSE AND SODIUM CHLORIDE 5; .9 G/100ML; G/100ML
75 INJECTION, SOLUTION INTRAVENOUS CONTINUOUS
Status: DISCONTINUED | OUTPATIENT
Start: 2023-09-27 | End: 2023-09-28

## 2023-09-27 RX ORDER — ALLOPURINOL 100 MG/1
100 TABLET ORAL DAILY
Status: DISCONTINUED | OUTPATIENT
Start: 2023-09-27 | End: 2023-09-29 | Stop reason: HOSPADM

## 2023-09-27 RX ORDER — IBUPROFEN 600 MG/1
1 TABLET ORAL
Status: DISCONTINUED | OUTPATIENT
Start: 2023-09-27 | End: 2023-09-29 | Stop reason: HOSPADM

## 2023-09-27 RX ORDER — LEVOTHYROXINE SODIUM 0.05 MG/1
50 TABLET ORAL
Status: DISCONTINUED | OUTPATIENT
Start: 2023-09-28 | End: 2023-09-29 | Stop reason: HOSPADM

## 2023-09-27 RX ORDER — AMOXICILLIN 250 MG
2 CAPSULE ORAL 2 TIMES DAILY
Status: DISCONTINUED | OUTPATIENT
Start: 2023-09-27 | End: 2023-09-29 | Stop reason: HOSPADM

## 2023-09-27 RX ADMIN — METOPROLOL TARTRATE 12.5 MG: 25 TABLET, FILM COATED ORAL at 21:06

## 2023-09-27 RX ADMIN — Medication 10 ML: at 10:44

## 2023-09-27 RX ADMIN — CITALOPRAM 20 MG: 20 TABLET, FILM COATED ORAL at 16:25

## 2023-09-27 RX ADMIN — TRAZODONE HYDROCHLORIDE 25 MG: 50 TABLET ORAL at 21:05

## 2023-09-27 RX ADMIN — CARBIDOPA AND LEVODOPA 1 TABLET: 25; 100 TABLET, EXTENDED RELEASE ORAL at 16:24

## 2023-09-27 RX ADMIN — DONEPEZIL HYDROCHLORIDE 10 MG: 10 TABLET, FILM COATED ORAL at 21:05

## 2023-09-27 RX ADMIN — ACETAMINOPHEN 650 MG: 325 TABLET, FILM COATED ORAL at 05:55

## 2023-09-27 RX ADMIN — DOCUSATE SODIUM 100 MG: 100 CAPSULE, LIQUID FILLED ORAL at 21:05

## 2023-09-27 RX ADMIN — DEXTROSE AND SODIUM CHLORIDE 75 ML/HR: 5; 900 INJECTION, SOLUTION INTRAVENOUS at 16:25

## 2023-09-27 RX ADMIN — CARBIDOPA AND LEVODOPA 1 TABLET: 25; 100 TABLET, EXTENDED RELEASE ORAL at 18:47

## 2023-09-27 RX ADMIN — APIXABAN 5 MG: 5 TABLET, FILM COATED ORAL at 16:24

## 2023-09-27 RX ADMIN — SENNOSIDES AND DOCUSATE SODIUM 2 TABLET: 50; 8.6 TABLET ORAL at 21:05

## 2023-09-27 RX ADMIN — DEXTROSE AND SODIUM CHLORIDE 100 ML/HR: 5; 450 INJECTION, SOLUTION INTRAVENOUS at 10:13

## 2023-09-27 RX ADMIN — Medication 10 ML: at 23:04

## 2023-09-27 RX ADMIN — APIXABAN 5 MG: 5 TABLET, FILM COATED ORAL at 23:04

## 2023-09-27 RX ADMIN — CARBIDOPA AND LEVODOPA 1 TABLET: 25; 100 TABLET, EXTENDED RELEASE ORAL at 21:06

## 2023-09-27 RX ADMIN — ALLOPURINOL 100 MG: 100 TABLET ORAL at 16:25

## 2023-09-27 RX ADMIN — ROSUVASTATIN CALCIUM 20 MG: 20 TABLET, FILM COATED ORAL at 21:05

## 2023-09-27 RX ADMIN — MAGNESIUM SULFATE HEPTAHYDRATE 2 G: 2 INJECTION, SOLUTION INTRAVENOUS at 16:26

## 2023-09-27 NOTE — PLAN OF CARE
Goal Outcome Evaluation:  Plan of Care Reviewed With: (P) patient, spouse           Outcome Evaluation: (P) Pt is a 73 y.o. male admitted for hypoglycemia, found down. Hx includes: Pakinson's, CHF, afib, HTN and DM2. Pt lives with spouse, using a U-step walker. Wife reports pt has fallen several times within the past month. Baseline requires assistance with bed mobility and STS from spouse. Pt demo. balance, endurance/activity tolerance and strength deficits limiting functional mobility. Pt completed supine>sit with SBA, sit>supine with Ranjit x1 and STS with Ranjit/ModA x2 and RW. STS achieved on second attempt. Unable to perform gait assessment d/t IV connected to bed; unable to reach RN to disconnect. Balance assessed via marching in place completed for several minutes with CGA and RW. Cueing to stay on task needed for therex. Pt c/o dizziness initally, but BP WNL. Anticipate home with 24/7 care upon d/c.      Anticipated Discharge Disposition (PT): (P) home with assist, home, home with 24/7 care

## 2023-09-27 NOTE — H&P
Name: Edilberto Reeder ADMIT: 2023   : 1950  PCP: Harvey Larson MD    MRN: [id number] LOS: 0 days   AGE/SEX: 73 y.o. male  ROOM: [facility]     Chief Complaint   Patient presents with    Hypoglycemia       Subjective   Patient is a 73 y.o. male who presents to [facility] with the above chief complaint.  He has a history of type 2 diabetes Parkinson's disease and mild chronic kidney disease.  He presents after being found lethargic and drooling last night by his wife.  She had gone out and he taken his glyburide and not eaten dinner.  EMS found him with a blood sugar that was significantly low.  He was given D50 and brought to the urgency room and placed on D5 and blood sugars have remained stable.  He is awake and alert now.  Review of the chart shows that he was in the hospital about a month ago with syncope.  At that time he had mild acute renal failure that resolved with hydration and creatinine at the time of discharge was around 0.7.  His baseline creatinine appears to be around 1.  He was restarted on his diuretics and medications and these were being titrated and adjusted.  His creatinine has been creeping up since restarting these medications.  Creatinine on the day of admission was 1.6.  Cardiology had recently decreased his Lasix.    History of Present Illness    Past Medical History:   Diagnosis Date    Atrial fibrillation with RVR     Atrial flutter     Diabetes     HLD (hyperlipidemia) 2016    Hypertension     Parkinson's disease     Advanced      Past Surgical History:   Procedure Laterality Date    BRAIN STIMULATOR      COLONOSCOPY N/A 2022    Procedure: COLONOSCOPY TO CECUM WITH COLD SNARE POLYPECTOMY;  Surgeon: Luther Ferrer MD;  Location: [facility] ENDOSCOPY;  Service: Gastroenterology;  Laterality: N/A;  PRE:ANEMIA  POST:POLYPS/HEMORRHOIDS    ENDOSCOPY N/A 2022    Procedure: ESOPHAGOGASTRODUODENOSCOPY WITH  COLD BIOPSIES;  Surgeon: Nabil  Luther Jones MD;  Location: [facility] ENDOSCOPY;  Service: Gastroenterology;  Laterality: N/A;  PRE:ANEMIA  POST:DIVERTICULUM/GASTRITIS    JOINT REPLACEMENT      Bilateral shoulder and knee replacements     History reviewed. No pertinent family history.  Social History     Tobacco Use    Smoking status: Never    Smokeless tobacco: Never    Tobacco comments:     caffeine use   Vaping Use    Vaping Use: Never used   Substance Use Topics    Alcohol use: Yes    Drug use: No     Medications Prior to Admission   Medication Sig Dispense Refill Last Dose    acetaminophen (TYLENOL) 325 MG tablet Take 2 tablets by mouth Every 6 (Six) Hours As Needed for Mild Pain .       allopurinol (ZYLOPRIM) 100 MG tablet Take 1 tablet by mouth Daily.       apixaban (ELIQUIS) 5 MG tablet tablet Take 1 tablet by mouth 2 (Two) Times a Day.       Calcium Carbonate Antacid (CALCIUM CARBONATE PO) Take 650 mg by mouth Daily.       Carbidopa-Levodopa ER 36. MG capsule controlled-release Take 4 capsules by mouth 4 (Four) Times a Day.       citalopram (CeleXA) 20 MG tablet Take 1 tablet by mouth Daily.       docusate sodium (COLACE) 50 MG capsule Take 2 capsules by mouth Every Night.       donepezil (ARICEPT) 5 MG tablet Take 2 tablets by mouth Every Night.       ferrous sulfate 325 (65 FE) MG tablet        furosemide (LASIX) 40 MG tablet Take 1 tablet by mouth Daily. 30 tablet 11     glipizide (GLUCOTROL) 5 MG tablet Take 1 tablet by mouth Every Morning.       levothyroxine (SYNTHROID, LEVOTHROID) 50 MCG tablet Take 1 tablet by mouth Every Morning. 30 tablet 0     lisinopril (PRINIVIL,ZESTRIL) 2.5 MG tablet Take 1 tablet by mouth Daily. 30 tablet 11     metFORMIN (GLUCOPHAGE) 500 MG tablet Take 1 tablet by mouth 2 (Two) Times a Day With Meals.       metoprolol tartrate (LOPRESSOR) 25 MG tablet TAKE 1/2 TABLET BY MOUTH TWICE A DAY (Patient taking differently: Take 0.5 tablets by mouth 2 (Two) Times a Day.) 30 tablet 11     multivitamin with  minerals tablet tablet Take 1 tablet by mouth Daily.       potassium chloride ER (K-TAB) 20 MEQ tablet controlled-release ER tablet Take 1 tablet by mouth Daily. 30 tablet 11     rosuvastatin (CRESTOR) 20 MG tablet Take 1 tablet by mouth Every Night. 90 tablet 3     tiZANidine (ZANAFLEX) 2 MG tablet Take 1 tablet by mouth Every 8 (Eight) Hours As Needed for Muscle Spasms.       traZODone (DESYREL) 50 MG tablet Take 0.5 tablets by mouth Every Night.       vitamin B-12 (CYANOCOBALAMIN) 1000 MCG tablet Take 0.5 tablets by mouth 2 (Two) Times a Day.        Allergies:  Bacitracin, Neosporin [neomycin-bacitracin zn-polymyx], Fish-derived products, Shellfish allergy, and Shrimp (diagnostic)    Review of Systems     Objective    Vital Signs  Temp:  [97.3 °F (36.3 °C)-97.5 °F (36.4 °C)] 97.5 °F (36.4 °C)  Heart Rate:  [60-67] 66  Resp:  [18] 18  BP: ()/() 91/53  SpO2:  [95 %-100 %] 98 %  on   ;   Device (Oxygen Therapy): room air  Body mass index is 23.75 kg/m².    Physical Exam  Vitals and nursing note reviewed.   Constitutional:       General: He is not in acute distress.     Appearance: He is not ill-appearing.   Pulmonary:      Effort: Pulmonary effort is normal. No respiratory distress.   Abdominal:      General: Bowel sounds are normal.      Palpations: Abdomen is soft.   Neurological:      General: No focal deficit present.      Mental Status: He is alert. Mental status is at baseline.       Results Review:   I reviewed the patient's new clinical results.  Results from last 7 days   Lab Units 09/27/23  0621 09/26/23 2253   WBC 10*3/mm3 4.42 5.52   HEMOGLOBIN g/dL 8.7* 8.8*   PLATELETS 10*3/mm3 202 212     Results from last 7 days   Lab Units 09/27/23  0621 09/26/23  2253 09/22/23  0807 09/20/23  1008   SODIUM mmol/L 131* 131* 136 134*   POTASSIUM mmol/L 3.8 4.0 3.8 2.5*   CHLORIDE mmol/L 95* 94* 97* 91*   CO2 mmol/L 23.0 24.0 26.4 27.0   BUN mg/dL 25* 27* 31* 32*   CREATININE mg/dL 1.47* 1.63* 1.49* 1.69*    GLUCOSE mg/dL 52* 134* 53* 47*   ALBUMIN g/dL  --  3.9  --   --    BILIRUBIN mg/dL  --  0.9  --   --    ALK PHOS U/L  --  219*  --   --    AST (SGOT) U/L  --  33  --   --    ALT (SGPT) U/L  --  <5  --   --    Estimated Creatinine Clearance: 50.1 mL/min (A) (by C-G formula based on SCr of 1.47 mg/dL (H)).  Results from last 7 days   Lab Units 09/27/23  0621 09/27/23  0409 09/27/23  0201   INR  2.11*  --   --    HSTROP T ng/L  --  99* 100*         Invalid input(s): LDLCALC    XR Chest 1 View   Final Result         Electronically signed by Bee Zheng MD on 09-27-23 at 0626        Assessment & Plan       Hypoglycemia    ASCVD (arteriosclerotic cardiovascular disease)    Essential hypertension    HLD (hyperlipidemia)    Hypothyroidism    Type 2 diabetes mellitus with hyperglycemia, without long-term current use of insulin    Parkinson's disease    Chronic diastolic heart failure    MATT (acute kidney injury)    Stage 3a chronic kidney disease      Assessment & Plan  This is a 73-year-old gentleman who presents to the hospital with hypoglycemia and metabolic encephalopathy  -I think the hypoglycemia and metabolic encephalopathy are directly related to his renal dysfunction and use of glyburide.  I suspect that him not eating and slowly accumulating more glyburide over the last few days due to his renal dysfunction led to persistent hypoglycemia.  Unfortunately this still will take a little bit to completely wash from his system.  If creatinine remains above 1.2 would recommend different therapy for his diabetes.  -He does have acute renal failure present on admission it is improving with hydration.  Sodium is also low likely related to hypovolemia.  I have asked nephrology to evaluate the patient here and make additional recommendations regarding diuretics at discharge.  -His troponin was elevated in the emergency room and he did have an NSTEMI on the last hospitalization cardiology is planning for echocardiogram and  stress test.  -Home medications been reviewed and reordered  -Pharmacologic DVT prophylaxis, chronically on Eliquis  -Full code      I discussed the patients findings and my recommendations with patient, family, and nursing staff.          Joaquin Arroyo MD  [facility]  09/27/23  08:45 EDT

## 2023-09-27 NOTE — PROGRESS NOTES
"Nutrition Services    Patient Name:  Edilberto Reeder  YOB: 1950  MRN: [id number]  Admit Date:  9/26/2023    Assessment Date:  09/27/23    NUTRITION SCREENING      Reason for Encounter Nonhealing wound or pressure ulcer   Diagnosis/Problem Hypoglycemia        PO Diet Diet: Diabetic Diets; Consistent Carbohydrate; Texture: Regular Texture (IDDSI 7); Fluid Consistency: Thin (IDDSI 0)  NPO Diet NPO Type: Strict NPO   Supplements N/a   PO Intake % 50% x 1 meal       Medications MAR reviewed by RD   Labs  Listed below, reviewed   Physical Findings Mild edema    GI Function +BM 9/26   Skin Status B=18, bruised        Height  Weight  BMI  Weight Trend     Height: 185.4 cm (73\")  Weight: 81.6 kg (180 lb) (09/27/23 1122)  Body mass index is 23.75 kg/m².  Unknown       Nutrition Problem (PES) No nutrition diagnosis at this time.       Intervention/Plan No current PIs noted.    RD to follow up per protocol.     Results from last 7 days   Lab Units 09/27/23 0621 09/26/23 2253 09/22/23  0807   SODIUM mmol/L 131* 131* 136   POTASSIUM mmol/L 3.8 4.0 3.8   CHLORIDE mmol/L 95* 94* 97*   CO2 mmol/L 23.0 24.0 26.4   BUN mg/dL 25* 27* 31*   CREATININE mg/dL 1.47* 1.63* 1.49*   CALCIUM mg/dL 8.8 9.0 9.3   BILIRUBIN mg/dL  --  0.9  --    ALK PHOS U/L  --  219*  --    ALT (SGPT) U/L  --  <5  --    AST (SGOT) U/L  --  33  --    GLUCOSE mg/dL 52* 134* 53*     Results from last 7 days   Lab Units 09/27/23 0621 09/26/23 2253   MAGNESIUM mg/dL 2.5* 1.4*   HEMOGLOBIN g/dL 8.7* 8.8*   HEMATOCRIT % 27.7* 28.4*     Lab Results   Component Value Date    HGBA1C 6.60 (H) 08/05/2023         Electronically signed by:  Charleen Montejo RD  09/27/23 14:02 EDT  "

## 2023-09-27 NOTE — CONSULTS
[facility] Consult Note      Patient Name: Edilberto Reeder  : 1950  MRN: [id number]  Primary Care Physician:  Harvey Larson MD  Referring Physician: No ref. provider found  Date of admission: 2023    Subjective     Reason for Consult:  MATT    HPI:   Edilberto Reeder is a 73 y.o. male with HTN, DM2, AFIB, diastolic CHF, Parkinson's who presented overnight with lethargy.  Hypoglycemic at home but  here.  He as hosp early AUG for syncope, found to have symptomatic anemia & severe hypotension.  Cr was normal then 0.85 mg/dL on discharge 23, but has slowly crept up since then: 1.4 on , 1.3 on , 1.7 on .  Now appears drifting back down, 1.6 yesterday and 1.5 today.  BP been low ie 90s/50s at times.  Home meds notable for low dose ACEi & metoprolol, as well as lasix, metformin, glipizide.  Lasix recently inc'd per CARD due to edema and wt gain.  Mild dyspnea off & on.  CXR with ill defined right perihilar infiltrate, edema vs PNA.  WBC normal.  Echo is pending, study in 2022 showed mildly reduced LVSF.  Reports some nausea but no emesis.  Mild urinary hesitancy too.        Review of Systems:   14 point review of systems is otherwise negative except for mentioned above on HPI    Personal History     Past Medical History:   Diagnosis Date    Atrial fibrillation with RVR     Atrial flutter     Diabetes     HLD (hyperlipidemia) 2016    Hypertension     Parkinson's disease     Advanced        Past Surgical History:   Procedure Laterality Date    BRAIN STIMULATOR      COLONOSCOPY N/A 2022    Procedure: COLONOSCOPY TO CECUM WITH COLD SNARE POLYPECTOMY;  Surgeon: Luther Ferrer MD;  Location: [facility] ENDOSCOPY;  Service: Gastroenterology;  Laterality: N/A;  PRE:ANEMIA  POST:POLYPS/HEMORRHOIDS    ENDOSCOPY N/A 2022    Procedure: ESOPHAGOGASTRODUODENOSCOPY WITH  COLD BIOPSIES;  Surgeon: Luther Ferrer MD;  Location: [facility] ENDOSCOPY;   Service: Gastroenterology;  Laterality: N/A;  PRE:ANEMIA  POST:DIVERTICULUM/GASTRITIS    JOINT REPLACEMENT      Bilateral shoulder and knee replacements       Family History: family history is not on file.    Social History:  reports that he has never smoked. He has never used smokeless tobacco. He reports current alcohol use. He reports that he does not use drugs.    Home Medications:  Prior to Admission medications    Medication Sig Start Date End Date Taking? Authorizing Provider   acetaminophen (TYLENOL) 325 MG tablet Take 2 tablets by mouth Every 6 (Six) Hours As Needed for Mild Pain . 3/25/22   Joaquin Davenport MD   allopurinol (ZYLOPRIM) 100 MG tablet Take 1 tablet by mouth Daily.    Mendoza Dennis MD   apixaban (ELIQUIS) 5 MG tablet tablet Take 1 tablet by mouth 2 (Two) Times a Day.    Mendoza Dennis MD   Calcium Carbonate Antacid (CALCIUM CARBONATE PO) Take 650 mg by mouth Daily.    Mendoza Dennis MD   Carbidopa-Levodopa ER 36. MG capsule controlled-release Take 4 capsules by mouth 4 (Four) Times a Day.    Mendoza Dennis MD   citalopram (CeleXA) 20 MG tablet Take 1 tablet by mouth Daily.    Mendoza Dennis MD   docusate sodium (COLACE) 50 MG capsule Take 2 capsules by mouth Every Night.    Mendoza Dennis MD   donepezil (ARICEPT) 5 MG tablet Take 2 tablets by mouth Every Night.    Mendoza Dennis MD   ferrous sulfate 325 (65 FE) MG tablet  8/29/23   Mendoza Dennis MD   furosemide (LASIX) 40 MG tablet Take 1 tablet by mouth Daily. 9/22/23   Zohra Angulo APRN   glipizide (GLUCOTROL) 5 MG tablet Take 1 tablet by mouth Every Morning.    Mendoza Dennis MD   levothyroxine (SYNTHROID, LEVOTHROID) 50 MCG tablet Take 1 tablet by mouth Every Morning. 8/9/23   Samina Barillas MD   lisinopril (PRINIVIL,ZESTRIL) 2.5 MG tablet Take 1 tablet by mouth Daily. 9/20/23   Zohra Angulo APRN   metFORMIN (GLUCOPHAGE) 500 MG tablet Take 1  tablet by mouth 2 (Two) Times a Day With Meals.    Mendoza Dennis MD   metoprolol tartrate (LOPRESSOR) 25 MG tablet TAKE 1/2 TABLET BY MOUTH TWICE A DAY  Patient taking differently: Take 0.5 tablets by mouth 2 (Two) Times a Day. 6/7/23   Anitha Barone APRN   multivitamin with minerals tablet tablet Take 1 tablet by mouth Daily.    Mendoza Dennis MD   potassium chloride ER (K-TAB) 20 MEQ tablet controlled-release ER tablet Take 1 tablet by mouth Daily. 9/22/23   Zohra Angulo APRN   rosuvastatin (CRESTOR) 20 MG tablet Take 1 tablet by mouth Every Night. 11/4/22   Lorene Wright APRN   tiZANidine (ZANAFLEX) 2 MG tablet Take 1 tablet by mouth Every 8 (Eight) Hours As Needed for Muscle Spasms.    Mendoza Dennis MD   traZODone (DESYREL) 50 MG tablet Take 0.5 tablets by mouth Every Night.    Mendoza Dennis MD   vitamin B-12 (CYANOCOBALAMIN) 1000 MCG tablet Take 0.5 tablets by mouth 2 (Two) Times a Day.    Mendoza Dennis MD       Allergies:  Allergies   Allergen Reactions    Bacitracin Anaphylaxis    Neosporin [Neomycin-Bacitracin Zn-Polymyx] Other (See Comments)     BP drops and heart stops!    Fish-Derived Products Hives    Shellfish Allergy Hives    Shrimp (Diagnostic) Hives       Objective     Vitals:   Temp:  [97.3 °F (36.3 °C)-98.2 °F (36.8 °C)] 98.2 °F (36.8 °C)  Heart Rate:  [60-73] 73  Resp:  [18] 18  BP: ()/() 114/64    Intake/Output Summary (Last 24 hours) at 9/27/2023 1332  Last data filed at 9/27/2023 0900  Gross per 24 hour   Intake 240 ml   Output --   Net 240 ml       Physical Exam:    General Appearance: frail WM comfortable, alert, wife present   Skin: warm and dry  HEENT: oral mucosa normal, nonicteric sclera  Neck: supple, no JVD  Lungs: CTA bilat no rales  Heart: RRR, normal S1 and S2  Abdomen: soft, nontender, nondistended  : no palpable bladder  Extremities: trace BLE edema, no cyanosis or clubbing  Neuro: normal speech and mental status      Scheduled Meds:     allopurinol, 100 mg, Oral, Daily  apixaban, 5 mg, Oral, Q12H  carbidopa-levodopa ER, 1 tablet, Oral, 4x Daily  citalopram, 20 mg, Oral, Daily  docusate sodium, 100 mg, Oral, Nightly  donepezil, 10 mg, Oral, Nightly  insulin lispro, 2-7 Units, Subcutaneous, 4x Daily AC & at Bedtime  [START ON 9/28/2023] levothyroxine, 50 mcg, Oral, Q AM  metoprolol tartrate, 12.5 mg, Oral, BID  rosuvastatin, 20 mg, Oral, Nightly  senna-docusate sodium, 2 tablet, Oral, BID  sodium chloride, 10 mL, Intravenous, Q12H  traZODone, 25 mg, Oral, Nightly      IV Meds:   dextrose 5 % and sodium chloride 0.45 %, 100 mL/hr, Last Rate: 100 mL/hr (09/27/23 1013)        Results Reviewed:   I have personally reviewed the results from the time of this admission to 9/27/2023 13:32 EDT     Lab Results   Component Value Date    GLUCOSE 52 (L) 09/27/2023    CALCIUM 8.8 09/27/2023     (L) 09/27/2023    K 3.8 09/27/2023    CO2 23.0 09/27/2023    CL 95 (L) 09/27/2023    BUN 25 (H) 09/27/2023    CREATININE 1.47 (H) 09/27/2023    EGFRIFNONA 83 09/17/2021    BCR 17.0 09/27/2023    ANIONGAP 13.0 09/27/2023      Lab Results   Component Value Date    MG 2.5 (H) 09/27/2023    PHOS 2.4 (L) 08/08/2023    ALBUMIN 3.9 09/26/2023           Assessment / Plan     ASSESSMENT:  Non olig MATT - suspect prerenal azotemia due to vol depletion from recent diuretic intensification, as well as hypotension and impaired compensation via ACEi use.  Cr was up to 1.7 a week ago, trending down 1.6 to 1.5 today.  Was normal 0.8 early AUG.  R/O retention.  K normal (hypokalemic recently) and mild hyponatremia present, Na stable 131.  Mg low 1.4   DM2 + hypoglycemia - combination of glyburide + MATT with impaired clearance.  On D5 1/2 NS   Diastolic CHF - lasix recently inc'd for wt gain & edema, both improved, but will have to watch vol status closely with IVF admin.  Vague right perihilar opacity on CXR doubt is pulm edema  Hypotension (hx HTN) - BP labile,  may have some dysautonomia in relation to Parkinson's.  BP was 90s/50s, better now, on low dose metop with hold parameters (also for rate control)  Parkinson's  Anemia - severe recently requiring transfusion last month, now hgb 8.7  Elevated Tn - CARD following; echo pending, may also undergo stress test, no immediate plan for cath noted   Dementia, on aricept    PLAN:  Change IVF to D5 NS at lower rate 75 cc/hr, likely will stop this tomorrow  Hold ACEi & lasix  Check UA, PVR  Check iron stores, SPEP/IF  Replace Mg    Thank you Dr Cordero for involving us in the care of Edilberto Reeder.  Please feel free to call with any questions.    Harvey Ngo MD  09/27/23  13:32 EDT    [facility] \[facility]\""  [phone]

## 2023-09-27 NOTE — CONSULTS
Cardiology History & Physical / Consultation      Patient Name: Edilberto Reeder  Age/Sex: 73 y.o. male  : 1950  MRN: [id number]    Date of Admission: 2023  Date of Encounter Visit: 23  Encounter Provider: Jimbo Fung MD  Referring Provider: No ref. provider found  Place of Service: [facility] CARDIOLOGY  Patient Care Team:  Harvey Larson MD as PCP - General  Harvey Larson MD as PCP - Family Medicine          Subjective:     Chief Complaint: Hypoglycemia    Reason for consultation: Elevated troponin    History of Present Illness:  Edilberto Reeder is a 73 y.o. male with a past medical history significant for DM, atrial fibrillation on AC, diastolic CHF, Parkinson's, chronic anemia, hypothyroidism, and abnormal stress test 3/2023.     He was recently admitted to the hospital on 23 after a fall and had an elevation in his troponin (HS trop 123). He had a head laceration and was anemic so his Eliqius was held and a unit of blood was administered. The Eliquis was eventually restarted. He is a patient of Dr. Fung and was last seen in the office on 23 by ESME Ambrose for atrial fibrillation and diastolic CHF. He had complaints of shortness of breath. He had restarted his lasix at home and it was increased during this visit. Aldactone was added, but then stopped because there was a bump in his BUN and creatinine. The Eliquis was continued.     He presented to the ED c/o hypoglycemia on 23. Per his wife, patient took his metformin and glyburide and then did not eat. She left to get food and returned to find him unresponsive and called EMS. EMS reported the patient was found on the floor and unable to speak. His blood glucose was 48 and he was given D10 and his repeat blood glucose was 110. His symptoms resolved in the field and he was brought to the ED for evaluation.     A work up in the ED was completed  Glucose 134,  BUN/creatinine 27/1.63, , mag 1.8, hgb 8.8, HS troponin 100    I was asked to see this patient for elevated troponin.  Patient said about 2:00 in the morning he developed some chest pressure.  It has persisted ever since then.  Currently he was resting comfortably but did say he can feel a pressure on his chest.  He has not had anything like this before.      ECHO 11/2/22    Left ventricular wall thickness is consistent with mild concentric hypertrophy.    The following left ventricular wall segments are hypokinetic: basal inferoseptal, mid inferoseptal and basal inferoseptal.    Mildly reduced right ventricular systolic function noted.    Left atrial volume is mildly increased.    Estimated right ventricular systolic pressure from tricuspid regurgitation is mildly elevated (35-45 mmHg).    Mild pulmonary hypertension is present.    Mild dilation of the aortic root is present.     Stress Test 3/25/22  There is a very small amount of basal inferolateral ischemia.  Left ventricular ejection fraction is mildly reduced. (Calculated EF = 46%).  Impressions are consistent with a low risk study.    Past Medical History:  Past Medical History:   Diagnosis Date    Atrial fibrillation with RVR     Atrial flutter     Diabetes     HLD (hyperlipidemia) 01/27/2016    Hypertension     Parkinson's disease     Advanced        Past Surgical History:   Procedure Laterality Date    BRAIN STIMULATOR      COLONOSCOPY N/A 5/5/2022    Procedure: COLONOSCOPY TO CECUM WITH COLD SNARE POLYPECTOMY;  Surgeon: Luther Ferrer MD;  Location: [facility] ENDOSCOPY;  Service: Gastroenterology;  Laterality: N/A;  PRE:ANEMIA  POST:POLYPS/HEMORRHOIDS    ENDOSCOPY N/A 5/5/2022    Procedure: ESOPHAGOGASTRODUODENOSCOPY WITH  COLD BIOPSIES;  Surgeon: Luther Ferrer MD;  Location: [facility] ENDOSCOPY;  Service: Gastroenterology;  Laterality: N/A;  PRE:ANEMIA  POST:DIVERTICULUM/GASTRITIS    JOINT REPLACEMENT      Bilateral shoulder and knee  replacements       Home Medications:   Medications Prior to Admission   Medication Sig Dispense Refill Last Dose    acetaminophen (TYLENOL) 325 MG tablet Take 2 tablets by mouth Every 6 (Six) Hours As Needed for Mild Pain .       allopurinol (ZYLOPRIM) 100 MG tablet Take 1 tablet by mouth Daily.       apixaban (ELIQUIS) 5 MG tablet tablet Take 1 tablet by mouth 2 (Two) Times a Day.       Calcium Carbonate Antacid (CALCIUM CARBONATE PO) Take 650 mg by mouth Daily.       Carbidopa-Levodopa ER 36. MG capsule controlled-release Take 4 capsules by mouth 4 (Four) Times a Day.       citalopram (CeleXA) 20 MG tablet Take 1 tablet by mouth Daily.       docusate sodium (COLACE) 50 MG capsule Take 2 capsules by mouth Every Night.       donepezil (ARICEPT) 5 MG tablet Take 2 tablets by mouth Every Night.       ferrous sulfate 325 (65 FE) MG tablet        furosemide (LASIX) 40 MG tablet Take 1 tablet by mouth Daily. 30 tablet 11     glipizide (GLUCOTROL) 5 MG tablet Take 1 tablet by mouth Every Morning.       levothyroxine (SYNTHROID, LEVOTHROID) 50 MCG tablet Take 1 tablet by mouth Every Morning. 30 tablet 0     lisinopril (PRINIVIL,ZESTRIL) 2.5 MG tablet Take 1 tablet by mouth Daily. 30 tablet 11     metFORMIN (GLUCOPHAGE) 500 MG tablet Take 1 tablet by mouth 2 (Two) Times a Day With Meals.       metoprolol tartrate (LOPRESSOR) 25 MG tablet TAKE 1/2 TABLET BY MOUTH TWICE A DAY (Patient taking differently: Take 0.5 tablets by mouth 2 (Two) Times a Day.) 30 tablet 11     multivitamin with minerals tablet tablet Take 1 tablet by mouth Daily.       potassium chloride ER (K-TAB) 20 MEQ tablet controlled-release ER tablet Take 1 tablet by mouth Daily. 30 tablet 11     rosuvastatin (CRESTOR) 20 MG tablet Take 1 tablet by mouth Every Night. 90 tablet 3     tiZANidine (ZANAFLEX) 2 MG tablet Take 1 tablet by mouth Every 8 (Eight) Hours As Needed for Muscle Spasms.       traZODone (DESYREL) 50 MG tablet Take 0.5 tablets by mouth  Every Night.       vitamin B-12 (CYANOCOBALAMIN) 1000 MCG tablet Take 0.5 tablets by mouth 2 (Two) Times a Day.          Allergies:  Allergies   Allergen Reactions    Bacitracin Anaphylaxis    Neosporin [Neomycin-Bacitracin Zn-Polymyx] Other (See Comments)     BP drops and heart stops!    Fish-Derived Products Hives    Shellfish Allergy Hives    Shrimp (Diagnostic) Hives       Past Social History:  Social History     Socioeconomic History    Marital status:    Tobacco Use    Smoking status: Never    Smokeless tobacco: Never    Tobacco comments:     caffeine use   Vaping Use    Vaping Use: Never used   Substance and Sexual Activity    Alcohol use: Yes    Drug use: No    Sexual activity: Defer       Past Family History: History reviewed. No pertinent family history.   History reviewed. No pertinent family history.    Review of Systems        Objective:     Objective:  Temp:  [97.3 °F (36.3 °C)-97.5 °F (36.4 °C)] 97.5 °F (36.4 °C)  Heart Rate:  [60-67] 66  Resp:  [18] 18  BP: ()/() 91/53  No intake or output data in the 24 hours ending 09/27/23 0905  Body mass index is 23.75 kg/m².      09/26/23 2259   Weight: 81.6 kg (180 lb)           Physical Exam:   Vitals reviewed.   Constitutional:       Appearance: Not in distress.   Pulmonary:      Breath sounds: Normal breath sounds.   Cardiovascular:      Normal rate. Regular rhythm. Normal S1. Normal S2.       Murmurs: There is no murmur.      No gallop.  No click. No rub.   Pulses:     Intact distal pulses.   Edema:     Peripheral edema absent.   Neurological:      Mental Status: Alert and oriented to person, place and time.        Labs:   Lab Review:     Results from last 7 days   Lab Units 09/27/23  0621 09/26/23  2253 09/22/23  0807 09/20/23  1008   SODIUM mmol/L 131* 131* 136 134*   POTASSIUM mmol/L 3.8 4.0 3.8 2.5*   CHLORIDE mmol/L 95* 94* 97* 91*   CO2 mmol/L 23.0 24.0 26.4 27.0   BUN mg/dL 25* 27* 31* 32*   CREATININE mg/dL 1.47* 1.63* 1.49*  1.69*   GLUCOSE mg/dL 52* 134* 53* 47*   CALCIUM mg/dL 8.8 9.0 9.3 9.3   AST (SGOT) U/L  --  33  --   --    ALT (SGPT) U/L  --  <5  --   --      Results from last 7 days   Lab Units 09/27/23  0409 09/27/23  0201   HSTROP T ng/L 99* 100*     Results from last 7 days   Lab Units 09/27/23  0621   WBC 10*3/mm3 4.42   HEMOGLOBIN g/dL 8.7*   HEMATOCRIT % 27.7*   PLATELETS 10*3/mm3 202     Results from last 7 days   Lab Units 09/27/23  0621   INR  2.11*         Results from last 7 days   Lab Units 09/27/23  0621   MAGNESIUM mg/dL 2.5*                         PREVIOUS EKG:          EKG:             Assessment:       Hypoglycemia        Plan:     1.  Patient initially presents with hypoglycemia and elevated troponin.  Patient also has elevated creatinine levels troponin initially was flat and ECG was unremarkable.  He started having some chest pressure about 2:00 in the morning which has persisted which is concerning.  He unfortunately ate breakfast this morning so I could not do a resting perfusion study and or stress test.  I am going to repeat his troponin reassess his echocardiogram and go from there.  He already is known to have hypokinesis of his inferior wall but we will see if his ejection fraction is decreased or anything is changed.  2.  Hypoglycemia better  3.  CHF patient was recently seen in the office on 9/20/2023.  Patient has lost about 10 pounds and is breathing fairly well.  4.  Chronic atrial fibrillation  5.  Hypertension  6.  We will follow for now may need to redo a stress test in the morning.  If his ejection fraction is changed significantly may need to consider heart catheterization but again with his elevated creatinine this is always a concern.    Thank you for allowing me to participate in the care of Edilberto Reeder. Feel free to contact me directly with any further questions or concerns.    Jimbo Fung MD  [facility]  09/27/23  09:05 EDT

## 2023-09-27 NOTE — ED PROVIDER NOTES
MD ATTESTATION NOTE    The LUC and I have discussed this patient's history, physical exam, and treatment plan.    I provided a substantive portion of the care of this patient. I personally performed the physical exam, in its entirety. The attached note describes my personal findings.      Edilberto Reeder is a 73 y.o. male who presents to the ED -patient was found down glucose was 48 was drooling and not speaking.  EMS gave patient D10 IV which improved blood glucose to 110.  Patient's symptoms resolved in the field.  Patient took his metformin and glyburide and then did not eat.  Patient's spouse left to get food and returned to find him unresponsive and called EMS.  Patient without acute complaints at this time.      On exam:  GENERAL: not distressed  HENT: nares patent  EYES: no scleral icterus  CV: regular rhythm, regular rate  RESPIRATORY: normal effort  ABDOMEN: soft  MUSCULOSKELETAL: no deformity  NEURO: alert, moves all extremities, follows commands  SKIN: warm, dry    Labs  Recent Results (from the past 24 hour(s))   POC Glucose Once    Collection Time: 09/26/23 10:28 PM    Specimen: Blood   Result Value Ref Range    Glucose 72 70 - 130 mg/dL   Comprehensive Metabolic Panel    Collection Time: 09/26/23 10:53 PM    Specimen: Blood   Result Value Ref Range    Glucose 134 (H) 65 - 99 mg/dL    BUN 27 (H) 8 - 23 mg/dL    Creatinine 1.63 (H) 0.76 - 1.27 mg/dL    Sodium 131 (L) 136 - 145 mmol/L    Potassium 4.0 3.5 - 5.2 mmol/L    Chloride 94 (L) 98 - 107 mmol/L    CO2 24.0 22.0 - 29.0 mmol/L    Calcium 9.0 8.6 - 10.5 mg/dL    Total Protein 7.1 6.0 - 8.5 g/dL    Albumin 3.9 3.5 - 5.2 g/dL    ALT (SGPT) <5 1 - 41 U/L    AST (SGOT) 33 1 - 40 U/L    Alkaline Phosphatase 219 (H) 39 - 117 U/L    Total Bilirubin 0.9 0.0 - 1.2 mg/dL    Globulin 3.2 gm/dL    A/G Ratio 1.2 g/dL    BUN/Creatinine Ratio 16.6 7.0 - 25.0    Anion Gap 13.0 5.0 - 15.0 mmol/L    eGFR 43.7 (L) >60.0 mL/min/1.73   CBC Auto Differential     Collection Time: 09/26/23 10:53 PM    Specimen: Blood   Result Value Ref Range    WBC 5.52 3.40 - 10.80 10*3/mm3    RBC 3.70 (L) 4.14 - 5.80 10*6/mm3    Hemoglobin 8.8 (L) 13.0 - 17.7 g/dL    Hematocrit 28.4 (L) 37.5 - 51.0 %    MCV 76.8 (L) 79.0 - 97.0 fL    MCH 23.8 (L) 26.6 - 33.0 pg    MCHC 31.0 (L) 31.5 - 35.7 g/dL    RDW 16.3 (H) 12.3 - 15.4 %    RDW-SD 45.1 37.0 - 54.0 fl    MPV 9.8 6.0 - 12.0 fL    Platelets 212 140 - 450 10*3/mm3    Neutrophil % 69.4 42.7 - 76.0 %    Lymphocyte % 15.6 (L) 19.6 - 45.3 %    Monocyte % 8.7 5.0 - 12.0 %    Eosinophil % 4.3 0.3 - 6.2 %    Basophil % 1.6 (H) 0.0 - 1.5 %    Immature Grans % 0.4 0.0 - 0.5 %    Neutrophils, Absolute 3.83 1.70 - 7.00 10*3/mm3    Lymphocytes, Absolute 0.86 0.70 - 3.10 10*3/mm3    Monocytes, Absolute 0.48 0.10 - 0.90 10*3/mm3    Eosinophils, Absolute 0.24 0.00 - 0.40 10*3/mm3    Basophils, Absolute 0.09 0.00 - 0.20 10*3/mm3    Immature Grans, Absolute 0.02 0.00 - 0.05 10*3/mm3    nRBC 0.0 0.0 - 0.2 /100 WBC   Magnesium    Collection Time: 09/26/23 10:53 PM    Specimen: Blood   Result Value Ref Range    Magnesium 1.4 (L) 1.6 - 2.4 mg/dL   POC Glucose Once    Collection Time: 09/26/23 11:57 PM    Specimen: Blood   Result Value Ref Range    Glucose 98 70 - 130 mg/dL   POC Glucose Once    Collection Time: 09/27/23  1:28 AM    Specimen: Blood   Result Value Ref Range    Glucose 86 70 - 130 mg/dL   High Sensitivity Troponin T    Collection Time: 09/27/23  2:01 AM    Specimen: Blood   Result Value Ref Range    HS Troponin T 100 (C) <15 ng/L   ECG 12 Lead Chest Pain    Collection Time: 09/27/23  2:12 AM   Result Value Ref Range    QT Interval 642 ms    QTC Interval 653 ms   POC Glucose Once    Collection Time: 09/27/23  2:48 AM    Specimen: Blood   Result Value Ref Range    Glucose 80 70 - 130 mg/dL   POC Glucose Once    Collection Time: 09/27/23  3:51 AM    Specimen: Blood   Result Value Ref Range    Glucose 85 70 - 130 mg/dL   High Sensitivity Troponin T 2Hr     Collection Time: 09/27/23  4:09 AM    Specimen: Blood   Result Value Ref Range    HS Troponin T 99 (C) <15 ng/L    Troponin T Delta -1 >=-4 - <+4 ng/L   POC Glucose Once    Collection Time: 09/27/23  6:38 AM    Specimen: Blood   Result Value Ref Range    Glucose 65 (L) 70 - 130 mg/dL       Radiology  XR Chest 1 View    Result Date: 9/27/2023  Patient: GABBIE YARBROUGH  Time Out: 06:26 Exam(s): XR CXR 1 VIEW EXAM:   XR Chest, 1 View CLINICAL HISTORY:    Reason for exam: Chest pain. TECHNIQUE:   Frontal view of the chest. COMPARISON:   8 4 2023 FINDINGS:   There is right perihilar haziness, suggestive of asymmetric mild pulmonary edema but could represent pneumonia.  The left lung is clear.  The heart size is normal.  The aorta is mildly tortuous, without change.  There is a stable generator overlying the right chest wall.  Bilateral shoulder surgeries noted.  The study is slightly limited by apical lordotic positioning.  In addition, the right costophrenic angle is not included. IMPRESSION:     Right perihilar infiltrates consistent with asymmetric pulmonary edema versus pneumonia.    Electronically signed by Bee Zheng MD on 09-27-23 at 0626     Medications given in the ED:  Medications   sodium chloride 0.9 % flush 10 mL (has no administration in time range)   dextrose 5 % and sodium chloride 0.45 % infusion (100 mL/hr Intravenous Currently Infusing 9/27/23 0530)   sodium chloride 0.9 % flush 10 mL (has no administration in time range)   sodium chloride 0.9 % flush 10 mL (has no administration in time range)   sodium chloride 0.9 % infusion 40 mL (has no administration in time range)   sennosides-docusate (PERICOLACE) 8.6-50 MG per tablet 2 tablet (has no administration in time range)     And   polyethylene glycol (MIRALAX) packet 17 g (has no administration in time range)     And   bisacodyl (DULCOLAX) EC tablet 5 mg (has no administration in time range)     And   bisacodyl (DULCOLAX) suppository 10 mg (has  no administration in time range)   dextrose (GLUTOSE) oral gel 15 g (has no administration in time range)   dextrose (D50W) (25 g/50 mL) IV injection 25 g (has no administration in time range)   glucagon (GLUCAGEN) injection 1 mg (has no administration in time range)   insulin lispro (HUMALOG/ADMELOG) injection 2-7 Units (has no administration in time range)   nitroglycerin (NITROSTAT) SL tablet 0.4 mg (has no administration in time range)   acetaminophen (TYLENOL) tablet 650 mg (650 mg Oral Given 9/27/23 0555)     Or   acetaminophen (TYLENOL) 160 MG/5ML oral solution 650 mg ( Oral Not Given:  See Alt 9/27/23 0555)     Or   acetaminophen (TYLENOL) suppository 650 mg ( Rectal Not Given:  See Alt 9/27/23 0555)   ondansetron (ZOFRAN) injection 4 mg (has no administration in time range)       Orders placed during this visit:  Orders Placed This Encounter   Procedures    XR Chest 1 View    Comprehensive Metabolic Panel    CBC Auto Differential    Magnesium    High Sensitivity Troponin T    High Sensitivity Troponin T 2Hr    Basic Metabolic Panel    CBC Auto Differential    Protime-INR    Magnesium    Diet: Diabetic Diets; Consistent Carbohydrate; Texture: Regular Texture (IDDSI 7); Fluid Consistency: Thin (IDDSI 0)    Assist With Feeding Patient    Vital Signs    Intake & Output    Weigh Patient    Oral Care    Place Sequential Compression Device    Maintain Sequential Compression Device    Telemetry - Maintain IV Access    Telemetry - Place Orders & Notify Provider of Results When Patient Experiences Acute Chest Pain, Dysrhythmia or Respiratory Distress    May Be Off Telemetry for Tests    Up With Assistance    Neuro Checks    Code Status and Medical Interventions:    CAMILLE (on-call MD unless specified) Details    Inpatient Cardiology Consult    PT Consult: Eval & Treat Functional Mobility Below Baseline    POC Glucose Once    POC Glucose Q1H    POC Glucose Once    POC Glucose Once    POC Glucose Once    POC Glucose Q2H     POC Glucose Once    POC Glucose 4x Daily Before Meals & at Bedtime    POC Glucose Once    ECG 12 Lead Chest Pain    SCANNED - TELEMETRY      Insert Peripheral IV    Insert Peripheral IV    Initiate Observation Status    CBC & Differential       Medical Decision Making:  ED Course as of 09/27/23 0642   Tue Sep 26, 2023   2244 Patient is noted to be taking glipizide and metformin [RC]   Wed Sep 27, 2023   0306 WBC: 5.52 [RC]   0307 RBC(!): 3.70 [RC]   0307 Hemoglobin(!): 8.8 [RC]   0307 Hematocrit(!): 28.4 [RC]   0307 Platelets: 212 [RC]   0307 Glucose(!): 134 [RC]   0307 BUN(!): 27 [RC]   0307 Creatinine(!): 1.63 [RC]   0307 Sodium(!): 131 [RC]   0307 CO2: 24.0 [RC]   0307 Anion Gap: 13.0 [RC]   0307 Magnesium(!): 1.4 [RC]   0307 HS Troponin T(!!): 100  SPECT this is renal related is as follows 123 and 116 roughly a month ago. [RC]   0307 Glucose: 80 [RC]   0307 Patient had complained of chest pain to my supervising physician.  An EKG and 2 troponins were obtained. [RC]   0307 Given his long-acting sulfonylurea, glucose remaining on the low end of normal given a plate lunch and a D5 half-normal saline drip, as well as his new complaint of chest pain, and is felt to be better served by admission to the hospital for further evaluation and management. [RC]   0320 Discussed with HANNAH Cabrera  To admit to Dr Diallo's care for further obs and management. [RC]   0323 EKG          EKG time: 0212  Rhythm/Rate: Sinus rhythm rate 62  P waves and TN: Moderate baseline artifact  QRS, axis: Narrow regular  ST and T waves: Normal    Interpreted Contemporaneously by me, independently viewed  No significant changed compared to prior EKG from 8/4/2023   [TJ]      ED Course User Index  [RC] Cali Giron III, PA  [TJ] Bryant Dillon MD             Diagnosis  Final diagnoses:   Hypoglycemia   Chest pain, unspecified type          Bryant Dillon MD  09/27/23 0642

## 2023-09-27 NOTE — ED NOTES
Pt to ED via ambulance. EMS called on scene to pt on floor drooling and not speaking. Blood glucose on scene 48. Pt given d10 and most recent glucose 110. Pt symptoms have resolved.

## 2023-09-27 NOTE — ED NOTES
.Nursing report ED to floor  Edilberto Reeder  73 y.o.  male    HPI :   Chief Complaint   Patient presents with    Hypoglycemia       Admitting doctor:   Pedro Diallo MD    Admitting diagnosis:   The primary encounter diagnosis was Hypoglycemia. A diagnosis of Chest pain, unspecified type was also pertinent to this visit.    Code status:   Current Code Status       Date Active Code Status Order ID Comments User Context       Prior            Allergies:   Bacitracin, Neosporin [neomycin-bacitracin zn-polymyx], Fish-derived products, Shellfish allergy, and Shrimp (diagnostic)    Isolation:   No active isolations    Intake and Output  No intake or output data in the 24 hours ending 09/27/23 0344    Weight:       09/26/23  2259   Weight: 81.6 kg (180 lb)       Most recent vitals:   Vitals:    09/27/23 0135 09/27/23 0156 09/27/23 0226 09/27/23 0256   BP:  110/80 100/63 94/45   Pulse: 64 62 65 60   Resp:       Temp:       SpO2: 98% 100% 100% 97%   Weight:       Height:           Active LDAs/IV Access:   Lines, Drains & Airways       Active LDAs       Name Placement date Placement time Site Days    Peripheral IV 09/26/23 2228 Left Antecubital 09/26/23 2228  Antecubital  less than 1                    Labs (abnormal labs have a star):   Labs Reviewed   COMPREHENSIVE METABOLIC PANEL - Abnormal; Notable for the following components:       Result Value    Glucose 134 (*)     BUN 27 (*)     Creatinine 1.63 (*)     Sodium 131 (*)     Chloride 94 (*)     Alkaline Phosphatase 219 (*)     eGFR 43.7 (*)     All other components within normal limits    Narrative:     GFR Normal >60  Chronic Kidney Disease <60  Kidney Failure <15    The GFR formula is only valid for adults with stable renal function between ages 18 and 70.   CBC WITH AUTO DIFFERENTIAL - Abnormal; Notable for the following components:    RBC 3.70 (*)     Hemoglobin 8.8 (*)     Hematocrit 28.4 (*)     MCV 76.8 (*)     MCH 23.8 (*)     MCHC 31.0 (*)     RDW 16.3  (*)     Lymphocyte % 15.6 (*)     Basophil % 1.6 (*)     All other components within normal limits   MAGNESIUM - Abnormal; Notable for the following components:    Magnesium 1.4 (*)     All other components within normal limits   TROPONIN - Abnormal; Notable for the following components:    HS Troponin T 100 (*)     All other components within normal limits    Narrative:     High Sensitive Troponin T Reference Range:  <10.0 ng/L- Negative Female for AMI  <15.0 ng/L- Negative Male for AMI  >=10 - Abnormal Female indicating possible myocardial injury.  >=15 - Abnormal Male indicating possible myocardial injury.   Clinicians would have to utilize clinical acumen, EKG, Troponin, and serial changes to determine if it is an Acute Myocardial Infarction or myocardial injury due to an underlying chronic condition.        POCT GLUCOSE FINGERSTICK - Normal   POCT GLUCOSE FINGERSTICK - Normal   POCT GLUCOSE FINGERSTICK - Normal   POCT GLUCOSE FINGERSTICK - Normal   HIGH SENSITIVITIY TROPONIN T 2HR   POCT GLUCOSE FINGERSTICK   POCT GLUCOSE FINGERSTICK   POCT GLUCOSE FINGERSTICK   POCT GLUCOSE FINGERSTICK   POCT GLUCOSE FINGERSTICK   POCT GLUCOSE FINGERSTICK   POCT GLUCOSE FINGERSTICK   POCT GLUCOSE FINGERSTICK   POCT GLUCOSE FINGERSTICK   POCT GLUCOSE FINGERSTICK   POCT GLUCOSE FINGERSTICK   POCT GLUCOSE FINGERSTICK   POCT GLUCOSE FINGERSTICK   POCT GLUCOSE FINGERSTICK   POCT GLUCOSE FINGERSTICK   POCT GLUCOSE FINGERSTICK   POCT GLUCOSE FINGERSTICK   POCT GLUCOSE FINGERSTICK   CBC AND DIFFERENTIAL    Narrative:     The following orders were created for panel order CBC & Differential.  Procedure                               Abnormality         Status                     ---------                               -----------         ------                     CBC Auto Differential[[id number]]        Abnormal            Final result                 Please view results for these tests on the individual orders.       EKG:   ECG 12 Lead  Chest Pain   Preliminary Result   HEART RATE= 62  bpm   RR Interval= 968  ms   NM Interval=   ms   P Horizontal Axis=   deg   P Front Axis=   deg   QRSD Interval= 75  ms   QT Interval= 642  ms   QTcB= 653  ms   QRS Axis= 0  deg   T Wave Axis=   deg   - ABNORMAL ECG -   Junctional rhythm   Probable anterior infarct, age indeterminate   Lateral leads are also involved   Prolonged QT interval   Artifact in lead(s) I,III,aVR,aVL,V1,V2,V3,V4,V5   Electronically Signed By:    Date and Time of Study: 2023-09-27 02:12:18          Meds given in ED:   Medications   sodium chloride 0.9 % flush 10 mL (has no administration in time range)   dextrose 5 % and sodium chloride 0.45 % infusion (100 mL/hr Intravenous Currently Infusing 9/27/23 0323)       Imaging results:  No radiology results for the last day      Social issues:   Social History     Socioeconomic History    Marital status:    Tobacco Use    Smoking status: Never    Smokeless tobacco: Never    Tobacco comments:     caffeine use   Vaping Use    Vaping Use: Never used   Substance and Sexual Activity    Alcohol use: Yes    Drug use: No    Sexual activity: Defer       NIH Stroke Scale:       Andra Carreno RN  09/27/23 03:44 EDT

## 2023-09-27 NOTE — DISCHARGE PLACEMENT REQUEST
"Edilberto Yarbrough (73 y.o. Male)       Date of Birth   1950    Social Security Number       Address   [address] [address]    Home Phone   [phone]    MRN   [id number]       [facility]    Marital Status                               Admission Date   9/26/23    Admission Type   Emergency    Admitting Provider   Joaquin Arroyo MD    Attending Provider   Joaquin Arroyo MD    Department, Room/Bed   [address], [address]       Discharge Date       Discharge Disposition       Discharge Destination                                 Attending Provider: Joaquin Arroyo MD    Allergies: Bacitracin, Neosporin [Neomycin-bacitracin Zn-polymyx], Fish-derived Products, Shellfish Allergy, Shrimp (Diagnostic)    Isolation: None   Infection: None   Code Status: CPR    Ht: 185.4 cm (73\")   Wt: 81.6 kg (180 lb)    Admission Cmt: None   Principal Problem: Hypoglycemia [E16.2]                   Active Insurance as of 9/26/2023       Primary Coverage       Payor Plan Insurance Group Employer/Plan Group    HUMANA MEDICARE REPLACEMENT HU[employer]NA MEDICARE REPLACEMENT [id number]       Payor Plan Address Payor Plan Phone Number Payor Plan Fax Number Effective Dates    [address] [address] [phone]  1/1/2021 - None Entered    [facility] [id number]         Subscriber Name Subscriber Birth Date Member ID       EDILBERTO YARBROUGH 1950 [id number]                     Emergency Contacts        (Rel.) Home Phone Work Phone Mobile Phone    VarinderJamaica morin (Spouse) [phone] -- [phone]    Varinder,Pete (Son) [phone] -- [phone]    JAYME NAGY (Daughter) [phone] -- --                "

## 2023-09-27 NOTE — ED PROVIDER NOTES
EMERGENCY DEPARTMENT ENCOUNTER    Room Number:  S602/1  PCP: Harvey Larson MD      HPI:  Chief Complaint: Hyperglycemia  A complete HPI/ROS/PMH/PSH/SH/FH are unobtainable due to: Nothing  Context: Edilberto Reeder is a 73 y.o. male who presents to the ED c/o hypoglycemia.  Patient was reported by EMS to be found on the floor drooling and not speaking.  EMS was called to the scene to further evaluate.  Patient's blood glucose was 48.  He was given D10 by EMS which brought the patient's blood glucose up to 110.  His symptoms resolved in the field and the patient was brought to the ED for further evaluation.    Spouse is present in the room and states that patient took his metformin and glyburide during the time she left the house to get the patient something to eat when she returned she found him in the condition stated above.  Patient is self is well-appearing at this time and has no complaints.    Patient appears to be followed by [facility] for atrial fibrillation and diastolic CHF..  He was last seen by Lianne VICTORIA on 9/20/2023.  His primary physician at the group is Dr. Fung.  Patient was noted to have a history of diastolic CHF and had a mildly abnormal stress test in March 2023.  He was anticoagulated with Eliquis twice daily.  His abnormal stress test has been treated medically.  In regards to his CHF, it was decided to keep the patient on Lasix and spironolactone was added.  He was to continue with Eliquis.        PAST MEDICAL HISTORY  Active Ambulatory Problems     Diagnosis Date Noted    ASCVD (arteriosclerotic cardiovascular disease) 01/27/2016    Permanent atrial fibrillation 01/27/2016    Diabetic retinopathy associated with type 2 diabetes mellitus 01/27/2016    Essential hypertension 01/27/2016    HLD (hyperlipidemia) 01/27/2016    Hypothyroidism 01/27/2016    Peripheral neuropathy 01/27/2016    Renal insufficiency 01/27/2016    Type 2 diabetes mellitus with hyperglycemia,  without long-term current use of insulin     Parkinson's disease     Dyspnea on exertion 04/19/2018    Atrial fibrillation, persistent 08/17/2018    Stroke-like symptoms 12/08/2018    Hypopotassemia 12/08/2018    Ankle pain 09/17/2021    Acute idiopathic gout of right ankle 09/18/2021    Generalized weakness 03/23/2022    Head injury due to trauma 04/30/2022    Acute blood loss anemia 05/01/2022    Chronic anticoagulation 05/01/2022    Thrombocytopenia 05/04/2022    Screening for colon cancer 05/04/2022    Minor head injury, initial encounter 05/04/2022    Chronic diastolic heart failure 11/02/2022    Shortness of breath 11/02/2022    Syncope 08/04/2023    Acute cough 07/11/2023    Bronchitis 07/11/2023    Depression 09/07/2023    H/O Spinal surgery 07/03/2017    H/O umbilical hernia repair 01/01/1999    Hemorrhoids 09/07/2023    History of colon polyps 09/07/2023    History of Parkinson's disease 09/07/2023    Melanoma in situ of unspecified upper limb, including shoulder 02/21/2023     Resolved Ambulatory Problems     Diagnosis Date Noted    Atrial fibrillation with RVR 01/04/2018    Chest pain 03/23/2022    Episodes of staring 03/23/2022    Diplopia 03/23/2022    Headache 03/23/2022    Metabolic encephalopathy 03/25/2022     Past Medical History:   Diagnosis Date    Atrial flutter     Diabetes     Hypertension          PAST SURGICAL HISTORY  Past Surgical History:   Procedure Laterality Date    BRAIN STIMULATOR      COLONOSCOPY N/A 5/5/2022    Procedure: COLONOSCOPY TO CECUM WITH COLD SNARE POLYPECTOMY;  Surgeon: Luther Ferrer MD;  Location: [facility] ENDOSCOPY;  Service: Gastroenterology;  Laterality: N/A;  PRE:ANEMIA  POST:POLYPS/HEMORRHOIDS    ENDOSCOPY N/A 5/5/2022    Procedure: ESOPHAGOGASTRODUODENOSCOPY WITH  COLD BIOPSIES;  Surgeon: Luther Ferrer MD;  Location: [facility] ENDOSCOPY;  Service: Gastroenterology;  Laterality: N/A;  PRE:ANEMIA  POST:DIVERTICULUM/GASTRITIS    JOINT REPLACEMENT       Bilateral shoulder and knee replacements         FAMILY HISTORY  History reviewed. No pertinent family history.      SOCIAL HISTORY  Social History     Socioeconomic History    Marital status:    Tobacco Use    Smoking status: Never    Smokeless tobacco: Never    Tobacco comments:     caffeine use   Vaping Use    Vaping Use: Never used   Substance and Sexual Activity    Alcohol use: Yes    Drug use: No    Sexual activity: Defer         ALLERGIES  Bacitracin, Neosporin [neomycin-bacitracin zn-polymyx], Fish-derived products, Shellfish allergy, and Shrimp (diagnostic)        REVIEW OF SYSTEMS  Review of Systems     All systems reviewed and negative except for those discussed in HPI.       PHYSICAL EXAM  ED Triage Vitals [09/26/23 2227]   Temp Heart Rate Resp BP SpO2   97.5 °F (36.4 °C) 66 18 123/62 97 %      Temp src Heart Rate Source Patient Position BP Location FiO2 (%)   -- -- -- -- --       Physical Exam      GENERAL: Chronically ill appearance, no acute distress  HENT: nares patent  EYES: no scleral icterus  CV: regular rhythm, normal rate, normal S1-S2, no unilateral leg swelling  RESPIRATORY: normal effort, lungs CTA B  ABDOMEN: soft, nontender  MUSCULOSKELETAL: no deformity  NEURO: alert orient x4, moves all extremities, follows commands  PSYCH:  calm, cooperative  SKIN: warm, dry    Vital signs and nursing notes reviewed.          LAB RESULTS  Recent Results (from the past 24 hour(s))   POC Glucose Once    Collection Time: 09/26/23 10:28 PM    Specimen: Blood   Result Value Ref Range    Glucose 72 70 - 130 mg/dL   Comprehensive Metabolic Panel    Collection Time: 09/26/23 10:53 PM    Specimen: Blood   Result Value Ref Range    Glucose 134 (H) 65 - 99 mg/dL    BUN 27 (H) 8 - 23 mg/dL    Creatinine 1.63 (H) 0.76 - 1.27 mg/dL    Sodium 131 (L) 136 - 145 mmol/L    Potassium 4.0 3.5 - 5.2 mmol/L    Chloride 94 (L) 98 - 107 mmol/L    CO2 24.0 22.0 - 29.0 mmol/L    Calcium 9.0 8.6 - 10.5 mg/dL    Total  Protein 7.1 6.0 - 8.5 g/dL    Albumin 3.9 3.5 - 5.2 g/dL    ALT (SGPT) <5 1 - 41 U/L    AST (SGOT) 33 1 - 40 U/L    Alkaline Phosphatase 219 (H) 39 - 117 U/L    Total Bilirubin 0.9 0.0 - 1.2 mg/dL    Globulin 3.2 gm/dL    A/G Ratio 1.2 g/dL    BUN/Creatinine Ratio 16.6 7.0 - 25.0    Anion Gap 13.0 5.0 - 15.0 mmol/L    eGFR 43.7 (L) >60.0 mL/min/1.73   CBC Auto Differential    Collection Time: 09/26/23 10:53 PM    Specimen: Blood   Result Value Ref Range    WBC 5.52 3.40 - 10.80 10*3/mm3    RBC 3.70 (L) 4.14 - 5.80 10*6/mm3    Hemoglobin 8.8 (L) 13.0 - 17.7 g/dL    Hematocrit 28.4 (L) 37.5 - 51.0 %    MCV 76.8 (L) 79.0 - 97.0 fL    MCH 23.8 (L) 26.6 - 33.0 pg    MCHC 31.0 (L) 31.5 - 35.7 g/dL    RDW 16.3 (H) 12.3 - 15.4 %    RDW-SD 45.1 37.0 - 54.0 fl    MPV 9.8 6.0 - 12.0 fL    Platelets 212 140 - 450 10*3/mm3    Neutrophil % 69.4 42.7 - 76.0 %    Lymphocyte % 15.6 (L) 19.6 - 45.3 %    Monocyte % 8.7 5.0 - 12.0 %    Eosinophil % 4.3 0.3 - 6.2 %    Basophil % 1.6 (H) 0.0 - 1.5 %    Immature Grans % 0.4 0.0 - 0.5 %    Neutrophils, Absolute 3.83 1.70 - 7.00 10*3/mm3    Lymphocytes, Absolute 0.86 0.70 - 3.10 10*3/mm3    Monocytes, Absolute 0.48 0.10 - 0.90 10*3/mm3    Eosinophils, Absolute 0.24 0.00 - 0.40 10*3/mm3    Basophils, Absolute 0.09 0.00 - 0.20 10*3/mm3    Immature Grans, Absolute 0.02 0.00 - 0.05 10*3/mm3    nRBC 0.0 0.0 - 0.2 /100 WBC   Magnesium    Collection Time: 09/26/23 10:53 PM    Specimen: Blood   Result Value Ref Range    Magnesium 1.4 (L) 1.6 - 2.4 mg/dL   POC Glucose Once    Collection Time: 09/26/23 11:57 PM    Specimen: Blood   Result Value Ref Range    Glucose 98 70 - 130 mg/dL   POC Glucose Once    Collection Time: 09/27/23  1:28 AM    Specimen: Blood   Result Value Ref Range    Glucose 86 70 - 130 mg/dL   High Sensitivity Troponin T    Collection Time: 09/27/23  2:01 AM    Specimen: Blood   Result Value Ref Range    HS Troponin T 100 (C) <15 ng/L   ECG 12 Lead Chest Pain    Collection Time:  09/27/23  2:12 AM   Result Value Ref Range    QT Interval 642 ms    QTC Interval 653 ms   POC Glucose Once    Collection Time: 09/27/23  2:48 AM    Specimen: Blood   Result Value Ref Range    Glucose 80 70 - 130 mg/dL   POC Glucose Once    Collection Time: 09/27/23  3:51 AM    Specimen: Blood   Result Value Ref Range    Glucose 85 70 - 130 mg/dL   High Sensitivity Troponin T 2Hr    Collection Time: 09/27/23  4:09 AM    Specimen: Blood   Result Value Ref Range    HS Troponin T 99 (C) <15 ng/L    Troponin T Delta -1 >=-4 - <+4 ng/L   Basic Metabolic Panel    Collection Time: 09/27/23  6:21 AM    Specimen: Blood   Result Value Ref Range    Glucose 52 (L) 65 - 99 mg/dL    BUN 25 (H) 8 - 23 mg/dL    Creatinine 1.47 (H) 0.76 - 1.27 mg/dL    Sodium 131 (L) 136 - 145 mmol/L    Potassium 3.8 3.5 - 5.2 mmol/L    Chloride 95 (L) 98 - 107 mmol/L    CO2 23.0 22.0 - 29.0 mmol/L    Calcium 8.8 8.6 - 10.5 mg/dL    BUN/Creatinine Ratio 17.0 7.0 - 25.0    Anion Gap 13.0 5.0 - 15.0 mmol/L    eGFR 49.4 (L) >60.0 mL/min/1.73   CBC Auto Differential    Collection Time: 09/27/23  6:21 AM    Specimen: Blood   Result Value Ref Range    WBC 4.42 3.40 - 10.80 10*3/mm3    RBC 3.62 (L) 4.14 - 5.80 10*6/mm3    Hemoglobin 8.7 (L) 13.0 - 17.7 g/dL    Hematocrit 27.7 (L) 37.5 - 51.0 %    MCV 76.5 (L) 79.0 - 97.0 fL    MCH 24.0 (L) 26.6 - 33.0 pg    MCHC 31.4 (L) 31.5 - 35.7 g/dL    RDW 16.5 (H) 12.3 - 15.4 %    RDW-SD 45.9 37.0 - 54.0 fl    MPV 9.8 6.0 - 12.0 fL    Platelets 202 140 - 450 10*3/mm3    Neutrophil % 63.5 42.7 - 76.0 %    Lymphocyte % 18.6 (L) 19.6 - 45.3 %    Monocyte % 10.6 5.0 - 12.0 %    Eosinophil % 5.9 0.3 - 6.2 %    Basophil % 0.9 0.0 - 1.5 %    Immature Grans % 0.5 0.0 - 0.5 %    Neutrophils, Absolute 2.81 1.70 - 7.00 10*3/mm3    Lymphocytes, Absolute 0.82 0.70 - 3.10 10*3/mm3    Monocytes, Absolute 0.47 0.10 - 0.90 10*3/mm3    Eosinophils, Absolute 0.26 0.00 - 0.40 10*3/mm3    Basophils, Absolute 0.04 0.00 - 0.20 10*3/mm3     Immature Grans, Absolute 0.02 0.00 - 0.05 10*3/mm3    nRBC 0.0 0.0 - 0.2 /100 WBC   Protime-INR    Collection Time: 09/27/23  6:21 AM    Specimen: Blood   Result Value Ref Range    Protime 24.1 (H) 11.7 - 14.2 Seconds    INR 2.11 (H) 0.90 - 1.10   Magnesium    Collection Time: 09/27/23  6:21 AM    Specimen: Blood   Result Value Ref Range    Magnesium 2.5 (H) 1.6 - 2.4 mg/dL   High Sensitivity Troponin T    Collection Time: 09/27/23  6:21 AM    Specimen: Blood   Result Value Ref Range    HS Troponin T 100 (C) <15 ng/L   POC Glucose Once    Collection Time: 09/27/23  6:38 AM    Specimen: Blood   Result Value Ref Range    Glucose 65 (L) 70 - 130 mg/dL   POC Glucose Once    Collection Time: 09/27/23  7:55 AM    Specimen: Blood   Result Value Ref Range    Glucose 88 70 - 130 mg/dL   POC Glucose Once    Collection Time: 09/27/23  8:11 AM    Specimen: Blood   Result Value Ref Range    Glucose 95 70 - 130 mg/dL   POC Glucose Once    Collection Time: 09/27/23  9:14 AM    Specimen: Blood   Result Value Ref Range    Glucose 107 70 - 130 mg/dL   Adult Transthoracic Echo Complete W/ Cont if Necessary Per Protocol    Collection Time: 09/27/23 11:22 AM   Result Value Ref Range    EF(MOD-bp) 56.4 %    LVIDd 3.8 cm    LVIDs 3.0 cm    IVSd 1.16 cm    LVPWd 1.03 cm    FS 22.4 %    IVS/LVPW 1.13 cm    ESV(cubed) 26.6 ml    EDV(cubed) 56.9 ml    LVOT area 3.3 cm2    LV mass(C)d 136.5 grams    LVOT diam 2.05 cm    EDV(MOD-sp2) 68.0 ml    EDV(MOD-sp4) 83.0 ml    ESV(MOD-sp2) 23.0 ml    ESV(MOD-sp4) 41.0 ml    SV(MOD-sp2) 45.0 ml    SV(MOD-sp4) 42.0 ml    EF(MOD-sp2) 66.2 %    EF(MOD-sp4) 50.6 %    MV E max peter 142.0 cm/sec    MV dec time 0.15 sec    Med Peak E' Peter 5.7 cm/sec    Lat Peak E' Peter 8.4 cm/sec    Avg E/e' ratio 20.14     SV(LVOT) 58.9 ml    RV Base 3.7 cm    RV Mid 3.5 cm    RV Length 6.5 cm    TAPSE (>1.6) 1.26 cm    RV S' 7.6 cm/sec    LV V1 max 83.7 cm/sec    LV V1 max PG 2.8 mmHg    LV V1 mean PG 1.41 mmHg    LV V1 VTI  17.8 cm    Ao pk claire 106.4 cm/sec    Ao max PG 4.5 mmHg    Ao mean PG 2.47 mmHg    Ao V2 VTI 22.9 cm    RIAZ(I,D) 2.6 cm2    MV max PG 7.3 mmHg    MV mean PG 2.6 mmHg    MV V2 VTI 33.9 cm    MV P1/2t 76.6 msec    MVA(P1/2t) 2.9 cm2    MVA(VTI) 1.74 cm2    MV dec slope 490.1 cm/sec2    TR max claire 242.2 cm/sec    TR max PG 23.5 mmHg    RVSP(TR) 38.5 mmHg    RAP systole 15.0 mmHg    RV V1 max PG 0.94 mmHg    RV V1 max 48.4 cm/sec    RV V1 VTI 11.9 cm    PA acc time 0.17 sec    Ao root diam 3.5 cm    ACS 2.28 cm   POC Glucose Once    Collection Time: 09/27/23  1:55 PM    Specimen: Blood   Result Value Ref Range    Glucose 99 70 - 130 mg/dL   POC Glucose Once    Collection Time: 09/27/23  5:08 PM    Specimen: Blood   Result Value Ref Range    Glucose 118 70 - 130 mg/dL   POC Glucose Once    Collection Time: 09/27/23  7:37 PM    Specimen: Blood   Result Value Ref Range    Glucose 146 (H) 70 - 130 mg/dL       Ordered the above labs and reviewed the results.        RADIOLOGY  Adult Transthoracic Echo Complete W/ Cont if Necessary Per Protocol    Result Date: 9/27/2023    Left ventricular systolic function is normal. Calculated left ventricular EF = 56.4%   Left ventricular diastolic function was indeterminate.   There is moderate, anterior mitral leaflet thickening present.   Moderate tricuspid valve regurgitation is present.   Estimated right ventricular systolic pressure from tricuspid regurgitation is mildly elevated (35-45 mmHg). Calculated right ventricular systolic pressure from tricuspid regurgitation is 39 mmHg.     XR Chest 1 View    Result Date: 9/27/2023  Patient: GABBIE YARBROUGH  Time Out: 06:26 Exam(s): XR CXR 1 VIEW EXAM:   XR Chest, 1 View CLINICAL HISTORY:    Reason for exam: Chest pain. TECHNIQUE:   Frontal view of the chest. COMPARISON:   8 4 2023 FINDINGS:   There is right perihilar haziness, suggestive of asymmetric mild pulmonary edema but could represent pneumonia.  The left lung is clear.  The heart  size is normal.  The aorta is mildly tortuous, without change.  There is a stable generator overlying the right chest wall.  Bilateral shoulder surgeries noted.  The study is slightly limited by apical lordotic positioning.  In addition, the right costophrenic angle is not included. IMPRESSION:     Right perihilar infiltrates consistent with asymmetric pulmonary edema versus pneumonia.    Electronically signed by Bee Zheng MD on 09-27-23 at 0626     Ordered the above noted radiological studies. Reviewed by me in PACS.          PROCEDURES  Procedures          MEDICATIONS GIVEN IN ER  Medications   traZODone (DESYREL) tablet 25 mg (has no administration in time range)   dextrose 5 % and sodium chloride 0.9 % infusion (75 mL/hr Intravenous New Bag 9/27/23 1625)   magnesium sulfate 2g/50 mL (PREMIX) infusion (2 g Intravenous New Bag 9/27/23 1626)         MEDICAL DECISION MAKING, PROGRESS, and CONSULTS    Discussion below represents my analysis of pertinent findings related to patient's condition, differential diagnosis, treatment plan and final disposition.      Orders placed during this visit:  Orders Placed This Encounter   Procedures    XR Chest 1 View    Comprehensive Metabolic Panel    CBC Auto Differential    Magnesium    High Sensitivity Troponin T    High Sensitivity Troponin T 2Hr    Basic Metabolic Panel    CBC Auto Differential    Protime-INR    Magnesium    High Sensitivity Troponin T    Urinalysis With Microscopic If Indicated (No Culture) - Urine, Clean Catch    Protein / Creatinine Ratio, Urine - Urine, Clean Catch    Renal Function Panel    MEIR + PE    Iron Profile    Ferritin    Magnesium    Diet: Diabetic Diets; Consistent Carbohydrate; Texture: Regular Texture (IDDSI 7); Fluid Consistency: Thin (IDDSI 0)    NPO Diet NPO Type: Strict NPO    Assist With Feeding Patient    Vital Signs    Intake & Output    Weigh Patient    Oral Care    Place Sequential Compression Device    Maintain Sequential  Compression Device    Telemetry - Maintain IV Access    Telemetry - Place Orders & Notify Provider of Results When Patient Experiences Acute Chest Pain, Dysrhythmia or Respiratory Distress    May Be Off Telemetry for Tests    Up With Assistance    Neuro Checks    FYI patient may be wet per wife.  I ordered PVR, call if > 300 thanks  Nursing Communication    Bladder Scan    Code Status and Medical Interventions:    CAMILLE (on-call MD unless specified) Details    Inpatient Cardiology Consult    Inpatient Nephrology Consult    PT Consult: Eval & Treat Functional Mobility Below Baseline    PT Plan of Care Cert / Re-Cert    POC Glucose Once    POC Glucose Q1H    POC Glucose Once    POC Glucose Once    POC Glucose Once    POC Glucose Q2H    POC Glucose Once    POC Glucose 4x Daily Before Meals & at Bedtime    POC Glucose Once    POC Glucose Once    POC Glucose Once    POC Glucose Once    POC Glucose Once    POC Glucose Once    POC Glucose Once    ECG 12 Lead Chest Pain    SCANNED - TELEMETRY      SCANNED - TELEMETRY      SCANNED - TELEMETRY      SCANNED - TELEMETRY      Adult Transthoracic Echo Complete W/ Cont if Necessary Per Protocol    Insert Peripheral IV    Insert Peripheral IV    Initiate Observation Status    CBC & Differential    CBC & Differential         Additional sources:  - Discussed/obtained information from independent historians: None available  Additional information was obtained to confirm the patient's history.    - External (non-ED) record review: As above            - Chronic or social conditions impacting care: None        Differential diagnosis:    Hypoglycemia secondary to skipping meals, hypoglycemia secondary to increased metabolic output and skipping meals, hypoglycemia secondary to accidental overdose, other cause of hypoglycemia.  This sounds like it was secondary to taking his medication while missing his evening meal.  Patient is on a long-acting sulfonylurea.  Patient's blood glucose dropped  rapidly from 110 in the field with EMS to 70 in the ED after the D10 was given.  We will feed the patient at this time and start a slow D5 half-normal saline drip and reassess hourly.            Independent interpretation of labs, radiology studies, and discussions with consultants:  ED Course as of 09/27/23 2003   Tue Sep 26, 2023   2244 Patient is noted to be taking glipizide and metformin [RC]   Wed Sep 27, 2023   0306 WBC: 5.52 [RC]   0307 RBC(!): 3.70 [RC]   0307 Hemoglobin(!): 8.8 [RC]   0307 Hematocrit(!): 28.4 [RC]   0307 Platelets: 212 [RC]   0307 Glucose(!): 134 [RC]   0307 BUN(!): 27 [RC]   0307 Creatinine(!): 1.63 [RC]   0307 Sodium(!): 131 [RC]   0307 CO2: 24.0 [RC]   0307 Anion Gap: 13.0 [RC]   0307 Magnesium(!): 1.4 [RC]   0307 HS Troponin T(!!): 100  SPECT this is renal related is as follows 123 and 116 roughly a month ago. [RC]   0307 Glucose: 80 [RC]   0307 Patient had complained of chest pain to my supervising physician.  An EKG and 2 troponins were obtained. [RC]   0307 Given his long-acting sulfonylurea, glucose remaining on the low end of normal given a plate lunch and a D5 half-normal saline drip, as well as his new complaint of chest pain, and is felt to be better served by admission to the hospital for further evaluation and management. [RC]   0320 Discussed with HANNAH Cabrera  To admit to Dr Diallo's care for further obs and management. [RC]   0323 EKG          EKG time: 0212  Rhythm/Rate: Sinus rhythm rate 62  P waves and NC: Moderate baseline artifact  QRS, axis: Narrow regular  ST and T waves: Normal    Interpreted Contemporaneously by me, independently viewed  No significant changed compared to prior EKG from 8/4/2023   [TJ]      ED Course User Index  [RC] Cali Giron III, PA  [TJ] Bryant Dillon MD               DIAGNOSIS  Final diagnoses:   Hypoglycemia   Chest pain, unspecified type         DISPOSITION  admission      Latest Documented Vital Signs:  As  of 20:03 EDT  BP- 120/55 HR- 67 Temp- 98.2 °F (36.8 °C) (Oral) O2 sat- 95%      --    Please note that portions of this were completed with a voice recognition program.       Note Disclaimer: At [facility], we believe that sharing information builds trust and better relationships. You are receiving this note because you are receiving care at [facility] or recently visited. It is possible you will see health information before a provider has talked with you about it. This kind of information can be easy to misunderstand. To help you fully understand what it means for your health, we urge you to discuss this note with your provider.         Cali Giron III, PA  09/27/23 2003

## 2023-09-27 NOTE — THERAPY EVALUATION
Patient Name: Edilberto Reeder  : 1950    MRN: [id number]                              Today's Date: 2023       Admit Date: 2023    Visit Dx:     ICD-10-CM ICD-9-CM   1. Hypoglycemia  E16.2 251.2   2. Chest pain, unspecified type  R07.9 786.50     Patient Active Problem List   Diagnosis    ASCVD (arteriosclerotic cardiovascular disease)    Permanent atrial fibrillation    Diabetic retinopathy associated with type 2 diabetes mellitus    Essential hypertension    HLD (hyperlipidemia)    Hypothyroidism    Peripheral neuropathy    Renal insufficiency    Type 2 diabetes mellitus with hyperglycemia, without long-term current use of insulin    Parkinson's disease    Dyspnea on exertion    Atrial fibrillation, persistent    Stroke-like symptoms    Hypopotassemia    Ankle pain    Acute idiopathic gout of right ankle    Generalized weakness    Head injury due to trauma    Acute blood loss anemia    Chronic anticoagulation    Thrombocytopenia    Screening for colon cancer    Minor head injury, initial encounter    Chronic diastolic heart failure    Shortness of breath    Syncope    Acute cough    Bronchitis    Depression    H/O Spinal surgery    H/O umbilical hernia repair    Hemorrhoids    History of colon polyps    History of Parkinson's disease    Melanoma in situ of unspecified upper limb, including shoulder    Hypoglycemia    MATT (acute kidney injury)    Stage 3a chronic kidney disease     Past Medical History:   Diagnosis Date    Atrial fibrillation with RVR     Atrial flutter     Diabetes     HLD (hyperlipidemia) 2016    Hypertension     Parkinson's disease     Advanced      Past Surgical History:   Procedure Laterality Date    BRAIN STIMULATOR      COLONOSCOPY N/A 2022    Procedure: COLONOSCOPY TO CECUM WITH COLD SNARE POLYPECTOMY;  Surgeon: Luther Ferrer MD;  Location: [facility] ENDOSCOPY;  Service: Gastroenterology;  Laterality: N/A;  PRE:ANEMIA  POST:POLYPS/HEMORRHOIDS    ENDOSCOPY  N/A 5/5/2022    Procedure: ESOPHAGOGASTRODUODENOSCOPY WITH  COLD BIOPSIES;  Surgeon: Luther Ferrer MD;  Location: [facility] ENDOSCOPY;  Service: Gastroenterology;  Laterality: N/A;  PRE:ANEMIA  POST:DIVERTICULUM/GASTRITIS    JOINT REPLACEMENT      Bilateral shoulder and knee replacements      General Information       Row Name 09/27/23 1502          Physical Therapy Time and Intention    Document Type evaluation (P)   -LL     Mode of Treatment individual therapy;physical therapy (P)   -LL       Row Name 09/27/23 1502          General Information    Patient Profile Reviewed yes (P)   -LL     Prior Level of Function dependent:;ADL's (P)   U-step walker; wife assists  -LL     Existing Precautions/Restrictions fall (P)   -LL     Barriers to Rehab medically complex;previous functional deficit (P)   -LL       Row Name 09/27/23 1502          Living Environment    People in Home grandchild(abilio);child(abilio), adult;spouse (P)   -LL       Row Name 09/27/23 1502          Cognition    Orientation Status (Cognition) oriented x 4 (P)   -LL       Row Name 09/27/23 1502          Safety Issues, Functional Mobility    Impairments Affecting Function (Mobility) balance;endurance/activity tolerance;strength (P)   -LL               User Key  (r) = Recorded By, (t) = Taken By, (c) = Cosigned By      Initials Name Provider Type    LL Joselin Longoria, PT Student PT Student                   Mobility       Row Name 09/27/23 1509          Bed Mobility    Bed Mobility supine-sit;sit-supine (P)   -LL     Supine-Sit [place] (Bed Mobility) standby assist (P)   -LL     Sit-Supine [place] (Bed Mobility) minimum assist (75% patient effort);verbal cues;1 person assist (P)   -LL     Assistive Device (Bed Mobility) head of bed elevated;bed rails (P)   -LL     Comment, (Bed Mobility) Need help swinging legs to bed during sit>supine (P)   -LL       Row Name 09/27/23 150          Sit-Stand Transfer    Sit-Stand [place] (Transfers)  minimum assist (75% patient effort);moderate assist (50% patient effort);2 person assist;verbal cues (P)   -LL     Assistive Device (Sit-Stand Transfers) walker, front-wheeled (P)   -LL     Comment, (Sit-Stand Transfer) Unsuccessful 1st attempt; required cueing for proper body positioning (P)   -LL       Row Name 09/27/23 1505          Gait/Stairs (Locomotion)    [place] Level (Gait) unable to assess (P)   -LL     Comment, (Gait/Stairs) IV connected to bed; unable to reach RN to disconnect (P)   -LL               User Key  (r) = Recorded By, (t) = Taken By, (c) = Cosigned By      Initials Name Provider Type     Joselin Longoria, PT Student PT Student                   Obj/Interventions       Row Name 09/27/23 1508          Range of Motion Comprehensive    General Range of Motion bilateral lower extremity ROM WFL (P)   -LL       Row Name 09/27/23 1508          Strength Comprehensive (MMT)    Comment, General Manual Muscle Testing (MMT) Assessment BLE >3/5 MMT (P)   -LL       [facility] Name 09/27/23 1508          Motor Skills    Therapeutic Exercise -- (P)   -LL       Row Name 09/27/23 1508          Balance    Balance Assessment sitting static balance;sitting dynamic balance;standing static balance;standing dynamic balance (P)   -LL     Static Sitting Balance standby assist (P)   -LL     Dynamic Sitting Balance standby assist (P)   -LL     Position, Sitting Balance unsupported;sitting edge of bed (P)   -LL     Static Standing Balance contact guard (P)   -LL     Dynamic Standing Balance contact guard;verbal cues (P)   -LL     Position/Device Used, Standing Balance supported;walker, front-wheeled (P)   -LL     Comment, Balance C/o dizziness in static sitting and standing (P)   -LL       Row Name 09/27/23 1508          Sensory Assessment (Somatosensory)    Sensory Assessment (Somatosensory) not tested (P)   -LL               User Key  (r) = Recorded By, (t) = Taken By, (c) = Cosigned By      Initials Name Provider  Type    LL Joselin Longoria, PT Student PT Student                   Goals/Plan       Row Name 09/27/23 1524          Bed Mobility Goal 1 (PT)    Activity/Assistive Device (Bed Mobility Goal 1, PT) bed mobility activities, all (P)   -LL     [place] Level/Cues Needed (Bed Mobility Goal 1, PT) independent (P)   -LL     Time Frame (Bed Mobility Goal 1, PT) 2 weeks (P)   -LL       Row Name 09/27/23 1524          Transfer Goal 1 (PT)    [place] Level/Cues Needed (Transfer Goal 1, PT) modified independence (P)   -LL     Time Frame (Transfer Goal 1, PT) 2 weeks (P)   -LL       Row Name 09/27/23 1524          Gait Training Goal 1 (PT)    Activity/Assistive Device (Gait Training Goal 1, PT) gait (walking locomotion) (P)   -LL     [place] Level (Gait Training Goal 1, PT) modified independence (P)   -LL     Distance (Gait Training Goal 1, PT) 100' (P)   -LL     Time Frame (Gait Training Goal 1, PT) 2 weeks (P)   -LL       Row Name 09/27/23 1524          Therapy Assessment/Plan (PT)    Planned Therapy Interventions (PT) balance training;bed mobility training;gait training;transfer training;strengthening;patient/family education (P)   -LL               User Key  (r) = Recorded By, (t) = Taken By, (c) = Cosigned By      Initials Name Provider Type    Joselin De La Rosa, PT Student PT Student                   Clinical Impression       Row Name 09/27/23 1510          Pain    Pretreatment Pain Rating 0/10 - no pain (P)   -LL     Posttreatment Pain Rating 0/10 - no pain (P)   -LL       Row Name 09/27/23 1510          Plan of Care Review    Plan of Care Reviewed With patient;spouse (P)   -LL     Outcome Evaluation Pt is a 73 y.o. male admitted for hypoglycemia, found down. Hx includes: Pakinson's, CHF, afib, HTN and DM2. Pt lives with spouse, using a U-step walker. Wife reports pt has fallen several times within the past month. Baseline requires assistance with bed mobility and STS from spouse. Pt demo. balance,  endurance/activity tolerance and strength deficits limiting functional mobility. Pt completed supine>sit with SBA, sit>supine with Ranjit x1 and STS with Ranjit/ModA x2 and RW. STS achieved on second attempt. Unable to perform gait assessment d/t IV connected to bed; unable to reach RN to disconnect. Balance assessed via marching in place completed for several minutes with CGA and RW. Cueing to stay on task needed for therex. Pt c/o dizziness initally, but BP WNL. Anticipate home with 24/7 care upon d/c. (P)   -LL       Row Name 09/27/23 1510          Therapy Assessment/Plan (PT)    Rehab Potential (PT) good, to achieve stated therapy goals (P)   -LL     Criteria for Skilled Interventions Met (PT) yes (P)   -LL     Therapy Frequency (PT) 5 times/wk (P)   -LL       Row Name 09/27/23 1510          Vital Signs    Pre Systolic BP Rehab 120 (P)   -LL     Pre Treatment Diastolic BP 65 (P)   -LL     O2 Delivery Pre Treatment room air (P)   -LL     O2 Delivery Post Treatment room air (P)   -LL       Row Name 09/27/23 1510          Positioning and Restraints    Pre-Treatment Position in bed (P)   -LL     Post Treatment Position bed (P)   -LL     In Bed call light within reach;encouraged to call for assist;exit alarm on;fowlers;with family/caregiver (P)   -LL               User Key  (r) = Recorded By, (t) = Taken By, (c) = Cosigned By      Initials Name Provider Type     Joselin Longoria, PT Student PT Student                   Outcome Measures       Row Name 09/27/23 1525 09/27/23 0800       How much help from another person do you currently need...    Turning from your back to your side while in flat bed without using bedrails? 3 (P)   -LL 3  -SH    Moving from lying on back to sitting on the side of a flat bed without bedrails? 3 (P)   -LL 3  -SH    Moving to and from a bed to a chair (including a wheelchair)? 3 (P)   -LL 3  -SH    Standing up from a chair using your arms (e.g., wheelchair, bedside chair)? 3 (P)   -LL 3   -SH    Climbing 3-5 steps with a railing? 2 (P)   -LL 3  -SH    To walk in hospital room? 3 (P)   -LL 3  -SH    AM-PAC 6 Clicks Score (PT) 17 (P)   -LL 18  -SH    Highest level of mobility 5 --> Static standing (P)   -LL 6 --> Walked 10 steps or more  -      Row Name 09/27/23 0507          How much help from another person do you currently need...    Turning from your back to your side while in flat bed without using bedrails? 3  -TP     Moving from lying on back to sitting on the side of a flat bed without bedrails? 3  -TP     Moving to and from a bed to a chair (including a wheelchair)? 3  -TP     Standing up from a chair using your arms (e.g., wheelchair, bedside chair)? 3  -TP     Climbing 3-5 steps with a railing? 2  -TP     To walk in hospital room? 3  -TP     AM-PAC 6 Clicks Score (PT) 17  -TP     Highest level of mobility 5 --> Static standing  -TP       Row Name 09/27/23 1525          Functional Assessment    Outcome Measure Options AM-PAC 6 Clicks Basic Mobility (PT) (P)   -LL               User Key  (r) = Recorded By, (t) = Taken By, (c) = Cosigned By      Initials Name Provider Type     Adria Toussaint, RN Registered Nurse    Julita Carrillo RN Registered Nurse    Joselin De La Rosa, PT Student PT Student                                 Physical Therapy Education       Title: PT OT SLP Therapies (Done)       Topic: Physical Therapy (Done)       Point: Mobility training (Done)       Learning Progress Summary             Patient Acceptance, E, VU by  at 9/27/2023 1526   Family Acceptance, E, VU by  at 9/27/2023 1526                         Point: Body mechanics (Done)       Learning Progress Summary             Patient Acceptance, E, VU by  at 9/27/2023 1526   Family Acceptance, E, VU by  at 9/27/2023 1526                                         User Key       Initials Effective Dates Name Provider Type [facility] 09/12/23 -  Joselin Longoria, PT Student PT Student PT                   PT Recommendation and Plan  Planned Therapy Interventions (PT): (P) balance training, bed mobility training, gait training, transfer training, strengthening, patient/family education  Plan of Care Reviewed With: (P) patient, spouse  Outcome Evaluation: (P) Pt is a 73 y.o. male admitted for hypoglycemia, found down. Hx includes: Pakinson's, CHF, afib, HTN and DM2. Pt lives with spouse, using a U-step walker. Wife reports pt has fallen several times within the past month. Baseline requires assistance with bed mobility and STS from spouse. Pt demo. balance, endurance/activity tolerance and strength deficits limiting functional mobility. Pt completed supine>sit with SBA, sit>supine with Ranjit x1 and STS with Ranjit/ModA x2 and RW. STS achieved on second attempt. Unable to perform gait assessment d/t IV connected to bed; unable to reach RN to disconnect. Balance assessed via marching in place completed for several minutes with CGA and RW. Cueing to stay on task needed for therex. Pt c/o dizziness initally, but BP WNL. Anticipate home with 24/7 care upon d/c.     Time Calculation:         PT Charges       Row Name 09/27/23 1526             Time Calculation    Start Time 1436 (P)   -LL      Stop Time 1457 (P)   -LL      Time Calculation (min) 21 min (P)   -LL      PT Received On 09/27/23 (P)   -LL      PT - Next Appointment 09/28/23 (P)   -LL      PT Goal Re-Cert Due Date 10/11/23 (P)   -LL         Time Calculation- PT    Total Timed Code Minutes- PT 17 minute(s) (P)   -LL         Timed Charges    96331 - Gait Training Minutes  -- (P)   -LL      86620 - PT Therapeutic Activity Minutes 17 (P)   -LL         Total Minutes    Timed Charges Total Minutes 17 (P)   -LL       Total Minutes 17 (P)   -LL                User Key  (r) = Recorded By, (t) = Taken By, (c) = Cosigned By      Initials Name Provider Type    Joselin De La Rosa, PT Student PT Student                  Therapy Charges for Today       Code Description  Service Date Service Provider Modifiers Qty    [id number]  PT THERAPEUTIC ACT EA 15 MIN 9/27/2023 Joselin Longoria, PT Student GP 1    [id number]  PT EVAL MOD COMPLEXITY 3 9/27/2023 Joselin Longoria, PT Student GP 1            PT G-Codes  Outcome Measure Options: (P) AM-PAC 6 Clicks Basic Mobility (PT)  AM-PAC 6 Clicks Score (PT): (P) 17  PT Discharge Summary  Anticipated Discharge Disposition (PT): (P) home with assist, home, home with 24/7 care    Joselin Longoria, PT Student  9/27/2023

## 2023-09-27 NOTE — PROGRESS NOTES
Discharge Planning Assessment  [facility]     Patient Name: Edilberto Reeder  MRN: [id number]  Today's Date: 9/27/2023    Admit Date: 9/26/2023    Plan: tbd   Discharge Needs Assessment       Row Name 09/27/23 1709       Living Environment    People in Home child(abilio), adult;spouse    Name(s) of People in Home spouse [place]    Current Living Arrangements home    Potentially Unsafe Housing Conditions none    Primary Care Provided by spouse/significant other;child(abilio)    Provides Primary Care For no one, unable/limited ability to care for self    Family Caregiver if Needed child(abilio), adult;spouse    Quality of Family Relationships helpful;involved;supportive    Able to Return to Prior Arrangements yes       Resource/Environmental Concerns    Resource/Environmental Concerns none    Transportation Concerns none       Food Insecurity    Within the past 12 months, you worried that your food would run out before you got the money to buy more. Never true    Within the past 12 months, the food you bought just didn't last and you didn't have money to get more. Never true       Transition Planning    Patient/Family Anticipates Transition to home with family    Patient/Family Anticipated Services at Transition skilled nursing;home health care    Transportation Anticipated family or friend will provide;agency       Discharge Needs Assessment    Equipment Currently Used at Home walker, standard;cane, straight;wheelchair, motorized    Concerns to be Addressed adjustment to diagnosis/illness    Equipment Needed After Discharge none    Discharge Facility/Level of Care Needs nursing facility, skilled;home with home health    Provided Post Acute Provider List? N/A    Provided Post Acute Provider Quality & Resource List? N/A    Current Discharge Risk dependent with mobility/activities of daily living                   Discharge Plan       Row Name 09/27/23 7645       Plan    Plan tbd    Patient/Family in Agreement with Plan yes     Plan Comments Met with patient at the bedside, pt's spouse, [place] is in room resting soundly. Pt lives with spouse, there are two steps to enter house, all ADL are on the main level.  Spouse and adult children assist as needed, Patient does not drive, spouse provides all transportation. Patient uses a cane, walker, w/c, scooter, shower chair and grab bars. Patient has been to [facility] for GIORGIO in the past and he has used [facility]. Patient denies GIORGIO needs at this time. He is agreeable with referral for HH to [facility]. PCP is Dr. Larson, preferred pharmacy is [facility] in [facility].  CCP will follow progress.                  Continued Care and Services - Admitted Since 9/26/2023       Home Medical Care       Service Provider Request Status Selected Services Address Phone Fax Patient Preferred    [facility] Pending - Request Sent N/A [address] [facility] [id number] [phone] --                  Expected Discharge Date and Time       Expected Discharge Date Expected Discharge Time    Sep 29, 2023            Demographic Summary    No documentation.                  Functional Status       Row Name 09/27/23 1715       Functional Status    Usual Activity Tolerance fair       Assessment of Health Literacy    Health Literacy Fair       Functional Status, IADL    Medications assistive person    Meal Preparation independent;assistive person    Housekeeping completely dependent    Laundry completely dependent    Shopping assistive equipment and person;completely dependent       Mental Status    General Appearance WDL WDL                   Psychosocial    No documentation.                  Abuse/Neglect    No documentation.                  Legal    No documentation.                  Substance Abuse    No documentation.                  Patient Forms    No documentation.                     Estrella Alvarez RN

## 2023-09-28 ENCOUNTER — APPOINTMENT (OUTPATIENT)
Dept: NUCLEAR MEDICINE | Facility: HOSPITAL | Age: 73
End: 2023-09-28
Payer: MEDICARE

## 2023-09-28 LAB
ALBUMIN SERPL-MCNC: 3.4 G/DL (ref 3.5–5.2)
ANION GAP SERPL CALCULATED.3IONS-SCNC: 9 MMOL/L (ref 5–15)
BASOPHILS # BLD AUTO: 0.06 10*3/MM3 (ref 0–0.2)
BASOPHILS NFR BLD AUTO: 1.5 % (ref 0–1.5)
BH CV REST NUCLEAR ISOTOPE DOSE: 9.2 MCI
BH CV STRESS COMMENTS STAGE 1: NORMAL
BH CV STRESS DOSE REGADENOSON STAGE 1: 0.4
BH CV STRESS DURATION MIN STAGE 1: 0
BH CV STRESS DURATION SEC STAGE 1: 10
BH CV STRESS NUCLEAR ISOTOPE DOSE: 27.8 MCI
BH CV STRESS PROTOCOL 1: NORMAL
BH CV STRESS RECOVERY BP: NORMAL MMHG
BH CV STRESS RECOVERY HR: 94 BPM
BH CV STRESS STAGE 1: 1
BUN SERPL-MCNC: 19 MG/DL (ref 8–23)
BUN/CREAT SERPL: 14.2 (ref 7–25)
CALCIUM SPEC-SCNC: 9 MG/DL (ref 8.6–10.5)
CHLORIDE SERPL-SCNC: 101 MMOL/L (ref 98–107)
CO2 SERPL-SCNC: 24 MMOL/L (ref 22–29)
CREAT SERPL-MCNC: 1.34 MG/DL (ref 0.76–1.27)
DEPRECATED RDW RBC AUTO: 43.9 FL (ref 37–54)
EGFRCR SERPLBLD CKD-EPI 2021: 55.2 ML/MIN/1.73
EOSINOPHIL # BLD AUTO: 0.24 10*3/MM3 (ref 0–0.4)
EOSINOPHIL NFR BLD AUTO: 6.1 % (ref 0.3–6.2)
ERYTHROCYTE [DISTWIDTH] IN BLOOD BY AUTOMATED COUNT: 16.2 % (ref 12.3–15.4)
FERRITIN SERPL-MCNC: 66.6 NG/ML (ref 30–400)
GLUCOSE BLDC GLUCOMTR-MCNC: 180 MG/DL (ref 70–130)
GLUCOSE BLDC GLUCOMTR-MCNC: 91 MG/DL (ref 70–130)
GLUCOSE BLDC GLUCOMTR-MCNC: 98 MG/DL (ref 70–130)
GLUCOSE SERPL-MCNC: 74 MG/DL (ref 65–99)
HCT VFR BLD AUTO: 27.1 % (ref 37.5–51)
HGB BLD-MCNC: 8.4 G/DL (ref 13–17.7)
IMM GRANULOCYTES # BLD AUTO: 0.01 10*3/MM3 (ref 0–0.05)
IMM GRANULOCYTES NFR BLD AUTO: 0.3 % (ref 0–0.5)
IRON 24H UR-MRATE: 25 MCG/DL (ref 59–158)
IRON SATN MFR SERPL: 7 % (ref 20–50)
LV EF NUC BP: 66 %
LYMPHOCYTES # BLD AUTO: 0.78 10*3/MM3 (ref 0.7–3.1)
LYMPHOCYTES NFR BLD AUTO: 19.7 % (ref 19.6–45.3)
MAGNESIUM SERPL-MCNC: 1.9 MG/DL (ref 1.6–2.4)
MAXIMAL PREDICTED HEART RATE: 145 BPM
MCH RBC QN AUTO: 23.5 PG (ref 26.6–33)
MCHC RBC AUTO-ENTMCNC: 31 G/DL (ref 31.5–35.7)
MCV RBC AUTO: 75.7 FL (ref 79–97)
MONOCYTES # BLD AUTO: 0.39 10*3/MM3 (ref 0.1–0.9)
MONOCYTES NFR BLD AUTO: 9.9 % (ref 5–12)
NEUTROPHILS NFR BLD AUTO: 2.47 10*3/MM3 (ref 1.7–7)
NEUTROPHILS NFR BLD AUTO: 62.5 % (ref 42.7–76)
NRBC BLD AUTO-RTO: 0.3 /100 WBC (ref 0–0.2)
PERCENT MAX PREDICTED HR: 68.28 %
PHOSPHATE SERPL-MCNC: 3 MG/DL (ref 2.5–4.5)
PLATELET # BLD AUTO: 195 10*3/MM3 (ref 140–450)
PMV BLD AUTO: 9.8 FL (ref 6–12)
POTASSIUM SERPL-SCNC: 3.7 MMOL/L (ref 3.5–5.2)
RBC # BLD AUTO: 3.58 10*6/MM3 (ref 4.14–5.8)
SODIUM SERPL-SCNC: 134 MMOL/L (ref 136–145)
STRESS BASELINE BP: NORMAL MMHG
STRESS BASELINE HR: 93 BPM
STRESS PERCENT HR: 80 %
STRESS POST PEAK BP: NORMAL MMHG
STRESS POST PEAK HR: 99 BPM
STRESS TARGET HR: 123 BPM
TIBC SERPL-MCNC: 381 MCG/DL (ref 298–536)
TRANSFERRIN SERPL-MCNC: 256 MG/DL (ref 200–360)
WBC NRBC COR # BLD: 3.95 10*3/MM3 (ref 3.4–10.8)

## 2023-09-28 PROCEDURE — 78452 HT MUSCLE IMAGE SPECT MULT: CPT

## 2023-09-28 PROCEDURE — 99214 OFFICE O/P EST MOD 30 MIN: CPT | Performed by: INTERNAL MEDICINE

## 2023-09-28 PROCEDURE — G0378 HOSPITAL OBSERVATION PER HR: HCPCS

## 2023-09-28 PROCEDURE — 93018 CV STRESS TEST I&R ONLY: CPT | Performed by: INTERNAL MEDICINE

## 2023-09-28 PROCEDURE — 82784 ASSAY IGA/IGD/IGG/IGM EACH: CPT | Performed by: INTERNAL MEDICINE

## 2023-09-28 PROCEDURE — 82728 ASSAY OF FERRITIN: CPT | Performed by: INTERNAL MEDICINE

## 2023-09-28 PROCEDURE — 82948 REAGENT STRIP/BLOOD GLUCOSE: CPT

## 2023-09-28 PROCEDURE — 84165 PROTEIN E-PHORESIS SERUM: CPT | Performed by: INTERNAL MEDICINE

## 2023-09-28 PROCEDURE — 84155 ASSAY OF PROTEIN SERUM: CPT | Performed by: INTERNAL MEDICINE

## 2023-09-28 PROCEDURE — 86334 IMMUNOFIX E-PHORESIS SERUM: CPT | Performed by: INTERNAL MEDICINE

## 2023-09-28 PROCEDURE — 96366 THER/PROPH/DIAG IV INF ADDON: CPT

## 2023-09-28 PROCEDURE — 84466 ASSAY OF TRANSFERRIN: CPT | Performed by: INTERNAL MEDICINE

## 2023-09-28 PROCEDURE — 83735 ASSAY OF MAGNESIUM: CPT | Performed by: INTERNAL MEDICINE

## 2023-09-28 PROCEDURE — 63710000001 INSULIN LISPRO (HUMAN) PER 5 UNITS: Performed by: NURSE PRACTITIONER

## 2023-09-28 PROCEDURE — 80069 RENAL FUNCTION PANEL: CPT | Performed by: INTERNAL MEDICINE

## 2023-09-28 PROCEDURE — 85025 COMPLETE CBC W/AUTO DIFF WBC: CPT | Performed by: INTERNAL MEDICINE

## 2023-09-28 PROCEDURE — A9500 TC99M SESTAMIBI: HCPCS | Performed by: HOSPITALIST

## 2023-09-28 PROCEDURE — 93016 CV STRESS TEST SUPVJ ONLY: CPT | Performed by: INTERNAL MEDICINE

## 2023-09-28 PROCEDURE — 83540 ASSAY OF IRON: CPT | Performed by: INTERNAL MEDICINE

## 2023-09-28 PROCEDURE — 78452 HT MUSCLE IMAGE SPECT MULT: CPT | Performed by: INTERNAL MEDICINE

## 2023-09-28 PROCEDURE — 93017 CV STRESS TEST TRACING ONLY: CPT

## 2023-09-28 PROCEDURE — 25010000002 REGADENOSON 0.4 MG/5ML SOLUTION: Performed by: HOSPITALIST

## 2023-09-28 PROCEDURE — 97530 THERAPEUTIC ACTIVITIES: CPT

## 2023-09-28 PROCEDURE — 0 TECHNETIUM SESTAMIBI: Performed by: HOSPITALIST

## 2023-09-28 RX ORDER — FUROSEMIDE 40 MG/1
40 TABLET ORAL DAILY
Status: DISCONTINUED | OUTPATIENT
Start: 2023-09-28 | End: 2023-09-29

## 2023-09-28 RX ORDER — REGADENOSON 0.08 MG/ML
0.4 INJECTION, SOLUTION INTRAVENOUS
Status: COMPLETED | OUTPATIENT
Start: 2023-09-28 | End: 2023-09-28

## 2023-09-28 RX ADMIN — DOCUSATE SODIUM 100 MG: 100 CAPSULE, LIQUID FILLED ORAL at 20:53

## 2023-09-28 RX ADMIN — SENNOSIDES AND DOCUSATE SODIUM 2 TABLET: 50; 8.6 TABLET ORAL at 09:14

## 2023-09-28 RX ADMIN — TECHNETIUM TC 99M SESTAMIBI 1 DOSE: 1 INJECTION INTRAVENOUS at 11:22

## 2023-09-28 RX ADMIN — Medication 10 ML: at 23:06

## 2023-09-28 RX ADMIN — REGADENOSON 0.4 MG: 0.08 INJECTION, SOLUTION INTRAVENOUS at 11:22

## 2023-09-28 RX ADMIN — Medication 10 ML: at 09:14

## 2023-09-28 RX ADMIN — TRAZODONE HYDROCHLORIDE 25 MG: 50 TABLET ORAL at 20:51

## 2023-09-28 RX ADMIN — CARBIDOPA AND LEVODOPA 1 TABLET: 25; 100 TABLET, EXTENDED RELEASE ORAL at 14:14

## 2023-09-28 RX ADMIN — CARBIDOPA AND LEVODOPA 1 TABLET: 25; 100 TABLET, EXTENDED RELEASE ORAL at 17:56

## 2023-09-28 RX ADMIN — CITALOPRAM 20 MG: 20 TABLET, FILM COATED ORAL at 09:13

## 2023-09-28 RX ADMIN — ROSUVASTATIN CALCIUM 20 MG: 20 TABLET, FILM COATED ORAL at 20:51

## 2023-09-28 RX ADMIN — CARBIDOPA AND LEVODOPA 1 TABLET: 25; 100 TABLET, EXTENDED RELEASE ORAL at 20:51

## 2023-09-28 RX ADMIN — TECHNETIUM TC 99M SESTAMIBI 1 DOSE: 1 INJECTION INTRAVENOUS at 09:50

## 2023-09-28 RX ADMIN — FUROSEMIDE 40 MG: 40 TABLET ORAL at 09:14

## 2023-09-28 RX ADMIN — DONEPEZIL HYDROCHLORIDE 10 MG: 10 TABLET, FILM COATED ORAL at 20:51

## 2023-09-28 RX ADMIN — APIXABAN 5 MG: 5 TABLET, FILM COATED ORAL at 20:51

## 2023-09-28 RX ADMIN — APIXABAN 5 MG: 5 TABLET, FILM COATED ORAL at 09:13

## 2023-09-28 RX ADMIN — INSULIN LISPRO 2 UNITS: 100 INJECTION, SOLUTION INTRAVENOUS; SUBCUTANEOUS at 16:47

## 2023-09-28 RX ADMIN — ALLOPURINOL 100 MG: 100 TABLET ORAL at 09:13

## 2023-09-28 RX ADMIN — LEVOTHYROXINE SODIUM 50 MCG: 0.05 TABLET ORAL at 06:50

## 2023-09-28 RX ADMIN — SENNOSIDES AND DOCUSATE SODIUM 2 TABLET: 50; 8.6 TABLET ORAL at 20:51

## 2023-09-28 RX ADMIN — METOPROLOL TARTRATE 12.5 MG: 25 TABLET, FILM COATED ORAL at 09:14

## 2023-09-28 RX ADMIN — DEXTROSE AND SODIUM CHLORIDE 75 ML/HR: 5; 900 INJECTION, SOLUTION INTRAVENOUS at 05:04

## 2023-09-28 RX ADMIN — METOPROLOL TARTRATE 12.5 MG: 25 TABLET, FILM COATED ORAL at 20:50

## 2023-09-28 NOTE — PLAN OF CARE
Goal Outcome Evaluation:  Plan of Care Reviewed With: patient        Progress: improving          VSS on RA. Blood glucose WDL. Stress test completed. IVF d/c after pt returned from stress test. Up to chair w/ PT. Incontinence care provided. Plan d/c home in AM if BG and Cr OK in AM. Wife at bedside. Bed alarm on.

## 2023-09-28 NOTE — THERAPY TREATMENT NOTE
Patient Name: Edilberto Reeder  : 1950    MRN: [id number]                              Today's Date: 2023       Admit Date: 2023    Visit Dx:     ICD-10-CM ICD-9-CM   1. Hypoglycemia  E16.2 251.2   2. Chest pain, unspecified type  R07.9 786.50     Patient Active Problem List   Diagnosis    ASCVD (arteriosclerotic cardiovascular disease)    Permanent atrial fibrillation    Diabetic retinopathy associated with type 2 diabetes mellitus    Essential hypertension    HLD (hyperlipidemia)    Hypothyroidism    Peripheral neuropathy    Renal insufficiency    Type 2 diabetes mellitus with hyperglycemia, without long-term current use of insulin    Parkinson's disease    Dyspnea on exertion    Atrial fibrillation, persistent    Stroke-like symptoms    Hypopotassemia    Ankle pain    Acute idiopathic gout of right ankle    Generalized weakness    Head injury due to trauma    Acute blood loss anemia    Chronic anticoagulation    Thrombocytopenia    Screening for colon cancer    Minor head injury, initial encounter    Chronic diastolic heart failure    Shortness of breath    Syncope    Acute cough    Bronchitis    Depression    H/O Spinal surgery    H/O umbilical hernia repair    Hemorrhoids    History of colon polyps    History of Parkinson's disease    Melanoma in situ of unspecified upper limb, including shoulder    Hypoglycemia    MATT (acute kidney injury)    Stage 3a chronic kidney disease     Past Medical History:   Diagnosis Date    Atrial fibrillation with RVR     Atrial flutter     Diabetes     HLD (hyperlipidemia) 2016    Hypertension     Parkinson's disease     Advanced      Past Surgical History:   Procedure Laterality Date    BRAIN STIMULATOR      COLONOSCOPY N/A 2022    Procedure: COLONOSCOPY TO CECUM WITH COLD SNARE POLYPECTOMY;  Surgeon: Luther Ferrer MD;  Location: [facility] ENDOSCOPY;  Service: Gastroenterology;  Laterality: N/A;  PRE:ANEMIA  POST:POLYPS/HEMORRHOIDS    ENDOSCOPY  N/A 5/5/2022    Procedure: ESOPHAGOGASTRODUODENOSCOPY WITH  COLD BIOPSIES;  Surgeon: Luther Ferrer MD;  Location: [facility] ENDOSCOPY;  Service: Gastroenterology;  Laterality: N/A;  PRE:ANEMIA  POST:DIVERTICULUM/GASTRITIS    JOINT REPLACEMENT      Bilateral shoulder and knee replacements      General Information       Row Name 09/28/23 1430          Physical Therapy Time and Intention    Document Type therapy note (daily note)  -MS (r) LL (t) MS (c)     Mode of Treatment individual therapy;physical therapy  -MS (r) LL (t) MS (c)       Row Name 09/28/23 1430          General Information    Patient Profile Reviewed yes  -MS (r) LL (t) MS (c)     Existing Precautions/Restrictions fall  -MS (r) LL (t) MS (c)     Barriers to Rehab medically complex;previous functional deficit  -MS (r) LL (t) MS (c)       Row Name 09/28/23 1430          Cognition    Orientation Status (Cognition) oriented x 4  -MS (r) LL (t) MS (c)       Row Name 09/28/23 1430          Safety Issues, Functional Mobility    Impairments Affecting Function (Mobility) balance;endurance/activity tolerance;strength  -MS (r) LL (t) MS (c)               User Key  (r) = Recorded By, (t) = Taken By, (c) = Cosigned By      Initials Name Provider Type    MS BlancosAmairani, PT Physical Therapist     Joselin Longoria, PT Student PT Student                   Mobility       Row Name 09/28/23 1431          Bed Mobility    Bed Mobility supine-sit  -MS (r) LL (t) MS (c)     Supine-Sit Cache (Bed Mobility) standby assist;verbal cues  -MS (r) LL (t) MS (c)     Assistive Device (Bed Mobility) head of bed elevated;bed rails  -MS (r) LL (t) MS (c)       Row Name 09/28/23 1431          Sit-Stand Transfer    Sit-Stand Cache (Transfers) moderate assist (50% patient effort);verbal cues;1 person assist  -MS (r) LL (t) MS (c)     Assistive Device (Sit-Stand Transfers) walker, front-wheeled  -MS (r) LL (t) MS (c)     Comment, (Sit-Stand Transfer)  Unsuccessful 1st attempt; required cueing for proper body positioning  -MS (r) LL (t) MS (c)       Row Name 09/28/23 1431          Gait/Stairs (Locomotion)    Milam Level (Gait) contact guard  -MS (r) LL (t) MS (c)     Assistive Device (Gait) walker, front-wheeled  -MS (r) LL (t) MS (c)     Distance in Feet (Gait) 30'  -MS (r) LL (t) MS (c)     Deviations/Abnormal Patterns (Gait) base of support, narrow;gait speed decreased;festinating/shuffling;kelvin decreased;stride length decreased  -MS (r) LL (t) MS (c)     Bilateral Gait Deviations forward flexed posture  -MS (r) LL (t) MS (c)     Left Sided Gait Deviations decreased knee extension  -MS (r) LL (t) MS (c)     Right Sided Gait Deviations decreased knee extension  -MS (r) LL (t) MS (c)     Comment, (Gait/Stairs) VC's for foot clearance  -MS (r) LL (t) MS (c)               User Key  (r) = Recorded By, (t) = Taken By, (c) = Cosigned By      Initials Name Provider Type    MS LisaAmairani, PT Physical Therapist     Joselin Longoria PT Student PT Student                   Obj/Interventions       Row Name 09/28/23 1439          Balance    Balance Assessment sitting static balance;sitting dynamic balance;standing static balance;standing dynamic balance  -MS (r) LL (t) MS (c)     Static Sitting Balance standby assist;maximum assist;1-person assist;verbal cues  -MS (r) LL (t) MS (c)     Dynamic Sitting Balance verbal cues;standby assist  -MS (r) LL (t) MS (c)     Position, Sitting Balance unsupported;sitting in chair;sitting edge of bed  -MS (r) LL (t) MS (c)     Static Standing Balance contact guard;verbal cues  -MS (r) LL (t) MS (c)     Dynamic Standing Balance verbal cues;contact guard  -MS (r) LL (t) MS (c)     Position/Device Used, Standing Balance supported;walker, front-wheeled  -MS (r) LL (t) MS (c)     Comment, Balance Post. lob static sitting at EOB while taking meds, took MaxA x1 to achieve upright posture; post. lean in both sitting and  standing  -MS (r) LL (t) MS (c)               User Key  (r) = Recorded By, (t) = Taken By, (c) = Cosigned By      Initials Name Provider Type    Amairani Banuelos, PT Physical Therapist    Joselin De La Rosa, PT Student PT Student                   Goals/Plan    No documentation.                  Clinical Impression       Row Name 09/28/23 [facility]          Pain    Pretreatment Pain Rating 0/10 - no pain  -MS (r) LL (t) MS (c)     Posttreatment Pain Rating 0/10 - no pain  -MS (r) LL (t) MS (c)       Row Name 09/28/23 [facility]          Plan of Care Review    Plan of Care Reviewed With patient;spouse  -MS (r) LL (t) MS (c)     Progress no change  -MS (r) LL (t) MS (c)     Outcome Evaluation Pt agreed to participate in PT this PM. Today, pt completed bed mobility with sba, STS with ModA x1 and RW, and ambulated 30' with cga and RW. While nsg was administering meds, pt had one posterior LOB from EOB requiring Max A x1 to achieve upright positioning. Momentum needed for STS which was completed on second attempt. Post. lean observed in both static sitting and standing. VC's during ambulation for foot clearance. PT still anticipates home with 24/7 care upon d/c. Will continue to monitor and track progress.  -MS (r) LL (t) MS (c)       Row Name 09/28/23 1437          Vital Signs    O2 Delivery Pre Treatment room air  -MS (r) LL (t) MS (c)     O2 Delivery Post Treatment room air  -MS (r) LL (t) MS (c)       Row Name 09/28/23 [facility]          Positioning and Restraints    Pre-Treatment Position in bed  -MS (r) LL (t) MS (c)     Post Treatment Position chair  -MS (r) LL (t) MS (c)     In Chair call light within reach;encouraged to call for assist;with family/caregiver;sitting  -MS (r) LL (t) MS (c)               User Key  (r) = Recorded By, (t) = Taken By, (c) = Cosigned By      Initials Name Provider Type    MS GonzalezAmairani, PT Physical Therapist    Joselin De La Rosa, PT Student PT Student                   Outcome  Measures       Row Name 09/28/23 1437 09/28/23 0914       How much help from another person do you currently need...    Turning from your back to your side while in flat bed without using bedrails? 3  -MS (r) LL (t) MS (c) 3  -LH    Moving from lying on back to sitting on the side of a flat bed without bedrails? 3  -MS (r) LL (t) MS (c) 3  -LH    Moving to and from a bed to a chair (including a wheelchair)? 3  -MS (r) LL (t) MS (c) 3  -LH    Standing up from a chair using your arms (e.g., wheelchair, bedside chair)? 3  -MS (r) LL (t) MS (c) 3  -LH    Climbing 3-5 steps with a railing? 2  -MS (r) LL (t) MS (c) 2  -LH    To walk in hospital room? 3  -MS (r) LL (t) MS (c) 2  -LH    AM-PAC 6 Clicks Score (PT) 17  -MS (r) LL (t) 16  -LH    Highest level of mobility 5 --> Static standing  -MS (r) LL (t) 5 --> Static standing  -LH      Row Name 09/28/23 1437          Functional Assessment    Outcome Measure Options AM-PAC 6 Clicks Basic Mobility (PT)  -MS (r) LL (t) MS (c)               User Key  (r) = Recorded By, (t) = Taken By, (c) = Cosigned By      Initials Name Provider Type    Amairani Banuelos, PT Physical Therapist     Lissa Goode RN Registered Nurse    Joselin De La Rosa, PT Student PT Student                                 Physical Therapy Education       Title: PT OT SLP Therapies (Done)       Topic: Physical Therapy (Done)       Point: Mobility training (Done)       Learning Progress Summary             Patient Acceptance, E, VU by LL at 9/28/2023 1438    Acceptance, E, VU by LL at 9/27/2023 1526   Family Acceptance, E, VU by LL at 9/27/2023 1526                         Point: Body mechanics (Done)       Learning Progress Summary             Patient Acceptance, E, VU by LL at 9/28/2023 1438    Acceptance, E, VU by LL at 9/27/2023 1526   Family Acceptance, E, VU by LL at 9/27/2023 1526                                         User Key       Initials Effective Dates Name Provider Type Discipline      09/12/23 -  Joselin Longoria, PT Student PT Student PT                  PT Recommendation and Plan  Planned Therapy Interventions (PT): balance training, bed mobility training, gait training, transfer training, strengthening, patient/family education  Plan of Care Reviewed With: patient, spouse  Progress: no change  Outcome Evaluation: Pt agreed to participate in PT this PM. Today, pt completed bed mobility with sba, STS with ModA x1 and RW, and ambulated 30' with cga and RW. While nsg was administering meds, pt had one posterior LOB from EOB requiring Max A x1 to achieve upright positioning. Momentum needed for STS which was completed on second attempt. Post. lean observed in both static sitting and standing. VC's during ambulation for foot clearance. PT still anticipates home with 24/7 care upon d/c. Will continue to monitor and track progress.     Time Calculation:         PT Charges       Row Name 09/28/23 1438             Time Calculation    Start Time 1312  -MS (r) LL (t) MS (c)      Stop Time 1327  -MS (r) LL (t) MS (c)      Time Calculation (min) 15 min  -MS (r) LL (t)      PT Received On 09/28/23  -MS (r) LL (t) MS (c)      PT - Next Appointment 09/29/23  -MS (r) LL (t) MS (c)      PT Goal Re-Cert Due Date 10/11/23  -MS (r) LL (t) MS (c)                User Key  (r) = Recorded By, (t) = Taken By, (c) = Cosigned By      Initials Name Provider Type    MS Gonzalez Amairani SEGUN, PT Physical Therapist     Joselin Longoria, PT Student PT Student                  Therapy Charges for Today       Code Description Service Date Service Provider Modifiers Qty    74166105237 HC PT THERAPEUTIC ACT EA 15 MIN 9/27/2023 Joselin Longoria, PT Student GP 1    61524532993 HC PT EVAL MOD COMPLEXITY 3 9/27/2023 Joselin Longoria, PT Student GP 1    [id number]  PT THERAPEUTIC ACT EA 15 MIN 9/28/2023 Joselin Longoria, PT Student GP 1            PT G-Codes  Outcome Measure Options: AM-PAC 6 Clicks Basic Mobility  (PT)  AM-PAC 6 Clicks Score (PT): 17  PT Discharge Summary  Anticipated Discharge Disposition (PT): home with assist, home, home with 24/7 care    Joselin Longoria, PT Student  9/28/2023

## 2023-09-28 NOTE — PROGRESS NOTES
Dedicated to Hospital Care    [phone]   LOS: 0 days     Name: Edilberto Reeder  Age/Sex: 73 y.o. male  :  1950        PCP: Harvey Larson MD  Chief Complaint   Patient presents with    Hypoglycemia      Subjective   He is feeling well this morning.  Denies any symptoms of hypoglycemia no absent episodes or syncope.  Rested decently well overnight.  Denies nausea or vomiting and is currently n.p.o. for stress test this morning  General: No Fever or Chills, Cardiac: No Chest Pain or Palpitations, Resp: No Cough or SOA, GI: No Nausea, Vomiting, or Diarrhea, and Other: No bleeding    allopurinol, 100 mg, Oral, Daily  apixaban, 5 mg, Oral, Q12H  carbidopa-levodopa ER, 1 tablet, Oral, 4x Daily  citalopram, 20 mg, Oral, Daily  docusate sodium, 100 mg, Oral, Nightly  donepezil, 10 mg, Oral, Nightly  furosemide, 40 mg, Oral, Daily  insulin lispro, 2-7 Units, Subcutaneous, 4x Daily AC & at Bedtime  levothyroxine, 50 mcg, Oral, Q AM  metoprolol tartrate, 12.5 mg, Oral, BID  rosuvastatin, 20 mg, Oral, Nightly  senna-docusate sodium, 2 tablet, Oral, BID  sodium chloride, 10 mL, Intravenous, Q12H  traZODone, 25 mg, Oral, Nightly           Objective   Vital Signs  Temp:  [97.9 °F (36.6 °C)-98.7 °F (37.1 °C)] 98.7 °F (37.1 °C)  Heart Rate:  [63-92] 92  Resp:  [18] 18  BP: (115-137)/(54-77) 137/77  Body mass index is 23.75 kg/m².    Intake/Output Summary (Last 24 hours) at 2023 1322  Last data filed at 2023 2105  Gross per 24 hour   Intake --   Output 100 ml   Net -100 ml       Physical Exam  Vitals and nursing note reviewed.   Constitutional:       General: He is not in acute distress.  Cardiovascular:      Rate and Rhythm: Normal rate and regular rhythm.   Pulmonary:      Effort: No respiratory distress.      Breath sounds: Normal breath sounds.   Abdominal:      General: Bowel sounds are normal.      Palpations: Abdomen is soft.   Neurological:      General: No focal deficit present.      Mental  Status: He is alert. Mental status is at baseline.         Results Review:       I reviewed the patient's new clinical results.  Results from last 7 days   Lab Units 09/28/23  0617 09/27/23 0621 09/26/23 2253   WBC 10*3/mm3 3.95 4.42 5.52   HEMOGLOBIN g/dL 8.4* 8.7* 8.8*   PLATELETS 10*3/mm3 195 202 212     Results from last 7 days   Lab Units 09/28/23  0617 09/27/23  0621 09/26/23 2253 09/22/23  0807   SODIUM mmol/L 134* 131* 131* 136   POTASSIUM mmol/L 3.7 3.8 4.0 3.8   CHLORIDE mmol/L 101 95* 94* 97*   CO2 mmol/L 24.0 23.0 24.0 26.4   BUN mg/dL 19 25* 27* 31*   CREATININE mg/dL 1.34* 1.47* 1.63* 1.49*   CALCIUM mg/dL 9.0 8.8 9.0 9.3   MAGNESIUM mg/dL 1.9 2.5* 1.4*  --    PHOSPHORUS mg/dL 3.0  --   --   --    Estimated Creatinine Clearance: 55 mL/min (A) (by C-G formula based on SCr of 1.34 mg/dL (H)).  Lab Results   Component Value Date    HGBA1C 6.60 (H) 08/05/2023    HGBA1C 8.10 (H) 05/01/2022    HGBA1C 7.50 (H) 03/23/2022     Glucose   Date/Time Value Ref Range Status   09/28/2023 0849 91 70 - 130 mg/dL Final   09/27/2023 1937 146 (H) 70 - 130 mg/dL Final   09/27/2023 1708 118 70 - 130 mg/dL Final   09/27/2023 1355 99 70 - 130 mg/dL Final   09/27/2023 0914 107 70 - 130 mg/dL Final   09/27/2023 0811 95 70 - 130 mg/dL Final   09/27/2023 0755 88 70 - 130 mg/dL Final   09/27/2023 0638 65 (L) 70 - 130 mg/dL Final         Assessment & Plan   Active Hospital Problems    Diagnosis  POA    **Hypoglycemia [E16.2]  Yes    MATT (acute kidney injury) [N17.9]  Unknown    Stage 3a chronic kidney disease [N18.31]  Unknown    Chronic diastolic heart failure [I50.32]  Yes    Type 2 diabetes mellitus with hyperglycemia, without long-term current use of insulin [E11.65]  Yes    Parkinson's disease [G20]  Yes    ASCVD (arteriosclerotic cardiovascular disease) [I25.10]  Yes    Essential hypertension [I10]  Yes    Hypothyroidism [E03.9]  Yes    HLD (hyperlipidemia) [E78.5]  Yes      Resolved Hospital Problems   No resolved  problems to display.       PLAN  This is a 73-year-old gentleman with a history of underlying dementia, chronic kidney disease, type 2 diabetes, hypertension, hyperlipidemia and hypothyroidism who presents to the hospital after hypoglycemic syncope and is found with acute renal failure and persistent hypoglycemia with elevated troponins  -I believe his hypoglycemia and the persistent nature of this is related to his renal dysfunction and use of glyburide.  I suspect he is likely accumulated some of this medication due to his decreased renal clearance hopefully after were able to initiate a diet following his stress test we can DC the IV fluids and monitor his blood sugar closely I would not plan to continue this medication at discharge.  In fact with an A1c of 6.6 at 73 years old with underlying Parkinson's it might not be a bad idea to pare back his medications quite a bit and his lab was A1c up to 7.5.  -He was recently diagnosed with an NSTEMI on the last hospitalization and was treated medically.  Echocardiogram at that time showed inferior wall hypokinesis but his echo here shows a preserved normal left ventricular function.  Cardiology plans for a stress test today we will follow-up those results.  If negative cardiology is cleared him for discharge  -His renal function is slowly improving but not yet back to baseline likely some degree of ATN from prolonged renal hypoperfusion with diuretics.  We will follow-up nephrology's recommendations regarding diuretics at discharge.  -Otherwise his blood pressure and other vital signs remained stable.  -Mechanical DVT prophylaxis  -Full code    Disposition  Expected Discharge Date: 9/29/2023; Expected Discharge Time:    Plan is home with home health tomorrow if blood sugars are stable after discontinuing IV fluids and able to tolerate a diet.  He did work with physical therapy and is found to be on the weak side of things but family wishes to pursue home health and  outpatient therapy options.    Joqauin Arroyo MD  [facility]  09/28/23  13:22 EDT

## 2023-09-28 NOTE — PLAN OF CARE
Goal Outcome Evaluation:      VSS, IV fluids continued, no complaints of pain, NPO at midnight. Urine sample collected. Q2 turns. Wife at bedside. All needs met.

## 2023-09-28 NOTE — PLAN OF CARE
Goal Outcome Evaluation:  Plan of Care Reviewed With: patient, spouse        Progress: no change  Outcome Evaluation: Pt agreed to participate in PT this PM. Today, pt completed bed mobility with sba, STS with ModA x1 and RW, and ambulated 30' with cga and RW. While nsg was administering meds, pt had one posterior LOB from EOB requiring Max A x1 to achieve upright positioning. Momentum needed for STS which was completed on second attempt. Post. lean observed in both static sitting and standing. VC's during ambulation for foot clearance. PT still anticipates home with 24/7 care upon d/c. Will continue to monitor and track progress.      Anticipated Discharge Disposition (PT): home with assist, home, home with 24/7 care

## 2023-09-28 NOTE — PROGRESS NOTES
Hospital Follow Up    LOS: 0  Patient Name: Edilberto Reeder  Age/Sex: 73 y.o. male  : 1950  MRN: [id number]    Day of Service: 23   Length of Stay: 0  Encounter Provider: Jimbo Fung MD  Place of Service: [facility] CARDIOLOGY  Patient Care Team:  Harvey Larson MD as PCP - General  Harvey Larson MD as PCP - Family Medicine    Subjective:     Chief Complaint: Chest pain    Interval History: Patient says he is feeling better.  No further chest discomfort says his breathing is better.    Objective:     Objective:  Temp:  [97.9 °F (36.6 °C)-98.2 °F (36.8 °C)] 97.9 °F (36.6 °C)  Heart Rate:  [60-73] 71  Resp:  [18] 18  BP: ()/(53-64) 119/64     Intake/Output Summary (Last 24 hours) at 2023 0938  Last data filed at 2023 2105  Gross per 24 hour   Intake --   Output 100 ml   Net -100 ml     Body mass index is 23.75 kg/m².      23  2259 23  1122   Weight: 81.6 kg (180 lb) 81.6 kg (180 lb)     Weight change: 0 kg (0 lb)      Physical Exam:   General :  Alert, cooperative, in no acute distress.  Neuro:   Alert,cooperative and oriented.  Lungs:  CTAB. Normal respiratory effort and rate.  CV:  irregular rate and rhythm, normal S1 and S2, no murmurs, gallops or rubs.   ABD:  Soft, nontender, nondistended. Positive bowel sounds.  Extr:  No edema or cyanosis, moves all extremities.    Lab Review:   Results from last 7 days   Lab Units 23  0617 23  0623   SODIUM mmol/L 134* 131* 131*   POTASSIUM mmol/L 3.7 3.8 4.0   CHLORIDE mmol/L 101 95* 94*   CO2 mmol/L 24.0 23.0 24.0   BUN mg/dL 19 25* 27*   CREATININE mg/dL 1.34* 1.47* 1.63*   GLUCOSE mg/dL 74 52* 134*   CALCIUM mg/dL 9.0 8.8 9.0   AST (SGOT) U/L  --   --  33   ALT (SGPT) U/L  --   --  <5     Results from last 7 days   Lab Units 23  0621 23  0409 23  0201   HSTROP T ng/L 100* 99* 100*     Results from last 7 days   Lab Units 23  0617  09/27/23  0621   WBC 10*3/mm3 3.95 4.42   HEMOGLOBIN g/dL 8.4* 8.7*   HEMATOCRIT % 27.1* 27.7*   PLATELETS 10*3/mm3 195 202     Results from last 7 days   Lab Units 09/27/23  0621   INR  2.11*     Results from last 7 days   Lab Units 09/28/23  0617 09/27/23  0621   MAGNESIUM mg/dL 1.9 2.5*           Invalid input(s): LDLCALC            Current Medications:   Scheduled Meds:allopurinol, 100 mg, Oral, Daily  apixaban, 5 mg, Oral, Q12H  carbidopa-levodopa ER, 1 tablet, Oral, 4x Daily  citalopram, 20 mg, Oral, Daily  docusate sodium, 100 mg, Oral, Nightly  donepezil, 10 mg, Oral, Nightly  furosemide, 40 mg, Oral, Daily  insulin lispro, 2-7 Units, Subcutaneous, 4x Daily AC & at Bedtime  levothyroxine, 50 mcg, Oral, Q AM  metoprolol tartrate, 12.5 mg, Oral, BID  rosuvastatin, 20 mg, Oral, Nightly  senna-docusate sodium, 2 tablet, Oral, BID  sodium chloride, 10 mL, Intravenous, Q12H  traZODone, 25 mg, Oral, Nightly      Continuous Infusions:     Allergies:  Allergies   Allergen Reactions    Bacitracin Anaphylaxis    Neosporin [Neomycin-Bacitracin Zn-Polymyx] Other (See Comments)     BP drops and heart stops!    Fish-Derived Products Hives    Shellfish Allergy Hives    Shrimp (Diagnostic) Hives       Assessment:       Hypoglycemia    ASCVD (arteriosclerotic cardiovascular disease)    Essential hypertension    HLD (hyperlipidemia)    Hypothyroidism    Type 2 diabetes mellitus with hyperglycemia, without long-term current use of insulin    Parkinson's disease    Chronic diastolic heart failure    MATT (acute kidney injury)    Stage 3a chronic kidney disease        Plan:     1.  Chest discomfort atypical.  Troponins are elevated but flat.  Patient looks significantly better today.  We will set him up for nuclear perfusion study for completeness sake.  Echo before had an inferior wall hypokinesis current echo shows normal LV function  2.  Hypoglycemia  3.  CHF resolved  4.  Chronic A-fib  5.  Hypertension  6.  Sodium and  creatinine have improved.  If the stress test is okay he is okay to discharge from my standpoint.  Stress test is pending at the time of this dictation.    Jimbo Fung MD  09/28/23  09:38 EDT

## 2023-09-28 NOTE — PROGRESS NOTES
[facility] Progress Note      Patient Name: Edilberto Reeder  : 1950  MRN: [id number]  Primary Care Physician:  Harvey Larson MD  Date of admission: 2023    Subjective     Interval History:   F/u MATT    Review of Systems:   Reports some dyspnea  No n/v   BG up to 90 this AM    Objective     Vitals:   Temp:  [97.9 °F (36.6 °C)-98.2 °F (36.8 °C)] 97.9 °F (36.6 °C)  Heart Rate:  [60-73] 71  Resp:  [18] 18  BP: ()/(53-64) 119/64    Intake/Output Summary (Last 24 hours) at 2023 0841  Last data filed at 2023 2105  Gross per 24 hour   Intake 240 ml   Output 100 ml   Net 140 ml       Physical Exam:    General Appearance: comfortable, alert  Neck: supple, no JVD  Lungs: CTA bilat no rales  Heart: RRR, normal S1 and S2  Abdomen: soft, nontender, nondistended  Extremities: 1+ BLE edema, no cyanosis or clubbing    Scheduled Meds:     allopurinol, 100 mg, Oral, Daily  apixaban, 5 mg, Oral, Q12H  carbidopa-levodopa ER, 1 tablet, Oral, 4x Daily  citalopram, 20 mg, Oral, Daily  docusate sodium, 100 mg, Oral, Nightly  donepezil, 10 mg, Oral, Nightly  insulin lispro, 2-7 Units, Subcutaneous, 4x Daily AC & at Bedtime  levothyroxine, 50 mcg, Oral, Q AM  metoprolol tartrate, 12.5 mg, Oral, BID  rosuvastatin, 20 mg, Oral, Nightly  senna-docusate sodium, 2 tablet, Oral, BID  sodium chloride, 10 mL, Intravenous, Q12H  traZODone, 25 mg, Oral, Nightly      IV Meds:   dextrose 5 % and sodium chloride 0.9 %, 75 mL/hr, Last Rate: 75 mL/hr (23 0505)        Results Reviewed:   I have personally reviewed the results from the time of this admission to 2023 08:41 EDT     Results from last 7 days   Lab Units 23  0617 23  0621 23  4626   SODIUM mmol/L 134* 131* 131*   POTASSIUM mmol/L 3.7 3.8 4.0   CHLORIDE mmol/L 101 95* 94*   CO2 mmol/L 24.0 23.0 24.0   BUN mg/dL 19 25* 27*   CREATININE mg/dL 1.34* 1.47* 1.63*   CALCIUM mg/dL 9.0 8.8 9.0   BILIRUBIN mg/dL  --    --  0.9   ALK PHOS U/L  --   --  219*   ALT (SGPT) U/L  --   --  <5   AST (SGOT) U/L  --   --  33   GLUCOSE mg/dL 74 52* 134*     Estimated Creatinine Clearance: 55 mL/min (A) (by C-G formula based on SCr of 1.34 mg/dL (H)).  Results from last 7 days   Lab Units 09/28/23  0617 09/27/23  0621 09/26/23  2253   MAGNESIUM mg/dL 1.9 2.5* 1.4*   PHOSPHORUS mg/dL 3.0  --   --          Results from last 7 days   Lab Units 09/28/23  0617 09/27/23  0621 09/26/23  2253   WBC 10*3/mm3 3.95 4.42 5.52   HEMOGLOBIN g/dL 8.4* 8.7* 8.8*   PLATELETS 10*3/mm3 195 202 212     Results from last 7 days   Lab Units 09/27/23  0621   INR  2.11*       Assessment / Plan     ASSESSMENT:  Non olig MATT - suspect prerenal azotemia due to vol depletion from recent diuretic intensification, as well as hypotension and impaired compensation via ACEi use.  Cr was up to 1.7 a week ago, trending down now, 1.3 today.  Was normal 0.8 early AUG.  Mild hyponatremia better, Na up 134 with switch to NS IVF.  Mg repleted   DM2 + hypoglycemia - combination of glyburide + MATT with impaired clearance.  On D5 1/2 NS   Diastolic CHF - had recent vol overload prompting diuretic intensification, now been on IVF for prerenal azotemia and developing some mild edema and dyspnea.  Vague right perihilar opacity on CXR doubt is pulm edema  Hypotension (hx HTN) - BP labile, may have some dysautonomia in relation to Parkinson's.  BP better past 12h  Parkinson's  Iron def anemia - hgb stable mid 8's, TSAT 7% ferritin 66  Elevated Tn - CARD following; echo pending, may also undergo stress test, no immediate plan for cath noted   Dementia, on aricept    PLAN:  Stress test this AM  DC D5 NS afterward when diet resumed (BG up to 90)  Restart lasix home dose 40mg daily       Harvey Ngo MD  09/28/23  08:41 EDT    [facility]  [phone]

## 2023-09-29 ENCOUNTER — READMISSION MANAGEMENT (OUTPATIENT)
Dept: CALL CENTER | Facility: HOSPITAL | Age: 73
End: 2023-09-29
Payer: MEDICARE

## 2023-09-29 VITALS
DIASTOLIC BLOOD PRESSURE: 61 MMHG | RESPIRATION RATE: 18 BRPM | HEIGHT: 73 IN | OXYGEN SATURATION: 99 % | WEIGHT: 180 LBS | HEART RATE: 70 BPM | SYSTOLIC BLOOD PRESSURE: 109 MMHG | BODY MASS INDEX: 23.86 KG/M2 | TEMPERATURE: 97.5 F

## 2023-09-29 LAB
ALBUMIN SERPL ELPH-MCNC: 2.9 G/DL (ref 2.9–4.4)
ALBUMIN SERPL-MCNC: 3.2 G/DL (ref 3.5–5.2)
ALBUMIN/GLOB SERPL: 1 {RATIO} (ref 0.7–1.7)
ALPHA1 GLOB SERPL ELPH-MCNC: 0.4 G/DL (ref 0–0.4)
ALPHA2 GLOB SERPL ELPH-MCNC: 0.6 G/DL (ref 0.4–1)
ANION GAP SERPL CALCULATED.3IONS-SCNC: 11 MMOL/L (ref 5–15)
B-GLOBULIN SERPL ELPH-MCNC: 0.9 G/DL (ref 0.7–1.3)
BASOPHILS # BLD AUTO: 0.06 10*3/MM3 (ref 0–0.2)
BASOPHILS NFR BLD AUTO: 1.5 % (ref 0–1.5)
BUN SERPL-MCNC: 17 MG/DL (ref 8–23)
BUN/CREAT SERPL: 13 (ref 7–25)
CALCIUM SPEC-SCNC: 8.9 MG/DL (ref 8.6–10.5)
CHLORIDE SERPL-SCNC: 99 MMOL/L (ref 98–107)
CO2 SERPL-SCNC: 21 MMOL/L (ref 22–29)
CREAT SERPL-MCNC: 1.31 MG/DL (ref 0.76–1.27)
DEPRECATED RDW RBC AUTO: 45.6 FL (ref 37–54)
EGFRCR SERPLBLD CKD-EPI 2021: 56.8 ML/MIN/1.73
EOSINOPHIL # BLD AUTO: 0.14 10*3/MM3 (ref 0–0.4)
EOSINOPHIL NFR BLD AUTO: 3.4 % (ref 0.3–6.2)
ERYTHROCYTE [DISTWIDTH] IN BLOOD BY AUTOMATED COUNT: 16.4 % (ref 12.3–15.4)
GAMMA GLOB SERPL ELPH-MCNC: 1.3 G/DL (ref 0.4–1.8)
GLOBULIN SER-MCNC: 3.2 G/DL (ref 2.2–3.9)
GLUCOSE BLDC GLUCOMTR-MCNC: 104 MG/DL (ref 70–130)
GLUCOSE BLDC GLUCOMTR-MCNC: 74 MG/DL (ref 70–130)
GLUCOSE BLDC GLUCOMTR-MCNC: 97 MG/DL (ref 70–130)
GLUCOSE SERPL-MCNC: 62 MG/DL (ref 65–99)
HCT VFR BLD AUTO: 29.3 % (ref 37.5–51)
HGB BLD-MCNC: 9 G/DL (ref 13–17.7)
IGA SERPL-MCNC: 389 MG/DL (ref 61–437)
IGG SERPL-MCNC: 1289 MG/DL (ref 603–1613)
IGM SERPL-MCNC: 57 MG/DL (ref 15–143)
IMM GRANULOCYTES # BLD AUTO: 0.01 10*3/MM3 (ref 0–0.05)
IMM GRANULOCYTES NFR BLD AUTO: 0.2 % (ref 0–0.5)
INTERPRETATION SERPL IEP-IMP: NORMAL
LABORATORY COMMENT REPORT: NORMAL
LYMPHOCYTES # BLD AUTO: 0.73 10*3/MM3 (ref 0.7–3.1)
LYMPHOCYTES NFR BLD AUTO: 17.7 % (ref 19.6–45.3)
M PROTEIN SERPL ELPH-MCNC: NORMAL G/DL
MCH RBC QN AUTO: 23.8 PG (ref 26.6–33)
MCHC RBC AUTO-ENTMCNC: 30.7 G/DL (ref 31.5–35.7)
MCV RBC AUTO: 77.5 FL (ref 79–97)
MONOCYTES # BLD AUTO: 0.63 10*3/MM3 (ref 0.1–0.9)
MONOCYTES NFR BLD AUTO: 15.3 % (ref 5–12)
NEUTROPHILS NFR BLD AUTO: 2.56 10*3/MM3 (ref 1.7–7)
NEUTROPHILS NFR BLD AUTO: 61.9 % (ref 42.7–76)
NRBC BLD AUTO-RTO: 0 /100 WBC (ref 0–0.2)
PHOSPHATE SERPL-MCNC: 3.1 MG/DL (ref 2.5–4.5)
PLATELET # BLD AUTO: 180 10*3/MM3 (ref 140–450)
PMV BLD AUTO: 9.6 FL (ref 6–12)
POTASSIUM SERPL-SCNC: 3.5 MMOL/L (ref 3.5–5.2)
PROT SERPL-MCNC: 6.1 G/DL (ref 6–8.5)
RBC # BLD AUTO: 3.78 10*6/MM3 (ref 4.14–5.8)
SODIUM SERPL-SCNC: 131 MMOL/L (ref 136–145)
WBC NRBC COR # BLD: 4.13 10*3/MM3 (ref 3.4–10.8)

## 2023-09-29 PROCEDURE — 85025 COMPLETE CBC W/AUTO DIFF WBC: CPT | Performed by: INTERNAL MEDICINE

## 2023-09-29 PROCEDURE — 82948 REAGENT STRIP/BLOOD GLUCOSE: CPT

## 2023-09-29 PROCEDURE — G0378 HOSPITAL OBSERVATION PER HR: HCPCS

## 2023-09-29 PROCEDURE — 80069 RENAL FUNCTION PANEL: CPT | Performed by: INTERNAL MEDICINE

## 2023-09-29 RX ORDER — POTASSIUM CHLORIDE 750 MG/1
20 TABLET, FILM COATED, EXTENDED RELEASE ORAL DAILY
Status: DISCONTINUED | OUTPATIENT
Start: 2023-09-29 | End: 2023-09-29 | Stop reason: HOSPADM

## 2023-09-29 RX ORDER — POTASSIUM CHLORIDE 1500 MG/1
20 TABLET, EXTENDED RELEASE ORAL DAILY
Qty: 30 TABLET | Refills: 11 | COMMUNITY
Start: 2023-10-01

## 2023-09-29 RX ORDER — FUROSEMIDE 40 MG/1
40 TABLET ORAL DAILY
Qty: 30 TABLET | Refills: 11 | OUTPATIENT
Start: 2023-10-01

## 2023-09-29 RX ADMIN — METOPROLOL TARTRATE 12.5 MG: 25 TABLET, FILM COATED ORAL at 09:09

## 2023-09-29 RX ADMIN — CARBIDOPA AND LEVODOPA 1 TABLET: 25; 100 TABLET, EXTENDED RELEASE ORAL at 12:47

## 2023-09-29 RX ADMIN — CITALOPRAM 20 MG: 20 TABLET, FILM COATED ORAL at 09:08

## 2023-09-29 RX ADMIN — LEVOTHYROXINE SODIUM 50 MCG: 0.05 TABLET ORAL at 05:22

## 2023-09-29 RX ADMIN — FUROSEMIDE 60 MG: 40 TABLET ORAL at 09:08

## 2023-09-29 RX ADMIN — Medication 10 ML: at 09:17

## 2023-09-29 RX ADMIN — APIXABAN 5 MG: 5 TABLET, FILM COATED ORAL at 09:09

## 2023-09-29 RX ADMIN — CARBIDOPA AND LEVODOPA 1 TABLET: 25; 100 TABLET, EXTENDED RELEASE ORAL at 09:09

## 2023-09-29 RX ADMIN — SENNOSIDES AND DOCUSATE SODIUM 2 TABLET: 50; 8.6 TABLET ORAL at 09:09

## 2023-09-29 RX ADMIN — POTASSIUM CHLORIDE 20 MEQ: 750 TABLET, EXTENDED RELEASE ORAL at 09:09

## 2023-09-29 RX ADMIN — ALLOPURINOL 100 MG: 100 TABLET ORAL at 09:09

## 2023-09-29 NOTE — DISCHARGE SUMMARY
PHYSICIAN DISCHARGE SUMMARY                                                                        [facility]    Patient Identification:  Name: Edilberto Reeder  Age: 73 y.o.  Sex: male  :  1950  MRN: [id number]  Primary Care Physician: Harvey Larson MD    Admit date: 2023  Discharge date and time: 2023     Discharged Condition: good    Discharge Diagnoses:  Hypoglycemia    ASCVD (arteriosclerotic cardiovascular disease)    Essential hypertension    HLD (hyperlipidemia)    Hypothyroidism    Type 2 diabetes mellitus with hyperglycemia, without long-term current use of insulin    Parkinson's disease    Chronic diastolic heart failure    MATT (acute kidney injury)    Stage 3a chronic kidney disease         Hospital Course:  Pleasant 73-year-old gentleman with a history of diabetes as well as CHF and previous admission for MATT/dehydration.  He presents after significant mental status change and noted to be significantly hypoglycemic via EMS.  He was on metformin as well as glipizide.  The patient was given D50 initially.  He did.  He also had D5 early on.  Glimepiride and metformin obviously put on hold.  In addition his Lasix was put on hold as well as an ACE inhibitor.  Creatinine has been improving.  His previous baselines have been quite variable based on his fluid status.  Blood sugars are stabilizing.  He was a little on the low side this morning but is corrected on his own.  I have requested 1 last Accu-Chek prior to lunch if this is in reasonable range he can be discharged from the main of his treatment follow-up as an outpatient.  On discharge she will hold off on his Lasix 1 more day and resume on .  ACE inhibitor will be on hold.  I have discontinued both his glyburide as well as metformin for the time being.  I would recommend glyburide be held indefinitely.    Consults:     Consults       Date and  "Time Order Name Status Description    9/27/2023 12:53 PM Inpatient Nephrology Consult Completed     9/27/2023  5:38 AM Inpatient Cardiology Consult Completed     9/27/2023  3:08 AM [facility] (on-call MD unless specified) Details                Discharge Exam:  General Appearance:  Comfortable, well-appearing, in no acute distress and not in pain.    Vital signs: (most recent): Blood pressure 109/61, pulse 70, temperature 97.5 °F (36.4 °C), temperature source Oral, resp. rate 18, height 185.4 cm (73\"), weight 81.6 kg (180 lb), SpO2 99 %.    Lungs:  Normal effort and normal respiratory rate.  Breath sounds clear to auscultation.    Heart: Normal rate.  Regular rhythm.    Extremities: There is no dependent edema.    Neurological: Patient is alert.  (Oriented person and place.  Cooperative and pleasant.).    Skin:  Warm and dry.     Disposition:  Home    Patient Instructions:      Discharge Medications        Changes to Medications        Instructions Start Date   furosemide 40 MG tablet  Commonly known as: LASIX  What changed: These instructions start on October 1, 2023. If you are unsure what to do until then, ask your doctor or other care provider.   40 mg, Oral, Daily   Start Date: October 1, 2023     potassium chloride ER 20 MEQ tablet controlled-release ER tablet  Commonly known as: K-TAB  What changed: These instructions start on October 1, 2023. If you are unsure what to do until then, ask your doctor or other care provider.   20 mEq, Oral, Daily   Start Date: October 1, 2023            Continue These Medications        Instructions Start Date   acetaminophen 325 MG tablet  Commonly known as: TYLENOL   650 mg, Oral, Every 6 Hours PRN      allopurinol 100 MG tablet  Commonly known as: ZYLOPRIM   100 mg, Oral, Daily      apixaban 5 MG tablet tablet  Commonly known as: ELIQUIS   5 mg, Oral, 2 Times Daily      CALCIUM CARBONATE PO   650 mg, Oral, Daily      Carbidopa-Levodopa ER 36. MG capsule controlled-release   4 " capsules, Oral, 4 Times Daily      citalopram 20 MG tablet  Commonly known as: CeleXA   20 mg, Oral, Daily      docusate sodium 50 MG capsule  Commonly known as: COLACE   100 mg, Oral, Nightly      donepezil 5 MG tablet  Commonly known as: ARICEPT   10 mg, Oral, Nightly      ferrous sulfate 325 (65 FE) MG tablet       levothyroxine 50 MCG tablet  Commonly known as: SYNTHROID, LEVOTHROID   50 mcg, Oral, Every Early Morning      metoprolol tartrate 25 MG tablet  Commonly known as: LOPRESSOR   TAKE 1/2 TABLET BY MOUTH TWICE A DAY      multivitamin with minerals tablet tablet   1 tablet, Oral, Daily      rosuvastatin 20 MG tablet  Commonly known as: CRESTOR   20 mg, Oral, Nightly      tiZANidine 2 MG tablet  Commonly known as: ZANAFLEX   2 mg, Oral, Every 8 Hours PRN      traZODone 50 MG tablet  Commonly known as: DESYREL   25 mg, Oral, Nightly      vitamin B-12 1000 MCG tablet  Commonly known as: CYANOCOBALAMIN   500 mcg, Oral, 2 Times Daily             Stop These Medications      glipizide 5 MG tablet  Commonly known as: GLUCOTROL     lisinopril 2.5 MG tablet  Commonly known as: PRINIVIL,ZESTRIL     metFORMIN 500 MG tablet  Commonly known as: GLUCOPHAGE            Diet Instructions       Diet: Cardiac Diets, Diabetic Diets; Healthy Heart (2-3 Na+); Regular Texture (IDDSI 7); Thin (IDDSI 0); Consistent Carbohydrate      Discharge Diet:  Cardiac Diets  Diabetic Diets       Cardiac Diet: Healthy Heart (2-3 Na+)    Texture: Regular Texture (IDDSI 7)    Fluid Consistency: Thin (IDDSI 0)    Diabetic Diet: Consistent Carbohydrate          Future Appointments   Date Time Provider Department [place]   10/6/2023 12:00 PM Zohra Angulo APRN [facility]     Additional Instructions for the Follow-ups that You Need to Schedule       Discharge Follow-up with PCP   As directed       Currently Documented PCP:    Harvey Larson MD    PCP Phone Number:    [phone]     Follow Up Details: 1 week        Discharge Follow-up  with Specialty: Nephrology, Cardiology   As directed      Specialty: Nephrology, Cardiology   Follow Up Details: As directed.               Contact information for follow-up providers       Harvey Larson MD .    Specialty: Family Medicine  Why: 1 week  Contact information:  [address]  [facility] [address] [phone]                       Contact information for after-discharge care       Home Medical Care       [facility] .    Service: Home Health Services  Contact information:  [address] [place] [id number] [phone]                                 Discharge Order (From admission, onward)       Start     Ordered    09/29/23 1134  Discharge patient  Once        Comments: Discharge after repeat Accu-Chek reported.   Expected Discharge Date: 09/29/23   Discharge Disposition: Home or Self Care   Physician of Record for Attribution - Please select from Treatment Team: PAUL CUMMINGS [[id number]]   Review needed by CMO to determine Physician of Record: No      Question Answer Comment   Physician of Record for Attribution - Please select from Treatment Team PAUL CUMMINGS    Review needed by CMO to determine Physician of Record No        09/29/23 1151                      Total time spent discharging patient including evaluation,post hospitalization follow up,  medication and post hospitalization instructions and education total time exceeds 30 minutes.    Signed:  Paul Cummings MD  9/29/2023  11:52 EDT    EMR Dragon/Transcription disclaimer:   Much of this encounter note is an electronic transcription/translation of spoken language to printed text. The electronic translation of spoken language may permit erroneous, or at times, nonsensical words or phrases to be inadvertently transcribed; Although I have reviewed the note for such errors, some may still exist.

## 2023-09-29 NOTE — PLAN OF CARE
Goal Outcome Evaluation:      VSS, no complaints of pain, q2 turns, incontinence care provided, wife at bedside. Very good urine output. Attempted to have bowel movement, however he only passed gas. Requesting Miralax with am meds.

## 2023-09-29 NOTE — PROGRESS NOTES
"DAILY PROGRESS NOTE  [facility]    Patient Identification:  Name: Edilberto Reeder  Age: 73 y.o.  Sex: male  :  1950  MRN: [id number]         Primary Care Physician: Harvey Larson MD      Subjective  Patient with no new or acute complaints.  Overall feels well.  He is eager to return home.    Objective:  General Appearance:  Comfortable, well-appearing, in no acute distress and not in pain.    Vital signs: (most recent): Blood pressure 109/61, pulse 70, temperature 97.5 °F (36.4 °C), temperature source Oral, resp. rate 18, height 185.4 cm (73\"), weight 81.6 kg (180 lb), SpO2 99 %.    Lungs:  Normal effort and normal respiratory rate.  Breath sounds clear to auscultation.    Heart: Normal rate.  Regular rhythm.    Extremities: There is no dependent edema.    Neurological: Patient is alert.  (Oriented person and place.  Cooperative and pleasant.).    Skin:  Warm and dry.                Vital signs in last 24 hours:  Temp:  [97.5 °F (36.4 °C)-99 °F (37.2 °C)] 97.5 °F (36.4 °C)  Heart Rate:  [60-92] 70  Resp:  [18] 18  BP: ()/(57-77) 109/61    Intake/Output:    Intake/Output Summary (Last 24 hours) at 2023 1051  Last data filed at 2023 0932  Gross per 24 hour   Intake 702 ml   Output 175 ml   Net 527 ml         Results from last 7 days   Lab Units 23  0710 23  0623  0623  2253   WBC 10*3/mm3 4.13 3.95 4.42 5.52   HEMOGLOBIN g/dL 9.0* 8.4* 8.7* 8.8*   PLATELETS 10*3/mm3 180 195 202 212     Results from last 7 days   Lab Units 23  0710 23  0617 23  0623  2253   SODIUM mmol/L 131* 134* 131* 131*   POTASSIUM mmol/L 3.5 3.7 3.8 4.0   CHLORIDE mmol/L 99 101 95* 94*   CO2 mmol/L 21.0* 24.0 23.0 24.0   BUN mg/dL 17 19 25* 27*   CREATININE mg/dL 1.31* 1.34* 1.47* 1.63*   GLUCOSE mg/dL 62* 74 52* 134*   Estimated Creatinine Clearance: 56.2 mL/min (A) (by C-G formula based on SCr of 1.31 mg/dL (H)).  Results from last 7 days   Lab " Units 09/29/23  0710 09/28/23  0617 09/27/23  0621 09/26/23  2253   CALCIUM mg/dL 8.9 9.0 8.8 9.0   ALBUMIN g/dL 3.2* 3.4*  --  3.9   MAGNESIUM mg/dL  --  1.9 2.5* 1.4*   PHOSPHORUS mg/dL 3.1 3.0  --   --      Results from last 7 days   Lab Units 09/29/23  0710 09/28/23  0617 09/26/23  2253   ALBUMIN g/dL 3.2* 3.4* 3.9   BILIRUBIN mg/dL  --   --  0.9   ALK PHOS U/L  --   --  219*   AST (SGOT) U/L  --   --  33   ALT (SGPT) U/L  --   --  <5       Assessment:    Hypoglycemia    ASCVD (arteriosclerotic cardiovascular disease)    Essential hypertension    HLD (hyperlipidemia)    Hypothyroidism    Type 2 diabetes mellitus with hyperglycemia, without long-term current use of insulin    Parkinson's disease    Chronic diastolic heart failure    MATT (acute kidney injury)    Stage 3a chronic kidney disease        Plan:  Overall doing well.  A.m. blood sugar little on the low side again this morning.  If blood sugars in reasonable range at noon then okay for discharge.    Paul Cummings MD  9/29/2023  10:51 EDT

## 2023-09-29 NOTE — PROGRESS NOTES
[facility] Progress Note      Patient Name: Edilberto Reeder  : 1950  MRN: [id number]  Primary Care Physician:  Harvey Larson MD  Date of admission: 2023    Subjective     Interval History:   F/u MATT    Review of Systems:   Denies dyspnea  Wife tells me home dose of lasix is actually 60 mg AM    Objective     Vitals:   Temp:  [97.7 °F (36.5 °C)-99 °F (37.2 °C)] 97.7 °F (36.5 °C)  Heart Rate:  [60-92] 60  Resp:  [18] 18  BP: ()/(57-77) 90/57    Intake/Output Summary (Last 24 hours) at 2023 0720  Last data filed at 2023 1807  Gross per 24 hour   Intake 222 ml   Output 175 ml   Net 47 ml       Physical Exam:    General Appearance: comfortable, alert  Neck: supple, no JVD  Lungs: CTA bilat no rales  Heart: RRR, normal S1 and S2  Abdomen: soft, nontender, nondistended  Extremities: trace BLE edema, no cyanosis or clubbing    Scheduled Meds:     allopurinol, 100 mg, Oral, Daily  apixaban, 5 mg, Oral, Q12H  carbidopa-levodopa ER, 1 tablet, Oral, 4x Daily  citalopram, 20 mg, Oral, Daily  docusate sodium, 100 mg, Oral, Nightly  donepezil, 10 mg, Oral, Nightly  furosemide, 40 mg, Oral, Daily  insulin lispro, 2-7 Units, Subcutaneous, 4x Daily AC & at Bedtime  levothyroxine, 50 mcg, Oral, Q AM  metoprolol tartrate, 12.5 mg, Oral, BID  rosuvastatin, 20 mg, Oral, Nightly  senna-docusate sodium, 2 tablet, Oral, BID  sodium chloride, 10 mL, Intravenous, Q12H  traZODone, 25 mg, Oral, Nightly      IV Meds:        Results Reviewed:   I have personally reviewed the results from the time of this admission to 2023 07:20 EDT     Results from last 7 days   Lab Units 23  0617 23  0621 23  7963   SODIUM mmol/L 134* 131* 131*   POTASSIUM mmol/L 3.7 3.8 4.0   CHLORIDE mmol/L 101 95* 94*   CO2 mmol/L 24.0 23.0 24.0   BUN mg/dL 19 25* 27*   CREATININE mg/dL 1.34* 1.47* 1.63*   CALCIUM mg/dL 9.0 8.8 9.0   BILIRUBIN mg/dL  --   --  0.9   ALK PHOS U/L  --   --   219*   ALT (SGPT) U/L  --   --  <5   AST (SGOT) U/L  --   --  33   GLUCOSE mg/dL 74 52* 134*     Estimated Creatinine Clearance: 55 mL/min (A) (by C-G formula based on SCr of 1.34 mg/dL (H)).  Results from last 7 days   Lab Units 09/28/23  0617 09/27/23  0621 09/26/23  2253   MAGNESIUM mg/dL 1.9 2.5* 1.4*   PHOSPHORUS mg/dL 3.0  --   --          Results from last 7 days   Lab Units 09/28/23  0617 09/27/23  0621 09/26/23  2253   WBC 10*3/mm3 3.95 4.42 5.52   HEMOGLOBIN g/dL 8.4* 8.7* 8.8*   PLATELETS 10*3/mm3 195 202 212     Results from last 7 days   Lab Units 09/27/23  0621   INR  2.11*       Assessment / Plan     ASSESSMENT:  Non olig MATT - suspect prerenal azotemia due to vol depletion from recent diuretic intensification, as well as hypotension and impaired compensation via ACEi use.  Cr was up to 1.7 a week ago, trending down now, 1.3 yesterday.  Cr normal 0.8 early AUG.  Mild hyponatremia better, Na up 134 .  Labs today pending  DM2 + hypoglycemia - combination of glyburide + MATT with impaired clearance.  Resolved.  Diastolic CHF - had recent vol overload prompting diuretic intensification, given IVF for prerenal azotemia and developed some mild edema - restarted lsaix yesterday, actual home dose is 60mg AM.  Vague right perihilar opacity on CXR doubt is pulm edema  Hypotension (hx HTN) - BP labile, may have some dysautonomia in relation to Parkinson's.  SBP 90 last night.  On low dose metop. ACEi on hold  Parkinson's  Iron def anemia - hgb stable mid 8's, TSAT 7% ferritin 66  Elevated Tn - CARD following; appears stress test and echo both unremarkable, EF 55%  Dementia, on aricept    PLAN:  No objection to discharge today assuming labs stable, which are pending   Inc lasix to 60mg, his home does  Resume KCL supplement he takes w lasix  Hold ACEi at discharge  Will arrange nephrology follow up 2-3 weeks      Harvey Ngo MD  09/29/23  07:20 EDT    [facility]  [phone]

## 2023-09-29 NOTE — CASE MANAGEMENT/SOCIAL WORK
Case Management Discharge Note      Final Note: DC'd home with wife and [facility] following    Provided Post Acute Provider List?: N/A  Provided Post Acute Provider Quality & Resource List?: N/A    Selected Continued Care - Discharged on 9/29/2023 Admission date: 9/26/2023 - Discharge disposition: Home or Self Care          Home Medical Care Coordination complete.      Service Provider Selected Services Address Phone Fax Patient Preferred    [facility] [address] [place] [phone] [phone] --                 Transportation Services  Private: Car    Final Discharge Disposition Code: 06 - home with home health care

## 2023-09-29 NOTE — OUTREACH NOTE
Prep Survey      Flowsheet Row Responses   [facility] facility patient discharged from? [facility]   Is LACE score < 7 ? No   Eligibility Readm Mgmt   Discharge diagnosis Hypoglycemia   Does the patient have one of the following disease processes/diagnoses(primary or secondary)? Other   Does the patient have Home health ordered? Yes   What is the Home health agency?  [facility]    Is there a DME ordered? No   Prep survey completed? Yes            Christina ENGEL - Registered Nurse

## 2023-10-03 ENCOUNTER — READMISSION MANAGEMENT (OUTPATIENT)
Dept: CALL CENTER | Facility: HOSPITAL | Age: 73
End: 2023-10-03
Payer: MEDICARE

## 2023-10-03 NOTE — OUTREACH NOTE
Medical Week 1 Survey      Flowsheet Row Responses   [facility] patient discharged from? [facility]   Does the patient have one of the following disease processes/diagnoses(primary or secondary)? Other   Week 1 attempt successful? No   Unsuccessful attempts Attempt 1            Lucero SANDERS - Registered Nurse

## 2023-10-05 RX ORDER — ISOSORBIDE MONONITRATE 30 MG/1
TABLET, EXTENDED RELEASE ORAL
Qty: 90 TABLET | Refills: 3 | Status: SHIPPED | OUTPATIENT
Start: 2023-10-05

## 2023-10-06 ENCOUNTER — OFFICE VISIT (OUTPATIENT)
Dept: CARDIOLOGY | Facility: CLINIC | Age: 73
End: 2023-10-06
Payer: MEDICARE

## 2023-10-06 VITALS
SYSTOLIC BLOOD PRESSURE: 134 MMHG | DIASTOLIC BLOOD PRESSURE: 72 MMHG | WEIGHT: 186 LBS | HEART RATE: 63 BPM | BODY MASS INDEX: 24.65 KG/M2 | HEIGHT: 73 IN

## 2023-10-06 DIAGNOSIS — I48.21 PERMANENT ATRIAL FIBRILLATION: Primary | ICD-10-CM

## 2023-10-06 DIAGNOSIS — I50.32 CHRONIC DIASTOLIC HEART FAILURE: ICD-10-CM

## 2023-10-06 DIAGNOSIS — E78.2 MIXED HYPERLIPIDEMIA: Chronic | ICD-10-CM

## 2023-10-06 DIAGNOSIS — I10 ESSENTIAL HYPERTENSION: Chronic | ICD-10-CM

## 2023-10-06 DIAGNOSIS — E11.65 TYPE 2 DIABETES MELLITUS WITH HYPERGLYCEMIA, WITHOUT LONG-TERM CURRENT USE OF INSULIN: ICD-10-CM

## 2023-10-06 NOTE — PROGRESS NOTES
Date of Office Visit: 10/06/2023  Encounter Provider: ESME Enamorado  Place of Service: [facility] CARDIOLOGY  Patient Name: Edilberto Reeder  :1950    Chief Complaint   Patient presents with    Follow-up    Congestive Heart Failure   :     HPI:Edilberto Reeder is a 73 y.o. male who is a patient of  Dr. Fung and is known to me today. He has a history of Parkinson's, chronic A-fib on anticoagulation, chronic anemia, type 2 diabetes mellitus, hypothyroidism and diastolic heart failure.  He had a mildly abnormal stress test in March of last year.  His echo was stable we treated him medically.  He has permanent A-fib and his rate has been controlled.  He has been anticoagulated with Eliquis 5 mg twice a day.  In November of last year he was seen for some diastolic and systolic heart failure his EF is actually 46 he has some mild regional wall motion abnormality.  Continue MAP medically and he was to follow-up in the office.  At that appointment he was reporting that he was still having some signs of volume overload.  We kept him on his same Lasix regimen but added some spironolactone.      His renal function went up after this and it was stopped. He fell and ended up in the hospital after a fall and had a laceration that was sutured. He had to get a unit of blood also. His Eliquis was initially held but then restarted. I saw him in the office in September and he was down 13 lbs. I cut his lasix back.     Two weeks ago he was in the hospital with hypoglycemia. His renal function has been stable but was seen by nephrology and is going to follow up with them. He complained of chest pain that admission and underwent a stress test that had some spetal wall motion abnormality but no ischemic and normal EF.     He is here for follow up today. He denies chest pain. Has some exertional shortness of breath but over all his weight is stable between 180-186 at home. He is able to  lay flat in his recliner chair. Edema is stable. No dizziness.   Previous testing and notes have been reviewed by me.   Past Medical History:   Diagnosis Date    Atrial fibrillation with RVR     Atrial flutter     Diabetes     HLD (hyperlipidemia) 01/27/2016    Hypertension     Parkinson's disease     Advanced        Past Surgical History:   Procedure Laterality Date    BRAIN STIMULATOR      COLONOSCOPY N/A 5/5/2022    Procedure: COLONOSCOPY TO CECUM WITH COLD SNARE POLYPECTOMY;  Surgeon: Luther Ferrer MD;  Location: [facility] ENDOSCOPY;  Service: Gastroenterology;  Laterality: N/A;  PRE:ANEMIA  POST:POLYPS/HEMORRHOIDS    ENDOSCOPY N/A 5/5/2022    Procedure: ESOPHAGOGASTRODUODENOSCOPY WITH  COLD BIOPSIES;  Surgeon: Luther Ferrer MD;  Location: [facility] ENDOSCOPY;  Service: Gastroenterology;  Laterality: N/A;  PRE:ANEMIA  POST:DIVERTICULUM/GASTRITIS    JOINT REPLACEMENT      Bilateral shoulder and knee replacements       Social History     Socioeconomic History    Marital status:    Tobacco Use    Smoking status: Never    Smokeless tobacco: Never    Tobacco comments:     caffeine use   Vaping Use    Vaping Use: Never used   Substance and Sexual Activity    Alcohol use: Yes    Drug use: No    Sexual activity: Defer       History reviewed. No pertinent family history.    Review of Systems   Constitutional: Negative for diaphoresis and malaise/fatigue.   Cardiovascular:  Positive for dyspnea on exertion and leg swelling. Negative for chest pain, claudication, irregular heartbeat, near-syncope, orthopnea, palpitations, paroxysmal nocturnal dyspnea and syncope.   Respiratory:  Negative for cough, shortness of breath and sleep disturbances due to breathing.    Musculoskeletal:  Negative for falls.   Neurological:  Negative for dizziness and weakness.   Psychiatric/Behavioral:  Negative for altered mental status and substance abuse.      Allergies   Allergen Reactions    Bacitracin Anaphylaxis     Neosporin [Neomycin-Bacitracin Zn-Polymyx] Other (See Comments)     BP drops and heart stops!    Fish-Derived Products Hives    Shellfish Allergy Hives    Shrimp (Diagnostic) Hives         Current Outpatient Medications:     acetaminophen (TYLENOL) 325 MG tablet, Take 2 tablets by mouth Every 6 (Six) Hours As Needed for Mild Pain ., Disp: , Rfl:     allopurinol (ZYLOPRIM) 100 MG tablet, Take 1 tablet by mouth Daily., Disp: , Rfl:     apixaban (ELIQUIS) 5 MG tablet tablet, Take 1 tablet by mouth 2 (Two) Times a Day., Disp: , Rfl:     Calcium Carbonate Antacid (CALCIUM CARBONATE PO), Take 650 mg by mouth Daily., Disp: , Rfl:     Carbidopa-Levodopa ER 36. MG capsule controlled-release, Take 4 capsules by mouth 4 (Four) Times a Day., Disp: , Rfl:     citalopram (CeleXA) 20 MG tablet, Take 1 tablet by mouth Daily., Disp: , Rfl:     docusate sodium (COLACE) 50 MG capsule, Take 2 capsules by mouth Every Night., Disp: , Rfl:     donepezil (ARICEPT) 5 MG tablet, Take 2 tablets by mouth Every Night., Disp: , Rfl:     ferrous sulfate 325 (65 FE) MG tablet, , Disp: , Rfl:     furosemide (LASIX) 40 MG tablet, Take 1 tablet by mouth Daily., Disp: 30 tablet, Rfl: 11    isosorbide mononitrate (IMDUR) 30 MG 24 hr tablet, TAKE 1 TABLET BY MOUTH DAILY, Disp: 90 tablet, Rfl: 3    levothyroxine (SYNTHROID, LEVOTHROID) 50 MCG tablet, Take 1 tablet by mouth Every Morning., Disp: 30 tablet, Rfl: 0    metoprolol tartrate (LOPRESSOR) 25 MG tablet, TAKE 1/2 TABLET BY MOUTH TWICE A DAY (Patient taking differently: Take 0.5 tablets by mouth 2 (Two) Times a Day.), Disp: 30 tablet, Rfl: 11    multivitamin with minerals tablet tablet, Take 1 tablet by mouth Daily., Disp: , Rfl:     potassium chloride ER (K-TAB) 20 MEQ tablet controlled-release ER tablet, Take 1 tablet by mouth Daily., Disp: 30 tablet, Rfl: 11    rosuvastatin (CRESTOR) 20 MG tablet, Take 1 tablet by mouth Every Night., Disp: 90 tablet, Rfl: 3    tiZANidine (ZANAFLEX) 2 MG  "tablet, Take 1 tablet by mouth Every 8 (Eight) Hours As Needed for Muscle Spasms., Disp: , Rfl:     traZODone (DESYREL) 50 MG tablet, Take 0.5 tablets by mouth Every Night., Disp: , Rfl:     vitamin B-12 (CYANOCOBALAMIN) 1000 MCG tablet, Take 0.5 tablets by mouth 2 (Two) Times a Day., Disp: , Rfl:       Objective:     Vitals:    10/06/23 1203   BP: 134/72   Pulse: 63   Weight: 84.4 kg (186 lb)   Height: 185.4 cm (73\")     Body mass index is 24.54 kg/m².    PHYSICAL EXAM:    Constitutional:       General: Not in acute distress.     Appearance: Normal appearance. Well-developed.   Eyes:      Pupils: Pupils are equal, round, and reactive to light.   HENT:      Head: Normocephalic.   Neck:      Vascular: No carotid bruit or JVD.   Pulmonary:      Effort: Pulmonary effort is normal. No tachypnea.      Breath sounds: Normal breath sounds. No wheezing. No rales.   Cardiovascular:      Normal rate. Irregularly irregular rhythm.      No gallop.    Pulses:     Intact distal pulses.   Edema:     Peripheral edema present.     Pretibial: bilateral 1+ edema of the pretibial area.     Ankle: bilateral 1+ edema of the ankle.     Feet: bilateral trace edema of the feet.  Abdominal:      General: Bowel sounds are normal.      Palpations: Abdomen is soft.      Tenderness: There is no abdominal tenderness.   Musculoskeletal: Normal range of motion.      Cervical back: Normal range of motion and neck supple. No edema. Skin:     General: Skin is warm and dry.   Neurological:      Mental Status: Alert and oriented to person, place, and time.         ECG 12 Lead    Date/Time: 10/6/2023 1:03 PM  Performed by: Zohra Angulo APRN  Authorized by: Zohra Angulo APRN   Comparison: compared with previous ECG from 9/26/2023  Similar to previous ECG  Rhythm: atrial fibrillation  Rate: normal  Conduction: right bundle branch block  QRS axis: normal  Other findings: non-specific ST-T wave changes    Clinical impression: abnormal EKG        "   Assessment:       Diagnosis Plan   1. Permanent atrial fibrillation     Rates well controlled. Anticoagulated with eliquis.   2. Mixed hyperlipidemia     Stable.   3. Essential hypertension     Controlled with current regimen. No further hypotension   4. Chronic diastolic heart failure     Volume status stable. Some edema but chronic. Going to follow up with nephrology. [facility] and he reports it was 1.2 which is stable.   5. Type 2 diabetes mellitus with hyperglycemia, without long-term current use of insulin     Of hypoglycemic agents due to poor oral intake and hypoglycemia.      No orders of the defined types were placed in this encounter.         Plan:       Follow up with Dr. Fung in 1-2 months          Your medication list            Accurate as of October 6, 2023 12:48 PM. If you have any questions, ask your nurse or doctor.                CONTINUE taking these medications        Instructions Last Dose Given Next Dose Due   acetaminophen 325 MG tablet  Commonly known as: TYLENOL      Take 2 tablets by mouth Every 6 (Six) Hours As Needed for Mild Pain .       allopurinol 100 MG tablet  Commonly known as: ZYLOPRIM      Take 1 tablet by mouth Daily.       apixaban 5 MG tablet tablet  Commonly known as: ELIQUIS      Take 1 tablet by mouth 2 (Two) Times a Day.       CALCIUM CARBONATE PO      Take 650 mg by mouth Daily.       Carbidopa-Levodopa ER 36. MG capsule controlled-release      Take 4 capsules by mouth 4 (Four) Times a Day.       citalopram 20 MG tablet  Commonly known as: CeleXA      Take 1 tablet by mouth Daily.       docusate sodium 50 MG capsule  Commonly known as: COLACE      Take 2 capsules by mouth Every Night.       donepezil 5 MG tablet  Commonly known as: ARICEPT      Take 2 tablets by mouth Every Night.       ferrous sulfate 325 (65 FE) MG tablet           furosemide 40 MG tablet  Commonly known as: LASIX      Take 1 tablet by mouth Daily.        isosorbide mononitrate 30 MG 24 hr tablet  Commonly known as: IMDUR      TAKE 1 TABLET BY MOUTH DAILY       levothyroxine 50 MCG tablet  Commonly known as: SYNTHROID, LEVOTHROID      Take 1 tablet by mouth Every Morning.       metoprolol tartrate 25 MG tablet  Commonly known as: LOPRESSOR      TAKE 1/2 TABLET BY MOUTH TWICE A DAY       multivitamin with minerals tablet tablet      Take 1 tablet by mouth Daily.       potassium chloride ER 20 MEQ tablet controlled-release ER tablet  Commonly known as: K-TAB      Take 1 tablet by mouth Daily.       rosuvastatin 20 MG tablet  Commonly known as: CRESTOR      Take 1 tablet by mouth Every Night.       tiZANidine 2 MG tablet  Commonly known as: ZANAFLEX      Take 1 tablet by mouth Every 8 (Eight) Hours As Needed for Muscle Spasms.       traZODone 50 MG tablet  Commonly known as: DESYREL      Take 0.5 tablets by mouth Every Night.       vitamin B-12 1000 MCG tablet  Commonly known as: CYANOCOBALAMIN      Take 0.5 tablets by mouth 2 (Two) Times a Day.                  As always, it has been a pleasure to participate in your patient's care.      Sincerely,     Zohra VICTORIA

## 2023-10-10 ENCOUNTER — READMISSION MANAGEMENT (OUTPATIENT)
Dept: CALL CENTER | Facility: HOSPITAL | Age: 73
End: 2023-10-10
Payer: MEDICARE

## 2023-10-10 NOTE — OUTREACH NOTE
Medical Week 2 Survey      Flowsheet Row Responses   [facility], [place] patient discharged from? [facility]   Does the patient have one of the following disease processes/diagnoses(primary or secondary)? Other   Week 2 attempt successful? No   Unsuccessful attempts Attempt 1            Casey HINOJOSA - Registered Nurse

## 2023-10-17 ENCOUNTER — APPOINTMENT (OUTPATIENT)
Dept: CARDIOLOGY | Facility: HOSPITAL | Age: 73
End: 2023-10-17
Payer: MEDICARE

## 2023-10-17 ENCOUNTER — HOSPITAL ENCOUNTER (EMERGENCY)
Facility: HOSPITAL | Age: 73
Discharge: HOME | End: 2023-10-17
Attending: STUDENT IN AN ORGANIZED HEALTH CARE EDUCATION/TRAINING PROGRAM | Admitting: STUDENT IN AN ORGANIZED HEALTH CARE EDUCATION/TRAINING PROGRAM
Payer: MEDICARE

## 2023-10-17 VITALS
HEIGHT: 73 IN | SYSTOLIC BLOOD PRESSURE: 129 MMHG | RESPIRATION RATE: 18 BRPM | WEIGHT: 186 LBS | DIASTOLIC BLOOD PRESSURE: 77 MMHG | OXYGEN SATURATION: 96 % | TEMPERATURE: 96.2 F | BODY MASS INDEX: 24.65 KG/M2 | HEART RATE: 50 BPM

## 2023-10-17 DIAGNOSIS — M79.89 LEG SWELLING: Primary | ICD-10-CM

## 2023-10-17 LAB
ALBUMIN SERPL-MCNC: 3.7 G/DL (ref 3.5–5.2)
ALBUMIN/GLOB SERPL: 1.2 G/DL
ALP SERPL-CCNC: 188 U/L (ref 39–117)
ALT SERPL W P-5'-P-CCNC: 6 U/L (ref 1–41)
ANION GAP SERPL CALCULATED.3IONS-SCNC: 13 MMOL/L (ref 5–15)
AST SERPL-CCNC: 28 U/L (ref 1–40)
BASOPHILS # BLD AUTO: 0.06 10*3/MM3 (ref 0–0.2)
BASOPHILS NFR BLD AUTO: 1.1 % (ref 0–1.5)
BH CV LOW VAS RIGHT MID FEMORAL SPONT: 1
BH CV LOW VAS RIGHT POPLITEAL SPONT: 1
BH CV LOWER VASCULAR LEFT COMMON FEMORAL AUGMENT: NORMAL
BH CV LOWER VASCULAR LEFT COMMON FEMORAL COMPETENT: NORMAL
BH CV LOWER VASCULAR LEFT COMMON FEMORAL COMPRESS: NORMAL
BH CV LOWER VASCULAR LEFT COMMON FEMORAL PHASIC: NORMAL
BH CV LOWER VASCULAR LEFT COMMON FEMORAL SPONT: NORMAL
BH CV LOWER VASCULAR RIGHT COMMON FEMORAL AUGMENT: NORMAL
BH CV LOWER VASCULAR RIGHT COMMON FEMORAL COMPETENT: NORMAL
BH CV LOWER VASCULAR RIGHT COMMON FEMORAL COMPRESS: NORMAL
BH CV LOWER VASCULAR RIGHT COMMON FEMORAL PHASIC: NORMAL
BH CV LOWER VASCULAR RIGHT COMMON FEMORAL SPONT: NORMAL
BH CV LOWER VASCULAR RIGHT DISTAL FEMORAL COMPRESS: NORMAL
BH CV LOWER VASCULAR RIGHT GASTRONEMIUS COMPRESS: NORMAL
BH CV LOWER VASCULAR RIGHT GREATER SAPH AK COMPRESS: NORMAL
BH CV LOWER VASCULAR RIGHT GREATER SAPH BK COMPRESS: NORMAL
BH CV LOWER VASCULAR RIGHT LESSER SAPH COMPRESS: NORMAL
BH CV LOWER VASCULAR RIGHT MID FEMORAL AUGMENT: NORMAL
BH CV LOWER VASCULAR RIGHT MID FEMORAL COMPETENT: NORMAL
BH CV LOWER VASCULAR RIGHT MID FEMORAL COMPRESS: NORMAL
BH CV LOWER VASCULAR RIGHT MID FEMORAL PHASIC: NORMAL
BH CV LOWER VASCULAR RIGHT MID FEMORAL SPONT: NORMAL
BH CV LOWER VASCULAR RIGHT PERONEAL COMPRESS: NORMAL
BH CV LOWER VASCULAR RIGHT POPLITEAL AUGMENT: NORMAL
BH CV LOWER VASCULAR RIGHT POPLITEAL COMPETENT: NORMAL
BH CV LOWER VASCULAR RIGHT POPLITEAL COMPRESS: NORMAL
BH CV LOWER VASCULAR RIGHT POPLITEAL PHASIC: NORMAL
BH CV LOWER VASCULAR RIGHT POPLITEAL SPONT: NORMAL
BH CV LOWER VASCULAR RIGHT POSTERIOR TIBIAL COMPRESS: NORMAL
BH CV LOWER VASCULAR RIGHT PROFUNDA FEMORAL COMPRESS: NORMAL
BH CV LOWER VASCULAR RIGHT PROXIMAL FEMORAL COMPRESS: NORMAL
BH CV LOWER VASCULAR RIGHT SAPHENOFEMORAL JUNCTION COMPRESS: NORMAL
BILIRUB SERPL-MCNC: 1.1 MG/DL (ref 0–1.2)
BUN SERPL-MCNC: 33 MG/DL (ref 8–23)
BUN/CREAT SERPL: 20.9 (ref 7–25)
CALCIUM SPEC-SCNC: 9 MG/DL (ref 8.6–10.5)
CHLORIDE SERPL-SCNC: 94 MMOL/L (ref 98–107)
CO2 SERPL-SCNC: 26 MMOL/L (ref 22–29)
CREAT SERPL-MCNC: 1.58 MG/DL (ref 0.76–1.27)
DEPRECATED RDW RBC AUTO: 43.9 FL (ref 37–54)
EGFRCR SERPLBLD CKD-EPI 2021: 45.3 ML/MIN/1.73
EOSINOPHIL # BLD AUTO: 0.21 10*3/MM3 (ref 0–0.4)
EOSINOPHIL NFR BLD AUTO: 3.9 % (ref 0.3–6.2)
ERYTHROCYTE [DISTWIDTH] IN BLOOD BY AUTOMATED COUNT: 16.6 % (ref 12.3–15.4)
GLOBULIN UR ELPH-MCNC: 3.2 GM/DL
GLUCOSE SERPL-MCNC: 167 MG/DL (ref 65–99)
HCT VFR BLD AUTO: 29.4 % (ref 37.5–51)
HGB BLD-MCNC: 9.2 G/DL (ref 13–17.7)
HOLD SPECIMEN: NORMAL
HOLD SPECIMEN: NORMAL
IMM GRANULOCYTES # BLD AUTO: 0.01 10*3/MM3 (ref 0–0.05)
IMM GRANULOCYTES NFR BLD AUTO: 0.2 % (ref 0–0.5)
LYMPHOCYTES # BLD AUTO: 0.81 10*3/MM3 (ref 0.7–3.1)
LYMPHOCYTES NFR BLD AUTO: 15.1 % (ref 19.6–45.3)
MCH RBC QN AUTO: 23.5 PG (ref 26.6–33)
MCHC RBC AUTO-ENTMCNC: 31.3 G/DL (ref 31.5–35.7)
MCV RBC AUTO: 75.2 FL (ref 79–97)
MONOCYTES # BLD AUTO: 0.5 10*3/MM3 (ref 0.1–0.9)
MONOCYTES NFR BLD AUTO: 9.3 % (ref 5–12)
NEUTROPHILS NFR BLD AUTO: 3.79 10*3/MM3 (ref 1.7–7)
NEUTROPHILS NFR BLD AUTO: 70.4 % (ref 42.7–76)
NRBC BLD AUTO-RTO: 0 /100 WBC (ref 0–0.2)
PLATELET # BLD AUTO: 208 10*3/MM3 (ref 140–450)
PMV BLD AUTO: 10.6 FL (ref 6–12)
POTASSIUM SERPL-SCNC: 3.4 MMOL/L (ref 3.5–5.2)
PROT SERPL-MCNC: 6.9 G/DL (ref 6–8.5)
RBC # BLD AUTO: 3.91 10*6/MM3 (ref 4.14–5.8)
SODIUM SERPL-SCNC: 133 MMOL/L (ref 136–145)
WBC NRBC COR # BLD: 5.38 10*3/MM3 (ref 3.4–10.8)
WHOLE BLOOD HOLD COAG: NORMAL
WHOLE BLOOD HOLD SPECIMEN: NORMAL

## 2023-10-17 PROCEDURE — 85025 COMPLETE CBC W/AUTO DIFF WBC: CPT | Performed by: EMERGENCY MEDICINE

## 2023-10-17 PROCEDURE — 99284 EMERGENCY DEPT VISIT MOD MDM: CPT

## 2023-10-17 PROCEDURE — 93971 EXTREMITY STUDY: CPT

## 2023-10-17 PROCEDURE — 80053 COMPREHEN METABOLIC PANEL: CPT | Performed by: EMERGENCY MEDICINE

## 2023-10-17 PROCEDURE — 36415 COLL VENOUS BLD VENIPUNCTURE: CPT

## 2023-10-17 NOTE — ED NOTES
Pt comes in with complaints of pain and swelling in his right leg. Leg appears a little larger than the other leg.

## 2023-10-18 NOTE — ED PROVIDER NOTES
EMERGENCY DEPARTMENT ENCOUNTER    Room Number:  25/25  PCP: Harvey Larson MD  Historian: Patient, family member at bedside      HPI:  Chief Complaint: Right leg swelling and pain    Context: Edilberto Reeder is a 73 y.o. male who presents to the ED c/o right leg swelling pain.  Patient was at physical therapy earlier today when his therapist noticed his right leg to be tender to touch and swollen.  She was concerned for potential DVT and sent patient to the emergency department for further evaluation.  Patient states symptoms of been occurring for several days but he did not notice how bad they were until today.            PAST MEDICAL HISTORY  Active Ambulatory Problems     Diagnosis Date Noted    ASCVD (arteriosclerotic cardiovascular disease) 01/27/2016    Permanent atrial fibrillation 01/27/2016    Diabetic retinopathy associated with type 2 diabetes mellitus 01/27/2016    Essential hypertension 01/27/2016    HLD (hyperlipidemia) 01/27/2016    Hypothyroidism 01/27/2016    Peripheral neuropathy 01/27/2016    Renal insufficiency 01/27/2016    Type 2 diabetes mellitus with hyperglycemia, without long-term current use of insulin     Parkinson's disease     Dyspnea on exertion 04/19/2018    Atrial fibrillation, persistent 08/17/2018    Stroke-like symptoms 12/08/2018    Hypopotassemia 12/08/2018    Ankle pain 09/17/2021    Acute idiopathic gout of right ankle 09/18/2021    Generalized weakness 03/23/2022    Head injury due to trauma 04/30/2022    Acute blood loss anemia 05/01/2022    Chronic anticoagulation 05/01/2022    Thrombocytopenia 05/04/2022    Screening for colon cancer 05/04/2022    Minor head injury, initial encounter 05/04/2022    Chronic diastolic heart failure 11/02/2022    Shortness of breath 11/02/2022    Syncope 08/04/2023    Acute cough 07/11/2023    Bronchitis 07/11/2023    Depression 09/07/2023    H/O Spinal surgery 07/03/2017    H/O umbilical hernia repair 01/01/1999    Hemorrhoids  09/07/2023    History of colon polyps 09/07/2023    History of Parkinson's disease 09/07/2023    Melanoma in situ of unspecified upper limb, including shoulder 02/21/2023    Hypoglycemia 09/27/2023    MATT (acute kidney injury) 09/27/2023    Stage 3a chronic kidney disease 09/27/2023     Resolved Ambulatory Problems     Diagnosis Date Noted    Atrial fibrillation with RVR 01/04/2018    Chest pain 03/23/2022    Episodes of staring 03/23/2022    Diplopia 03/23/2022    Headache 03/23/2022    Metabolic encephalopathy 03/25/2022     Past Medical History:   Diagnosis Date    Atrial flutter     Diabetes     Hypertension          PAST SURGICAL HISTORY  Past Surgical History:   Procedure Laterality Date    BRAIN STIMULATOR      COLONOSCOPY N/A 5/5/2022    Procedure: COLONOSCOPY TO CECUM WITH COLD SNARE POLYPECTOMY;  Surgeon: Luther Ferrer MD;  Location: [facility] ENDOSCOPY;  Service: Gastroenterology;  Laterality: N/A;  PRE:ANEMIA  POST:POLYPS/HEMORRHOIDS    ENDOSCOPY N/A 5/5/2022    Procedure: ESOPHAGOGASTRODUODENOSCOPY WITH  COLD BIOPSIES;  Surgeon: Luther Ferrer MD;  Location: [facility] ENDOSCOPY;  Service: Gastroenterology;  Laterality: N/A;  PRE:ANEMIA  POST:DIVERTICULUM/GASTRITIS    JOINT REPLACEMENT      Bilateral shoulder and knee replacements         FAMILY HISTORY  No family history on file.      SOCIAL HISTORY  Social History     Socioeconomic History    Marital status:    Tobacco Use    Smoking status: Never    Smokeless tobacco: Never    Tobacco comments:     caffeine use   Vaping Use    Vaping Use: Never used   Substance and Sexual Activity    Alcohol use: Yes    Drug use: No    Sexual activity: Defer         ALLERGIES  Bacitracin, Neosporin [neomycin-bacitracin zn-polymyx], Fish-derived products, Shellfish allergy, and Shrimp (diagnostic)        REVIEW OF SYSTEMS  Review of Systems   Musculoskeletal:  Positive for joint swelling.   All other systems reviewed and are negative.            PHYSICAL EXAM  ED Triage Vitals   Temp Heart Rate Resp BP SpO2   10/17/23 1726 10/17/23 1726 10/17/23 1726 10/17/23 1744 10/17/23 1726   96.2 °F (35.7 °C) 50 18 105/48 96 %      Temp src Heart Rate Source Patient Position BP Location FiO2 (%)   10/17/23 1726 10/17/23 1726 10/17/23 1744 -- --   Tympanic Monitor Sitting         Physical Exam      GENERAL: no acute distress  HENT: nares patent  EYES: no scleral icterus  CV: regular rhythm, normal rate  RESPIRATORY: normal effort  ABDOMEN: soft  MUSCULOSKELETAL: no deformity.  Mild swelling noted in the right lower extremity with tenderness palpation in the posterior fossa  NEURO: alert, moves all extremities, follows commands  PSYCH:  calm, cooperative  SKIN: warm, dry    Vital signs and nursing notes reviewed.          LAB RESULTS  Recent Results (from the past 24 hour(s))   Green Top (Gel)    Collection Time: 10/17/23  5:47 PM   Result Value Ref Range    Extra Tube Hold for add-ons.    Lavender Top    Collection Time: 10/17/23  5:47 PM   Result Value Ref Range    Extra Tube hold for add-on    Gold Top - SST    Collection Time: 10/17/23  5:47 PM   Result Value Ref Range    Extra Tube Hold for add-ons.    Light Blue Top    Collection Time: 10/17/23  5:47 PM   Result Value Ref Range    Extra Tube Hold for add-ons.    Comprehensive Metabolic Panel    Collection Time: 10/17/23  5:47 PM    Specimen: Blood   Result Value Ref Range    Glucose 167 (H) 65 - 99 mg/dL    BUN 33 (H) 8 - 23 mg/dL    Creatinine 1.58 (H) 0.76 - 1.27 mg/dL    Sodium 133 (L) 136 - 145 mmol/L    Potassium 3.4 (L) 3.5 - 5.2 mmol/L    Chloride 94 (L) 98 - 107 mmol/L    CO2 26.0 22.0 - 29.0 mmol/L    Calcium 9.0 8.6 - 10.5 mg/dL    Total Protein 6.9 6.0 - 8.5 g/dL    Albumin 3.7 3.5 - 5.2 g/dL    ALT (SGPT) 6 1 - 41 U/L    AST (SGOT) 28 1 - 40 U/L    Alkaline Phosphatase 188 (H) 39 - 117 U/L    Total Bilirubin 1.1 0.0 - 1.2 mg/dL    Globulin 3.2 gm/dL    A/G Ratio 1.2 g/dL    BUN/Creatinine Ratio  20.9 7.0 - 25.0    Anion Gap 13.0 5.0 - 15.0 mmol/L    eGFR 45.3 (L) >60.0 mL/min/1.73   CBC Auto Differential    Collection Time: 10/17/23  5:47 PM    Specimen: Blood   Result Value Ref Range    WBC 5.38 3.40 - 10.80 10*3/mm3    RBC 3.91 (L) 4.14 - 5.80 10*6/mm3    Hemoglobin 9.2 (L) 13.0 - 17.7 g/dL    Hematocrit 29.4 (L) 37.5 - 51.0 %    MCV 75.2 (L) 79.0 - 97.0 fL    MCH 23.5 (L) 26.6 - 33.0 pg    MCHC 31.3 (L) 31.5 - 35.7 g/dL    RDW 16.6 (H) 12.3 - 15.4 %    RDW-SD 43.9 37.0 - 54.0 fl    MPV 10.6 6.0 - 12.0 fL    Platelets 208 140 - 450 10*3/mm3    Neutrophil % 70.4 42.7 - 76.0 %    Lymphocyte % 15.1 (L) 19.6 - 45.3 %    Monocyte % 9.3 5.0 - 12.0 %    Eosinophil % 3.9 0.3 - 6.2 %    Basophil % 1.1 0.0 - 1.5 %    Immature Grans % 0.2 0.0 - 0.5 %    Neutrophils, Absolute 3.79 1.70 - 7.00 10*3/mm3    Lymphocytes, Absolute 0.81 0.70 - 3.10 10*3/mm3    Monocytes, Absolute 0.50 0.10 - 0.90 10*3/mm3    Eosinophils, Absolute 0.21 0.00 - 0.40 10*3/mm3    Basophils, Absolute 0.06 0.00 - 0.20 10*3/mm3    Immature Grans, Absolute 0.01 0.00 - 0.05 10*3/mm3    nRBC 0.0 0.0 - 0.2 /100 WBC   Duplex Venous Lower Extremity Right    Collection Time: 10/17/23  7:02 PM   Result Value Ref Range    Right Mid Femoral Spont 1.0     Right Popliteal Spont 1.0     Right Common Femoral Spont Y     Right Common Femoral Competent Y     Right Common Femoral Phasic Y     Right Common Femoral Compress C     Right Common Femoral Augment Y     Right Saphenofemoral Junction Compress C     Right Profunda Femoral Compress C     Right Proximal Femoral Compress C     Right Mid Femoral Spont Y     Right Mid Femoral Competent N     Right Mid Femoral Phasic Y     Right Mid Femoral Compress C     Right Mid Femoral Augment Y     Right Distal Femoral Compress C     Right Popliteal Spont Y     Right Popliteal Competent N     Right Popliteal Phasic Y     Right Popliteal Compress C     Right Popliteal Augment Y     Right Posterior Tibial Compress C     Right  Peroneal Compress C     Right Gastronemius Compress C     Right Greater Saph AK Compress C     Right Greater Saph BK Compress C     Right Lesser Saph Compress C     Left Common Femoral Spont Y     Left Common Femoral Competent Y     Left Common Femoral Phasic Y     Left Common Femoral Compress C     Left Common Femoral Augment Y        Ordered the above labs and reviewed the results.        RADIOLOGY  Duplex Venous Lower Extremity Right    Result Date: 10/17/2023    There was deep venous reflux in the right mid femoral and popliteal veins.   All other right sided veins appeared normal.      Ordered the above noted radiological studies. Reviewed by me in PACS.            MEDICATIONS GIVEN IN ER  Medications - No data to display                MEDICAL DECISION MAKING, PROGRESS, and CONSULTS    All labs have been independently reviewed by me.  All radiology studies have been reviewed by me and I have also reviewed the radiology report.   EKG's independently viewed and interpreted by me.  Discussion below represents my analysis of pertinent findings related to patient's condition, differential diagnosis, treatment plan and final disposition.      Additional sources:  - Discussed/ obtained information from independent historians: Family member at bedside    - External (non-ED) record review: Discharge summary from 9/29/2023 reviewed and notable for 73-year-old gentleman with a history of diabetes as well as CHF.  Patient was admitted for mental status change and noted to be hypoglycemic.  Patient symptoms were improved during hospitalization and he was eventually able to be discharged home    - Chronic or social conditions impacting care: CHF, diabetes, CKD    - Shared decision making: Utilizing shared decision-making techniques we discussed the findings and plan going forward.  At this time suspect patient's symptoms are likely secondary to venous stasis.  Patient does have close follow-up scheduled with nephrology next  week for kidney injury evaluation.      Orders placed during this visit:  Orders Placed This Encounter   Procedures    Elizabeth Draw    Comprehensive Metabolic Panel    CBC Auto Differential    NPO Diet NPO Type: Strict NPO    Undress & Gown    Green Top (Gel)    Lavender Top    Gold Top - SST    Light Blue Top    CBC & Differential         Differential diagnosis includes but is not limited to:    DVT, venous stasis, lower extremity edema      Independent interpretation of labs, radiology studies, and discussions with consultants:  ED Course as of 10/17/23 2038   Tue Oct 17, 2023   1843 Creatinine(!): 1.58 [MW]   1843 Hemoglobin(!): 9.2 [MW]   2037 DVT ultrasound is negative per vascular tech [MW]   2037 Patient is noted to have an elevation in creatinine which was seen on previous lab work.  Patient is close follow-up scheduled with nephrology already [MW]   2038 Suspect lower extremity edema and pain is likely secondary to venous stasis.  Patient counseled to continue outpatient evaluation.  No further emergent work-up indicated at this time.  Patient discharged in stable condition. [MW]      ED Course User Index  [MW] Broderick Moy MD         DIAGNOSIS  Final diagnoses:   Leg swelling         DISPOSITION  DISCHARGE    Patient discharged in stable condition.    Reviewed implications of results, diagnosis, meds, responsibility to follow up, warning signs and symptoms of possible worsening, potential complications and reasons to return to ER, including uncontrolled pain, shortness of breath, chest pain.    Patient/Family voiced understanding of above instructions.    Discussed plan for discharge, as there is no emergent indication for admission. Patient referred to primary care provider for BP management due to today's BP. Pt/family is agreeable and understands need for follow up and repeat testing.  Pt is aware that discharge does not mean that nothing is wrong but it indicates no emergency is present that  requires admission and they must continue care with follow-up as given below or physician of their choice.     FOLLOW-UP  Harvey Larson MD  [address] [facility] [address] [phone]    In 1 week           Medication List      No changes were made to your prescriptions during this visit.                   Latest Documented Vital Signs:  As of 20:38 EDT  BP- 105/48 HR- 50 Temp- 96.2 °F (35.7 °C) (Tympanic) O2 sat- 96%              --    Please note that portions of this were completed with a voice recognition program.       Note Disclaimer: At [facility], we believe that sharing information builds trust and better relationships. You are receiving this note because you are receiving care at [facility] or recently visited. It is possible you will see health information before a provider has talked with you about it. This kind of information can be easy to misunderstand. To help you fully understand what it means for your health, we urge you to discuss this note with your provider.             Broderick Moy MD  10/17/23 2038

## 2023-10-18 NOTE — DISCHARGE INSTRUCTIONS
Please follow-up with your primary care physician within 1 to 2 weeks for repeat evaluation    Please keep your upcoming appointment with nephrology for further evaluation of kidney function next week.    Please return to the emergency department new or worsening symptoms including but not limited to chest pain, shortness of breath, lightheadedness, worsening pain in the legs

## 2023-11-05 PROBLEM — N17.9 ACUTE ON CHRONIC RENAL FAILURE: Status: ACTIVE | Noted: 2023-01-01

## 2023-11-05 PROBLEM — I95.9 HYPOTENSION: Status: ACTIVE | Noted: 2023-01-01

## 2023-11-05 PROBLEM — N18.9 ACUTE ON CHRONIC RENAL FAILURE: Status: ACTIVE | Noted: 2023-01-01

## 2023-11-05 NOTE — H&P
Group: [facility]        HISTORY AND PHYSICAL    Patient Identification:  Edilberto Reeder  73 y.o.  male  1950  [id number]            CC: altered mental status    History of Present Illness:  Edilberto Reeder is a 73-year-old male with a past medical history of Parkinson's disease (implanted brain stimulator and on carbidopa/levodopa), chronic atrial fibrillation on anticoagulation (Eliquis), chronic anemia, type 2 diabetes mellitus, hypothyroidism, and diastolic congestive heart failure.    He presented to the emergency department on 11/4/2023 with complaints of altered mental status, hallucinations and lethargy.  Patient's wife at bedside providing history, patient is confused at time of assessment.  She reports that patient's had an increase in his falls recently including a fall yesterday, she denies any trauma or head injury.  Patient woke this morning at 5 AM with confusion-wife reports that he has been interacting with visual hallucinations.  Upon assessment, patient denies any specific complaints besides sleepiness.    Patient was bradycardic with a heart rate of 46 and borderline hypotensive upon arrival to the ED with a blood pressure of 86/53.    Pertinent lab values: proBNP 4943, sodium 132, creatinine 3.12 (previously 1.58 two weeks ago), lactate 2.2, procalcitonin 0.67, INR 4.01, WBC 4.58, hemoglobin 9.6.  Urinalysis had hyaline casts, negative for nitrates, 2+ bacteria present.  CXR showed cardiomegaly and bilateral pleural effusions, suggestive of congestive heart failure.  CT head negative for acute findings.    Patient received 500 mL of normal saline due to concern for fluid overload given history of congestive heart failure, remained hypotensive with systolic occasionally is low in the 70s and therefore ICU admission was requested.      Review of Systems:  Limited due to patient's confusion, denies any specific focal complaints.    Past Medical History:  Past Medical History:  "  Diagnosis Date    Atrial fibrillation with RVR     Atrial flutter     Diabetes     HLD (hyperlipidemia) 01/27/2016    Hypertension     Parkinson's disease     Advanced        Past Surgical History:  Past Surgical History:   Procedure Laterality Date    BRAIN STIMULATOR      COLONOSCOPY N/A 5/5/2022    Procedure: COLONOSCOPY TO CECUM WITH COLD SNARE POLYPECTOMY;  Surgeon: Luther Ferrer MD;  Location: [facility] ENDOSCOPY;  Service: Gastroenterology;  Laterality: N/A;  PRE:ANEMIA  POST:POLYPS/HEMORRHOIDS    ENDOSCOPY N/A 5/5/2022    Procedure: ESOPHAGOGASTRODUODENOSCOPY WITH  COLD BIOPSIES;  Surgeon: Luther Ferrer MD;  Location: [facility] ENDOSCOPY;  Service: Gastroenterology;  Laterality: N/A;  PRE:ANEMIA  POST:DIVERTICULUM/GASTRITIS    JOINT REPLACEMENT      Bilateral shoulder and knee replacements        Home Meds:  (Not in a hospital admission)      Allergies:  Allergies   Allergen Reactions    Bacitracin Anaphylaxis    Neosporin [Neomycin-Bacitracin Zn-Polymyx] Other (See Comments)     BP drops and heart stops!    Fish-Derived Products Hives    Shellfish Allergy Hives    Shrimp (Diagnostic) Hives       Social History:   Social History     Socioeconomic History    Marital status:    Tobacco Use    Smoking status: Never    Smokeless tobacco: Never    Tobacco comments:     caffeine use   Vaping Use    Vaping Use: Never used   Substance and Sexual Activity    Alcohol use: Yes    Drug use: No    Sexual activity: Defer       Family History:  History reviewed. No pertinent family history.    Physical Exam:  BP (!) 81/53   Pulse 51   Temp 95.6 °F (35.3 °C) (Tympanic)   Resp 14   Ht 185.4 cm (73\")   Wt 84.4 kg (186 lb)   SpO2 95%   BMI 24.54 kg/m²  Body mass index is 24.54 kg/m². 95% 84.4 kg (186 lb)    Physical Exam:  Constitutional: Chronically ill-appearing, elderly male laying in bed, no respiratory distress or accessory muscle use  Head: - NCAT  Eyes: No pallor, Anicteric conjunctiva, " EOMI.  ENMT:  Mallampati 3, no oral thrush. Dry MM.   NECK: Trachea midline, No thyromegaly, no palpable cervical lymphadenopathy, JVD present when supine, improves at 45 degrees  Heart: Bradycardic, paced rhythm, no murmur. 1+ BLE edema  Lungs: JODI +, No wheezes/ crackles heard    Abdomen: Soft. No tenderness, guarding or rigidity. No palpable masses  Extremities: Extremities warm and well perfused. No cyanosis/ clubbing  Neuro: Alert, oriented to self, disoriented to time, moves all extremities purposefully, follows commands      PPE recommended per [facility] infectious disease Isolation protocol for the current clinical scenario(as mentioned below) was followed.      LABS:  COVID19   Date Value Ref Range Status   05/04/2022 Not Detected Not Detected - Ref. Range Final       Lab Results   Component Value Date    CALCIUM 9.0 11/04/2023    PHOS 3.1 09/29/2023     Results from last 7 days   Lab Units 11/04/23  2341   MAGNESIUM mg/dL 1.9   SODIUM mmol/L 132*   POTASSIUM mmol/L 4.1   CHLORIDE mmol/L 96*   CO2 mmol/L 23.0   BUN mg/dL 35*   CREATININE mg/dL 3.12*   GLUCOSE mg/dL 108*   CALCIUM mg/dL 9.0   WBC 10*3/mm3 4.58   HEMOGLOBIN g/dL 9.6*   PLATELETS 10*3/mm3 127*   ALT (SGPT) U/L 5   AST (SGOT) U/L 44*   PROBNP pg/mL 4,943.0*   PROCALCITONIN ng/mL 0.67*     Lab Results   Component Value Date    CKTOTAL 153 08/04/2023    TROPONINT 119 (C) 11/05/2023     Results from last 7 days   Lab Units 11/05/23  0139 11/04/23  2341   HSTROP T ng/L 119* 138*         Results from last 7 days   Lab Units 11/05/23  0139 11/04/23  2341   PROCALCITONIN ng/mL  --  0.67*   LACTATE mmol/L 1.6 2.2*             Results from last 7 days   Lab Units 11/04/23  2341   INR  4.01*         Lab Results   Component Value Date    TSH 7.370 (H) 08/04/2023     Estimated Creatinine Clearance: 24.4 mL/min (A) (by C-G formula based on SCr of 3.12 mg/dL (H)).  Results from last 7 days   Lab Units 11/04/23  2341   NITRITE UA  Negative   WBC UA  /HPF 6-10*   BACTERIA UA /HPF 2+*   SQUAM EPITHEL UA /HPF 7-12*        Imaging: I personally visualized the images of scans/x-rays performed within last 3 days.      Assessment:  Hypotension, cardiogenic versus hypovolemic shock  Acute kidney injury superimposed on chronic kidney disease  Acute on chronic congestive heart failure  Bilateral pleural effusions  Elevated procalcitonin  Chronic atrial fibrillation  Chronic anemia  Type 2 diabetes mellitus  Hypothyroidism  Parkinson's disease    Recommendations:  Hypotension, cardiogenic versus hypovolemic shock  Acute on chronic congestive heart failure  Bilateral pleural effusions  Recent ECHO performed on 9/27/2023 revealing normal LV systolic function with LVEF 56.4%, moderate tricuspid valve regurgitation, RVSP 39 mmHg, indeterminate diastolic function.   Interestingly, patient heart rate not compensating for hypotension-however patient is on beta-blocker at baseline.  Fluid status difficult to -patient does have bilateral lower extremity edema and evidence of pleural effusions on chest x-ray, however mucous membranes are dry, patient has poor skin turgor, he is not requiring supplemental oxygen and patient remains fluid responsive with infusion.  We will start patient on low-dose norepinephrine to maintain MAP greater than 65, and give additional 500 mL of normal saline slowly to cautiously rehydrate.  Consult cardiology for assistance in managing congestive heart failure-patient follows with Dr. Fung.    Acute kidney injury superimposed on chronic kidney disease  Creatinine jumped to 3.12, was previously 1.58 two weeks previously.  Patient's wife reports decreased oral intake and urine output, suggestive of dehydration.  Continue to monitor closely with IV fluid rehydration and improvement in blood pressure.  Monitor strict I's and O's.  If patient's creatinine does not improve drastically, may require nephrology input to manage MATT superimposed on  CKD.    Elevated procalcitonin  No evidence of acute infection with no recent fevers, chills.  WBC within normal limits at 4.58.  Elevated procalcitonin of 0.67 in the presence of acute kidney injury, of unknown significance.  Urinalysis not convincing for urinary tract infection-hold antibiotics for now and monitor for signs and symptoms of infection.    Chronic atrial fibrillation  Currently paced and bradycardic.  Hold all rate controlling medications given bradycardia and hypotension.  Chronically anticoagulated with Eliquis, hold for now with patient's elevated INR.  Monitor for bleeding.    Chronic anemia  Hemoglobin 9.6-close to baseline.  No evidence of acute bleeding at this time.    Continue home ferrous sulfate and vitamin B12.    Type 2 diabetes mellitus  Monitor blood glucose ACHS, patient is not on insulin or ischemic agents at baseline.    Hypothyroidism  Home medication: Levothyroxine, continue.    Parkinson's disease  On medication: Carbidopa-levodopa, will need to clarify dosing prior to reordering-patient's home dose is nonformulary.  Consult placed to pharmacy to help find equivalent dosing.    Regular diet  Anticoagulation with Eliquis (currently on hold for elevated INR)  Full code    Spoke with patient's wife, Jaamica at bedside in the emergency department and answered all questions to best my abilities.  Confirmed patient's CODE STATUS with wife, patient does have a living will stating that he would not to be on long-term life support if patient has no recoverability, however for now would like to remain a full code.    Patient was placed in face mask upon entering room and kept mask on throughout our encounter. I wore full protective equipment throughout this patient encounter including a face mask, gown and gloves. Hand hygiene was performed before donning protective equipment and after removal when leaving the room.    Rox Webster, APRN  11/5/2023  04:21 EST      Much of this encounter  note is an electronic transcription/translation of spoken language to printed text using Dragon Software.

## 2023-11-05 NOTE — PLAN OF CARE
Goal Outcome Evaluation:  Plan of Care Reviewed With: patient           Outcome Evaluation: Pt is 74 yo male admitted to [facility] with hypotension, MATT, acute exacerbation of CHF, AMS. PMH significant for PD, afib, DM, CHF, hypothyroidism. Patient lives with spouse, unsure of exact prior level of function, pt with slurred speech and no family present. Patient performed bed mobility with modAx2, sit to stand with modAx2, unable to ambulate or transfer today. patient has impairments consisting of generalized weakness, decreased activity tolerance, decreased balance, decreased cognition and would benefit from skilled PT. Anticipate patient will need SNU at d/c.      Anticipated Discharge Disposition (PT): skilled nursing facility

## 2023-11-05 NOTE — PROGRESS NOTES
[facility]  [phone]  Dr. Avtar Paula    Subjective:  LOS: 0    Chief Complaint: Altered mental status    According to wife patient is still altered and not at baseline mental status.  Has chronic lower extremity edema but distention and swelling of the abdomen is new.  He is followed by [facility].  Underlying Parkinson's.  Recently discharged on 2023 also for significant mental status changes and hypoglycemia.  Patient has a history of A-fib.  Wife states he is often bradycardic.  Also admitted in 2023 with syncope.  At that time his blood pressure was low.  He required 2 units of blood on that admission.  His last EGD and colonoscopy was in .    Objective   Vital Signs past 24hrs  Temp range: Temp (24hrs), Av.6 °F (35.3 °C), Min:95.6 °F (35.3 °C), Max:95.6 °F (35.3 °C)    BP range: BP: ()/(41-93) 103/93  Pulse range: Heart Rate:  [46-56] 56  Resp rate range: Resp:  [14-16] 16  Device (Oxygen Therapy): room air   Oxygen range:SpO2:  [92 %-97 %] 95 %       Physical Exam  Eyes:      Pupils: Pupils are equal, round, and reactive to light.   Cardiovascular:      Rate and Rhythm: Bradycardia present. Rhythm irregular.      Heart sounds: No murmur heard.  Pulmonary:      Effort: Pulmonary effort is normal.      Breath sounds: Decreased breath sounds (nearly absent right lung base and dull to percuss) present.   Abdominal:      General: Bowel sounds are normal. There is distension.      Palpations: Abdomen is soft. There is no mass.      Tenderness: There is no abdominal tenderness.   Musculoskeletal:      Right lower leg: Edema present.      Left lower leg: Edema present.   Neurological:      Mental Status: He is alert. He is confused.       Results Review:    I have reviewed the laboratory and imaging data since the last note by [facility] physician.  My annotations are noted in assessment and plan.      Result Review:  I have personally reviewed the results from last note by [facility]  physician to 11/5/2023 08:08 EST and agree with these findings:  [x]  Laboratory list / accordion  [x]  Microbiology  [x]  Radiology  [x]  EKG/Telemetry   [x]  Cardiology/Vascular   []  Pathology  []  Old records  []  Other:    Medication Review:  I have reviewed the current MAR.  My annotations are noted in assessment and plan.    ferrous sulfate, 325 mg, Oral, Daily With Breakfast  levothyroxine, 50 mcg, Oral, Q AM  senna-docusate sodium, 2 tablet, Oral, BID  sodium chloride, 10 mL, Intravenous, Q12H  vitamin B-12, 500 mcg, Oral, BID        norepinephrine, 0.02-0.3 mcg/kg/min, Last Rate: 0.04 mcg/kg/min (11/05/23 0615)  sodium chloride, 50 mL/hr, Last Rate: 50 mL/hr (11/05/23 0445)      Lines, Drains & Airways       Active LDAs       Name Placement date Placement time Site Days    Peripheral IV 11/04/23 2342 Anterior;Right Forearm 11/04/23 2342  Forearm  less than 1    Peripheral IV 11/05/23 0406 Anterior;Left Forearm 11/05/23 0406  Forearm  less than 1    External Urinary Catheter 11/05/23 0445  --  less than 1                  Isolation status: No active isolations    Dietary Orders (From admission, onward)       Start     Ordered    11/05/23 0415  Diet: Regular/House Diet; Texture: Regular Texture (IDDSI 7); Fluid Consistency: Thin (IDDSI 0)  Diet Effective Now        References:    Diet Order [place]   Question Answer Comment   Diets: Regular/House Diet    Texture: Regular Texture (IDDSI 7)    Fluid Consistency: Thin (IDDSI 0)        11/05/23 0417                    PCCM Problems  Altered mental status  Hypotension ? Cardiogenic  Acute HFpEF ?  Hypothyroidism ACUTE  Bradycardia  Elevated procal with MATT  MATT with CKD  Right pleural effusion  Thrombocytopenia  Anemia  Relevant Medical Diagnoses  Parkinson's disease  Dementia on Aricept  Chronic Afib on AC  DM2      THESE ARE NEW MEDICAL PROBLEMS TO ME.    Plan of Treatment    Altered mental status noted.  Patient has been admitted for the same in the past.   Previously due to hypoglycemia though this does not appear to be the cause at present time.  Has some underlying dementia.  CT head was unremarkable.  If mental status does not improve will consult neurology.  At this point it looks like metabolic encephalopathy.    Hypotension and low-dose Levophed.  Etiology uncertain.  I do not think fluids is the way to go.  Discontinue IV fluids and continue Levophed as needed.  Overall fluid overload.  Recent echo showed normal EF and only mild pulmonary hypertension.  Will await cardiology and nephrology input.    MATT noted with CKD.  Await nephrology input.    Hypothyroidism noted and TSH is high.  Thyroid profile cascade.  Could this be an element of myxedema?    Patient has chronic A-fib and on anticoagulation.  Holding anticoagulation for now.  Bradycardia is reportedly chronic according to the wife.  Could be due to rate control medications.    Elevated Pro-Levy noted with MATT. Blood cultures were sent on admission and are pending.  Patient was given cefepime in the ED.  My suspicion for infection is low but at this time due to diagnostic conundrum we will continue cefepime.  Await culture results    Right pleural effusion seen and appears chronic.  May be worthwhile tapping this at some point.    Anemia and thrombocytopenia noted but appear chronic.  Continue to monitor.    Type 2 diabetes reportedly.  On previous admissions oral antihyperglycemics were discontinued due to hypoglycemic episode.  Patient has not been resumed on these.  Blood sugars controlled.    Guarded prognosis at this time.  Spoke to wife at bedside.      Avtar Paula MD  11/05/23  08:08 EST      Part of this note may be an electronic transcription/translation of spoken language to printed text using the Dragon Dictation System.

## 2023-11-05 NOTE — CONSULTS
[facility] Consult Note      Patient Name: Edilberto Reeder  : 1950  MRN: [id number]  Primary Care Physician:  Harvey Larson MD  Referring Physician: Azra Garcia MD  Date of admission: 2023    Subjective     Reason for Consult:  Acute kidney injury    HPI:   Edilberto Reeder is a 73 y.o. male who presented to the ED for altered mental status. Pertinent past medical history includes Parkinson's disease (implanted brain stimulator and on carbidopa/levodopa), chronic atrial fibrillation on anticoagulation (Eliquis), chronic anemia, type 2 diabetes mellitus, hypothyroidism, and diastolic congestive heart failure. Upon arrival to ED patient was found to be hypotensive. CXR showed cardiomegaly and bilateral pleural effusions. Patient was admitted to ICU. Patient was found to have acute kidney injury with serum creatinine on admission 3.12 mg/dl (compared to 1.58 mg/dl on 10/17/23).  CT of the head in the emergency department was unremarkable.  Patient was started on Levophed.  Continue the patient he feels better and his wife is on bedside stated that his mental status has improved somewhat since admission.  We were consulted to help with management of acute kidney injury     Review of Systems:   14 point review of systems is otherwise negative except for mentioned above on HPI    Personal History     Past Medical History:   Diagnosis Date   • Atrial fibrillation with RVR    • Atrial flutter    • Diabetes    • Disease of thyroid gland    • HLD (hyperlipidemia) 2016   • Hypertension    • Parkinson's disease     Advanced        Past Surgical History:   Procedure Laterality Date   • BRAIN STIMULATOR     • COLONOSCOPY N/A 2022    Procedure: COLONOSCOPY TO CECUM WITH COLD SNARE POLYPECTOMY;  Surgeon: Luther Ferrer MD;  Location: [facility] ENDOSCOPY;  Service: Gastroenterology;  Laterality: N/A;  PRE:ANEMIA  POST:POLYPS/HEMORRHOIDS   • ENDOSCOPY N/A 2022     Procedure: ESOPHAGOGASTRODUODENOSCOPY WITH  COLD BIOPSIES;  Surgeon: Luther Ferrer MD;  Location: [facility] ENDOSCOPY;  Service: Gastroenterology;  Laterality: N/A;  PRE:ANEMIA  POST:DIVERTICULUM/GASTRITIS   • JOINT REPLACEMENT      Bilateral shoulder and knee replacements       Family History: family history includes Cancer in his brother, father, and sister; Diabetes in his brother and mother; Heart disease in his father.    Social History:  reports that he has never smoked. He has never been exposed to tobacco smoke. He has never used smokeless tobacco. He reports current alcohol use of about 2.0 standard drinks of alcohol per week. He reports that he does not use drugs.    Home Medications:  Prior to Admission medications    Medication Sig Start Date End Date Taking? Authorizing Provider   acetaminophen (TYLENOL) 325 MG tablet Take 2 tablets by mouth Every 6 (Six) Hours As Needed for Mild Pain . 3/25/22  Yes Joaquni Davenport MD   allopurinol (ZYLOPRIM) 100 MG tablet Take 1 tablet by mouth Daily.   Yes Mendoza Dennis MD   apixaban (ELIQUIS) 5 MG tablet tablet Take 1 tablet by mouth 2 (Two) Times a Day.   Yes Mendoza Dennis MD   Calcium Carbonate Antacid (CALCIUM CARBONATE PO) Take 650 mg by mouth Daily.   Yes Mendoza Dennis MD   Carbidopa-Levodopa ER 36. MG capsule controlled-release Take 4 capsules by mouth 4 (Four) Times a Day.   Yes Mendoza Dennis MD   citalopram (CeleXA) 20 MG tablet Take 1 tablet by mouth Daily.   Yes Mendoza Dennis MD   docusate sodium (COLACE) 50 MG capsule Take 2 capsules by mouth Every Night.   Yes Mendoza Dennis MD   donepezil (ARICEPT) 5 MG tablet Take 2 tablets by mouth Every Night.   Yes Mendoza Dennis MD   ferrous sulfate 325 (65 FE) MG tablet  8/29/23  Yes Mendoza Dennis MD   furosemide (LASIX) 40 MG tablet Take 1 tablet by mouth Daily. 10/1/23  Yes Paul Cummings MD   isosorbide mononitrate (IMDUR)  30 MG 24 hr tablet TAKE 1 TABLET BY MOUTH DAILY 10/5/23  Yes Zohra Angulo APRN   levothyroxine (SYNTHROID, LEVOTHROID) 50 MCG tablet Take 1 tablet by mouth Every Morning. 8/9/23  Yes Samina Barillas MD   lisinopril (PRINIVIL,ZESTRIL) 2.5 MG tablet Take 1 tablet by mouth Daily. 10/18/23  Yes Provider, MD Mendoza   metoprolol tartrate (LOPRESSOR) 25 MG tablet TAKE 1/2 TABLET BY MOUTH TWICE A DAY  Patient taking differently: Take 0.5 tablets by mouth 2 (Two) Times a Day. 6/7/23  Yes Anitha Barone APRN   multivitamin with minerals tablet tablet Take 1 tablet by mouth Daily.   Yes Provider, Historical, MD   potassium chloride ER (K-TAB) 20 MEQ tablet controlled-release ER tablet Take 1 tablet by mouth Daily. 10/1/23  Yes Paul Cummings MD   rosuvastatin (CRESTOR) 20 MG tablet Take 1 tablet by mouth Every Night. 11/4/22  Yes Lorene Wright APRN   tiZANidine (ZANAFLEX) 2 MG tablet Take 1 tablet by mouth Every 8 (Eight) Hours As Needed for Muscle Spasms.   Yes Provider, Historical, MD   traZODone (DESYREL) 50 MG tablet Take 0.5 tablets by mouth Every Night.   Yes Provider, Historical, MD   vitamin B-12 (CYANOCOBALAMIN) 1000 MCG tablet Take 0.5 tablets by mouth 2 (Two) Times a Day.   Yes Provider, Historical, MD       Allergies:  Allergies   Allergen Reactions   • Bacitracin Anaphylaxis   • Neosporin [Neomycin-Bacitracin Zn-Polymyx] Other (See Comments)     BP drops and heart stops!   • Fish-Derived Products Hives   • Shellfish Allergy Hives   • Shrimp (Diagnostic) Hives       Objective     Vitals:   Temp:  [95.6 °F (35.3 °C)] 95.6 °F (35.3 °C)  Heart Rate:  [46-57] 57  Resp:  [14-16] 16  BP: ()/(41-93) 104/59    Intake/Output Summary (Last 24 hours) at 11/5/2023 0963  Last data filed at 11/5/2023 0545  Gross per 24 hour   Intake 1155.81 ml   Output --   Net 1155.81 ml       Physical Exam:   Constitutional: Awake, frail, no acute distress.  HEENT: Sclera anicteric, no conjunctival  injection  Neck: Supple, no thyromegaly, no lymphadenopathy, trachea at midline, no JVD  Respiratory: Clear to auscultation bilaterally, nonlabored respiration  Cardiovascular: RRR, no murmurs, no rubs or gallops, no carotid bruit  Gastrointestinal: Positive bowel sounds, abdomen is soft, nontender and nondistended  : No palpable bladder  Musculoskeletal: ++ edema, no clubbing or cyanosis  Psychiatric: Appropriate affect, cooperative  Neurologic: Oriented x3, moving all extremities  Skin: Warm and dry       Scheduled Meds:     cefepime, 2,000 mg, Intravenous, Q12H  ferrous sulfate, 325 mg, Oral, Daily With Breakfast  levothyroxine, 50 mcg, Oral, Q AM  senna-docusate sodium, 2 tablet, Oral, BID  sodium chloride, 10 mL, Intravenous, Q12H  vitamin B-12, 500 mcg, Oral, BID      IV Meds:   norepinephrine, 0.02-0.3 mcg/kg/min, Last Rate: 0.02 mcg/kg/min (11/05/23 0915)        Results Reviewed:   I have personally reviewed the results from the time of this admission to 11/5/2023 09:55 EST     Lab Results   Component Value Date    GLUCOSE 108 (H) 11/04/2023    CALCIUM 9.0 11/04/2023     (L) 11/04/2023    K 4.1 11/04/2023    CO2 23.0 11/04/2023    CL 96 (L) 11/04/2023    BUN 35 (H) 11/04/2023    CREATININE 3.12 (H) 11/04/2023    EGFRIFNONA 83 09/17/2021    BCR 11.2 11/04/2023    ANIONGAP 13.0 11/04/2023      Lab Results   Component Value Date    MG 1.9 11/04/2023    PHOS 3.1 09/29/2023    ALBUMIN 3.3 (L) 11/04/2023           Assessment / Plan       Hypotension      ASSESSMENT:  Acute kidney injury on chronic kidney disease stage III- Acute kidney injury likely related to decreased perfusion from hypotension in a setting of  impaired renal autoregulation from ACE inhibitor use as an outpatient.  Last echocardiogram showed moderate right pulmonary hypertension but clinical presentation and physical exam is more suggestive of right heart failure/pulmonary hypertension.  At this point we will switch patient to dobutamine  drip plus diuretic and monitor response very closely.  Discussed with Dr. Paula and awaiting further recommendation by cardiologist as well  Acute on chronic congestive heart failure- CXR showed cardiomegaly with bilateral pleural effusions. 11/5/23 ECHO   Hyponatremia- hypervolemic hyponatremia, sodium 132 today (11/5/23)  Hypotension- previous history of hypertension, now hypotensive. Anti-hypertensive medication being held. Currently on levophed gtt.   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 Paula  Surveillance labs    Thank you for involving us in the care of Edilberto SEGUN Osbornly.  Please feel free to call with any questions.    AUDELIA Rivera  11/05/23  09:55 [facility]    [facility]  [phone]      Please note that portions of this note were completed with a voice recognition program.

## 2023-11-05 NOTE — CONSULTS
Patient Name: Edilberto Reeder  :1950  73 y.o.    Date of Admission: 2023  Date of Consultation:  23  Encounter Provider: Amadeo Shields III, MD  Place of Service: [facility]  Referring Provider: Azra Garcia MD  Patient Care Team:  Harvey Larson MD as PCP - General  Harvey Larson MD as PCP - Family Medicine      Chief complaint: altered mental status, hallucinations    History of Present Illness:     Mr. Reeder is a 73 year old gentleman followed by Dr. Fung for chronic atrial fibrillation (on apixaban) and chronic diastolic heart failure. He has Parkinson's disease, chronic anemia, diabetes mellitus type II and hypothyroidism.     He came to emergency room with altered mental status, hallucinations, lethargy, hypotension renal failure.  He has been increasing falls at home, no significant injury.  In the ER he was noted to be bradycardic related to A-fib with a heart rate 45-55.  Acute renal failure with creatinine of 3.12, previously 1.58.  Lactic acid was elevated.  Chest x-ray showed bilateral pleural effusions.  High-sensitivity troponin was 138 with repeat 119, 2 months ago Hice is due to bone was the low 100s.    Patient was started on Levophed for support of his pressure, and had dobutamine ordered by nephrology.  He did receive 1500 cc IV normal saline initially.  He is also on IV antibiotics for urinary tract infection.    He was admitted in 2022 for heart failure. EF at that time was mildly depressed at 46% and he had mild regional wall motion abnormalities. He was treated medically and discharged on oral diuretics. He had a mildly abnormal stress test with very small amount of basal inferolateral ischemia in 2022.    He was admitted in 2023 for hypoglycemia. He had chest pain while admitted. He had a stress test that showed LVEF 66% moderate hypokinesis of the septal wall. No evidence of ischemia.      He was seen in the office on 10/6/23 in follow up . He had some dyspnea on exertion which was chronic and stable. His weight was stable at  per scales at home. He was 186 lbs in the office. No changes were made.        Echo 9/27/2023    Left ventricular systolic function is normal. Calculated left ventricular EF = 56.4%    Left ventricular diastolic function was indeterminate.    There is moderate, anterior mitral leaflet thickening present.    Moderate tricuspid valve regurgitation is present.    Estimated right ventricular systolic pressure from tricuspid regurgitation is mildly elevated (35-45 mmHg). Calculated right ventricular systolic pressure from tricuspid regurgitation is 39 mmHg.     Stress 9/28/2023    Diaphragmatic attenuation artifact is present.    Left ventricular ejection fraction is normal (Calculated EF = 66%).    Abnormal LV wall motion consistent with moderate hypokinesis of the septal wall.    Myocardial perfusion imaging indicates a normal myocardial perfusion study with no evidence of ischemia.    Compared to previous study March 24, 2022 the diaphragmatic attenuation is more pronounced but this may be secondary to different camera quality.  Past Medical History:   Diagnosis Date    Atrial fibrillation with RVR     Atrial flutter     Diabetes     Disease of thyroid gland     HLD (hyperlipidemia) 01/27/2016    Hypertension     Parkinson's disease     Advanced        Past Surgical History:   Procedure Laterality Date    BRAIN STIMULATOR      COLONOSCOPY N/A 5/5/2022    Procedure: COLONOSCOPY TO CECUM WITH COLD SNARE POLYPECTOMY;  Surgeon: Luther Ferrer MD;  Location: [facility];  Service: Gastroenterology;  Laterality: N/A;  PRE:ANEMIA  POST:POLYPS/HEMORRHOIDS    ENDOSCOPY N/A 5/5/2022    Procedure: ESOPHAGOGASTRODUODENOSCOPY WITH  COLD BIOPSIES;  Surgeon: Luther Ferrer MD;  Location: [facility] ENDOSCOPY;  Service: Gastroenterology;  Laterality: N/A;   PRE:ANEMIA  POST:DIVERTICULUM/GASTRITIS    JOINT REPLACEMENT      Bilateral shoulder and knee replacements         Prior to Admission medications    Medication Sig Start Date End Date Taking? Authorizing Provider   acetaminophen (TYLENOL) 325 MG tablet Take 2 tablets by mouth Every 6 (Six) Hours As Needed for Mild Pain . 3/25/22  Yes Joaquin Davenport MD   allopurinol (ZYLOPRIM) 100 MG tablet Take 1 tablet by mouth Daily.   Yes Mendoza Dennis MD   apixaban (ELIQUIS) 5 MG tablet tablet Take 1 tablet by mouth 2 (Two) Times a Day.   Yes Mendoza Dennis MD   Calcium Carbonate Antacid (CALCIUM CARBONATE PO) Take 650 mg by mouth Daily.   Yes Mendoza Dennis MD   Carbidopa-Levodopa ER 36. MG capsule controlled-release Take 4 capsules by mouth 4 (Four) Times a Day.   Yes Mendoza Dennis MD   citalopram (CeleXA) 20 MG tablet Take 1 tablet by mouth Daily.   Yes Mendoza Dennis MD   docusate sodium (COLACE) 50 MG capsule Take 2 capsules by mouth Every Night.   Yes Mendoza Dennis MD   donepezil (ARICEPT) 5 MG tablet Take 2 tablets by mouth Every Night.   Yes Mendoza Dennis MD   ferrous sulfate 325 (65 FE) MG tablet  8/29/23  Yes Mendoza Dennis MD   furosemide (LASIX) 40 MG tablet Take 1 tablet by mouth Daily. 10/1/23  Yes Paul Cummings MD   isosorbide mononitrate (IMDUR) 30 MG 24 hr tablet TAKE 1 TABLET BY MOUTH DAILY 10/5/23  Yes Zohra Angulo APRN   levothyroxine (SYNTHROID, LEVOTHROID) 50 MCG tablet Take 1 tablet by mouth Every Morning. 8/9/23  Yes Samina Barillas MD   lisinopril (PRINIVIL,ZESTRIL) 2.5 MG tablet Take 1 tablet by mouth Daily. 10/18/23  Yes Mendoza Dennis MD   metoprolol tartrate (LOPRESSOR) 25 MG tablet TAKE 1/2 TABLET BY MOUTH TWICE A DAY  Patient taking differently: Take 0.5 tablets by mouth 2 (Two) Times a Day. 6/7/23  Yes Anitha Barone APRN   multivitamin with minerals tablet tablet Take 1 tablet by mouth Daily.    Yes Provider, MD Mendoza   potassium chloride ER (K-TAB) 20 MEQ tablet controlled-release ER tablet Take 1 tablet by mouth Daily. 10/1/23  Yes Paul Cummings MD   rosuvastatin (CRESTOR) 20 MG tablet Take 1 tablet by mouth Every Night. 11/4/22  Yes Lorene Wright APRN   tiZANidine (ZANAFLEX) 2 MG tablet Take 1 tablet by mouth Every 8 (Eight) Hours As Needed for Muscle Spasms.   Yes ProviderMendoza MD   traZODone (DESYREL) 50 MG tablet Take 0.5 tablets by mouth Every Night.   Yes ProviderMendoza MD   vitamin B-12 (CYANOCOBALAMIN) 1000 MCG tablet Take 0.5 tablets by mouth 2 (Two) Times a Day.   Yes Provider, MD Mendoza       Allergies   Allergen Reactions    Bacitracin Anaphylaxis    Neosporin [Neomycin-Bacitracin Zn-Polymyx] Other (See Comments)     BP drops and heart stops!    Fish-Derived Products Hives    Shellfish Allergy Hives    Shrimp (Diagnostic) Hives       Social History     Socioeconomic History    Marital status:    Tobacco Use    Smoking status: Never     Passive exposure: Never    Smokeless tobacco: Never    Tobacco comments:     caffeine use   Vaping Use    Vaping Use: Never used   Substance and Sexual Activity    Alcohol use: Yes     Alcohol/week: 2.0 standard drinks of alcohol     Types: 1 Cans of beer, 1 Drinks containing 0.5 oz of alcohol per week    Drug use: No    Sexual activity: Defer       Family History   Problem Relation Age of Onset    Diabetes Mother     Heart disease Father     Cancer Father     Cancer Sister     Diabetes Brother     Cancer Brother        REVIEW OF SYSTEMS:   All systems reviewed.  Pertinent positives identified in HPI.  All other systems are negative.      Objective:     Vitals:    11/05/23 1230 11/05/23 1244 11/05/23 1245 11/05/23 1300   BP: 96/47 96/47 91/55 100/54   BP Location:       Pulse: 66 66 66 68   Resp:    15   Temp:       TempSrc:       SpO2: 93%  93% 92%   Weight:       Height:         Body mass index is 28.91  kg/m².    Physical Exam:  General Appearance:  Ill-appearing   Head:    Normocephalic, without obvious abnormality   Eyes:            Lids and lashes normal, conjunctivae and sclerae normal, no icterus, no pallor, corneas clear   Ears:    Ears appear intact with no abnormalities noted   Throat:   No oral lesions, oral mucosa moist   Neck:   No adenopathy, supple, trachea midline, no thyromegaly, no carotid bruit, no JVD   Back:     No kyphosis present, no erythema or scars, no tenderness to palpation    Lungs:     Clear to auscultation,respirations regular, even and unlabored    Heart:    irregular rhythm and normal rate, normal S1 and S2, no murmur, no gallop, no rub, no click   Chest Wall:    No abnormalities observed   Abdomen:     Normal bowel sounds, no masses, no organomegaly, soft        non-tender, non-distended, no guarding   Extremities:   Moves all extremities well, + edema, no cyanosis, no redness   Pulses:  Bilateral carotids brisk   Skin:  Psychiatric:   No bleeding or rash    confused         Lab Review:     Results from last 7 days   Lab Units 11/04/23  2341   SODIUM mmol/L 132*   POTASSIUM mmol/L 4.1   CHLORIDE mmol/L 96*   CO2 mmol/L 23.0   BUN mg/dL 35*   CREATININE mg/dL 3.12*   CALCIUM mg/dL 9.0   BILIRUBIN mg/dL 2.1*   ALK PHOS U/L 276*   ALT (SGPT) U/L 5   AST (SGOT) U/L 44*   GLUCOSE mg/dL 108*     Results from last 7 days   Lab Units 11/05/23  0139 11/04/23  2341   HSTROP T ng/L 119* 138*     Results from last 7 days   Lab Units 11/04/23  2341   WBC 10*3/mm3 4.58   HEMOGLOBIN g/dL 9.6*   HEMATOCRIT % 31.1*   PLATELETS 10*3/mm3 127*     Results from last 7 days   Lab Units 11/04/23  2341   INR  4.01*   APTT seconds 82.2*     Results from last 7 days   Lab Units 11/04/23  2341   MAGNESIUM mg/dL 1.9                 Current Facility-Administered Medications:     acetaminophen (TYLENOL) tablet 650 mg, 650 mg, Oral, Q4H PRN **OR** acetaminophen (TYLENOL) suppository 650 mg, 650 mg, Rectal, Q4H  PRN, Rox Webster APRN    sennosides-docusate (PERICOLACE) 8.6-50 MG per tablet 2 tablet, 2 tablet, Oral, BID, 2 tablet at 11/05/23 0917 **AND** polyethylene glycol (MIRALAX) packet 17 g, 17 g, Oral, Daily PRN **AND** bisacodyl (DULCOLAX) EC tablet 5 mg, 5 mg, Oral, Daily PRN **AND** bisacodyl (DULCOLAX) suppository 10 mg, 10 mg, Rectal, Daily PRN, Rox Webster APRN    bumetanide (BUMEX) injection 2 mg, 2 mg, Intravenous, Q12H, Gurinder Garcia MD, 2 mg at 11/05/23 1109    cefepime 2000 mg IVPB in 100 ml NS (VTB), 2,000 mg, Intravenous, Q12H, Avtar Paula MD    DOBUTamine (DOBUTREX) 1 mg/mL infusion, 5 mcg/kg/min, Intravenous, Titrated, Gurinder Garcia MD, Last Rate: 29 mL/hr at 11/05/23 1112, 5 mcg/kg/min at 11/05/23 1112    ferrous sulfate tablet 325 mg, 325 mg, Oral, Daily With Breakfast, Rox Webster APRN, 325 mg at 11/05/23 0917    ipratropium-albuterol (DUO-NEB) nebulizer solution 3 mL, 3 mL, Nebulization, Q6H PRN, Rox Webster APRN    levothyroxine (SYNTHROID, LEVOTHROID) tablet 50 mcg, 50 mcg, Oral, Q AM, Rox Webster APRN, 50 mcg at 11/05/23 0917    nitroglycerin (NITROSTAT) SL tablet 0.4 mg, 0.4 mg, Sublingual, Q5 Min PRN, Rox Webster APRN    norepinephrine (LEVOPHED) 8 mg in 250 mL NS infusion (premix), 0.02-0.3 mcg/kg/min, Intravenous, Titrated, Rox Webster APRN, Last Rate: 3.17 mL/hr at 11/05/23 1244, 0.02 mcg/kg/min at 11/05/23 1244    ondansetron (ZOFRAN) injection 4 mg, 4 mg, Intravenous, Q6H PRN, Rox Webster APRN, 4 mg at 11/05/23 1001    [COMPLETED] Insert Peripheral IV, , , Once **AND** sodium chloride 0.9 % flush 10 mL, 10 mL, Intravenous, PRN, Bryant Dillon MD    sodium chloride 0.9 % flush 10 mL, 10 mL, Intravenous, Q12H, Rox Webster APRN, 10 mL at 11/05/23 0918    sodium chloride 0.9 % flush 10 mL, 10 mL, Intravenous, PRN, Rox Webster APRN    sodium chloride 0.9 % infusion 40 mL, 40 mL, Intravenous, PRN, Rox Webster APRN     tiZANidine (ZANAFLEX) tablet 2 mg, 2 mg, Oral, Q8H PRN, Rox Webster APRN    vitamin B-12 (CYANOCOBALAMIN) tablet 500 mcg, 500 mcg, Oral, BID, Rox Webster APRN, 500 mcg at 11/05/23 0917    Assessment and Plan:       Active Hospital Problems    Diagnosis  POA    **Hypotension [I95.9]  Yes    Acute on chronic renal failure [N17.9, N18.9]  Yes    Parkinson's disease [G20.A1]  Yes    ASCVD (arteriosclerotic cardiovascular disease) [I25.10]  Yes    Permanent atrial fibrillation [I48.21]  Yes      Resolved Hospital Problems   No resolved problems to display.     1.  Altered mental status, evaluation underway  2.  Acute on chronic renal failure-renal following, really just added dobutamine to try to improve diuresis.  3.  Volume overload, probably multifactorial, continue to follow  4.  Chronic anticoagulation  5.  Chronic atrial fibrillation, bradycardic on arrival, follow  6.  Elevated Pro-Levy, currently on IV antibiotics awaiting culture results  7.  Anemia, thrombocytopenia  D.  Echocardiograms been ordered for further review, will be reviewed once available.    Amadeo Shields III, MD  11/05/23  14:20 EST

## 2023-11-05 NOTE — PLAN OF CARE
Pt oriented times self. Pt able to follow commands. Drowsy throughout shift, but easily arouses. Pt complaining of abdominal discomfort. Prn pain medication given. Passing flatulence but no BM today. Hx of constipation per wife. Now off of levophed, and on dobutamine. Now on 2L nasal cannula.

## 2023-11-05 NOTE — ED PROVIDER NOTES
EMERGENCY DEPARTMENT ENCOUNTER    Room Number:  I382/1  PCP: Harvey Larson MD  Patient Care Team:  Harvey Larson MD as PCP - General  Harvey Larson MD as PCP - Family Medicine   Independent Historians: Patient, family    HPI:  Chief Complaint: Hallucinations, altered mental status    A complete HPI/ROS/PMH/PSH/SH/FH are unobtainable due to: Patient is altered    Chronic or social conditions impacting patient care (Social Determinants of Health): None  (Financial Resource Strain / Food Insecurity / Transportation Needs / Physical Activity / Stress / Social Connections / Intimate Partner Violence / Housing Stability)    Context: Edilberto Reeder is a 73 y.o. male who presents to the ED c/o acute hallucinations.  Has been on since 5:00 in the morning.  Patient is attempted to converse with people who are not there is picking air out of his food which is not actually present.  Patient apparently has had episodes like this in the past often times when his antiparkinson medicines need to be adjusted.  Patient denies any complaints but is somewhat slow to respond disoriented to time.    Review of prior external notes (non-ED) -and- Review of prior external test results outside of this encounter: Reviewed patient's discharge summary from 9/29/2023    Prescription drug monitoring program review:         PAST MEDICAL HISTORY  Active Ambulatory Problems     Diagnosis Date Noted    ASCVD (arteriosclerotic cardiovascular disease) 01/27/2016    Permanent atrial fibrillation 01/27/2016    Diabetic retinopathy associated with type 2 diabetes mellitus 01/27/2016    Essential hypertension 01/27/2016    HLD (hyperlipidemia) 01/27/2016    Hypothyroidism 01/27/2016    Peripheral neuropathy 01/27/2016    Renal insufficiency 01/27/2016    Type 2 diabetes mellitus with hyperglycemia, without long-term current use of insulin     Parkinson's disease     Dyspnea on exertion 04/19/2018    Atrial fibrillation, persistent  08/17/2018    Stroke-like symptoms 12/08/2018    Hypopotassemia 12/08/2018    Ankle pain 09/17/2021    Acute idiopathic gout of right ankle 09/18/2021    Generalized weakness 03/23/2022    Head injury due to trauma 04/30/2022    Acute blood loss anemia 05/01/2022    Chronic anticoagulation 05/01/2022    Thrombocytopenia 05/04/2022    Screening for colon cancer 05/04/2022    Minor head injury, initial encounter 05/04/2022    Chronic diastolic heart failure 11/02/2022    Shortness of breath 11/02/2022    Syncope 08/04/2023    Acute cough 07/11/2023    Bronchitis 07/11/2023    Depression 09/07/2023    H/O Spinal surgery 07/03/2017    H/O umbilical hernia repair 01/01/1999    Hemorrhoids 09/07/2023    History of colon polyps 09/07/2023    History of Parkinson's disease 09/07/2023    Melanoma in situ of unspecified upper limb, including shoulder 02/21/2023    Hypoglycemia 09/27/2023    MATT (acute kidney injury) 09/27/2023    Stage 3a chronic kidney disease 09/27/2023     Resolved Ambulatory Problems     Diagnosis Date Noted    Atrial fibrillation with RVR 01/04/2018    Chest pain 03/23/2022    Episodes of staring 03/23/2022    Diplopia 03/23/2022    Headache 03/23/2022    Metabolic encephalopathy 03/25/2022     Past Medical History:   Diagnosis Date    Atrial flutter     Diabetes     Disease of thyroid gland     Hypertension          PAST SURGICAL HISTORY  Past Surgical History:   Procedure Laterality Date    BRAIN STIMULATOR      COLONOSCOPY N/A 5/5/2022    Procedure: COLONOSCOPY TO CECUM WITH COLD SNARE POLYPECTOMY;  Surgeon: Luther Ferrer MD;  Location: [facility] ENDOSCOPY;  Service: Gastroenterology;  Laterality: N/A;  PRE:ANEMIA  POST:POLYPS/HEMORRHOIDS    ENDOSCOPY N/A 5/5/2022    Procedure: ESOPHAGOGASTRODUODENOSCOPY WITH  COLD BIOPSIES;  Surgeon: Luther Ferrer MD;  Location: [facility] ENDOSCOPY;  Service: Gastroenterology;  Laterality: N/A;  PRE:ANEMIA  POST:DIVERTICULUM/GASTRITIS    JOINT REPLACEMENT       Bilateral shoulder and knee replacements         FAMILY HISTORY  Family History   Problem Relation Age of Onset    Diabetes Mother     Heart disease Father     Cancer Father     Cancer Sister     Diabetes Brother     Cancer Brother          SOCIAL HISTORY  Social History     Socioeconomic History    Marital status:    Tobacco Use    Smoking status: Never     Passive exposure: Never    Smokeless tobacco: Never    Tobacco comments:     caffeine use   Vaping Use    Vaping Use: Never used   Substance and Sexual Activity    Alcohol use: Yes     Alcohol/week: 2.0 standard drinks of alcohol     Types: 1 Cans of beer, 1 Drinks containing 0.5 oz of alcohol per week    Drug use: No    Sexual activity: Defer         ALLERGIES  Bacitracin, Neosporin [neomycin-bacitracin zn-polymyx], Fish-derived products, Shellfish allergy, and Shrimp (diagnostic)        REVIEW OF SYSTEMS  Review of Systems  Included in HPI  All systems reviewed and negative except for those discussed in HPI.      PHYSICAL EXAM    I have reviewed the triage vital signs and nursing notes.    ED Triage Vitals   Temp Heart Rate Resp BP SpO2   11/04/23 2320 11/04/23 2320 11/04/23 2320 11/04/23 2331 11/04/23 2320   95.6 °F (35.3 °C) (!) 46 14 (!) 86/53 92 %      Temp src Heart Rate Source Patient Position BP Location FiO2 (%)   11/04/23 2320 11/04/23 2320 11/04/23 2331 11/04/23 2331 --   Tympanic Monitor Lying Left arm        Physical Exam  GENERAL: alert, no acute distress, ill-appearing  SKIN: Warm, dry  HENT: Normocephalic, atraumatic  EYES: no scleral icterus  CV: regular rhythm, regular rate  RESPIRATORY: normal effort, lungs clear  ABDOMEN: soft, nontender, nondistended  MUSCULOSKELETAL: no deformity  NEURO: alert, disoriented to time, moves all extremities, follows commands                                                               LAB RESULTS  Recent Results (from the past 24 hour(s))   POC Glucose Once    Collection Time: 11/04/23 11:26 PM     Specimen: Blood   Result Value Ref Range    Glucose 147 (H) 70 - 130 mg/dL   Comprehensive Metabolic Panel    Collection Time: 11/04/23 11:41 PM    Specimen: Blood   Result Value Ref Range    Glucose 108 (H) 65 - 99 mg/dL    BUN 35 (H) 8 - 23 mg/dL    Creatinine 3.12 (H) 0.76 - 1.27 mg/dL    Sodium 132 (L) 136 - 145 mmol/L    Potassium 4.1 3.5 - 5.2 mmol/L    Chloride 96 (L) 98 - 107 mmol/L    CO2 23.0 22.0 - 29.0 mmol/L    Calcium 9.0 8.6 - 10.5 mg/dL    Total Protein 6.3 6.0 - 8.5 g/dL    Albumin 3.3 (L) 3.5 - 5.2 g/dL    ALT (SGPT) 5 1 - 41 U/L    AST (SGOT) 44 (H) 1 - 40 U/L    Alkaline Phosphatase 276 (H) 39 - 117 U/L    Total Bilirubin 2.1 (H) 0.0 - 1.2 mg/dL    Globulin 3.0 gm/dL    A/G Ratio 1.1 g/dL    BUN/Creatinine Ratio 11.2 7.0 - 25.0    Anion Gap 13.0 5.0 - 15.0 mmol/L    eGFR 20.0 (L) >60.0 mL/min/1.73   Protime-INR    Collection Time: 11/04/23 11:41 PM    Specimen: Blood   Result Value Ref Range    Protime 40.1 (H) 11.7 - 14.2 Seconds    INR 4.01 (C) 0.90 - 1.10   aPTT    Collection Time: 11/04/23 11:41 PM    Specimen: Blood   Result Value Ref Range    PTT 82.2 (H) 22.7 - 35.4 seconds   Urinalysis With Microscopic If Indicated (No Culture) - Urine, Clean Catch    Collection Time: 11/04/23 11:41 PM    Specimen: Urine, Clean Catch   Result Value Ref Range    Color, UA Dark Yellow (A) Yellow, Straw    Appearance, UA Cloudy (A) Clear    pH, UA <=5.0 5.0 - 8.0    Specific Gravity, UA 1.020 1.005 - 1.030    Glucose, UA Negative Negative    Ketones, UA Trace (A) Negative    Bilirubin, UA Negative Negative    Blood, UA Negative Negative    Protein,  mg/dL (2+) (A) Negative    Leuk Esterase, UA Trace (A) Negative    Nitrite, UA Negative Negative    Urobilinogen, UA 1.0 E.U./dL 0.2 - 1.0 E.U./dL   BNP    Collection Time: 11/04/23 11:41 PM    Specimen: Blood   Result Value Ref Range    proBNP 4,943.0 (H) 0.0 - 1,800.0 pg/mL   High Sensitivity Troponin T    Collection Time: 11/04/23 11:41 PM    Specimen:  Blood   Result Value Ref Range    HS Troponin T 138 (C) <15 ng/L   Lactic Acid, Plasma    Collection Time: 11/04/23 11:41 PM    Specimen: Blood   Result Value Ref Range    Lactate 2.2 (C) 0.5 - 2.0 mmol/L   Procalcitonin    Collection Time: 11/04/23 11:41 PM    Specimen: Blood   Result Value Ref Range    Procalcitonin 0.67 (H) 0.00 - 0.25 ng/mL   Magnesium    Collection Time: 11/04/23 11:41 PM    Specimen: Blood   Result Value Ref Range    Magnesium 1.9 1.6 - 2.4 mg/dL   CBC Auto Differential    Collection Time: 11/04/23 11:41 PM    Specimen: Blood   Result Value Ref Range    WBC 4.58 3.40 - 10.80 10*3/mm3    RBC 3.97 (L) 4.14 - 5.80 10*6/mm3    Hemoglobin 9.6 (L) 13.0 - 17.7 g/dL    Hematocrit 31.1 (L) 37.5 - 51.0 %    MCV 78.3 (L) 79.0 - 97.0 fL    MCH 24.2 (L) 26.6 - 33.0 pg    MCHC 30.9 (L) 31.5 - 35.7 g/dL    RDW 18.1 (H) 12.3 - 15.4 %    RDW-SD 51.8 37.0 - 54.0 fl    MPV 10.1 6.0 - 12.0 fL    Platelets 127 (L) 140 - 450 10*3/mm3    Neutrophil % 69.8 42.7 - 76.0 %    Lymphocyte % 15.1 (L) 19.6 - 45.3 %    Monocyte % 10.3 5.0 - 12.0 %    Eosinophil % 3.5 0.3 - 6.2 %    Basophil % 1.1 0.0 - 1.5 %    Neutrophils, Absolute 3.20 1.70 - 7.00 10*3/mm3    Lymphocytes, Absolute 0.69 (L) 0.70 - 3.10 10*3/mm3    Monocytes, Absolute 0.47 0.10 - 0.90 10*3/mm3    Eosinophils, Absolute 0.16 0.00 - 0.40 10*3/mm3    Basophils, Absolute 0.05 0.00 - 0.20 10*3/mm3   Urinalysis, Microscopic Only - Urine, Clean Catch    Collection Time: 11/04/23 11:41 PM    Specimen: Urine, Clean Catch   Result Value Ref Range    RBC, UA 0-2 None Seen, 0-2 /HPF    WBC, UA 6-10 (A) None Seen, 0-2 /HPF    Bacteria, UA 2+ (A) None Seen /HPF    Squamous Epithelial Cells, UA 7-12 (A) None Seen, 0-2 /HPF    Hyaline Casts, UA Too Numerous to Count None Seen /LPF    Methodology Manual Light Microscopy    ECG 12 Lead Altered Mental Status    Collection Time: 11/05/23 12:03 AM   Result Value Ref Range    QT Interval 646 ms    QTC Interval 584 ms   STAT Lactic  Acid, Reflex    Collection Time: 11/05/23  1:39 AM    Specimen: Blood   Result Value Ref Range    Lactate 1.6 0.5 - 2.0 mmol/L   High Sensitivity Troponin T 2Hr    Collection Time: 11/05/23  1:39 AM    Specimen: Blood   Result Value Ref Range    HS Troponin T 119 (C) <15 ng/L    Troponin T Delta -19 (L) >=-4 - <+4 ng/L   TSH    Collection Time: 11/05/23  1:39 AM    Specimen: Blood   Result Value Ref Range    TSH 17.200 (H) 0.270 - 4.200 uIU/mL   POC Glucose Once    Collection Time: 11/05/23  4:34 AM    Specimen: Blood   Result Value Ref Range    Glucose 145 (H) 70 - 130 mg/dL       Ordered the above labs and independently reviewed the results.        RADIOLOGY  XR Chest 1 View    Result Date: 11/5/2023  Patient: GABBIE YARBROUGH  Time Out: 00:46 Exam(s): XR CXR 1 VIEW EXAM:   XR Chest, 1 View CLINICAL HISTORY:    Reason for exam: soa. TECHNIQUE:   Frontal view of the chest. COMPARISON:   9 27 2023. FINDINGS:   Lungs:  Scarring and subsegmental atelectasis suggested near the lung bases.   Pleural space:  Moderate left pleural effusion.  Moderate to large right pleural effusion.  No pneumothorax.   Heart:  There is cardiomegaly.   Mediastinum:  Unremarkable.   Bones joints:  Bilateral shoulder prosthesis is noted in place and are normal anatomic position.   Vasculature:  Atherosclerotic disease.   Tubes, lines and devices:  Lead wires extend to the neck region right of midline, beyond view.   Other findings:  Battery pack overlies the upper to mid right chest. IMPRESSION:     1.  Cardiomegaly and bilateral pleural effusions suggestive of congestive heart failure. 2.  Clinical correlation is advised.     Electronically signed by Luis Blount MD on 11-05-23 at 0046    CT Head Without Contrast    Result Date: 11/5/2023  Patient: GABBIE YARBROUGH  Time Out: 00:40 Exam(s): CT HEAD Without Contrast EXAM:   CT Head Without Intravenous Contrast CLINICAL HISTORY:    Reason for exam: ams. TECHNIQUE:   Axial computed  tomography images of the head brain without intravenous contrast.  CTDI is 54.92 mGy and DLP is 979.4 mGy-cm.  This CT exam was performed according to the principle of ALARA (As Low As Reasonably Achievable) by using one or more of the following dose reduction techniques: automated exposure control, adjustment of the mA and or kV according to patient size, and or use of iterative reconstruction technique. COMPARISON:   8 4 2023. FINDINGS:   Brain:  Age-related atrophy and small vessel disease of aging.  No hemorrhage.  No abnormal extra-axial collection is noted.   Midline shift:  Midline anatomy is unremarkable.   Ventricles:  Unremarkable.  No ventriculomegaly.   Bones joints:  Calvarium is unremarkable.  No acute fracture.   Soft tissues:  Unremarkable.   Sinuses:  Visualized sinuses are unremarkable.   Mastoid air cells:  Partial opacification of mastoid air cells.   Tubes, lines and devices:  The brain stimulator wires are again noted in place.   Other findings:  No previous studies. IMPRESSION:     1.  Age-related changes similar to the previous study. 2.  The brain stimulator wires are noted in place as before. 3.  No acute intracranial pathology is detected. 4.  If there is concern for etiology such as early acute lacunar infarcts,  MRI imaging of the brain with diffusion-weighted sequences should be performed.     Electronically signed by Luis Blount MD on 11-05-23 at 0040     I ordered the above noted radiological studies. Reviewed by me and discussed with radiologist.  See dictation for official radiology interpretation.      PROCEDURES    Critical Care    Performed by: Bryant Dillon MD  Authorized by: Azar Garcia MD    Critical care provider statement:     Critical care time (minutes):  65    Critical care was necessary to treat or prevent imminent or life-threatening deterioration of the following conditions:  Respiratory failure, dehydration and circulatory failure    Critical care  was time spent personally by me on the following activities:  Ordering and performing treatments and interventions, ordering and review of laboratory studies, ordering and review of radiographic studies, pulse oximetry, re-evaluation of patient's condition, review of old charts, development of treatment plan with patient or surrogate, evaluation of patient's response to treatment and obtaining history from patient or surrogate        MEDICATIONS GIVEN IN ER    Medications   sodium chloride 0.9 % flush 10 mL (has no administration in time range)   norepinephrine (LEVOPHED) 8 mg in 250 mL NS infusion (premix) (0.04 mcg/kg/min × 84.4 kg Intravenous Rate/Dose Verify 11/5/23 5405)   nitroglycerin (NITROSTAT) SL tablet 0.4 mg (has no administration in time range)   sodium chloride 0.9 % flush 10 mL (has no administration in time range)   sodium chloride 0.9 % flush 10 mL (has no administration in time range)   sodium chloride 0.9 % infusion 40 mL (has no administration in time range)   sennosides-docusate (PERICOLACE) 8.6-50 MG per tablet 2 tablet (has no administration in time range)     And   polyethylene glycol (MIRALAX) packet 17 g (has no administration in time range)     And   bisacodyl (DULCOLAX) EC tablet 5 mg (has no administration in time range)     And   bisacodyl (DULCOLAX) suppository 10 mg (has no administration in time range)   sodium chloride 0.9 % infusion (50 mL/hr Intravenous New Bag 11/5/23 6783)   acetaminophen (TYLENOL) tablet 650 mg (has no administration in time range)     Or   acetaminophen (TYLENOL) suppository 650 mg (has no administration in time range)   ipratropium-albuterol (DUO-NEB) nebulizer solution 3 mL (has no administration in time range)   ondansetron (ZOFRAN) injection 4 mg (has no administration in time range)   levothyroxine (SYNTHROID, LEVOTHROID) tablet 50 mcg (has no administration in time range)   tiZANidine (ZANAFLEX) tablet 2 mg (has no administration in time range)   vitamin  B-12 (CYANOCOBALAMIN) tablet 500 mcg (has no administration in time range)   ferrous sulfate tablet 325 mg (has no administration in time range)   sodium chloride 0.9 % bolus 500 mL (0 mL Intravenous Stopped 11/5/23 0027)   cefepime 2000 mg IVPB in 100 ml NS (VTB) (0 mg Intravenous Stopped 11/5/23 0133)   sodium chloride 0.9 % bolus 500 mL (0 mL Intravenous Stopped 11/5/23 0156)   sodium chloride 0.9 % bolus 500 mL (500 mL Intravenous New Bag 11/5/23 5524)         ORDERS PLACED DURING THIS VISIT:  Orders Placed This Encounter   Procedures    Critical Care    Blood Culture - Blood,    Blood Culture - Blood,    Urine Culture - Urine, Urine, Clean Catch    XR Chest 1 View    CT Head Without Contrast    CT Abdomen Pelvis Without Contrast    Comprehensive Metabolic Panel    Protime-INR    aPTT    Urinalysis With Microscopic If Indicated (No Culture) - Urine, Clean Catch    BNP    High Sensitivity Troponin T    Lactic Acid, Plasma    Procalcitonin    Magnesium    CBC Auto Differential    Urinalysis, Microscopic Only - Urine, Clean Catch    STAT Lactic Acid, Reflex    High Sensitivity Troponin T 2Hr    Basic Metabolic Panel    Magnesium    TSH    Diet: Regular/House Diet; Texture: Regular Texture (IDDSI 7); Fluid Consistency: Thin (IDDSI 0)    Monitor Blood Pressure    Vital Signs Every Hour and Per Hospital Policy Based on Patient Condition    Telemetry - Place Orders & Notify Provider of Results When Patient Experiences Acute Chest Pain, Dysrhythmia or Respiratory Distress    Continuous Pulse Oximetry    Height & Weight    Daily Weights    Intake & Output    Oral Care - Patient Not on NPPV & Not Intubated    Target Arousal Level RASS 0 to -1    Use Mobility Guidelines for Advancement of Activity    If Patient has BG Less Than 80 & is Symptomatic But Not on IV Insulin Protocol - Use Adult Hypoglycemia Treatment Orders    ICU / CCU - Maintain IV Access    ICU / CCU - Place Order Consult Intensivist For Critical Care  Management (If Patient Not Admitted to Cardiology for Primary Cardiology Condition)    ICU / CCU - Notify All Physicians When Patient is Transferred    Code Status and Medical Interventions:    Inpatient Cardiology Consult    PT Consult: Eval & Treat Functional Mobility Below Baseline, Other; Parkinson's disease, limited mobility at baseline, works with PT three times weekly at home    Oxygen Therapy- Nasal Cannula; Titrate 1-6 LPM Per SpO2; 90 - 95%    SLP Consult: Eval & Treat RN Dysphagia Screen Failed    POC Glucose Once    POC Glucose Once    POC Glucose Finger 4x Daily Before Meals & at Bedtime    ECG 12 Lead Altered Mental Status    Adult Transthoracic Echo Complete W/ Cont if Necessary Per Protocol    Insert Peripheral IV    Insert Peripheral IV    Inpatient Admission    CBC & Differential    CBC & Differential         PROGRESS, DATA ANALYSIS, CONSULTS, AND MEDICAL DECISION MAKING    All labs have been independently interpreted by me.  All radiology studies have been reviewed by me and discussed with radiologist dictating the report.   EKG's independently viewed and interpreted by me.  Discussion below represents my analysis of pertinent findings related to patient's condition, differential diagnosis, treatment plan and final disposition.    Differential diagnosis includes but is not limited to: UTI, delirium, dementia, ICH    ED Course as of 11/05/23 0724   Sun Nov 05, 2023   0017 Creatinine(!): 3.12 [TJ]   0017 Potassium: 4.1 [TJ]   0017 WBC: 4.58 [TJ]   0017 Hemoglobin(!): 9.6  Baseline   [TJ]   0017 WBC, UA(!): 6-10 [TJ]   0017 Bacteria, UA(!): 2+ [TJ]   0032 Procalcitonin(!): 0.67 [TJ]   0033 Lactate(!!): 2.2 [TJ]   0049 I have independently reviewed patient's head CT; my interpretation is no bleed no shift [TJ]   0143 CO2: 23.0 [TJ]   0144 Anion Gap: 13.0 [TJ]   0318 Patient with persistent hypotension despite fluid resuscitation.  I am concerned additional resuscitation may resulted in becoming fluid  overloaded.  Will initiate pressors and discuss with ICU. [TJ]      ED Course User Index  [TJ] Bryant Dillon MD          PPE: The patient wore a mask and I wore an N95 mask throughout the entire patient encounter.       AS OF 07:24 EST VITALS:    BP - (!) 88/57  HR - 54  TEMP - 95.6 °F (35.3 °C) (Tympanic)  O2 SATS - 96%        DIAGNOSIS  Final diagnoses:   Hypotension, unspecified hypotension type   MATT (acute kidney injury)   Altered mental status, unspecified altered mental status type         DISPOSITION  ED Disposition       ED Disposition   Decision to Admit    Condition   --    Comment   Level of Care: Critical Care [6]   Diagnosis: Hypotension [149145]   Admitting Physician: MICHAEL GALLOWAY [[id number]]   Certification: I Certify That Inpatient Hospital Services Are Medically Necessary For Greater Than 2 Midnights                    Note Disclaimer: At [facility], we believe that sharing information builds trust and better relationships. You are receiving this note because you recently visited [facility]. It is possible you will see health information before a provider has talked with you about it. This kind of information can be easy to misunderstand. To help you fully understand what it means for your health, we urge you to discuss this note with your provider.         Bryant Dillon MD  11/05/23 7064

## 2023-11-05 NOTE — THERAPY EVALUATION
Patient Name: Edilberto Reeder  : 1950    MRN: [id number]                              Today's Date: 2023       Admit Date: 2023    Visit Dx:     ICD-10-CM ICD-9-CM   1. Hypotension, unspecified hypotension type  I95.9 458.9   2. MATT (acute kidney injury)  N17.9 584.9   3. Altered mental status, unspecified altered mental status type  R41.82 780.97     Patient Active Problem List   Diagnosis    ASCVD (arteriosclerotic cardiovascular disease)    Permanent atrial fibrillation    Diabetic retinopathy associated with type 2 diabetes mellitus    Essential hypertension    HLD (hyperlipidemia)    Hypothyroidism    Peripheral neuropathy    Renal insufficiency    Type 2 diabetes mellitus with hyperglycemia, without long-term current use of insulin    Parkinson's disease    Dyspnea on exertion    Atrial fibrillation, persistent    Stroke-like symptoms    Hypopotassemia    Ankle pain    Acute idiopathic gout of right ankle    Generalized weakness    Head injury due to trauma    Acute blood loss anemia    Chronic anticoagulation    Thrombocytopenia    Screening for colon cancer    Minor head injury, initial encounter    Chronic diastolic heart failure    Shortness of breath    Syncope    Acute cough    Bronchitis    Depression    H/O Spinal surgery    H/O umbilical hernia repair    Hemorrhoids    History of colon polyps    History of Parkinson's disease    Melanoma in situ of unspecified upper limb, including shoulder    Hypoglycemia    MATT (acute kidney injury)    Stage 3a chronic kidney disease    Hypotension    Acute on chronic renal failure     Past Medical History:   Diagnosis Date    Atrial fibrillation with RVR     Atrial flutter     Diabetes     Disease of thyroid gland     HLD (hyperlipidemia) 2016    Hypertension     Parkinson's disease     Advanced      Past Surgical History:   Procedure Laterality Date    BRAIN STIMULATOR      COLONOSCOPY N/A 2022    Procedure: COLONOSCOPY TO CECUM WITH  COLD SNARE POLYPECTOMY;  Surgeon: Luther Ferrer MD;  Location: [facility] ENDOSCOPY;  Service: Gastroenterology;  Laterality: N/A;  PRE:ANEMIA  POST:POLYPS/HEMORRHOIDS    ENDOSCOPY N/A 5/5/2022    Procedure: ESOPHAGOGASTRODUODENOSCOPY WITH  COLD BIOPSIES;  Surgeon: Luther Ferrer MD;  Location: [facility] ENDOSCOPY;  Service: Gastroenterology;  Laterality: N/A;  PRE:ANEMIA  POST:DIVERTICULUM/GASTRITIS    JOINT REPLACEMENT      Bilateral shoulder and knee replacements      General Information       Row Name 11/05/23 1453          Physical Therapy Time and Intention    Document Type evaluation  -EM     Mode of Treatment individual therapy;physical therapy  -EM       Row Name 11/05/23 1453          General Information    Patient Profile Reviewed yes  -EM     Prior Level of Function independent:;all household mobility  unsure of exact prior level of function, no family present  -EM     Existing Precautions/Restrictions fall  -EM       Row Name 11/05/23 1453          Living Environment    People in Home spouse;child(abilio), adult  -EM       Row Name 11/05/23 1453          Cognition    Orientation Status (Cognition) oriented to;person  pt stated name, speech slurred and difficult to understand, unsure about orientation to time and place  -EM       Row Name 11/05/23 1453          Safety Issues, Functional Mobility    Impairments Affecting Function (Mobility) balance;cognition;coordination;endurance/activity tolerance;strength  -EM               User Key  (r) = Recorded By, (t) = Taken By, (c) = Cosigned By      Initials Name Provider Type    EM Rona Cleveland PT Physical Therapist                   Mobility       Row Name 11/05/23 1454          Bed Mobility    Bed Mobility supine-sit;sit-supine  -EM     Supine-Sit [place] (Bed Mobility) moderate assist (50% patient effort);2 person assist  -EM     Sit-Supine [place] (Bed Mobility) moderate assist (50% patient effort);2 person assist  -EM      Assistive Device (Bed Mobility) head of bed elevated;draw sheet  -EM       Row Name 11/05/23 1454          Sit-Stand Transfer    Sit-Stand [place] (Transfers) moderate assist (50% patient effort);2 person assist;verbal cues  -EM     Assistive Device (Sit-Stand Transfers) walker, front-wheeled  -EM     Comment, (Sit-Stand Transfer) cues for feet and hand placement, cues to open eyes, pt stood at the bedside, then closed eyes again so returned to sitting and then back to supine  -EM       Row Name 11/05/23 1454          Gait/Stairs (Locomotion)    Comment, (Gait/Stairs) unsafe to attempt today  -EM               User Key  (r) = Recorded By, (t) = Taken By, (c) = Cosigned By      Initials Name Provider Type    EM Rona Cleveland, PT Physical Therapist                   Obj/Interventions       Row Name 11/05/23 1455          Range of Motion Comprehensive    General Range of Motion no range of motion deficits identified  -EM       Row Name 11/05/23 1455          Strength Comprehensive (MMT)    General Manual Muscle Testing (MMT) Assessment other (see comments)  -EM     Comment, General Manual Muscle Testing (MMT) Assessment no focal deficits identified, no formal MMT, difficulty with level of alertness and following commands  -EM       Row Name 11/05/23 1455          Balance    Balance Assessment sitting static balance;sitting dynamic balance;standing static balance;standing dynamic balance  -EM     Static Sitting Balance standby assist  -EM     Dynamic Sitting Balance contact guard  -EM     Position, Sitting Balance sitting edge of bed  -EM     Static Standing Balance minimal assist;2-person assist  -EM     Position/Device Used, Standing Balance walker, rolling  -EM       Row Name 11/05/23 1454          Sensory Assessment (Somatosensory)    Sensory Assessment (Somatosensory) unable/difficult to assess  -EM               User Key  (r) = Recorded By, (t) = Taken By, (c) = Cosigned By      Initials Name Provider  Type    EM Rona Cleveland PT Physical Therapist                   Goals/Plan       Row Name 11/05/23 1455          Bed Mobility Goal 1 (PT)    Activity/Assistive Device (Bed Mobility Goal 1, PT) bed mobility activities, all  -EM     [place] Level/Cues Needed (Bed Mobility Goal 1, PT) minimum assist (75% or more patient effort)  -EM     Time Frame (Bed Mobility Goal 1, PT) 2 weeks  -EM       Row Name 11/05/23 1455          Transfer Goal 1 (PT)    Activity/Assistive Device (Transfer Goal 1, PT) transfers, all;walker, rolling  -EM     [place] Level/Cues Needed (Transfer Goal 1, PT) minimum assist (75% or more patient effort)  -EM     Time Frame (Transfer Goal 1, PT) 2 weeks  -EM       Row Name 11/05/23 1451          Gait Training Goal 1 (PT)    Activity/Assistive Device (Gait Training Goal 1, PT) gait (walking locomotion);walker, rolling  -EM     [place] Level (Gait Training Goal 1, PT) minimum assist (75% or more patient effort)  -EM     Distance (Gait Training Goal 1, PT) 25  -EM     Time Frame (Gait Training Goal 1, PT) 2 weeks  -EM       Row Name 11/05/23 1453          Therapy Assessment/Plan (PT)    Planned Therapy Interventions (PT) bed mobility training;gait training;home exercise program;patient/family education;transfer training  -EM               User Key  (r) = Recorded By, (t) = Taken By, (c) = Cosigned By      Initials Name Provider Type    EM Rona Cleveland PT Physical Therapist                   Clinical Impression       Row Name 11/05/23 1453          Pain    Pre/Posttreatment Pain Comment pt unable to rate pain, appears to have pain in R elbow when moved  -EM     Pain Intervention(s) Repositioned  -EM       Row Name 11/05/23 1457          Plan of Care Review    Plan of Care Reviewed With patient  -EM     Outcome Evaluation Pt is 72 yo male admitted to [facility] with hypotension, MATT, acute exacerbation of CHF, AMS. PMH significant for PD, afib, DM, CHF, hypothyroidism. Patient  lives with spouse, unsure of exact prior level of function, pt with slurred speech and no family present. Patient performed bed mobility with modAx2, sit to stand with modAx2, unable to ambulate or transfer today. patient has impairments consisting of generalized weakness, decreased activity tolerance, decreased balance, decreased cognition and would benefit from skilled PT. Anticipate patient will need SNU at d/c.  -EM       Row Name 11/05/23 1456          Therapy Assessment/Plan (PT)    Patient/Family Therapy Goals Statement (PT) pt unable to state  -EM     Rehab Potential (PT) good, to achieve stated therapy goals  -EM     Criteria for Skilled Interventions Met (PT) yes;skilled treatment is necessary  -EM     Therapy Frequency (PT) 5 times/wk  -EM       Row Name 11/05/23 1456          Positioning and Restraints    Pre-Treatment Position in bed  -EM     Post Treatment Position bed  -EM     In Bed supine;call light within reach;exit alarm on;notified nsg  -EM               User Key  (r) = Recorded By, (t) = Taken By, (c) = Cosigned By      Initials Name Provider Type    EM Rona Cleveland, PT Physical Therapist                   Outcome Measures       Row Name 11/05/23 1500 11/05/23 0815       How much help from another person do you currently need...    Turning from your back to your side while in flat bed without using bedrails? 2  -EM 2  -MR    Moving from lying on back to sitting on the side of a flat bed without bedrails? 2  -EM 2  -MR    Moving to and from a bed to a chair (including a wheelchair)? 2  -EM 2  -MR    Standing up from a chair using your arms (e.g., wheelchair, bedside chair)? 2  -EM 2  -MR    Climbing 3-5 steps with a railing? 1  -EM 2  -MR    To walk in hospital room? 1  -EM 2  -MR    AM-PAC 6 Clicks Score (PT) 10  -EM 12  -MR    Highest level of mobility 4 --> Transferred to chair/commode  -EM 4 --> Transferred to chair/commode  -MR      Row Name 11/05/23 9780          How much help from  another person do you currently need...    Turning from your back to your side while in flat bed without using bedrails? 2  -MK     Moving from lying on back to sitting on the side of a flat bed without bedrails? 2  -MK     Moving to and from a bed to a chair (including a wheelchair)? 1  -MK     Standing up from a chair using your arms (e.g., wheelchair, bedside chair)? 1  -MK     Climbing 3-5 steps with a railing? 1  -MK     To walk in hospital room? 1  -MK     AM-PAC 6 Clicks Score (PT) 8  -MK     Highest level of mobility 3 --> Sat at edge of bed  -MK               User Key  (r) = Recorded By, (t) = Taken By, (c) = Cosigned By      Initials Name Provider Type    EM Rona Cleveland, PT Physical Therapist    Tatianna Cote, RN Registered Nurse    Dominique Khalil RN Registered Nurse                                 Physical Therapy Education       Title: PT OT SLP Therapies (In Progress)       Topic: Physical Therapy (In Progress)       Point: Mobility training (In Progress)       Learning Progress Summary             Patient Acceptance, E, NR by EM at 11/5/2023 1500                         Point: Home exercise program (Not Started)       Learner Progress:  Not documented in this visit.              Point: Body mechanics (Not Started)       Learner Progress:  Not documented in this visit.              Point: Precautions (Not Started)       Learner Progress:  Not documented in this visit.                              User Key       Initials Effective Dates Name Provider Type Discipline     06/16/21 -  Rona Cleveland, PT Physical Therapist PT                  PT Recommendation and Plan  Planned Therapy Interventions (PT): bed mobility training, gait training, home exercise program, patient/family education, transfer training  Plan of Care Reviewed With: patient  Outcome Evaluation: Pt is 74 yo male admitted [facility] with hypotension, MATT, acute exacerbation of CHF, AMS. PMH significant for PD, afib,  DM, CHF, hypothyroidism. Patient lives with spouse, unsure of exact prior level of function, pt with slurred speech and no family present. Patient performed bed mobility with modAx2, sit to stand with modAx2, unable to ambulate or transfer today. patient has impairments consisting of generalized weakness, decreased activity tolerance, decreased balance, decreased cognition and would benefit from skilled PT. Anticipate patient will need SNU at d/c.     Time Calculation:         PT Charges       Row Name 11/05/23 1500             Time Calculation    Start Time 1345  -EM      Stop Time 1407  -EM      Time Calculation (min) 22 min  -EM      PT Received On 11/05/23  -EM      PT - Next Appointment 11/06/23  -EM      PT Goal Re-Cert Due Date 11/19/23  -EM         Time Calculation- PT    Total Timed Code Minutes- PT 15 minute(s)  -EM         Timed Charges    19154 - PT Therapeutic Activity Minutes 15  -EM         Total Minutes    Timed Charges Total Minutes 15  -EM       Total Minutes 15  -EM                User Key  (r) = Recorded By, (t) = Taken By, (c) = Cosigned By      Initials Name Provider Type    EM Rona Cleveland PT Physical Therapist                  Therapy Charges for Today       Code Description Service Date Service Provider Modifiers Qty    64784898204 HC PT THERAPEUTIC ACT EA 15 MIN 11/5/2023 Rona Cleveland, PT GP 1    90174935873 HC PT EVAL MOD COMPLEXITY 2 11/5/2023 Rona Cleveland, PT GP 1    48846499669 HC PT THER SUPP EA 15 MIN 11/5/2023 Rona Cleveland PT GP 1            PT G-Codes  AM-PAC 6 Clicks Score (PT): 10  PT Discharge Summary  Anticipated Discharge Disposition (PT): skilled nursing facility    Rona Cleveland PT  11/5/2023

## 2023-11-06 NOTE — CONSULTS
.     REASON FOR CONSULTATION:     Provide an opinion on any further workup or treatment  Prolonged INR  Thrombocytopenia  Anemia                               REQUESTING PHYSICIAN: Azra Garcia MD  RECORDS OBTAINED:  Records of the patient's history including those obtained from the referring provider were reviewed and summarized in detail.    HISTORY OF PRESENT ILLNESS:  The patient is a 73 y.o. year old male  who is here for follow-up with the above-mentioned history.  Patient is a 73-year-old male with chronic atrial fibrillation on Eliquis, chronic diastolic heart failure, Parkinson's disease with a stimulator, chronic thrombocytopenia and anemia, diabetes hypothyroidism presented to the emergency room on 11/5/2023 with complaints of mental status changes.  Patient is unable to provide any history and history has been obtained by reviewing the medical records as well as discussing with his wife son and his nurse at bedside.  Noted to have MATT and on presentation with a creatinine of 3.12, baseline around 1.4.  Patient was hypertensive and noted to have volume overload on presentation.  He was started on Levophed as well as dobutamine.  Echocardiogram shows a normal ejection fraction of 64.8%.  Left ventricular wall thickness is consistent with severe concentric hypertrophy.  Findings consistent with infiltrative cardiomyopathy such as amyloidosis.  Cardiology recommending central IJ access to better define hemodynamic parameters.  Labs reviewed and patient has had chronic anemia with hemoglobin decreasing as low as 5.9 in the past.  Thrombocytopenia is also relatively chronic with platelets as low as 97 in the past however has had normal platelet count in September 2023.  On presentation platelet count is low at 127,000.  Platelets lower today at 103,000.  9/28/2023 on level low at 25, ferritin 66.6, iron saturation 7%, TIBC 381, vitamin B12 1618 and folic acid greater than 20.  11/4/2023 INR elevated at  4.01, 11/6/2023-INR increased to 6.41.  PT 58.1.  Hematology has been consulted for further work-up of the elevated INR  Mixing studies suggestive of correction of PT to 17.9 indicating a factor deficiency.  According to patient's wife he has been eating and drinking fairly well up until Wednesday when his mental status started declining.  They have however been renovating their kitchen and have been using mostly microwave cooked food.  Blood cultures negative  UA not clearly suggestive of an infection.  Urine culture negative.  CT of the head with no acute intracranial pathology.  Brain stimulator present.  CT of the abdomen with findings consistent with anasarca.  Moderate right and small left pleural effusions.  Dense consolidation in the lower lungs likely compressive atelectasis.  Nonspecific enlarged left periaortic lymph node.  Follow-up CT in 3 months recommended.      Past Medical History:   Diagnosis Date    Atrial fibrillation with RVR     Atrial flutter     Diabetes     Disease of thyroid gland     HLD (hyperlipidemia) 01/27/2016    Hypertension     Parkinson's disease     Advanced      Past Surgical History:   Procedure Laterality Date    BRAIN STIMULATOR      COLONOSCOPY N/A 5/5/2022    Procedure: COLONOSCOPY TO CECUM WITH COLD SNARE POLYPECTOMY;  Surgeon: Luther Ferrer MD;  Location: [facility] ENDOSCOPY;  Service: Gastroenterology;  Laterality: N/A;  PRE:ANEMIA  POST:POLYPS/HEMORRHOIDS    ENDOSCOPY N/A 5/5/2022    Procedure: ESOPHAGOGASTRODUODENOSCOPY WITH  COLD BIOPSIES;  Surgeon: Luther Ferrer MD;  Location: [facility] ENDOSCOPY;  Service: Gastroenterology;  Laterality: N/A;  PRE:ANEMIA  POST:DIVERTICULUM/GASTRITIS    JOINT REPLACEMENT      Bilateral shoulder and knee replacements       MEDICATIONS    Current Facility-Administered Medications:     acetaminophen (TYLENOL) tablet 650 mg, 650 mg, Oral, Q4H PRN, 650 mg at 11/05/23 1903 **OR** acetaminophen (TYLENOL) suppository 650 mg, 650  mg, Rectal, Q4H PRN, Rox Webster APRN    sennosides-docusate (PERICOLACE) 8.6-50 MG per tablet 2 tablet, 2 tablet, Oral, BID, 2 tablet at 11/05/23 0917 **AND** polyethylene glycol (MIRALAX) packet 17 g, 17 g, Oral, Daily PRN **AND** bisacodyl (DULCOLAX) EC tablet 5 mg, 5 mg, Oral, Daily PRN **AND** bisacodyl (DULCOLAX) suppository 10 mg, 10 mg, Rectal, Daily PRN, Rox Webster APRN    cefepime 2000 mg IVPB in 100 ml NS (VTB), 2,000 mg, Intravenous, Q12H, Avtar Paula MD, 2,000 mg at 11/06/23 1343    dextrose (D50W) (25 g/50 mL) IV injection 25 g, 25 g, Intravenous, Q15 Min PRN, Azra Garcia MD, 25 g at 11/06/23 1222    dextrose (GLUTOSE) oral gel 15 g, 15 g, Oral, Q15 Min PRN, Azra Garcia MD    DOBUTamine (DOBUTREX) 1 mg/mL infusion, 5 mcg/kg/min, Intravenous, Titrated, Gurinder Garcia MD, Stopped at 11/06/23 0815    fentaNYL citrate (PF) (SUBLIMAZE) injection 50 mcg, 50 mcg, Intravenous, Q1H PRN, Avtar Paula MD, 50 mcg at 11/06/23 1343    ferrous sulfate tablet 325 mg, 325 mg, Oral, Daily With Breakfast, Rox Webster APRN, 325 mg at 11/05/23 0917    glucagon (GLUCAGEN) injection 1 mg, 1 mg, Intramuscular, Q15 Min PRN, Azra Garcia MD    ipratropium-albuterol (DUO-NEB) nebulizer solution 3 mL, 3 mL, Nebulization, Q6H PRN, Rox Webster APRN    lactulose (CHRONULAC) 10 GM/15ML solution 20 g, 20 g, Oral, Daily, Jarad Redmond MD    levothyroxine (SYNTHROID, LEVOTHROID) tablet 50 mcg, 50 mcg, Oral, Q AM, Rox Webster APRN, 50 mcg at 11/05/23 0917    nitroglycerin (NITROSTAT) SL tablet 0.4 mg, 0.4 mg, Sublingual, Q5 Min PRN, Rox Webster APRN    norepinephrine (LEVOPHED) 8 mg in 250 mL NS infusion (premix), 0.02-0.3 mcg/kg/min, Intravenous, Titrated, Rox Webster APRN, Last Rate: 9.5 mL/hr at 11/06/23 1552, 0.06 mcg/kg/min at 11/06/23 1552    ondansetron (ZOFRAN) injection 4 mg, 4 mg, Intravenous, Q6H PRN, Rox Webster APRN, 4 mg at 11/05/23 1001    [COMPLETED] Insert  Peripheral IV, , , Once **AND** sodium chloride 0.9 % flush 10 mL, 10 mL, Intravenous, PRN, Bryant Dillon MD    sodium chloride 0.9 % flush 10 mL, 10 mL, Intravenous, Q12H, Rox Webster APRN, 10 mL at 11/06/23 0901    sodium chloride 0.9 % flush 10 mL, 10 mL, Intravenous, PRN, Rox Webster APRN    sodium chloride 0.9 % infusion 40 mL, 40 mL, Intravenous, PRN, Rox Webster APRN    tiZANidine (ZANAFLEX) tablet 2 mg, 2 mg, Oral, Q8H PRN, Rox Webster APRN    vitamin B-12 (CYANOCOBALAMIN) tablet 500 mcg, 500 mcg, Oral, BID, Rox Webster APRN, 500 mcg at 11/05/23 0917    ALLERGIES:     Allergies   Allergen Reactions    Bacitracin Anaphylaxis    Neosporin [Neomycin-Bacitracin Zn-Polymyx] Other (See Comments)     BP drops and heart stops!    Fish-Derived Products Hives    Shellfish Allergy Hives    Shrimp (Diagnostic) Hives       SOCIAL HISTORY:       Social History     Socioeconomic History    Marital status:    Tobacco Use    Smoking status: Never     Passive exposure: Never    Smokeless tobacco: Never    Tobacco comments:     caffeine use   Vaping Use    Vaping Use: Never used   Substance and Sexual Activity    Alcohol use: Yes     Alcohol/week: 2.0 standard drinks of alcohol     Types: 1 Cans of beer, 1 Drinks containing 0.5 oz of alcohol per week    Drug use: No    Sexual activity: Defer         FAMILY HISTORY:  Family History   Problem Relation Age of Onset    Diabetes Mother     Heart disease Father     Cancer Father     Cancer Sister     Diabetes Brother     Cancer Brother        REVIEW OF SYSTEMS:  Review of Systems  Unable to obtain due to mental status changes           Vitals:    11/06/23 1500 11/06/23 1515 11/06/23 1530 11/06/23 1545   BP: 106/73 108/65 112/78 (!) 124/112   Pulse: 90 90 91 92   Resp:       Temp:       TempSrc:       SpO2: 95% 95% 95% 97%   Weight:       Height:              No data to display               PHYSICAL EXAM:      CONSTITUTIONAL:  Vital signs  reviewed.  Appears uncomfortable, moaning.  EARS,NOSE,MOUTH,THROAT:  Ears and nose appear unremarkable.  Lips, teeth, gums appear unremarkable.  RESPIRATORY:  Normal respiratory effort.  Lungs clear to auscultation bilaterally.  CARDIOVASCULAR:  Normal S1, S2.  No murmurs rubs or gallops.  Bilateral lower extremity edema  GASTROINTESTINAL: Abdomen appears unremarkable.  Nontender.  No hepatomegaly.  No splenomegaly.          RECENT LABS:        WBC   Date Value Ref Range Status   11/06/2023 4.73 3.40 - 10.80 10*3/mm3 Final   11/06/2023 4.26 3.40 - 10.80 10*3/mm3 Final   11/04/2023 4.58 3.40 - 10.80 10*3/mm3 Final     Hemoglobin   Date Value Ref Range Status   11/06/2023 8.6 (L) 13.0 - 17.7 g/dL Final   11/06/2023 8.3 (L) 13.0 - 17.7 g/dL Final   11/04/2023 9.6 (L) 13.0 - 17.7 g/dL Final     Platelets   Date Value Ref Range Status   11/06/2023 103 (L) 140 - 450 10*3/mm3 Final   11/06/2023 103 (L) 140 - 450 10*3/mm3 Final   11/04/2023 127 (L) 140 - 450 10*3/mm3 Final       Assessment & Plan   [unfilled]      Edilberto Reeder     *Prolonged INR  Likely multifactorial  INR elevated at 6.41 with PT being 58.1.  On presentation INR was 4.01.  Patient has been on Eliquis which can prolong INR and even in the past his INR has been elevated between 2-3.  Suspect worsening of INR secondary to poor nutrition, possible infection   Mixing study with correction of PT to 17.2.  Suggestive of factor deficiency.  Could use FFP to correct the INR prior to line placement if required, assuming some other factors besides Eliquis playing a role in elevation of INR  Patient is not actively bleeding and hence would not recommend any other further aggressive measures to correct the INR.  Could consider vitamin K if necessary.    *Thrombocytopenia  CT abdomen shows a normal spleen.  He did have previous thrombocytopenia however platelet counts were relatively normal in September 2023  Again could be secondary to possible  infection  Currently on antibiotics per primary team  B12 and folic acid normal    *Cardiac failure  Echocardiogram concerning for an infiltrative process  We will obtain myeloma studies.    *Possible infection  Slightly elevated procalcitonin  Currently on cefepime.  Management per primary team  Repeat cultures pending.    *Anemia  Chronic  Likely secondary to a combination of iron deficiency in the setting of renal dysfunction.  Hemoglobin has been stable since admission.  Continue to monitor H&H closely in the setting of elevated INR    Recommendations  Daily PT PTT INR  Monitor without any intervention at this time since there is no active bleeding.  If INR correction is required for line placement then could consider FFP prior to line placement  Update myeloma studies  We will continue to follow along    Discussed with Dr. Paula.  Patient is critically ill with guarded prognosis.  All issues new to me today.

## 2023-11-06 NOTE — PROGRESS NOTES
[facility] Progress Note      Patient Name: Edilberto Reeder  : 1950  MRN: [id number]  Primary Care Physician:  Harvey Larson MD  Date of admission: 2023    Subjective     Interval History:   The patient was seen and examined today for follow-up on MATT   Patient continues to be confused.  Hypotensive on pressors Levophed at this morning.  Oligoanuric  Wife at bedside.  Oxygen requirements are stable    Review of Systems:   As noted above    Objective     Vitals:   Temp:  [94.1 °F (34.5 °C)-98.2 °F (36.8 °C)] 97.5 °F (36.4 °C)  Heart Rate:  [] 89  Resp:  [12-21] 21  BP: ()/(30-86) 74/48  Flow (L/min):  [2] 2    Intake/Output Summary (Last 24 hours) at 2023 1001  Last data filed at 2023 0551  Gross per 24 hour   Intake 1462.7 ml   Output 200 ml   Net 1262.7 ml       Physical Exam:    General Appearance: Mildly agitated.  Confused  Skin: warm and dry  HEENT: oral mucosa normal, nonicteric sclera  Neck: supple, no JVD  Lungs: Decreased air entry bilateral  Heart: RRR, normal S1 and S2  Abdomen: soft, nontender, nondistended  : Scrotal edema noted.  Blood at the penis meatus  Extremities: 1-2+ pitting edema bilateral lower extremities up to the thigh.  Neuro: Unable to examine as patient is noncooperative    Scheduled Meds:     bumetanide, 2 mg, Intravenous, Q12H  cefepime, 2,000 mg, Intravenous, Q12H  ferrous sulfate, 325 mg, Oral, Daily With Breakfast  levothyroxine, 50 mcg, Oral, Q AM  senna-docusate sodium, 2 tablet, Oral, BID  sodium chloride, 10 mL, Intravenous, Q12H  vitamin B-12, 500 mcg, Oral, BID      IV Meds:   DOBUTamine, 5 mcg/kg/min, Last Rate: Stopped (23 0815)  norepinephrine, 0.02-0.3 mcg/kg/min, Last Rate: 0.08 mcg/kg/min (23 0903)        Results Reviewed:   I have personally reviewed the results from the time of this admission to 2023 10:01 EST     Results from last 7 days   Lab Units 23  0451 23  9945    SODIUM mmol/L 131* 132*   POTASSIUM mmol/L 4.0 4.1   CHLORIDE mmol/L 98 96*   CO2 mmol/L 19.7* 23.0   BUN mg/dL 35* 35*   CREATININE mg/dL 3.67* 3.12*   CALCIUM mg/dL 8.2* 9.0   BILIRUBIN mg/dL  --  2.1*   ALK PHOS U/L  --  276*   ALT (SGPT) U/L  --  5   AST (SGOT) U/L  --  44*   GLUCOSE mg/dL 86 108*       Estimated Creatinine Clearance: 21.2 mL/min (A) (by C-G formula based on SCr of 3.67 mg/dL (H)).    Results from last 7 days   Lab Units 11/06/23  0451 11/04/23  2341   MAGNESIUM mg/dL 1.7 1.9   PHOSPHORUS mg/dL 3.8  --              Results from last 7 days   Lab Units 11/06/23  0451 11/04/23  2341   WBC 10*3/mm3 4.26 4.58   HEMOGLOBIN g/dL 8.3* 9.6*   PLATELETS 10*3/mm3 103* 127*       Results from last 7 days   Lab Units 11/04/23  2341   INR  4.01*       Assessment / Plan     ASSESSMENT:  Acute kidney injury on chronic kidney disease stage III- Acute kidney injury likely related to decreased perfusion from hypotension in a setting of  impaired renal autoregulation from ACE inhibitor use as an outpatient.  Last echocardiogram showed the presence of right-sided heart failure in addition feature favors infiltrative cardiomyopathy with diastolic heart failure this was associated with pulmonary hypertension.    Acute on chronic congestive heart failure- CXR showed cardiomegaly with bilateral pleural effusions. 11/5/23 ECHO   Hyponatremia- hypervolemic hyponatremia, sodium 132 today (11/5/23)  Hypotension- previous history of hypertension, now hypotensive. Anti-hypertensive medication being held. Currently on levophed gtt.   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   I agree with dobutamine and Levophed for blood pressure control  Plan to resume diuresis when blood pressure is  better controlled  We will await also for cardiology recommendation.  The patient may be heading to dialysis and had a discussion with the family about possibility of dialysis if he does not respond to medical therapy.  Hopefully his hemodynamics will be acceptable to allow dialysis if needed.  Very poor prognosis  We will bladder scan every 8 hours  We will check uric acid as a surrogate marker of intravascular volume status  Discussed with Avtar Higgins       Thank you for involving us in the care of Edilberto Reeder.  Please feel free to call with any questions.    Jarad Redmond MD  11/06/23  10:01 [facility]    [facility]  [phone]    Parts of this note may be an electronic transcription/translation of spoken language to printed text using the Dragon dictation system.

## 2023-11-06 NOTE — PLAN OF CARE
Goal Outcome Evaluation:              Outcome Evaluation: Orders received, chart reviewed.  Discussed with RN who stated pt was not appropriate d/t decline in status and agitation.  Directed to s/o at this time.  If pt becomes appropriate please reconsult.

## 2023-11-06 NOTE — PROGRESS NOTES
"Nutrition Services    Patient Name:  Edilberto Reeder  YOB: 1950  MRN: [id number]  Admit Date:  11/4/2023    Assessment Date:  11/06/23    Summary: Nutrition assessment initiated due to skin issues.  Pt was admitted with AMS. Discussed skin issues with RN, gluteal area not open. NPO, not appropriate for po intake today. No BM yet. Labs, meds, skin reviewed. On levo and dobutamine.    Plan/Recommendations:  Await po diet, intake and tolerance  Monitor and replace lytes as needed.    Will follow clinical course, nutrition needs.    CLINICAL NUTRITION ASSESSMENT      Reason for Assessment Pressure Injury and/or Non-Healing Wound     Diagnosis/Problem   AMS, hypotension, MATT/CKD, parkinson's, dementia, DM, AFib   Medical/Surgical History Past Medical History:   Diagnosis Date    Atrial fibrillation with RVR     Atrial flutter     Diabetes     Disease of thyroid gland     HLD (hyperlipidemia) 01/27/2016    Hypertension     Parkinson's disease     Advanced        Past Surgical History:   Procedure Laterality Date    BRAIN STIMULATOR      COLONOSCOPY N/A 5/5/2022    Procedure: COLONOSCOPY TO CECUM WITH COLD SNARE POLYPECTOMY;  Surgeon: Luther Ferrer MD;  Location: [facility] ENDOSCOPY;  Service: Gastroenterology;  Laterality: N/A;  PRE:ANEMIA  POST:POLYPS/HEMORRHOIDS    ENDOSCOPY N/A 5/5/2022    Procedure: ESOPHAGOGASTRODUODENOSCOPY WITH  COLD BIOPSIES;  Surgeon: Luther Ferrer MD;  Location: [facility] ENDOSCOPY;  Service: Gastroenterology;  Laterality: N/A;  PRE:ANEMIA  POST:DIVERTICULUM/GASTRITIS    JOINT REPLACEMENT      Bilateral shoulder and knee replacements        Anthropometrics        Current Height  Current Weight  BMI kg/m2 Height: 182.9 cm (72\")  Weight: 99 kg (218 lb 2.7 oz) (11/06/23 0500)  Body mass index is 29.59 kg/m².   Adjusted BMI (if applicable)    BMI Category Overweight (25 - 29.9)   Ideal Body Weight (IBW) 77.6kg   Usual Body Weight (UBW) ~200lb   Weight Trend Gain   Weight " History Wt Readings from Last 30 Encounters:   11/06/23 0500 99 kg (218 lb 2.7 oz)   11/05/23 0600 96.7 kg (213 lb 3 oz)   11/05/23 0416 96.7 kg (213 lb 3 oz)   11/04/23 2320 84.4 kg (186 lb)   10/17/23 1727 84.4 kg (186 lb)   10/06/23 1203 84.4 kg (186 lb)   09/27/23 1122 81.6 kg (180 lb)   09/26/23 2259 81.6 kg (180 lb)   09/20/23 0942 82.1 kg (181 lb)   09/13/23 1014 88.2 kg (194 lb 6.4 oz)   09/07/23 1444 88.5 kg (195 lb)   08/04/23 2132 79.1 kg (174 lb 6.4 oz)   08/04/23 1536 84.4 kg (186 lb)   02/14/23 1316 84.4 kg (186 lb)   11/04/22 0635 88.5 kg (195 lb)   11/03/22 0500 98.6 kg (217 lb 6 oz)   11/02/22 1202 99.3 kg (219 lb)   11/01/22 1600 99.3 kg (219 lb)   11/01/22 1156 99.8 kg (220 lb)   05/31/22 1428 86.2 kg (190 lb)   05/24/22 1424 80.8 kg (178 lb 3.2 oz)   05/08/22 0520 88.9 kg (195 lb 15.8 oz)   05/07/22 0505 86.8 kg (191 lb 4.8 oz)   04/30/22 2356 85.7 kg (189 lb)   04/30/22 2203 86.1 kg (189 lb 13.1 oz)   04/08/22 0824 88 kg (194 lb)   03/24/22 1721 89 kg (196 lb 3.4 oz)   03/23/22 0608 89.6 kg (197 lb 8.5 oz)   09/17/21 2008 113 kg (250 lb)   05/01/21 1511 113 kg (250 lb)   09/11/20 1359 99.3 kg (219 lb)   09/10/19 1355 95.3 kg (210 lb)   08/16/19 1418 94.8 kg (209 lb)   12/14/18 0550 110 kg (242 lb 8.1 oz)   12/12/18 0500 111 kg (243 lb 13.3 oz)   12/11/18 0500 115 kg (253 lb 12 oz)   12/10/18 0500 110 kg (242 lb 15.2 oz)   12/09/18 1209 106 kg (233 lb)   12/09/18 0520 106 kg (233 lb 14.5 oz)   12/08/18 1640 104 kg (229 lb)   12/08/18 1202 107 kg (235 lb 8 oz)   08/10/18 1429 103 kg (226 lb)   04/19/18 0924 106 kg (233 lb)   02/02/18 0954 102 kg (224 lb)   01/05/18 0932 103 kg (226 lb)   01/04/18 1902 103 kg (226 lb 4 oz)   01/04/18 1216 103 kg (227 lb)   01/29/15 1224 108 kg (238 lb 0.1 oz)   09/10/14 1550 109 kg (239 lb 3.9 oz)   02/12/14 1355 112 kg (247 lb 0.1 oz)   06/07/13 1300 110 kg (242 lb)        Estimated/Assessed Needs        Current Weight  Weight: 99 kg (218 lb 2.7 oz) (11/06/23  0500)       Energy Requirements    Weight for Calculation 77.6 kg   Method for Estimation  25 kcal/kg   EST Needs (kcal/day) 1940       Protein Requirements    Weight for Calculation 77.6 kg   EST Protein Needs (g/kg) 1.2 gm/kg   EST Daily Needs (g/day) 93       Fluid Requirements     Method for Estimation 1 mL/kcal    EST Needs (mL/day)      Labs       Pertinent Labs    Results from last 7 days   Lab Units 11/06/23  0451 11/04/23  2341   SODIUM mmol/L 131* 132*   POTASSIUM mmol/L 4.0 4.1   CHLORIDE mmol/L 98 96*   CO2 mmol/L 19.7* 23.0   BUN mg/dL 35* 35*   CREATININE mg/dL 3.67* 3.12*   CALCIUM mg/dL 8.2* 9.0   BILIRUBIN mg/dL  --  2.1*   ALK PHOS U/L  --  276*   ALT (SGPT) U/L  --  5   AST (SGOT) U/L  --  44*   GLUCOSE mg/dL 86 108*     Results from last 7 days   Lab Units 11/06/23  0451 11/04/23  2341   MAGNESIUM mg/dL 1.7 1.9   PHOSPHORUS mg/dL 3.8  --    HEMOGLOBIN g/dL 8.3* 9.6*   HEMATOCRIT % 26.9* 31.1*   WBC 10*3/mm3 4.26 4.58   ALBUMIN g/dL 2.8* 3.3*     Results from last 7 days   Lab Units 11/06/23  1048 11/06/23  0451 11/04/23  2341   INR  6.41*  --  4.01*   APTT seconds 78.7*  --  82.2*   PLATELETS 10*3/mm3  --  103* 127*     COVID19   Date Value Ref Range Status   05/04/2022 Not Detected Not Detected - Ref. Range Final     Lab Results   Component Value Date    HGBA1C 6.60 (H) 08/05/2023          Medications           Scheduled Medications cefepime, 2,000 mg, Intravenous, Q12H  ferrous sulfate, 325 mg, Oral, Daily With Breakfast  lactulose, 20 g, Oral, Daily  levothyroxine, 50 mcg, Oral, Q AM  senna-docusate sodium, 2 tablet, Oral, BID  sodium chloride, 10 mL, Intravenous, Q12H  vitamin B-12, 500 mcg, Oral, BID       Infusions DOBUTamine, 5 mcg/kg/min, Last Rate: Stopped (11/06/23 0815)  norepinephrine, 0.02-0.3 mcg/kg/min, Last Rate: 0.08 mcg/kg/min (11/06/23 0903)       PRN Medications   acetaminophen **OR** acetaminophen    senna-docusate sodium **AND** polyethylene glycol **AND** bisacodyl **AND**  bisacodyl    dextrose    dextrose    fentaNYL citrate (PF)    glucagon (human recombinant)    ipratropium-albuterol    nitroglycerin    ondansetron    [COMPLETED] Insert Peripheral IV **AND** sodium chloride    sodium chloride    sodium chloride    tiZANidine     Physical Findings          General Findings agitated, alert, disoriented, overweight, on oxygen therapy   Oral/Mouth Cavity tooth or teeth missing   Edema  3+ (moderate)   Gastrointestinal hypoactive bowel sounds, last bowel movement: pending   Skin  pressure injury: DTI to bilat gluteal area, other: toes, elbow wounds   Tubes/Drains/Lines none   NFPE No clinical signs of muscle wasting or fat loss   --  Current Nutrition Orders & Evaluation of Intake       Oral Nutrition     Food Allergies Fish, Shellfish   Current PO Diet NPO Diet NPO Type: Strict NPO   Supplement n/a   PO Evaluation     % PO Intake 0    Factors Affecting Intake: altered mental status, swallow impairment   --  PES STATEMENT / NUTRITION DIAGNOSIS      Nutrition Dx Problem  Problem: Predicted Suboptimal Intake  Etiology: Medical Diagnosis - hx dementia    Signs/Symptoms: SLP/Swallow Evaluation pending     NUTRITION INTERVENTION / PLAN OF CARE      Intervention Goal(s) Maintain nutrition status, Reduce/improve symptoms, Meet estimated needs, Initiate feeding/diet, Tolerate PO , and No significant weight loss         RD Intervention/Action Await initiation/advancement of PO diet, Continue to monitor, and Care plan reviewed   --      Prescription/Orders:       PO Diet       Supplements       Enteral Nutrition       Parenteral Nutrition    New Prescription Ordered? No changes at this time   --      Monitor/Evaluation Per protocol   Discharge Plan/Needs Pending clinical course   --    RD to follow per protocol.    Electronically signed by:  Sary Contreras RD  11/06/23 12:13 EST

## 2023-11-06 NOTE — PLAN OF CARE
Pt alert and combative. Not following commands and increasingly restless and confused. Family attentive and involved in care, pt responding well to that.  Prn pain medication given as needed. Continues on levophed. Now making more urine.

## 2023-11-06 NOTE — SIGNIFICANT NOTE
ABG 11/6/23 0842   pH 7.445  CO2 29.5  PO2 81.0  HCO3 20.3  BE -3.1   cSO2 96.6%    2 lpm NC     Showed results to Dr. Paula.   Results not crossing into pt chart; Dr. Paula aware and notified results are in progress notes

## 2023-11-06 NOTE — PROGRESS NOTES
[facility]  [phone]  Dr. Avtar Paula    Subjective:  LOS: 1    Chief Complaint: Altered mental status    Remains altered and pulling at lines.  Lot of edema below the waist.  Low blood pressure this morning therefore requiring Levophed to be increased.  Dobutamine stopped for this reason.  Patient is disoriented    Objective   Vital Signs past 24hrs  Temp range: Temp (24hrs), Av.5 °F (36.4 °C), Min:97 °F (36.1 °C), Max:98.2 °F (36.8 °C)    BP range: BP: ()/(30-86) 97/59  Pulse range: Heart Rate:  [] 91  Resp rate range: Resp:  [12-21] 21  Device (Oxygen Therapy): nasal cannulaFlow (L/min):  [2] 2  Oxygen range:SpO2:  [90 %-97 %] 95 %       Physical Exam  Eyes:      Pupils: Pupils are equal, round, and reactive to light.   Cardiovascular:      Rate and Rhythm: Bradycardia present. Rhythm irregular.      Heart sounds: No murmur heard.  Pulmonary:      Effort: Pulmonary effort is normal.      Breath sounds: Decreased breath sounds (nearly absent right lung base and dull to percuss) present.   Abdominal:      General: Bowel sounds are normal. There is distension.      Palpations: Abdomen is soft. There is no mass.      Tenderness: There is no abdominal tenderness.   Musculoskeletal:      Right lower leg: Edema present.      Left lower leg: Edema present.   Neurological:      Mental Status: He is lethargic, disoriented and confused.       Results Review:    I have reviewed the laboratory and imaging data since the last note by [facility] physician.  My annotations are noted in assessment and plan.      Result Review:  I have personally reviewed the results from last note by [facility] physician to 2023 11:17 EST and agree with these findings:  [x]  Laboratory list / accordion  [x]  Microbiology  [x]  Radiology  [x]  EKG/Telemetry   [x]  Cardiology/Vascular   []  Pathology  []  Old records  []  Other:    Medication Review:  I have reviewed the current MAR.  My annotations are noted in  There were no immediate complications during the procedure.  assessment and plan.    cefepime, 2,000 mg, Intravenous, Q12H  ferrous sulfate, 325 mg, Oral, Daily With Breakfast  lactulose, 20 g, Oral, Daily  levothyroxine, 50 mcg, Oral, Q AM  senna-docusate sodium, 2 tablet, Oral, BID  sodium chloride, 10 mL, Intravenous, Q12H  vitamin B-12, 500 mcg, Oral, BID        DOBUTamine, 5 mcg/kg/min, Last Rate: Stopped (11/06/23 0815)  norepinephrine, 0.02-0.3 mcg/kg/min, Last Rate: 0.08 mcg/kg/min (11/06/23 0903)      Lines, Drains & Airways       Active LDAs       Name Placement date Placement time Site Days    Peripheral IV 11/04/23 2342 Anterior;Right Forearm 11/04/23 2342  Forearm  less than 1    Peripheral IV 11/05/23 0406 Anterior;Left Forearm 11/05/23 0406  Forearm  less than 1    External Urinary Catheter 11/05/23 0445  --  less than 1                  Isolation status: No active isolations    Dietary Orders (From admission, onward)       Start     Ordered    11/06/23 0840  NPO Diet NPO Type: Strict NPO  Diet Effective Now        Question:  NPO Type  Answer:  Strict NPO    11/06/23 0840                    PCCM Problems  Altered mental status  Hypotension ? Cardiogenic  Acute HFpEF ?  Hypothyroidism ACUTE  Bradycardia  Elevated procal with MATT  MATT with CKD  Right pleural effusion  Thrombocytopenia  Anemia  Relevant Medical Diagnoses  Parkinson's disease  Dementia on Aricept  Chronic Afib on AC  DM2        Plan of Treatment    Altered mental status noted.  Patient has been admitted for the same in the past.  Previously due to hypoglycemia though this does not appear to be the cause at present time.  Has some underlying dementia.  CT head was unremarkable.  At this point it looks like metabolic encephalopathy.  Will monitor for now.  ABG was done and reviewed.  Not showing up in epic    Continue Levophed for hypotension.  Discussed with renal and did like the patient to stay on dobutamine.  Awaiting further input from cardiology.  Does not require PA catheter at this time.   Concern for restrictive heart disease.    Right pleural effusion seen and appears chronic.  May be worth tapping this at some point. May need CT chest.    MATT noted with CKD.  Discussed with nephrology.    Hypothyroidism noted and TSH is high.  Thyroid cascade profile is pending.    Patient has chronic A-fib and on anticoagulation.  Holding anticoagulation for now.  Recheck INR before placing central line.    Elevated Pro-Levy noted with MATT. Blood cultures were sent on admission and are pending.  Patient was given cefepime in the ED.  My suspicion for infection is low but at this time due to diagnostic conundrum we will continue cefepime.  Await culture results    Anemia and thrombocytopenia noted but appear chronic.  Continue to monitor.    Type 2 diabetes reportedly.  On previous admissions oral antihyperglycemics were discontinued due to hypoglycemic episode.  Patient has not been resumed on these.  Blood sugars controlled.    Guarded prognosis at this time.  Spoke to wife at bedside.    I spent 35 mins critical care time in care of this patient outside of any procedures.     Avtar Paula MD  11/06/23  11:17 EST      Part of this note may be an electronic transcription/translation of spoken language to printed text using the Dragon Dictation System.

## 2023-11-06 NOTE — PROGRESS NOTES
LOS: 1 day   Patient Care Team:  Harvey Larson MD as PCP - General  Harvey Larson MD as PCP - Family Medicine    Chief Complaint:  Following for CHF    Interval History:     Slightly more confused and worsening renal function.  Blood pressure remains low.    He was started on dobutamine yesterday at a rate of 15 mcg/kg/min.  Remains on epinephrine 0.08 mcg/kg/min.  Dobutamine was stopped and hemodynamics improved.  Creatinine mildly improved.    Objective   Vital Signs  Temp:  [94.1 °F (34.5 °C)-98.2 °F (36.8 °C)] 97.5 °F (36.4 °C)  Heart Rate:  [] 89  Resp:  [12-21] 21  BP: ()/(30-86) 74/48    Intake/Output Summary (Last 24 hours) at 11/6/2023 0940  Last data filed at 11/6/2023 0551  Gross per 24 hour   Intake 1462.7 ml   Output 200 ml   Net 1262.7 ml       Does not follow commands.  Confused.  PERRL, conjunctivae clear  Neck supple, JVD noted to mandible.  S1/S2 irregularly irregular, no m/r/g  Coarse rhonchorous airway sounds.  Abdomen S/NT/ND (+) BS, no HSM appreciated  Extremities warm, no clubbing, cyanosis, diffuse anasarca noted.  2+.  Does not follow commands, mild resting tremor  No visible or palpable skin lesions  Able to make eye contact, but not following commands.  Appears uncomfortable.    Results Review:      Results from last 7 days   Lab Units 11/06/23 0451 11/04/23  2341   SODIUM mmol/L 131* 132*   POTASSIUM mmol/L 4.0 4.1   CHLORIDE mmol/L 98 96*   CO2 mmol/L 19.7* 23.0   BUN mg/dL 35* 35*   CREATININE mg/dL 3.67* 3.12*   GLUCOSE mg/dL 86 108*   CALCIUM mg/dL 8.2* 9.0     Results from last 7 days   Lab Units 11/05/23  0139 11/04/23  2341   HSTROP T ng/L 119* 138*     Results from last 7 days   Lab Units 11/06/23  0451 11/04/23  2341   WBC 10*3/mm3 4.26 4.58   HEMOGLOBIN g/dL 8.3* 9.6*   HEMATOCRIT % 26.9* 31.1*   PLATELETS 10*3/mm3 103* 127*     Results from last 7 days   Lab Units 11/04/23  2341   INR  4.01*   APTT seconds 82.2*         Results from last 7 days   Lab  Units 11/06/23  0451   MAGNESIUM mg/dL 1.7           I reviewed the patient's new clinical results.  I personally viewed and interpreted the patient's EKG/Telemetry data    I reviewed his EKG from November 5 which shows low voltage and junctional rhythm.  Rates have subsequently improved to the 100s/90s on dobutamine.        Medication Review:   bumetanide, 2 mg, Intravenous, Q12H  cefepime, 2,000 mg, Intravenous, Q12H  ferrous sulfate, 325 mg, Oral, Daily With Breakfast  levothyroxine, 50 mcg, Oral, Q AM  senna-docusate sodium, 2 tablet, Oral, BID  sodium chloride, 10 mL, Intravenous, Q12H  vitamin B-12, 500 mcg, Oral, BID        DOBUTamine, 5 mcg/kg/min, Last Rate: Stopped (11/06/23 0815)  norepinephrine, 0.02-0.3 mcg/kg/min, Last Rate: 0.08 mcg/kg/min (11/06/23 0903)        Assessment & Plan       Hypotension    ASCVD (arteriosclerotic cardiovascular disease)    Permanent atrial fibrillation    Parkinson's disease    Acute on chronic renal failure    Acute on chronic decompensated diastolic heart failure  Probable cardiogenic shock  Agree with central IJ access, this can help us define his hemodynamic parameters more efficiently  Continue epinephrine, may need to add dobutamine depending on right atrial SvO2  Reviewed the echocardiogram, the walls do appear thicker on this most recent echocardiogram and do suggest what appears to be an infiltrative cardiomyopathy, possible amyloidosis, which could explain the low voltage on his EKG  Normal LVEF and wall motion, suspicion for any ischemic cardiomyopathy contributing to this is low given the echo and ECG findings  Acute on chronic renal failure  Nephrology following for diuresis  Chronic atrial fibrillation, initially junctional rhythm now improved  Previously on Eliquis, held for now.  May need to resume heparin pending clinical and hemoglobin stability   Anemia, thrombocytopenia  Transaminase, alkaline phosphatase elevation with bilirubinemia.  Likely related to  congestive hepatopathy.  Trialing on norepinephrine to help with output and BP, hopefully this is able to improve some    I suspect there is a component of infiltrative/amyloid cardiomyopathy contributing to his presentation given his EKG and echo findings.  What does remain clear is why suddenly his hemodynamics are worsened, the wife states that he was doing status quo up until that Wednesday when he had a slow decline.  Infectious work-up ongoing.  Agree with central access, we can obtain a SvO2 from his central line to quickly help differentiate the source of shock.    I did have a discussion with patient's wife today about his overall prognosis.  Still remains unclear what is driving his main presentation, but regardless if this is primary heart failure, or if there is an infectious component, his prognosis does remain poor overall.  If this is infiltrative cardiomyopathy due to amyloidosis, his prognosis is already poor, the fact that he is requiring Levophed for blood pressure support the setting of normal LVEF, this further point towards a poor prognosis.  They did discuss and mention that in the event that there are no evidence of reversible causes and quality of life remain poor, they would pursue comfort measures.  I do think that with little more data about patient's current hemodynamic status may be helpful with IJ access.  Hopefully his INR continues to downtrend and this can help us delineate the course, and possible treatment options, of his condition.  I do not think a dobutamine is needed right now his extremities are warm, and it appears that it worsened his pressures when he was on it.    William Sargent MD  11/06/23  09:40 EST      Part of this note may be an electronic transcription/translation of spoken language to printed text using the Dragon Dictation System.

## 2023-11-07 NOTE — PROGRESS NOTES
LOS: 2 days   Patient Care Team:  Harvey Larson MD as PCP - General  Harvey Larson MD as PCP - Family Medicine    Chief Complaint:  Following for CHF    Interval History:     UOP improved, but creatinine worse.  Remains encephalopathic and remains on Levophed.    Objective   Vital Signs  Temp:  [96.1 °F (35.6 °C)-97.9 °F (36.6 °C)] 97.9 °F (36.6 °C)  Heart Rate:  [] 100  Resp:  [16-24] 16  BP: ()/() 98/88    Intake/Output Summary (Last 24 hours) at 11/7/2023 0711  Last data filed at 11/7/2023 0106  Gross per 24 hour   Intake 983.62 ml   Output 1410 ml   Net -426.38 ml       Does not follow commands.  Confused.  Appears uncomfortable  PERRL, conjunctivae clear  Neck supple, JVD noted to mandible.  S1/S2 irregularly irregular, no m/r/g  Coarse rhonchorous airway sounds.  Abdomen S/NT/ND (+) BS, no HSM appreciated  Extremities warm, no clubbing, cyanosis, diffuse anasarca noted.  2+.  Does not follow commands, mild resting tremor  No visible or palpable skin lesions  Able to make eye contact, but not following commands     Results Review:      Results from last 7 days   Lab Units 11/07/23  0325 11/06/23  1048 11/06/23  0451   SODIUM mmol/L 133* 136 131*   POTASSIUM mmol/L 4.5 3.2* 4.0   CHLORIDE mmol/L 101 109* 98   CO2 mmol/L 20.1* 16.8* 19.7*   BUN mg/dL 39* 31* 35*   CREATININE mg/dL 4.19* 3.05* 3.67*   GLUCOSE mg/dL 71 53* 86   CALCIUM mg/dL 8.8 6.5* 8.2*     Results from last 7 days   Lab Units 11/05/23  0139 11/04/23  2341   HSTROP T ng/L 119* 138*     Results from last 7 days   Lab Units 11/07/23  0325 11/06/23  2016 11/06/23  1218   WBC 10*3/mm3 5.96 4.78 4.73   HEMOGLOBIN g/dL 8.8* 8.5* 8.6*   HEMATOCRIT % 28.5* 27.2* 27.9*   PLATELETS 10*3/mm3 106* 103* 103*     Results from last 7 days   Lab Units 11/07/23  0603 11/06/23  1048 11/04/23  2341   INR  3.81* 6.41* 4.01*   APTT seconds  --  78.7* 82.2*         Results from last 7 days   Lab Units 11/07/23  0325   MAGNESIUM mg/dL 1.8            I reviewed the patient's new clinical results.  I personally viewed and interpreted the patient's EKG/Telemetry data    I reviewed his EKG from November 5 which shows low voltage and junctional rhythm.  Rates have subsequently improved to the 100s/90s on dobutamine.        Medication Review:   bumetanide, 2 mg, Intravenous, Q8H  cefepime, 2,000 mg, Intravenous, Q12H  ferrous sulfate, 325 mg, Oral, Daily With Breakfast  lactulose, 20 g, Oral, Daily  levothyroxine, 50 mcg, Oral, Q AM  senna-docusate sodium, 2 tablet, Oral, BID  sodium chloride, 10 mL, Intravenous, Q12H  vitamin B-12, 500 mcg, Oral, BID        DOBUTamine, 5 mcg/kg/min, Last Rate: Stopped (11/06/23 0815)  norepinephrine, 0.02-0.3 mcg/kg/min, Last Rate: 0.06 mcg/kg/min (11/07/23 0611)        Assessment & Plan       Hypotension    ASCVD (arteriosclerotic cardiovascular disease)    Permanent atrial fibrillation    Parkinson's disease    Acute on chronic renal failure    Acute on chronic decompensated diastolic heart failure  Probable cardiogenic shock  At this point, his options are limited.  He has worsening renal dysfunction, remains encephalopathic and is requiring pressors.  Even if aggressive measures were pursued, such as dialysis, invasive catheter placements, I discussed with wife to be very unlikely that he would have good quality of life after this.  He still has issues with orthostatic hypotension, HFpEF that is likely progressed due to infiltrative cardiomyopathy, and his ability to have good quality of life even despite the most aggressive measures we have very low.  His prognosis is very poor.  I discussed with them and they are leaning towards comfort measures, but are waiting to discuss this with the daughter.  Reviewed the echocardiogram, the walls do appear thicker on this most recent echocardiogram and do suggest what appears to be an infiltrative cardiomyopathy, possible amyloidosis, which could explain the low voltage on his  EKG   Acute on chronic renal failure  Nephrology following for diuresis  Chronic atrial fibrillation, initially junctional rhythm now improved  Previously on Eliquis, held for now.  May need to resume heparin pending clinical and hemoglobin stability   Anemia, thrombocytopenia  Transaminase, alkaline phosphatase elevation with bilirubinemia.  Likely related to congestive hepatopathy.  Trialing on norepinephrine to help with output and BP, hopefully this is able to improve some     I think a palliative approach is reasonable at this point.  Despite the best medical interventions he has continued to decline.,  Even even if he does well through this his quality of life and overall prognosis remains poor.     William Sargent MD  11/07/23  07:11 EST      Part of this note may be an electronic transcription/translation of spoken language to printed text using the Dragon Dictation System.

## 2023-11-07 NOTE — PROGRESS NOTES
[facility]  [phone]  Dr. Avtar Paula    Subjective:  LOS: 2    Chief Complaint: Altered mental status    Patient is obtunded and not responding to qs, eyes closed etc.    Objective   Vital Signs past 24hrs  Temp range: Temp (24hrs), Av.4 °F (36.3 °C), Min:96.1 °F (35.6 °C), Max:97.9 °F (36.6 °C)    BP range: BP: ()/() 93/47  Pulse range: Heart Rate:  [] 62  Resp rate range: Resp:  [16-24] 16  Device (Oxygen Therapy): nasal cannulaFlow (L/min):  [2] 2  Oxygen range:SpO2:  [91 %-97 %] 91 %       Physical Exam  Eyes:      Pupils: Pupils are equal, round, and reactive to light.   Cardiovascular:      Rate and Rhythm: Bradycardia present. Rhythm irregular.      Heart sounds: No murmur heard.  Pulmonary:      Effort: Pulmonary effort is normal.      Breath sounds: Decreased breath sounds (both lung bases absent and dull to percuss) present.   Abdominal:      General: Bowel sounds are normal. There is distension.      Palpations: Abdomen is soft. There is no mass.      Tenderness: There is no abdominal tenderness.   Musculoskeletal:      Right lower leg: Edema present.      Left lower leg: Edema present.   Neurological:      Mental Status: He is lethargic, disoriented and confused.       Results Review:    I have reviewed the laboratory and imaging data since the last note by [facility] physician.  My annotations are noted in assessment and plan.      Result Review:  I have personally reviewed the results from last note by [facility] physician to 2023 09:55 EST and agree with these findings:  [x]  Laboratory list / accordion  [x]  Microbiology  [x]  Radiology  [x]  EKG/Telemetry   [x]  Cardiology/Vascular   []  Pathology  []  Old records  []  Other:    Medication Review:  I have reviewed the current MAR.  My annotations are noted in assessment and plan.    bumetanide, 3 mg, Intravenous, Q8H  cefepime, 2,000 mg, Intravenous, Q12H  ferrous sulfate, 325 mg, Oral, Daily With  Breakfast  lactulose, 20 g, Oral, Daily  levothyroxine, 50 mcg, Oral, Q AM  senna-docusate sodium, 2 tablet, Oral, BID  sodium chloride, 10 mL, Intravenous, Q12H  vitamin B-12, 500 mcg, Oral, BID        DOBUTamine, 5 mcg/kg/min, Last Rate: Stopped (11/06/23 0815)  norepinephrine, 0.02-0.3 mcg/kg/min, Last Rate: 0.1 mcg/kg/min (11/07/23 0736)      Lines, Drains & Airways       Active LDAs       Name Placement date Placement time Site Days    Peripheral IV 11/04/23 2342 Anterior;Right Forearm 11/04/23 2342  Forearm  less than 1    Peripheral IV 11/05/23 0406 Anterior;Left Forearm 11/05/23 0406  Forearm  less than 1    External Urinary Catheter 11/05/23 0445  --  less than 1                  Isolation status: No active isolations    Dietary Orders (From admission, onward)       Start     Ordered    11/06/23 0840  NPO Diet NPO Type: Strict NPO  Diet Effective Now        Question:  NPO Type  Answer:  Strict NPO    11/06/23 0840                    PCCM Problems  Altered mental status  Hypotension ? Cardiogenic  Acute HFpEF ?  Hypothyroidism ACUTE  Bradycardia  Elevated procal with MATT  MATT with CKD  Right pleural effusion  Thrombocytopenia  Anemia  Relevant Medical Diagnoses  Parkinson's disease  Dementia on Aricept  Chronic Afib on AC  DM2        Plan of Treatment    Altered mental status noted.  At this point it looks like metabolic encephalopathy.    Continue Levophed for hypotension. Concern for restrictive heart disease. Diuresing as able to - limited by renal function. Worsening CHF on CxR.    MATT noted with CKD.  Discussed with nephrology.    Hypothyroidism noted. On Synthroid.    Patient has chronic A-fib and on anticoagulation.  Holding anticoagulation for now.  INR still high. FFP if needed.    Elevated Pro-Levy noted with MATT. Blood cultures were sent on admission and are pending.  Patient was given cefepime in the ED.  My suspicion for infection is low but at this time due to diagnostic conundrum we will  continue cefepime. Cultures NG so far.    Anemia and thrombocytopenia noted but appear chronic.  Continue to monitor.    Type 2 diabetes reportedly.  On previous admissions oral antihyperglycemics were discontinued due to hypoglycemic episode.  Patient has not been resumed on these.  Blood sugars low. Monitor.    After much discussion with cardiology and nephrology, favor cardiac amyloidosis and poor prognosis. Spouse considering comfort measures. Wants DNR and DNI for now. Will ask palliative to see.    Avtar Paula MD  11/07/23  09:55 EST      Part of this note may be an electronic transcription/translation of spoken language to printed text using the Dragon Dictation System.

## 2023-11-07 NOTE — PROGRESS NOTES
REASON FOR FOLLOWUP/CHIEF COMPLAINT:   Prolonged INR     HISTORY OF PRESENT ILLNESS:   Patient still not responding.  Poor prognosis discussed with patient's family by cardiology as well as ICU team and his wife is leaning towards opting comfort measures.    Past Medical History, Past Surgical History, Social History, Family History have been reviewed and are without significant changes except as mentioned.    Review of Systems   Review of Systems  Unable to obtain    Medications:  The current medication list was reviewed in the EMR    ALLERGIES:    Allergies   Allergen Reactions    Bacitracin Anaphylaxis    Neosporin [Neomycin-Bacitracin Zn-Polymyx] Other (See Comments)     BP drops and heart stops!    Fish-Derived Products Hives    Shellfish Allergy Hives    Shrimp (Diagnostic) Hives              Vitals:    11/07/23 0744 11/07/23 1000 11/07/23 1100 11/07/23 1205   BP:  113/69 102/79    Pulse:  105 105    Resp:       Temp: 97.5 °F (36.4 °C)   97.8 °F (36.6 °C)   TempSrc: Oral   Oral   SpO2:  94% 93%    Weight:       Height:         Physical Exam    CONSTITUTIONAL:  Vital signs reviewed.  Patient appears uncomfortable.  EARS,NOSE,MOUTH,THROAT:  Ears and nose appear unremarkable.  Lips, teeth, gums appear unremarkable.  RESPIRATORY: Diminished breath sounds bilaterally  CARDIOVASCULAR:  Normal S1, S2.  No murmurs rubs or gallops.  Bilateral edema  GASTROINTESTINAL: Abdomen appears unremarkable.  Nontender.  No hepatomegaly.  No splenomegaly.         RECENT LABS:  WBC   Date Value Ref Range Status   11/07/2023 5.96 3.40 - 10.80 10*3/mm3 Final   11/06/2023 4.78 3.40 - 10.80 10*3/mm3 Final   11/06/2023 4.73 3.40 - 10.80 10*3/mm3 Final   11/06/2023 4.26 3.40 - 10.80 10*3/mm3 Final   11/04/2023 4.58 3.40 - 10.80 10*3/mm3 Final     Hemoglobin   Date Value Ref Range Status   11/07/2023 8.8 (L) 13.0 - 17.7 g/dL Final   11/06/2023 8.5 (L) 13.0 - 17.7 g/dL Final   11/06/2023 8.6 (L) 13.0 - 17.7 g/dL Final   11/06/2023  8.3 (L) 13.0 - 17.7 g/dL Final   11/04/2023 9.6 (L) 13.0 - 17.7 g/dL Final     Platelets   Date Value Ref Range Status   11/07/2023 106 (L) 140 - 450 10*3/mm3 Final   11/06/2023 103 (L) 140 - 450 10*3/mm3 Final   11/06/2023 103 (L) 140 - 450 10*3/mm3 Final   11/06/2023 103 (L) 140 - 450 10*3/mm3 Final   11/04/2023 127 (L) 140 - 450 10*3/mm3 Final       ASSESSMENT/PLAN:  Edilberto Reeder [id number]     Prolonged INR  Likely multifactorial  INR elevated at 6.41 with PT being 58.1.  On presentation INR was 4.01.  Patient has been on Eliquis which can prolong INR and even in the past his INR has been elevated between 2-3.  Suspect worsening of INR secondary to poor nutrition, possible infection   Mixing study with correction of PT to 17.2.  Suggestive of factor deficiency.  Could use FFP to correct the INR prior to line placement if required, assuming some other factors besides Eliquis playing a role in elevation of INR  Patient is not actively bleeding and hence would not recommend any other further aggressive measures to correct the INR.  Could consider vitamin K if necessary.  INR somewhat better at 3.81     *Thrombocytopenia  CT abdomen shows a normal spleen.  He did have previous thrombocytopenia however platelet counts were relatively normal in September 2023  Again could be secondary to possible infection  Currently on antibiotics per primary team  B12 and folic acid normal  Platelets stable at 106,000     *Cardiac failure  Echocardiogram concerning for an infiltrative process  Myeloma studies pending     *Possible infection  Slightly elevated procalcitonin  Currently on cefepime.  Management per primary team     *Anemia  Chronic  Likely secondary to a combination of iron deficiency in the setting of renal dysfunction.  Hemoglobin has been stable since admission.  Hemoglobin stable at 8.8     Recommendations  Overall poor prognosis given worsening renal dysfunction, cardiogenic shock and abnormal coags.  Poor  quality of life with interventions discussed with wife and decision has been made to proceed with comfort measures and hospice.  We will sign off at this time.  Please call with additional questions or concerns

## 2023-11-07 NOTE — SIGNIFICANT NOTE
11/07/23 1241   OTHER   Discipline physical therapist   Rehab Time/Intention   Session Not Performed unable to treat, medical status change  (Pt remains obtunded, not following commands. Noted pallative consult placed; will await decisions regarding GOC and follow up as appropriate.)   Recommendation   PT - Next Appointment 11/08/23

## 2023-11-07 NOTE — PLAN OF CARE
Goal Outcome Evaluation: Pt was made palliative at 1243 on 11/7/23 by wife with family supporting at bedside. Will proceed with comfort measures until transer

## 2023-11-07 NOTE — CONSULTS
Purpose of the visit was to evaluate for: goals of care/advanced care planning, support for patient/family, pain/symptom management, withdrawal of interventions, and comfort care. Spoke with MD and RN as well as family and discussed palliative care, goals of care, care options, and resuscitation status.      Assessment:  Patient is palliative care appropriate for inpatient care given (list diagnosis/symptoms):  73 year old male with past medical history including dementia and parkinson's disease, DM, afib, admitted to ICU with hypotension requiring pressors, acute on chronic heart failure, bilateral pleural effusions, MATT on CKD. Cardiology suspects amyloid cardiomyopathy. Patient is encephalopathic, moaning at times and appears uncomfortable and dyspneic. Unable to communicate. PPS 10%     Recommendations/Plan: Wife is wanting to transition to comfort measures only but would like to wait until her daughter can be present at the hospital this afternoon. When family is ready, change code status to comfort measures only and gear all treatment options towards symptom management, transfer to 4P if stable and consult [facility].     Other Comments: Spoke with patient's wife [place] at bedside. We discussed goals of care, treatment options and code status in detail. [place] has good understanding of situation. She understands patient is unlikely to improve, and she does not want to escalate care with more aggressive life saving measures or prolong his suffering with current treatments. We discussed inpatient palliative care and medications used to alleviate suffering, answered all questions and provided support, advised of ongoing availability. Discussed with Dr. Paula and JOSÉ Albert.

## 2023-11-07 NOTE — PROGRESS NOTES
[facility] Progress Note      Patient Name: Edilberto Reeder  : 1950  MRN: [id number]  Primary Care Physician:  Harvey Larson MD  Date of admission: 2023    Subjective     Interval History:   The patient was seen and examined today for follow-up on MATT   Patient continues to be confused.    Urine output is picking up   Family is considering comfort measures     Review of Systems:   As noted above    Objective     Vitals:   Temp:  [96.1 °F (35.6 °C)-97.9 °F (36.6 °C)] 97.5 °F (36.4 °C)  Heart Rate:  [] 62  Resp:  [16-24] 16  BP: ()/() 93/47  Flow (L/min):  [2] 2    Intake/Output Summary (Last 24 hours) at 2023 0809  Last data filed at 2023 0106  Gross per 24 hour   Intake 983.62 ml   Output 1410 ml   Net -426.38 ml       Physical Exam:    General Appearance: Mildly agitated.  Confused  Skin: warm and dry  HEENT: oral mucosa normal, nonicteric sclera  Neck: supple, no JVD  Lungs: Decreased air entry bilateral  Heart: RRR, normal S1 and S2  Abdomen: soft, nontender, nondistended  : Scrotal edema noted.  Blood at the penis meatus  Extremities: 1-2+ pitting edema bilateral lower extremities up to the thighs.  Neuro: Unable to examine as patient is noncooperative    Scheduled Meds:     bumetanide, 2 mg, Intravenous, Q8H  cefepime, 2,000 mg, Intravenous, Q12H  ferrous sulfate, 325 mg, Oral, Daily With Breakfast  lactulose, 20 g, Oral, Daily  levothyroxine, 50 mcg, Oral, Q AM  senna-docusate sodium, 2 tablet, Oral, BID  sodium chloride, 10 mL, Intravenous, Q12H  vitamin B-12, 500 mcg, Oral, BID      IV Meds:   DOBUTamine, 5 mcg/kg/min, Last Rate: Stopped (23 0815)  norepinephrine, 0.02-0.3 mcg/kg/min, Last Rate: 0.1 mcg/kg/min (23 0736)        Results Reviewed:   I have personally reviewed the results from the time of this admission to 2023 08:09 EST     Results from last 7 days   Lab Units 23  0325 23  1048  11/06/23  0451 11/04/23  2341   SODIUM mmol/L 133* 136 131* 132*   POTASSIUM mmol/L 4.5 3.2* 4.0 4.1   CHLORIDE mmol/L 101 109* 98 96*   CO2 mmol/L 20.1* 16.8* 19.7* 23.0   BUN mg/dL 39* 31* 35* 35*   CREATININE mg/dL 4.19* 3.05* 3.67* 3.12*   CALCIUM mg/dL 8.8 6.5* 8.2* 9.0   BILIRUBIN mg/dL  --  1.8*  --  2.1*   ALK PHOS U/L  --  171*  --  276*   ALT (SGPT) U/L  --  5  --  5   AST (SGOT) U/L  --  33  --  44*   GLUCOSE mg/dL 71 53* 86 108*       Estimated Creatinine Clearance: 18.6 mL/min (A) (by C-G formula based on SCr of 4.19 mg/dL (H)).    Results from last 7 days   Lab Units 11/07/23  0325 11/06/23 0451 11/04/23  2341   MAGNESIUM mg/dL 1.8 1.7 1.9   PHOSPHORUS mg/dL 5.1* 3.8  --        Results from last 7 days   Lab Units 11/06/23  1048   URIC ACID mg/dL 3.4       Results from last 7 days   Lab Units 11/07/23  0325 11/06/23 2016 11/06/23  1218 11/06/23  0451 11/04/23  2341   WBC 10*3/mm3 5.96 4.78 4.73 4.26 4.58   HEMOGLOBIN g/dL 8.8* 8.5* 8.6* 8.3* 9.6*   PLATELETS 10*3/mm3 106* 103* 103* 103* 127*       Results from last 7 days   Lab Units 11/07/23  0603 11/06/23  1048 11/04/23  2341   INR  3.81* 6.41* 4.01*       Assessment / Plan     ASSESSMENT:  Acute kidney injury on chronic kidney disease stage III- Acute kidney injury likely related to decreased perfusion from hypotension in a setting of  impaired renal autoregulation from ACE inhibitor use as an outpatient.  Last echocardiogram showed the presence of right-sided heart failure in addition feature favors infiltrative cardiomyopathy with diastolic heart failure this was associated with pulmonary hypertension.    Acute on chronic congestive heart failure- CXR showed cardiomegaly with bilateral pleural effusions. 11/5/23 ECHO   Hyponatremia- hypervolemic hyponatremia, sodium 132 today (11/5/23)  Hypotension- previous history of hypertension, now hypotensive. Anti-hypertensive medication being held. Currently on levophed gtt.   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 Urine output improved after levo and diuresis  Appreciate cardiology recommendation   Discussed with son at bedside who noted that the family is contemplating comfort measures and they are awaiting for the wife to make the final decision. We will increase bumex to 3 mg TID awaiting family decision   We will bladder scan every 8 hours  Discussed with Avtar Higgins       Thank you for involving us in the care of Edilberto Reeder.  Please feel free to call with any questions.    Jarad Redmond MD  11/07/23  08:09 [facility]    [facility]  [phone]    Parts of this note may be an electronic transcription/translation of spoken language to printed text using the Dragon dictation system.

## 2023-11-08 NOTE — CASE MANAGEMENT/SOCIAL WORK
Continued Stay Note  [facility]     Patient Name: Edilberto Reeder  MRN: [id number]  Today's Date: 2023    Admit Date: 2023        Discharge Plan       Row Name 23 1729       Plan    Final Discharge Disposition Code 20 -     Final Note Pt                    Discharge Codes    No documentation.                 Expected Discharge Date and Time       Expected Discharge Date Expected Discharge Time    Nov 10, 2023               Annita Garcia RN

## 2023-11-08 NOTE — DISCHARGE SUMMARY
Date of Admission: 2023  Date of Discharge:  2023    Discharge Diagnosis:    Altered mental status  Hypotension ? Cardiogenic  Acute HFpEF ?  Hypothyroidism ACUTE  Bradycardia  Elevated procal with MATT  MATT with CKD  Right pleural effusion  Thrombocytopenia  Anemia  Relevant Medical Diagnoses  Parkinson's disease  Dementia on Aricept  Chronic Afib on AC  DM2    Presenting Problem/History of Present Illness    Edilberto Reeder is a 73-year-old male with a past medical history of Parkinson's disease (implanted brain stimulator and on carbidopa/levodopa), chronic atrial fibrillation on anticoagulation (Eliquis), chronic anemia, type 2 diabetes mellitus, hypothyro patient was admitted with hypotension on , and diastolic congestive heart failure.     He presented to the emergency department on 2023 with complaints of altered mental status, hallucinations and lethargy.  Patient's wife at bedside providing history, patient is confused at time of assessment.  She reports that patient's had an increase in his falls recently including a fall yesterday, she denies any trauma or head injury.  Patient woke this morning at 5 AM with confusion-wife reports that he has been interacting with visual hallucinations.  Upon assessment, patient denies any specific complaints besides sleepiness.    Hospital Course    523.  He will seen by cardiology.  Altered mental status was noted.  Levophed was added.  My impression was that this patient may have right heart failure.  Nephrology was consulted and diuresis attempted.  Cardiology input also noted.  Diuresis was impaired by worsening renal function.  Echocardiogram showed evidence of restrictive heart disease with suspicion for amyloidosis.  His condition continued to worsen and family elected for palliative measures given the poor prognosis from the cardiac standpoint.  Patient  as below      Condition on Discharge:   : Date and Time of Death:  11/7/2023 at 8:00 PM       Avtar Paula MD  11/08/23  07:46 EST    Time: I spent UNDER 30mins in the discharge planning of this patient.    Some of this encounter note is an electronic transcription/translation of spoken language to printed text.

## 2023-11-08 NOTE — NURSING NOTE
RN went into the patient room to inform family will give medications for comfort and give support to family. During this discussion patient . No heart tones, no resp, no pulse , rhythm asystole, no saturation noted. Verified with charge JOSÉ Murphy

## 2023-11-09 LAB — BACTERIA SPEC AEROBE CULT: NORMAL

## 2023-11-09 RX ORDER — ROSUVASTATIN CALCIUM 20 MG/1
20 TABLET, COATED ORAL NIGHTLY
Qty: 90 TABLET | Refills: 3 | OUTPATIENT
Start: 2023-11-09

## 2023-11-09 NOTE — PROGRESS NOTES
Enter Query Response Below      Query Response: clinically insignificant               If applicable, please update the problem list.     Patient: Edilberto Reeder        : 1950  Account: [id number]           Admit Date: 2023        How to Respond to this query:       a. Click New Note     b. Answer query within the yellow box.                c. Update the Problem List, if applicable.      If you have any questions about this query contact me at: [email or web address]    Dr. Redmond,    Patient admitted with acute on chronic CHF, MATT/CKD, encephalopathy, hypotension. Hyponatremia documented by nephrology consults. Sodium during admission:  132;  131, 136;  133. NaCl 0.9% boluses and infusion.     Please clarify the following:  Hyponatremia- clinically significant  Hyponatremia- clinically insignificant  Other- specify______  Unable to determine    By submitting this query, we are merely seeking further clarification of documentation to accurately reflect all conditions that you are monitoring, evaluating, treating or that extend the hospitalization or utilize additional resources of care. Please utilize your independent clinical judgment when addressing the question(s) above.     This query and your response, once completed, will be entered into the legal medical record.    Sincerely,  Shanda Castellanos RN BSN  Clinical Documentation Integrity Program

## 2023-11-09 NOTE — PROGRESS NOTES
Enter Query Response Below      Query Response: Heart failure due to hypertension, atrial fibrillation and valvular disease and possible cardiac amyloidosis             If applicable, please update the problem list.     Patient: Edilberto Reeder        : 1950  Account: [id number]           Admit Date: 2023        How to Respond to this query:       a. Click New Note     b. Answer query within the yellow box.                c. Update the Problem List, if applicable.      If you have any questions about this query contact me at: [email or web address]    Dr. Sargent,     Patient admitted with acute on chronic diastolic CHF. History of hypertension (now hypotensive), permanent atrial fibrillation, moderate tricuspid valve insufficiency. Treatment includes IV bumex.    Please clarify the etiology of the patient’s heart failure:  Heart failure due to hypertension  Heart failure due to atrial fibrillation  Heart failure due to valvular disease  Heart failure due to hypertension, atrial fibrillation and valvular disease  Other- specify__________  Unable to determine    By submitting this query, we are merely seeking further clarification of documentation to accurately reflect all conditions that you are monitoring, evaluating, treating or that extend the hospitalization or utilize additional resources of care. Please utilize your independent clinical judgment when addressing the question(s) above.     This query and your response, once completed, will be entered into the legal medical record.    Sincerely,  Shanda Castellanos RN BSN  Clinical Documentation Integrity Program

## 2023-11-10 LAB — BACTERIA SPEC AEROBE CULT: NORMAL

## (undated) DEVICE — FRCP BX RADJAW4 NDL 2.8 240CM LG OG BX40

## (undated) DEVICE — SENSR O2 OXIMAX FNGR A/ 18IN NONSTR

## (undated) DEVICE — KT ORCA ORCAPOD DISP STRL

## (undated) DEVICE — LN SMPL CO2 SHTRM SD STREAM W/M LUER

## (undated) DEVICE — BITEBLOCK OMNI BLOC

## (undated) DEVICE — ADAPT CLN BIOGUARD AIR/H2O DISP

## (undated) DEVICE — TUBING, SUCTION, 1/4" X 10', STRAIGHT: Brand: MEDLINE

## (undated) DEVICE — SNAR POLYP CAPTIVATOR RND STFF 2.4 240CM 10MM 1P/U

## (undated) DEVICE — MSK PROC CURAPLEX O2 2/ADAPT 7FT